# Patient Record
Sex: MALE | Race: WHITE | NOT HISPANIC OR LATINO | URBAN - METROPOLITAN AREA
[De-identification: names, ages, dates, MRNs, and addresses within clinical notes are randomized per-mention and may not be internally consistent; named-entity substitution may affect disease eponyms.]

---

## 2020-11-09 ENCOUNTER — OFFICE VISIT (OUTPATIENT)
Dept: ENDOCRINOLOGY | Facility: CLINIC | Age: 70
End: 2020-11-09

## 2020-11-09 VITALS
DIASTOLIC BLOOD PRESSURE: 78 MMHG | HEART RATE: 82 BPM | SYSTOLIC BLOOD PRESSURE: 126 MMHG | TEMPERATURE: 98.9 F | OXYGEN SATURATION: 98 % | BODY MASS INDEX: 32.93 KG/M2 | HEIGHT: 70 IN | WEIGHT: 230 LBS

## 2020-11-09 DIAGNOSIS — I10 BENIGN HYPERTENSION: ICD-10-CM

## 2020-11-09 DIAGNOSIS — E11.65 UNCONTROLLED TYPE 2 DIABETES MELLITUS WITH HYPERGLYCEMIA (HCC): Primary | ICD-10-CM

## 2020-11-09 PROBLEM — I25.10 ATHEROSCLEROSIS OF NATIVE CORONARY ARTERY OF NATIVE HEART WITHOUT ANGINA PECTORIS: Status: ACTIVE | Noted: 2020-11-09

## 2020-11-09 PROBLEM — E78.2 MIXED HYPERLIPIDEMIA: Status: ACTIVE | Noted: 2020-11-09

## 2020-11-09 LAB
EXPIRATION DATE: NORMAL
HBA1C MFR BLD: 6.8 %
Lab: NORMAL

## 2020-11-09 PROCEDURE — 83036 HEMOGLOBIN GLYCOSYLATED A1C: CPT | Performed by: INTERNAL MEDICINE

## 2020-11-09 PROCEDURE — 99213 OFFICE O/P EST LOW 20 MIN: CPT | Performed by: INTERNAL MEDICINE

## 2020-11-09 RX ORDER — DULAGLUTIDE 1.5 MG/.5ML
INJECTION, SOLUTION SUBCUTANEOUS
COMMUNITY
Start: 2020-11-06 | End: 2021-05-27 | Stop reason: DRUGHIGH

## 2020-11-09 RX ORDER — VALSARTAN AND HYDROCHLOROTHIAZIDE 320; 25 MG/1; MG/1
1 TABLET, FILM COATED ORAL DAILY
COMMUNITY
Start: 2020-11-06

## 2020-11-09 RX ORDER — OMEPRAZOLE 20 MG/1
1 CAPSULE, DELAYED RELEASE ORAL DAILY
COMMUNITY
Start: 2020-09-01 | End: 2022-03-07 | Stop reason: SDUPTHER

## 2020-11-09 RX ORDER — POTASSIUM CITRATE 15 MEQ/1
TABLET, EXTENDED RELEASE ORAL EVERY 12 HOURS SCHEDULED
COMMUNITY
End: 2021-10-12

## 2020-11-09 RX ORDER — UBIDECARENONE 100 MG
100 CAPSULE ORAL DAILY
COMMUNITY

## 2020-11-09 RX ORDER — NITROGLYCERIN 0.4 MG/1
TABLET SUBLINGUAL
COMMUNITY

## 2020-11-09 RX ORDER — ALLOPURINOL 300 MG/1
1 TABLET ORAL DAILY
COMMUNITY
Start: 2020-09-01 | End: 2022-03-07 | Stop reason: SDUPTHER

## 2020-11-09 RX ORDER — METOPROLOL SUCCINATE 50 MG/1
1 TABLET, EXTENDED RELEASE ORAL DAILY
COMMUNITY
Start: 2020-11-06

## 2020-11-09 NOTE — PROGRESS NOTES
"     Office Note      Date: 2020  Patient Name: Aldair Narvaez  MRN: [id number]  : 1950    Chief Complaint   Patient presents with   • Follow-up   • Diabetes       History of Present Illness:   Aldair Narvaez is a 70 y.o. male who presents for Diabetes type 2. Diagnosed in: . Treated in past with oral agents. Current treatments: trulicity. Number of insulin shots per day: none. Checks blood sugar none times a day. Has low blood sugar: no. Aspirin use: Yes. Statin use: No - intolerant. ACE-I/ARB use: Yes. Changes in health since last visit: kidney stone. Last eye exam 2019.    Subjective      Diabetic Complications:  Eyes: No  Kidneys: No  Feet: No  Heart: Yes - stents    Diet and Exercise:  Meals per day: 2  Minutes of exercise per week: 0 mins.    Review of Systems:   Review of Systems   Constitutional: Negative.    Cardiovascular: Negative.    Gastrointestinal: Positive for diarrhea and vomiting.   Endocrine: Negative.        The following portions of the patient's history were reviewed and updated as appropriate: allergies, current medications, past family history, past medical history, past social history, past surgical history and problem list.    Objective       Visit Vitals  /78   Pulse 82   Temp 98.9 °F (37.2 °C) (Infrared)   Ht 177.8 cm (70\")   Wt 104 kg (230 lb)   SpO2 98%   BMI 33.00 kg/m²       Physical Exam:  Physical Exam  Constitutional:       Appearance: Normal appearance.   Neurological:      Mental Status: He is alert.         Labs:    HbA1c  Lab Results   Component Value Date    HGBA1C 6.8 2020       CMP  No results found for: GLUCOSE, BUN, CREATININE, EGFRIFNONA, EGFRIFAFRI, BCR, K, CO2, CALCIUM, PROTENTOTREF, LABIL2, BILIRUBIN, AST, ALT     Lipid Panel        TSH  No results found for: TSH, FREET4     Hemoglobin A1C  Lab Results   Component Value Date    HGBA1C 6.8 2020        Microalbumin/Creatinine  No results found for: MALBCRERATIO, CREATINIURIN, " MICROALBUR        Assessment / Plan      Assessment & Plan:  Problem List Items Addressed This Visit        Cardiovascular and Mediastinum    Benign hypertension    Current Assessment & Plan     Hypertension is unchanged.  Continue current treatment regimen.  Blood pressure will be reassessed in 3 months.         Relevant Medications    valsartan-hydrochlorothiazide (DIOVAN-HCT) 320-25 MG per tablet    metoprolol succinate XL (TOPROL-XL) 50 MG 24 hr tablet       Endocrine    Uncontrolled type 2 diabetes mellitus with hyperglycemia (CMS/[facility]) - Primary    Current Assessment & Plan     Diabetes is unchanged.   Continue current treatment regimen.  Diabetes will be reassessed in 3 months.    A1c okay.         Relevant Medications    Trulicity 1.5 MG/0.5ML solution pen-injector    Other Relevant Orders    POC Glycosylated Hemoglobin (Hb A1C) (Completed)           Return in about 3 months (around 2/9/2021) for Recheck with A1c, CMP, lipids, TSH, microalbumin.    Steve Cohen MD   11/09/2020

## 2020-11-09 NOTE — ASSESSMENT & PLAN NOTE
Diabetes is unchanged.   Continue current treatment regimen.  Diabetes will be reassessed in 3 months.    A1c okay.

## 2021-02-17 ENCOUNTER — OFFICE VISIT (OUTPATIENT)
Dept: ENDOCRINOLOGY | Facility: CLINIC | Age: 71
End: 2021-02-17

## 2021-02-17 ENCOUNTER — LAB (OUTPATIENT)
Dept: LAB | Facility: HOSPITAL | Age: 71
End: 2021-02-17

## 2021-02-17 VITALS
TEMPERATURE: 97.1 F | DIASTOLIC BLOOD PRESSURE: 66 MMHG | BODY MASS INDEX: 34.07 KG/M2 | OXYGEN SATURATION: 97 % | SYSTOLIC BLOOD PRESSURE: 124 MMHG | WEIGHT: 238 LBS | HEIGHT: 70 IN | HEART RATE: 78 BPM

## 2021-02-17 DIAGNOSIS — E11.65 UNCONTROLLED TYPE 2 DIABETES MELLITUS WITH HYPERGLYCEMIA (HCC): Primary | ICD-10-CM

## 2021-02-17 DIAGNOSIS — I25.10 ATHEROSCLEROSIS OF NATIVE CORONARY ARTERY OF NATIVE HEART WITHOUT ANGINA PECTORIS: ICD-10-CM

## 2021-02-17 DIAGNOSIS — I10 BENIGN HYPERTENSION: ICD-10-CM

## 2021-02-17 DIAGNOSIS — E11.65 UNCONTROLLED TYPE 2 DIABETES MELLITUS WITH HYPERGLYCEMIA (HCC): ICD-10-CM

## 2021-02-17 DIAGNOSIS — E78.2 MIXED HYPERLIPIDEMIA: ICD-10-CM

## 2021-02-17 LAB
ALBUMIN SERPL-MCNC: 4.3 G/DL (ref 3.5–5.2)
ALBUMIN UR-MCNC: <1.2 MG/DL
ALBUMIN/GLOB SERPL: 1.3 G/DL
ALP SERPL-CCNC: 78 U/L (ref 39–117)
ALT SERPL W P-5'-P-CCNC: 38 U/L (ref 1–41)
ANION GAP SERPL CALCULATED.3IONS-SCNC: 11.8 MMOL/L (ref 5–15)
AST SERPL-CCNC: 49 U/L (ref 1–40)
BILIRUB SERPL-MCNC: 0.4 MG/DL (ref 0–1.2)
BUN SERPL-MCNC: 16 MG/DL (ref 8–23)
BUN/CREAT SERPL: 20 (ref 7–25)
CALCIUM SPEC-SCNC: 10.1 MG/DL (ref 8.6–10.5)
CHLORIDE SERPL-SCNC: 98 MMOL/L (ref 98–107)
CHOLEST SERPL-MCNC: 120 MG/DL (ref 0–200)
CO2 SERPL-SCNC: 28.2 MMOL/L (ref 22–29)
CREAT SERPL-MCNC: 0.8 MG/DL (ref 0.76–1.27)
CREAT UR-MCNC: 130.4 MG/DL
EXPIRATION DATE: NORMAL
GFR SERPL CREATININE-BSD FRML MDRD: 96 ML/MIN/1.73
GLOBULIN UR ELPH-MCNC: 3.4 GM/DL
GLUCOSE SERPL-MCNC: 159 MG/DL (ref 65–99)
HBA1C MFR BLD: 7.5 %
HDLC SERPL-MCNC: 29 MG/DL (ref 40–60)
LDLC SERPL CALC-MCNC: 63 MG/DL (ref 0–100)
LDLC/HDLC SERPL: 2.03 {RATIO}
Lab: NORMAL
MICROALBUMIN/CREAT UR: NORMAL MG/G{CREAT}
POTASSIUM SERPL-SCNC: 4.8 MMOL/L (ref 3.5–5.2)
PROT SERPL-MCNC: 7.7 G/DL (ref 6–8.5)
SODIUM SERPL-SCNC: 138 MMOL/L (ref 136–145)
TRIGL SERPL-MCNC: 161 MG/DL (ref 0–150)
TSH SERPL DL<=0.05 MIU/L-ACNC: 2.27 UIU/ML (ref 0.27–4.2)
VLDLC SERPL-MCNC: 28 MG/DL (ref 5–40)

## 2021-02-17 PROCEDURE — 82043 UR ALBUMIN QUANTITATIVE: CPT

## 2021-02-17 PROCEDURE — 82570 ASSAY OF URINE CREATININE: CPT

## 2021-02-17 PROCEDURE — 99214 OFFICE O/P EST MOD 30 MIN: CPT | Performed by: INTERNAL MEDICINE

## 2021-02-17 PROCEDURE — 80053 COMPREHEN METABOLIC PANEL: CPT

## 2021-02-17 PROCEDURE — 83036 HEMOGLOBIN GLYCOSYLATED A1C: CPT | Performed by: INTERNAL MEDICINE

## 2021-02-17 PROCEDURE — 84443 ASSAY THYROID STIM HORMONE: CPT

## 2021-02-17 PROCEDURE — 80061 LIPID PANEL: CPT

## 2021-02-17 RX ORDER — ROSUVASTATIN CALCIUM 20 MG/1
20 TABLET, COATED ORAL DAILY
COMMUNITY
Start: 2020-11-23

## 2021-02-17 NOTE — ASSESSMENT & PLAN NOTE
Diabetes is worsening.   Continue current treatment regimen.  Diabetes will be reassessed in 3 months.    Work on diet/exercise/weight loss.  If A1c still high next visit, consider increasing trulicity dose.

## 2021-05-27 ENCOUNTER — OFFICE VISIT (OUTPATIENT)
Dept: ENDOCRINOLOGY | Facility: CLINIC | Age: 71
End: 2021-05-27

## 2021-05-27 VITALS
HEIGHT: 70 IN | SYSTOLIC BLOOD PRESSURE: 136 MMHG | HEART RATE: 78 BPM | DIASTOLIC BLOOD PRESSURE: 68 MMHG | BODY MASS INDEX: 33.93 KG/M2 | OXYGEN SATURATION: 96 % | WEIGHT: 237 LBS

## 2021-05-27 DIAGNOSIS — I25.10 ATHEROSCLEROSIS OF NATIVE CORONARY ARTERY OF NATIVE HEART WITHOUT ANGINA PECTORIS: ICD-10-CM

## 2021-05-27 DIAGNOSIS — E11.65 UNCONTROLLED TYPE 2 DIABETES MELLITUS WITH HYPERGLYCEMIA (HCC): Primary | ICD-10-CM

## 2021-05-27 DIAGNOSIS — I10 BENIGN HYPERTENSION: ICD-10-CM

## 2021-05-27 DIAGNOSIS — E78.2 MIXED HYPERLIPIDEMIA: ICD-10-CM

## 2021-05-27 LAB
EXPIRATION DATE: ABNORMAL
EXPIRATION DATE: NORMAL
GLUCOSE BLDC GLUCOMTR-MCNC: 157 MG/DL (ref 70–130)
HBA1C MFR BLD: 8.2 %
Lab: ABNORMAL
Lab: NORMAL

## 2021-05-27 PROCEDURE — 82947 ASSAY GLUCOSE BLOOD QUANT: CPT | Performed by: INTERNAL MEDICINE

## 2021-05-27 PROCEDURE — 99214 OFFICE O/P EST MOD 30 MIN: CPT | Performed by: INTERNAL MEDICINE

## 2021-05-27 PROCEDURE — 83036 HEMOGLOBIN GLYCOSYLATED A1C: CPT | Performed by: INTERNAL MEDICINE

## 2021-05-27 RX ORDER — PANTOPRAZOLE SODIUM 40 MG/1
TABLET, DELAYED RELEASE ORAL DAILY
COMMUNITY
Start: 2021-04-12

## 2021-05-27 RX ORDER — DULAGLUTIDE 3 MG/.5ML
3 INJECTION, SOLUTION SUBCUTANEOUS
Qty: 6 ML | Refills: 3 | Status: SHIPPED | OUTPATIENT
Start: 2021-05-27 | End: 2021-10-12

## 2021-05-27 NOTE — ASSESSMENT & PLAN NOTE
Diabetes is worsening.   Continue current treatment regimen.  But increase trulicity.  Diabetes will be reassessed in 3 months.

## 2021-05-27 NOTE — PROGRESS NOTES
"     Office Note      Date: 2021  Patient Name: Aldair Narvaez  MRN: [id number]  : 1950    Chief Complaint   Patient presents with   • Diabetes       History of Present Illness:   Aldair Narvaez is a 70 y.o. male who presents for Diabetes type 2. Diagnosed in: . Treated in past with oral agents. Current treatments: trulicity. Number of insulin shots per day: none. Checks blood sugar intermittently. Has low blood sugar: no. Aspirin use: Yes. Statin use: Yes. ACE-I/ARB use: Yes. Changes in health since last visit: none. Last eye exam 2019.    Subjective      Diabetic Complications:  Eyes: No  Kidneys: No  Feet: No  Heart: Yes - stents    Diet and Exercise:  Meals per day: 2  Minutes of exercise per week: 0 mins.    Review of Systems:   Review of Systems   Constitutional: Negative.    Cardiovascular: Negative.    Gastrointestinal: Negative.    Endocrine: Negative.        The following portions of the patient's history were reviewed and updated as appropriate: allergies, current medications, past family history, past medical history, past social history, past surgical history and problem list.    Objective       Visit Vitals  /68   Pulse 78   Ht 177.8 cm (70\")   Wt 108 kg (237 lb)   SpO2 96%   BMI 34.01 kg/m²       Physical Exam:  Physical Exam  Constitutional:       Appearance: Normal appearance.   Neurological:      Mental Status: He is alert.         Labs:    HbA1c  Lab Results   Component Value Date    HGBA1C 8.2 2021       CMP  Lab Results   Component Value Date    GLUCOSE 159 (H) 2021    BUN 16 2021    CREATININE 0.80 2021    EGFRIFNONA 96 2021    BCR 20.0 2021    K 4.8 2021    CO2 28.2 2021    CALCIUM 10.1 2021    AST 49 (H) 2021    ALT 38 2021        Lipid Panel  Lab Results   Component Value Date    HDL 29 (L) 2021    LDL 63 2021    TRIG 161 (H) 2021        TSH  Lab Results   Component Value Date    TSH 2.270 " 02/17/2021        Hemoglobin A1C  Lab Results   Component Value Date    HGBA1C 8.2 05/27/2021        Microalbumin/Creatinine  Lab Results   Component Value Date    MALBCRERATIO  02/17/2021      Comment:      Unable to calculate    MICROALBUR <1.2 02/17/2021           Assessment / Plan      Assessment & Plan:  Diagnoses and all orders for this visit:    1. Uncontrolled type 2 diabetes mellitus with hyperglycemia (CMS/[facility]) (Primary)  Assessment & Plan:  Diabetes is worsening.   Continue current treatment regimen.  But increase trulicity.  Diabetes will be reassessed in 3 months.    Orders:  -     POC Glycosylated Hemoglobin (Hb A1C)  -     POC Glucose, Blood    2. Benign hypertension  Assessment & Plan:  Hypertension is unchanged.  Continue current treatment regimen.  Blood pressure will be reassessed at the next regular appointment.      3. Mixed hyperlipidemia  Assessment & Plan:  Continue statin.      4. Atherosclerosis of native coronary artery of native heart without angina pectoris  Assessment & Plan:  Continue ASA, statin and GLP-1 RA.      Other orders  -     Dulaglutide (Trulicity) 3 MG/0.5ML solution pen-injector; Inject 3 mg under the skin into the appropriate area as directed Every 7 (Seven) Days.  Dispense: 6 mL; Refill: 3      Return in about 3 months (around 8/27/2021) for Recheck with A1c.    Steve Cohen MD   05/27/2021

## 2021-10-12 ENCOUNTER — MEDICATION THERAPY MANAGEMENT (OUTPATIENT)
Dept: ENDOCRINOLOGY | Facility: CLINIC | Age: 71
End: 2021-10-12

## 2021-10-12 ENCOUNTER — OFFICE VISIT (OUTPATIENT)
Dept: ENDOCRINOLOGY | Facility: CLINIC | Age: 71
End: 2021-10-12

## 2021-10-12 VITALS
DIASTOLIC BLOOD PRESSURE: 68 MMHG | SYSTOLIC BLOOD PRESSURE: 130 MMHG | BODY MASS INDEX: 33.79 KG/M2 | OXYGEN SATURATION: 97 % | HEART RATE: 77 BPM | WEIGHT: 236 LBS | HEIGHT: 70 IN

## 2021-10-12 DIAGNOSIS — I10 BENIGN HYPERTENSION: ICD-10-CM

## 2021-10-12 DIAGNOSIS — E78.2 MIXED HYPERLIPIDEMIA: ICD-10-CM

## 2021-10-12 DIAGNOSIS — I25.10 ATHEROSCLEROSIS OF NATIVE CORONARY ARTERY OF NATIVE HEART WITHOUT ANGINA PECTORIS: ICD-10-CM

## 2021-10-12 DIAGNOSIS — E11.65 UNCONTROLLED TYPE 2 DIABETES MELLITUS WITH HYPERGLYCEMIA (HCC): Primary | ICD-10-CM

## 2021-10-12 LAB
EXPIRATION DATE: ABNORMAL
EXPIRATION DATE: NORMAL
GLUCOSE BLDC GLUCOMTR-MCNC: 230 MG/DL (ref 70–130)
HBA1C MFR BLD: 8.3 %
Lab: ABNORMAL
Lab: NORMAL

## 2021-10-12 PROCEDURE — 99214 OFFICE O/P EST MOD 30 MIN: CPT | Performed by: INTERNAL MEDICINE

## 2021-10-12 PROCEDURE — 3052F HG A1C>EQUAL 8.0%<EQUAL 9.0%: CPT | Performed by: INTERNAL MEDICINE

## 2021-10-12 PROCEDURE — 82947 ASSAY GLUCOSE BLOOD QUANT: CPT | Performed by: INTERNAL MEDICINE

## 2021-10-12 PROCEDURE — 83036 HEMOGLOBIN GLYCOSYLATED A1C: CPT | Performed by: INTERNAL MEDICINE

## 2021-10-12 RX ORDER — DAPAGLIFLOZIN 10 MG/1
10 TABLET, FILM COATED ORAL DAILY
Qty: 30 TABLET | Refills: 5 | Status: SHIPPED | OUTPATIENT
Start: 2021-10-12 | End: 2022-03-07 | Stop reason: SDUPTHER

## 2021-10-12 NOTE — ASSESSMENT & PLAN NOTE
Diabetes is unchanged.   Having some GI issues that could be due to trulicity.  Stop trulicity. Trial of SGLT-2 inhibitor.  Potential s/e discussed.  Diabetes will be reassessed in 3 months.

## 2021-10-12 NOTE — PROGRESS NOTES
"     Office Note      Date: 10/12/2021  Patient Name: Aldair Narvaez  MRN: [id number]  : 1950    Chief Complaint   Patient presents with   • Diabetes       History of Present Illness:   Aldair Narvaez is a 71 y.o. male who presents for Diabetes type 2. Diagnosed in: . Treated in past with oral agents. Current treatments: trulicity. Number of insulin shots per day: none. Checks blood sugar intermittently. Has low blood sugar: no. Aspirin use: Yes. Statin use: Yes. ACE-I/ARB use: Yes. Changes in health since last visit: none. Last eye exam 2021.    Subjective      Diabetic Complications:  Eyes: No  Kidneys: No  Feet: No  Heart: Yes - stents    Diet and Exercise:  Meals per day: 2  Minutes of exercise per week: 0 mins.    Review of Systems:   Review of Systems   Constitutional: Negative.    Cardiovascular: Negative.    Gastrointestinal: Positive for abdominal distention, diarrhea and vomiting.   Endocrine: Negative.        The following portions of the patient's history were reviewed and updated as appropriate: allergies, current medications, past family history, past medical history, past social history, past surgical history and problem list.    Objective       Visit Vitals  /68 (BP Location: Left arm, Patient Position: Sitting, Cuff Size: Adult)   Pulse 77   Ht 177.8 cm (70\")   Wt 107 kg (236 lb)   SpO2 97%   BMI 33.86 kg/m²       Physical Exam:  Physical Exam  Constitutional:       Appearance: Normal appearance.   Neurological:      Mental Status: He is alert.         Labs:    HbA1c  Lab Results   Component Value Date    HGBA1C 8.1 2022       CMP  Lab Results   Component Value Date    GLUCOSE 159 (H) 2021    BUN 16 2021    CREATININE 0.80 2021    EGFRIFNONA 96 2021    BCR 20.0 2021    K 4.8 2021    CO2 28.2 2021    CALCIUM 10.1 2021    AST 49 (H) 2021    ALT 38 2021        Lipid Panel  Lab Results   Component Value Date    HDL 29 (L) " 02/17/2021    LDL 63 02/17/2021    TRIG 161 (H) 02/17/2021        TSH  Lab Results   Component Value Date    TSH 2.270 02/17/2021        Hemoglobin A1C  Lab Results   Component Value Date    HGBA1C 8.1 03/07/2022        Microalbumin/Creatinine  Lab Results   Component Value Date    EZTIO  02/17/2021      Comment:      Unable to calculate    MICROALBUR <1.2 02/17/2021           Assessment / Plan      Assessment & Plan:  Diagnoses and all orders for this visit:    1. Uncontrolled type 2 diabetes mellitus with hyperglycemia (HCC) (Primary)  Assessment & Plan:  Diabetes is unchanged.   Having some GI issues that could be due to trulicity.  Stop trulicity. Trial of SGLT-2 inhibitor.  Potential s/e discussed.  Diabetes will be reassessed in 3 months.    Orders:  -     POC Glycosylated Hemoglobin (Hb A1C)  -     POC Glucose, Blood    2. Benign hypertension  Assessment & Plan:  Hypertension is unchanged.  Continue current treatment regimen.  Blood pressure will be reassessed at the next regular appointment.      3. Mixed hyperlipidemia  Assessment & Plan:  Continue statin.      4. Atherosclerosis of native coronary artery of native heart without angina pectoris  Assessment & Plan:  Continue ASA and statin.  Stop GLP-1 RA but try starting SGLT-2 inhibitor.      Other orders  -     Dapagliflozin Propanediol (Farxiga) 10 MG tablet; Take 10 mg by mouth Daily.  Dispense: 30 tablet; Refill: 5      Return in about 3 months (around 1/12/2022) for Recheck with A1c, CMP, lipids, TSH, microalbumin.    Steve Cohen MD   10/12/2021

## 2021-10-12 NOTE — PROGRESS NOTES
[facility] Pharmacy Visit [facility]    Aldair Narvaez is a 71 y.o. male with T2DM seen by Endocrinology and assessed by the [facility] Pharmacist at [facility]. This was an Initial MTM visit for Aldair Narvaez.    Aldair Narvaez was introduced to the [facility] and allergies, PMH, and medications were reviewed.    Past Medical History:    Past Medical History:   Diagnosis Date   • Acid reflux    • Arthritis    • Benign hypertension    • Colon polyp    • Coronary artery disease    • Coronary atherosclerosis    • Heart disease    • Hyperlipidemia    • Hypertension    • Kidney stone    • Mixed hyperlipidemia    • Obesity     body mass index 30+-obesity   • Prostate cancer (HCC)    • Type 2 diabetes mellitus (HCC)    • Type 2 diabetes mellitus (HCC)      Social History     Socioeconomic History   • Marital status: Unknown   Tobacco Use   • Smoking status: Never Smoker   • Smokeless tobacco: Never Used   Vaping Use   • Vaping Use: Never used   Substance and Sexual Activity   • Alcohol use: Yes     Comment: once a week   • Drug use: Never   • Sexual activity: Defer       Medication Allergies:  Iodine and Oxycodone-acetaminophen    Current Medication List:    Current Outpatient Medications:   •  allopurinol (ZYLOPRIM) 300 MG tablet, 1 tablet Daily., Disp: , Rfl:   •  Aspirin Buf,CaCarb-MgCarb-MgO, 81 MG tablet, 1 tablet Daily., Disp: , Rfl:   •  coenzyme Q10 100 MG capsule, Take 100 mg by mouth Daily., Disp: , Rfl:   •  Dapagliflozin Propanediol (Farxiga) 10 MG tablet, Take 10 mg by mouth Daily., Disp: 30 tablet, Rfl: 5  •  metoprolol succinate XL (TOPROL-XL) 50 MG 24 hr tablet, 1 tablet Daily., Disp: , Rfl:   •  nitroglycerin (NITROSTAT) 0.4 MG SL tablet, nitroglycerin 0.4 mg sublingual tablet, Disp: , Rfl:   •  omeprazole (priLOSEC) 20 MG capsule, 1 capsule Daily., Disp: , Rfl:   •  pantoprazole (PROTONIX) 40 MG EC tablet, Daily., Disp: , Rfl:   •   rosuvastatin (CRESTOR) 20 MG tablet, , Disp: , Rfl:   •  valsartan-hydrochlorothiazide (DIOVAN-HCT) 320-25 MG per tablet, 1 tablet Daily., Disp: , Rfl:     Vitals/Labs:  BP: (130)/(68) 130/68   Heart Rate:  [77] 77      A1C Last 3 Results    HGBA1C Last 3 Results 2/17/21 5/27/21 10/12/21   Hemoglobin A1C 7.5 8.2 8.3            Lab Results   Component Value Date    GLUCOSE 159 (H) 02/17/2021    CALCIUM 10.1 02/17/2021     02/17/2021    K 4.8 02/17/2021    CO2 28.2 02/17/2021    CL 98 02/17/2021    BUN 16 02/17/2021    CREATININE 0.80 02/17/2021    EGFRIFNONA 96 02/17/2021    BCR 20.0 02/17/2021    ANIONGAP 11.8 02/17/2021     Lab Results   Component Value Date    CHOL 120 02/17/2021    TRIG 161 (H) 02/17/2021    HDL 29 (L) 02/17/2021    LDL 63 02/17/2021     Microalbumin    Microalbumin 2/17/21   Microalbumin, Urine <1.2              Drug-Drug Interactions:   • No clinically significant DDI identified per chart review     Medication Assessment:   1. Aspirin - Currently Taking  2. Statin - Currently Taking  3. ACEi/ARB - Currently Taking    Medication Education on Specialty Medication:  The patient was provided medication education via verbal and written information on new and/or current target medications. Patient expressed understanding and had no further questions at this time.    Farxiga (dapagliflozin)   · Discussed the medication's MOA and that it may cause more frequent urination particularly upon starting the medication  · Take one tablet in the morning with or without food    · Encouraged to drink plenty of water as to not get dehydrated especially in warmer weather or with activity  · Also discussed there may be an increased incidence of UTIs or yeast infections and to monitor for signs/symptoms such as redness, itching, pain/burning, discharge, or odor.       Assessment & Plan:  1. Provider Changes This Visit: Trulicity discontinued d/t abdominal ADEs; Farxiga prescribed   2. During pharmacy visit,  "patient mentioned that he recently had an epidural for back pain ~10 days ago. He endorsed the following urinary symptoms for the last ~7 days, urinating every 30-60 minutes, incontinence, burning with urination, and cold sweats. Denies any new/worsening flank pain or fever but notes frequent \"cold sweats\". Discussed with Dr. Cohen, he advised pt see PCP regarding symptoms and to hold initiation of Farxiga until pt has been evaluated by PCP and urinary symptoms resolved. Pt expressed understanding and agreeable to plan.    3. Therapy Modifications Recommended: None   4. Provided contact information for the pharmacy team and discussed [facility] services available to patient, including: monitoring of refills, prior authorizations, and copay coupon cards, as applicable, as well as curb-side pick-up or mail-order as needed.   5. Mr. Narvaez is currently filling prescriptions at [email or web address] but has had difficulty with Trulicity being very expensive as he remains in the coverage gap. Advised that Farxiga may also be expensive but unable to provide  Retail copay information, as Rx is currently being processed at [facility]. Farxiga samples provided for trial prior to first fill. Also provided patient with AZ&Me PAP application should he feel he could qualify and Farxiga use otherwise cost-prohibitive. Pt thanked writer for assistance and will contact PharmD if interested in filling with  in future.   6. PharmD Follow Up: Plan to f/u with pt at next provider appointment or sooner if contacted by patient.     Jazmin Kilpatrick, [place]  10/12/2021  17:10 EDT    "

## 2021-11-10 ENCOUNTER — TELEPHONE (OUTPATIENT)
Dept: ENDOCRINOLOGY | Facility: CLINIC | Age: 71
End: 2021-11-10

## 2021-11-10 NOTE — TELEPHONE ENCOUNTER
No answer and no message left. Need to find out which physician he saw.    Tried to contact Urology and no one answered.

## 2021-11-10 NOTE — TELEPHONE ENCOUNTER
PATIENT WAS SEEN AT [facility] UROLOGY AND HAS OVER 1000 GLUCOSE IN HIS URINE. PATIENT COMPLAINED HIS BLOOD SUGARS OVER 200 AND NEEDS TO BE ADVISED ON WHAT TO DO ABOUT URINE AND HIGH BLOOD SUGARS. PHONE NUMBER [phone]

## 2021-11-30 RX ORDER — GLIMEPIRIDE 2 MG/1
2 TABLET ORAL
Qty: 30 TABLET | Refills: 5 | Status: SHIPPED | OUTPATIENT
Start: 2021-11-30 | End: 2022-02-21 | Stop reason: SDUPTHER

## 2021-11-30 NOTE — TELEPHONE ENCOUNTER
Pt called because he was changed to Farxiga from Trulicity and he says that is is not lowering his sugar. He is wanting a call back to discuss his plan of care.

## 2022-02-21 RX ORDER — GLIMEPIRIDE 4 MG/1
4 TABLET ORAL
Qty: 90 TABLET | Refills: 1 | Status: SHIPPED | OUTPATIENT
Start: 2022-02-21 | End: 2022-03-07 | Stop reason: SDUPTHER

## 2022-02-21 NOTE — TELEPHONE ENCOUNTER
We had stopped trulicity last visit due to GI upset.  Did the GI issues improve?  We had started jardiance instead.  Is he still taking this?  We may need to add another medication.

## 2022-02-21 NOTE — TELEPHONE ENCOUNTER
Spoke with patient.  States his blood sugars have been averaging around 200.  Fasting are 170-180 and it's running in the 200's throughout the day.

## 2022-02-21 NOTE — TELEPHONE ENCOUNTER
Spoke with patient.  States his GI issues did improve.  He is taking Farxiga 10mg and glimepiride 2mg daily.

## 2022-02-21 NOTE — TELEPHONE ENCOUNTER
PT CALLED STATING THAT HIS BS's ARE RUNNING AROUND 200. HE REQUESTED A CALL BACK TO CONSULT TO GET BS LOWER. PLEASE AND THANK YOU

## 2022-02-24 ENCOUNTER — DOCUMENTATION (OUTPATIENT)
Dept: ENDOCRINOLOGY | Facility: CLINIC | Age: 72
End: 2022-02-24

## 2022-02-24 NOTE — PROGRESS NOTES
[employer]  Farxiga- $47/ 30 days    $141/ 90 days    April Evelyne Ho  Pharmacy Care Coordinator  2/24/2022  12:12 EST    
Daily Assessment

## 2022-03-07 ENCOUNTER — LAB (OUTPATIENT)
Dept: LAB | Facility: HOSPITAL | Age: 72
End: 2022-03-07

## 2022-03-07 ENCOUNTER — SPECIALTY PHARMACY (OUTPATIENT)
Dept: ENDOCRINOLOGY | Facility: CLINIC | Age: 72
End: 2022-03-07

## 2022-03-07 ENCOUNTER — OFFICE VISIT (OUTPATIENT)
Dept: ENDOCRINOLOGY | Facility: CLINIC | Age: 72
End: 2022-03-07

## 2022-03-07 VITALS
OXYGEN SATURATION: 98 % | HEIGHT: 70 IN | DIASTOLIC BLOOD PRESSURE: 78 MMHG | BODY MASS INDEX: 34.22 KG/M2 | WEIGHT: 239 LBS | HEART RATE: 64 BPM | SYSTOLIC BLOOD PRESSURE: 124 MMHG

## 2022-03-07 DIAGNOSIS — E11.65 UNCONTROLLED TYPE 2 DIABETES MELLITUS WITH HYPERGLYCEMIA: Primary | ICD-10-CM

## 2022-03-07 DIAGNOSIS — I10 BENIGN HYPERTENSION: ICD-10-CM

## 2022-03-07 DIAGNOSIS — E78.2 MIXED HYPERLIPIDEMIA: ICD-10-CM

## 2022-03-07 DIAGNOSIS — I25.10 ATHEROSCLEROSIS OF NATIVE CORONARY ARTERY OF NATIVE HEART WITHOUT ANGINA PECTORIS: ICD-10-CM

## 2022-03-07 DIAGNOSIS — E11.65 UNCONTROLLED TYPE 2 DIABETES MELLITUS WITH HYPERGLYCEMIA: ICD-10-CM

## 2022-03-07 LAB
ALBUMIN SERPL-MCNC: 4.3 G/DL (ref 3.5–5.2)
ALBUMIN UR-MCNC: 1.6 MG/DL
ALBUMIN/GLOB SERPL: 1.6 G/DL
ALP SERPL-CCNC: 69 U/L (ref 39–117)
ALT SERPL W P-5'-P-CCNC: 34 U/L (ref 1–41)
ANION GAP SERPL CALCULATED.3IONS-SCNC: 9.3 MMOL/L (ref 5–15)
AST SERPL-CCNC: 36 U/L (ref 1–40)
BILIRUB SERPL-MCNC: 0.3 MG/DL (ref 0–1.2)
BUN SERPL-MCNC: 15 MG/DL (ref 8–23)
BUN/CREAT SERPL: 18.3 (ref 7–25)
CALCIUM SPEC-SCNC: 9.6 MG/DL (ref 8.6–10.5)
CHLORIDE SERPL-SCNC: 101 MMOL/L (ref 98–107)
CHOLEST SERPL-MCNC: 162 MG/DL (ref 0–200)
CO2 SERPL-SCNC: 29.7 MMOL/L (ref 22–29)
CREAT SERPL-MCNC: 0.82 MG/DL (ref 0.76–1.27)
CREAT UR-MCNC: 144.5 MG/DL
EGFRCR SERPLBLD CKD-EPI 2021: 95.1 ML/MIN/1.73
EXPIRATION DATE: ABNORMAL
EXPIRATION DATE: NORMAL
GLOBULIN UR ELPH-MCNC: 2.7 GM/DL
GLUCOSE BLDC GLUCOMTR-MCNC: 202 MG/DL (ref 70–130)
GLUCOSE SERPL-MCNC: 208 MG/DL (ref 65–99)
HBA1C MFR BLD: 8.1 %
HDLC SERPL-MCNC: 33 MG/DL (ref 40–60)
LDLC SERPL CALC-MCNC: 107 MG/DL (ref 0–100)
LDLC/HDLC SERPL: 3.17 {RATIO}
Lab: ABNORMAL
Lab: NORMAL
MICROALBUMIN/CREAT UR: 11.1 MG/G
POTASSIUM SERPL-SCNC: 4.2 MMOL/L (ref 3.5–5.2)
PROT SERPL-MCNC: 7 G/DL (ref 6–8.5)
SODIUM SERPL-SCNC: 140 MMOL/L (ref 136–145)
TRIGL SERPL-MCNC: 122 MG/DL (ref 0–150)
TSH SERPL DL<=0.05 MIU/L-ACNC: 3.15 UIU/ML (ref 0.27–4.2)
VLDLC SERPL-MCNC: 22 MG/DL (ref 5–40)

## 2022-03-07 PROCEDURE — 99214 OFFICE O/P EST MOD 30 MIN: CPT | Performed by: INTERNAL MEDICINE

## 2022-03-07 PROCEDURE — 83036 HEMOGLOBIN GLYCOSYLATED A1C: CPT | Performed by: INTERNAL MEDICINE

## 2022-03-07 PROCEDURE — 84443 ASSAY THYROID STIM HORMONE: CPT

## 2022-03-07 PROCEDURE — 80053 COMPREHEN METABOLIC PANEL: CPT

## 2022-03-07 PROCEDURE — 82570 ASSAY OF URINE CREATININE: CPT

## 2022-03-07 PROCEDURE — 82043 UR ALBUMIN QUANTITATIVE: CPT

## 2022-03-07 PROCEDURE — 3052F HG A1C>EQUAL 8.0%<EQUAL 9.0%: CPT | Performed by: INTERNAL MEDICINE

## 2022-03-07 PROCEDURE — 82947 ASSAY GLUCOSE BLOOD QUANT: CPT | Performed by: INTERNAL MEDICINE

## 2022-03-07 PROCEDURE — 80061 LIPID PANEL: CPT

## 2022-03-07 RX ORDER — BUPROPION HYDROCHLORIDE 150 MG/1
TABLET ORAL DAILY
COMMUNITY
Start: 2022-03-04

## 2022-03-07 RX ORDER — AMLODIPINE BESYLATE 5 MG/1
TABLET ORAL DAILY
COMMUNITY
Start: 2022-03-04 | End: 2022-12-06

## 2022-03-07 RX ORDER — DAPAGLIFLOZIN 10 MG/1
10 TABLET, FILM COATED ORAL DAILY
Qty: 90 TABLET | Refills: 3 | Status: SHIPPED | OUTPATIENT
Start: 2022-03-07

## 2022-03-07 RX ORDER — GLIMEPIRIDE 4 MG/1
8 TABLET ORAL
Qty: 180 TABLET | Refills: 3 | Status: SHIPPED | OUTPATIENT
Start: 2022-03-07 | End: 2022-12-06 | Stop reason: SDUPTHER

## 2022-03-07 NOTE — PROGRESS NOTES
Specialty Pharmacy Patient Management Program  Endocrinology Introduction to Services Outreach     Aldair Narvaez is a 71 y.o. male with Type 2 Diabetes seen by an Endocrinology provider. This was an initial visit to introduce Endocrinology Patient Management Program and Specialty Pharmacy services offered by [facility], as the patient is taking a specialty medication.  Allergies, PMH, and medications were reviewed during this visit.      Relevant Past Medical History and Comorbidities  Past Medical History:   Diagnosis Date   • Acid reflux    • Arthritis    • Benign hypertension    • Colon polyp    • Coronary artery disease    • Coronary atherosclerosis    • Heart disease    • Hyperlipidemia    • Hypertension    • Kidney stone    • Mixed hyperlipidemia    • Obesity     body mass index 30+-obesity   • Prostate cancer (HCC)    • Type 2 diabetes mellitus (HCC)    • Type 2 diabetes mellitus (HCC)      Social History     Socioeconomic History   • Marital status: Unknown   Tobacco Use   • Smoking status: Never Smoker   • Smokeless tobacco: Never Used   Vaping Use   • Vaping Use: Never used   Substance and Sexual Activity   • Alcohol use: Yes     Comment: once a week   • Drug use: Never   • Sexual activity: Defer            Allergies  Iodine and Oxycodone-acetaminophen    Allergies reviewed by Jazmin Kilpatrick [facility] on 3/7/2022 at 10:08 AM    Current Medication List    Current Outpatient Medications:   •  amLODIPine (NORVASC) 5 MG tablet, Daily., Disp: , Rfl:   •  Aspirin Buf,CaCarb-MgCarb-MgO, 81 MG tablet, 1 tablet Daily., Disp: , Rfl:   •  buPROPion XL (WELLBUTRIN XL) 150 MG 24 hr tablet, Daily., Disp: , Rfl:   •  coenzyme Q10 100 MG capsule, Take 100 mg by mouth Daily., Disp: , Rfl:   •  Dapagliflozin Propanediol (Farxiga) 10 MG tablet, Take 10 mg by mouth Daily., Disp: 90 tablet, Rfl: 3  •  glimepiride (AMARYL) 4 MG tablet, Take 2 tablets by mouth Every Morning Before Breakfast., Disp: 180 tablet, Rfl:  3  •  metoprolol succinate XL (TOPROL-XL) 50 MG 24 hr tablet, 1 tablet Daily., Disp: , Rfl:   •  nitroglycerin (NITROSTAT) 0.4 MG SL tablet, nitroglycerin 0.4 mg sublingual tablet, Disp: , Rfl:   •  pantoprazole (PROTONIX) 40 MG EC tablet, Daily., Disp: , Rfl:   •  rosuvastatin (CRESTOR) 20 MG tablet, , Disp: , Rfl:   •  valsartan-hydrochlorothiazide (DIOVAN-HCT) 320-25 MG per tablet, 1 tablet Daily., Disp: , Rfl:     Medicines reviewed by Jazmin Kilpatrick RPH on 3/7/2022 at 10:08 AM    Recommended Medications Assessment  • Aspirin - Currently Taking  • Statin - Currently Taking  • ACEi/ARB - Currently Taking    Initial Education Provided for Specialty Medication  The patient has been provided with the following education and any applicable administration techniques (i.e. self-injection) have been demonstrated for the therapies indicated. All questions and concerns have been addressed prior to the patient receiving the medication, and the patient has verbalized understanding of the education and any materials provided.  Additional patient education shall be provided and documented upon request by the patient, provider or payer.      FARXIGA® (dapagliflozin)  Medication Expectations   Why am I taking this medication? You are taking Farxiga to lower blood sugar because you have type 2 diabetes. Diabetes is not curable but with proper medication and treatment, we can keep your blood sugar within your personalized target range. This medication also helps reduce the risk of progression of kidney disease, reduce the risk of death from heart attack or stroke, and reduce the risk of heart failure hospitalization.    What should I expect while on this medication? You should expect to see your blood sugar and A1c decrease over time. You may also see a decrease in your blood pressure and it can help some people lose weight.     How does the medication work? Farxiga works by helping to remove some sugar that the body doesn't  need through urination.    How long will I be on this medication for? The amount of time you will be on this medication will be determined by your doctor based on blood sugar and A1c control. You will most likely be on this medication or another diabetes medication throughout your lifetime. Do not abruptly stop this medication without talking to your doctor first.    How do I take this medication? Take as directed on your prescription label. This medication is usually taken in the morning and can be given with or without food.    What are some possible side effects? You may notice increased urination, especially when you first start Farxiga. Stuffy or runny nose can also occur. The most common side effects are urinary tract infections and yeast infections and are more commonly seen in females. Talk with your doctor if you notice white or yellow vaginal discharge, vaginal itching or odor of if you notice redness, itching, pain, or swelling of the penis and/or bad-smelling discharge from the penis.    What happens if I miss a dose? If you miss a dose, take it as soon as you remember. If it is close to your next dose, skip it (do not take 2 doses at once)     Medication Safety   What are things I should warn my doctor immediately about? Tell your doctor if you have kidney disease, liver disease, heart failure, pancreas problems, or history of frequent genital yeast or urinary tract infections. Tell your doctor if you are on a low-salt diet, if you drink alcohol, or if you are having surgery. Talk to your doctor if you are pregnant, planning to become pregnant, or breastfeeding. Also tell your doctor if you notice any signs/symptoms of an allergic reaction (rash, hives, difficulty breathing, etc.).   What are things that I should be cautious of? Be cautious of any side effects from this medication. Talk to your doctor if any new ones develop or aren't getting better.   What are some medications that can interact with  this one? Some medications that interact include diuretics (water pills) and other medications that may also lower your blood sugar such as insulins and glipizide/glimepiride/glyburide. Your doctor may reduce the dose of these medications when you start Farxiga to minimize low blood sugars. Always tell your doctor or pharmacist immediately if you start taking any new medications, including over-the-counter medications, vitamins, and herbal supplements.      Medication Storage/Handling   How should I handle this medication? Keep this medication out of reach of pets/children in tightly sealed container   How does this medication need to be stored? Store at room temperature and keep dry (don't keep in bathroom or other room with moisture)   How should I dispose of this medication? There should not be a need to dispose of this medication unless your provider decides to change the dose or therapy. If that is the case, take to your local police station for proper disposal. Some pharmacies also have take-back bins for medication drop-off.      Resources/Support   How can I remind myself to take this medication? You can download reminder apps to help you manage your refills. You may also set an alarm on your phone to remind you. The pharmacy carries pill boxes that you can place next to an area you pass everyday (such as where you place your car keys or where you charge your phone)   Is financial support available?  [employer] can provide co-pay cards if you have commercial insurance or patient assistance if you have Medicare or no insurance.    Which vaccines are recommended for me? Talk to your doctor about these vaccines: Flu, Coronavirus (COVID-19), Pneumococcal (pneumonia), Tdap, Hepatitis B, Zoster (shingles)        Reassessment Plan & Follow-Up  1. Medication Therapy Changes: Increase to Glimepiride 8mg QAM  2. Additional Plans, Therapy Recommendations, or Therapy Problems to Be Addressed: None   3. Patient declined  filling their specialty medication(s) at [facility] and/or enrollment in the Endocrine Disorders Patient Management Program at this time.     Jazmin Kilpatrick, PharmD, BCACP   3/7/2022  10:08 EST

## 2022-03-07 NOTE — ASSESSMENT & PLAN NOTE
Diabetes is unchanged.  A1c only slightly better at 8.1%.  GI issues resolved off trulicity,  Continue current treatment regimen.  But increase glimepiride.  Diabetes will be reassessed in 3 months.

## 2022-03-07 NOTE — ASSESSMENT & PLAN NOTE
Resume statin.  He has only been taking it intermittently due to joint pain.  Last was about a week ago.  We discussed using CoQ10 to see if this helps with joint/muscle aches.  Check lipids today.

## 2022-03-07 NOTE — PROGRESS NOTES
"     Office Note      Date: 2022  Patient Name: Aldair Narvaez  MRN: [id number]  : 1950    Chief Complaint   Patient presents with   • Diabetes       History of Present Illness:   Aldair Narvaez is a 71 y.o. male who presents for Diabetes type 2. Diagnosed in: . Treated in past with oral agents. Current treatments: farxiga and glimepiride. Number of insulin shots per day: none. Checks blood sugar intermittently. Has low blood sugar: no. Aspirin use: Yes. Statin use: Yes. ACE-I/ARB use: Yes. Changes in health since last visit: none. Last eye exam 2021.    Subjective      Diabetic Complications:  Eyes: No  Kidneys: No  Feet: No  Heart: Yes - stents    Diet and Exercise:  Meals per day: 2  Minutes of exercise per week: 0 mins.    Review of Systems:   Review of Systems   Constitutional: Negative.    Cardiovascular: Negative.    Gastrointestinal: Negative.    Endocrine: Negative.        The following portions of the patient's history were reviewed and updated as appropriate: allergies, current medications, past family history, past medical history, past social history, past surgical history and problem list.    Objective       Visit Vitals  /78   Pulse 64   Ht 177.8 cm (70\")   Wt 108 kg (239 lb)   SpO2 98%   BMI 34.29 kg/m²       Physical Exam:  Physical Exam  Constitutional:       Appearance: Normal appearance.   Cardiovascular:      Pulses:           Dorsalis pedis pulses are 1+ on the right side and 1+ on the left side.        Posterior tibial pulses are 1+ on the right side and 1+ on the left side.   Feet:      Right foot:      Protective Sensation: 5 sites tested. 5 sites sensed.      Skin integrity: Skin integrity normal.      Toenail Condition: Right toenails are abnormally thick. Fungal disease present.     Left foot:      Protective Sensation: 5 sites tested. 5 sites sensed.      Skin integrity: Skin integrity normal.      Toenail Condition: Left toenails are abnormally thick. Fungal disease " present.  Neurological:      Mental Status: He is alert.         Labs:    HbA1c  Lab Results   Component Value Date    HGBA1C 8.1 03/07/2022       CMP  Lab Results   Component Value Date    GLUCOSE 159 (H) 02/17/2021    BUN 16 02/17/2021    CREATININE 0.80 02/17/2021    EGFRIFNONA 96 02/17/2021    BCR 20.0 02/17/2021    K 4.8 02/17/2021    CO2 28.2 02/17/2021    CALCIUM 10.1 02/17/2021    AST 49 (H) 02/17/2021    ALT 38 02/17/2021        Lipid Panel  Lab Results   Component Value Date    HDL 29 (L) 02/17/2021    LDL 63 02/17/2021    TRIG 161 (H) 02/17/2021        TSH  Lab Results   Component Value Date    TSH 2.270 02/17/2021        Hemoglobin A1C  Lab Results   Component Value Date    HGBA1C 8.1 03/07/2022        Microalbumin/Creatinine  Lab Results   Component Value Date    MALBCRERATIO  02/17/2021      Comment:      Unable to calculate    MICROALBUR <1.2 02/17/2021           Assessment / Plan      Assessment & Plan:  Diagnoses and all orders for this visit:    1. Uncontrolled type 2 diabetes mellitus with hyperglycemia (HCC) (Primary)  Assessment & Plan:  Diabetes is unchanged.  A1c only slightly better at 8.1%.  GI issues resolved off trulicity,  Continue current treatment regimen.  But increase glimepiride.  Diabetes will be reassessed in 3 months.    Orders:  -     Comprehensive Metabolic Panel; Future  -     Lipid Panel; Future  -     Microalbumin / Creatinine Urine Ratio - Urine, Clean Catch; Future  -     TSH; Future  -     POC Glycosylated Hemoglobin (Hb A1C)  -     POC Glucose, Blood    2. Benign hypertension  Assessment & Plan:  Hypertension is unchanged.  Continue current treatment regimen.  Blood pressure will be reassessed at the next regular appointment.      3. Mixed hyperlipidemia  Assessment & Plan:  Resume statin.  He has only been taking it intermittently due to joint pain.  Last was about a week ago.  We discussed using CoQ10 to see if this helps with joint/muscle aches.  Check lipids  today.      4. Atherosclerosis of native coronary artery of native heart without angina pectoris  Assessment & Plan:  Continue ASA, statin and SGLT-2 inhibitor.      Other orders  -     glimepiride (AMARYL) 4 MG tablet; Take 2 tablets by mouth Every Morning Before Breakfast.  Dispense: 180 tablet; Refill: 3  -     Dapagliflozin Propanediol (Farxiga) 10 MG tablet; Take 10 mg by mouth Daily.  Dispense: 90 tablet; Refill: 3      Return in about 3 months (around 6/7/2022) for Recheck with A1c.    Steve Cohen MD   03/07/2022

## 2022-08-03 ENCOUNTER — OFFICE VISIT (OUTPATIENT)
Dept: ENDOCRINOLOGY | Facility: CLINIC | Age: 72
End: 2022-08-03

## 2022-08-03 VITALS
HEART RATE: 78 BPM | HEIGHT: 70 IN | OXYGEN SATURATION: 96 % | WEIGHT: 230 LBS | DIASTOLIC BLOOD PRESSURE: 82 MMHG | BODY MASS INDEX: 32.93 KG/M2 | SYSTOLIC BLOOD PRESSURE: 140 MMHG

## 2022-08-03 DIAGNOSIS — I25.10 ATHEROSCLEROSIS OF NATIVE CORONARY ARTERY OF NATIVE HEART WITHOUT ANGINA PECTORIS: ICD-10-CM

## 2022-08-03 DIAGNOSIS — E78.2 MIXED HYPERLIPIDEMIA: ICD-10-CM

## 2022-08-03 DIAGNOSIS — I10 BENIGN HYPERTENSION: ICD-10-CM

## 2022-08-03 DIAGNOSIS — E11.65 UNCONTROLLED TYPE 2 DIABETES MELLITUS WITH HYPERGLYCEMIA: Primary | ICD-10-CM

## 2022-08-03 LAB
EXPIRATION DATE: NORMAL
EXPIRATION DATE: NORMAL
GLUCOSE BLDC GLUCOMTR-MCNC: 116 MG/DL (ref 70–130)
HBA1C MFR BLD: 7.1 %
Lab: NORMAL
Lab: NORMAL

## 2022-08-03 PROCEDURE — 99214 OFFICE O/P EST MOD 30 MIN: CPT | Performed by: INTERNAL MEDICINE

## 2022-08-03 PROCEDURE — 3051F HG A1C>EQUAL 7.0%<8.0%: CPT | Performed by: INTERNAL MEDICINE

## 2022-08-03 PROCEDURE — 82947 ASSAY GLUCOSE BLOOD QUANT: CPT | Performed by: INTERNAL MEDICINE

## 2022-08-03 PROCEDURE — 83036 HEMOGLOBIN GLYCOSYLATED A1C: CPT | Performed by: INTERNAL MEDICINE

## 2022-08-03 RX ORDER — HYDROCODONE BITARTRATE AND ACETAMINOPHEN 5; 325 MG/1; MG/1
0.5 TABLET ORAL AS NEEDED
COMMUNITY
Start: 2022-07-08 | End: 2022-12-06

## 2022-08-03 RX ORDER — ALBUTEROL SULFATE 90 UG/1
AEROSOL, METERED RESPIRATORY (INHALATION) AS NEEDED
COMMUNITY
Start: 2022-06-12

## 2022-08-03 NOTE — ASSESSMENT & PLAN NOTE
Diabetes is improving with treatment.  A1c improved at 7.1%.  Continue current treatment regimen.  Work on diet/exercise.  Diabetes will be reassessed in 3 months.

## 2022-08-03 NOTE — PROGRESS NOTES
"     Office Note      Date: 2022  Patient Name: Aldair Narvaez  MRN: [id number]  : 1950    Chief Complaint   Patient presents with   • Diabetes     C/o hyperglycemic episodes throughout the day and night when checked.       History of Present Illness:   Aldair Narvaez is a 71 y.o. male who presents for Diabetes type 2. Diagnosed in: . Treated in past with oral agents. Current treatments: farxiga and glimepiride. Number of insulin shots per day: none. Checks blood sugar intermittently. Has low blood sugar: no. Aspirin use: Yes. Statin use: Yes. ACE-I/ARB use: Yes. Changes in health since last visit: possible mild MI. Last eye exam 2021.    Subjective      Diabetic Complications:  Eyes: No  Kidneys: No  Feet: No  Heart: Yes - stents    Diet and Exercise:  Meals per day: 2  Minutes of exercise per week: 0 mins.    Review of Systems:   Review of Systems   Constitutional: Negative.    Cardiovascular: Negative.    Gastrointestinal: Negative.    Endocrine: Negative.        The following portions of the patient's history were reviewed and updated as appropriate: allergies, current medications, past family history, past medical history, past social history, past surgical history and problem list.    Objective       Visit Vitals  /82   Pulse 78   Ht 177.8 cm (70\")   Wt 104 kg (230 lb)   SpO2 96%   BMI 33.00 kg/m²       Physical Exam:  Physical Exam  Constitutional:       Appearance: Normal appearance.   Neurological:      Mental Status: He is alert.         Labs:    HbA1c  Lab Results   Component Value Date    HGBA1C 7.1 2022       CMP  Lab Results   Component Value Date    GLUCOSE 208 (H) 2022    BUN 15 2022    CREATININE 0.82 2022    EGFRIFNONA 96 2021    BCR 18.3 2022    K 4.2 2022    CO2 29.7 (H) 2022    CALCIUM 9.6 2022    AST 36 2022    ALT 34 2022        Lipid Panel  Lab Results   Component Value Date    HDL 33 (L) 2022    LDL " 107 (H) 03/07/2022    TRIG 122 03/07/2022        TSH  Lab Results   Component Value Date    TSH 3.150 03/07/2022        Hemoglobin A1C  Lab Results   Component Value Date    HGBA1C 7.1 08/03/2022        Microalbumin/Creatinine  Lab Results   Component Value Date    MALBCRERATIO 11.1 03/07/2022    MICROALBUR 1.6 03/07/2022           Assessment / Plan      Assessment & Plan:  Diagnoses and all orders for this visit:    1. Uncontrolled type 2 diabetes mellitus with hyperglycemia (HCC) (Primary)  Assessment & Plan:  Diabetes is improving with treatment.  A1c improved at 7.1%.  Continue current treatment regimen.  Work on diet/exercise.  Diabetes will be reassessed in 3 months.    Orders:  -     POC Glycosylated Hemoglobin (Hb A1C)  -     POC Glucose, Blood    2. Benign hypertension  Assessment & Plan:  Hypertension is unchanged.  Continue current treatment regimen.  Blood pressure will be reassessed at the next regular appointment.      3. Mixed hyperlipidemia  Assessment & Plan:  Continue statin. Recent LDL above goal.      4. Atherosclerosis of native coronary artery of native heart without angina pectoris  Assessment & Plan:  Continue ASA, statin and SGLT-2 inhibitor.        Return in about 3 months (around 11/3/2022) for Recheck with A1c.    Steve Cohen MD   08/03/2022

## 2022-09-12 NOTE — PROGRESS NOTES
[facility] INPATIENT ENCOUNTER  CRITICAL CARE DAILY PROGRESS NOTE    ADMISSION DATE:  9/10/2022  DATE:  9/12/2022  CURRENT HOSPITAL DAY:  Hospital Day: 3  ATTENDING PHYSICIAN:  Elías Browne MD  CODE STATUS:  Full Resuscitation    IMPRESSION:   1. Hypotension 2/2 GI bleed, resolved  2. GI bleed  3. Anemia 2/2 #2  4. Hypokalemia       PLAN:   1. Responded to IVF. Now hypertensive. Holding antihypertensives given plan for procedure with sedation  2. GI following, colon prep done, EDG/Colon today, on Protonix gtt  3. Cont to trend H/H, no blood given at this time, transfuse if <7  4. Supplement per protocol    Discussed with patient and RN     Chief Complaint: GI bleed    SUBJECTIVE:  Pt in bed. She complains of epigastric pain and has some abdominal cramping. Denies SOB.     MEDICATIONS:  SCHEDULED MEDS:  Current Facility-Administered Medications   Medication Dose Route Frequency Provider Last Rate Last Admin   • [Held by provider] NIFEdipine XL (PROCARDIA XL) ER tablet 90 mg  90 mg Oral Daily Christy Govea MD   90 mg at 09/11/22 0503   • nystatin (MYCOSTATIN) powder   Topical TID Elías Browne MD   Given at 09/11/22 2246   • [Held by provider] metoPROLOL succinate (TOPROL-XL) ER tablet 100 mg  100 mg Oral BID Elías Browne MD       • sertraline (ZOLOFT) tablet 25 mg  25 mg Oral Daily Elías Browne MD   25 mg at 09/11/22 1455   • melatonin tablet 3 mg  3 mg Oral Nightly Elías Browne MD       • sodium chloride (PF) 0.9 % injection 2 mL  2 mL Intracatheter 2 times per day Liza Dumont MD   2 mL at 09/11/22 2245   • Potassium Standard Replacement Protocol (Levels 3.5 and lower)   Does not apply See Admin Instructions Liza Dumont MD       • Magnesium Standard Replacement Protocol   Does not apply See Admin Instructions Liza Dumont MD           CONTINUOUS INFUSIONS:  Current Facility-Administered Medications   Medication Dose Route Frequency Provider Last Rate Last Admin   • lactated ringers infusion   "     Office Note      Date: 2021  Patient Name: Aldair Narvaez  MRN: [id number]  : 1950    Chief Complaint   Patient presents with   • Diabetes       History of Present Illness:   Aldair Narvaez is a 70 y.o. male who presents for Diabetes type 2. Diagnosed in: . Treated in past with oral agents. Current treatments: trulicity. Number of insulin shots per day: none. Checks blood sugar none times a day. Has low blood sugar: no. Aspirin use: Yes. Statin use: Yes. ACE-I/ARB use: Yes. Changes in health since last visit: none. Last eye exam 2019.    Subjective      Diabetic Complications:  Eyes: No  Kidneys: No  Feet: No  Heart: Yes - stents    Diet and Exercise:  Meals per day: 2  Minutes of exercise per week: 0 mins.    Review of Systems:   Review of Systems   Constitutional: Negative.    Cardiovascular: Negative.    Gastrointestinal: Negative.    Endocrine: Negative.        The following portions of the patient's history were reviewed and updated as appropriate: allergies, current medications, past family history, past medical history, past social history, past surgical history and problem list.    Objective       Visit Vitals  /66 (BP Location: Left arm, Patient Position: Sitting, Cuff Size: Adult)   Pulse 78   Temp 97.1 °F (36.2 °C) (Infrared)   Ht 177.8 cm (70\")   Wt 108 kg (238 lb)   SpO2 97%   BMI 34.15 kg/m²       Physical Exam:  Physical Exam  Constitutional:       Appearance: Normal appearance.   Cardiovascular:      Pulses:           Dorsalis pedis pulses are 2+ on the right side and 1+ on the left side.        Posterior tibial pulses are 2+ on the right side and 1+ on the left side.   Feet:      Right foot:      Protective Sensation: 5 sites tested. 5 sites sensed.      Skin integrity: Skin integrity normal.      Toenail Condition: Right toenails are abnormally thick.      Left foot:      Protective Sensation: 5 sites tested. 5 sites sensed.      Skin integrity: Skin integrity normal.      "  Intravenous Continuous [place]  mL/hr at 09/12/22 0757 Rate Verify at 09/12/22 0757   • pantoprazole (PROTONIX) 80 mg/100mL in sodium chloride 0.9% infusion  8 mg/hr Intravenous Continuous Liza Dumont MD 10 mL/hr at 09/12/22 0757 8 mg/hr at 09/12/22 0757       PRN MEDS:  Current Facility-Administered Medications   Medication Dose Route Frequency Provider Last Rate Last Admin   • hydrALAZINE (APRESOLINE) tablet 25 mg  25 mg Oral Q4H PRN Christy Govea MD   25 mg at 09/11/22 0246   • tiZANidine (ZANAFLEX) tablet 2 mg  2 mg Oral QHS PRN Elías Browne MD       • hydrALAZINE (APRESOLINE) injection 10 mg  10 mg Intravenous Q6H PRN Elías Browne MD   10 mg at 09/11/22 1452   • sodium chloride 0.9 % flush bag 25 mL  25 mL Intravenous PRN Liza Dumont MD       • sodium chloride (NORMAL SALINE) 0.9 % bolus 500 mL  500 mL Intravenous PRN Liza Dumont MD       • sodium chloride 0.9 % flush bag 25 mL  25 mL Intravenous PRN Liza Dumont MD           HISTORIES:  I have reviewed the past medical history, family history, social history, medications and allergies listed in the medical record as well as the nursing notes for this encounter.    OBJECTIVE:  VITAL SIGNS:   Vital Last Value 24 Hour Range   Temperature 98 °F (36.7 °C) (09/12/22 0400) Temp  Min: 97.2 °F (36.2 °C)  Max: 98 °F (36.7 °C)   Pulse 60 (09/12/22 0730) Pulse  Min: 39  Max: 97   Respiratory 17 (09/12/22 0730) Resp  Min: 14  Max: 36   Non-Invasive  Blood Pressure (!) 186/77 (09/12/22 0730) BP  Min: 89/38  Max: 194/84   Pulse Oximetry 97 % (09/12/22 0730) SpO2  Min: 93 %  Max: 99 %     Vital Today Admitted   Weight 77.2 kg (170 lb 3.1 oz) (09/12/22 0615) Weight: 78.5 kg (173 lb) (09/10/22 1530)   Height N/A Height: 5' 5\" (165.1 cm) (09/10/22 1530)   BMI N/A BMI (Calculated): 28.79 (09/10/22 1530)       VENT SETTINGS LAST 24 HOURS:         PHYSICAL EXAM:  General:  Awake, alert, NAD  Skin:  Skin color, texture, and turgor  Toenail Condition: Left toenails are abnormally thick.   Neurological:      Mental Status: He is alert.         Labs:    HbA1c  Lab Results   Component Value Date    HGBA1C 7.5 02/17/2021       CMP  No results found for: GLUCOSE, BUN, CREATININE, EGFRIFNONA, EGFRIFAFRI, BCR, K, CO2, CALCIUM, PROTENTOTREF, LABIL2, BILIRUBIN, AST, ALT     Lipid Panel        TSH  No results found for: TSH, FREET4     Hemoglobin A1C  Lab Results   Component Value Date    HGBA1C 7.5 02/17/2021        Microalbumin/Creatinine  No results found for: MALBCRERATIO, CREATINIURIN, MICROALBUR        Assessment / Plan      Assessment & Plan:  Diagnoses and all orders for this visit:    1. Uncontrolled type 2 diabetes mellitus with hyperglycemia (CMS/[facility]) (Primary)  Assessment & Plan:  Diabetes is worsening.   Continue current treatment regimen.  Diabetes will be reassessed in 3 months.    Work on diet/exercise/weight loss.  If A1c still high next visit, consider increasing trulicity dose.    Orders:  -     POC Glycosylated Hemoglobin (Hb A1C)  -     Comprehensive Metabolic Panel; Future  -     Lipid Panel; Future  -     Microalbumin / Creatinine Urine Ratio - Urine, Clean Catch; Future  -     TSH; Future    2. Benign hypertension  Assessment & Plan:  Hypertension is unchanged.  Continue current treatment regimen.  Blood pressure will be reassessed in 3 months.      3. Mixed hyperlipidemia  Assessment & Plan:  Continue statin.  Had trouble tolerating in the past.  Doing okay on rosuvastatin for now.      4. Atherosclerosis of native coronary artery of native heart without angina pectoris  Assessment & Plan:  Cotinue ASA, statin and GLP-1 RA.        Return in about 3 months (around 5/17/2021) for Recheck with A1c.    Steve Cohen MD   02/17/2021   normal.  No rashes or lesions.  Head:  Normocephalic, without obvious abnormality, atraumatic.  Eyes:  PERRL, conjunctivae/corneas clear.  Nose: Nares normal. Septum midline.  Throat:  Lips, mucosa, and tongue normal; teeth and gums normal.  Neck: Supple, symmetrical.  Trachea midline. No adenopathy.  Lungs:  Clear bilaterally, no wheeze or rales  Heart: Regular rate and rhythm. S1 and S2 normal. No murmur, rub or gallop.  Abdomen:  Soft, nontender.  Bowel sounds active in all four quadrants. No masses or hepatomegaly or splenomegaly.  Extremities:  Normal, atraumatic, no cyanosis,  No edema.  Neuro: AAO, GIRON    LABORATORY DATA:  Recent Labs   Lab 09/12/22  0555 09/11/22 1958 09/11/22  0734 09/10/22  1554 09/10/22  1548   SODIUM  --  144 141  --  141   POTASSIUM 3.7 3.2* 3.9  --  4.1   CHLORIDE  --  112* 108  --  108   CO2  --  25 31  --  30   ANIONGAP  --  10 6*  --  7   GLUCOSE  --  148* 101*  --  93   BUN  --  16 17  --  20   CREATININE  --  0.80 0.88 0.90 0.86   BCRAT  --  20 19  --  23   CALCIUM  --  7.8* 8.0*  --  8.3*     Recent Labs   Lab 09/12/22  0141 09/11/22 1958 09/11/22  1406 09/11/22  0734 09/10/22  2026 09/10/22  1548   WBC  --  5.6  --  4.2  --  5.6   HCT 34.7* 34.0* 36.5 35.7*   < > 39.1   HGB 11.3* 10.9* 11.8* 11.4*   < > 12.7   PLT  --  139*  --  147  --  165    < > = values in this interval not displayed.       IMAGING STUDIES:    Radiographic images have been reviewed by me personally.  No new for review                    On 09/12/22, ILala NP scribed the services personally performed by JUAN Milian MD    The documentation recorded by the scribe accurately and completely reflects the service(s) I personally performed and the decisions made by me.                Critical Care Time greater than:  35 minutes     Tien Milian MD

## 2022-09-21 ENCOUNTER — TELEPHONE (OUTPATIENT)
Dept: ENDOCRINOLOGY | Facility: CLINIC | Age: 72
End: 2022-09-21

## 2022-09-21 NOTE — TELEPHONE ENCOUNTER
Patient called stated rx for Farxiga is going to cost him to much. He wanted to know what other options were available. Please advise.

## 2022-09-23 ENCOUNTER — TELEPHONE (OUTPATIENT)
Dept: ENDOCRINOLOGY | Facility: CLINIC | Age: 72
End: 2022-09-23

## 2022-09-23 NOTE — TELEPHONE ENCOUNTER
PATIENT IS CURRENTLY WAITING ON PATIENT ASSISTANCE PAPERWORK TO ARRIVED FROM THIS OFFICE FOR FARXIGA. PATIENT STATES THAT HE IS ALMOST OUT OF THIS MEDICATION AND WOULD LIKE TO BE ADVISED AS IT IS TOO EXPENSIVE OUT OF POCKET AT THIS TIME.     CALL BACK [phone]

## 2022-09-23 NOTE — TELEPHONE ENCOUNTER
PATIENT IS REQUESTING RX FOR CONTOUR TEST STRIPS. PATIENT STATES THAT HE IS TESTING 3 TIMES PER DAY, 90 DAY SUPPLY. PLEASE SEND TO [facility].

## 2022-09-23 NOTE — TELEPHONE ENCOUNTER
Spoke to pt-advised we have samples he can  until his pt assistance is evaluated.  Place samples and the application up front for

## 2022-09-26 ENCOUNTER — TELEPHONE (OUTPATIENT)
Dept: ENDOCRINOLOGY | Facility: CLINIC | Age: 72
End: 2022-09-26

## 2022-09-26 NOTE — TELEPHONE ENCOUNTER
Patient has questions about his med:Dapagliflozin Propanediol (Farxiga). He would like a call back.

## 2022-09-26 NOTE — TELEPHONE ENCOUNTER
Spoke to pt-he filled out the pt assistance for farxiga on line and qualified.  Needed me to fax a rx.  Done  [phone]

## 2022-10-05 ENCOUNTER — TELEPHONE (OUTPATIENT)
Dept: ENDOCRINOLOGY | Facility: CLINIC | Age: 72
End: 2022-10-05

## 2022-12-06 ENCOUNTER — OFFICE VISIT (OUTPATIENT)
Dept: ENDOCRINOLOGY | Facility: CLINIC | Age: 72
End: 2022-12-06

## 2022-12-06 VITALS
DIASTOLIC BLOOD PRESSURE: 78 MMHG | HEART RATE: 65 BPM | SYSTOLIC BLOOD PRESSURE: 130 MMHG | OXYGEN SATURATION: 96 % | BODY MASS INDEX: 30.49 KG/M2 | WEIGHT: 213 LBS | HEIGHT: 70 IN

## 2022-12-06 DIAGNOSIS — I10 BENIGN HYPERTENSION: ICD-10-CM

## 2022-12-06 DIAGNOSIS — E78.2 MIXED HYPERLIPIDEMIA: ICD-10-CM

## 2022-12-06 DIAGNOSIS — E11.65 UNCONTROLLED TYPE 2 DIABETES MELLITUS WITH HYPERGLYCEMIA: Primary | ICD-10-CM

## 2022-12-06 DIAGNOSIS — I25.10 ATHEROSCLEROSIS OF NATIVE CORONARY ARTERY OF NATIVE HEART WITHOUT ANGINA PECTORIS: ICD-10-CM

## 2022-12-06 LAB
EXPIRATION DATE: NORMAL
EXPIRATION DATE: NORMAL
GLUCOSE BLDC GLUCOMTR-MCNC: 94 MG/DL (ref 70–130)
HBA1C MFR BLD: 6.5 %
Lab: NORMAL
Lab: NORMAL

## 2022-12-06 PROCEDURE — 99214 OFFICE O/P EST MOD 30 MIN: CPT | Performed by: INTERNAL MEDICINE

## 2022-12-06 PROCEDURE — 3044F HG A1C LEVEL LT 7.0%: CPT | Performed by: INTERNAL MEDICINE

## 2022-12-06 PROCEDURE — 83036 HEMOGLOBIN GLYCOSYLATED A1C: CPT | Performed by: INTERNAL MEDICINE

## 2022-12-06 PROCEDURE — 82947 ASSAY GLUCOSE BLOOD QUANT: CPT | Performed by: INTERNAL MEDICINE

## 2022-12-06 RX ORDER — APIXABAN 5 MG/1
TABLET, FILM COATED ORAL
COMMUNITY
Start: 2022-11-16

## 2022-12-06 RX ORDER — GLIMEPIRIDE 4 MG/1
8 TABLET ORAL
Qty: 180 TABLET | Refills: 3 | Status: SHIPPED | OUTPATIENT
Start: 2022-12-06 | End: 2023-04-06 | Stop reason: SDUPTHER

## 2022-12-06 NOTE — PROGRESS NOTES
"     Office Note      Date: 2022  Patient Name: Aldair Narvaez  MRN: [id number]  : 1950    Chief Complaint   Patient presents with   • Diabetes     Type II       History of Present Illness:   Aldair Narvaez is a 72 y.o. male who presents for Diabetes type 2. Diagnosed in: . Treated in past with oral agents. Current treatments: farxiga and glimepiride. Number of insulin shots per day: none. Checks blood sugar intermittently. Has low blood sugar: no. Aspirin use: No - on eliquis. Statin use: Yes. ACE-I/ARB use: Yes. Changes in health since last visit: CABG. Last eye exam 2021.    Subjective      Diabetic Complications:  Eyes: No  Kidneys: No  Feet: No  Heart: Yes - stents and CABG    Diet and Exercise:  Meals per day: 2  Minutes of exercise per week: 0 mins.    Review of Systems:   Review of Systems   Constitutional: Negative.    Cardiovascular: Negative.    Gastrointestinal: Negative.    Endocrine: Negative.        The following portions of the patient's history were reviewed and updated as appropriate: allergies, current medications, past family history, past medical history, past social history, past surgical history and problem list.    Objective       Visit Vitals  /78 (BP Location: Left arm, Patient Position: Sitting, Cuff Size: Adult)   Pulse 65   Ht 177.8 cm (70\")   Wt 96.6 kg (213 lb)   SpO2 96%   BMI 30.56 kg/m²       Physical Exam:  Physical Exam  Constitutional:       Appearance: Normal appearance.   Neurological:      Mental Status: He is alert.         Labs:    HbA1c  Lab Results   Component Value Date    HGBA1C 6.5 2022       CMP  Lab Results   Component Value Date    GLUCOSE 208 (H) 2022    BUN 15 2022    CREATININE 0.82 2022    EGFRIFNONA 96 2021    BCR 18.3 2022    K 4.2 2022    CO2 29.7 (H) 2022    CALCIUM 9.6 2022    AST 36 2022    ALT 34 2022        Lipid Panel  Lab Results   Component Value Date    HDL 33 (L) " 03/07/2022     (H) 03/07/2022    TRIG 122 03/07/2022        TSH  Lab Results   Component Value Date    TSH 3.150 03/07/2022        Hemoglobin A1C  Lab Results   Component Value Date    HGBA1C 6.5 12/06/2022        Microalbumin/Creatinine  Lab Results   Component Value Date    MALBCRERATIO 11.1 03/07/2022    MICROALBUR 1.6 03/07/2022           Assessment / Plan      Assessment & Plan:  Diagnoses and all orders for this visit:    1. Uncontrolled type 2 diabetes mellitus with hyperglycemia (HCC) (Primary)  Assessment & Plan:  Diabetes is improving with treatment.  A1c improved at 6.5%.  Continue current treatment regimen.  Take glimepiride 8mg daily.  Diabetes will be reassessed in 3 months.    Orders:  -     POC Glucose, Blood  -     POC Glycosylated Hemoglobin (Hb A1C)    2. Benign hypertension  Assessment & Plan:  Hypertension is improving with treatment.  Continue current treatment regimen.  Blood pressure will be reassessed at the next regular appointment.      3. Mixed hyperlipidemia  Assessment & Plan:  Continue statin.      4. Atherosclerosis of native coronary artery of native heart without angina pectoris  Assessment & Plan:  Continue statin and SGLT-2 inhibitor.      Other orders  -     glimepiride (AMARYL) 4 MG tablet; Take 2 tablets by mouth Every Morning Before Breakfast.  Dispense: 180 tablet; Refill: 3      Return in about 3 months (around 3/6/2023) for Recheck with A1c, CMP, lipids, TSH, microalbumin, foot exam.    Steve Cohen MD   12/06/2022

## 2022-12-06 NOTE — ASSESSMENT & PLAN NOTE
Diabetes is improving with treatment.  A1c improved at 6.5%.  Continue current treatment regimen.  Take glimepiride 8mg daily.  Diabetes will be reassessed in 3 months.

## 2022-12-15 ENCOUNTER — TELEPHONE (OUTPATIENT)
Dept: ENDOCRINOLOGY | Facility: CLINIC | Age: 72
End: 2022-12-15

## 2023-01-25 ENCOUNTER — TELEPHONE (OUTPATIENT)
Dept: ENDOCRINOLOGY | Facility: CLINIC | Age: 73
End: 2023-01-25
Payer: MEDICARE

## 2023-03-28 ENCOUNTER — TELEPHONE (OUTPATIENT)
Dept: ENDOCRINOLOGY | Facility: CLINIC | Age: 73
End: 2023-03-28
Payer: MEDICARE

## 2023-04-06 RX ORDER — GLIMEPIRIDE 4 MG/1
8 TABLET ORAL
Qty: 180 TABLET | Refills: 1 | Status: SHIPPED | OUTPATIENT
Start: 2023-04-06

## 2023-04-13 ENCOUNTER — OFFICE VISIT (OUTPATIENT)
Dept: ENDOCRINOLOGY | Facility: CLINIC | Age: 73
End: 2023-04-13
Payer: MEDICARE

## 2023-04-13 VITALS
HEART RATE: 71 BPM | DIASTOLIC BLOOD PRESSURE: 72 MMHG | OXYGEN SATURATION: 94 % | BODY MASS INDEX: 32.27 KG/M2 | SYSTOLIC BLOOD PRESSURE: 132 MMHG | WEIGHT: 225.4 LBS | HEIGHT: 70 IN

## 2023-04-13 DIAGNOSIS — I25.10 ATHEROSCLEROSIS OF NATIVE CORONARY ARTERY OF NATIVE HEART WITHOUT ANGINA PECTORIS: ICD-10-CM

## 2023-04-13 DIAGNOSIS — I10 BENIGN HYPERTENSION: ICD-10-CM

## 2023-04-13 DIAGNOSIS — E11.65 UNCONTROLLED TYPE 2 DIABETES MELLITUS WITH HYPERGLYCEMIA: Primary | ICD-10-CM

## 2023-04-13 DIAGNOSIS — E78.2 MIXED HYPERLIPIDEMIA: ICD-10-CM

## 2023-04-13 LAB
ALBUMIN SERPL-MCNC: 4.3 G/DL (ref 3.5–5.2)
ALBUMIN UR-MCNC: 1.2 MG/DL
ALBUMIN/GLOB SERPL: 1.3 G/DL
ALP SERPL-CCNC: 74 U/L (ref 39–117)
ALT SERPL W P-5'-P-CCNC: 31 U/L (ref 1–41)
ANION GAP SERPL CALCULATED.3IONS-SCNC: 11 MMOL/L (ref 5–15)
AST SERPL-CCNC: 39 U/L (ref 1–40)
BILIRUB SERPL-MCNC: 0.4 MG/DL (ref 0–1.2)
BUN SERPL-MCNC: 17 MG/DL (ref 8–23)
BUN/CREAT SERPL: 20 (ref 7–25)
CALCIUM SPEC-SCNC: 9.4 MG/DL (ref 8.6–10.5)
CHLORIDE SERPL-SCNC: 101 MMOL/L (ref 98–107)
CHOLEST SERPL-MCNC: 188 MG/DL (ref 0–200)
CO2 SERPL-SCNC: 28 MMOL/L (ref 22–29)
CREAT SERPL-MCNC: 0.85 MG/DL (ref 0.76–1.27)
CREAT UR-MCNC: 130.6 MG/DL
EGFRCR SERPLBLD CKD-EPI 2021: 93.5 ML/MIN/1.73
EXPIRATION DATE: NORMAL
EXPIRATION DATE: NORMAL
GLOBULIN UR ELPH-MCNC: 3.2 GM/DL
GLUCOSE BLDC GLUCOMTR-MCNC: 125 MG/DL (ref 70–130)
GLUCOSE SERPL-MCNC: 128 MG/DL (ref 65–99)
HBA1C MFR BLD: 7 %
HDLC SERPL-MCNC: 37 MG/DL (ref 40–60)
LDLC SERPL CALC-MCNC: 114 MG/DL (ref 0–100)
LDLC/HDLC SERPL: 2.93 {RATIO}
Lab: NORMAL
Lab: NORMAL
MICROALBUMIN/CREAT UR: 9.2 MG/G
POTASSIUM SERPL-SCNC: 3.7 MMOL/L (ref 3.5–5.2)
PROT SERPL-MCNC: 7.5 G/DL (ref 6–8.5)
SODIUM SERPL-SCNC: 140 MMOL/L (ref 136–145)
TRIGL SERPL-MCNC: 213 MG/DL (ref 0–150)
TSH SERPL DL<=0.05 MIU/L-ACNC: 2.66 UIU/ML (ref 0.27–4.2)
VLDLC SERPL-MCNC: 37 MG/DL (ref 5–40)

## 2023-04-13 PROCEDURE — 1160F RVW MEDS BY RX/DR IN RCRD: CPT | Performed by: INTERNAL MEDICINE

## 2023-04-13 PROCEDURE — 82947 ASSAY GLUCOSE BLOOD QUANT: CPT | Performed by: INTERNAL MEDICINE

## 2023-04-13 PROCEDURE — 1159F MED LIST DOCD IN RCRD: CPT | Performed by: INTERNAL MEDICINE

## 2023-04-13 PROCEDURE — 84443 ASSAY THYROID STIM HORMONE: CPT | Performed by: INTERNAL MEDICINE

## 2023-04-13 PROCEDURE — 3075F SYST BP GE 130 - 139MM HG: CPT | Performed by: INTERNAL MEDICINE

## 2023-04-13 PROCEDURE — 82043 UR ALBUMIN QUANTITATIVE: CPT | Performed by: INTERNAL MEDICINE

## 2023-04-13 PROCEDURE — 3078F DIAST BP <80 MM HG: CPT | Performed by: INTERNAL MEDICINE

## 2023-04-13 PROCEDURE — 80061 LIPID PANEL: CPT | Performed by: INTERNAL MEDICINE

## 2023-04-13 PROCEDURE — 99214 OFFICE O/P EST MOD 30 MIN: CPT | Performed by: INTERNAL MEDICINE

## 2023-04-13 PROCEDURE — 3051F HG A1C>EQUAL 7.0%<8.0%: CPT | Performed by: INTERNAL MEDICINE

## 2023-04-13 PROCEDURE — 82570 ASSAY OF URINE CREATININE: CPT | Performed by: INTERNAL MEDICINE

## 2023-04-13 PROCEDURE — 80053 COMPREHEN METABOLIC PANEL: CPT | Performed by: INTERNAL MEDICINE

## 2023-04-13 PROCEDURE — 83036 HEMOGLOBIN GLYCOSYLATED A1C: CPT | Performed by: INTERNAL MEDICINE

## 2023-04-13 NOTE — PROGRESS NOTES
"     Office Note      Date: 2023  Patient Name: Aldair Narvaez  MRN: [id number]  : 1950    Chief Complaint   Patient presents with   • Diabetes       History of Present Illness:   Aldair Narvaez is a 72 y.o. male who presents for Diabetes type 2. Diagnosed in: . Treated in past with oral agents. Current treatments: farxiga and glimepiride. Number of insulin shots per day: none. Checks blood sugar intermittently. Has low blood sugar: no. Aspirin use: No - on eliquis. Statin use: Yes. ACE-I/ARB use: Yes. Changes in health since last visit: pinched nerve in back. Last eye exam 2023.    Subjective      Diabetic Complications:  Eyes: No  Kidneys: No  Feet: No  Heart: Yes - stents and CABG    Diet and Exercise:  Meals per day: 2  Minutes of exercise per week: 0 mins.    Review of Systems:   Review of Systems   Constitutional: Negative.    Cardiovascular: Negative.    Gastrointestinal: Negative.    Endocrine: Negative.        The following portions of the patient's history were reviewed and updated as appropriate: allergies, current medications, past family history, past medical history, past social history, past surgical history and problem list.    Objective       Visit Vitals  /72   Pulse 71   Ht 177.8 cm (70\")   Wt 102 kg (225 lb 6.4 oz)   SpO2 94%   BMI 32.34 kg/m²       Physical Exam:  Physical Exam  Constitutional:       Appearance: Normal appearance.   Cardiovascular:      Pulses:           Dorsalis pedis pulses are 1+ on the right side and 1+ on the left side.        Posterior tibial pulses are 1+ on the right side and 1+ on the left side.   Feet:      Right foot:      Protective Sensation: 5 sites tested. 5 sites sensed.      Skin integrity: Dry skin present.      Toenail Condition: Right toenails are abnormally thick. Fungal disease present.     Left foot:      Protective Sensation: 5 sites tested. 5 sites sensed.      Skin integrity: Dry skin present.      Toenail Condition: Left toenails " are abnormally thick. Fungal disease present.  Neurological:      Mental Status: He is alert.         Labs:    HbA1c  Lab Results   Component Value Date    HGBA1C 7.0 04/13/2023       CMP  Lab Results   Component Value Date    GLUCOSE 208 (H) 03/07/2022    BUN 15 03/07/2022    CREATININE 0.82 03/07/2022    EGFRIFNONA 96 02/17/2021    BCR 18.3 03/07/2022    K 4.2 03/07/2022    CO2 29.7 (H) 03/07/2022    CALCIUM 9.6 03/07/2022    AST 36 03/07/2022    ALT 34 03/07/2022        Lipid Panel  Lab Results   Component Value Date    HDL 33 (L) 03/07/2022     (H) 03/07/2022    TRIG 122 03/07/2022        TSH  Lab Results   Component Value Date    TSH 3.150 03/07/2022        Hemoglobin A1C  Lab Results   Component Value Date    HGBA1C 7.0 04/13/2023        Microalbumin/Creatinine  Lab Results   Component Value Date    MALBCRERATIO 11.1 03/07/2022    MICROALBUR 1.6 03/07/2022           Assessment / Plan      Assessment & Plan:  Diagnoses and all orders for this visit:    1. Uncontrolled type 2 diabetes mellitus with hyperglycemia (Primary)  Assessment & Plan:  Diabetes is worsening.  A1c increased but okay at 7.0%.  Continue current treatment regimen.  Diabetes will be reassessed in 3 months.    Orders:  -     POC Glucose, Blood  -     POC Glycosylated Hemoglobin (Hb A1C)  -     Comprehensive Metabolic Panel; Future  -     Lipid Panel; Future  -     Microalbumin / Creatinine Urine Ratio - Urine, Clean Catch; Future  -     TSH; Future    2. Benign hypertension  Assessment & Plan:  Hypertension is unchanged.  Continue current treatment regimen.  Blood pressure will be reassessed at the next regular appointment.      3. Mixed hyperlipidemia  Assessment & Plan:  Continue statin.  Check lipids today.      4. Atherosclerosis of native coronary artery of native heart without angina pectoris  Assessment & Plan:  Continue eliquis, statin and SGLT-2 inhibitor.      Current Outpatient Medications   Medication Instructions   •  albuterol sulfate  (90 Base) MCG/ACT inhaler As Needed   • buPROPion XL (WELLBUTRIN XL) 150 MG 24 hr tablet Daily   • coenzyme Q10 100 mg, Oral, Daily   • Eliquis 5 MG tablet tablet No dose, route, or frequency recorded.   • Farxiga 10 mg, Oral, Daily   • glimepiride (AMARYL) 8 mg, Oral, Every Morning Before Breakfast   • glucose blood (Contour Next Test) test strip 1 strip 3 times daily dx: e11.65   • metoprolol succinate XL (TOPROL-XL) 50 MG 24 hr tablet 1 tablet, Daily   • nitroglycerin (NITROSTAT) 0.4 MG SL tablet nitroglycerin 0.4 mg sublingual tablet   • pantoprazole (PROTONIX) 40 MG EC tablet Daily   • rosuvastatin (CRESTOR) 20 mg, Oral, Daily   • valsartan-hydrochlorothiazide (DIOVAN-HCT) 320-25 MG per tablet 1 tablet, Daily      Return in about 3 months (around 7/13/2023) for Recheck with A1c.    Steve Cohen MD   04/13/2023

## 2023-04-13 NOTE — ASSESSMENT & PLAN NOTE
Diabetes is worsening.  A1c increased but okay at 7.0%.  Continue current treatment regimen.  Diabetes will be reassessed in 3 months.

## 2023-05-08 ENCOUNTER — HOSPITAL ENCOUNTER (INPATIENT)
Facility: HOSPITAL | Age: 73
LOS: 3 days | Discharge: HOME | End: 2023-05-11
Attending: EMERGENCY MEDICINE | Admitting: HOSPITALIST
Payer: MEDICARE

## 2023-05-08 ENCOUNTER — APPOINTMENT (OUTPATIENT)
Dept: CT IMAGING | Facility: HOSPITAL | Age: 73
End: 2023-05-08
Payer: MEDICARE

## 2023-05-08 DIAGNOSIS — N13.30 HYDRONEPHROSIS, LEFT: ICD-10-CM

## 2023-05-08 DIAGNOSIS — D72.829 LEUKOCYTOSIS, UNSPECIFIED TYPE: ICD-10-CM

## 2023-05-08 DIAGNOSIS — R50.9 ACUTE FEBRILE ILLNESS: ICD-10-CM

## 2023-05-08 DIAGNOSIS — N12 PYELONEPHRITIS: Primary | ICD-10-CM

## 2023-05-08 PROBLEM — I48.0 PAROXYSMAL A-FIB: Status: ACTIVE | Noted: 2023-05-08

## 2023-05-08 PROBLEM — N39.0 SEPSIS DUE TO URINARY TRACT INFECTION: Status: ACTIVE | Noted: 2023-05-08

## 2023-05-08 PROBLEM — A41.9 SEPSIS DUE TO URINARY TRACT INFECTION: Status: ACTIVE | Noted: 2023-05-08

## 2023-05-08 PROBLEM — Z85.46 PERSONAL HISTORY OF PROSTATE CANCER: Status: ACTIVE | Noted: 2023-05-08

## 2023-05-08 LAB
ALBUMIN SERPL-MCNC: 3.9 G/DL (ref 3.5–5.2)
ALBUMIN/GLOB SERPL: 1 G/DL
ALP SERPL-CCNC: 101 U/L (ref 39–117)
ALT SERPL W P-5'-P-CCNC: 30 U/L (ref 1–41)
ANION GAP SERPL CALCULATED.3IONS-SCNC: 14 MMOL/L (ref 5–15)
AST SERPL-CCNC: 27 U/L (ref 1–40)
BACTERIA UR QL AUTO: ABNORMAL /HPF
BASOPHILS # BLD AUTO: 0.07 10*3/MM3 (ref 0–0.2)
BASOPHILS NFR BLD AUTO: 0.3 % (ref 0–1.5)
BILIRUB SERPL-MCNC: 0.8 MG/DL (ref 0–1.2)
BILIRUB UR QL STRIP: NEGATIVE
BUN SERPL-MCNC: 19 MG/DL (ref 8–23)
BUN/CREAT SERPL: 17.9 (ref 7–25)
CALCIUM SPEC-SCNC: 10 MG/DL (ref 8.6–10.5)
CHLORIDE SERPL-SCNC: 95 MMOL/L (ref 98–107)
CLARITY UR: ABNORMAL
CO2 SERPL-SCNC: 27 MMOL/L (ref 22–29)
COLOR UR: YELLOW
CREAT SERPL-MCNC: 1.06 MG/DL (ref 0.76–1.27)
D-LACTATE SERPL-SCNC: 1.7 MMOL/L (ref 0.5–2)
D-LACTATE SERPL-SCNC: 2.7 MMOL/L (ref 0.5–2)
D-LACTATE SERPL-SCNC: 3 MMOL/L (ref 0.5–2)
DEPRECATED RDW RBC AUTO: 50.2 FL (ref 37–54)
EGFRCR SERPLBLD CKD-EPI 2021: 75.5 ML/MIN/1.73
EOSINOPHIL # BLD AUTO: 0.03 10*3/MM3 (ref 0–0.4)
EOSINOPHIL NFR BLD AUTO: 0.1 % (ref 0.3–6.2)
ERYTHROCYTE [DISTWIDTH] IN BLOOD BY AUTOMATED COUNT: 14.6 % (ref 12.3–15.4)
GLOBULIN UR ELPH-MCNC: 4.1 GM/DL
GLUCOSE BLDC GLUCOMTR-MCNC: 144 MG/DL (ref 70–130)
GLUCOSE SERPL-MCNC: 213 MG/DL (ref 65–99)
GLUCOSE UR STRIP-MCNC: ABNORMAL MG/DL
HCT VFR BLD AUTO: 51.4 % (ref 37.5–51)
HGB BLD-MCNC: 16.4 G/DL (ref 13–17.7)
HGB UR QL STRIP.AUTO: ABNORMAL
HOLD SPECIMEN: NORMAL
HYALINE CASTS UR QL AUTO: ABNORMAL /LPF
IMM GRANULOCYTES # BLD AUTO: 0.25 10*3/MM3 (ref 0–0.05)
IMM GRANULOCYTES NFR BLD AUTO: 1.2 % (ref 0–0.5)
KETONES UR QL STRIP: NEGATIVE
LEUKOCYTE ESTERASE UR QL STRIP.AUTO: ABNORMAL
LYMPHOCYTES # BLD AUTO: 1.71 10*3/MM3 (ref 0.7–3.1)
LYMPHOCYTES NFR BLD AUTO: 8.2 % (ref 19.6–45.3)
MCH RBC QN AUTO: 29.7 PG (ref 26.6–33)
MCHC RBC AUTO-ENTMCNC: 31.9 G/DL (ref 31.5–35.7)
MCV RBC AUTO: 92.9 FL (ref 79–97)
MONOCYTES # BLD AUTO: 1.59 10*3/MM3 (ref 0.1–0.9)
MONOCYTES NFR BLD AUTO: 7.6 % (ref 5–12)
NEUTROPHILS NFR BLD AUTO: 17.31 10*3/MM3 (ref 1.7–7)
NEUTROPHILS NFR BLD AUTO: 82.6 % (ref 42.7–76)
NITRITE UR QL STRIP: NEGATIVE
NRBC BLD AUTO-RTO: 0 /100 WBC (ref 0–0.2)
PH UR STRIP.AUTO: 6 [PH] (ref 5–8)
PLATELET # BLD AUTO: 208 10*3/MM3 (ref 140–450)
PMV BLD AUTO: 9.2 FL (ref 6–12)
POTASSIUM SERPL-SCNC: 4.6 MMOL/L (ref 3.5–5.2)
PROT SERPL-MCNC: 8 G/DL (ref 6–8.5)
PROT UR QL STRIP: ABNORMAL
RBC # BLD AUTO: 5.53 10*6/MM3 (ref 4.14–5.8)
RBC # UR STRIP: ABNORMAL /HPF
REF LAB TEST METHOD: ABNORMAL
SODIUM SERPL-SCNC: 136 MMOL/L (ref 136–145)
SP GR UR STRIP: 1.03 (ref 1–1.03)
SQUAMOUS #/AREA URNS HPF: ABNORMAL /HPF
UROBILINOGEN UR QL STRIP: ABNORMAL
WBC # UR STRIP: ABNORMAL /HPF
WBC NRBC COR # BLD: 20.96 10*3/MM3 (ref 3.4–10.8)
WHOLE BLOOD HOLD COAG: NORMAL
WHOLE BLOOD HOLD SPECIMEN: NORMAL

## 2023-05-08 PROCEDURE — 87040 BLOOD CULTURE FOR BACTERIA: CPT | Performed by: INTERNAL MEDICINE

## 2023-05-08 PROCEDURE — 87077 CULTURE AEROBIC IDENTIFY: CPT | Performed by: NURSE PRACTITIONER

## 2023-05-08 PROCEDURE — 83605 ASSAY OF LACTIC ACID: CPT | Performed by: EMERGENCY MEDICINE

## 2023-05-08 PROCEDURE — 99223 1ST HOSP IP/OBS HIGH 75: CPT | Performed by: INTERNAL MEDICINE

## 2023-05-08 PROCEDURE — 87086 URINE CULTURE/COLONY COUNT: CPT | Performed by: EMERGENCY MEDICINE

## 2023-05-08 PROCEDURE — 87150 DNA/RNA AMPLIFIED PROBE: CPT | Performed by: EMERGENCY MEDICINE

## 2023-05-08 PROCEDURE — 80053 COMPREHEN METABOLIC PANEL: CPT | Performed by: EMERGENCY MEDICINE

## 2023-05-08 PROCEDURE — 82948 REAGENT STRIP/BLOOD GLUCOSE: CPT

## 2023-05-08 PROCEDURE — 81001 URINALYSIS AUTO W/SCOPE: CPT | Performed by: EMERGENCY MEDICINE

## 2023-05-08 PROCEDURE — 85025 COMPLETE CBC W/AUTO DIFF WBC: CPT | Performed by: EMERGENCY MEDICINE

## 2023-05-08 PROCEDURE — 87086 URINE CULTURE/COLONY COUNT: CPT | Performed by: NURSE PRACTITIONER

## 2023-05-08 PROCEDURE — 36415 COLL VENOUS BLD VENIPUNCTURE: CPT

## 2023-05-08 PROCEDURE — 74176 CT ABD & PELVIS W/O CONTRAST: CPT

## 2023-05-08 PROCEDURE — 99285 EMERGENCY DEPT VISIT HI MDM: CPT

## 2023-05-08 PROCEDURE — 87186 SC STD MICRODIL/AGAR DIL: CPT | Performed by: EMERGENCY MEDICINE

## 2023-05-08 PROCEDURE — 87040 BLOOD CULTURE FOR BACTERIA: CPT | Performed by: EMERGENCY MEDICINE

## 2023-05-08 PROCEDURE — 87186 SC STD MICRODIL/AGAR DIL: CPT | Performed by: NURSE PRACTITIONER

## 2023-05-08 PROCEDURE — 87088 URINE BACTERIA CULTURE: CPT | Performed by: EMERGENCY MEDICINE

## 2023-05-08 PROCEDURE — 25010000002 CEFTRIAXONE PER 250 MG: Performed by: EMERGENCY MEDICINE

## 2023-05-08 RX ORDER — METOPROLOL SUCCINATE 50 MG/1
50 TABLET, EXTENDED RELEASE ORAL DAILY
Status: DISCONTINUED | OUTPATIENT
Start: 2023-05-09 | End: 2023-05-11 | Stop reason: HOSPADM

## 2023-05-08 RX ORDER — ONDANSETRON 4 MG/1
4 TABLET, FILM COATED ORAL EVERY 6 HOURS PRN
Status: DISCONTINUED | OUTPATIENT
Start: 2023-05-08 | End: 2023-05-08

## 2023-05-08 RX ORDER — ROSUVASTATIN CALCIUM 20 MG/1
20 TABLET, COATED ORAL DAILY
Status: DISCONTINUED | OUTPATIENT
Start: 2023-05-09 | End: 2023-05-11 | Stop reason: HOSPADM

## 2023-05-08 RX ORDER — BUPROPION HYDROCHLORIDE 150 MG/1
150 TABLET ORAL DAILY
Status: DISCONTINUED | OUTPATIENT
Start: 2023-05-09 | End: 2023-05-11 | Stop reason: HOSPADM

## 2023-05-08 RX ORDER — SODIUM CHLORIDE 0.9 % (FLUSH) 0.9 %
1-10 SYRINGE (ML) INJECTION AS NEEDED
Status: DISCONTINUED | OUTPATIENT
Start: 2023-05-08 | End: 2023-05-11 | Stop reason: HOSPADM

## 2023-05-08 RX ORDER — ALBUTEROL SULFATE 90 UG/1
2 AEROSOL, METERED RESPIRATORY (INHALATION) AS NEEDED
Status: DISCONTINUED | OUTPATIENT
Start: 2023-05-08 | End: 2023-05-11 | Stop reason: HOSPADM

## 2023-05-08 RX ORDER — DEXTROSE MONOHYDRATE 25 G/50ML
25 INJECTION, SOLUTION INTRAVENOUS
Status: DISCONTINUED | OUTPATIENT
Start: 2023-05-08 | End: 2023-05-11 | Stop reason: HOSPADM

## 2023-05-08 RX ORDER — ONDANSETRON 2 MG/ML
4 INJECTION INTRAMUSCULAR; INTRAVENOUS EVERY 6 HOURS PRN
Status: DISCONTINUED | OUTPATIENT
Start: 2023-05-08 | End: 2023-05-08

## 2023-05-08 RX ORDER — ACETAMINOPHEN 500 MG
1000 TABLET ORAL ONCE
Status: COMPLETED | OUTPATIENT
Start: 2023-05-08 | End: 2023-05-08

## 2023-05-08 RX ORDER — HYDROCODONE BITARTRATE AND ACETAMINOPHEN 5; 325 MG/1; MG/1
1 TABLET ORAL EVERY 4 HOURS PRN
Status: DISCONTINUED | OUTPATIENT
Start: 2023-05-08 | End: 2023-05-11 | Stop reason: HOSPADM

## 2023-05-08 RX ORDER — IBUPROFEN 600 MG/1
1 TABLET ORAL
Status: DISCONTINUED | OUTPATIENT
Start: 2023-05-08 | End: 2023-05-11 | Stop reason: HOSPADM

## 2023-05-08 RX ORDER — NICOTINE POLACRILEX 4 MG
15 LOZENGE BUCCAL
Status: DISCONTINUED | OUTPATIENT
Start: 2023-05-08 | End: 2023-05-11 | Stop reason: HOSPADM

## 2023-05-08 RX ORDER — ACETAMINOPHEN 325 MG/1
650 TABLET ORAL EVERY 4 HOURS PRN
Status: DISCONTINUED | OUTPATIENT
Start: 2023-05-08 | End: 2023-05-11 | Stop reason: HOSPADM

## 2023-05-08 RX ORDER — ACETAMINOPHEN 160 MG/5ML
650 SOLUTION ORAL EVERY 4 HOURS PRN
Status: DISCONTINUED | OUTPATIENT
Start: 2023-05-08 | End: 2023-05-11 | Stop reason: HOSPADM

## 2023-05-08 RX ORDER — SODIUM CHLORIDE 0.9 % (FLUSH) 0.9 %
10 SYRINGE (ML) INJECTION EVERY 12 HOURS SCHEDULED
Status: DISCONTINUED | OUTPATIENT
Start: 2023-05-08 | End: 2023-05-11 | Stop reason: HOSPADM

## 2023-05-08 RX ORDER — SODIUM CHLORIDE 0.9 % (FLUSH) 0.9 %
10 SYRINGE (ML) INJECTION AS NEEDED
Status: DISCONTINUED | OUTPATIENT
Start: 2023-05-08 | End: 2023-05-11 | Stop reason: HOSPADM

## 2023-05-08 RX ORDER — INSULIN LISPRO 100 [IU]/ML
0-7 INJECTION, SOLUTION INTRAVENOUS; SUBCUTANEOUS
Status: DISCONTINUED | OUTPATIENT
Start: 2023-05-08 | End: 2023-05-11 | Stop reason: HOSPADM

## 2023-05-08 RX ORDER — PANTOPRAZOLE SODIUM 40 MG/1
40 TABLET, DELAYED RELEASE ORAL DAILY
Status: DISCONTINUED | OUTPATIENT
Start: 2023-05-09 | End: 2023-05-11 | Stop reason: HOSPADM

## 2023-05-08 RX ORDER — SODIUM CHLORIDE 9 MG/ML
40 INJECTION, SOLUTION INTRAVENOUS AS NEEDED
Status: DISCONTINUED | OUTPATIENT
Start: 2023-05-08 | End: 2023-05-11 | Stop reason: HOSPADM

## 2023-05-08 RX ORDER — SODIUM CHLORIDE 9 MG/ML
100 INJECTION, SOLUTION INTRAVENOUS CONTINUOUS
Status: ACTIVE | OUTPATIENT
Start: 2023-05-08 | End: 2023-05-09

## 2023-05-08 RX ORDER — ACETAMINOPHEN 650 MG/1
650 SUPPOSITORY RECTAL EVERY 4 HOURS PRN
Status: DISCONTINUED | OUTPATIENT
Start: 2023-05-08 | End: 2023-05-11 | Stop reason: HOSPADM

## 2023-05-08 RX ADMIN — CEFTRIAXONE 2 G: 2 INJECTION, POWDER, FOR SOLUTION INTRAMUSCULAR; INTRAVENOUS at 15:08

## 2023-05-08 RX ADMIN — ACETAMINOPHEN 325MG 650 MG: 325 TABLET ORAL at 23:06

## 2023-05-08 RX ADMIN — SODIUM CHLORIDE 1000 ML: 0.9 INJECTION, SOLUTION INTRAVENOUS at 15:13

## 2023-05-08 RX ADMIN — ACETAMINOPHEN 1000 MG: 500 TABLET ORAL at 15:13

## 2023-05-08 RX ADMIN — SODIUM CHLORIDE 100 ML/HR: 0.9 INJECTION, SOLUTION INTRAVENOUS at 17:32

## 2023-05-08 RX ADMIN — APIXABAN 5 MG: 5 TABLET, FILM COATED ORAL at 20:11

## 2023-05-08 RX ADMIN — Medication 10 ML: at 20:11

## 2023-05-08 NOTE — H&P
[facility] Medicine Services  HISTORY AND PHYSICAL    Patient Name: Aldair Narvaez  : 1950  MRN: [id number]  Primary Care Physician: Miguel Angel Mireles MD  Date of admission: 2023      Subjective   Subjective     Chief Complaint:  Fever, dysuria    HPI:  Aldair Narvaez is a 72 y.o. male with PMH of HTN, HLD, T2DM, CAD s/p stents in the remote past with 3VCABG in , PAF on Eliquis, prostate cancer s/p prostatectomy who presented with complaints of dysuria. He was found to be septic due to pyelonephritis. Pt states that he has been having frequency,  Dysuria and fevers for the last few days at home. He did not seek treatment over the weekend and finally decided to go to [facility] this am. He was sent here for further management.    Pt endorses left sided pain but notes that this is mostly from his hip which was injected by Ortho last week (steroids).         Review of Systems   Gen- No fevers, chills  CV- No chest pain, palpitations  Resp- No cough, dyspnea  GI- No N/V/D, + left flank pain        Personal History     Past Medical History:   Diagnosis Date   • Acid reflux    • Arthritis    • Benign hypertension    • Colon polyp    • Concern about heart attack without diagnosis    • Coronary artery disease    • Coronary atherosclerosis    • Heart disease    • Hyperlipidemia    • Hypertension    • Kidney stone    • Mixed hyperlipidemia    • Obesity     body mass index 30+-obesity   • Prostate cancer    • Type 2 diabetes mellitus    • Type 2 diabetes mellitus              Past Surgical History:   Procedure Laterality Date   • CARDIAC CATHETERIZATION     • CARDIAC SURGERY N/A     [place]   • CARPAL TUNNEL RELEASE     • CORONARY ARTERY BYPASS GRAFT  2022   • CYSTOSCOPY BLADDER STONE LITHOTRIPSY     • CYSTOSCOPY BLADDER STONE LITHOTRIPSY     • FETAL SURGERY FOR CONGENITAL HERNIA     • INGUINAL HERNIA REPAIR     • KNEE ARTHROSCOPY     • PROSTATECTOMY     • TRIGGER FINGER RELEASE     •  UMBILICAL HERNIA REPAIR     • UVULOPALATOPHARYNGOPLASTY         Family History: family history includes Diabetes in his sister; Heart attack in his father; Heart disease in his father; Hyperlipidemia in his sister; Hypertension in his sister.     Social History:  reports that he has never smoked. He has never used smokeless tobacco. He reports that he does not currently use alcohol. He reports that he does not use drugs.  Social History     Social History Narrative   • Not on file       Medications:  Available home medication information reviewed.  (Not in a hospital admission)    No current facility-administered medications on file prior to encounter.     Current Outpatient Medications on File Prior to Encounter   Medication Sig Dispense Refill   • albuterol sulfate  (90 Base) MCG/ACT inhaler As Needed.     • buPROPion XL (WELLBUTRIN XL) 150 MG 24 hr tablet Daily.     • coenzyme Q10 100 MG capsule Take 1 capsule by mouth Daily.     • Dapagliflozin Propanediol (Farxiga) 10 MG tablet Take 10 mg by mouth Daily. 90 tablet 3   • Eliquis 5 MG tablet tablet      • glimepiride (AMARYL) 4 MG tablet Take 2 tablets by mouth Every Morning Before Breakfast. 180 tablet 1   • glucose blood (Contour Next Test) test strip 1 strip 3 times daily dx: e11.65 300 each 3   • metoprolol succinate XL (TOPROL-XL) 50 MG 24 hr tablet 1 tablet Daily.     • nitroglycerin (NITROSTAT) 0.4 MG SL tablet nitroglycerin 0.4 mg sublingual tablet     • pantoprazole (PROTONIX) 40 MG EC tablet Daily.     • rosuvastatin (CRESTOR) 20 MG tablet Take 1 tablet by mouth Daily.     • valsartan-hydrochlorothiazide (DIOVAN-HCT) 320-25 MG per tablet 1 tablet Daily.         Allergies   Allergen Reactions   • Iodine Other (See Comments)   • Oxycodone-Acetaminophen Itching   • Zofran [Ondansetron] Hives       Objective   Objective     Vital Signs:   Temp:  [101.3 °F (38.5 °C)-102.9 °F (39.4 °C)] 102.9 °F (39.4 °C)  Heart Rate:  [117-127] 117  Resp:  [18-20]  20  BP: (112-138)/(78-92) 130/78       Physical Exam   Constitutional: Awake, alert  Eyes: PERRLA, sclerae anicteric, no conjunctival injection  HENT: NCAT, mucous membranes moist  Neck: Supple, no thyromegaly, no lymphadenopathy, trachea midline  Respiratory: Clear to auscultation bilaterally, nonlabored respirations   Cardiovascular: tachycardic but regular, no murmurs, rubs, or gallops, palpable pedal pulses bilaterally  Gastrointestinal: Positive bowel sounds, soft, nontender, nondistended  Musculoskeletal: No bilateral ankle edema, no clubbing or cyanosis to extremities  Psychiatric: Appropriate affect, cooperative  Neurologic: Oriented x 3, strength symmetric in all extremities, Cranial Nerves grossly intact to confrontation, speech clear  Skin: No rashes    Result Review:  I have personally reviewed the results from the time of this admission to 5/8/2023 15:30 EDT and agree with these findings:  [x]  Laboratory list / accordion  [x]  Microbiology  [x]  Radiology  []  EKG/Telemetry   []  Cardiology/Vascular   []  Pathology  [x]  Old records  []  Other:  Most notable findings include: see assessment and plan      LAB RESULTS:      Lab 05/08/23  1343 05/08/23  1047   WBC  --  20.96*   HEMOGLOBIN  --  16.4   HEMATOCRIT  --  51.4*   PLATELETS  --  208   NEUTROS ABS  --  17.31*   IMMATURE GRANS (ABS)  --  0.25*   LYMPHS ABS  --  1.71   MONOS ABS  --  1.59*   EOS ABS  --  0.03   MCV  --  92.9   LACTATE 3.0* 2.7*         Lab 05/08/23  1047   SODIUM 136   POTASSIUM 4.6   CHLORIDE 95*   CO2 27.0   ANION GAP 14.0   BUN 19   CREATININE 1.06   EGFR 75.5   GLUCOSE 213*   CALCIUM 10.0         Lab 05/08/23  1047   TOTAL PROTEIN 8.0   ALBUMIN 3.9   GLOBULIN 4.1   ALT (SGPT) 30   AST (SGOT) 27   BILIRUBIN 0.8   ALK PHOS 101                     UA        5/8/2023    09:02 5/8/2023    14:10   Urinalysis   Squamous Epithelial Cells, UA  7-12     Specific Gravity, UA  1.027     Ketones, UA Negative   Negative     Blood, UA  Large  (3+)     Leukocytes, UA Moderate (2+)   Moderate (2+)     Nitrite, UA  Negative     RBC, UA  13-20     WBC, UA  Too Numerous to Count     Bacteria, UA  3+         Microbiology Results (last 10 days)     ** No results found for the last 240 hours. **          CT Abdomen Pelvis Without Contrast    Result Date: 5/8/2023  CT ABDOMEN PELVIS WO CONTRAST Date of Exam: 5/8/2023 12:40 PM EDT Indication: bilat flank pain with high fever and hx of nephrioliths. Comparison: None available. Technique: Axial CT images were obtained of the abdomen and pelvis without the administration of contrast. Reconstructed coronal and sagittal images were also obtained. Automated exposure control and iterative construction methods were used. Findings: Lower thorax demonstrates partially imaged changes of prior CABG with otherwise unremarkable appearance of the lower thorax. Unremarkable appearance of the liver. There is small dependent hyperdensity within the gallbladder lumen favored to reflect some gallbladder sludge, with 3 mm calcific density at the gallbladder fundus, uncertain whether this reflects small gallstone or calcification relating to adenomyomatosis. No evidence of acute cholecystitis. Normal appearance of the bile ducts. Unremarkable appearance of the spleen, pancreas, and adrenal glands. There is a cluster of small nonobstructing stones in the inferior pole of the right kidney measuring up to 3 mm in size. There are couple small vague round hypodensities in the cortex of the right kidney which may reflect small simple cysts. Otherwise unremarkable appearance of the right kidney and right ureter. There is mild to moderate left-sided hydroureteronephrosis. There is a punctate nonobstructing stone in the inferior pole of the left kidney. There is some periureteral fat stranding along the  proximal and mid left ureter; the distal left ureter appears normal in caliber. No evidence of ureteral stone or obvious obstructing lesion.  There is a simple cyst in the superior pole cortex of the left kidney. No perinephric fluid collection. There is  questionable circumferential wall thickening of the urinary bladder although the bladder is under distended. There are multiple surgical clips in the expected region of the prostate compatible with surgical change of prostatectomy. There is minimal sigmoid colonic diverticulosis without evidence of diverticulitis. Normal appendix. No evidence of bowel obstruction. No definite inflammatory change of the GI tract. No abdominopelvic free fluid or conspicuous fat stranding. No pneumoperitoneum. There are mild scattered atherosclerotic calcifications of the abdominal aorta with otherwise unremarkable noncontrast appearance of the vasculature. There are couple surgical clips in the region of the right inguinal canal/right groin.  Otherwise unremarkable appearance of the body wall soft tissues. No bulky or suspicious abdominopelvic lymph nodes. There is a subcentimeter sclerotic focus in the right ischium which is nonspecific and may reflect bone island; no additional conspicuous  sclerotic bony lesions are identified. No acute osseous findings.     Impression: Impression: Mild to moderate left-sided hydroureteronephrosis without identifiable cause. Assessment for any left-sided pyelonephritis is severely limited in the absence of IV contrast and clinical correlation is required. There is nonobstructing bilateral nephrolithiasis. Questionable circumferential wall thickening of the urinary bladder which appears under distended. Correlate with urinalysis to exclude cystitis. There are surgical changes of prostatectomy. Gallbladder sludge. Electronically Signed: Luis Justin  5/8/2023 1:21 PM EDT  Workstation ID: [id number]          Assessment & Plan   Assessment & Plan     Active Hospital Problems    Diagnosis  POA   • **Pyelonephritis [N12]  Yes     Priority: High   • Sepsis due to urinary tract infection  [A41.9, N39.0]  Unknown     Priority: High   • Personal history of prostate cancer [Z85.46]  Not Applicable     Priority: Medium   • Paroxysmal A-fib [I48.0]  Unknown     Priority: Medium   • Uncontrolled type 2 diabetes mellitus with hyperglycemia [E11.65]  Yes   • Mixed hyperlipidemia [E78.2]  Yes   • Benign hypertension [I10]  Yes   • CAD, multiple vessel [I25.10]  Unknown     S/p CABG in 2022         Mr. Narvaez is a 73 yo M with PMH of HTN, HLD, T2DM, CAD s/p CABG in 2022, PAF on Eliquis, prostate cancer s/p prostatectomy who presented with complaints of dysuria. He was found to be septic due to pyelonephritis.     Plan:    Sepsis (fever, tachycardia, leukocytosis)  UTI/Pyelonephritis  -- UA c/w UTI  -- UCx and blood cultures x 2 PENDING  -- CT A/P looks c/w pyelonephritis  -- IV Rocephin for now  -- IVFs for 24 hours  -- hold antihypertensives as pt is borderline hypotensive    Lactic Acidosis  -- due to above  -- reflex PENDING  -- IVFs for 24 hours as noted above    PAF  -- continue metoprolol as BP will allow  -- IVFs for 24 hours  -- [id number] of 3, on Eliquis, continue    CAD   -- s/p stents in remote past with 3V CABG in 2022 ([facility])  -- hold antihypertensives with exception of beta blocker as noted above    H/o Prostate Cancer  -- s/p prostatectomy    T2DM  -- check HbA1C in am  -- hold oral hypoglycemics  -- low dose SSI for now    Total time spent: 75 minutes  Time spent includes time reviewing chart, face-to-face time, counseling patient/family/caregiver, ordering medications/tests/procedures, communicating with other health care professionals, documenting clinical information in the electronic health record, and coordination of care.      DVT prophylaxis:  Eliquis      CODE STATUS:  Full Code  Code Status and Medical Interventions:   Ordered at: 05/08/23 1526     Level Of Support Discussed With:    Patient     Code Status (Patient has no pulse and is not breathing):    CPR (Attempt to  Resuscitate)     Medical Interventions (Patient has pulse or is breathing):    Full Support       Expected Discharge   Expected Discharge Date: 5/10/2023; Expected Discharge Time:      Bárbara Lee MD  05/08/23

## 2023-05-08 NOTE — Clinical Note
Level of Care: Telemetry [5]   Diagnosis: Pyelonephritis [[id number]]   Admitting Physician: SUMA VALENTIN [[id number]]   Attending Physician: SUMA VALENTIN [[id number]]

## 2023-05-08 NOTE — ED PROVIDER NOTES
" EMERGENCY DEPARTMENT ENCOUNTER    Pt Name: Aldair Narvaez  MRN: [id number]  Pt :   1950  Room Number:  33/33  Date of encounter:  2023  PCP: Miguel Angel Mireles MD  ED Provider: Smith Guzman MD    Historian: Patient      HPI:  Chief Complaint: Urinary frequency, flank pain, fever        Context: Aldair Narvaez is a 72 y.o. male who presents to the ED c/o symptoms which started roughly 4 days ago with urinary urgency and frequency.  He states that he is now urinating every 15 to 20 minutes even throughout the night.  At home he was suffering from chills.  He went to an urgent treatment center today and his temperature was 101.9.  He has been complaining of bilateral flank pain as well.  He notes that he \"gets urinary tract infections with kidney stones\".  He has noticed some blood in his urine 2 and 3 days ago but none today.  The patient is status post prostatectomy and herniorrhaphy.      PAST MEDICAL HISTORY  Past Medical History:   Diagnosis Date   • Acid reflux    • Arthritis    • Benign hypertension    • Colon polyp    • Concern about heart attack without diagnosis    • Coronary artery disease    • Coronary atherosclerosis    • Heart disease    • Hyperlipidemia    • Hypertension    • Kidney stone    • Mixed hyperlipidemia    • Obesity     body mass index 30+-obesity   • Prostate cancer    • Type 2 diabetes mellitus    • Type 2 diabetes mellitus          PAST SURGICAL HISTORY  Past Surgical History:   Procedure Laterality Date   • CARDIAC CATHETERIZATION     • CARDIAC SURGERY N/A     Triple By Pass   • CARPAL TUNNEL RELEASE     • CORONARY ARTERY BYPASS GRAFT  2022   • CYSTOSCOPY BLADDER STONE LITHOTRIPSY     • CYSTOSCOPY BLADDER STONE LITHOTRIPSY     • FETAL SURGERY FOR CONGENITAL HERNIA     • INGUINAL HERNIA REPAIR     • KNEE ARTHROSCOPY     • PROSTATECTOMY     • TRIGGER FINGER RELEASE     • UMBILICAL HERNIA REPAIR     • UVULOPALATOPHARYNGOPLASTY           FAMILY HISTORY  Family History   Problem " Relation Age of Onset   • Heart attack Father    • Heart disease Father    • Diabetes Sister    • Hyperlipidemia Sister    • Hypertension Sister          SOCIAL HISTORY  Social History     Socioeconomic History   • Marital status: Unknown   Tobacco Use   • Smoking status: Never   • Smokeless tobacco: Never   Vaping Use   • Vaping Use: Never used   Substance and Sexual Activity   • Alcohol use: Not Currently   • Drug use: Never   • Sexual activity: Defer         ALLERGIES  Iodine and Oxycodone-acetaminophen        REVIEW OF SYSTEMS  Review of Systems       All systems reviewed and negative except for those discussed in HPI.       PHYSICAL EXAM    I have reviewed the triage vital signs and nursing notes.    ED Triage Vitals [05/08/23 1026]   Temp Heart Rate Resp BP SpO2   (!) 102.9 °F (39.4 °C) (!) 127 20 112/92 93 %      Temp src Heart Rate Source Patient Position BP Location FiO2 (%)   Oral Monitor Sitting Left arm --       Physical Exam  GENERAL:   Appears nontoxic but appears to not feel well.  HENT: Nares patent.  Somewhat dry mucous membranes  EYES: No scleral icterus.  CV: Regular rhythm, tachycardic rate.  No murmurs gallops rubs  RESPIRATORY: Normal effort.  No audible wheezes, rales or rhonchi.  Clear to auscultation  ABDOMEN: Soft, nontender to very deep palpation.  Protuberant with adipose.  MUSCULOSKELETAL: No deformities.  No CVA tenderness  NEURO: Alert, moves all extremities, follows commands.  SKIN: Warm, dry, no rash visualized.      LAB RESULTS  Recent Results (from the past 24 hour(s))   POC Urinalysis Dipstick, Multipro (Automated Dipstick)    Collection Time: 05/08/23  9:02 AM    Specimen: Urine   Result Value Ref Range    Color Yellow     Clarity, UA Cloudy (A)     Glucose, UA 3+ (A) mg/dL    Bilirubin Negative     Ketones, UA Negative     Specific Gravity  1.015 1.005 - 1.030    Blood, UA 3+ (A)     pH, Urine 6.0 5.0 - 8.0    Protein, POC 1+ (A) mg/dL    Urobilinogen, UA Normal     Nitrite, UA  Positive (A)     Leukocytes Moderate (2+) (A)    Comprehensive Metabolic Panel    Collection Time: 05/08/23 10:47 AM    Specimen: Blood   Result Value Ref Range    Glucose 213 (H) 65 - 99 mg/dL    BUN 19 8 - 23 mg/dL    Creatinine 1.06 0.76 - 1.27 mg/dL    Sodium 136 136 - 145 mmol/L    Potassium 4.6 3.5 - 5.2 mmol/L    Chloride 95 (L) 98 - 107 mmol/L    CO2 27.0 22.0 - 29.0 mmol/L    Calcium 10.0 8.6 - 10.5 mg/dL    Total Protein 8.0 6.0 - 8.5 g/dL    Albumin 3.9 3.5 - 5.2 g/dL    ALT (SGPT) 30 1 - 41 U/L    AST (SGOT) 27 1 - 40 U/L    Alkaline Phosphatase 101 39 - 117 U/L    Total Bilirubin 0.8 0.0 - 1.2 mg/dL    Globulin 4.1 gm/dL    A/G Ratio 1.0 g/dL    BUN/Creatinine Ratio 17.9 7.0 - 25.0    Anion Gap 14.0 5.0 - 15.0 mmol/L    eGFR 75.5 >60.0 mL/min/1.73   Lactic Acid, Plasma    Collection Time: 05/08/23 10:47 AM    Specimen: Blood   Result Value Ref Range    Lactate 2.7 (C) 0.5 - 2.0 mmol/L   CBC Auto Differential    Collection Time: 05/08/23 10:47 AM    Specimen: Blood   Result Value Ref Range    WBC 20.96 (H) 3.40 - 10.80 10*3/mm3    RBC 5.53 4.14 - 5.80 10*6/mm3    Hemoglobin 16.4 13.0 - 17.7 g/dL    Hematocrit 51.4 (H) 37.5 - 51.0 %    MCV 92.9 79.0 - 97.0 fL    MCH 29.7 26.6 - 33.0 pg    MCHC 31.9 31.5 - 35.7 g/dL    RDW 14.6 12.3 - 15.4 %    RDW-SD 50.2 37.0 - 54.0 fl    MPV 9.2 6.0 - 12.0 fL    Platelets 208 140 - 450 10*3/mm3    Neutrophil % 82.6 (H) 42.7 - 76.0 %    Lymphocyte % 8.2 (L) 19.6 - 45.3 %    Monocyte % 7.6 5.0 - 12.0 %    Eosinophil % 0.1 (L) 0.3 - 6.2 %    Basophil % 0.3 0.0 - 1.5 %    Immature Grans % 1.2 (H) 0.0 - 0.5 %    Neutrophils, Absolute 17.31 (H) 1.70 - 7.00 10*3/mm3    Lymphocytes, Absolute 1.71 0.70 - 3.10 10*3/mm3    Monocytes, Absolute 1.59 (H) 0.10 - 0.90 10*3/mm3    Eosinophils, Absolute 0.03 0.00 - 0.40 10*3/mm3    Basophils, Absolute 0.07 0.00 - 0.20 10*3/mm3    Immature Grans, Absolute 0.25 (H) 0.00 - 0.05 10*3/mm3    nRBC 0.0 0.0 - 0.2 /100 WBC   Green Top (Gel)     Collection Time: 05/08/23 10:47 AM   Result Value Ref Range    Extra Tube Hold for add-ons.    Lavender Top    Collection Time: 05/08/23 10:47 AM   Result Value Ref Range    Extra Tube hold for add-on    Gold Top - SST    Collection Time: 05/08/23 10:47 AM   Result Value Ref Range    Extra Tube Hold for add-ons.    Light Blue Top    Collection Time: 05/08/23 10:47 AM   Result Value Ref Range    Extra Tube Hold for add-ons.    Urinalysis With Culture If Indicated - Urine, Clean Catch    Collection Time: 05/08/23  2:10 PM    Specimen: Urine, Clean Catch   Result Value Ref Range    Color, UA Yellow Yellow, Straw    Appearance, UA Turbid (A) Clear    pH, UA 6.0 5.0 - 8.0    Specific Gravity, UA 1.027 1.001 - 1.030    Glucose, UA >=1000 mg/dL (3+) (A) Negative    Ketones, UA Negative Negative    Bilirubin, UA Negative Negative    Blood, UA Large (3+) (A) Negative    Protein, UA >=300 mg/dL (3+) (A) Negative    Leuk Esterase, UA Moderate (2+) (A) Negative    Nitrite, UA Negative Negative    Urobilinogen, UA 0.2 E.U./dL 0.2 - 1.0 E.U./dL   Urinalysis, Microscopic Only - Urine, Clean Catch    Collection Time: 05/08/23  2:10 PM    Specimen: Urine, Clean Catch   Result Value Ref Range    RBC, UA 13-20 (A) None Seen, 0-2 /HPF    WBC, UA Too Numerous to Count (A) None Seen, 0-2 /HPF    Bacteria, UA 3+ (A) None Seen, Trace /HPF    Squamous Epithelial Cells, UA 7-12 (A) None Seen, 0-2 /HPF    Hyaline Casts, UA 0-6 0 - 6 /LPF    Methodology Manual Light Microscopy        If labs were ordered, I independently reviewed the results and considered them in treating the patient.        RADIOLOGY  CT Abdomen Pelvis Without Contrast    Result Date: 5/8/2023  CT ABDOMEN PELVIS WO CONTRAST Date of Exam: 5/8/2023 12:40 PM EDT Indication: bilat flank pain with high fever and hx of nephrioliths. Comparison: None available. Technique: Axial CT images were obtained of the abdomen and pelvis without the administration of contrast. Reconstructed  coronal and sagittal images were also obtained. Automated exposure control and iterative construction methods were used. Findings: Lower thorax demonstrates partially imaged changes of prior CABG with otherwise unremarkable appearance of the lower thorax. Unremarkable appearance of the liver. There is small dependent hyperdensity within the gallbladder lumen favored to reflect some gallbladder sludge, with 3 mm calcific density at the gallbladder fundus, uncertain whether this reflects small gallstone or calcification relating to adenomyomatosis. No evidence of acute cholecystitis. Normal appearance of the bile ducts. Unremarkable appearance of the spleen, pancreas, and adrenal glands. There is a cluster of small nonobstructing stones in the inferior pole of the right kidney measuring up to 3 mm in size. There are couple small vague round hypodensities in the cortex of the right kidney which may reflect small simple cysts. Otherwise unremarkable appearance of the right kidney and right ureter. There is mild to moderate left-sided hydroureteronephrosis. There is a punctate nonobstructing stone in the inferior pole of the left kidney. There is some periureteral fat stranding along the  proximal and mid left ureter; the distal left ureter appears normal in caliber. No evidence of ureteral stone or obvious obstructing lesion. There is a simple cyst in the superior pole cortex of the left kidney. No perinephric fluid collection. There is  questionable circumferential wall thickening of the urinary bladder although the bladder is under distended. There are multiple surgical clips in the expected region of the prostate compatible with surgical change of prostatectomy. There is minimal sigmoid colonic diverticulosis without evidence of diverticulitis. Normal appendix. No evidence of bowel obstruction. No definite inflammatory change of the GI tract. No abdominopelvic free fluid or conspicuous fat stranding. No  pneumoperitoneum. There are mild scattered atherosclerotic calcifications of the abdominal aorta with otherwise unremarkable noncontrast appearance of the vasculature. There are couple surgical clips in the region of the right inguinal canal/right groin.  Otherwise unremarkable appearance of the body wall soft tissues. No bulky or suspicious abdominopelvic lymph nodes. There is a subcentimeter sclerotic focus in the right ischium which is nonspecific and may reflect bone island; no additional conspicuous  sclerotic bony lesions are identified. No acute osseous findings.     Impression: Mild to moderate left-sided hydroureteronephrosis without identifiable cause. Assessment for any left-sided pyelonephritis is severely limited in the absence of IV contrast and clinical correlation is required. There is nonobstructing bilateral nephrolithiasis. Questionable circumferential wall thickening of the urinary bladder which appears under distended. Correlate with urinalysis to exclude cystitis. There are surgical changes of prostatectomy. Gallbladder sludge. Electronically Signed: Luis Justin  5/8/2023 1:21 PM EDT  Workstation ID: [id number]      I ordered and independently reviewed the above noted radiographic studies.      I viewed images of CT abdomen pelvis which showed no ureteral stones per my independent interpretation.    See radiologist's dictation for official interpretation.        PROCEDURES    Procedures    No orders to display       MEDICATIONS GIVEN IN ER    Medications   sodium chloride 0.9 % flush 10 mL (has no administration in time range)   sodium chloride 0.9 % bolus 1,000 mL (has no administration in time range)   acetaminophen (TYLENOL) tablet 1,000 mg (has no administration in time range)   cefTRIAXone (ROCEPHIN) 2 g/100 mL 0.9% NS IVPB (MBP) (has no administration in time range)         MEDICAL DECISION MAKING, PROGRESS, and CONSULTS    All labs have been independently reviewed by me.  All radiology  studies have been reviewed by me and the radiologist dictating the report.  All EKG's have been independently viewed and interpreted by me.      Discussion below represents my analysis of pertinent findings related to patient's condition, differential diagnosis, treatment plan and final disposition.      Differential diagnosis:    Pyelonephritis versus infected kidney stone versus colitis, etc.      Additional sources:      - External (non-ED) record review: Epic chart review    - Chronic or social conditions impacting care: BMI 32    - Shared decision making: Patient is in full agreement with current plan for evaluation and treatment.      Orders placed during this visit:  Orders Placed This Encounter   Procedures   • Blood Culture - Blood,   • Blood Culture - Blood,   • Urine Culture - Urine,   • CT Abdomen Pelvis Without Contrast   • Comprehensive Metabolic Panel   • Lactic Acid, Plasma   • May Draw   • CBC Auto Differential   • Urinalysis With Culture If Indicated - Urine, Clean Catch   • STAT Lactic Acid, Reflex   • Urinalysis, Microscopic Only - Urine, Clean Catch   • Undress & Gown   • Continuous Pulse Oximetry   • Vital Signs   • Cardiac Monitoring   • Oxygen Therapy- Nasal Cannula; 2 LPM; Titrate for SPO2: 92%, Greater Than or Equal To   • Insert Peripheral IV   • Inpatient Admission   • ED Bed Request   • CBC & Differential   • Green Top (Gel)   • Lavender Top   • Gold Top - SST   • Gray Top   • Light Blue Top         Additional orders considered but not ordered:  Contrasted CT scan of the abdomen and pelvis.  I did not believe that contrast would give significant benefit and does have downsides.    ED Course:    Consultants:      ED Course as of 05/08/23 1451   Mon May 08, 2023   1441 WBC(!): 20.96 [MS]   1441 Lactate(!!): 2.7 [MS]   1441 Glucose(!): 213 [MS]   1441 WBC, UA(!): Too Numerous to Count [MS]   1441 Bacteria, UA(!): 3+ [MS]   1441 Glucose(!): >=1000 mg/dL (3+) [MS]   1445 I have ordered 2  g of IV Rocephin.  I communicated with Dr. Padron, hospitalist for admission. [MS]      ED Course User Index  [MS] Smith Guzman MD                  AS OF 14:51 EDT VITALS:    BP - 112/92  HR - (!) 127  TEMP - (!) 102.9 °F (39.4 °C) (Oral)  O2 SATS - 93%                  DIAGNOSIS  Final diagnoses:   Pyelonephritis   Leukocytosis, unspecified type   Hydronephrosis, left   Acute febrile illness         DISPOSITION  Admission      Please note that portions of this document were completed with voice recognition software.      Smith Guzman MD  05/14/23 6350

## 2023-05-08 NOTE — PLAN OF CARE
Goal Outcome Evaluation:  Plan of Care Reviewed With: patient        Progress: improving  Outcome Evaluation: VSS. RA. Tachycardic. IVF infusing. Pt admitted to floor

## 2023-05-09 ENCOUNTER — ANESTHESIA (OUTPATIENT)
Dept: PERIOP | Facility: HOSPITAL | Age: 73
End: 2023-05-09
Payer: MEDICARE

## 2023-05-09 ENCOUNTER — ANESTHESIA EVENT (OUTPATIENT)
Dept: PERIOP | Facility: HOSPITAL | Age: 73
End: 2023-05-09
Payer: MEDICARE

## 2023-05-09 ENCOUNTER — APPOINTMENT (OUTPATIENT)
Dept: GENERAL RADIOLOGY | Facility: HOSPITAL | Age: 73
End: 2023-05-09
Payer: MEDICARE

## 2023-05-09 PROBLEM — N13.30 HYDRONEPHROSIS OF LEFT KIDNEY: Status: ACTIVE | Noted: 2023-05-09

## 2023-05-09 PROBLEM — R78.81 BACTEREMIA: Status: ACTIVE | Noted: 2023-05-09

## 2023-05-09 LAB
ANION GAP SERPL CALCULATED.3IONS-SCNC: 12 MMOL/L (ref 5–15)
BACTERIA BLD CULT: ABNORMAL
BASOPHILS # BLD AUTO: 0.04 10*3/MM3 (ref 0–0.2)
BASOPHILS NFR BLD AUTO: 0.4 % (ref 0–1.5)
BUN SERPL-MCNC: 20 MG/DL (ref 8–23)
BUN/CREAT SERPL: 22.2 (ref 7–25)
CALCIUM SPEC-SCNC: 8 MG/DL (ref 8.6–10.5)
CHLORIDE SERPL-SCNC: 101 MMOL/L (ref 98–107)
CHOLEST SERPL-MCNC: 173 MG/DL (ref 0–200)
CO2 SERPL-SCNC: 23 MMOL/L (ref 22–29)
CREAT SERPL-MCNC: 0.9 MG/DL (ref 0.76–1.27)
DEPRECATED RDW RBC AUTO: 50.4 FL (ref 37–54)
EGFRCR SERPLBLD CKD-EPI 2021: 91.9 ML/MIN/1.73
EOSINOPHIL # BLD AUTO: 0.01 10*3/MM3 (ref 0–0.4)
EOSINOPHIL NFR BLD AUTO: 0.1 % (ref 0.3–6.2)
ERYTHROCYTE [DISTWIDTH] IN BLOOD BY AUTOMATED COUNT: 14.7 % (ref 12.3–15.4)
GLUCOSE BLDC GLUCOMTR-MCNC: 116 MG/DL (ref 70–130)
GLUCOSE BLDC GLUCOMTR-MCNC: 123 MG/DL (ref 70–130)
GLUCOSE BLDC GLUCOMTR-MCNC: 123 MG/DL (ref 70–130)
GLUCOSE SERPL-MCNC: 119 MG/DL (ref 65–99)
HBA1C MFR BLD: 7.3 % (ref 4.8–5.6)
HCT VFR BLD AUTO: 42.7 % (ref 37.5–51)
HDLC SERPL-MCNC: 39 MG/DL (ref 40–60)
HGB BLD-MCNC: 13.5 G/DL (ref 13–17.7)
IMM GRANULOCYTES # BLD AUTO: 0.15 10*3/MM3 (ref 0–0.05)
IMM GRANULOCYTES NFR BLD AUTO: 1.6 % (ref 0–0.5)
LDLC SERPL CALC-MCNC: 113 MG/DL (ref 0–100)
LDLC/HDLC SERPL: 2.85 {RATIO}
LYMPHOCYTES # BLD AUTO: 0.69 10*3/MM3 (ref 0.7–3.1)
LYMPHOCYTES NFR BLD AUTO: 7.4 % (ref 19.6–45.3)
MCH RBC QN AUTO: 29.5 PG (ref 26.6–33)
MCHC RBC AUTO-ENTMCNC: 31.6 G/DL (ref 31.5–35.7)
MCV RBC AUTO: 93.2 FL (ref 79–97)
MONOCYTES # BLD AUTO: 0.69 10*3/MM3 (ref 0.1–0.9)
MONOCYTES NFR BLD AUTO: 7.4 % (ref 5–12)
NEUTROPHILS NFR BLD AUTO: 7.72 10*3/MM3 (ref 1.7–7)
NEUTROPHILS NFR BLD AUTO: 83.1 % (ref 42.7–76)
NRBC BLD AUTO-RTO: 0 /100 WBC (ref 0–0.2)
PLATELET # BLD AUTO: 146 10*3/MM3 (ref 140–450)
PMV BLD AUTO: 8.9 FL (ref 6–12)
POTASSIUM SERPL-SCNC: 4 MMOL/L (ref 3.5–5.2)
RBC # BLD AUTO: 4.58 10*6/MM3 (ref 4.14–5.8)
SODIUM SERPL-SCNC: 136 MMOL/L (ref 136–145)
TRIGL SERPL-MCNC: 114 MG/DL (ref 0–150)
TSH SERPL DL<=0.05 MIU/L-ACNC: 0.58 UIU/ML (ref 0.27–4.2)
VLDLC SERPL-MCNC: 21 MG/DL (ref 5–40)
WBC NRBC COR # BLD: 9.3 10*3/MM3 (ref 3.4–10.8)

## 2023-05-09 PROCEDURE — 82948 REAGENT STRIP/BLOOD GLUCOSE: CPT

## 2023-05-09 PROCEDURE — BT1F1ZZ FLUOROSCOPY OF LEFT KIDNEY, URETER AND BLADDER USING LOW OSMOLAR CONTRAST: ICD-10-PCS | Performed by: STUDENT IN AN ORGANIZED HEALTH CARE EDUCATION/TRAINING PROGRAM

## 2023-05-09 PROCEDURE — C1758 CATHETER, URETERAL: HCPCS | Performed by: STUDENT IN AN ORGANIZED HEALTH CARE EDUCATION/TRAINING PROGRAM

## 2023-05-09 PROCEDURE — 25010000002 CEFTRIAXONE PER 250 MG: Performed by: INTERNAL MEDICINE

## 2023-05-09 PROCEDURE — 52332 CYSTOSCOPY AND TREATMENT: CPT | Performed by: STUDENT IN AN ORGANIZED HEALTH CARE EDUCATION/TRAINING PROGRAM

## 2023-05-09 PROCEDURE — 85025 COMPLETE CBC W/AUTO DIFF WBC: CPT | Performed by: INTERNAL MEDICINE

## 2023-05-09 PROCEDURE — 0T778DZ DILATION OF LEFT URETER WITH INTRALUMINAL DEVICE, VIA NATURAL OR ARTIFICIAL OPENING ENDOSCOPIC: ICD-10-PCS | Performed by: STUDENT IN AN ORGANIZED HEALTH CARE EDUCATION/TRAINING PROGRAM

## 2023-05-09 PROCEDURE — C2617 STENT, NON-COR, TEM W/O DEL: HCPCS | Performed by: STUDENT IN AN ORGANIZED HEALTH CARE EDUCATION/TRAINING PROGRAM

## 2023-05-09 PROCEDURE — 74420 UROGRAPHY RTRGR +-KUB: CPT

## 2023-05-09 PROCEDURE — 80061 LIPID PANEL: CPT | Performed by: INTERNAL MEDICINE

## 2023-05-09 PROCEDURE — C1769 GUIDE WIRE: HCPCS | Performed by: STUDENT IN AN ORGANIZED HEALTH CARE EDUCATION/TRAINING PROGRAM

## 2023-05-09 PROCEDURE — 25010000002 KETOROLAC TROMETHAMINE PER 15 MG: Performed by: NURSE ANESTHETIST, CERTIFIED REGISTERED

## 2023-05-09 PROCEDURE — 25010000002 CEFTRIAXONE PER 250 MG: Performed by: STUDENT IN AN ORGANIZED HEALTH CARE EDUCATION/TRAINING PROGRAM

## 2023-05-09 PROCEDURE — 83036 HEMOGLOBIN GLYCOSYLATED A1C: CPT | Performed by: INTERNAL MEDICINE

## 2023-05-09 PROCEDURE — 25510000001 IOPAMIDOL 61 % SOLUTION 50 ML VIAL: Performed by: STUDENT IN AN ORGANIZED HEALTH CARE EDUCATION/TRAINING PROGRAM

## 2023-05-09 PROCEDURE — 99222 1ST HOSP IP/OBS MODERATE 55: CPT | Performed by: NURSE PRACTITIONER

## 2023-05-09 PROCEDURE — 74420 UROGRAPHY RTRGR +-KUB: CPT | Performed by: STUDENT IN AN ORGANIZED HEALTH CARE EDUCATION/TRAINING PROGRAM

## 2023-05-09 PROCEDURE — 52351 CYSTOURETERO & OR PYELOSCOPE: CPT | Performed by: STUDENT IN AN ORGANIZED HEALTH CARE EDUCATION/TRAINING PROGRAM

## 2023-05-09 PROCEDURE — 84443 ASSAY THYROID STIM HORMONE: CPT | Performed by: INTERNAL MEDICINE

## 2023-05-09 PROCEDURE — 25010000002 ONDANSETRON PER 1 MG: Performed by: NURSE ANESTHETIST, CERTIFIED REGISTERED

## 2023-05-09 PROCEDURE — 99233 SBSQ HOSP IP/OBS HIGH 50: CPT | Performed by: INTERNAL MEDICINE

## 2023-05-09 PROCEDURE — 25010000002 DEXAMETHASONE PER 1 MG: Performed by: NURSE ANESTHETIST, CERTIFIED REGISTERED

## 2023-05-09 PROCEDURE — 80048 BASIC METABOLIC PNL TOTAL CA: CPT | Performed by: INTERNAL MEDICINE

## 2023-05-09 DEVICE — URETERAL STENT
Type: IMPLANTABLE DEVICE | Site: UTERUS | Status: FUNCTIONAL
Brand: CONTOUR™

## 2023-05-09 RX ORDER — SODIUM CHLORIDE 0.9 % (FLUSH) 0.9 %
10 SYRINGE (ML) INJECTION EVERY 12 HOURS SCHEDULED
Status: DISCONTINUED | OUTPATIENT
Start: 2023-05-09 | End: 2023-05-09 | Stop reason: HOSPADM

## 2023-05-09 RX ORDER — SODIUM CHLORIDE 0.9 % (FLUSH) 0.9 %
10 SYRINGE (ML) INJECTION AS NEEDED
Status: DISCONTINUED | OUTPATIENT
Start: 2023-05-09 | End: 2023-05-09 | Stop reason: HOSPADM

## 2023-05-09 RX ORDER — DEXAMETHASONE SODIUM PHOSPHATE 4 MG/ML
INJECTION, SOLUTION INTRA-ARTICULAR; INTRALESIONAL; INTRAMUSCULAR; INTRAVENOUS; SOFT TISSUE AS NEEDED
Status: DISCONTINUED | OUTPATIENT
Start: 2023-05-09 | End: 2023-05-09 | Stop reason: SURG

## 2023-05-09 RX ORDER — KETOROLAC TROMETHAMINE 30 MG/ML
INJECTION, SOLUTION INTRAMUSCULAR; INTRAVENOUS AS NEEDED
Status: DISCONTINUED | OUTPATIENT
Start: 2023-05-09 | End: 2023-05-09 | Stop reason: SURG

## 2023-05-09 RX ORDER — MAGNESIUM HYDROXIDE 1200 MG/15ML
LIQUID ORAL AS NEEDED
Status: DISCONTINUED | OUTPATIENT
Start: 2023-05-09 | End: 2023-05-09 | Stop reason: HOSPADM

## 2023-05-09 RX ORDER — SODIUM CHLORIDE, SODIUM LACTATE, POTASSIUM CHLORIDE, CALCIUM CHLORIDE 600; 310; 30; 20 MG/100ML; MG/100ML; MG/100ML; MG/100ML
9 INJECTION, SOLUTION INTRAVENOUS CONTINUOUS PRN
Status: DISCONTINUED | OUTPATIENT
Start: 2023-05-09 | End: 2023-05-11 | Stop reason: HOSPADM

## 2023-05-09 RX ORDER — FENTANYL CITRATE 50 UG/ML
50 INJECTION, SOLUTION INTRAMUSCULAR; INTRAVENOUS
Status: DISCONTINUED | OUTPATIENT
Start: 2023-05-09 | End: 2023-05-09 | Stop reason: HOSPADM

## 2023-05-09 RX ORDER — ONDANSETRON 2 MG/ML
INJECTION INTRAMUSCULAR; INTRAVENOUS AS NEEDED
Status: DISCONTINUED | OUTPATIENT
Start: 2023-05-09 | End: 2023-05-09 | Stop reason: SURG

## 2023-05-09 RX ORDER — SODIUM CHLORIDE 9 MG/ML
40 INJECTION, SOLUTION INTRAVENOUS AS NEEDED
Status: DISCONTINUED | OUTPATIENT
Start: 2023-05-09 | End: 2023-05-09 | Stop reason: HOSPADM

## 2023-05-09 RX ADMIN — APIXABAN 5 MG: 5 TABLET, FILM COATED ORAL at 09:15

## 2023-05-09 RX ADMIN — CEFTRIAXONE 2 G: 2 INJECTION, POWDER, FOR SOLUTION INTRAMUSCULAR; INTRAVENOUS at 15:53

## 2023-05-09 RX ADMIN — CEFTRIAXONE 2 G: 2 INJECTION, POWDER, FOR SOLUTION INTRAMUSCULAR; INTRAVENOUS at 15:17

## 2023-05-09 RX ADMIN — BUPROPION HYDROCHLORIDE 150 MG: 150 TABLET, EXTENDED RELEASE ORAL at 09:15

## 2023-05-09 RX ADMIN — ONDANSETRON 4 MG: 2 INJECTION INTRAMUSCULAR; INTRAVENOUS at 16:15

## 2023-05-09 RX ADMIN — APIXABAN 5 MG: 5 TABLET, FILM COATED ORAL at 20:13

## 2023-05-09 RX ADMIN — ROSUVASTATIN 20 MG: 20 TABLET, FILM COATED ORAL at 09:16

## 2023-05-09 RX ADMIN — ACETAMINOPHEN 325MG 650 MG: 325 TABLET ORAL at 03:20

## 2023-05-09 RX ADMIN — METOPROLOL SUCCINATE 50 MG: 50 TABLET, EXTENDED RELEASE ORAL at 09:15

## 2023-05-09 RX ADMIN — PANTOPRAZOLE SODIUM 40 MG: 40 TABLET, DELAYED RELEASE ORAL at 09:15

## 2023-05-09 RX ADMIN — KETOROLAC TROMETHAMINE 15 MG: 30 INJECTION, SOLUTION INTRAMUSCULAR at 16:24

## 2023-05-09 RX ADMIN — DEXAMETHASONE SODIUM PHOSPHATE 4 MG: 4 INJECTION, SOLUTION INTRAMUSCULAR; INTRAVENOUS at 16:13

## 2023-05-09 RX ADMIN — SODIUM CHLORIDE, POTASSIUM CHLORIDE, SODIUM LACTATE AND CALCIUM CHLORIDE 9 ML/HR: 600; 310; 30; 20 INJECTION, SOLUTION INTRAVENOUS at 14:20

## 2023-05-09 RX ADMIN — SODIUM CHLORIDE 100 ML/HR: 0.9 INJECTION, SOLUTION INTRAVENOUS at 03:20

## 2023-05-09 NOTE — ANESTHESIA PROCEDURE NOTES
Airway  Urgency: elective    Date/Time: 5/9/2023 4:06 PM  Airway not difficult    General Information and Staff    Patient location during procedure: OR  CRNA/CAA: Rajesh Barillas CRNA    Indications and Patient Condition  Indications for airway management: airway protection    Preoxygenated: yes  Mask difficulty assessment: 1 - vent by mask    Final Airway Details  Final airway type: supraglottic airway      Successful airway: I-gel  Size 4     Number of attempts at approach: 1  Assessment: lips, teeth, and gum same as pre-op    Additional Comments  LMA placed without difficulty, ventilation with assist, equal breath sounds and symmetric chest rise and fall

## 2023-05-09 NOTE — CASE MANAGEMENT/SOCIAL WORK
"Discharge Planning Assessment  [facility]     Patient Name: Aldair Narvaez  MRN: [id number]  Today's Date: 5/9/2023    Admit Date: 5/8/2023    Plan: JAUN eval   Discharge Needs Assessment     Row Name 05/09/23 1259       Living Environment    People in Home spouse    Current Living Arrangements home    Primary Care Provided by self    Provides Primary Care For no one    Family Caregiver if Needed spouse    Quality of Family Relationships helpful;involved       Resource/Environmental Concerns    Resource/Environmental Concerns none       Transition Planning    Patient/Family Anticipates Transition to home with family    Patient/Family Anticipated Services at Transition     Transportation Anticipated family or friend will provide       Discharge Needs Assessment    Readmission Within the Last 30 Days no previous admission in last 30 days    Equipment Currently Used at Home cpap;glucometer    Concerns to be Addressed discharge planning    Anticipated Changes Related to Illness none    Equipment Needed After Discharge none               Discharge Plan     Row Name 05/09/23 1300       Plan    Plan CM eval    Plan Comments Confirmed patient lives with his spouse in [facility]. He is independent of ADls and has a cpap and glucometer at home. Per MD note, plan for today \"Proceed to OR for cystoscopy, left retrograde pyelogram, left ureteral stent insertion, possible Farooq catheter placement, possible bladder neck contracture dilation.\" Goal is to return home upon discharge. CM will continue to follow -[phone]    Final Discharge Disposition Code 01 - home or self-care              Continued Care and Services - Admitted Since 5/8/2023    Coordination has not been started for this encounter.       Expected Discharge Date and Time     Expected Discharge Date Expected Discharge Time    May 11, 2023          Demographic Summary     Row Name 05/09/23 1258       General Information    Admission Type inpatient "    Arrived From home    Referral Source admission list    Reason for Consult discharge planning    Preferred Language English       Contact Information    Permission Granted to Share Info With                Functional Status     Row Name 05/09/23 3318       Functional Status    Usual Activity Tolerance good    Current Activity Tolerance moderate       Functional Status, IADL    Medications independent    Meal Preparation independent    Housekeeping independent    Laundry independent    Shopping independent               Psychosocial    No documentation.                Abuse/Neglect    No documentation.                Legal    No documentation.                Substance Abuse    No documentation.                Patient Forms    No documentation.                   Brandy Gerard RN

## 2023-05-09 NOTE — OP NOTE
CYSTOSCOPY URETERAL CATHETER/STENT INSERTION  Procedure Report    Patient Name:  Aldair Narvaez  YOB: 1950    Date of Surgery:  5/9/2023     Indications: 72-year-old male with remote prostatectomy for prostate cancer admitted with urosepsis, left hydroureteronephrosis of unclear etiology.  He presents today for cystoscopy, left retrograde pyelogram and stent placement.  Risks discussed.    Pre-op Diagnosis:   Left hydroureteronephrosis       Post-Op Diagnosis Codes:  Left hydroureteronephrosis    Procedure(s):  CYSTOSCOPY   LEFT RETROGRADE PYELOGRAM  LEFT URETERAL STENT PLACEMENT    Staff:  Surgeon(s):  Isma Justin MD     Anesthesia: General    Estimated Blood Loss: none    Implants:    Implant Name Type Inv. Item Serial No.  Lot No. LRB No. Used Action   STNT URETRL CONTOUR PERCUFLX NO GW 6F 22CM - [id number] Stent STNT URETRL CONTOUR PERCUFLX NO GW 6F 22CM  [employer] LATRICIA [id number] Left 1 Implanted       Specimen:          None        Findings:   1. Estimated 18-20 Fr bladder neck contracture able to be bypassed with 22 Fr rigid cystoscope  2.  Moderate to severe left hydroroureteronephrosis to level of distal ureter of unclear etiology  3. Uncomplicated left ureteral stent placement, 6 Fr x 22 cm    Complications: None    Description of Procedure:   The patient was identified in the preoperative holding area where informed consent was reviewed and signed. The patient was transported the operating room per anesthesia and placed supine on the operating table. Smooth endotracheal intubation was performed without issue after administration of general anesthesia. The patient was then placed in the dorsal lithotomy position where genitals were prepped and draped in the usual sterile fashion. A brief timeout was performed identifying the correct patient procedure and laterality. Perioperative antibiotics were administered. All pressure points were padded.     Procedure began by  inserting a 22 Arabic cystoscope atraumatically per the patient's urethra, the penile and bulbar urethra appeared widely patent, the membranous sphincter was preserved, at the level of the bladder neck there was an estimated 18-20 Arabic mild bladder neck contracture which was able to be bypassed with the flexible scope with gentle pressure.      Entering into the bladder were pan cystoscopy was performed identifying bilateral orthotopic ureteral orifices and no evidence of bladder masses or other lesions. Attention was then turned to the left ureteral orifice.     Left retrograde pyelogram interpretation  A 5 Fr open ended ureteral catheter was inserted into the distal ureter and contrast dye was injected up the ureter, a retrograde pyelogram was performed identifying stenotic appearing distal ureter, a few centimeters above the left ureterovesical junction there was a significant ureteral stenosis for a few centimeters, above this more proximally there was significant hydroureteronephrosis, rated severe. The ureter and renal pelvis locations were identified under fluoroscopy.  No obvious filling defects were noted in the ureter or renal collecting system.     A Sensor wire was inserted through the working channel of the scope and advanced up the left ureteral orifice to the level of the renal pelvis on fluoroscopy. A 6 Arabic by 22 cm double J ureteral stent was then advanced over the wire to the renal pelvis confirmed on fluoroscopy. The Sensor wire was then removed and a good proximal stent curl was visualized within the collecting system on fluoroscopy, and a distal stent curl was observed within the bladder on direct visualization. The patient's bladder was emptied. The cystoscope was then removed.    The patient was awoken from general anesthesia and transported to the PACU in stable condition.    PLAN  -Return to clinic in 2 to 3 weeks to discuss further work-up of his likely left distal ureteral stricture,  of unclear etiology.  Diagnostic ureteroscopy would likely be the next step once patient clears his infection.        Isma Justin MD     Date: 5/9/2023  Time: 18:37 EDT

## 2023-05-09 NOTE — PLAN OF CARE
Goal Outcome Evaluation:           Progress: (P) improving  Outcome Evaluation: (P) VSS on RA.Sinus tach at baseline. Diet tolerated well before going NPO for cystoscopy. Ambulating well to bathroom. No complaints of pain or nausea. Awaiting patient's return from cystoscopy. Plan of care ongoing.

## 2023-05-09 NOTE — NURSING NOTE
"                             Wound, Ostomy and Continence (WOC) Note    Reason for WOC Consultation: \"Left leg, skin peeled off.\"     Patient awake and alert.  Family/support person not present.  Subjective: Patient states,\" I had a vein graft at the site a year ago.  The wound has been healing at home.  We have been putting Neosporin on it.\"    Skin/Wound Assessment:     Wound Type: Skin Tear ISTAP-type 1 versus open abrasion  Location: Left medial lower leg  Measurements: 2.5 x 2.5 x 0.2 cm  Wound Bed: bleeding, clean, dermis, epithelialization, granulating and red  Wound Edges: Irregular and Open  Periwound Skin: intact and blanchable   Drainage Characteristics/Odor: bleeding controlled  Drainage Amount: scant  Pain: No   Care provided: The wound was cleansed with wound cleanser.  Multilayer Xeroform applied and cover with an Optifoam dressing.  Image:         Summary and Recommendation(s):     -The wound bed appears healthy with good granulation tissue.  No signs of infection.  - Refer to wound care instructions in the \"Orders\" tab  - Specialty support surface in place: Foam Mattress       Pressure Injury Prevention Measures (initiate for a Antony Scale Score of <18):     Most recent Antony Scale score:  Sensory Perception: 4-->no impairment  Moisture: 4-->rarely moist  Activity: 3-->walks occasionally  Mobility: 4-->no limitation  Nutrition: 3-->adequate  Friction and Shear: 3-->no apparent problem  Antony Score: 21 (05/08/23 2000)     -Turn q 2 hr. using an offloading foam wedge, keep heels elevated and offloaded with offloading heel boots.    -Raise knee-gatch before elevating HOB to reduce shearing   -Follow C.A.R.E protocol if medical devices (Bipap, villarreal, Ng tube, etc) are being used.  -Apply moisture barrier cream to bottom BID & PRN, if incontinent.  -Clean skin gently with no-rinse PH-balanced foam cleanser and a soft, disposable cloth (barrier wipes-blue pack).   -Reduce layers under patient (one " sheet as drawsheet and two incontinence pads)    Thank you for consulting the WOC Nurse.  WOC Team will sign off.  Please re consult if the wound(s) worsens.     Den Dye RN, BSN, CCRN, CWOCN  Wound, Ostomy and Continence (WOC) Department  [facility]

## 2023-05-09 NOTE — PAYOR COMM NOTE
"Flaca Lim RN  Utilization Management  P:[phone]  F:[phone]    Ref # [id number]  Yumiko Bernstein (72 y.o. Male)     Date of Birth   1950    Social Security Number       Address   [facility] [address] [place] [address]    Home Phone   [phone]    MRN   [id number]       Holiness   Faith    Marital Status   Unknown                            Admission Date   23    Admission Type   Emergency    Admitting Provider   Georgie Padron MD    Attending Provider   Georgie Padron MD    Department, Room/Bed   [facility] 2F, S201/1       Discharge Date       Discharge Disposition       Discharge Destination                               Attending Provider: Georgie Padron MD    Allergies: Iodine, Oxycodone-acetaminophen, Zofran [Ondansetron]    Isolation: None   Infection: None   Code Status: CPR    Ht: 177.8 cm (70\")   Wt: 99.8 kg (220 lb)    Admission Cmt: None   Principal Problem: Pyelonephritis [N12]                 Active Insurance as of 2023     Primary Coverage     Payor Plan Insurance Group Employer/Plan Group    ANTHEM MEDICARE REPLACEMENT ANTHEM MEDICARE ADVANTAGE [id number]     Payor Plan Address Payor Plan Phone Number Payor Plan Fax Number Effective Dates    [id number] [phone]  2018 - None Entered    [facility] [address]       Subscriber Name Subscriber Birth Date Member ID       YUMIKO BERNSTEIN 1950 [id number]                 Emergency Contacts      (Rel.) Home Phone Work Phone Mobile Phone    GORAN BERNSTEIN (Spouse) [phone] -- --    Adelaide Joseph (Daughter) [phone] -- --               History & Physical      Bárbara Lee MD at [address] [place] [address] Medicine Services  HISTORY AND PHYSICAL    Patient Name: Yumiko Bernstein  : 1950  MRN: [id number]  Primary Care Physician: Miguel Angel Mireles MD  Date of admission: 2023      Subjective    Subjective     Chief " Complaint:  Fever, dysuria    HPI:  Aldair Narvaez is a 72 y.o. male with PMH of HTN, HLD, T2DM, CAD s/p stents in the remote past with 3VCABG in 2022, PAF on Eliquis, prostate cancer s/p prostatectomy who presented with complaints of dysuria. He was found to be septic due to pyelonephritis. Pt states that he has been having frequency,  Dysuria and fevers for the last few days at home. He did not seek treatment over the weekend and finally decided to go to [facility] this am. He was sent here for further management.    Pt endorses left sided pain but notes that this is mostly from his hip which was injected by Ortho last week (steroids).         Review of Systems   Gen- No fevers, chills  CV- No chest pain, palpitations  Resp- No cough, dyspnea  GI- No N/V/D, + left flank pain        Personal History     Past Medical History:   Diagnosis Date   • Acid reflux    • Arthritis    • Benign hypertension    • Colon polyp    • Concern about heart attack without diagnosis    • Coronary artery disease    • Coronary atherosclerosis    • Heart disease    • Hyperlipidemia    • Hypertension    • Kidney stone    • Mixed hyperlipidemia    • Obesity     body mass index 30+-obesity   • Prostate cancer    • Type 2 diabetes mellitus    • Type 2 diabetes mellitus              Past Surgical History:   Procedure Laterality Date   • CARDIAC CATHETERIZATION     • CARDIAC SURGERY N/A     Triple By Pass   • CARPAL TUNNEL RELEASE     • CORONARY ARTERY BYPASS GRAFT  08/30/2022   • CYSTOSCOPY BLADDER STONE LITHOTRIPSY     • CYSTOSCOPY BLADDER STONE LITHOTRIPSY     • FETAL SURGERY FOR CONGENITAL HERNIA     • INGUINAL HERNIA REPAIR     • KNEE ARTHROSCOPY     • PROSTATECTOMY     • TRIGGER FINGER RELEASE     • UMBILICAL HERNIA REPAIR     • UVULOPALATOPHARYNGOPLASTY         Family History: family history includes Diabetes in his sister; Heart attack in his father; Heart disease in his father; Hyperlipidemia in his sister; Hypertension in his sister.     Social  History:  reports that he has never smoked. He has never used smokeless tobacco. He reports that he does not currently use alcohol. He reports that he does not use drugs.  Social History     Social History Narrative   • Not on file       Medications:  Available home medication information reviewed.  (Not in a hospital admission)    No current facility-administered medications on file prior to encounter.     Current Outpatient Medications on File Prior to Encounter   Medication Sig Dispense Refill   • albuterol sulfate  (90 Base) MCG/ACT inhaler As Needed.     • buPROPion XL (WELLBUTRIN XL) 150 MG 24 hr tablet Daily.     • coenzyme Q10 100 MG capsule Take 1 capsule by mouth Daily.     • Dapagliflozin Propanediol (Farxiga) 10 MG tablet Take 10 mg by mouth Daily. 90 tablet 3   • Eliquis 5 MG tablet tablet      • glimepiride (AMARYL) 4 MG tablet Take 2 tablets by mouth Every Morning Before Breakfast. 180 tablet 1   • glucose blood (Contour Next Test) test strip 1 strip 3 times daily dx: e11.65 300 each 3   • metoprolol succinate XL (TOPROL-XL) 50 MG 24 hr tablet 1 tablet Daily.     • nitroglycerin (NITROSTAT) 0.4 MG SL tablet nitroglycerin 0.4 mg sublingual tablet     • pantoprazole (PROTONIX) 40 MG EC tablet Daily.     • rosuvastatin (CRESTOR) 20 MG tablet Take 1 tablet by mouth Daily.     • valsartan-hydrochlorothiazide (DIOVAN-HCT) 320-25 MG per tablet 1 tablet Daily.         Allergies   Allergen Reactions   • Iodine Other (See Comments)   • Oxycodone-Acetaminophen Itching   • Zofran [Ondansetron] Hives       Objective    Objective     Vital Signs:   Temp:  [101.3 °F (38.5 °C)-102.9 °F (39.4 °C)] 102.9 °F (39.4 °C)  Heart Rate:  [117-127] 117  Resp:  [18-20] 20  BP: (112-138)/(78-92) 130/78       Physical Exam   Constitutional: Awake, alert  Eyes: PERRLA, sclerae anicteric, no conjunctival injection  HENT: NCAT, mucous membranes moist  Neck: Supple, no thyromegaly, no lymphadenopathy, trachea  midline  Respiratory: Clear to auscultation bilaterally, nonlabored respirations   Cardiovascular: tachycardic but regular, no murmurs, rubs, or gallops, palpable pedal pulses bilaterally  Gastrointestinal: Positive bowel sounds, soft, nontender, nondistended  Musculoskeletal: No bilateral ankle edema, no clubbing or cyanosis to extremities  Psychiatric: Appropriate affect, cooperative  Neurologic: Oriented x 3, strength symmetric in all extremities, Cranial Nerves grossly intact to confrontation, speech clear  Skin: No rashes    Result Review:  I have personally reviewed the results from the time of this admission to 5/8/2023 15:30 EDT and agree with these findings:  [x]  Laboratory list / accordion  [x]  Microbiology  [x]  Radiology  []  EKG/Telemetry   []  Cardiology/Vascular   []  Pathology  [x]  Old records  []  Other:  Most notable findings include: see assessment and plan      LAB RESULTS:      Lab 05/08/23  1343 05/08/23  1047   WBC  --  20.96*   HEMOGLOBIN  --  16.4   HEMATOCRIT  --  51.4*   PLATELETS  --  208   NEUTROS ABS  --  17.31*   IMMATURE GRANS (ABS)  --  0.25*   LYMPHS ABS  --  1.71   MONOS ABS  --  1.59*   EOS ABS  --  0.03   MCV  --  92.9   LACTATE 3.0* 2.7*         Lab 05/08/23  1047   SODIUM 136   POTASSIUM 4.6   CHLORIDE 95*   CO2 27.0   ANION GAP 14.0   BUN 19   CREATININE 1.06   EGFR 75.5   GLUCOSE 213*   CALCIUM 10.0         Lab 05/08/23  1047   TOTAL PROTEIN 8.0   ALBUMIN 3.9   GLOBULIN 4.1   ALT (SGPT) 30   AST (SGOT) 27   BILIRUBIN 0.8   ALK PHOS 101                     UA        5/8/2023    09:02 5/8/2023    14:10   Urinalysis   Squamous Epithelial Cells, UA  7-12     Specific Gravity, UA  1.027     Ketones, UA Negative   Negative     Blood, UA  Large (3+)     Leukocytes, UA Moderate (2+)   Moderate (2+)     Nitrite, UA  Negative     RBC, UA  13-20     WBC, UA  Too Numerous to Count     Bacteria, UA  3+         Microbiology Results (last 10 days)     ** No results found for the last 240  hours. **          CT Abdomen Pelvis Without Contrast    Result Date: 5/8/2023  CT ABDOMEN PELVIS WO CONTRAST Date of Exam: 5/8/2023 12:40 PM EDT Indication: bilat flank pain with high fever and hx of nephrioliths. Comparison: None available. Technique: Axial CT images were obtained of the abdomen and pelvis without the administration of contrast. Reconstructed coronal and sagittal images were also obtained. Automated exposure control and iterative construction methods were used. Findings: Lower thorax demonstrates partially imaged changes of prior CABG with otherwise unremarkable appearance of the lower thorax. Unremarkable appearance of the liver. There is small dependent hyperdensity within the gallbladder lumen favored to reflect some gallbladder sludge, with 3 mm calcific density at the gallbladder fundus, uncertain whether this reflects small gallstone or calcification relating to adenomyomatosis. No evidence of acute cholecystitis. Normal appearance of the bile ducts. Unremarkable appearance of the spleen, pancreas, and adrenal glands. There is a cluster of small nonobstructing stones in the inferior pole of the right kidney measuring up to 3 mm in size. There are couple small vague round hypodensities in the cortex of the right kidney which may reflect small simple cysts. Otherwise unremarkable appearance of the right kidney and right ureter. There is mild to moderate left-sided hydroureteronephrosis. There is a punctate nonobstructing stone in the inferior pole of the left kidney. There is some periureteral fat stranding along the  proximal and mid left ureter; the distal left ureter appears normal in caliber. No evidence of ureteral stone or obvious obstructing lesion. There is a simple cyst in the superior pole cortex of the left kidney. No perinephric fluid collection. There is  questionable circumferential wall thickening of the urinary bladder although the bladder is under distended. There are multiple  surgical clips in the expected region of the prostate compatible with surgical change of prostatectomy. There is minimal sigmoid colonic diverticulosis without evidence of diverticulitis. Normal appendix. No evidence of bowel obstruction. No definite inflammatory change of the GI tract. No abdominopelvic free fluid or conspicuous fat stranding. No pneumoperitoneum. There are mild scattered atherosclerotic calcifications of the abdominal aorta with otherwise unremarkable noncontrast appearance of the vasculature. There are couple surgical clips in the region of the right inguinal canal/right groin.  Otherwise unremarkable appearance of the body wall soft tissues. No bulky or suspicious abdominopelvic lymph nodes. There is a subcentimeter sclerotic focus in the right ischium which is nonspecific and may reflect bone island; no additional conspicuous  sclerotic bony lesions are identified. No acute osseous findings.     Impression: Impression: Mild to moderate left-sided hydroureteronephrosis without identifiable cause. Assessment for any left-sided pyelonephritis is severely limited in the absence of IV contrast and clinical correlation is required. There is nonobstructing bilateral nephrolithiasis. Questionable circumferential wall thickening of the urinary bladder which appears under distended. Correlate with urinalysis to exclude cystitis. There are surgical changes of prostatectomy. Gallbladder sludge. Electronically Signed: Luis Justin  5/8/2023 1:21 PM EDT  Workstation ID: [id number]          Assessment & Plan   Assessment & Plan     Active Hospital Problems    Diagnosis  POA   • **Pyelonephritis [N12]  Yes     Priority: High   • Sepsis due to urinary tract infection [A41.9, N39.0]  Unknown     Priority: High   • Personal history of prostate cancer [Z85.46]  Not Applicable     Priority: Medium   • Paroxysmal A-fib [I48.0]  Unknown     Priority: Medium   • Uncontrolled type 2 diabetes mellitus with hyperglycemia  [E11.65]  Yes   • Mixed hyperlipidemia [E78.2]  Yes   • Benign hypertension [I10]  Yes   • CAD, multiple vessel [I25.10]  Unknown     S/p CABG in 2022         Mr. Narvaez is a 71 yo M with PMH of HTN, HLD, T2DM, CAD s/p CABG in 2022, PAF on Eliquis, prostate cancer s/p prostatectomy who presented with complaints of dysuria. He was found to be septic due to pyelonephritis.     Plan:    Sepsis (fever, tachycardia, leukocytosis)  UTI/Pyelonephritis  -- UA c/w UTI  -- UCx and blood cultures x 2 PENDING  -- CT A/P looks c/w pyelonephritis  -- IV Rocephin for now  -- IVFs for 24 hours  -- hold antihypertensives as pt is borderline hypotensive    Lactic Acidosis  -- due to above  -- reflex PENDING  -- IVFs for 24 hours as noted above    PAF  -- continue metoprolol as BP will allow  -- IVFs for 24 hours  -- [id number] of 3, on Eliquis, continue    CAD   -- s/p stents in remote past with 3V CABG in 2022 ([facility])  -- hold antihypertensives with exception of beta blocker as noted above    H/o Prostate Cancer  -- s/p prostatectomy    T2DM  -- check HbA1C in am  -- hold oral hypoglycemics  -- low dose SSI for now    Total time spent: 75 minutes  Time spent includes time reviewing chart, face-to-face time, counseling patient/family/caregiver, ordering medications/tests/procedures, communicating with other health care professionals, documenting clinical information in the electronic health record, and coordination of care.      DVT prophylaxis:  Eliquis      CODE STATUS:  Full Code  Code Status and Medical Interventions:   Ordered at: 05/08/23 1526     Level Of Support Discussed With:    Patient     Code Status (Patient has no pulse and is not breathing):    CPR (Attempt to Resuscitate)     Medical Interventions (Patient has pulse or is breathing):    Full Support       Expected Discharge   Expected Discharge Date: 5/10/2023; Expected Discharge Time:      Bárbara Lee MD  05/08/23    Electronically signed by Jesus  Bárbara Lemon MD at 05/08/23 1530         Current Facility-Administered Medications   Medication Dose Route Frequency Provider Last Rate Last Admin   • acetaminophen (TYLENOL) tablet 650 mg  650 mg Oral Q4H PRN Bárbara Lee MD   650 mg at 05/09/23 0320    Or   • acetaminophen (TYLENOL) 160 MG/5ML solution 650 mg  650 mg Oral Q4H PRN Bárbara Lee MD        Or   • acetaminophen (TYLENOL) suppository 650 mg  650 mg Rectal Q4H PRN Bárbara Lee MD       • albuterol sulfate HFA (PROVENTIL HFA;VENTOLIN HFA;PROAIR HFA) inhaler 2 puff  2 puff Inhalation PRN Bárbara Lee MD       • apixaban (ELIQUIS) tablet 5 mg  5 mg Oral Q12H Bárbara Lee MD   5 mg at 05/09/23 0915   • buPROPion XL (WELLBUTRIN XL) 24 hr tablet 150 mg  150 mg Oral Daily Bárbara Lee MD   150 mg at 05/09/23 0915   • Calcium Replacement - Follow Nurse / BPA Driven Protocol   Does not apply PRN Bárbara Lee MD       • cefTRIAXone (ROCEPHIN) 2 g/100 mL 0.9% NS IVPB (MBP)  2 g Intravenous Q24H Bárbara Lee MD       • dextrose (D50W) (25 g/50 mL) IV injection 25 g  25 g Intravenous Q15 Min PRN Bárbara Lee MD       • dextrose (GLUTOSE) oral gel 15 g  15 g Oral Q15 Min PRN Bárbara Lee MD       • glucagon (GLUCAGEN) injection 1 mg  1 mg Intramuscular Q15 Min PRN Bárbara Lee MD       • HYDROcodone-acetaminophen (NORCO) 5-325 MG per tablet 1 tablet  1 tablet Oral Q4H PRN Bárbara Lee MD       • Insulin Lispro (humaLOG) injection 0-7 Units  0-7 Units Subcutaneous TID AC Bárbara Lee MD       • Magnesium Standard Dose Replacement - Follow Nurse / BPA Driven Protocol   Does not apply PRN Bárbara Lee MD       • metoprolol succinate XL (TOPROL-XL) 24 hr tablet 50 mg  50 mg Oral Daily Bárbara Lee MD   50 mg at 05/09/23 0915   • pantoprazole (PROTONIX) EC tablet 40 mg  40 mg Oral Daily Bárbara Lee MD   40 mg at  05/09/23 0915   • Phosphorus Replacement - Follow Nurse / BPA Driven Protocol   Does not apply PRN Bárbara Lee MD       • Potassium Replacement - Follow Nurse / BPA Driven Protocol   Does not apply PRN Bárbara Lee MD       • rosuvastatin (CRESTOR) tablet 20 mg  20 mg Oral Daily Bárbara Lee MD   20 mg at 05/09/23 0916   • sodium chloride 0.9 % flush 1-10 mL  1-10 mL Intravenous PRN Bárbara Lee MD       • sodium chloride 0.9 % flush 10 mL  10 mL Intravenous PRN Smith Guzman MD       • sodium chloride 0.9 % flush 10 mL  10 mL Intravenous Q12H Bárbara Lee MD   10 mL at 05/08/23 2011   • sodium chloride 0.9 % infusion 40 mL  40 mL Intravenous PRN Bárbara Lee MD       • sodium chloride 0.9 % infusion  100 mL/hr Intravenous Continuous Bárbara Lee  mL/hr at 05/09/23 0320 100 mL/hr at 05/09/23 0320       Lab Results (last 24 hours)     Procedure Component Value Units Date/Time    POC Glucose Once [[id number]]  (Normal) Collected: 05/09/23 1114    Specimen: Blood Updated: 05/09/23 1116     Glucose 123 mg/dL      Comment: Meter: [id number] : [id number] Georgiana Gutierrez       POC Glucose Once [[id number]]  (Normal) Collected: 05/09/23 0733    Specimen: Blood Updated: 05/09/23 0744     Glucose 123 mg/dL      Comment: Meter: [id number] : [id number] Cj Santos       Basic Metabolic Panel [[id number]]  (Abnormal) Collected: 05/09/23 0532    Specimen: Blood Updated: 05/09/23 0659     Glucose 119 mg/dL      BUN 20 mg/dL      Creatinine 0.90 mg/dL      Sodium 136 mmol/L      Potassium 4.0 mmol/L      Comment: Slight hemolysis detected by analyzer. Results may be affected.        Chloride 101 mmol/L      CO2 23.0 mmol/L      Calcium 8.0 mg/dL      BUN/Creatinine Ratio 22.2     Anion Gap 12.0 mmol/L      eGFR 91.9 mL/min/1.73     Narrative:      GFR Normal >60  Chronic Kidney Disease <60  Kidney Failure <15      Lipid Panel [[id number]]   (Abnormal) Collected: 05/09/23 0532    Specimen: Blood Updated: 05/09/23 0658     Total Cholesterol 173 mg/dL      Triglycerides 114 mg/dL      HDL Cholesterol 39 mg/dL      LDL Cholesterol  113 mg/dL      VLDL Cholesterol 21 mg/dL      LDL/HDL Ratio 2.85    Narrative:      Cholesterol Reference Ranges  (U.S. Department of Health and Human Services ATP III Classifications)    Desirable          <200 mg/dL  Borderline High    200-239 mg/dL  High Risk          >240 mg/dL      Triglyceride Reference Ranges  (U.S. Department of Health and Human Services ATP III Classifications)    Normal           <150 mg/dL  Borderline High  150-199 mg/dL  High             200-499 mg/dL  Very High        >500 mg/dL    HDL Reference Ranges  (U.S. Department of Health and Human Services ATP III Classifications)    Low     <40 mg/dl (major risk factor for CHD)  High    >60 mg/dl ('negative' risk factor for CHD)        LDL Reference Ranges  (U.S. Department of Health and Human Services ATP III Classifications)    Optimal          <100 mg/dL  Near Optimal     100-129 mg/dL  Borderline High  130-159 mg/dL  High             160-189 mg/dL  Very High        >189 mg/dL    TSH [[id number]]  (Normal) Collected: 05/09/23 0532    Specimen: Blood Updated: 05/09/23 0650     TSH 0.577 uIU/mL     Urine Culture - Urine, Urine, Clean Catch [[id number]]  (Abnormal) Collected: 05/08/23 1410    Specimen: Urine, Clean Catch Updated: 05/09/23 0648     Urine Culture >100,000 CFU/mL Escherichia coli    Narrative:      Colonization of the urinary tract without infection is common. Treatment is discouraged unless the patient is symptomatic, pregnant, or undergoing an invasive urologic procedure.    Hemoglobin A1c [[id number]]  (Abnormal) Collected: 05/09/23 0532    Specimen: Blood Updated: 05/09/23 0608     Hemoglobin A1C 7.30 %     Narrative:      Hemoglobin A1C Ranges:    Increased Risk for Diabetes  5.7% to 6.4%  Diabetes                     >= 6.5%  Diabetic Goal                 < 7.0%    Blood Culture ID, PCR - Blood, Arm, Right [[id number]]  (Abnormal) Collected: 05/08/23 1047    Specimen: Blood from Arm, Right Updated: 05/09/23 0558     BCID, PCR Escherichia coli. Identification by BCID2 PCR.    Narrative:      No resistance genes detected.    CBC & Differential [[id number]]  (Abnormal) Collected: 05/09/23 0532    Specimen: Blood Updated: 05/09/23 0553    Narrative:      The following orders were created for panel order CBC & Differential.  Procedure                               Abnormality         Status                     ---------                               -----------         ------                     CBC Auto Differential[[id number]]        Abnormal            Final result               Scan Slide[[id number]]                                                                    Please view results for these tests on the individual orders.    CBC Auto Differential [[id number]]  (Abnormal) Collected: 05/09/23 0532    Specimen: Blood Updated: 05/09/23 0553     WBC 9.30 10*3/mm3      RBC 4.58 10*6/mm3      Hemoglobin 13.5 g/dL      Hematocrit 42.7 %      MCV 93.2 fL      MCH 29.5 pg      MCHC 31.6 g/dL      RDW 14.7 %      RDW-SD 50.4 fl      MPV 8.9 fL      Platelets 146 10*3/mm3      Neutrophil % 83.1 %      Lymphocyte % 7.4 %      Monocyte % 7.4 %      Eosinophil % 0.1 %      Basophil % 0.4 %      Immature Grans % 1.6 %      Neutrophils, Absolute 7.72 10*3/mm3      Lymphocytes, Absolute 0.69 10*3/mm3      Monocytes, Absolute 0.69 10*3/mm3      Eosinophils, Absolute 0.01 10*3/mm3      Basophils, Absolute 0.04 10*3/mm3      Immature Grans, Absolute 0.15 10*3/mm3      nRBC 0.0 /100 WBC     Blood Culture - Blood, Arm, Right [[id number]]  (Abnormal) Collected: 05/08/23 1047    Specimen: Blood from Arm, Right Updated: 05/09/23 0433     Blood Culture Abnormal Stain     Gram Stain Aerobic Bottle Gram negative bacilli    STAT Lactic Acid, Reflex [[id number]]  (Normal) Collected:  05/08/23 1926    Specimen: Blood Updated: 05/08/23 2010     Lactate 1.7 mmol/L      Comment: Falsely depressed results may occur on samples drawn from patients receiving N-Acetylcysteine (NAC) or Metamizole.       Blood Culture - Blood, Arm, Left [[id number]] Collected: 05/08/23 1926    Specimen: Blood from Arm, Left Updated: 05/08/23 1948    Blood Culture - Blood, Hand, Left [[id number]] Collected: 05/08/23 1926    Specimen: Blood from Hand, Left Updated: 05/08/23 1948    POC Glucose Once [[id number]]  (Abnormal) Collected: 05/08/23 1712    Specimen: Blood Updated: 05/08/23 1714     Glucose 144 mg/dL      Comment: Meter: [id number] : [id number] [place]       STAT Lactic Acid, Reflex [[id number]]  (Abnormal) Collected: 05/08/23 1343    Specimen: Blood Updated: 05/08/23 1506     Lactate 3.0 mmol/L      Comment: Falsely depressed results may occur on samples drawn from patients receiving N-Acetylcysteine (NAC) or Metamizole.       [place] Draw [[id number]] Collected: 05/08/23 1047    Specimen: Blood Updated: 05/08/23 1500    Narrative:      The following orders were created for panel order [place] Draw.  Procedure                               Abnormality         Status                     ---------                               -----------         ------                     Green Top (Gel)[[id number]]                                  Final result               Lavender Top[[id number]]                                     Final result               Gold Top - SST[[id number]]                                   Final result               Tsai Top[[id number]]                                         Final result               Light Blue Top[[id number]]                                   Final result                 Please view results for these tests on the individual orders.    Gray Top [[id number]] Collected: 05/08/23 1047    Specimen: Blood Updated: 05/08/23 1500     Extra Tube Hold for add-ons.     Comment: Auto  resulted.       Urinalysis, Microscopic Only - Urine, Clean Catch [[id number]]  (Abnormal) Collected: 05/08/23 1410    Specimen: Urine, Clean Catch Updated: 05/08/23 1439     RBC, UA 13-20 /HPF      WBC, UA Too Numerous to Count /HPF      Bacteria, UA 3+ /HPF      Squamous Epithelial Cells, UA 7-12 /HPF      Hyaline Casts, UA 0-6 /LPF      Methodology Manual Light Microscopy    Narrative:      Microscopic performed on unspun specimen.      Urinalysis With Culture If Indicated - Urine, Clean Catch [[id number]]  (Abnormal) Collected: 05/08/23 1410    Specimen: Urine, Clean Catch Updated: 05/08/23 1427     Color, UA Yellow     Appearance, UA Turbid     pH, UA 6.0     Specific Gravity, UA 1.027     Glucose, UA >=1000 mg/dL (3+)     Ketones, UA Negative     Bilirubin, UA Negative     Blood, UA Large (3+)     Protein, UA >=300 mg/dL (3+)     Leuk Esterase, UA Moderate (2+)     Nitrite, UA Negative     Urobilinogen, UA 0.2 E.U./dL    Narrative:      In absence of clinical symptoms, the presence of pyuria, bacteria, and/or nitrites on the urinalysis result does not correlate with infection.    Blood Culture - Blood, Arm, Right [[id number]] Collected: 05/08/23 1343    Specimen: Blood from Arm, Right Updated: 05/08/23 1400        Imaging Results (Last 24 Hours)     Procedure Component Value Units Date/Time    CT Abdomen Pelvis Without Contrast [[id number]] Collected: 05/08/23 1303     Updated: 05/08/23 1324    Narrative:      CT ABDOMEN PELVIS WO CONTRAST    Date of Exam: 5/8/2023 12:40 PM EDT    Indication: bilat flank pain with high fever and hx of nephrioliths.    Comparison: None available.    Technique: Axial CT images were obtained of the abdomen and pelvis without the administration of contrast. Reconstructed coronal and sagittal images were also obtained. Automated exposure control and iterative construction methods were used.      Findings:  Lower thorax demonstrates partially imaged changes of prior CABG with otherwise  unremarkable appearance of the lower thorax. Unremarkable appearance of the liver. There is small dependent hyperdensity within the gallbladder lumen favored to reflect some   gallbladder sludge, with 3 mm calcific density at the gallbladder fundus, uncertain whether this reflects small gallstone or calcification relating to adenomyomatosis. No evidence of acute cholecystitis. Normal appearance of the bile ducts. Unremarkable   appearance of the spleen, pancreas, and adrenal glands.    There is a cluster of small nonobstructing stones in the inferior pole of the right kidney measuring up to 3 mm in size. There are couple small vague round hypodensities in the cortex of the right kidney which may reflect small simple cysts. Otherwise   unremarkable appearance of the right kidney and right ureter. There is mild to moderate left-sided hydroureteronephrosis. There is a punctate nonobstructing stone in the inferior pole of the left kidney. There is some periureteral fat stranding along the   proximal and mid left ureter; the distal left ureter appears normal in caliber. No evidence of ureteral stone or obvious obstructing lesion. There is a simple cyst in the superior pole cortex of the left kidney. No perinephric fluid collection. There is   questionable circumferential wall thickening of the urinary bladder although the bladder is under distended.    There are multiple surgical clips in the expected region of the prostate compatible with surgical change of prostatectomy.    There is minimal sigmoid colonic diverticulosis without evidence of diverticulitis. Normal appendix. No evidence of bowel obstruction. No definite inflammatory change of the GI tract. No abdominopelvic free fluid or conspicuous fat stranding. No   pneumoperitoneum. There are mild scattered atherosclerotic calcifications of the abdominal aorta with otherwise unremarkable noncontrast appearance of the vasculature. There are couple surgical clips in  the region of the right inguinal canal/right groin.   Otherwise unremarkable appearance of the body wall soft tissues. No bulky or suspicious abdominopelvic lymph nodes. There is a subcentimeter sclerotic focus in the right ischium which is nonspecific and may reflect bone island; no additional conspicuous   sclerotic bony lesions are identified. No acute osseous findings.      Impression:      Impression:  Mild to moderate left-sided hydroureteronephrosis without identifiable cause. Assessment for any left-sided pyelonephritis is severely limited in the absence of IV contrast and clinical correlation is required. There is nonobstructing bilateral   nephrolithiasis.    Questionable circumferential wall thickening of the urinary bladder which appears under distended. Correlate with urinalysis to exclude cystitis.    There are surgical changes of prostatectomy.    Gallbladder sludge.            Electronically Signed: Luis Justin    2023 1:21 PM EDT    Workstation ID: [id number]             Physician Progress Notes (last 24 hours)      Georgie Padron MD at 23 43              [facility] Medicine Services  PROGRESS NOTE    Patient Name: Aldair Narvaez  : 1950  MRN: [id number]    Date of Admission: 2023  Primary Care Physician: Miguel Angel Mireles MD    Subjective   Subjective     CC:  Pyelo/bacteremia    HPI:  Patient is doing ok today. Fevers throughout night but reports he feels well. No other complaints today     ROS:  Gen- + fevers, - chills  CV- No chest pain, palpitations  Resp- No cough, dyspnea  GI- No N/V/D, abd pain         Objective   Objective     Vital Signs:   Temp:  [98.8 °F (37.1 °C)-103 °F (39.4 °C)] 99.3 °F (37.4 °C)  Heart Rate:  [105-127] (P) 106  Resp:  [16-20] 16  BP: (112-150)/(69-92) 130/82     Physical Exam:  GEN: NAD, resting in bed, awake, BMI 32  HEENT: on room air, atraumatic, normocephalic  NECK: supple, no masses  RESP: on room air, normal effort  CV:  on tele, sinus rhythm  PSYCH: normal affect, appropriate  NEURO: awake, alert, no focal deficits noted  MSK: no edema noted  SKIN: no rashes noted       Results Reviewed:  LAB RESULTS:      Lab 05/09/23  0532 05/08/23  1926 05/08/23  1343 05/08/23  1047   WBC 9.30  --   --  20.96*   HEMOGLOBIN 13.5  --   --  16.4   HEMATOCRIT 42.7  --   --  51.4*   PLATELETS 146  --   --  208   NEUTROS ABS 7.72*  --   --  17.31*   IMMATURE GRANS (ABS) 0.15*  --   --  0.25*   LYMPHS ABS 0.69*  --   --  1.71   MONOS ABS 0.69  --   --  1.59*   EOS ABS 0.01  --   --  0.03   MCV 93.2  --   --  92.9   LACTATE  --  1.7 3.0* 2.7*         Lab 05/09/23  0532 05/08/23  1047   SODIUM 136 136   POTASSIUM 4.0 4.6   CHLORIDE 101 95*   CO2 23.0 27.0   ANION GAP 12.0 14.0   BUN 20 19   CREATININE 0.90 1.06   EGFR 91.9 75.5   GLUCOSE 119* 213*   CALCIUM 8.0* 10.0   HEMOGLOBIN A1C 7.30*  --    TSH 0.577  --          Lab 05/08/23  1047   TOTAL PROTEIN 8.0   ALBUMIN 3.9   GLOBULIN 4.1   ALT (SGPT) 30   AST (SGOT) 27   BILIRUBIN 0.8   ALK PHOS 101             Lab 05/09/23  0532   CHOLESTEROL 173   LDL CHOL 113*   HDL CHOL 39*   TRIGLYCERIDES 114             Brief Urine Lab Results  (Last result in the past 365 days)      Color   Clarity   Blood   Leuk Est   Nitrite   Protein   CREAT   Urine HCG        05/08/23 1410 Yellow   Turbid   Large (3+)   Moderate (2+)   Negative   >=300 mg/dL (3+)                 Microbiology Results Abnormal     None          CT Abdomen Pelvis Without Contrast    Result Date: 5/8/2023  CT ABDOMEN PELVIS WO CONTRAST Date of Exam: 5/8/2023 12:40 PM EDT Indication: bilat flank pain with high fever and hx of nephrioliths. Comparison: None available. Technique: Axial CT images were obtained of the abdomen and pelvis without the administration of contrast. Reconstructed coronal and sagittal images were also obtained. Automated exposure control and iterative construction methods were used. Findings: Lower thorax demonstrates partially  imaged changes of prior CABG with otherwise unremarkable appearance of the lower thorax. Unremarkable appearance of the liver. There is small dependent hyperdensity within the gallbladder lumen favored to reflect some gallbladder sludge, with 3 mm calcific density at the gallbladder fundus, uncertain whether this reflects small gallstone or calcification relating to adenomyomatosis. No evidence of acute cholecystitis. Normal appearance of the bile ducts. Unremarkable appearance of the spleen, pancreas, and adrenal glands. There is a cluster of small nonobstructing stones in the inferior pole of the right kidney measuring up to 3 mm in size. There are couple small vague round hypodensities in the cortex of the right kidney which may reflect small simple cysts. Otherwise unremarkable appearance of the right kidney and right ureter. There is mild to moderate left-sided hydroureteronephrosis. There is a punctate nonobstructing stone in the inferior pole of the left kidney. There is some periureteral fat stranding along the  proximal and mid left ureter; the distal left ureter appears normal in caliber. No evidence of ureteral stone or obvious obstructing lesion. There is a simple cyst in the superior pole cortex of the left kidney. No perinephric fluid collection. There is  questionable circumferential wall thickening of the urinary bladder although the bladder is under distended. There are multiple surgical clips in the expected region of the prostate compatible with surgical change of prostatectomy. There is minimal sigmoid colonic diverticulosis without evidence of diverticulitis. Normal appendix. No evidence of bowel obstruction. No definite inflammatory change of the GI tract. No abdominopelvic free fluid or conspicuous fat stranding. No pneumoperitoneum. There are mild scattered atherosclerotic calcifications of the abdominal aorta with otherwise unremarkable noncontrast appearance of the vasculature. There are  couple surgical clips in the region of the right inguinal canal/right groin.  Otherwise unremarkable appearance of the body wall soft tissues. No bulky or suspicious abdominopelvic lymph nodes. There is a subcentimeter sclerotic focus in the right ischium which is nonspecific and may reflect bone island; no additional conspicuous  sclerotic bony lesions are identified. No acute osseous findings.     Impression: Impression: Mild to moderate left-sided hydroureteronephrosis without identifiable cause. Assessment for any left-sided pyelonephritis is severely limited in the absence of IV contrast and clinical correlation is required. There is nonobstructing bilateral nephrolithiasis. Questionable circumferential wall thickening of the urinary bladder which appears under distended. Correlate with urinalysis to exclude cystitis. There are surgical changes of prostatectomy. Gallbladder sludge. Electronically Signed: Luis Justin  5/8/2023 1:21 PM EDT  Workstation ID: [id number]          Current medications:  Scheduled Meds:apixaban, 5 mg, Oral, Q12H  buPROPion XL, 150 mg, Oral, Daily  cefTRIAXone, 2 g, Intravenous, Q24H  insulin lispro, 0-7 Units, Subcutaneous, TID AC  metoprolol succinate XL, 50 mg, Oral, Daily  pantoprazole, 40 mg, Oral, Daily  rosuvastatin, 20 mg, Oral, Daily  sodium chloride, 10 mL, Intravenous, Q12H      Continuous Infusions:sodium chloride, 100 mL/hr, Last Rate: 100 mL/hr (05/09/23 0320)      PRN Meds:.•  acetaminophen **OR** acetaminophen **OR** acetaminophen  •  albuterol sulfate HFA  •  Calcium Replacement - Follow Nurse / BPA Driven Protocol  •  dextrose  •  dextrose  •  glucagon (human recombinant)  •  HYDROcodone-acetaminophen  •  Magnesium Standard Dose Replacement - Follow Nurse / BPA Driven Protocol  •  Phosphorus Replacement - Follow Nurse / BPA Driven Protocol  •  Potassium Replacement - Follow Nurse / BPA Driven Protocol  •  sodium chloride  •  sodium chloride  •  sodium  chloride    Assessment & Plan   Assessment & Plan     Active Hospital Problems    Diagnosis  POA   • **Pyelonephritis [N12]  Yes   • Bacteremia [R78.81]  Unknown   • Sepsis due to urinary tract infection [A41.9, N39.0]  Unknown   • Personal history of prostate cancer [Z85.46]  Not Applicable   • Paroxysmal A-fib [I48.0]  Unknown   • Uncontrolled type 2 diabetes mellitus with hyperglycemia [E11.65]  Yes   • Mixed hyperlipidemia [E78.2]  Yes   • Benign hypertension [I10]  Yes   • CAD, multiple vessel [I25.10]  Unknown      Resolved Hospital Problems   No resolved problems to display.        Brief Hospital Course to date:  Mr. Narvaez is a 71 yo M with PMH of HTN, HLD, T2DM, CAD s/p CABG in 2022, PAF on Eliquis, prostate cancer s/p prostatectomy who presented with complaints of dysuria. He was found to be septic due to pyelonephritis.      Plan:     Sepsis (fever, tachycardia, leukocytosis)  UTI/Pyelonephritis  Ecoli Bacteremia   -- UA c/w UTI  -- Urine cx and blood cx have now speciated with Ecoli, likely as pyelo the source  --given bacteremia will consult ID to assist with IV abx management possibly as outpatient   -- CT A/P looks c/w pyelonephritis  -- IV Rocephin for now  -- IVFs for 24 hours  -- WBC sign improved but still spiking fevers, continue to monitor on IV abx     Hydroureteronephrosis, left sided  --noted on CT imaging, d/w ID- have consulted urology to see- appreciate     Lactic Acidosis  -- due to above, reflex normalized  -- IVFs for 24 hours as noted above     PAF  -- continue metoprolol as BP will allow  -- IVFs for 24 hours  -- [id number] of 3, on Eliquis, continue     CAD   -- s/p stents in remote past with 3V CABG in 2022 ([facility])  -- hold antihypertensives with exception of beta blocker as noted above     H/o Prostate Cancer  -- s/p prostatectomy     T2DM  -- A1C 7.3  -- hold oral hypoglycemics  -- low dose SSI for now    Expected Discharge Location and Transportation: home with family    Expected Discharge  Expected Discharge Date: 5/11/2023; Expected Discharge Time:      DVT prophylaxis:  Medical DVT prophylaxis orders are present.     AM-PAC 6 Clicks Score (PT): 22 (05/08/23 2000)    CODE STATUS:   Code Status and Medical Interventions:   Ordered at: 05/08/23 1526     Level Of Support Discussed With:    Patient     Code Status (Patient has no pulse and is not breathing):    CPR (Attempt to Resuscitate)     Medical Interventions (Patient has pulse or is breathing):    Full Support       Georgie Padron MD  05/09/23        Electronically signed by Georgie Padron MD at 05/09/23 0939          Consult Notes (all)      Linh Vallejo APRN at 05/09/23 0921      Consult Orders    1. Inpatient Urology Consult [[id number]] ordered by Joel Ham MD at 05/09/23 0816          Attestation signed by Isma Justin MD at 05/09/23 1036    I have reviewed this documentation and agree.    Patient personally evaluated and examined.  History of prostate cancer status post remote prostatectomy in the early 2000's by an outside urologist.  Previously following with the [facility] urology group and most recently Dr. Reeves of [facility].  Patient reports being displeased with his previous urologic care and would like to transition his care to [facility].    States over the last week he has been having difficulty with urination, frequency, urgency and nocturia as well as dysuria concerning for UTI.  Has a history of recurrent nephrolithiasis.  Has undergone multiple previous procedures.  He tells me in discussion that he underwent left-sided kidney stone procedure before and was told he had a left ureteral injury during one of his procedure secondary to large stone and laser lithotripsy.  He has had stents in the past.    From care everywhere, patient was seen by Dr. Reeves in 2021 and a CT at that time demonstrated bilateral nephrolithiasis and what appeared to be mild to  moderate left-sided hydronephrosis, of which Dr. Reeves elected to observe.    Review of CT demonstrates moderate to severe left hydroureteronephrosis and thick bladder wall, multiple clips posterior and lateral to the bladder secondary to prostatectomy.  Patient is persistently tachycardic into the 90s and low 100s, he has been febrile to 103 Fahrenheit.  He is bacteremic with E. coli.  Given his left kidney obstruction he is best served with retrograde pyelogram and left ureteral stent placement.  I discussed with the patient he likely has a chronic left ureteral stricture but this will be assessed on retrograde pyelogram.  Discussed we will come up with a plan for his left ureteral stent in future discussions.  He ate at 7:30 AM therefore 8-hour n.p.o. time will be 330 surgery time.    Discussed given his recent difficulty urinating he could have a bladder neck contracture which will need to be assessed on cystoscopy and may require dilation.    Risks of the procedure include bleeding, hematuria, infection, worsening urinary incontinence, anesthetic related complications, ureteral injury, etc.  Patient reports understanding and is willing to proceed.    Plan  Proceed to OR for cystoscopy, left retrograde pyelogram, left ureteral stent insertion, possible Farooq catheter placement, possible bladder neck contracture dilation    Isma Justin MD             Summary:Urology Consult                [facility]   HISTORY AND PHYSICAL    Patient Name: Aldair Narvaez  : 1950  MRN: [id number]  Primary Care Physician:  Miguel Angel Mireles MD  Date of admission: 2023    Subjective   Subjective     Chief Complaint: Urinary frequency, fever.     HPI:    Aldair Narvaez is a 72 y.o. male with hx of HTN, HLD, CAD s/p stents, CABG x 3, Atrial Fibrillation on Eliquis and prostate cancer s/p radical prostatectomy who presented to [facility] ED with worsening dysuria, urinary frequency and fever. He was admitted for further management  of pyelonephritis. His established Urologist is Dr. Reeves. He reports history of nephrolithiasis and has previously undergone ESWL and Ureteroscopy for stones.     Labs upon admission reviewed; WBC 20.96, Lactate 2.7, Cr 1.06. UA with 3+ Bacteria, TNTC WBC, 13-20 RBC. Urine and Blood Cultures preliminary with >100,000 E.Coli. TMAX overnight 102.8.     CT AP wo 05/08; Mild-Moderate left sided hydronephrosis. No obvious obstructing ureteral calculi. The bladder is nondistended, bilateral nonobstructing renal calculi.     He is currently sitting on side of bed. He has been voiding, dysuria is improving. He denies Left flank pain.     Review of Systems   All systems were reviewed and negative except for: Dysuria, fever    Personal History     Past Medical History:   Diagnosis Date   • Acid reflux    • Arthritis    • Benign hypertension    • Colon polyp    • Concern about heart attack without diagnosis    • Coronary artery disease    • Coronary atherosclerosis    • Heart disease    • Hyperlipidemia    • Hypertension    • Kidney stone    • Mixed hyperlipidemia    • Obesity     body mass index 30+-obesity   • Prostate cancer    • Sleep apnea    • Type 2 diabetes mellitus    • Type 2 diabetes mellitus        Past Surgical History:   Procedure Laterality Date   • CARDIAC CATHETERIZATION     • CARDIAC SURGERY N/A     [place]   • CARPAL TUNNEL RELEASE     • CORONARY ARTERY BYPASS GRAFT  08/30/2022   • CYSTOSCOPY BLADDER STONE LITHOTRIPSY     • CYSTOSCOPY BLADDER STONE LITHOTRIPSY     • FETAL SURGERY FOR CONGENITAL HERNIA     • INGUINAL HERNIA REPAIR     • KNEE ARTHROSCOPY     • PROSTATECTOMY     • TRIGGER FINGER RELEASE     • UMBILICAL HERNIA REPAIR     • UVULOPALATOPHARYNGOPLASTY         Family History: family history includes Diabetes in his sister; Heart attack in his father; Heart disease in his father; Hyperlipidemia in his sister; Hypertension in his sister. Otherwise pertinent FHx was reviewed and not pertinent  to current issue.    Social History:  reports that he has never smoked. He has never used smokeless tobacco. He reports that he does not currently use alcohol after a past usage of about 1.0 standard drink per week. He reports that he does not use drugs.    Home Medications:  albuterol sulfate HFA, apixaban, buPROPion XL, coenzyme Q10, dapagliflozin Propanediol, glimepiride, glucose blood, metoprolol succinate XL, nitroglycerin, pantoprazole, rosuvastatin, and valsartan-hydrochlorothiazide      Allergies:  Allergies   Allergen Reactions   • Iodine Other (See Comments)     Closes sinuses   • Oxycodone-Acetaminophen Itching   • Zofran [Ondansetron] Hives       Objective   Objective     Vitals:   Temp:  [98.8 °F (37.1 °C)-103 °F (39.4 °C)] 99.3 °F (37.4 °C)  Heart Rate:  [105-127] (P) 106  Resp:  [16-20] 16  BP: (112-150)/(69-92) 130/82  Physical Exam    Constitutional: Awake in bed, alert    Eyes: PERRLA, sclerae anicteric, no conjunctival injection   HENT: Normocephalic, atraumatic, mucous membranes moist   Neck: Supple, trachea midline   Respiratory:Equal chest rise, non-labored respirations    Cardiovascular: RRR   Gastrointestinal: Soft   Musculoskeletal: No bilateral ankle edema, no clubbing or cyanosis to extremities   Genitourinary: voiding, no flank pain.    Psychiatric: Appropriate affect, cooperative   Neurologic: Oriented x 3, Cranial Nerves grossly intact, speech clear   Skin: No rashes     Result Review    Result Review:  I have personally reviewed the results from the time of this admission to 5/9/2023 09:21 EDT and agree with these findings:  [x]  Laboratory  [x]  Microbiology  [x]  Radiology  []  EKG/Telemetry   []  Cardiology/Vascular   []  Pathology  []  Old records  [x]  Other: Vital signs    Most notable findings include: Urine Culture Preliminary >100,000 E.Coli. WBC 9.3 from 20.96. Prelim blood cultures with E.Coli.     Assessment & Plan   Assessment / Plan     Brief Patient Summary:  Aldair Narvaez  is a 72 y.o. male with history of nephrolithiasis and prostate cancer s/p prostatectomy who presented to [facility] for worsening dysuria, urinary frequency and fever found to have Left pyelonephritis and mild-moderate left hydronephrosis.     Active Hospital Problems:  Active Hospital Problems    Diagnosis    • **Pyelonephritis    • Sepsis due to urinary tract infection    • Personal history of prostate cancer    • Paroxysmal A-fib    • Uncontrolled type 2 diabetes mellitus with hyperglycemia    • Mixed hyperlipidemia    • Benign hypertension    • CAD, multiple vessel      Dr. Justin reviewed CT findings and discussed plan of care with patient. Likely chronic left hydronephrosis, given patients persistent Tachycardia we will plan for Cystoscopy, Left retrograde pyelogram, Left ureteral stent placement.     Plan:  -Consent.  -Keep NPO for procedure.  -OR this afternoon with Dr. Justin.   will follow, please call if any questions.     DVT prophylaxis:  Medical DVT prophylaxis orders are present.    CODE STATUS:    Level Of Support Discussed With: Patient  Code Status (Patient has no pulse and is not breathing): CPR (Attempt to Resuscitate)  Medical Interventions (Patient has pulse or is breathing): Full Support    Admission Status:  I believe this patient meets inpatient status.    Electronically signed by JERRICA Damon, 05/09/23, 9:21 AM EDT.             Electronically signed by Isma Justin MD at 05/09/23 1036     Joel Ham MD at 05/09/23 0812      Consult Orders    1. Inpatient Infectious Diseases Consult [[id number]] ordered by Georgie Padron MD at 05/09/23 0745               [place]air [place]  1950  [id number]    Date of Consult: 5/9/2023    Admit Date:  5/8/2023      Requesting Provider: No ref. provider found  Evaluating Physician: Joel Ham MD    Chief Complaint: fever    Reason for Consultation: sepsis    History of present illness:    Patient is a 72 y.o.  Yr old male with  history of prostate cancer and renal calculi, follows with Dr. Dennis Reeves at [facility]. Prior prostatectomy.He has required prior stents but he does not recall past anatomy/side or timing.  No prior history of resistant organisms.  No prior history MRSA/VRE, C. Difficile or ESBL/K PC/CRE organisms; he was admitted on May 8 with acute urinary frequency/hematuria, dysuria with fever over at least several days, starting on the weekend. Fever noted to 103, tachycardic with leukocytosis and subsequent blood/urine culture positivity for E. Coli, final SANDRA data pending but no resistance markers on PCR per microbiology.as of May 9, he reports temperature less intense, white blood cell count improving and he denies any new pain.  Nursing reports no new distress.  On room air    Recent dysuria/hematuria and urinary frequency as above.  He denies flank pain.  Denies hesitancy.  Fever as above.  No chills or sweats.    No headache photophobia or neck stiffness.  No shortness of breath cough or hemoptysis.  Denies nausea vomiting diarrhea or abdominal pain.  No skin rash        Past Medical History:   Diagnosis Date   • Acid reflux    • Arthritis    • Benign hypertension    • Colon polyp    • Concern about heart attack without diagnosis    • Coronary artery disease    • Coronary atherosclerosis    • Heart disease    • Hyperlipidemia    • Hypertension    • Kidney stone    • Mixed hyperlipidemia    • Obesity     body mass index 30+-obesity   • Prostate cancer    • Sleep apnea    • Type 2 diabetes mellitus    • Type 2 diabetes mellitus        Past Surgical History:   Procedure Laterality Date   • CARDIAC CATHETERIZATION     • CARDIAC SURGERY N/A     [place]   • CARPAL TUNNEL RELEASE     • CORONARY ARTERY BYPASS GRAFT  08/30/2022   • CYSTOSCOPY BLADDER STONE LITHOTRIPSY     • CYSTOSCOPY BLADDER STONE LITHOTRIPSY     • FETAL SURGERY FOR CONGENITAL HERNIA     • INGUINAL HERNIA REPAIR     • KNEE ARTHROSCOPY     •  PROSTATECTOMY     • TRIGGER FINGER RELEASE     • UMBILICAL HERNIA REPAIR     • UVULOPALATOPHARYNGOPLASTY         Pediatric History   Patient Parents   • Not on file     Other Topics Concern   • Not on file   Social History Narrative   • Not on file       family history includes Diabetes in his sister; Heart attack in his father; Heart disease in his father; Hyperlipidemia in his sister; Hypertension in his sister.    Allergies   Allergen Reactions   • Iodine Other (See Comments)     Closes sinuses   • Oxycodone-Acetaminophen Itching   • Zofran [Ondansetron] Hives       Medication:  Current Facility-Administered Medications   Medication Dose Route Frequency Provider Last Rate Last Admin   • acetaminophen (TYLENOL) tablet 650 mg  650 mg Oral Q4H PRN Bárbara Lee MD   650 mg at 05/09/23 0320    Or   • acetaminophen (TYLENOL) 160 MG/5ML solution 650 mg  650 mg Oral Q4H PRN Bárbara Lee MD        Or   • acetaminophen (TYLENOL) suppository 650 mg  650 mg Rectal Q4H PRN Bárbara Lee MD       • albuterol sulfate HFA (PROVENTIL HFA;VENTOLIN HFA;PROAIR HFA) inhaler 2 puff  2 puff Inhalation PRN Bárbara Lee MD       • apixaban (ELIQUIS) tablet 5 mg  5 mg Oral Q12H Bárbara Lee MD   5 mg at 05/08/23 2011   • buPROPion XL (WELLBUTRIN XL) 24 hr tablet 150 mg  150 mg Oral Daily Bárbara Lee MD       • Calcium Replacement - Follow Nurse / BPA Driven Protocol   Does not apply PRN Bárbara Lee MD       • cefTRIAXone (ROCEPHIN) 2 g/100 mL 0.9% NS IVPB (MBP)  2 g Intravenous Q24H Bárbara Lee MD       • dextrose (D50W) (25 g/50 mL) IV injection 25 g  25 g Intravenous Q15 Min PRN Bárbara Lee MD       • dextrose (GLUTOSE) oral gel 15 g  15 g Oral Q15 Min PRN Bárbara Lee MD       • glucagon (GLUCAGEN) injection 1 mg  1 mg Intramuscular Q15 Min PRN Bárbara Lee MD       • HYDROcodone-acetaminophen (NORCO) 5-325 MG per tablet 1  tablet  1 tablet Oral Q4H PRN Bárbara Lee MD       • Insulin Lispro (humaLOG) injection 0-7 Units  0-7 Units Subcutaneous TID AC Bárbara Lee MD       • Magnesium Standard Dose Replacement - Follow Nurse / BPA Driven Protocol   Does not apply PRN Bárbara Lee MD       • metoprolol succinate XL (TOPROL-XL) 24 hr tablet 50 mg  50 mg Oral Daily Bárbara Lee MD       • pantoprazole (PROTONIX) EC tablet 40 mg  40 mg Oral Daily Bárbara Lee MD       • Phosphorus Replacement - Follow Nurse / BPA Driven Protocol   Does not apply PRN Bárbara Lee MD       • Potassium Replacement - Follow Nurse / BPA Driven Protocol   Does not apply PRN Bárbara Lee MD       • rosuvastatin (CRESTOR) tablet 20 mg  20 mg Oral Daily Bárbara Lee MD       • sodium chloride 0.9 % flush 1-10 mL  1-10 mL Intravenous PRN Bárbara Lee MD       • sodium chloride 0.9 % flush 10 mL  10 mL Intravenous PRN Smith Guzman MD       • sodium chloride 0.9 % flush 10 mL  10 mL Intravenous Q12H Bárbara Lee MD   10 mL at 05/08/23 2011   • sodium chloride 0.9 % infusion 40 mL  40 mL Intravenous PRN Bárbara Lee MD       • sodium chloride 0.9 % infusion  100 mL/hr Intravenous Continuous Bárbara Lee  mL/hr at 05/09/23 0320 100 mL/hr at 05/09/23 0320       Antibiotics:  Anti-Infectives (From admission, onward)    Ordered     Dose/Rate Route Frequency Start Stop    05/08/23 1708  cefTRIAXone (ROCEPHIN) 2 g/100 mL 0.9% NS IVPB (MBP)        Ordering Provider: Bárbara Lee MD    2 g  over 30 Minutes Intravenous Every 24 Hours 05/09/23 1500 05/16/23 1459    05/08/23 1442  cefTRIAXone (ROCEPHIN) 2 g/100 mL 0.9% NS IVPB (MBP)        Ordering Provider: Smith Guzman MD    2 g  over 30 Minutes Intravenous Once 05/08/23 1444 05/08/23 1538            Review of Systems    Constitutional--  Appetite  Diminished, and no malaise. +  "fatigue.  Heent-- No new vision, hearing or throat complaints.  No epistaxis or oral sores.  Denies odynophagia or dysphagia.  No flashers, floaters or eye pain. No odynophagia or dysphagia. No headache, photophobia or neck stiffness.  CV-- No chest pain, palpitation or syncope  Resp-- No SOB/cough/Hemoptysis  GI- No nausea, vomiting, or diarrhea.  No hematochezia, melena, or hematemesis. Denies jaundice or chronic liver disease.  -- see above  Lymph- no swollen lymph nodes in neck/axilla or groin.   Heme- No active bruising or bleeding; no Hx of DVT or PE.  MS-- no swelling or pain in the bones or joints of arms/legs.  No new back pain.  Neuro-- No acute focal weakness or numbness in the arms or legs.  No seizures.    Full 12 point review of systems reviewed and negative otherwise for acute complaints, except for above    Physical Exam:   Vital Signs   /82 (BP Location: Left arm, Patient Position: Lying)   Pulse 114   Temp 99.3 °F (37.4 °C) (Oral)   Resp 16   Ht 177.8 cm (70\")   Wt 99.8 kg (220 lb)   SpO2 93%   BMI 31.57 kg/m²     GENERAL: Awake and alert, in no acute distress.   HEENT: Normocephalic, atraumatic.  PERRL. EOMI. No conjunctival injection. No icterus. Oropharynx clear without evidence of thrush or exudate. No evidence of peridontal disease.    NECK: Supple without nuchal rigidity. No mass.  LYMPH: No cervical, axillary or inguinal lymphadenopathy.  HEART: RRR; No murmur, rubs, gallops.   LUNGS: Clear to auscultation bilaterally without wheezing, rales, rhonchi. Normal respiratory effort. Nonlabored. No dullness.  ABDOMEN: Soft, nontender, nondistended. Positive bowel sounds. No rebound or guarding. NO mass or HSM.  EXT:  No cyanosis, clubbing or edema. No cord.  : Genitalia generally unremarkable.  Without Farooq catheter.  MSK: FROM without joint effusions noted arms/legs.    SKIN: Warm and dry without cutaneous eruptions on Inspection/palpation.    NEURO: Oriented to PPT. No focal " deficits on motor/sensory exam at arms/legs.  PSYCHIATRIC: Normal insight and judgement. Cooperative with PE    No CVA tenderness to percussion    IV without obvious redness or drainage    Laboratory Data    Results from last 7 days   Lab Units 05/09/23  0532 05/08/23  1047   WBC 10*3/mm3 9.30 20.96*   HEMOGLOBIN g/dL 13.5 16.4   HEMATOCRIT % 42.7 51.4*   PLATELETS 10*3/mm3 146 208     Results from last 7 days   Lab Units 05/09/23  0532   SODIUM mmol/L 136   POTASSIUM mmol/L 4.0   CHLORIDE mmol/L 101   CO2 mmol/L 23.0   BUN mg/dL 20   CREATININE mg/dL 0.90   GLUCOSE mg/dL 119*   CALCIUM mg/dL 8.0*     Results from last 7 days   Lab Units 05/08/23  1047   ALK PHOS U/L 101   BILIRUBIN mg/dL 0.8   ALT (SGPT) U/L 30   AST (SGOT) U/L 27               Estimated Creatinine Clearance: 90.4 mL/min (by C-G formula based on SCr of 0.9 mg/dL).      Microbiology:      Radiology:  Imaging Results (Last 72 Hours)     Procedure Component Value Units Date/Time    CT Abdomen Pelvis Without Contrast [[id number]] Collected: 05/08/23 1303     Updated: 05/08/23 1324    Narrative:      CT ABDOMEN PELVIS WO CONTRAST    Date of Exam: 5/8/2023 12:40 PM EDT    Indication: bilat flank pain with high fever and hx of nephrioliths.    Comparison: None available.    Technique: Axial CT images were obtained of the abdomen and pelvis without the administration of contrast. Reconstructed coronal and sagittal images were also obtained. Automated exposure control and iterative construction methods were used.      Findings:  Lower thorax demonstrates partially imaged changes of prior CABG with otherwise unremarkable appearance of the lower thorax. Unremarkable appearance of the liver. There is small dependent hyperdensity within the gallbladder lumen favored to reflect some   gallbladder sludge, with 3 mm calcific density at the gallbladder fundus, uncertain whether this reflects small gallstone or calcification relating to adenomyomatosis. No evidence  of acute cholecystitis. Normal appearance of the bile ducts. Unremarkable   appearance of the spleen, pancreas, and adrenal glands.    There is a cluster of small nonobstructing stones in the inferior pole of the right kidney measuring up to 3 mm in size. There are couple small vague round hypodensities in the cortex of the right kidney which may reflect small simple cysts. Otherwise   unremarkable appearance of the right kidney and right ureter. There is mild to moderate left-sided hydroureteronephrosis. There is a punctate nonobstructing stone in the inferior pole of the left kidney. There is some periureteral fat stranding along the   proximal and mid left ureter; the distal left ureter appears normal in caliber. No evidence of ureteral stone or obvious obstructing lesion. There is a simple cyst in the superior pole cortex of the left kidney. No perinephric fluid collection. There is   questionable circumferential wall thickening of the urinary bladder although the bladder is under distended.    There are multiple surgical clips in the expected region of the prostate compatible with surgical change of prostatectomy.    There is minimal sigmoid colonic diverticulosis without evidence of diverticulitis. Normal appendix. No evidence of bowel obstruction. No definite inflammatory change of the GI tract. No abdominopelvic free fluid or conspicuous fat stranding. No   pneumoperitoneum. There are mild scattered atherosclerotic calcifications of the abdominal aorta with otherwise unremarkable noncontrast appearance of the vasculature. There are couple surgical clips in the region of the right inguinal canal/right groin.   Otherwise unremarkable appearance of the body wall soft tissues. No bulky or suspicious abdominopelvic lymph nodes. There is a subcentimeter sclerotic focus in the right ischium which is nonspecific and may reflect bone island; no additional conspicuous   sclerotic bony lesions are identified. No acute  osseous findings.      Impression:      Impression:  Mild to moderate left-sided hydroureteronephrosis without identifiable cause. Assessment for any left-sided pyelonephritis is severely limited in the absence of IV contrast and clinical correlation is required. There is nonobstructing bilateral   nephrolithiasis.    Questionable circumferential wall thickening of the urinary bladder which appears under distended. Correlate with urinalysis to exclude cystitis.    There are surgical changes of prostatectomy.    Gallbladder sludge.            Electronically Signed: Luis Vuongdacia    5/8/2023 1:21 PM EDT    Workstation ID: [id number]            Impression:     --acute complicated UTI/ pyelonephritis, gram-negative mavis in urine/blood and associated with sepsis per admission notes.  White blood cell count and fever trending better with ceftriaxone and no resistance markers on PCR per microbiology.  Further SANDRA data pending.  If clinically worse despite current therapy prior to more information, you could consider empirically broader therapy    --Acute sepsis at presentation per medicine knows.  White blood cell count trending better, fever down per patient; resuscitative measures per medicine team    --left side hydroureteronephrosis on CT scan per radiology, bilateral nephrolithiasis per radiology and circumferential wall thickening of the bladder.  Urology to see and further anatomic/functional assessment/intervention at their discretion    --history of prostate cancer    PLAN: Thank you for asking us to see Aldair Narvaez, I recommend the following:    --IV ceftriaxone    --Check/review labs cultures and scans    --Partial history per nursing staff    --Discussed with microbiology    --Discussed with Dr. Padron    --Urology to see    --Discussed with pharmacy    --Highly complex at of issues with high risk for further serious morbidity and other serious sequela       Joel Ham,  MD  5/9/2023                Electronically signed by Joel Ham MD at 05/09/23 0894

## 2023-05-09 NOTE — PLAN OF CARE
Problem: Adult Inpatient Plan of Care  Goal: Absence of Hospital-Acquired Illness or Injury  Intervention: Identify and Manage Fall Risk  Description: Perform standard risk assessment on admission using a validated tool or comprehensive approach appropriate to the patient; reassess fall risk frequently, with change in status or transfer to another level of care.  Communicate fall injury risk to interprofessional healthcare team.  Determine need for increased observation, equipment and environmental modification, such as low bed, signage and supportive, nonskid footwear.  Adjust safety measures to individual developmental age, stage and identified risk factors.  Reinforce the importance of safety and physical activity with patient and family.  Perform regular intentional rounding to assess need for position change, pain assessment and personal needs, including assistance with toileting.  Recent Flowsheet Documentation  Taken 5/9/2023 0000 by Tremayne Vicente, RN  Safety Promotion/Fall Prevention:   activity supervised   assistive device/personal items within reach   clutter free environment maintained   elopement precautions   fall prevention program maintained   nonskid shoes/slippers when out of bed   safety round/check completed  Taken 5/8/2023 2200 by Tremayne Vicente, RN  Safety Promotion/Fall Prevention:   activity supervised   assistive device/personal items within reach   clutter free environment maintained   elopement precautions   fall prevention program maintained   nonskid shoes/slippers when out of bed   safety round/check completed  Taken 5/8/2023 2000 by Tremayne Vicente, RN  Safety Promotion/Fall Prevention:   activity supervised   assistive device/personal items within reach   elopement precautions   clutter free environment maintained   fall prevention program maintained   nonskid shoes/slippers when out of bed   safety round/check completed  Intervention: Prevent Skin Injury  Description: Perform a screening  for skin injury risk, such as pressure or moisture associated skin damage on admission and at regular intervals throughout hospital stay.  Keep all areas of skin (especially folds) clean and dry.  Maintain adequate skin hydration.  Relieve and redistribute pressure and protect bony prominences; implement measures based on patient-specific risk factors.  Match turning and repositioning schedule to clinical condition.  Encourage weight shift frequently; assist with reposition if unable to complete independently.  Float heels off bed; avoid pressure on the Achilles tendon.  Keep skin free from extended contact with medical devices.  Encourage functional activity and mobility, as early as tolerated.  Use aids (e.g., slide boards, mechanical lift) during transfer.  Recent Flowsheet Documentation  Taken 5/9/2023 0000 by Tremayne Vicente RN  Body Position: position changed independently  Taken 5/8/2023 2200 by Tremayne Vicente RN  Body Position: position changed independently  Taken 5/8/2023 2000 by Tremayne Vicente RN  Body Position: position changed independently  Intervention: Prevent and Manage VTE (Venous Thromboembolism) Risk  Description: Assess for VTE (venous thromboembolism) risk.  Encourage and assist with early ambulation.  Initiate and maintain compression or other therapy, as indicated, based on identified risk in accordance with organizational protocol and provider order.  Encourage both active and passive leg exercises while in bed, if unable to ambulate.  Recent Flowsheet Documentation  Taken 5/9/2023 0000 by Tremayne Vicente RN  Activity Management: dorsiflexion/plantar flexion performed  Range of Motion: ROM (range of motion) performed  Taken 5/8/2023 2200 by Tremayne Vicente RN  Activity Management: dorsiflexion/plantar flexion performed  Taken 5/8/2023 2000 by Tremayne Vicente RN  Activity Management: dorsiflexion/plantar flexion performed  Range of Motion: ROM (range of motion) performed  Intervention: Prevent  Infection  Description: Maintain skin and mucous membrane integrity; promote hand, oral and pulmonary hygiene.  Optimize fluid balance, nutrition, sleep and glycemic control to maximize infection resistance.  Identify potential sources of infection early to prevent or mitigate progression of infection (e.g., wound, lines, devices).  Evaluate ongoing need for invasive devices; remove promptly when no longer indicated.  Recent Flowsheet Documentation  Taken 5/8/2023 2000 by Tremayne Vicente RN  Infection Prevention:   environmental surveillance performed   hand hygiene promoted   rest/sleep promoted   single patient room provided  Goal: Optimal Comfort and Wellbeing  Intervention: Provide Person-Centered Care  Description: Use a family-focused approach to care.  Develop trust and rapport by proactively providing information, encouraging questions, addressing concerns and offering reassurance.  Acknowledge emotional response to hospitalization.  Recognize and utilize personal coping strategies.  Honor spiritual and cultural preferences.  Recent Flowsheet Documentation  Taken 5/8/2023 2000 by Tremayne Vicente RN  Trust Relationship/Rapport:   care explained   choices provided   emotional support provided   empathic listening provided   questions answered   questions encouraged   reassurance provided   thoughts/feelings acknowledged     Problem: Hypertension Comorbidity  Goal: Blood Pressure in Desired Range  Outcome: Ongoing, Progressing  Intervention: Maintain Blood Pressure Management  Description: Evaluate adherence to home antihypertensive regimen (e.g., exercise and activity, diet modification, medication).  Provide scheduled antihypertensive medication; consider administration time and effects (e.g., avoid giving diuretic prior to bedtime).  Monitor response to antihypertensive medication therapy (e.g., blood pressure, electrolyte levels, medication effects).  Minimize risk of orthostatic hypotension; encourage caution  with position changes, particularly if elderly.  Flowsheets  Taken 5/9/2023 0010  Syncope Management: position changed slowly  Taken 5/8/2023 2000  Medication Review/Management:   medications reviewed   high-risk medications identified   Goal Outcome Evaluation:

## 2023-05-09 NOTE — BRIEF OP NOTE
CYSTOSCOPY URETERAL CATHETER/STENT INSERTION  Progress Note    Aldair Narvaez  5/9/2023    Pre-op Diagnosis:   Urosepsis  Left hydroureteronephrosis       Post-Op Diagnosis Codes:  Urosepsis  Left hydroureteronephrosis         Procedure(s):  CYSTOSCOPY LEFT RETROGRADE PYELOGRAM AND LEFT URETERAL STENT PLACEMENT        Surgeon(s):  Isma Justin MD    Anesthesia: General    Staff:   Circulator: Sheba Collins RN  Scrub Person: Jamilah Coleman         Estimated Blood Loss: none    Urine Voided: * No values recorded between 5/9/2023  3:53 PM and 5/9/2023  4:23 PM *    Specimens:                None          Drains: * No LDAs found *    Findings: Severe global cystitis noted. Moderate bladder neck contracture noted however able to dilate easily with passage of 22 Fr cystoscope.  Left retrograde pyelogram with left distal 1/3 ureteral stenosis and severe proximal ureterohydronephrosis. Proteinaceous debris returned after placement of left ureteral stent, 6 Fr x 22 cm double J. No strings.         Complications: None          Isma Justin MD     Date: 5/9/2023  Time: 16:26 EDT

## 2023-05-09 NOTE — PROGRESS NOTES
[facility] Medicine Services  PROGRESS NOTE    Patient Name: Aldair Narvaez  : 1950  MRN: [id number]    Date of Admission: 2023  Primary Care Physician: Miguel Angel Mireles MD    Subjective   Subjective     CC:  Pyelo/bacteremia    HPI:  Patient is doing ok today. Fevers throughout night but reports he feels well. No other complaints today     ROS:  Gen- + fevers, - chills  CV- No chest pain, palpitations  Resp- No cough, dyspnea  GI- No N/V/D, abd pain         Objective   Objective     Vital Signs:   Temp:  [98.8 °F (37.1 °C)-103 °F (39.4 °C)] 99.3 °F (37.4 °C)  Heart Rate:  [105-127] (P) 106  Resp:  [16-20] 16  BP: (112-150)/(69-92) 130/82     Physical Exam:  GEN: NAD, resting in bed, awake, BMI 32  HEENT: on room air, atraumatic, normocephalic  NECK: supple, no masses  RESP: on room air, normal effort  CV: on tele, sinus rhythm  PSYCH: normal affect, appropriate  NEURO: awake, alert, no focal deficits noted  MSK: no edema noted  SKIN: no rashes noted       Results Reviewed:  LAB RESULTS:      Lab 23  0532 23  1926 23  1343 23  1047   WBC 9.30  --   --  20.96*   HEMOGLOBIN 13.5  --   --  16.4   HEMATOCRIT 42.7  --   --  51.4*   PLATELETS 146  --   --  208   NEUTROS ABS 7.72*  --   --  17.31*   IMMATURE GRANS (ABS) 0.15*  --   --  0.25*   LYMPHS ABS 0.69*  --   --  1.71   MONOS ABS 0.69  --   --  1.59*   EOS ABS 0.01  --   --  0.03   MCV 93.2  --   --  92.9   LACTATE  --  1.7 3.0* 2.7*         Lab 23  0532 23  1047   SODIUM 136 136   POTASSIUM 4.0 4.6   CHLORIDE 101 95*   CO2 23.0 27.0   ANION GAP 12.0 14.0   BUN 20 19   CREATININE 0.90 1.06   EGFR 91.9 75.5   GLUCOSE 119* 213*   CALCIUM 8.0* 10.0   HEMOGLOBIN A1C 7.30*  --    TSH 0.577  --          Lab 23  1047   TOTAL PROTEIN 8.0   ALBUMIN 3.9   GLOBULIN 4.1   ALT (SGPT) 30   AST (SGOT) 27   BILIRUBIN 0.8   ALK PHOS 101             Lab 23  0532   CHOLESTEROL 173   LDL CHOL 113*   HDL  CHOL 39*   TRIGLYCERIDES 114             Brief Urine Lab Results  (Last result in the past 365 days)      Color   Clarity   Blood   Leuk Est   Nitrite   Protein   CREAT   Urine HCG        05/08/23 1410 Yellow   Turbid   Large (3+)   Moderate (2+)   Negative   >=300 mg/dL (3+)                 Microbiology Results Abnormal     None          CT Abdomen Pelvis Without Contrast    Result Date: 5/8/2023  CT ABDOMEN PELVIS WO CONTRAST Date of Exam: 5/8/2023 12:40 PM EDT Indication: bilat flank pain with high fever and hx of nephrioliths. Comparison: None available. Technique: Axial CT images were obtained of the abdomen and pelvis without the administration of contrast. Reconstructed coronal and sagittal images were also obtained. Automated exposure control and iterative construction methods were used. Findings: Lower thorax demonstrates partially imaged changes of prior CABG with otherwise unremarkable appearance of the lower thorax. Unremarkable appearance of the liver. There is small dependent hyperdensity within the gallbladder lumen favored to reflect some gallbladder sludge, with 3 mm calcific density at the gallbladder fundus, uncertain whether this reflects small gallstone or calcification relating to adenomyomatosis. No evidence of acute cholecystitis. Normal appearance of the bile ducts. Unremarkable appearance of the spleen, pancreas, and adrenal glands. There is a cluster of small nonobstructing stones in the inferior pole of the right kidney measuring up to 3 mm in size. There are couple small vague round hypodensities in the cortex of the right kidney which may reflect small simple cysts. Otherwise unremarkable appearance of the right kidney and right ureter. There is mild to moderate left-sided hydroureteronephrosis. There is a punctate nonobstructing stone in the inferior pole of the left kidney. There is some periureteral fat stranding along the  proximal and mid left ureter; the distal left ureter appears  normal in caliber. No evidence of ureteral stone or obvious obstructing lesion. There is a simple cyst in the superior pole cortex of the left kidney. No perinephric fluid collection. There is  questionable circumferential wall thickening of the urinary bladder although the bladder is under distended. There are multiple surgical clips in the expected region of the prostate compatible with surgical change of prostatectomy. There is minimal sigmoid colonic diverticulosis without evidence of diverticulitis. Normal appendix. No evidence of bowel obstruction. No definite inflammatory change of the GI tract. No abdominopelvic free fluid or conspicuous fat stranding. No pneumoperitoneum. There are mild scattered atherosclerotic calcifications of the abdominal aorta with otherwise unremarkable noncontrast appearance of the vasculature. There are couple surgical clips in the region of the right inguinal canal/right groin.  Otherwise unremarkable appearance of the body wall soft tissues. No bulky or suspicious abdominopelvic lymph nodes. There is a subcentimeter sclerotic focus in the right ischium which is nonspecific and may reflect bone island; no additional conspicuous  sclerotic bony lesions are identified. No acute osseous findings.     Impression: Impression: Mild to moderate left-sided hydroureteronephrosis without identifiable cause. Assessment for any left-sided pyelonephritis is severely limited in the absence of IV contrast and clinical correlation is required. There is nonobstructing bilateral nephrolithiasis. Questionable circumferential wall thickening of the urinary bladder which appears under distended. Correlate with urinalysis to exclude cystitis. There are surgical changes of prostatectomy. Gallbladder sludge. Electronically Signed: Luis Justin  5/8/2023 1:21 PM EDT  Workstation ID: [id number]          Current medications:  Scheduled Meds:apixaban, 5 mg, Oral, Q12H  buPROPion XL, 150 mg, Oral,  Daily  cefTRIAXone, 2 g, Intravenous, Q24H  insulin lispro, 0-7 Units, Subcutaneous, TID AC  metoprolol succinate XL, 50 mg, Oral, Daily  pantoprazole, 40 mg, Oral, Daily  rosuvastatin, 20 mg, Oral, Daily  sodium chloride, 10 mL, Intravenous, Q12H      Continuous Infusions:sodium chloride, 100 mL/hr, Last Rate: 100 mL/hr (05/09/23 0320)      PRN Meds:.•  acetaminophen **OR** acetaminophen **OR** acetaminophen  •  albuterol sulfate HFA  •  Calcium Replacement - Follow Nurse / BPA Driven Protocol  •  dextrose  •  dextrose  •  glucagon (human recombinant)  •  HYDROcodone-acetaminophen  •  Magnesium Standard Dose Replacement - Follow Nurse / BPA Driven Protocol  •  Phosphorus Replacement - Follow Nurse / BPA Driven Protocol  •  Potassium Replacement - Follow Nurse / BPA Driven Protocol  •  sodium chloride  •  sodium chloride  •  sodium chloride    Assessment & Plan   Assessment & Plan     Active Hospital Problems    Diagnosis  POA   • **Pyelonephritis [N12]  Yes   • Bacteremia [R78.81]  Unknown   • Sepsis due to urinary tract infection [A41.9, N39.0]  Unknown   • Personal history of prostate cancer [Z85.46]  Not Applicable   • Paroxysmal A-fib [I48.0]  Unknown   • Uncontrolled type 2 diabetes mellitus with hyperglycemia [E11.65]  Yes   • Mixed hyperlipidemia [E78.2]  Yes   • Benign hypertension [I10]  Yes   • CAD, multiple vessel [I25.10]  Unknown      Resolved Hospital Problems   No resolved problems to display.        Brief Hospital Course to date:  Mr. Narvaez is a 73 yo M with PMH of HTN, HLD, T2DM, CAD s/p CABG in 2022, PAF on Eliquis, prostate cancer s/p prostatectomy who presented with complaints of dysuria. He was found to be septic due to pyelonephritis.      Plan:     Sepsis (fever, tachycardia, leukocytosis)  UTI/Pyelonephritis  Ecoli Bacteremia   -- UA c/w UTI  -- Urine cx and blood cx have now speciated with Ecoli, likely as pyelo the source  --given bacteremia will consult ID to assist with IV abx management  possibly as outpatient   -- CT A/P looks c/w pyelonephritis  -- IV Rocephin for now  -- IVFs for 24 hours  -- WBC sign improved but still spiking fevers, continue to monitor on IV abx     Hydroureteronephrosis, left sided  --noted on CT imaging, d/w ID- have consulted urology to see- appreciate     Lactic Acidosis  -- due to above, reflex normalized  -- IVFs for 24 hours as noted above     PAF  -- continue metoprolol as BP will allow  -- IVFs for 24 hours  -- [id number] of 3, on Eliquis, continue     CAD   -- s/p stents in remote past with 3V CABG in 2022 ([facility])  -- hold antihypertensives with exception of beta blocker as noted above     H/o Prostate Cancer  -- s/p prostatectomy     T2DM  -- A1C 7.3  -- hold oral hypoglycemics  -- low dose SSI for now    Expected Discharge Location and Transportation: home with family   Expected Discharge  Expected Discharge Date: 5/11/2023; Expected Discharge Time:      DVT prophylaxis:  Medical DVT prophylaxis orders are present.     AM-PAC 6 Clicks Score (PT): 22 (05/08/23 2000)    CODE STATUS:   Code Status and Medical Interventions:   Ordered at: 05/08/23 1526     Level Of Support Discussed With:    Patient     Code Status (Patient has no pulse and is not breathing):    CPR (Attempt to Resuscitate)     Medical Interventions (Patient has pulse or is breathing):    Full Support       Georgie Padron MD  05/09/23

## 2023-05-09 NOTE — ANESTHESIA PREPROCEDURE EVALUATION
Anesthesia Evaluation     Patient summary reviewed and Nursing notes reviewed                Airway   Mallampati: I  TM distance: >3 FB  Neck ROM: full  No difficulty expected  Dental - normal exam   (+) edentulous    Pulmonary - normal exam   (+) sleep apnea,   Cardiovascular - normal exam    (+) hypertension, CAD, CABG, dysrhythmias Atrial Fib, hyperlipidemia,       Neuro/Psych- negative ROS  GI/Hepatic/Renal/Endo    (+) obesity,  GERD,  renal disease stones, diabetes mellitus,     Musculoskeletal     Abdominal  - normal exam    Bowel sounds: normal.   Substance History - negative use     OB/GYN negative ob/gyn ROS         Other   arthritis,    history of cancer (PROSTATE)                  Anesthesia Plan    ASA 3     general     intravenous induction     Anesthetic plan, risks, benefits, and alternatives have been provided, discussed and informed consent has been obtained with: patient.    Plan discussed with CRNA.        CODE STATUS:    Level Of Support Discussed With: Patient  Code Status (Patient has no pulse and is not breathing): CPR (Attempt to Resuscitate)  Medical Interventions (Patient has pulse or is breathing): Full Support

## 2023-05-09 NOTE — CONSULTS
Aldair Narvaez  1950  [id number]    Date of Consult: 5/9/2023    Admit Date:  5/8/2023      Requesting Provider: No ref. provider found  Evaluating Physician: Joel Ham MD    Chief Complaint: fever    Reason for Consultation: sepsis    History of present illness:    Patient is a 72 y.o.  Yr old male with history of prostate cancer and renal calculi, follows with Dr. Dennis Reeves at [facility]. Prior prostatectomy.He has required prior stents but he does not recall past anatomy/side or timing.  No prior history of resistant organisms.  No prior history MRSA/VRE, C. Difficile or ESBL/K PC/CRE organisms; he was admitted on May 8 with acute urinary frequency/hematuria, dysuria with fever over at least several days, starting on the weekend. Fever noted to 103, tachycardic with leukocytosis and subsequent blood/urine culture positivity for E. Coli, final SANDRA data pending but no resistance markers on PCR per microbiology.as of May 9, he reports temperature less intense, white blood cell count improving and he denies any new pain.  Nursing reports no new distress.  On room air    Recent dysuria/hematuria and urinary frequency as above.  He denies flank pain.  Denies hesitancy.  Fever as above.  No chills or sweats.    No headache photophobia or neck stiffness.  No shortness of breath cough or hemoptysis.  Denies nausea vomiting diarrhea or abdominal pain.  No skin rash        Past Medical History:   Diagnosis Date   • Acid reflux    • Arthritis    • Benign hypertension    • Colon polyp    • Concern about heart attack without diagnosis    • Coronary artery disease    • Coronary atherosclerosis    • Heart disease    • Hyperlipidemia    • Hypertension    • Kidney stone    • Mixed hyperlipidemia    • Obesity     body mass index 30+-obesity   • Prostate cancer    • Sleep apnea    • Type 2 diabetes mellitus    • Type 2 diabetes mellitus        Past Surgical History:   Procedure Laterality Date   • CARDIAC  CATHETERIZATION     • CARDIAC SURGERY N/A     Triple By Pass   • CARPAL TUNNEL RELEASE     • CORONARY ARTERY BYPASS GRAFT  08/30/2022   • CYSTOSCOPY BLADDER STONE LITHOTRIPSY     • CYSTOSCOPY BLADDER STONE LITHOTRIPSY     • FETAL SURGERY FOR CONGENITAL HERNIA     • INGUINAL HERNIA REPAIR     • KNEE ARTHROSCOPY     • PROSTATECTOMY     • TRIGGER FINGER RELEASE     • UMBILICAL HERNIA REPAIR     • UVULOPALATOPHARYNGOPLASTY         Pediatric History   Patient Parents   • Not on file     Other Topics Concern   • Not on file   Social History Narrative   • Not on file       family history includes Diabetes in his sister; Heart attack in his father; Heart disease in his father; Hyperlipidemia in his sister; Hypertension in his sister.    Allergies   Allergen Reactions   • Iodine Other (See Comments)     Closes sinuses   • Oxycodone-Acetaminophen Itching   • Zofran [Ondansetron] Hives       Medication:  Current Facility-Administered Medications   Medication Dose Route Frequency Provider Last Rate Last Admin   • acetaminophen (TYLENOL) tablet 650 mg  650 mg Oral Q4H PRN Bárbara Lee MD   650 mg at 05/09/23 0320    Or   • acetaminophen (TYLENOL) 160 MG/5ML solution 650 mg  650 mg Oral Q4H PRN Bárbara Lee MD        Or   • acetaminophen (TYLENOL) suppository 650 mg  650 mg Rectal Q4H PRN Bárbara Lee MD       • albuterol sulfate HFA (PROVENTIL HFA;VENTOLIN HFA;PROAIR HFA) inhaler 2 puff  2 puff Inhalation PRN Bárbara Lee MD       • apixaban (ELIQUIS) tablet 5 mg  5 mg Oral Q12H Bárbara Lee MD   5 mg at 05/08/23 2011   • buPROPion XL (WELLBUTRIN XL) 24 hr tablet 150 mg  150 mg Oral Daily Bárbara Lee MD       • Calcium Replacement - Follow Nurse / BPA Driven Protocol   Does not apply PRN Bárbara Lee MD       • cefTRIAXone (ROCEPHIN) 2 g/100 mL 0.9% NS IVPB (MBP)  2 g Intravenous Q24H Bárbara Lee MD       • dextrose (D50W) (25 g/50 mL) IV injection  25 g  25 g Intravenous Q15 Min PRN Bárbara Lee MD       • dextrose (GLUTOSE) oral gel 15 g  15 g Oral Q15 Min PRN Bárbara Lee MD       • glucagon (GLUCAGEN) injection 1 mg  1 mg Intramuscular Q15 Min PRN Bárbara Lee MD       • HYDROcodone-acetaminophen (NORCO) 5-325 MG per tablet 1 tablet  1 tablet Oral Q4H PRN Bárbara Lee MD       • Insulin Lispro (humaLOG) injection 0-7 Units  0-7 Units Subcutaneous TID AC Bárbara Lee MD       • Magnesium Standard Dose Replacement - Follow Nurse / BPA Driven Protocol   Does not apply PRN Bárbara Lee MD       • metoprolol succinate XL (TOPROL-XL) 24 hr tablet 50 mg  50 mg Oral Daily Bárbara Lee MD       • pantoprazole (PROTONIX) EC tablet 40 mg  40 mg Oral Daily Bárbara Lee MD       • Phosphorus Replacement - Follow Nurse / BPA Driven Protocol   Does not apply PRN Bárbara Lee MD       • Potassium Replacement - Follow Nurse / BPA Driven Protocol   Does not apply PRN Bárbara Lee MD       • rosuvastatin (CRESTOR) tablet 20 mg  20 mg Oral Daily Bárbara Lee MD       • sodium chloride 0.9 % flush 1-10 mL  1-10 mL Intravenous PRN Bárbara Lee MD       • sodium chloride 0.9 % flush 10 mL  10 mL Intravenous PRN Smith Guzman MD       • sodium chloride 0.9 % flush 10 mL  10 mL Intravenous Q12H Bárbara Lee MD   10 mL at 05/08/23 2011   • sodium chloride 0.9 % infusion 40 mL  40 mL Intravenous PRN Bárbara Lee MD       • sodium chloride 0.9 % infusion  100 mL/hr Intravenous Continuous Bárbara Lee  mL/hr at 05/09/23 0320 100 mL/hr at 05/09/23 0320       Antibiotics:  Anti-Infectives (From admission, onward)    Ordered     Dose/Rate Route Frequency Start Stop    05/08/23 1708  cefTRIAXone (ROCEPHIN) 2 g/100 mL 0.9% NS IVPB (MBP)        Ordering Provider: Bárbara Lee MD    2 g  over 30 Minutes Intravenous Every 24  "Hours 05/09/23 1500 05/16/23 1459    05/08/23 1442  cefTRIAXone (ROCEPHIN) 2 g/100 mL 0.9% NS IVPB (MBP)        Ordering Provider: Smith Guzman MD    2 g  over 30 Minutes Intravenous Once 05/08/23 1444 05/08/23 1538            Review of Systems    Constitutional--  Appetite  Diminished, and no malaise. + fatigue.  Heent-- No new vision, hearing or throat complaints.  No epistaxis or oral sores.  Denies odynophagia or dysphagia.  No flashers, floaters or eye pain. No odynophagia or dysphagia. No headache, photophobia or neck stiffness.  CV-- No chest pain, palpitation or syncope  Resp-- No SOB/cough/Hemoptysis  GI- No nausea, vomiting, or diarrhea.  No hematochezia, melena, or hematemesis. Denies jaundice or chronic liver disease.  -- see above  Lymph- no swollen lymph nodes in neck/axilla or groin.   Heme- No active bruising or bleeding; no Hx of DVT or PE.  MS-- no swelling or pain in the bones or joints of arms/legs.  No new back pain.  Neuro-- No acute focal weakness or numbness in the arms or legs.  No seizures.    Full 12 point review of systems reviewed and negative otherwise for acute complaints, except for above    Physical Exam:   Vital Signs   /82 (BP Location: Left arm, Patient Position: Lying)   Pulse 114   Temp 99.3 °F (37.4 °C) (Oral)   Resp 16   Ht 177.8 cm (70\")   Wt 99.8 kg (220 lb)   SpO2 93%   BMI 31.57 kg/m²     GENERAL: Awake and alert, in no acute distress.   HEENT: Normocephalic, atraumatic.  PERRL. EOMI. No conjunctival injection. No icterus. Oropharynx clear without evidence of thrush or exudate. No evidence of peridontal disease.    NECK: Supple without nuchal rigidity. No mass.  LYMPH: No cervical, axillary or inguinal lymphadenopathy.  HEART: RRR; No murmur, rubs, gallops.   LUNGS: Clear to auscultation bilaterally without wheezing, rales, rhonchi. Normal respiratory effort. Nonlabored. No dullness.  ABDOMEN: Soft, nontender, nondistended. Positive bowel sounds. No " rebound or guarding. NO mass or HSM.  EXT:  No cyanosis, clubbing or edema. No cord.  : Genitalia generally unremarkable.  Without Farooq catheter.  MSK: FROM without joint effusions noted arms/legs.    SKIN: Warm and dry without cutaneous eruptions on Inspection/palpation.    NEURO: Oriented to PPT. No focal deficits on motor/sensory exam at arms/legs.  PSYCHIATRIC: Normal insight and judgement. Cooperative with PE    No CVA tenderness to percussion    IV without obvious redness or drainage    Laboratory Data    Results from last 7 days   Lab Units 05/09/23  0532 05/08/23  1047   WBC 10*3/mm3 9.30 20.96*   HEMOGLOBIN g/dL 13.5 16.4   HEMATOCRIT % 42.7 51.4*   PLATELETS 10*3/mm3 146 208     Results from last 7 days   Lab Units 05/09/23  0532   SODIUM mmol/L 136   POTASSIUM mmol/L 4.0   CHLORIDE mmol/L 101   CO2 mmol/L 23.0   BUN mg/dL 20   CREATININE mg/dL 0.90   GLUCOSE mg/dL 119*   CALCIUM mg/dL 8.0*     Results from last 7 days   Lab Units 05/08/23  1047   ALK PHOS U/L 101   BILIRUBIN mg/dL 0.8   ALT (SGPT) U/L 30   AST (SGOT) U/L 27               Estimated Creatinine Clearance: 90.4 mL/min (by C-G formula based on SCr of 0.9 mg/dL).      Microbiology:      Radiology:  Imaging Results (Last 72 Hours)     Procedure Component Value Units Date/Time    CT Abdomen Pelvis Without Contrast [[id number]] Collected: 05/08/23 1303     Updated: 05/08/23 1324    Narrative:      CT ABDOMEN PELVIS WO CONTRAST    Date of Exam: 5/8/2023 12:40 PM EDT    Indication: bilat flank pain with high fever and hx of nephrioliths.    Comparison: None available.    Technique: Axial CT images were obtained of the abdomen and pelvis without the administration of contrast. Reconstructed coronal and sagittal images were also obtained. Automated exposure control and iterative construction methods were used.      Findings:  Lower thorax demonstrates partially imaged changes of prior CABG with otherwise unremarkable appearance of the lower thorax.  Unremarkable appearance of the liver. There is small dependent hyperdensity within the gallbladder lumen favored to reflect some   gallbladder sludge, with 3 mm calcific density at the gallbladder fundus, uncertain whether this reflects small gallstone or calcification relating to adenomyomatosis. No evidence of acute cholecystitis. Normal appearance of the bile ducts. Unremarkable   appearance of the spleen, pancreas, and adrenal glands.    There is a cluster of small nonobstructing stones in the inferior pole of the right kidney measuring up to 3 mm in size. There are couple small vague round hypodensities in the cortex of the right kidney which may reflect small simple cysts. Otherwise   unremarkable appearance of the right kidney and right ureter. There is mild to moderate left-sided hydroureteronephrosis. There is a punctate nonobstructing stone in the inferior pole of the left kidney. There is some periureteral fat stranding along the   proximal and mid left ureter; the distal left ureter appears normal in caliber. No evidence of ureteral stone or obvious obstructing lesion. There is a simple cyst in the superior pole cortex of the left kidney. No perinephric fluid collection. There is   questionable circumferential wall thickening of the urinary bladder although the bladder is under distended.    There are multiple surgical clips in the expected region of the prostate compatible with surgical change of prostatectomy.    There is minimal sigmoid colonic diverticulosis without evidence of diverticulitis. Normal appendix. No evidence of bowel obstruction. No definite inflammatory change of the GI tract. No abdominopelvic free fluid or conspicuous fat stranding. No   pneumoperitoneum. There are mild scattered atherosclerotic calcifications of the abdominal aorta with otherwise unremarkable noncontrast appearance of the vasculature. There are couple surgical clips in the region of the right inguinal canal/right  groin.   Otherwise unremarkable appearance of the body wall soft tissues. No bulky or suspicious abdominopelvic lymph nodes. There is a subcentimeter sclerotic focus in the right ischium which is nonspecific and may reflect bone island; no additional conspicuous   sclerotic bony lesions are identified. No acute osseous findings.      Impression:      Impression:  Mild to moderate left-sided hydroureteronephrosis without identifiable cause. Assessment for any left-sided pyelonephritis is severely limited in the absence of IV contrast and clinical correlation is required. There is nonobstructing bilateral   nephrolithiasis.    Questionable circumferential wall thickening of the urinary bladder which appears under distended. Correlate with urinalysis to exclude cystitis.    There are surgical changes of prostatectomy.    Gallbladder sludge.            Electronically Signed: Luis Justin    5/8/2023 1:21 PM EDT    Workstation ID: [id number]            Impression:     --acute complicated UTI/ pyelonephritis, gram-negative mavis in urine/blood and associated with sepsis per admission notes.  White blood cell count and fever trending better with ceftriaxone and no resistance markers on PCR per microbiology.  Further SANDRA data pending.  If clinically worse despite current therapy prior to more information, you could consider empirically broader therapy    --Acute sepsis at presentation per medicine knows.  White blood cell count trending better, fever down per patient; resuscitative measures per medicine team    --left side hydroureteronephrosis on CT scan per radiology, bilateral nephrolithiasis per radiology and circumferential wall thickening of the bladder.  Urology to see and further anatomic/functional assessment/intervention at their discretion    --history of prostate cancer    PLAN: Thank you for asking us to see Aldair Narvaez, I recommend the following:    --IV ceftriaxone    --Check/review labs cultures and  scans    --Partial history per nursing staff    --Discussed with microbiology    --Discussed with Dr. Padron    --Urology to see    --Discussed with pharmacy    --Highly complex at of issues with high risk for further serious morbidity and other serious sequela       Joel Ham MD  5/9/2023

## 2023-05-09 NOTE — CONSULTS
[facility]   HISTORY AND PHYSICAL    Patient Name: Aldair Narvaez  : 1950  MRN: [id number]  Primary Care Physician:  Miguel Angel Mireles MD  Date of admission: 2023    Subjective   Subjective     Chief Complaint: Urinary frequency, fever.     HPI:    Aldair Narvaez is a 72 y.o. male with hx of HTN, HLD, CAD s/p stents, CABG x 3, Atrial Fibrillation on Eliquis and prostate cancer s/p radical prostatectomy who presented to [facility] ED with worsening dysuria, urinary frequency and fever. He was admitted for further management of pyelonephritis. His established Urologist is Dr. Reeves. He reports history of nephrolithiasis and has previously undergone ESWL and Ureteroscopy for stones.     Labs upon admission reviewed; WBC 20.96, Lactate 2.7, Cr 1.06. UA with 3+ Bacteria, TNTC WBC, 13-20 RBC. Urine and Blood Cultures preliminary with >100,000 E.Coli. TMAX overnight 102.8.     CT AP wo ; Mild-Moderate left sided hydronephrosis. No obvious obstructing ureteral calculi. The bladder is nondistended, bilateral nonobstructing renal calculi.     He is currently sitting on side of bed. He has been voiding, dysuria is improving. He denies Left flank pain.     Review of Systems   All systems were reviewed and negative except for: Dysuria, fever    Personal History     Past Medical History:   Diagnosis Date   • Acid reflux    • Arthritis    • Benign hypertension    • Colon polyp    • Concern about heart attack without diagnosis    • Coronary artery disease    • Coronary atherosclerosis    • Heart disease    • Hyperlipidemia    • Hypertension    • Kidney stone    • Mixed hyperlipidemia    • Obesity     body mass index 30+-obesity   • Prostate cancer    • Sleep apnea    • Type 2 diabetes mellitus    • Type 2 diabetes mellitus        Past Surgical History:   Procedure Laterality Date   • CARDIAC CATHETERIZATION     • CARDIAC SURGERY N/A     Triple By Pass   • CARPAL TUNNEL RELEASE     • CORONARY ARTERY BYPASS GRAFT  2022   •  CYSTOSCOPY BLADDER STONE LITHOTRIPSY     • CYSTOSCOPY BLADDER STONE LITHOTRIPSY     • FETAL SURGERY FOR CONGENITAL HERNIA     • INGUINAL HERNIA REPAIR     • KNEE ARTHROSCOPY     • PROSTATECTOMY     • TRIGGER FINGER RELEASE     • UMBILICAL HERNIA REPAIR     • UVULOPALATOPHARYNGOPLASTY         Family History: family history includes Diabetes in his sister; Heart attack in his father; Heart disease in his father; Hyperlipidemia in his sister; Hypertension in his sister. Otherwise pertinent FHx was reviewed and not pertinent to current issue.    Social History:  reports that he has never smoked. He has never used smokeless tobacco. He reports that he does not currently use alcohol after a past usage of about 1.0 standard drink per week. He reports that he does not use drugs.    Home Medications:  albuterol sulfate HFA, apixaban, buPROPion XL, coenzyme Q10, dapagliflozin Propanediol, glimepiride, glucose blood, metoprolol succinate XL, nitroglycerin, pantoprazole, rosuvastatin, and valsartan-hydrochlorothiazide      Allergies:  Allergies   Allergen Reactions   • Iodine Other (See Comments)     Closes sinuses   • Oxycodone-Acetaminophen Itching   • Zofran [Ondansetron] Hives       Objective   Objective     Vitals:   Temp:  [98.8 °F (37.1 °C)-103 °F (39.4 °C)] 99.3 °F (37.4 °C)  Heart Rate:  [105-127] (P) 106  Resp:  [16-20] 16  BP: (112-150)/(69-92) 130/82  Physical Exam    Constitutional: Awake in bed, alert    Eyes: PERRLA, sclerae anicteric, no conjunctival injection   HENT: Normocephalic, atraumatic, mucous membranes moist   Neck: Supple, trachea midline   Respiratory:Equal chest rise, non-labored respirations    Cardiovascular: RRR   Gastrointestinal: Soft   Musculoskeletal: No bilateral ankle edema, no clubbing or cyanosis to extremities   Genitourinary: voiding, no flank pain.    Psychiatric: Appropriate affect, cooperative   Neurologic: Oriented x 3, Cranial Nerves grossly intact, speech clear   Skin: No rashes      Result Review    Result Review:  I have personally reviewed the results from the time of this admission to 5/9/2023 09:21 EDT and agree with these findings:  [x]  Laboratory  [x]  Microbiology  [x]  Radiology  []  EKG/Telemetry   []  Cardiology/Vascular   []  Pathology  []  Old records  [x]  Other: Vital signs    Most notable findings include: Urine Culture Preliminary >100,000 E.Coli. WBC 9.3 from 20.96. Prelim blood cultures with E.Coli.     Assessment & Plan   Assessment / Plan     Brief Patient Summary:  Aldair Narvaez is a 72 y.o. male with history of nephrolithiasis and prostate cancer s/p prostatectomy who presented to [facility] for worsening dysuria, urinary frequency and fever found to have Left pyelonephritis and mild-moderate left hydronephrosis.     Active Hospital Problems:  Active Hospital Problems    Diagnosis    • **Pyelonephritis    • Sepsis due to urinary tract infection    • Personal history of prostate cancer    • Paroxysmal A-fib    • Uncontrolled type 2 diabetes mellitus with hyperglycemia    • Mixed hyperlipidemia    • Benign hypertension    • CAD, multiple vessel      Dr. Justin reviewed CT findings and discussed plan of care with patient. Likely chronic left hydronephrosis, given patients persistent Tachycardia we will plan for Cystoscopy, Left retrograde pyelogram, Left ureteral stent placement.     Plan:  -Consent.  -Keep NPO for procedure.  -OR this afternoon with Dr. Justin.   will follow, please call if any questions.     DVT prophylaxis:  Medical DVT prophylaxis orders are present.    CODE STATUS:    Level Of Support Discussed With: Patient  Code Status (Patient has no pulse and is not breathing): CPR (Attempt to Resuscitate)  Medical Interventions (Patient has pulse or is breathing): Full Support    Admission Status:  I believe this patient meets inpatient status.    Electronically signed by JERRICA Damon, 05/09/23, 9:21 AM EDT.

## 2023-05-09 NOTE — ANESTHESIA POSTPROCEDURE EVALUATION
Patient: Aldair Narvaez    Procedure Summary     Date: 05/09/23 Room / Location:  [facility] /  [facility]    Anesthesia Start: 1553 Anesthesia Stop: 1638    Procedure: CYSTOSCOPY LEFT RETROGRADE PYELOGRAM AND LEFT URETERAL STENT PLACEMENT (Left: Bladder) Diagnosis:     Surgeons: Isma Justin MD Provider: Wai Vaca MD    Anesthesia Type: general ASA Status: 3          Anesthesia Type: general    Vitals  Vitals Value Taken Time   /76 05/09/23 1715   Temp 98.7 °F (37.1 °C) 05/09/23 1715   Pulse 82 05/09/23 1721   Resp 18 05/09/23 1715   SpO2 93 % 05/09/23 1721   Vitals shown include unvalidated device data.        Post Anesthesia Care and Evaluation    Patient location during evaluation: PACU  Patient participation: complete - patient participated  Level of consciousness: awake and alert  Pain management: adequate    Airway patency: patent  Anesthetic complications: No anesthetic complications  PONV Status: none  Cardiovascular status: hemodynamically stable and acceptable  Respiratory status: nonlabored ventilation, acceptable and nasal cannula  Hydration status: acceptable

## 2023-05-10 ENCOUNTER — TELEPHONE (OUTPATIENT)
Dept: ENDOCRINOLOGY | Facility: CLINIC | Age: 73
End: 2023-05-10
Payer: MEDICARE

## 2023-05-10 LAB
BACTERIA SPEC AEROBE CULT: ABNORMAL
GLUCOSE BLDC GLUCOMTR-MCNC: 209 MG/DL (ref 70–130)
GLUCOSE BLDC GLUCOMTR-MCNC: 229 MG/DL (ref 70–130)
GLUCOSE BLDC GLUCOMTR-MCNC: 242 MG/DL (ref 70–130)

## 2023-05-10 PROCEDURE — 99232 SBSQ HOSP IP/OBS MODERATE 35: CPT | Performed by: HOSPITALIST

## 2023-05-10 PROCEDURE — 82948 REAGENT STRIP/BLOOD GLUCOSE: CPT

## 2023-05-10 PROCEDURE — 99233 SBSQ HOSP IP/OBS HIGH 50: CPT | Performed by: NURSE PRACTITIONER

## 2023-05-10 PROCEDURE — 25010000002 CEFTRIAXONE PER 250 MG: Performed by: STUDENT IN AN ORGANIZED HEALTH CARE EDUCATION/TRAINING PROGRAM

## 2023-05-10 PROCEDURE — 63710000001 INSULIN LISPRO (HUMAN) PER 5 UNITS: Performed by: STUDENT IN AN ORGANIZED HEALTH CARE EDUCATION/TRAINING PROGRAM

## 2023-05-10 RX ORDER — PHENAZOPYRIDINE HYDROCHLORIDE 100 MG/1
200 TABLET, FILM COATED ORAL 3 TIMES DAILY PRN
Status: DISCONTINUED | OUTPATIENT
Start: 2023-05-10 | End: 2023-05-11 | Stop reason: HOSPADM

## 2023-05-10 RX ORDER — OXYBUTYNIN CHLORIDE 10 MG/1
10 TABLET, EXTENDED RELEASE ORAL DAILY
Status: DISCONTINUED | OUTPATIENT
Start: 2023-05-10 | End: 2023-05-11 | Stop reason: HOSPADM

## 2023-05-10 RX ORDER — TAMSULOSIN HYDROCHLORIDE 0.4 MG/1
0.4 CAPSULE ORAL DAILY
Status: DISCONTINUED | OUTPATIENT
Start: 2023-05-10 | End: 2023-05-11 | Stop reason: HOSPADM

## 2023-05-10 RX ORDER — HYOSCYAMINE SULFATE 0.125 MG
125 TABLET,DISINTEGRATING ORAL EVERY 4 HOURS PRN
Status: DISCONTINUED | OUTPATIENT
Start: 2023-05-10 | End: 2023-05-11 | Stop reason: HOSPADM

## 2023-05-10 RX ADMIN — APIXABAN 5 MG: 5 TABLET, FILM COATED ORAL at 20:01

## 2023-05-10 RX ADMIN — OXYBUTYNIN CHLORIDE 10 MG: 10 TABLET, EXTENDED RELEASE ORAL at 09:31

## 2023-05-10 RX ADMIN — METOPROLOL SUCCINATE 50 MG: 50 TABLET, EXTENDED RELEASE ORAL at 08:07

## 2023-05-10 RX ADMIN — BUPROPION HYDROCHLORIDE 150 MG: 150 TABLET, EXTENDED RELEASE ORAL at 08:07

## 2023-05-10 RX ADMIN — APIXABAN 5 MG: 5 TABLET, FILM COATED ORAL at 08:07

## 2023-05-10 RX ADMIN — INSULIN LISPRO 3 UNITS: 100 INJECTION, SOLUTION INTRAVENOUS; SUBCUTANEOUS at 16:02

## 2023-05-10 RX ADMIN — TAMSULOSIN HYDROCHLORIDE 0.4 MG: 0.4 CAPSULE ORAL at 09:31

## 2023-05-10 RX ADMIN — INSULIN LISPRO 3 UNITS: 100 INJECTION, SOLUTION INTRAVENOUS; SUBCUTANEOUS at 11:39

## 2023-05-10 RX ADMIN — Medication 10 ML: at 08:08

## 2023-05-10 RX ADMIN — INSULIN LISPRO 3 UNITS: 100 INJECTION, SOLUTION INTRAVENOUS; SUBCUTANEOUS at 08:07

## 2023-05-10 RX ADMIN — CEFTRIAXONE 2 G: 2 INJECTION, POWDER, FOR SOLUTION INTRAMUSCULAR; INTRAVENOUS at 16:02

## 2023-05-10 RX ADMIN — ROSUVASTATIN 20 MG: 20 TABLET, FILM COATED ORAL at 08:07

## 2023-05-10 RX ADMIN — PANTOPRAZOLE SODIUM 40 MG: 40 TABLET, DELAYED RELEASE ORAL at 08:07

## 2023-05-10 NOTE — PLAN OF CARE
Goal Outcome Evaluation:           Progress: improving  Outcome Evaluation: VSS on RA to 2LNC while sleeping. large BM overnight. Tolerating regular diet. Ambulating independently and voiding appropriately. No complaints of pain or nausea. Slept throughout the night. Will continue with plan of care.

## 2023-05-10 NOTE — PROGRESS NOTES
"Aldair Narvaez  1950  [id number]     Chief Complaint: fever    Reason for Consultation: sepsis    History of present illness:    Patient is a 72 y.o.  Yr old male with history of prostate cancer and renal calculi, follows with Dr. Dennis Reeves at [facility]. Prior prostatectomy.He has required prior stents but he does not recall past anatomy/side or timing.  No prior history of resistant organisms.  No prior history MRSA/VRE, C. Difficile or ESBL/K PC/CRE organisms; he was admitted on May 8 with acute urinary frequency/hematuria, dysuria with fever over at least several days, starting on the weekend. Fever noted to 103, tachycardic with leukocytosis and subsequent blood/urine culture positivity for E. Coli, final SANDRA data pending but no resistance markers on PCR per microbiology.as of May 9, he reports temperature less intense, white blood cell count improving and he denies any new pain.  Nursing reports no new distress.  On room air    5/9/23 Dr Justin  \"Procedure(s):  CYSTOSCOPY LEFT RETROGRADE PYELOGRAM AND LEFT URETERAL STENT PLACEMENT\"    Preop  hesitancy/dysuria/hematuria and urinary frequency as above.      5/10/23 postop with no new focal pain; He denies flank pain.  Denies hesitancy.  Fever as above.  No chills or sweats.    No headache photophobia or neck stiffness.  No shortness of breath cough or hemoptysis.  Denies nausea vomiting diarrhea or abdominal pain.  No skin rash        Past Medical History:   Diagnosis Date   • Acid reflux    • Arthritis    • Benign hypertension    • Colon polyp    • Concern about heart attack without diagnosis    • Coronary artery disease    • Coronary atherosclerosis    • Heart disease    • Hyperlipidemia    • Hypertension    • Kidney stone    • Mixed hyperlipidemia    • Obesity     body mass index 30+-obesity   • Prostate cancer    • Sleep apnea    • Type 2 diabetes mellitus    • Type 2 diabetes mellitus        Past Surgical History:   Procedure Laterality Date   • " CARDIAC CATHETERIZATION     • CARDIAC SURGERY N/A     Triple By Pass   • CARPAL TUNNEL RELEASE     • CORONARY ARTERY BYPASS GRAFT  08/30/2022   • CYSTOSCOPY BLADDER STONE LITHOTRIPSY     • CYSTOSCOPY BLADDER STONE LITHOTRIPSY     • FETAL SURGERY FOR CONGENITAL HERNIA     • INGUINAL HERNIA REPAIR     • KNEE ARTHROSCOPY     • PROSTATECTOMY     • TRIGGER FINGER RELEASE     • UMBILICAL HERNIA REPAIR     • UVULOPALATOPHARYNGOPLASTY         Pediatric History   Patient Parents   • Not on file     Other Topics Concern   • Not on file   Social History Narrative   • Not on file       family history includes Diabetes in his sister; Heart attack in his father; Heart disease in his father; Hyperlipidemia in his sister; Hypertension in his sister.    Allergies   Allergen Reactions   • Iodine Other (See Comments)     Closes sinuses   • Oxycodone-Acetaminophen Itching   • Zofran [Ondansetron] Hives        Review of Systems    5/10/23     Constitutional--  Appetite  Diminished, and no malaise. + fatigue.  Heent-- No new vision, hearing or throat complaints.  No epistaxis or oral sores.  Denies odynophagia or dysphagia.  No flashers, floaters or eye pain. No odynophagia or dysphagia. No headache, photophobia or neck stiffness.  CV-- No chest pain, palpitation or syncope  Resp-- No SOB/cough/Hemoptysis  GI- No nausea, vomiting, or diarrhea.  No hematochezia, melena, or hematemesis. Denies jaundice or chronic liver disease.  -- see above  Lymph- no swollen lymph nodes in neck/axilla or groin.   Heme- No active bruising or bleeding; no Hx of DVT or PE.  MS-- no swelling or pain in the bones or joints of arms/legs.  No new back pain.  Neuro-- No acute focal weakness or numbness in the arms or legs.  No seizures.    Full 12 point review of systems reviewed and negative otherwise for acute complaints, except for above    Physical Exam:   Vital Signs   /88 (BP Location: Right arm, Patient Position: Lying)   Pulse 80   Temp 97.7 °F  "(36.5 °C) (Oral)   Resp 18   Ht 177.8 cm (70\")   Wt 99.8 kg (220 lb 0.3 oz)   SpO2 96%   BMI 31.57 kg/m²     GENERAL: Awake and alert, in no acute distress.   HEENT: Normocephalic, atraumatic.   No conjunctival injection. No icterus. Oropharynx clear without evidence of thrush or exudate. No evidence of peridontal disease.    NECK: Supple without nuchal rigidity. No mass.   HEART: RRR; No murmur, rubs, gallops.   LUNGS: Clear to auscultation bilaterally without wheezing, rales, rhonchi. Normal respiratory effort. Nonlabored. No dullness.  ABDOMEN: Soft, nontender, nondistended. Positive bowel sounds. No rebound or guarding. NO mass or HSM.  EXT:  No cyanosis, clubbing or edema. No cord.   MSK: FROM without joint effusions noted arms/legs.    SKIN: Warm and dry without cutaneous eruptions on Inspection/palpation.    NEURO: Oriented to PPT. No focal deficits on motor/sensory exam at arms/legs.     No CVA tenderness to percussion    IV without obvious redness or drainage    Laboratory Data    Results from last 7 days   Lab Units 05/09/23  0532 05/08/23  1047   WBC 10*3/mm3 9.30 20.96*   HEMOGLOBIN g/dL 13.5 16.4   HEMATOCRIT % 42.7 51.4*   PLATELETS 10*3/mm3 146 208     Results from last 7 days   Lab Units 05/09/23  0532   SODIUM mmol/L 136   POTASSIUM mmol/L 4.0   CHLORIDE mmol/L 101   CO2 mmol/L 23.0   BUN mg/dL 20   CREATININE mg/dL 0.90   GLUCOSE mg/dL 119*   CALCIUM mg/dL 8.0*     Results from last 7 days   Lab Units 05/08/23  1047   ALK PHOS U/L 101   BILIRUBIN mg/dL 0.8   ALT (SGPT) U/L 30   AST (SGOT) U/L 27               Estimated Creatinine Clearance: 90.4 mL/min (by C-G formula based on SCr of 0.9 mg/dL).      Microbiology:      Radiology:  Imaging Results (Last 72 Hours)     Procedure Component Value Units Date/Time    XR Pyelogram Retrograde [[id number]] Collected: 05/09/23 1657     Updated: 05/09/23 1701    Narrative:      XR PYELOGRAM RETROGRADE    Date of Exam: 5/9/2023 3:56 PM EDT    Indication: " CYSTOSCOPY LEFT RETROGRADE PYELOGRAM AND LEFT URETERAL STENT PLACEMENT    Comparison: CT abdomen and pelvis dated 5/8/2023    Findings:  48 seconds of fluoroscopy was utilized. 25.6 mGy. 7 images were obtained.  Intraoperative fluoroscopic images demonstrate cannulation and opacification of the left ureter and renal collecting system in retrograde fashion with stent placement. Please see the operative report for full findings and details.      Impression:      Impression:  1. Intraoperative fluoroscopy during left-sided retrograde evaluation with stent placement. Please see operative report for full findings and details.      Electronically Signed: Jt Reyna    5/9/2023 4:58 PM EDT    Workstation ID: [id number]    CT Abdomen Pelvis Without Contrast [[id number]] Collected: 05/08/23 1303     Updated: 05/08/23 1324    Narrative:      CT ABDOMEN PELVIS WO CONTRAST    Date of Exam: 5/8/2023 12:40 PM EDT    Indication: bilat flank pain with high fever and hx of nephrioliths.    Comparison: None available.    Technique: Axial CT images were obtained of the abdomen and pelvis without the administration of contrast. Reconstructed coronal and sagittal images were also obtained. Automated exposure control and iterative construction methods were used.      Findings:  Lower thorax demonstrates partially imaged changes of prior CABG with otherwise unremarkable appearance of the lower thorax. Unremarkable appearance of the liver. There is small dependent hyperdensity within the gallbladder lumen favored to reflect some   gallbladder sludge, with 3 mm calcific density at the gallbladder fundus, uncertain whether this reflects small gallstone or calcification relating to adenomyomatosis. No evidence of acute cholecystitis. Normal appearance of the bile ducts. Unremarkable   appearance of the spleen, pancreas, and adrenal glands.    There is a cluster of small nonobstructing stones in the inferior pole of the right kidney  measuring up to 3 mm in size. There are couple small vague round hypodensities in the cortex of the right kidney which may reflect small simple cysts. Otherwise   unremarkable appearance of the right kidney and right ureter. There is mild to moderate left-sided hydroureteronephrosis. There is a punctate nonobstructing stone in the inferior pole of the left kidney. There is some periureteral fat stranding along the   proximal and mid left ureter; the distal left ureter appears normal in caliber. No evidence of ureteral stone or obvious obstructing lesion. There is a simple cyst in the superior pole cortex of the left kidney. No perinephric fluid collection. There is   questionable circumferential wall thickening of the urinary bladder although the bladder is under distended.    There are multiple surgical clips in the expected region of the prostate compatible with surgical change of prostatectomy.    There is minimal sigmoid colonic diverticulosis without evidence of diverticulitis. Normal appendix. No evidence of bowel obstruction. No definite inflammatory change of the GI tract. No abdominopelvic free fluid or conspicuous fat stranding. No   pneumoperitoneum. There are mild scattered atherosclerotic calcifications of the abdominal aorta with otherwise unremarkable noncontrast appearance of the vasculature. There are couple surgical clips in the region of the right inguinal canal/right groin.   Otherwise unremarkable appearance of the body wall soft tissues. No bulky or suspicious abdominopelvic lymph nodes. There is a subcentimeter sclerotic focus in the right ischium which is nonspecific and may reflect bone island; no additional conspicuous   sclerotic bony lesions are identified. No acute osseous findings.      Impression:      Impression:  Mild to moderate left-sided hydroureteronephrosis without identifiable cause. Assessment for any left-sided pyelonephritis is severely limited in the absence of IV contrast  and clinical correlation is required. There is nonobstructing bilateral   nephrolithiasis.    Questionable circumferential wall thickening of the urinary bladder which appears under distended. Correlate with urinalysis to exclude cystitis.    There are surgical changes of prostatectomy.    Gallbladder sludge.            Electronically Signed: Luis Vuongdacia    5/8/2023 1:21 PM EDT    Workstation ID: [id number]            Impression:     --acute E Coli complicated UTI/ pyelonephritis/septicemia,  + urine/blood Cx and associated with sepsis per admission notes.  White blood cell count and fever trending better with ceftriaxone .  Intervention 5/9    --Acute sepsis at presentation per medicine knows.  White blood cell count trending better, fever down per patient; resuscitative measures per medicine team    --left side hydroureteronephrosis on CT scan per radiology, bilateral nephrolithiasis per radiology and circumferential wall thickening of the bladder.  Urology to see and further anatomic/functional assessment/intervention at their discretion    --history of prostate cancer    PLAN:    --IV ceftriaxone    --Check/review labs cultures and scans    --Partial history per nursing staff    --Discussed with microbiology    --Urology following    --Discussed with pharmacy    --Highly complex at of issues with high risk for further serious morbidity and other serious sequela    **Copied text in this note has been reviewed and is accurate as of 05/10/23.     Joel Ham MD  5/10/2023

## 2023-05-10 NOTE — PROGRESS NOTES
[facility]   Urology Progress Note    Patient Name: Aldair Narvaez  : 1950  MRN: [id number]  Primary Care Physician:  Miguel Angel Mireles MD  Date of admission: 2023    Subjective   Subjective     Chief Complaint: Urinary frequency, dysuria    HPI:  Patient resting in bed. He denies flank pain. He reports mild dysuria and urinary straining. TMAX last night 99.1. Afebrile this AM.     Urine and Blood Cultures with E.Coli.     Review of Systems   All systems were reviewed and negative except for: dysuria, urinary straining    Objective   Objective     Vitals:   Temp:  [97.6 °F (36.4 °C)-99.2 °F (37.3 °C)] 97.7 °F (36.5 °C)  Heart Rate:  [] 80  Resp:  [16-20] 18  BP: (105-135)/(62-88) 132/88  Flow (L/min):  [2] 2  Physical Exam    Constitutional: Awake in bed, alert   Eyes: PERRLA, sclerae anicteric, no conjunctival injection   HENT: Normocephalic, atraumatic, mucous membranes moist   Neck: Supple, trachea midline   Respiratory: Equal chest rise, 2 L o2.   Cardiovascular: RRR, palpable radial pulses bilaterally   Gastrointestinal: Soft, nontender, non-distended   Genitourinary: voiding, no flank pain   Musculoskeletal: No lower extremity edema bilaterally, no clubbing or cyanosis to extremities   Psychiatric: Appropriate affect, cooperative   Neurologic: Oriented x 3,  Cranial Nerves grossly intact, speech clear   Skin: No rashes     Result Review    Result Review:  I have personally reviewed the results from the time of this admission to 5/10/2023 08:51 EDT and agree with these findings:  [x]  Laboratory  [x]  Microbiology  []  Radiology  []  EKG/Telemetry   []  Cardiology/Vascular   []  Pathology  []  Old records  [x]  Other:Vital signs, operative report.     Most notable findings include: Urine and Blood Cultures E.Coli.     Assessment & Plan   Assessment / Plan     Brief Patient Summary:  Aldair Narvaez is a 72 y.o. male with history of nephrolithiasis and prostate cancer s/p prostatectomy who presented  "to [facility] for worsening dysuria, urinary frequency and fever found to have Left pyelonephritis and moderate-severe left hydroureteronephrosis. He is now POD 1 s/p Cystoscopy, Left retrograde pyelogram and left ureteral stent placement with Dr. Justin.     OR findings per Dr. Justin \"Proteinaceous debris returned after placement of left ureteral stent\".    Active Hospital Problems:  Active Hospital Problems    Diagnosis    • **Pyelonephritis    • Bacteremia    • Hydronephrosis of left kidney    • Sepsis due to urinary tract infection    • Personal history of prostate cancer    • Paroxysmal A-fib    • Uncontrolled type 2 diabetes mellitus with hyperglycemia    • Mixed hyperlipidemia    • Benign hypertension    • CAD, multiple vessel        Plan:   -Pyridium, Oxybutynin, Levsin and Flomax for stent discomfort/dysuria.   -Plan for outpatient follow up with Dr. Justin in 2-3 weeks. Our office will call to schedule appointment.    will be available, please call if any questions.  D/w Dr. Justin.      DVT prophylaxis:  Medical DVT prophylaxis orders are present.    CODE STATUS:   Level Of Support Discussed With: Patient  Code Status (Patient has no pulse and is not breathing): CPR (Attempt to Resuscitate)  Medical Interventions (Patient has pulse or is breathing): Full Support    Disposition:  I expect patient to remain inpatient.    Electronically signed by JERRICA Damon, 05/10/23, 8:51 AM EDT.           "

## 2023-05-10 NOTE — CASE MANAGEMENT/SOCIAL WORK
Continued Stay Note   [facility]     Patient Name: Aldair Narvaez  MRN: [id number]  Today's Date: 5/10/2023    Admit Date: 5/8/2023    Plan: CM update   Discharge Plan     Row Name 05/10/23 1336       Plan    Plan CM update    Plan Comments Goal remains for patient to return home upon discharge- found to be septic due to pyelonephritis, IV antibiotics continue. CM will continue to follow for dc planning needs that may arise - danika [phone]    Final Discharge Disposition Code 01 - home or self-care               Discharge Codes    No documentation.               Expected Discharge Date and Time     Expected Discharge Date Expected Discharge Time    May 11, 2023             Danika Gerard RN

## 2023-05-10 NOTE — PROGRESS NOTES
[facility] Medicine Services  PROGRESS NOTE    Patient Name: Aldair Narvaez  : 1950  MRN: [id number]    Date of Admission: 2023  Primary Care Physician: Miguel Angel Mireles MD    Subjective   Subjective     CC:  Pyelo/bacteremia    HPI: Notes sweats, some dysuria, but improving. Debris/hematuria better. No pain. No n/v. Tolerating PO. No dyspnea.    ROS:  As above    Objective   Objective     Vital Signs:   Temp:  [97.6 °F (36.4 °C)-99.3 °F (37.4 °C)] 97.6 °F (36.4 °C)  Heart Rate:  [] 75  Resp:  [16-20] 18  BP: (105-135)/(62-83) 124/74  Flow (L/min):  [2] 2     Physical Exam:  NAD, alert and oriented  OP clear, dry MM  Neck supple  No LAD  RRR  CTAB  +BS, ND, soft  ESCAMILLA  Normal affect  No rashes    Results Reviewed:  LAB RESULTS:      Lab 23  0532 23  1926 23  1343 23  1047   WBC 9.30  --   --  20.96*   HEMOGLOBIN 13.5  --   --  16.4   HEMATOCRIT 42.7  --   --  51.4*   PLATELETS 146  --   --  208   NEUTROS ABS 7.72*  --   --  17.31*   IMMATURE GRANS (ABS) 0.15*  --   --  0.25*   LYMPHS ABS 0.69*  --   --  1.71   MONOS ABS 0.69  --   --  1.59*   EOS ABS 0.01  --   --  0.03   MCV 93.2  --   --  92.9   LACTATE  --  1.7 3.0* 2.7*         Lab 23  0532 23  1047   SODIUM 136 136   POTASSIUM 4.0 4.6   CHLORIDE 101 95*   CO2 23.0 27.0   ANION GAP 12.0 14.0   BUN 20 19   CREATININE 0.90 1.06   EGFR 91.9 75.5   GLUCOSE 119* 213*   CALCIUM 8.0* 10.0   HEMOGLOBIN A1C 7.30*  --    TSH 0.577  --          Lab 23  1047   TOTAL PROTEIN 8.0   ALBUMIN 3.9   GLOBULIN 4.1   ALT (SGPT) 30   AST (SGOT) 27   BILIRUBIN 0.8   ALK PHOS 101             Lab 23  0532   CHOLESTEROL 173   LDL CHOL 113*   HDL CHOL 39*   TRIGLYCERIDES 114             Brief Urine Lab Results  (Last result in the past 365 days)      Color   Clarity   Blood   Leuk Est   Nitrite   Protein   CREAT   Urine HCG        23 1410 Yellow   Turbid   Large (3+)   Moderate (2+)   Negative    >=300 mg/dL (3+)                 Microbiology Results Abnormal     Procedure Component Value - Date/Time    Blood Culture - Blood, Hand, Left [[id number]]  (Normal) Collected: 05/08/23 1926    Lab Status: Preliminary result Specimen: Blood from Hand, Left Updated: 05/09/23 2000     Blood Culture No growth at 24 hours    Blood Culture - Blood, Arm, Left [[id number]]  (Normal) Collected: 05/08/23 1926    Lab Status: Preliminary result Specimen: Blood from Arm, Left Updated: 05/09/23 2000     Blood Culture No growth at 24 hours    Blood Culture - Blood, Arm, Right [[id number]]  (Normal) Collected: 05/08/23 1343    Lab Status: Preliminary result Specimen: Blood from Arm, Right Updated: 05/09/23 1415     Blood Culture No growth at 24 hours          CT Abdomen Pelvis Without Contrast    Result Date: 5/8/2023  CT ABDOMEN PELVIS WO CONTRAST Date of Exam: 5/8/2023 12:40 PM EDT Indication: bilat flank pain with high fever and hx of nephrioliths. Comparison: None available. Technique: Axial CT images were obtained of the abdomen and pelvis without the administration of contrast. Reconstructed coronal and sagittal images were also obtained. Automated exposure control and iterative construction methods were used. Findings: Lower thorax demonstrates partially imaged changes of prior CABG with otherwise unremarkable appearance of the lower thorax. Unremarkable appearance of the liver. There is small dependent hyperdensity within the gallbladder lumen favored to reflect some gallbladder sludge, with 3 mm calcific density at the gallbladder fundus, uncertain whether this reflects small gallstone or calcification relating to adenomyomatosis. No evidence of acute cholecystitis. Normal appearance of the bile ducts. Unremarkable appearance of the spleen, pancreas, and adrenal glands. There is a cluster of small nonobstructing stones in the inferior pole of the right kidney measuring up to 3 mm in size. There are couple small vague round  hypodensities in the cortex of the right kidney which may reflect small simple cysts. Otherwise unremarkable appearance of the right kidney and right ureter. There is mild to moderate left-sided hydroureteronephrosis. There is a punctate nonobstructing stone in the inferior pole of the left kidney. There is some periureteral fat stranding along the  proximal and mid left ureter; the distal left ureter appears normal in caliber. No evidence of ureteral stone or obvious obstructing lesion. There is a simple cyst in the superior pole cortex of the left kidney. No perinephric fluid collection. There is  questionable circumferential wall thickening of the urinary bladder although the bladder is under distended. There are multiple surgical clips in the expected region of the prostate compatible with surgical change of prostatectomy. There is minimal sigmoid colonic diverticulosis without evidence of diverticulitis. Normal appendix. No evidence of bowel obstruction. No definite inflammatory change of the GI tract. No abdominopelvic free fluid or conspicuous fat stranding. No pneumoperitoneum. There are mild scattered atherosclerotic calcifications of the abdominal aorta with otherwise unremarkable noncontrast appearance of the vasculature. There are couple surgical clips in the region of the right inguinal canal/right groin.  Otherwise unremarkable appearance of the body wall soft tissues. No bulky or suspicious abdominopelvic lymph nodes. There is a subcentimeter sclerotic focus in the right ischium which is nonspecific and may reflect bone island; no additional conspicuous  sclerotic bony lesions are identified. No acute osseous findings.     Impression: Impression: Mild to moderate left-sided hydroureteronephrosis without identifiable cause. Assessment for any left-sided pyelonephritis is severely limited in the absence of IV contrast and clinical correlation is required. There is nonobstructing bilateral  nephrolithiasis. Questionable circumferential wall thickening of the urinary bladder which appears under distended. Correlate with urinalysis to exclude cystitis. There are surgical changes of prostatectomy. Gallbladder sludge. Electronically Signed: Luis Justin  5/8/2023 1:21 PM EDT  Workstation ID: [id number]    XR Pyelogram Retrograde    Result Date: 5/9/2023  XR PYELOGRAM RETROGRADE Date of Exam: 5/9/2023 3:56 PM EDT Indication: CYSTOSCOPY LEFT RETROGRADE PYELOGRAM AND LEFT URETERAL STENT PLACEMENT Comparison: CT abdomen and pelvis dated 5/8/2023 Findings: 48 seconds of fluoroscopy was utilized. 25.6 mGy. 7 images were obtained. Intraoperative fluoroscopic images demonstrate cannulation and opacification of the left ureter and renal collecting system in retrograde fashion with stent placement. Please see the operative report for full findings and details.     Impression: Impression: 1. Intraoperative fluoroscopy during left-sided retrograde evaluation with stent placement. Please see operative report for full findings and details. Electronically Signed: Jt Reyna  5/9/2023 4:58 PM EDT  Workstation ID: [id number]          Current medications:  Scheduled Meds:apixaban, 5 mg, Oral, Q12H  buPROPion XL, 150 mg, Oral, Daily  cefTRIAXone, 2 g, Intravenous, Q24H  insulin lispro, 0-7 Units, Subcutaneous, TID AC  metoprolol succinate XL, 50 mg, Oral, Daily  pantoprazole, 40 mg, Oral, Daily  rosuvastatin, 20 mg, Oral, Daily  sodium chloride, 10 mL, Intravenous, Q12H      Continuous Infusions:lactated ringers, 9 mL/hr, Last Rate: 9 mL/hr (05/09/23 1420)      PRN Meds:.•  acetaminophen **OR** acetaminophen **OR** acetaminophen  •  albuterol sulfate HFA  •  Calcium Replacement - Follow Nurse / BPA Driven Protocol  •  dextrose  •  dextrose  •  glucagon (human recombinant)  •  HYDROcodone-acetaminophen  •  lactated ringers  •  Magnesium Standard Dose Replacement - Follow Nurse / BPA Driven Protocol  •  Phosphorus  Replacement - Follow Nurse / BPA Driven Protocol  •  Potassium Replacement - Follow Nurse / BPA Driven Protocol  •  sodium chloride  •  sodium chloride  •  sodium chloride    Assessment & Plan   Assessment & Plan     Active Hospital Problems    Diagnosis  POA   • **Pyelonephritis [N12]  Yes   • Bacteremia [R78.81]  Unknown   • Hydronephrosis of left kidney [N13.30]  Yes   • Sepsis due to urinary tract infection [A41.9, N39.0]  Unknown   • Personal history of prostate cancer [Z85.46]  Not Applicable   • Paroxysmal A-fib [I48.0]  Unknown   • Uncontrolled type 2 diabetes mellitus with hyperglycemia [E11.65]  Yes   • Mixed hyperlipidemia [E78.2]  Yes   • Benign hypertension [I10]  Yes   • CAD, multiple vessel [I25.10]  Unknown      Resolved Hospital Problems   No resolved problems to display.        Brief Hospital Course to date:  Mr. Narvaez is a 71 yo M with PMH of HTN, HLD, T2DM, CAD s/p CABG in 2022, PAF on Eliquis, prostate cancer s/p prostatectomy who presented with complaints of dysuria. He was found to be septic due to pyelonephritis.      Plan:     Sepsis (fever, tachycardia, leukocytosis)  UTI/Pyelonephritis  Ecoli Bacteremia   -s/p cytoscopy, with L ureteral stent, with return of debris  -IV antibiotics per ID  -will need urology follow up  -continues with sweats, no fevers  -home soon    Hydroureteronephrosis, left sided  -s/p cystoscopy with L ureteral stent    PAF  -BB/Eliquis  -CHADSVASC 3     CAD   -BB, s/p CABG 2022     H/o Prostate Cancer  -s/p prostatectomy     T2DM  -SSI    Expected Discharge Location and Transportation: Home with family  Expected Discharge  Expected Discharge Date: 5/11/2023; Expected Discharge Time:      DVT prophylaxis:  Medical DVT prophylaxis orders are present.     AM-PAC 6 Clicks Score (PT): 23 (05/09/23 2000)    CODE STATUS:   Code Status and Medical Interventions:   Ordered at: 05/08/23 1526     Level Of Support Discussed With:    Patient     Code Status (Patient has no pulse  and is not breathing):    CPR (Attempt to Resuscitate)     Medical Interventions (Patient has pulse or is breathing):    Full Support       Raymon Weinberg MD  05/10/23

## 2023-05-10 NOTE — PLAN OF CARE
Goal Outcome Evaluation:  Plan of Care Reviewed With: patient        Progress: improving  Outcome Evaluation: VSS on RA to 2LNC while sleeping. BM today. Tolerating regular diet. Ambulating independently and voiding appropriately. No complaints of pain or nausea. Discussed plan of care with patient who verbalizes understanding.

## 2023-05-11 ENCOUNTER — READMISSION MANAGEMENT (OUTPATIENT)
Dept: CALL CENTER | Facility: HOSPITAL | Age: 73
End: 2023-05-11
Payer: MEDICARE

## 2023-05-11 VITALS
SYSTOLIC BLOOD PRESSURE: 130 MMHG | DIASTOLIC BLOOD PRESSURE: 78 MMHG | HEIGHT: 70 IN | TEMPERATURE: 97.7 F | OXYGEN SATURATION: 95 % | BODY MASS INDEX: 31.5 KG/M2 | WEIGHT: 220.02 LBS | RESPIRATION RATE: 18 BRPM | HEART RATE: 72 BPM

## 2023-05-11 LAB
ANION GAP SERPL CALCULATED.3IONS-SCNC: 10 MMOL/L (ref 5–15)
BACTERIA SPEC AEROBE CULT: ABNORMAL
BUN SERPL-MCNC: 23 MG/DL (ref 8–23)
BUN/CREAT SERPL: 34.3 (ref 7–25)
CALCIUM SPEC-SCNC: 8.3 MG/DL (ref 8.6–10.5)
CHLORIDE SERPL-SCNC: 105 MMOL/L (ref 98–107)
CO2 SERPL-SCNC: 22 MMOL/L (ref 22–29)
CREAT SERPL-MCNC: 0.67 MG/DL (ref 0.76–1.27)
DEPRECATED RDW RBC AUTO: 50.1 FL (ref 37–54)
EGFRCR SERPLBLD CKD-EPI 2021: 100.4 ML/MIN/1.73
ERYTHROCYTE [DISTWIDTH] IN BLOOD BY AUTOMATED COUNT: 14.5 % (ref 12.3–15.4)
GLUCOSE BLDC GLUCOMTR-MCNC: 151 MG/DL (ref 70–130)
GLUCOSE BLDC GLUCOMTR-MCNC: 156 MG/DL (ref 70–130)
GLUCOSE SERPL-MCNC: 204 MG/DL (ref 65–99)
GRAM STN SPEC: ABNORMAL
HCT VFR BLD AUTO: 43.1 % (ref 37.5–51)
HGB BLD-MCNC: 13.8 G/DL (ref 13–17.7)
ISOLATED FROM: ABNORMAL
MCH RBC QN AUTO: 30.1 PG (ref 26.6–33)
MCHC RBC AUTO-ENTMCNC: 32 G/DL (ref 31.5–35.7)
MCV RBC AUTO: 94.1 FL (ref 79–97)
PLATELET # BLD AUTO: 163 10*3/MM3 (ref 140–450)
PMV BLD AUTO: 9 FL (ref 6–12)
POTASSIUM SERPL-SCNC: 4.3 MMOL/L (ref 3.5–5.2)
RBC # BLD AUTO: 4.58 10*6/MM3 (ref 4.14–5.8)
SODIUM SERPL-SCNC: 137 MMOL/L (ref 136–145)
WBC NRBC COR # BLD: 8.24 10*3/MM3 (ref 3.4–10.8)

## 2023-05-11 PROCEDURE — 85027 COMPLETE CBC AUTOMATED: CPT | Performed by: HOSPITALIST

## 2023-05-11 PROCEDURE — 99239 HOSP IP/OBS DSCHRG MGMT >30: CPT | Performed by: HOSPITALIST

## 2023-05-11 PROCEDURE — 80048 BASIC METABOLIC PNL TOTAL CA: CPT | Performed by: HOSPITALIST

## 2023-05-11 PROCEDURE — 82948 REAGENT STRIP/BLOOD GLUCOSE: CPT

## 2023-05-11 PROCEDURE — 63710000001 INSULIN LISPRO (HUMAN) PER 5 UNITS: Performed by: STUDENT IN AN ORGANIZED HEALTH CARE EDUCATION/TRAINING PROGRAM

## 2023-05-11 PROCEDURE — 25010000002 CEFTRIAXONE PER 250 MG: Performed by: STUDENT IN AN ORGANIZED HEALTH CARE EDUCATION/TRAINING PROGRAM

## 2023-05-11 RX ORDER — TAMSULOSIN HYDROCHLORIDE 0.4 MG/1
0.4 CAPSULE ORAL DAILY
Qty: 30 CAPSULE | Refills: 0 | Status: SHIPPED | OUTPATIENT
Start: 2023-05-11

## 2023-05-11 RX ORDER — OXYBUTYNIN CHLORIDE 10 MG/1
10 TABLET, EXTENDED RELEASE ORAL DAILY
Qty: 30 TABLET | Refills: 0 | Status: SHIPPED | OUTPATIENT
Start: 2023-05-12

## 2023-05-11 RX ORDER — PHENAZOPYRIDINE HYDROCHLORIDE 200 MG/1
200 TABLET, FILM COATED ORAL 3 TIMES DAILY PRN
Qty: 15 TABLET | Refills: 0 | Status: SHIPPED | OUTPATIENT
Start: 2023-05-11

## 2023-05-11 RX ORDER — HYDROCODONE BITARTRATE AND ACETAMINOPHEN 5; 325 MG/1; MG/1
1 TABLET ORAL EVERY 8 HOURS PRN
Qty: 9 TABLET | Refills: 0 | Status: SHIPPED | OUTPATIENT
Start: 2023-05-11

## 2023-05-11 RX ORDER — HYOSCYAMINE SULFATE 0.125 MG
125 TABLET,DISINTEGRATING ORAL EVERY 6 HOURS PRN
Qty: 15 EACH | Refills: 0 | Status: SHIPPED | OUTPATIENT
Start: 2023-05-11

## 2023-05-11 RX ADMIN — CEFTRIAXONE 2 G: 2 INJECTION, POWDER, FOR SOLUTION INTRAMUSCULAR; INTRAVENOUS at 08:19

## 2023-05-11 RX ADMIN — OXYBUTYNIN CHLORIDE 10 MG: 10 TABLET, EXTENDED RELEASE ORAL at 08:06

## 2023-05-11 RX ADMIN — BUPROPION HYDROCHLORIDE 150 MG: 150 TABLET, EXTENDED RELEASE ORAL at 08:07

## 2023-05-11 RX ADMIN — Medication 10 ML: at 08:08

## 2023-05-11 RX ADMIN — INSULIN LISPRO 2 UNITS: 100 INJECTION, SOLUTION INTRAVENOUS; SUBCUTANEOUS at 08:07

## 2023-05-11 RX ADMIN — APIXABAN 5 MG: 5 TABLET, FILM COATED ORAL at 08:07

## 2023-05-11 RX ADMIN — ROSUVASTATIN 20 MG: 20 TABLET, FILM COATED ORAL at 08:07

## 2023-05-11 RX ADMIN — PANTOPRAZOLE SODIUM 40 MG: 40 TABLET, DELAYED RELEASE ORAL at 08:07

## 2023-05-11 RX ADMIN — TAMSULOSIN HYDROCHLORIDE 0.4 MG: 0.4 CAPSULE ORAL at 08:07

## 2023-05-11 RX ADMIN — METOPROLOL SUCCINATE 50 MG: 50 TABLET, EXTENDED RELEASE ORAL at 08:07

## 2023-05-11 NOTE — DISCHARGE SUMMARY
[facility]  DISCHARGE SUMMARY    Patient Name: Aldair Narvaez  : 1950  MRN: [id number]    Date of Admission: 2023  2:39 PM  Date of Discharge:  2023  Primary Care Physician: Miguel Angel Mireles MD    Consults     Date and Time Order Name Status Description    2023  8:16 AM Inpatient Urology Consult Completed     2023  7:45 AM Inpatient Infectious Diseases Consult Completed           Hospital Course     Presenting Problem:   Pyelonephritis [N12]    Active Hospital Problems    Diagnosis  POA   • **Pyelonephritis [N12]  Yes   • Bacteremia [R78.81]  Unknown   • Hydronephrosis of left kidney [N13.30]  Yes   • Sepsis due to urinary tract infection [A41.9, N39.0]  Unknown   • Personal history of prostate cancer [Z85.46]  Not Applicable   • Paroxysmal A-fib [I48.0]  Unknown   • Uncontrolled type 2 diabetes mellitus with hyperglycemia [E11.65]  Yes   • Mixed hyperlipidemia [E78.2]  Yes   • Benign hypertension [I10]  Yes   • CAD, multiple vessel [I25.10]  Unknown      Resolved Hospital Problems   No resolved problems to display.          Hospital Course:  Mr. Narvaez is a 73 yo M with PMH of HTN, HLD, T2DM, CAD s/p CABG in , PAF on Eliquis, prostate cancer s/p prostatectomy who presented with complaints of dysuria. He was found to be septic due to pyelonephritis.      Plan:     Sepsis (fever, tachycardia, leukocytosis)  UTI/Pyelonephritis  E. Coli Bacteremia   -He underwent cystoscopy with L ureteral stent placement with return of debris and will follow up with Dr. Justin in two weeks. He will continue IV Rocephin after discharge under the care of Dr. Ham, infectious disease.   -We will discontinue his Farxiga given his presentation with pyelonephritis and bacteremia and he can address resuming this with his PCP.     Hydroureteronephrosis, left sided  -s/p cystoscopy with L ureteral stent     PAF  -BB/Eliquis  -CHADSVASC 3     CAD   -BB, s/p CABG      H/o  Prostate Cancer  -s/p prostatectomy     T2DM  -We will stop his farxiga  Discharge Follow Up Recommendations for outpatient labs/diagnostics:  As written    Day of Discharge     HPI:   Denies pain or dysuria. No f/c/sweats.     Review of Systems  As above    Vital Signs:   Temp:  [97.5 °F (36.4 °C)-98.7 °F (37.1 °C)] 97.8 °F (36.6 °C)  Heart Rate:  [71-98] 73  Resp:  [16-18] 18  BP: (107-155)/(78-82) 135/81  Flow (L/min):  [2] 2      Physical Exam:  NAD, alert and oriented  OP clear, MMM  Neck supple  No LAD  RRR  CTAB  +BS, ND, NT, soft  ESCAMILLA  Normal affect  No rashes    Pertinent  and/or Most Recent Results     LAB RESULTS:      Lab 05/11/23  0510 05/09/23  0532 05/08/23  1926 05/08/23  1343 05/08/23  1047   WBC 8.24 9.30  --   --  20.96*   HEMOGLOBIN 13.8 13.5  --   --  16.4   HEMATOCRIT 43.1 42.7  --   --  51.4*   PLATELETS 163 146  --   --  208   NEUTROS ABS  --  7.72*  --   --  17.31*   IMMATURE GRANS (ABS)  --  0.15*  --   --  0.25*   LYMPHS ABS  --  0.69*  --   --  1.71   MONOS ABS  --  0.69  --   --  1.59*   EOS ABS  --  0.01  --   --  0.03   MCV 94.1 93.2  --   --  92.9   LACTATE  --   --  1.7 3.0* 2.7*         Lab 05/11/23  0510 05/09/23  0532 05/08/23  1047   SODIUM 137 136 136   POTASSIUM 4.3 4.0 4.6   CHLORIDE 105 101 95*   CO2 22.0 23.0 27.0   ANION GAP 10.0 12.0 14.0   BUN 23 20 19   CREATININE 0.67* 0.90 1.06   EGFR 100.4 91.9 75.5   GLUCOSE 204* 119* 213*   CALCIUM 8.3* 8.0* 10.0   HEMOGLOBIN A1C  --  7.30*  --    TSH  --  0.577  --          Lab 05/08/23  1047   TOTAL PROTEIN 8.0   ALBUMIN 3.9   GLOBULIN 4.1   ALT (SGPT) 30   AST (SGOT) 27   BILIRUBIN 0.8   ALK PHOS 101             Lab 05/09/23  0532   CHOLESTEROL 173   LDL CHOL 113*   HDL CHOL 39*   TRIGLYCERIDES 114             Brief Urine Lab Results  (Last result in the past 365 days)      Color   Clarity   Blood   Leuk Est   Nitrite   Protein   CREAT   Urine HCG        05/08/23 1410 Yellow   Turbid   Large (3+)   Moderate (2+)   Negative    >=300 mg/dL (3+)               Microbiology Results (last 10 days)     Procedure Component Value - Date/Time    Blood Culture - Blood, Hand, Left [[id number]]  (Normal) Collected: 05/08/23 1926    Lab Status: Preliminary result Specimen: Blood from Hand, Left Updated: 05/10/23 2000     Blood Culture No growth at 2 days    Blood Culture - Blood, Arm, Left [[id number]]  (Normal) Collected: 05/08/23 1926    Lab Status: Preliminary result Specimen: Blood from Arm, Left Updated: 05/10/23 2000     Blood Culture No growth at 2 days    Urine Culture - Urine, Urine, Clean Catch [[id number]]  (Abnormal)  (Susceptibility) Collected: 05/08/23 1410    Lab Status: Final result Specimen: Urine, Clean Catch Updated: 05/10/23 0632     Urine Culture >100,000 CFU/mL Escherichia coli    Narrative:      Colonization of the urinary tract without infection is common. Treatment is discouraged unless the patient is symptomatic, pregnant, or undergoing an invasive urologic procedure.    Susceptibility      Escherichia coli      SANDRA      Ampicillin Resistant     Ampicillin + Sulbactam Resistant     Cefazolin Susceptible      Cefepime Susceptible      Ceftazidime Susceptible      Ceftriaxone Susceptible      Gentamicin Susceptible      Levofloxacin Susceptible      Nitrofurantoin Susceptible      Piperacillin + Tazobactam Susceptible      Trimethoprim + Sulfamethoxazole Resistant                          Blood Culture - Blood, Arm, Right [[id number]]  (Normal) Collected: 05/08/23 1343    Lab Status: Preliminary result Specimen: Blood from Arm, Right Updated: 05/10/23 1415     Blood Culture No growth at 2 days    Blood Culture - Blood, Arm, Right [[id number]]  (Abnormal)  (Susceptibility) Collected: 05/08/23 1047    Lab Status: Final result Specimen: Blood from Arm, Right Updated: 05/11/23 0629     Blood Culture Escherichia coli     Isolated from Aerobic Bottle     Gram Stain Aerobic Bottle Gram negative bacilli    Susceptibility      Escherichia  coli      SANDRA      Ampicillin Resistant     Ampicillin + Sulbactam Resistant     Cefepime Susceptible      Ceftazidime Susceptible      Ceftriaxone Susceptible      Gentamicin Susceptible      Levofloxacin Susceptible      Piperacillin + Tazobactam Susceptible      Trimethoprim + Sulfamethoxazole Resistant                      Susceptibility Comments     Escherichia coli    Cefazolin sensitivity will not be reported for Enterobacteriaceae in non-urine isolates. If cefazolin is preferred, please call the microbiology lab to request an E-test.  With the exception of urinary-sourced infections, aminoglycosides should not be used as monotherapy.             Blood Culture ID, PCR - Blood, Arm, Right [[id number]]  (Abnormal) Collected: 05/08/23 1047    Lab Status: Final result Specimen: Blood from Arm, Right Updated: 05/09/23 0558     BCID, PCR Escherichia coli. Identification by BCID2 PCR.    Narrative:      No resistance genes detected.    Urine Culture - Urine, Urine, Clean Catch [[id number]]  (Abnormal)  (Susceptibility) Collected: 05/08/23 0904    Lab Status: Final result Specimen: Urine, Clean Catch Updated: 05/10/23 0623     Urine Culture >100,000 CFU/mL Escherichia coli    Narrative:      Colonization of the urinary tract without infection is common. Treatment is discouraged unless the patient is symptomatic, pregnant, or undergoing an invasive urologic procedure.    Susceptibility      Escherichia coli      SANDRA      Ampicillin Resistant     Ampicillin + Sulbactam Intermediate      Cefazolin Susceptible      Cefepime Susceptible      Ceftazidime Susceptible      Ceftriaxone Susceptible      Gentamicin Susceptible      Levofloxacin Susceptible      Nitrofurantoin Susceptible      Piperacillin + Tazobactam Susceptible      Trimethoprim + Sulfamethoxazole Resistant                                CT Abdomen Pelvis Without Contrast    Result Date: 5/8/2023  CT ABDOMEN PELVIS WO CONTRAST Date of Exam: 5/8/2023 12:40 PM  EDT Indication: bilat flank pain with high fever and hx of nephrioliths. Comparison: None available. Technique: Axial CT images were obtained of the abdomen and pelvis without the administration of contrast. Reconstructed coronal and sagittal images were also obtained. Automated exposure control and iterative construction methods were used. Findings: Lower thorax demonstrates partially imaged changes of prior CABG with otherwise unremarkable appearance of the lower thorax. Unremarkable appearance of the liver. There is small dependent hyperdensity within the gallbladder lumen favored to reflect some gallbladder sludge, with 3 mm calcific density at the gallbladder fundus, uncertain whether this reflects small gallstone or calcification relating to adenomyomatosis. No evidence of acute cholecystitis. Normal appearance of the bile ducts. Unremarkable appearance of the spleen, pancreas, and adrenal glands. There is a cluster of small nonobstructing stones in the inferior pole of the right kidney measuring up to 3 mm in size. There are couple small vague round hypodensities in the cortex of the right kidney which may reflect small simple cysts. Otherwise unremarkable appearance of the right kidney and right ureter. There is mild to moderate left-sided hydroureteronephrosis. There is a punctate nonobstructing stone in the inferior pole of the left kidney. There is some periureteral fat stranding along the  proximal and mid left ureter; the distal left ureter appears normal in caliber. No evidence of ureteral stone or obvious obstructing lesion. There is a simple cyst in the superior pole cortex of the left kidney. No perinephric fluid collection. There is  questionable circumferential wall thickening of the urinary bladder although the bladder is under distended. There are multiple surgical clips in the expected region of the prostate compatible with surgical change of prostatectomy. There is minimal sigmoid colonic  diverticulosis without evidence of diverticulitis. Normal appendix. No evidence of bowel obstruction. No definite inflammatory change of the GI tract. No abdominopelvic free fluid or conspicuous fat stranding. No pneumoperitoneum. There are mild scattered atherosclerotic calcifications of the abdominal aorta with otherwise unremarkable noncontrast appearance of the vasculature. There are couple surgical clips in the region of the right inguinal canal/right groin.  Otherwise unremarkable appearance of the body wall soft tissues. No bulky or suspicious abdominopelvic lymph nodes. There is a subcentimeter sclerotic focus in the right ischium which is nonspecific and may reflect bone island; no additional conspicuous  sclerotic bony lesions are identified. No acute osseous findings.     Impression: Mild to moderate left-sided hydroureteronephrosis without identifiable cause. Assessment for any left-sided pyelonephritis is severely limited in the absence of IV contrast and clinical correlation is required. There is nonobstructing bilateral nephrolithiasis. Questionable circumferential wall thickening of the urinary bladder which appears under distended. Correlate with urinalysis to exclude cystitis. There are surgical changes of prostatectomy. Gallbladder sludge. Electronically Signed: Luis Justin  5/8/2023 1:21 PM EDT  Workstation ID: [id number]    XR Pyelogram Retrograde    Result Date: 5/9/2023  XR PYELOGRAM RETROGRADE Date of Exam: 5/9/2023 3:56 PM EDT Indication: CYSTOSCOPY LEFT RETROGRADE PYELOGRAM AND LEFT URETERAL STENT PLACEMENT Comparison: CT abdomen and pelvis dated 5/8/2023 Findings: 48 seconds of fluoroscopy was utilized. 25.6 mGy. 7 images were obtained. Intraoperative fluoroscopic images demonstrate cannulation and opacification of the left ureter and renal collecting system in retrograde fashion with stent placement. Please see the operative report for full findings and details.     Impression: 1.  Intraoperative fluoroscopy during left-sided retrograde evaluation with stent placement. Please see operative report for full findings and details. Electronically Signed: Jt Barkeror  5/9/2023 4:58 PM EDT  Workstation ID: [id number]                  Plan for Follow-up of Pending Labs/Results:   Pending Labs     Order Current Status    Blood Culture - Blood, Arm, Left Preliminary result    Blood Culture - Blood, Arm, Right Preliminary result    Blood Culture - Blood, Hand, Left Preliminary result        Discharge Details        Discharge Medications      New Medications      Instructions Start Date   cefTRIAXone  Commonly known as: ROCEPHIN   2 g, Intravenous, Every 24 Hours      HYDROcodone-acetaminophen 5-325 MG per tablet  Commonly known as: NORCO   1 tablet, Oral, Every 8 Hours PRN      hyoscyamine sulfate 0.125 MG tablet dispersible disintegrating tablet  Commonly known as: ANASPAZ   0.125 mg, Translingual, Every 6 Hours PRN      oxybutynin XL 10 MG 24 hr tablet  Commonly known as: DITROPAN-XL   10 mg, Oral, Daily   Start Date: May 12, 2023     PHARMACY MEDS TO BED CONSULT   Does not apply, Daily      phenazopyridine 200 MG tablet  Commonly known as: PYRIDIUM   Take 1 tablet by mouth 3 (Three) Times a Day As Needed painful urination (Dysuria) for up to 15 doses.      tamsulosin 0.4 MG capsule 24 hr capsule  Commonly known as: FLOMAX   0.4 mg, Oral, Daily         Continue These Medications      Instructions Start Date   albuterol sulfate  (90 Base) MCG/ACT inhaler  Commonly known as: PROVENTIL HFA;VENTOLIN HFA;PROAIR HFA   As Needed      buPROPion  MG 24 hr tablet  Commonly known as: WELLBUTRIN XL   Daily      coenzyme Q10 100 MG capsule   100 mg, Oral, Daily      Eliquis 5 MG tablet tablet  Generic drug: apixaban   Daily      glimepiride 4 MG tablet  Commonly known as: AMARYL   8 mg, Oral, Every Morning Before Breakfast      glucose blood test strip  Commonly known as: Contour Next Test   1 strip  3 times daily dx: e11.65      metoprolol succinate XL 50 MG 24 hr tablet  Commonly known as: TOPROL-XL   1 tablet, Daily      nitroglycerin 0.4 MG SL tablet  Commonly known as: NITROSTAT   nitroglycerin 0.4 mg sublingual tablet      pantoprazole 40 MG EC tablet  Commonly known as: PROTONIX   Daily      rosuvastatin 20 MG tablet  Commonly known as: CRESTOR   20 mg, Oral, Daily      valsartan-hydrochlorothiazide 320-25 MG per tablet  Commonly known as: DIOVAN-HCT   1 tablet, Daily         Stop These Medications    Farxiga 10 MG tablet  Generic drug: dapagliflozin Propanediol            Allergies   Allergen Reactions   • Iodine Other (See Comments)     Closes sinuses   • Oxycodone-Acetaminophen Itching   • Zofran [Ondansetron] Hives         Discharge Disposition:  Home or Self Care    Diet:  Hospital:  Diet Order   Procedures   • Diet: Regular/House Diet; Texture: Regular Texture (IDDSI 7); Fluid Consistency: Thin (IDDSI 0)       Activity:      Restrictions or Other Recommendations:         CODE STATUS:    Code Status and Medical Interventions:   Ordered at: 05/08/23 1526     Level Of Support Discussed With:    Patient     Code Status (Patient has no pulse and is not breathing):    CPR (Attempt to Resuscitate)     Medical Interventions (Patient has pulse or is breathing):    Full Support       Future Appointments   Date Time Provider Department Center   5/24/2023  3:40 PM Isma Justin MD [facility]   9/5/2023  1:45 PM Steve Cohen MD [facility]       Additional Instructions for the Follow-ups that You Need to Schedule     Discharge Follow-up with PCP   As directed       Currently Documented PCP:    Miguel Angel Mireles MD    PCP Phone Number:    [phone]     Follow Up Details: 1-2 weeks         Discharge Follow-up with Specified Provider: YANIQUE tomorrow for outpatient IV rocephin, and Dr. Ham tomorrow or Monday 5/15   As directed      To: YANIQUE tomorrow for outpatient IV rocephin, and Dr. Ham  tomorrow or Monday 5/15         Discharge Follow-up with Specified Provider: naeem Justin 2 weeks   As directed      To: naeem Justin 2 weeks                     Raymon Weinberg MD  05/11/23      Time Spent on Discharge:  I spent  40  minutes on this discharge activity which included: face-to-face encounter with the patient, reviewing the data in the system, coordination of the care with the nursing staff as well as consultants, documentation, and entering orders.

## 2023-05-11 NOTE — CASE MANAGEMENT/SOCIAL WORK
Case Management Discharge Note      Final Note: Patient is being discharged home today - He will return home with his spouse in [facility]. No new DME needs. Follow up appointments made and documented. family to transport - no other dc needs identified - danika [phone]         Selected Continued Care - Admitted Since 5/8/2023     Destination    No services have been selected for the patient.              Durable Medical Equipment    No services have been selected for the patient.              Dialysis/Infusion    No services have been selected for the patient.              Home Medical Care    No services have been selected for the patient.              Therapy    No services have been selected for the patient.              Community Resources    No services have been selected for the patient.              Community & DME    No services have been selected for the patient.                       Final Discharge Disposition Code: 01 - home or self-care

## 2023-05-11 NOTE — PLAN OF CARE
Goal Outcome Evaluation:           Progress: improving  Outcome Evaluation: Patient is being discharged at this time. Peripheral IV has been removed. Patient belongings have been gathered to be sent home with the patient. Dressing on left leg was changed before the patient was discharged. Patient also received today's dose of rocephin. Discharge paperwork has been printed off and reviewed with the patient. Patient will be driving himself home from the hospital. VSS, and patient is alert and oriented times four.

## 2023-05-11 NOTE — PROGRESS NOTES
"Aldair Narvaez  1950  [id number]     Chief Complaint: fever    Reason for Consultation: sepsis    History of present illness:    Patient is a 72 y.o.  Yr old male with history of prostate cancer and renal calculi, follows with Dr. Dennis Reeves at [facility]. Prior prostatectomy.He has required prior stents but he does not recall past anatomy/side or timing.  No prior history of resistant organisms.  No prior history MRSA/VRE, C. Difficile or ESBL/K PC/CRE organisms; he was admitted on May 8 with acute urinary frequency/hematuria, dysuria with fever over at least several days, starting on the weekend. Fever noted to 103, tachycardic with leukocytosis and subsequent blood/urine culture positivity for E. Coli, final SANDRA data pending but no resistance markers on PCR per microbiology.as of May 9, he reports temperature less intense, white blood cell count improving and he denies any new pain.  Nursing reports no new distress.  On room air    5/9/23 Dr Justin  \"Procedure(s):  CYSTOSCOPY LEFT RETROGRADE PYELOGRAM AND LEFT URETERAL STENT PLACEMENT\"    Preop  hesitancy/dysuria/hematuria and urinary frequency as above.      5/11/23 doing ok overall;  He wants home; no new focal pain; He denies flank pain.  Denies hesitancy.  Fever as above.  No chills or sweats.    No headache photophobia or neck stiffness.  No shortness of breath cough or hemoptysis.  Denies nausea vomiting diarrhea or abdominal pain.  No skin rash        Past Medical History:   Diagnosis Date   • Acid reflux    • Arthritis    • Benign hypertension    • Colon polyp    • Concern about heart attack without diagnosis    • Coronary artery disease    • Coronary atherosclerosis    • Heart disease    • Hyperlipidemia    • Hypertension    • Kidney stone    • Mixed hyperlipidemia    • Obesity     body mass index 30+-obesity   • Prostate cancer    • Sleep apnea    • Type 2 diabetes mellitus    • Type 2 diabetes mellitus        Past Surgical History: "   Procedure Laterality Date   • CARDIAC CATHETERIZATION     • CARDIAC SURGERY N/A     [place]   • CARPAL TUNNEL RELEASE     • CORONARY ARTERY BYPASS GRAFT  08/30/2022   • CYSTOSCOPY BLADDER STONE LITHOTRIPSY     • CYSTOSCOPY BLADDER STONE LITHOTRIPSY     • CYSTOSCOPY W/ URETERAL STENT PLACEMENT Left 5/9/2023    Procedure: CYSTOSCOPY LEFT RETROGRADE PYELOGRAM AND LEFT URETERAL STENT PLACEMENT;  Surgeon: Isma Justin MD;  Location: [facility];  Service: Urology;  Laterality: Left;   • FETAL SURGERY FOR CONGENITAL HERNIA     • INGUINAL HERNIA REPAIR     • KNEE ARTHROSCOPY     • PROSTATECTOMY     • TRIGGER FINGER RELEASE     • UMBILICAL HERNIA REPAIR     • UVULOPALATOPHARYNGOPLASTY         Pediatric History   Patient Parents   • Not on file     Other Topics Concern   • Not on file   Social History Narrative   • Not on file       family history includes Diabetes in his sister; Heart attack in his father; Heart disease in his father; Hyperlipidemia in his sister; Hypertension in his sister.    Allergies   Allergen Reactions   • Iodine Other (See Comments)     Closes sinuses   • Oxycodone-Acetaminophen Itching   • Zofran [Ondansetron] Hives        Review of Systems    5/11/23     Constitutional--  Appetite  Diminished, and no malaise. + fatigue.  Heent-- No new vision, hearing or throat complaints.  No epistaxis or oral sores.  Denies odynophagia or dysphagia.  No flashers, floaters or eye pain. No odynophagia or dysphagia. No headache, photophobia or neck stiffness.  CV-- No chest pain, palpitation or syncope  Resp-- No SOB/cough/Hemoptysis  GI- No nausea, vomiting, or diarrhea.  No hematochezia, melena, or hematemesis. Denies jaundice or chronic liver disease.  -- see above  Lymph- no swollen lymph nodes in neck/axilla or groin.   Heme- No active bruising or bleeding; no Hx of DVT or PE.  MS-- no swelling or pain in the bones or joints of arms/legs.  No new back pain.  Neuro-- No acute focal weakness or  "numbness in the arms or legs.  No seizures.    Full 12 point review of systems reviewed and negative otherwise for acute complaints, except for above    Physical Exam:   Vital Signs   /81 (BP Location: Right arm, Patient Position: Lying)   Pulse 73   Temp 97.8 °F (36.6 °C) (Oral)   Resp 18   Ht 177.8 cm (70\")   Wt 99.8 kg (220 lb 0.3 oz)   SpO2 96%   BMI 31.57 kg/m²     GENERAL: Awake and alert, in no acute distress.   HEENT: Normocephalic, atraumatic.   No conjunctival injection. No icterus. Oropharynx clear without evidence of thrush or exudate. No evidence of peridontal disease.    NECK: Supple without nuchal rigidity. No mass.   HEART: RRR; No murmur, rubs, gallops.   LUNGS: Clear to auscultation bilaterally without wheezing, rales, rhonchi. Normal respiratory effort. Nonlabored. No dullness.  ABDOMEN: Soft, nontender, nondistended. Positive bowel sounds. No rebound or guarding. NO mass or HSM.  EXT:  No cyanosis, clubbing or edema. No cord.   MSK: FROM without joint effusions noted arms/legs.    SKIN: Warm and dry without cutaneous eruptions on Inspection/palpation.    NEURO: Oriented to PPT. No focal deficits on motor/sensory exam at arms/legs.     No CVA tenderness to percussion    IV without obvious redness or drainage    Laboratory Data    Results from last 7 days   Lab Units 05/11/23  0510 05/09/23  0532 05/08/23  1047   WBC 10*3/mm3 8.24 9.30 20.96*   HEMOGLOBIN g/dL 13.8 13.5 16.4   HEMATOCRIT % 43.1 42.7 51.4*   PLATELETS 10*3/mm3 163 146 208     Results from last 7 days   Lab Units 05/11/23  0510   SODIUM mmol/L 137   POTASSIUM mmol/L 4.3   CHLORIDE mmol/L 105   CO2 mmol/L 22.0   BUN mg/dL 23   CREATININE mg/dL 0.67*   GLUCOSE mg/dL 204*   CALCIUM mg/dL 8.3*     Results from last 7 days   Lab Units 05/08/23  1047   ALK PHOS U/L 101   BILIRUBIN mg/dL 0.8   ALT (SGPT) U/L 30   AST (SGOT) U/L 27               Estimated Creatinine Clearance: 121.5 mL/min (A) (by C-G formula based on SCr of " 0.67 mg/dL (L)).      Microbiology:      Radiology:  Imaging Results (Last 72 Hours)     Procedure Component Value Units Date/Time    XR Pyelogram Retrograde [[id number]] Collected: 05/09/23 1657     Updated: 05/09/23 1701    Narrative:      XR PYELOGRAM RETROGRADE    Date of Exam: 5/9/2023 3:56 PM EDT    Indication: CYSTOSCOPY LEFT RETROGRADE PYELOGRAM AND LEFT URETERAL STENT PLACEMENT    Comparison: CT abdomen and pelvis dated 5/8/2023    Findings:  48 seconds of fluoroscopy was utilized. 25.6 mGy. 7 images were obtained.  Intraoperative fluoroscopic images demonstrate cannulation and opacification of the left ureter and renal collecting system in retrograde fashion with stent placement. Please see the operative report for full findings and details.      Impression:      Impression:  1. Intraoperative fluoroscopy during left-sided retrograde evaluation with stent placement. Please see operative report for full findings and details.      Electronically Signed: Jt Reyna    5/9/2023 4:58 PM EDT    Workstation ID: [id number]    CT Abdomen Pelvis Without Contrast [[id number]] Collected: 05/08/23 1303     Updated: 05/08/23 1324    Narrative:      CT ABDOMEN PELVIS WO CONTRAST    Date of Exam: 5/8/2023 12:40 PM EDT    Indication: bilat flank pain with high fever and hx of nephrioliths.    Comparison: None available.    Technique: Axial CT images were obtained of the abdomen and pelvis without the administration of contrast. Reconstructed coronal and sagittal images were also obtained. Automated exposure control and iterative construction methods were used.      Findings:  Lower thorax demonstrates partially imaged changes of prior CABG with otherwise unremarkable appearance of the lower thorax. Unremarkable appearance of the liver. There is small dependent hyperdensity within the gallbladder lumen favored to reflect some   gallbladder sludge, with 3 mm calcific density at the gallbladder fundus, uncertain whether this  reflects small gallstone or calcification relating to adenomyomatosis. No evidence of acute cholecystitis. Normal appearance of the bile ducts. Unremarkable   appearance of the spleen, pancreas, and adrenal glands.    There is a cluster of small nonobstructing stones in the inferior pole of the right kidney measuring up to 3 mm in size. There are couple small vague round hypodensities in the cortex of the right kidney which may reflect small simple cysts. Otherwise   unremarkable appearance of the right kidney and right ureter. There is mild to moderate left-sided hydroureteronephrosis. There is a punctate nonobstructing stone in the inferior pole of the left kidney. There is some periureteral fat stranding along the   proximal and mid left ureter; the distal left ureter appears normal in caliber. No evidence of ureteral stone or obvious obstructing lesion. There is a simple cyst in the superior pole cortex of the left kidney. No perinephric fluid collection. There is   questionable circumferential wall thickening of the urinary bladder although the bladder is under distended.    There are multiple surgical clips in the expected region of the prostate compatible with surgical change of prostatectomy.    There is minimal sigmoid colonic diverticulosis without evidence of diverticulitis. Normal appendix. No evidence of bowel obstruction. No definite inflammatory change of the GI tract. No abdominopelvic free fluid or conspicuous fat stranding. No   pneumoperitoneum. There are mild scattered atherosclerotic calcifications of the abdominal aorta with otherwise unremarkable noncontrast appearance of the vasculature. There are couple surgical clips in the region of the right inguinal canal/right groin.   Otherwise unremarkable appearance of the body wall soft tissues. No bulky or suspicious abdominopelvic lymph nodes. There is a subcentimeter sclerotic focus in the right ischium which is nonspecific and may reflect bone  island; no additional conspicuous   sclerotic bony lesions are identified. No acute osseous findings.      Impression:      Impression:  Mild to moderate left-sided hydroureteronephrosis without identifiable cause. Assessment for any left-sided pyelonephritis is severely limited in the absence of IV contrast and clinical correlation is required. There is nonobstructing bilateral   nephrolithiasis.    Questionable circumferential wall thickening of the urinary bladder which appears under distended. Correlate with urinalysis to exclude cystitis.    There are surgical changes of prostatectomy.    Gallbladder sludge.            Electronically Signed: Luis Justin    5/8/2023 1:21 PM EDT    Workstation ID: [id number]            Impression:     --acute E Coli complicated UTI/ pyelonephritis/septicemia,  + urine/blood Cx and associated with sepsis per admission notes.  White blood cell count and fever trending better with ceftriaxone .  Intervention 5/9    --Acute sepsis at presentation per medicine knows.  White blood cell count trending better, fever down per patient; resuscitative measures per medicine team    --left side hydroureteronephrosis on CT scan per radiology, bilateral nephrolithiasis per radiology and circumferential wall thickening of the bladder.  Urology to see and further anatomic/functional assessment/intervention at their discretion    --history of prostate cancer    PLAN:    --IV ceftriaxone    --Check/review labs cultures and scans    --Partial history per nursing staff    --Discussed with microbiology    --Urology following    --Discussed with pharmacy    --Highly complex at of issues with high risk for further serious morbidity and other serious sequela    **he will come to office daily starting on May 12 for ceftriaxone 2 g IV daily.  Follow-up with me in office on May 12, 2023    **Copied text in this note has been reviewed and is accurate as of 05/11/23.     Joel Ham,  MD  5/11/2023

## 2023-05-11 NOTE — PLAN OF CARE
Goal Outcome Evaluation:           Progress: improving  Outcome Evaluation: VSS on RA. No complaints of pain or nausea. Tolerating regular diet. Ambulating independently and voiding appropriately. Plan for D/C today. Will continue with plan of care.

## 2023-05-12 ENCOUNTER — TELEPHONE (OUTPATIENT)
Dept: UROLOGY | Facility: CLINIC | Age: 73
End: 2023-05-12
Payer: MEDICARE

## 2023-05-12 NOTE — OUTREACH NOTE
Prep Survey    Flowsheet Row Responses   [facility] facility patient discharged from? [place]   Is LACE score < 7 ? No   Eligibility Readm Mgmt   Discharge diagnosis CYSTOSCOPY  LEFT RETROGRADE PYELOGRAM   LEFT URETERAL STENT PLACEMENT   Does the patient have one of the following disease processes/diagnoses(primary or secondary)? General Surgery   Does the patient have Home health ordered? No   Is there a DME ordered? No   Prep survey completed? Yes          Nadine MORALES - Registered Nurse

## 2023-05-12 NOTE — TELEPHONE ENCOUNTER
Pt called with questions regarding his scheduled colonoscopy due to having an Ecoli infection and several other issues after being taking off medication

## 2023-05-12 NOTE — PAYOR COMM NOTE
"Flaca Lim RN  Utilization Management  P:[phone]  F:[phone]    Ref # [id number]  Yumiko Bernstein (72 y.o. Male)     Date of Birth   1950    Social Security Number       Address   [facility] [address] [place] [address]    Home Phone   [phone]    MRN   [id number]       Hoahaoism   Muslim    Marital Status   Unknown                            Admission Date   23    Admission Type   Emergency    Admitting Provider   Raymon Weinberg MD    Attending Provider       Department, Room/Bed   [facility] 2F, S201/       Discharge Date   2023    Discharge Disposition   Home or Self Care    Discharge Destination   Home                            Attending Provider: (none)   Allergies: Iodine, Oxycodone-acetaminophen, Zofran [Ondansetron]    Isolation: None   Infection: None   Code Status: Prior    Ht: 177.8 cm (70\")   Wt: 99.8 kg (220 lb 0.3 oz)    Admission Cmt: None   Principal Problem: Pyelonephritis [N12]                 Active Insurance as of 2023     Primary Coverage     Payor Plan Insurance Group Employer/Plan Group    ANTHEM MEDICARE REPLACEMENT ANTHEM MEDICARE ADVANTAGE [id number]     Payor Plan Address Payor Plan Phone Number Payor Plan Fax Number Effective Dates    [address] [phone]  2018 - None Entered    [facility] [address]       Subscriber Name Subscriber Birth Date Member ID       YUMIKO BERNSTEIN 1950 [id number]                 Emergency Contacts      (Rel.) Home Phone Work Phone Mobile Phone    GORAN BERNSTEIN (Spouse) [phone] -- --    JakeAdelaide (Daughter) [phone] -- --               Discharge Summary      Raymon Weinberg MD at 23 0809              [facility] Medicine Services  DISCHARGE SUMMARY    Patient Name: Yumiko Bernstein  : 1950  MRN: [id number]    Date of Admission: 2023  2:39 PM  Date of Discharge:  2023  Primary Care Physician: Miguel Angel Mireles MD    Consults     " Date and Time Order Name Status Description    5/9/2023  8:16 AM Inpatient Urology Consult Completed     5/9/2023  7:45 AM Inpatient Infectious Diseases Consult Completed           Hospital Course     Presenting Problem:   Pyelonephritis [N12]    Active Hospital Problems    Diagnosis  POA   • **Pyelonephritis [N12]  Yes   • Bacteremia [R78.81]  Unknown   • Hydronephrosis of left kidney [N13.30]  Yes   • Sepsis due to urinary tract infection [A41.9, N39.0]  Unknown   • Personal history of prostate cancer [Z85.46]  Not Applicable   • Paroxysmal A-fib [I48.0]  Unknown   • Uncontrolled type 2 diabetes mellitus with hyperglycemia [E11.65]  Yes   • Mixed hyperlipidemia [E78.2]  Yes   • Benign hypertension [I10]  Yes   • CAD, multiple vessel [I25.10]  Unknown      Resolved Hospital Problems   No resolved problems to display.          Hospital Course:  Mr. Narveaz is a 73 yo M with PMH of HTN, HLD, T2DM, CAD s/p CABG in 2022, PAF on Eliquis, prostate cancer s/p prostatectomy who presented with complaints of dysuria. He was found to be septic due to pyelonephritis.      Plan:     Sepsis (fever, tachycardia, leukocytosis)  UTI/Pyelonephritis  E. Coli Bacteremia   -He underwent cystoscopy with L ureteral stent placement with return of debris and will follow up with Dr. Justin in two weeks. He will continue IV Rocephin after discharge under the care of Dr. Ham, infectious disease.   -We will discontinue his Farxiga given his presentation with pyelonephritis and bacteremia and he can address resuming this with his PCP.     Hydroureteronephrosis, left sided  -s/p cystoscopy with L ureteral stent     PAF  -BB/Eliquis  -CHADSVASC 3     CAD   -BB, s/p CABG 2022     H/o Prostate Cancer  -s/p prostatectomy     T2DM  -We will stop his farxiga  Discharge Follow Up Recommendations for outpatient labs/diagnostics:  As written    Day of Discharge     HPI:   Denies pain or dysuria. No f/c/sweats.     Review of Systems  As above    Vital  Signs:   Temp:  [97.5 °F (36.4 °C)-98.7 °F (37.1 °C)] 97.8 °F (36.6 °C)  Heart Rate:  [71-98] 73  Resp:  [16-18] 18  BP: (107-155)/(78-82) 135/81  Flow (L/min):  [2] 2      Physical Exam:  NAD, alert and oriented  OP clear, MMM  Neck supple  No LAD  RRR  CTAB  +BS, ND, NT, soft  ESCAMILLA  Normal affect  No rashes    Pertinent  and/or Most Recent Results     LAB RESULTS:      Lab 05/11/23  0510 05/09/23  0532 05/08/23  1926 05/08/23  1343 05/08/23  1047   WBC 8.24 9.30  --   --  20.96*   HEMOGLOBIN 13.8 13.5  --   --  16.4   HEMATOCRIT 43.1 42.7  --   --  51.4*   PLATELETS 163 146  --   --  208   NEUTROS ABS  --  7.72*  --   --  17.31*   IMMATURE GRANS (ABS)  --  0.15*  --   --  0.25*   LYMPHS ABS  --  0.69*  --   --  1.71   MONOS ABS  --  0.69  --   --  1.59*   EOS ABS  --  0.01  --   --  0.03   MCV 94.1 93.2  --   --  92.9   LACTATE  --   --  1.7 3.0* 2.7*         Lab 05/11/23  0510 05/09/23  0532 05/08/23  1047   SODIUM 137 136 136   POTASSIUM 4.3 4.0 4.6   CHLORIDE 105 101 95*   CO2 22.0 23.0 27.0   ANION GAP 10.0 12.0 14.0   BUN 23 20 19   CREATININE 0.67* 0.90 1.06   EGFR 100.4 91.9 75.5   GLUCOSE 204* 119* 213*   CALCIUM 8.3* 8.0* 10.0   HEMOGLOBIN A1C  --  7.30*  --    TSH  --  0.577  --          Lab 05/08/23  1047   TOTAL PROTEIN 8.0   ALBUMIN 3.9   GLOBULIN 4.1   ALT (SGPT) 30   AST (SGOT) 27   BILIRUBIN 0.8   ALK PHOS 101             Lab 05/09/23  0532   CHOLESTEROL 173   LDL CHOL 113*   HDL CHOL 39*   TRIGLYCERIDES 114             Brief Urine Lab Results  (Last result in the past 365 days)      Color   Clarity   Blood   Leuk Est   Nitrite   Protein   CREAT   Urine HCG        05/08/23 1410 Yellow   Turbid   Large (3+)   Moderate (2+)   Negative   >=300 mg/dL (3+)               Microbiology Results (last 10 days)     Procedure Component Value - Date/Time    Blood Culture - Blood, Hand, Left [[id number]]  (Normal) Collected: 05/08/23 1926    Lab Status: Preliminary result Specimen: Blood from Hand, Left Updated:  05/10/23 2000     Blood Culture No growth at 2 days    Blood Culture - Blood, Arm, Left [[id number]]  (Normal) Collected: 05/08/23 1926    Lab Status: Preliminary result Specimen: Blood from Arm, Left Updated: 05/10/23 2000     Blood Culture No growth at 2 days    Urine Culture - Urine, Urine, Clean Catch [[id number]]  (Abnormal)  (Susceptibility) Collected: 05/08/23 1410    Lab Status: Final result Specimen: Urine, Clean Catch Updated: 05/10/23 0632     Urine Culture >100,000 CFU/mL Escherichia coli    Narrative:      Colonization of the urinary tract without infection is common. Treatment is discouraged unless the patient is symptomatic, pregnant, or undergoing an invasive urologic procedure.    Susceptibility      Escherichia coli      SANDRA      Ampicillin Resistant     Ampicillin + Sulbactam Resistant     Cefazolin Susceptible      Cefepime Susceptible      Ceftazidime Susceptible      Ceftriaxone Susceptible      Gentamicin Susceptible      Levofloxacin Susceptible      Nitrofurantoin Susceptible      Piperacillin + Tazobactam Susceptible      Trimethoprim + Sulfamethoxazole Resistant                          Blood Culture - Blood, Arm, Right [[id number]]  (Normal) Collected: 05/08/23 1343    Lab Status: Preliminary result Specimen: Blood from Arm, Right Updated: 05/10/23 1415     Blood Culture No growth at 2 days    Blood Culture - Blood, Arm, Right [[id number]]  (Abnormal)  (Susceptibility) Collected: 05/08/23 1047    Lab Status: Final result Specimen: Blood from Arm, Right Updated: 05/11/23 0629     Blood Culture Escherichia coli     Isolated from Aerobic Bottle     Gram Stain Aerobic Bottle Gram negative bacilli    Susceptibility      Escherichia coli      SANDRA      Ampicillin Resistant     Ampicillin + Sulbactam Resistant     Cefepime Susceptible      Ceftazidime Susceptible      Ceftriaxone Susceptible      Gentamicin Susceptible      Levofloxacin Susceptible      Piperacillin + Tazobactam Susceptible       Trimethoprim + Sulfamethoxazole Resistant                      Susceptibility Comments     Escherichia coli    Cefazolin sensitivity will not be reported for Enterobacteriaceae in non-urine isolates. If cefazolin is preferred, please call the microbiology lab to request an E-test.  With the exception of urinary-sourced infections, aminoglycosides should not be used as monotherapy.             Blood Culture ID, PCR - Blood, Arm, Right [[id number]]  (Abnormal) Collected: 05/08/23 1047    Lab Status: Final result Specimen: Blood from Arm, Right Updated: 05/09/23 0558     BCID, PCR Escherichia coli. Identification by BCID2 PCR.    Narrative:      No resistance genes detected.    Urine Culture - Urine, Urine, Clean Catch [[id number]]  (Abnormal)  (Susceptibility) Collected: 05/08/23 0904    Lab Status: Final result Specimen: Urine, Clean Catch Updated: 05/10/23 0623     Urine Culture >100,000 CFU/mL Escherichia coli    Narrative:      Colonization of the urinary tract without infection is common. Treatment is discouraged unless the patient is symptomatic, pregnant, or undergoing an invasive urologic procedure.    Susceptibility      Escherichia coli      SANDRA      Ampicillin Resistant     Ampicillin + Sulbactam Intermediate      Cefazolin Susceptible      Cefepime Susceptible      Ceftazidime Susceptible      Ceftriaxone Susceptible      Gentamicin Susceptible      Levofloxacin Susceptible      Nitrofurantoin Susceptible      Piperacillin + Tazobactam Susceptible      Trimethoprim + Sulfamethoxazole Resistant                                CT Abdomen Pelvis Without Contrast    Result Date: 5/8/2023  CT ABDOMEN PELVIS WO CONTRAST Date of Exam: 5/8/2023 12:40 PM EDT Indication: bilat flank pain with high fever and hx of nephrioliths. Comparison: None available. Technique: Axial CT images were obtained of the abdomen and pelvis without the administration of contrast. Reconstructed coronal and sagittal images were also  obtained. Automated exposure control and iterative construction methods were used. Findings: Lower thorax demonstrates partially imaged changes of prior CABG with otherwise unremarkable appearance of the lower thorax. Unremarkable appearance of the liver. There is small dependent hyperdensity within the gallbladder lumen favored to reflect some gallbladder sludge, with 3 mm calcific density at the gallbladder fundus, uncertain whether this reflects small gallstone or calcification relating to adenomyomatosis. No evidence of acute cholecystitis. Normal appearance of the bile ducts. Unremarkable appearance of the spleen, pancreas, and adrenal glands. There is a cluster of small nonobstructing stones in the inferior pole of the right kidney measuring up to 3 mm in size. There are couple small vague round hypodensities in the cortex of the right kidney which may reflect small simple cysts. Otherwise unremarkable appearance of the right kidney and right ureter. There is mild to moderate left-sided hydroureteronephrosis. There is a punctate nonobstructing stone in the inferior pole of the left kidney. There is some periureteral fat stranding along the  proximal and mid left ureter; the distal left ureter appears normal in caliber. No evidence of ureteral stone or obvious obstructing lesion. There is a simple cyst in the superior pole cortex of the left kidney. No perinephric fluid collection. There is  questionable circumferential wall thickening of the urinary bladder although the bladder is under distended. There are multiple surgical clips in the expected region of the prostate compatible with surgical change of prostatectomy. There is minimal sigmoid colonic diverticulosis without evidence of diverticulitis. Normal appendix. No evidence of bowel obstruction. No definite inflammatory change of the GI tract. No abdominopelvic free fluid or conspicuous fat stranding. No pneumoperitoneum. There are mild scattered  atherosclerotic calcifications of the abdominal aorta with otherwise unremarkable noncontrast appearance of the vasculature. There are couple surgical clips in the region of the right inguinal canal/right groin.  Otherwise unremarkable appearance of the body wall soft tissues. No bulky or suspicious abdominopelvic lymph nodes. There is a subcentimeter sclerotic focus in the right ischium which is nonspecific and may reflect bone island; no additional conspicuous  sclerotic bony lesions are identified. No acute osseous findings.     Impression: Mild to moderate left-sided hydroureteronephrosis without identifiable cause. Assessment for any left-sided pyelonephritis is severely limited in the absence of IV contrast and clinical correlation is required. There is nonobstructing bilateral nephrolithiasis. Questionable circumferential wall thickening of the urinary bladder which appears under distended. Correlate with urinalysis to exclude cystitis. There are surgical changes of prostatectomy. Gallbladder sludge. Electronically Signed: Luis Justin  5/8/2023 1:21 PM EDT  Workstation ID: [id number]    XR Pyelogram Retrograde    Result Date: 5/9/2023  XR PYELOGRAM RETROGRADE Date of Exam: 5/9/2023 3:56 PM EDT Indication: CYSTOSCOPY LEFT RETROGRADE PYELOGRAM AND LEFT URETERAL STENT PLACEMENT Comparison: CT abdomen and pelvis dated 5/8/2023 Findings: 48 seconds of fluoroscopy was utilized. 25.6 mGy. 7 images were obtained. Intraoperative fluoroscopic images demonstrate cannulation and opacification of the left ureter and renal collecting system in retrograde fashion with stent placement. Please see the operative report for full findings and details.     Impression: 1. Intraoperative fluoroscopy during left-sided retrograde evaluation with stent placement. Please see operative report for full findings and details. Electronically Signed: Jt Reyna  5/9/2023 4:58 PM EDT  Workstation ID: [id number]                  Plan for  Follow-up of Pending Labs/Results:   Pending Labs     Order Current Status    Blood Culture - Blood, Arm, Left Preliminary result    Blood Culture - Blood, Arm, Right Preliminary result    Blood Culture - Blood, Hand, Left Preliminary result        Discharge Details        Discharge Medications      New Medications      Instructions Start Date   cefTRIAXone  Commonly known as: ROCEPHIN   2 g, Intravenous, Every 24 Hours      HYDROcodone-acetaminophen 5-325 MG per tablet  Commonly known as: NORCO   1 tablet, Oral, Every 8 Hours PRN      hyoscyamine sulfate 0.125 MG tablet dispersible disintegrating tablet  Commonly known as: ANASPAZ   0.125 mg, Translingual, Every 6 Hours PRN      oxybutynin XL 10 MG 24 hr tablet  Commonly known as: DITROPAN-XL   10 mg, Oral, Daily   Start Date: May 12, 2023     PHARMACY MEDS TO BED CONSULT   Does not apply, Daily      phenazopyridine 200 MG tablet  Commonly known as: PYRIDIUM   Take 1 tablet by mouth 3 (Three) Times a Day As Needed painful urination (Dysuria) for up to 15 doses.      tamsulosin 0.4 MG capsule 24 hr capsule  Commonly known as: FLOMAX   0.4 mg, Oral, Daily         Continue These Medications      Instructions Start Date   albuterol sulfate  (90 Base) MCG/ACT inhaler  Commonly known as: PROVENTIL HFA;VENTOLIN HFA;PROAIR HFA   As Needed      buPROPion  MG 24 hr tablet  Commonly known as: WELLBUTRIN XL   Daily      coenzyme Q10 100 MG capsule   100 mg, Oral, Daily      Eliquis 5 MG tablet tablet  Generic drug: apixaban   Daily      glimepiride 4 MG tablet  Commonly known as: AMARYL   8 mg, Oral, Every Morning Before Breakfast      glucose blood test strip  Commonly known as: Contour Next Test   1 strip 3 times daily dx: e11.65      metoprolol succinate XL 50 MG 24 hr tablet  Commonly known as: TOPROL-XL   1 tablet, Daily      nitroglycerin 0.4 MG SL tablet  Commonly known as: NITROSTAT   nitroglycerin 0.4 mg sublingual tablet      pantoprazole 40 MG EC  tablet  Commonly known as: PROTONIX   Daily      rosuvastatin 20 MG tablet  Commonly known as: CRESTOR   20 mg, Oral, Daily      valsartan-hydrochlorothiazide 320-25 MG per tablet  Commonly known as: DIOVAN-HCT   1 tablet, Daily         Stop These Medications    Farxiga 10 MG tablet  Generic drug: dapagliflozin Propanediol            Allergies   Allergen Reactions   • Iodine Other (See Comments)     Closes sinuses   • Oxycodone-Acetaminophen Itching   • Zofran [Ondansetron] Hives         Discharge Disposition:  Home or Self Care    Diet:  Hospital:  Diet Order   Procedures   • Diet: Regular/House Diet; Texture: Regular Texture (IDDSI 7); Fluid Consistency: Thin (IDDSI 0)       Activity:      Restrictions or Other Recommendations:         CODE STATUS:    Code Status and Medical Interventions:   Ordered at: 05/08/23 1526     Level Of Support Discussed With:    Patient     Code Status (Patient has no pulse and is not breathing):    CPR (Attempt to Resuscitate)     Medical Interventions (Patient has pulse or is breathing):    Full Support       Future Appointments   Date Time Provider Department Center   5/24/2023  3:40 PM Isma Justin MD [facility]   9/5/2023  1:45 PM Steve Cohen MD [facility]       Additional Instructions for the Follow-ups that You Need to Schedule     Discharge Follow-up with PCP   As directed       Currently Documented PCP:    Miguel Angel Mireles MD    PCP Phone Number:    [phone]     Follow Up Details: 1-2 weeks         Discharge Follow-up with Specified Provider: YANIQUE tomorrow for outpatient IV rocephin, and Dr. Ham tomorrow or Monday 5/15   As directed      To: YANIQUE tomorrow for outpatient IV rocephin, and Dr. Ham tomorrow or Monday 5/15         Discharge Follow-up with Specified Provider: bruna Justiny 2 weeks   As directed      To: [facility] 2 weeks                     Raymon Weinberg MD  05/11/23      Time Spent on Discharge:  I spent  40  minutes  on this discharge activity which included: face-to-face encounter with the patient, reviewing the data in the system, coordination of the care with the nursing staff as well as consultants, documentation, and entering orders.            Electronically signed by Raymon Weinberg MD at 05/11/23 0904

## 2023-05-13 LAB
BACTERIA SPEC AEROBE CULT: NORMAL

## 2023-05-15 NOTE — TELEPHONE ENCOUNTER
Patient states he has a colonoscopy scheduled for 05/22/23 and was wondering if he should cancel this procedure, since he is dealing with the infection, Ecoli which was diagnosed at the hospital.    Pt was given antibiotics.      Also, pt was taken of Farxiga, when he was in the hospital.      Did Dr. Justin discontinued this medication?    Informed pt to speak to his endocrinologist about his medication, Farxiga and ask if he needs to continue taking.    Pt will talk to Endocrinologist but would still like to ask Dr. Justin.    Dr. Justin, please advise.  Thank you.

## 2023-05-15 NOTE — TELEPHONE ENCOUNTER
Informed patient, per  its ok to go back to taking home meds.  Patient is eating normally, and ok to have colonoscopy.  Patient voiced understanding.

## 2023-05-16 ENCOUNTER — TRANSCRIBE ORDERS (OUTPATIENT)
Dept: LAB | Facility: HOSPITAL | Age: 73
End: 2023-05-16
Payer: MEDICARE

## 2023-05-16 ENCOUNTER — LAB (OUTPATIENT)
Dept: LAB | Facility: HOSPITAL | Age: 73
End: 2023-05-16
Payer: MEDICARE

## 2023-05-16 ENCOUNTER — READMISSION MANAGEMENT (OUTPATIENT)
Dept: CALL CENTER | Facility: HOSPITAL | Age: 73
End: 2023-05-16
Payer: MEDICARE

## 2023-05-16 DIAGNOSIS — I48.0 PAROXYSMAL ATRIAL FIBRILLATION: ICD-10-CM

## 2023-05-16 DIAGNOSIS — N13.6 HYDRONEPHROSIS WITH INFECTION: ICD-10-CM

## 2023-05-16 DIAGNOSIS — N39.0 URINARY TRACT INFECTION WITHOUT HEMATURIA, SITE UNSPECIFIED: ICD-10-CM

## 2023-05-16 DIAGNOSIS — E11.9 TYPE 2 DIABETES MELLITUS WITHOUT COMPLICATION, UNSPECIFIED WHETHER LONG TERM INSULIN USE: ICD-10-CM

## 2023-05-16 DIAGNOSIS — A41.51 SEPSIS DUE TO ESCHERICHIA COLI, UNSPECIFIED WHETHER ACUTE ORGAN DYSFUNCTION PRESENT: ICD-10-CM

## 2023-05-16 DIAGNOSIS — I10 HYPERTENSION, ESSENTIAL: ICD-10-CM

## 2023-05-16 DIAGNOSIS — I25.810 ATHEROSCLEROSIS OF AUTOLOGOUS VEIN CORONARY ARTERY BYPASS GRAFT, UNSPECIFIED WHETHER ANGINA PRESENT: ICD-10-CM

## 2023-05-16 DIAGNOSIS — N10 ACUTE PYELONEPHRITIS: Primary | ICD-10-CM

## 2023-05-16 DIAGNOSIS — B96.29 NON-SHIGA TOXIN-PRODUCING E. COLI: ICD-10-CM

## 2023-05-16 LAB
ALBUMIN SERPL-MCNC: 3.9 G/DL (ref 3.5–5.2)
ALBUMIN/GLOB SERPL: 1.1 G/DL
ALP SERPL-CCNC: 80 U/L (ref 39–117)
ALT SERPL W P-5'-P-CCNC: 34 U/L (ref 1–41)
ANION GAP SERPL CALCULATED.3IONS-SCNC: 11 MMOL/L (ref 5–15)
AST SERPL-CCNC: 27 U/L (ref 1–40)
BASOPHILS # BLD AUTO: 0.14 10*3/MM3 (ref 0–0.2)
BASOPHILS NFR BLD AUTO: 1.1 % (ref 0–1.5)
BILIRUB SERPL-MCNC: 0.3 MG/DL (ref 0–1.2)
BUN SERPL-MCNC: 15 MG/DL (ref 8–23)
BUN/CREAT SERPL: 17.4 (ref 7–25)
CALCIUM SPEC-SCNC: 9.6 MG/DL (ref 8.6–10.5)
CHLORIDE SERPL-SCNC: 102 MMOL/L (ref 98–107)
CO2 SERPL-SCNC: 27 MMOL/L (ref 22–29)
CREAT SERPL-MCNC: 0.86 MG/DL (ref 0.76–1.27)
CRP SERPL-MCNC: 0.72 MG/DL (ref 0–0.5)
DEPRECATED RDW RBC AUTO: 47.5 FL (ref 37–54)
EGFRCR SERPLBLD CKD-EPI 2021: 93.1 ML/MIN/1.73
EOSINOPHIL # BLD AUTO: 0.25 10*3/MM3 (ref 0–0.4)
EOSINOPHIL NFR BLD AUTO: 1.9 % (ref 0.3–6.2)
ERYTHROCYTE [DISTWIDTH] IN BLOOD BY AUTOMATED COUNT: 14.1 % (ref 12.3–15.4)
ERYTHROCYTE [SEDIMENTATION RATE] IN BLOOD: 38 MM/HR (ref 0–20)
GLOBULIN UR ELPH-MCNC: 3.4 GM/DL
GLUCOSE SERPL-MCNC: 135 MG/DL (ref 65–99)
HCT VFR BLD AUTO: 45.5 % (ref 37.5–51)
HGB BLD-MCNC: 14.9 G/DL (ref 13–17.7)
IMM GRANULOCYTES # BLD AUTO: 0.48 10*3/MM3 (ref 0–0.05)
IMM GRANULOCYTES NFR BLD AUTO: 3.7 % (ref 0–0.5)
LYMPHOCYTES # BLD AUTO: 3.3 10*3/MM3 (ref 0.7–3.1)
LYMPHOCYTES NFR BLD AUTO: 25.6 % (ref 19.6–45.3)
MCH RBC QN AUTO: 30 PG (ref 26.6–33)
MCHC RBC AUTO-ENTMCNC: 32.7 G/DL (ref 31.5–35.7)
MCV RBC AUTO: 91.5 FL (ref 79–97)
MONOCYTES # BLD AUTO: 0.71 10*3/MM3 (ref 0.1–0.9)
MONOCYTES NFR BLD AUTO: 5.5 % (ref 5–12)
NEUTROPHILS NFR BLD AUTO: 62.2 % (ref 42.7–76)
NEUTROPHILS NFR BLD AUTO: 8.02 10*3/MM3 (ref 1.7–7)
NRBC BLD AUTO-RTO: 0 /100 WBC (ref 0–0.2)
PLATELET # BLD AUTO: 273 10*3/MM3 (ref 140–450)
PMV BLD AUTO: 8.9 FL (ref 6–12)
POTASSIUM SERPL-SCNC: 3.9 MMOL/L (ref 3.5–5.2)
PROT SERPL-MCNC: 7.3 G/DL (ref 6–8.5)
RBC # BLD AUTO: 4.97 10*6/MM3 (ref 4.14–5.8)
SODIUM SERPL-SCNC: 140 MMOL/L (ref 136–145)
WBC NRBC COR # BLD: 12.9 10*3/MM3 (ref 3.4–10.8)

## 2023-05-16 PROCEDURE — 85025 COMPLETE CBC W/AUTO DIFF WBC: CPT | Performed by: INTERNAL MEDICINE

## 2023-05-16 PROCEDURE — 85652 RBC SED RATE AUTOMATED: CPT | Performed by: INTERNAL MEDICINE

## 2023-05-16 PROCEDURE — 36415 COLL VENOUS BLD VENIPUNCTURE: CPT | Performed by: INTERNAL MEDICINE

## 2023-05-16 PROCEDURE — 86140 C-REACTIVE PROTEIN: CPT | Performed by: INTERNAL MEDICINE

## 2023-05-16 PROCEDURE — 80053 COMPREHEN METABOLIC PANEL: CPT | Performed by: INTERNAL MEDICINE

## 2023-05-16 NOTE — OUTREACH NOTE
General Surgery Week 1 Survey    Flowsheet Row Responses   [facility], [place], operated by [facility] patient discharged from? [facility]   Does the patient have one of the following disease processes/diagnoses(primary or secondary)? General Surgery   Week 1 attempt successful? Yes   Call start time 1357   Call end time 1359   Discharge diagnosis CYSTOSCOPY  LEFT RETROGRADE PYELOGRAM   LEFT URETERAL STENT PLACEMENT   Meds reviewed with patient/caregiver? Yes   Is the patient having any side effects they believe may be caused by any medication additions or changes? No   Does the patient have all medications related to this admission filled (includes all antibiotics, pain medications, etc.) Yes   Is the patient taking all medications as directed (includes completed medication regime)? Yes   Does the patient have a follow up appointment scheduled with their surgeon? Yes   Has the patient kept scheduled appointments due by today? N/A   Has home health visited the patient within 72 hours of discharge? N/A   Psychosocial issues? No   Did the patient receive a copy of their discharge instructions? Yes   Nursing interventions Reviewed instructions with patient   What is the patient's perception of their health status since discharge? Improving   Is the patient /caregiver able to teach back basic post-op care? Drive as instructed by MD in discharge instructions, Take showers only when approved by MD-sponge bathe until then, No tub bath, swimming, or hot tub until instructed by MD, Lifting as instructed by MD in discharge instructions   Is the patient/caregiver able to teach back steps to recovery at home? Set small, achievable goals for return to baseline health, Rest and rebuild strength, gradually increase activity, Make a list of questions for surgeon's appointment   If the patient is a current smoker, are they able to teach back resources for cessation? Not a smoker   Is the patient/caregiver able to teach back the hierarchy of who to call/visit  for symptoms/problems? PCP, Specialist, Home health nurse, Urgent Care, ED, 911 Yes   Week 1 call completed? Yes          Mignon AMIN - Licensed

## 2023-05-19 ENCOUNTER — TRANSCRIBE ORDERS (OUTPATIENT)
Dept: LAB | Facility: HOSPITAL | Age: 73
End: 2023-05-19
Payer: MEDICARE

## 2023-05-19 ENCOUNTER — LAB (OUTPATIENT)
Dept: LAB | Facility: HOSPITAL | Age: 73
End: 2023-05-19
Payer: MEDICARE

## 2023-05-19 DIAGNOSIS — A41.51 SEPTICEMIA DUE TO E. COLI: ICD-10-CM

## 2023-05-19 DIAGNOSIS — N10 PYELONEPHRITIS, ACUTE: ICD-10-CM

## 2023-05-19 DIAGNOSIS — N13.6 ACUTE PYONEPHROSIS WITHOUT RENAL MEDULLARY NECROSIS: ICD-10-CM

## 2023-05-19 DIAGNOSIS — N10 PYELONEPHRITIS, ACUTE: Primary | ICD-10-CM

## 2023-05-19 DIAGNOSIS — E11.69 TYPE 2 DIABETES MELLITUS WITH OTHER SPECIFIED COMPLICATION, UNSPECIFIED WHETHER LONG TERM INSULIN USE: ICD-10-CM

## 2023-05-19 LAB
ALBUMIN SERPL-MCNC: 3.9 G/DL (ref 3.5–5.2)
ALBUMIN/GLOB SERPL: 1.2 G/DL
ALP SERPL-CCNC: 81 U/L (ref 39–117)
ALT SERPL W P-5'-P-CCNC: 35 U/L (ref 1–41)
ANION GAP SERPL CALCULATED.3IONS-SCNC: 12 MMOL/L (ref 5–15)
AST SERPL-CCNC: 20 U/L (ref 1–40)
BASOPHILS # BLD AUTO: 0.04 10*3/MM3 (ref 0–0.2)
BASOPHILS NFR BLD AUTO: 0.4 % (ref 0–1.5)
BILIRUB SERPL-MCNC: 0.2 MG/DL (ref 0–1.2)
BUN SERPL-MCNC: 26 MG/DL (ref 8–23)
BUN/CREAT SERPL: 29.9 (ref 7–25)
CALCIUM SPEC-SCNC: 9.9 MG/DL (ref 8.6–10.5)
CHLORIDE SERPL-SCNC: 100 MMOL/L (ref 98–107)
CO2 SERPL-SCNC: 28 MMOL/L (ref 22–29)
CREAT SERPL-MCNC: 0.87 MG/DL (ref 0.76–1.27)
CRP SERPL-MCNC: 1.36 MG/DL (ref 0–0.5)
DEPRECATED RDW RBC AUTO: 46.7 FL (ref 37–54)
EGFRCR SERPLBLD CKD-EPI 2021: 92.8 ML/MIN/1.73
EOSINOPHIL # BLD AUTO: 0.21 10*3/MM3 (ref 0–0.4)
EOSINOPHIL NFR BLD AUTO: 1.9 % (ref 0.3–6.2)
ERYTHROCYTE [DISTWIDTH] IN BLOOD BY AUTOMATED COUNT: 13.9 % (ref 12.3–15.4)
ERYTHROCYTE [SEDIMENTATION RATE] IN BLOOD: 34 MM/HR (ref 0–20)
GLOBULIN UR ELPH-MCNC: 3.3 GM/DL
GLUCOSE SERPL-MCNC: 157 MG/DL (ref 65–99)
HCT VFR BLD AUTO: 44.3 % (ref 37.5–51)
HGB BLD-MCNC: 14.6 G/DL (ref 13–17.7)
IMM GRANULOCYTES # BLD AUTO: 0.16 10*3/MM3 (ref 0–0.05)
IMM GRANULOCYTES NFR BLD AUTO: 1.5 % (ref 0–0.5)
LYMPHOCYTES # BLD AUTO: 3.07 10*3/MM3 (ref 0.7–3.1)
LYMPHOCYTES NFR BLD AUTO: 28.5 % (ref 19.6–45.3)
MCH RBC QN AUTO: 30.1 PG (ref 26.6–33)
MCHC RBC AUTO-ENTMCNC: 33 G/DL (ref 31.5–35.7)
MCV RBC AUTO: 91.3 FL (ref 79–97)
MONOCYTES # BLD AUTO: 0.74 10*3/MM3 (ref 0.1–0.9)
MONOCYTES NFR BLD AUTO: 6.9 % (ref 5–12)
NEUTROPHILS NFR BLD AUTO: 6.55 10*3/MM3 (ref 1.7–7)
NEUTROPHILS NFR BLD AUTO: 60.8 % (ref 42.7–76)
NRBC BLD AUTO-RTO: 0 /100 WBC (ref 0–0.2)
PLATELET # BLD AUTO: 245 10*3/MM3 (ref 140–450)
PMV BLD AUTO: 8.7 FL (ref 6–12)
POTASSIUM SERPL-SCNC: 4.4 MMOL/L (ref 3.5–5.2)
PROT SERPL-MCNC: 7.2 G/DL (ref 6–8.5)
RBC # BLD AUTO: 4.85 10*6/MM3 (ref 4.14–5.8)
SODIUM SERPL-SCNC: 140 MMOL/L (ref 136–145)
WBC NRBC COR # BLD: 10.77 10*3/MM3 (ref 3.4–10.8)

## 2023-05-19 PROCEDURE — 85652 RBC SED RATE AUTOMATED: CPT

## 2023-05-19 PROCEDURE — 85025 COMPLETE CBC W/AUTO DIFF WBC: CPT

## 2023-05-19 PROCEDURE — 86140 C-REACTIVE PROTEIN: CPT

## 2023-05-19 PROCEDURE — 36415 COLL VENOUS BLD VENIPUNCTURE: CPT

## 2023-05-19 PROCEDURE — 80053 COMPREHEN METABOLIC PANEL: CPT

## 2023-05-24 ENCOUNTER — LAB (OUTPATIENT)
Dept: LAB | Facility: HOSPITAL | Age: 73
End: 2023-05-24
Payer: MEDICARE

## 2023-05-24 ENCOUNTER — READMISSION MANAGEMENT (OUTPATIENT)
Dept: CALL CENTER | Facility: HOSPITAL | Age: 73
End: 2023-05-24
Payer: MEDICARE

## 2023-05-24 ENCOUNTER — OFFICE VISIT (OUTPATIENT)
Dept: UROLOGY | Facility: CLINIC | Age: 73
End: 2023-05-24
Payer: MEDICARE

## 2023-05-24 ENCOUNTER — TRANSCRIBE ORDERS (OUTPATIENT)
Dept: LAB | Facility: HOSPITAL | Age: 73
End: 2023-05-24
Payer: MEDICARE

## 2023-05-24 VITALS — BODY MASS INDEX: 31.5 KG/M2 | HEIGHT: 70 IN | WEIGHT: 220 LBS

## 2023-05-24 DIAGNOSIS — I25.718 ATHEROSCLEROSIS OF AUTOLOGOUS VEIN CORONARY ARTERY BYPASS GRAFT WITH OTHER FORMS OF ANGINA PECTORIS: ICD-10-CM

## 2023-05-24 DIAGNOSIS — A41.51 SEPTICEMIA DUE TO E. COLI: ICD-10-CM

## 2023-05-24 DIAGNOSIS — N10 ACUTE PYELONEPHRITIS WITHOUT LESION OF RENAL MEDULLARY NECROSIS: ICD-10-CM

## 2023-05-24 DIAGNOSIS — E13.69 OTHER SPECIFIED DIABETES MELLITUS WITH OTHER SPECIFIED COMPLICATION, UNSPECIFIED WHETHER LONG TERM INSULIN USE: ICD-10-CM

## 2023-05-24 DIAGNOSIS — B96.29 NON-SHIGA TOXIN-PRODUCING E. COLI: ICD-10-CM

## 2023-05-24 DIAGNOSIS — N13.30 HYDRONEPHROSIS OF LEFT KIDNEY: Primary | ICD-10-CM

## 2023-05-24 DIAGNOSIS — I48.0 PAROXYSMAL ATRIAL FIBRILLATION: ICD-10-CM

## 2023-05-24 DIAGNOSIS — R32 URINARY INCONTINENCE, UNSPECIFIED TYPE: ICD-10-CM

## 2023-05-24 DIAGNOSIS — I10 ESSENTIAL HYPERTENSION, MALIGNANT: ICD-10-CM

## 2023-05-24 DIAGNOSIS — N13.5 URETERAL STRICTURE, LEFT: ICD-10-CM

## 2023-05-24 DIAGNOSIS — N13.9 ACUTE UNILATERAL OBSTRUCTIVE UROPATHY: ICD-10-CM

## 2023-05-24 DIAGNOSIS — N10 ACUTE PYELONEPHRITIS WITHOUT LESION OF RENAL MEDULLARY NECROSIS: Primary | ICD-10-CM

## 2023-05-24 DIAGNOSIS — N39.0 URINARY TRACT INFECTION WITHOUT HEMATURIA, SITE UNSPECIFIED: ICD-10-CM

## 2023-05-24 LAB
ALBUMIN SERPL-MCNC: 4.2 G/DL (ref 3.5–5.2)
ALBUMIN/GLOB SERPL: 1.3 G/DL
ALP SERPL-CCNC: 79 U/L (ref 39–117)
ALT SERPL W P-5'-P-CCNC: 27 U/L (ref 1–41)
ANION GAP SERPL CALCULATED.3IONS-SCNC: 8 MMOL/L (ref 5–15)
AST SERPL-CCNC: 24 U/L (ref 1–40)
BASOPHILS # BLD AUTO: 0.05 10*3/MM3 (ref 0–0.2)
BASOPHILS NFR BLD AUTO: 0.4 % (ref 0–1.5)
BILIRUB BLD-MCNC: NEGATIVE MG/DL
BILIRUB SERPL-MCNC: 0.2 MG/DL (ref 0–1.2)
BUN SERPL-MCNC: 23 MG/DL (ref 8–23)
BUN/CREAT SERPL: 28.4 (ref 7–25)
CALCIUM SPEC-SCNC: 9.7 MG/DL (ref 8.6–10.5)
CHLORIDE SERPL-SCNC: 100 MMOL/L (ref 98–107)
CLARITY, POC: ABNORMAL
CO2 SERPL-SCNC: 31 MMOL/L (ref 22–29)
COLOR UR: YELLOW
CREAT SERPL-MCNC: 0.81 MG/DL (ref 0.76–1.27)
CRP SERPL-MCNC: 0.48 MG/DL (ref 0–0.5)
DEPRECATED RDW RBC AUTO: 48.8 FL (ref 37–54)
EGFRCR SERPLBLD CKD-EPI 2021: 94.8 ML/MIN/1.73
EOSINOPHIL # BLD AUTO: 0.21 10*3/MM3 (ref 0–0.4)
EOSINOPHIL NFR BLD AUTO: 1.7 % (ref 0.3–6.2)
ERYTHROCYTE [DISTWIDTH] IN BLOOD BY AUTOMATED COUNT: 14.4 % (ref 12.3–15.4)
ERYTHROCYTE [SEDIMENTATION RATE] IN BLOOD: 38 MM/HR (ref 0–20)
EXPIRATION DATE: ABNORMAL
GLOBULIN UR ELPH-MCNC: 3.3 GM/DL
GLUCOSE SERPL-MCNC: 197 MG/DL (ref 65–99)
GLUCOSE UR STRIP-MCNC: ABNORMAL MG/DL
HCT VFR BLD AUTO: 45.6 % (ref 37.5–51)
HGB BLD-MCNC: 14.5 G/DL (ref 13–17.7)
IMM GRANULOCYTES # BLD AUTO: 0.11 10*3/MM3 (ref 0–0.05)
IMM GRANULOCYTES NFR BLD AUTO: 0.9 % (ref 0–0.5)
KETONES UR QL: NEGATIVE
LEUKOCYTE EST, POC: NEGATIVE
LYMPHOCYTES # BLD AUTO: 3.22 10*3/MM3 (ref 0.7–3.1)
LYMPHOCYTES NFR BLD AUTO: 26.2 % (ref 19.6–45.3)
Lab: ABNORMAL
MCH RBC QN AUTO: 29.5 PG (ref 26.6–33)
MCHC RBC AUTO-ENTMCNC: 31.8 G/DL (ref 31.5–35.7)
MCV RBC AUTO: 92.7 FL (ref 79–97)
MONOCYTES # BLD AUTO: 0.78 10*3/MM3 (ref 0.1–0.9)
MONOCYTES NFR BLD AUTO: 6.3 % (ref 5–12)
NEUTROPHILS NFR BLD AUTO: 64.5 % (ref 42.7–76)
NEUTROPHILS NFR BLD AUTO: 7.94 10*3/MM3 (ref 1.7–7)
NITRITE UR-MCNC: NEGATIVE MG/ML
NRBC BLD AUTO-RTO: 0 /100 WBC (ref 0–0.2)
PH UR: 6 [PH] (ref 5–8)
PLATELET # BLD AUTO: 230 10*3/MM3 (ref 140–450)
PMV BLD AUTO: 9.1 FL (ref 6–12)
POTASSIUM SERPL-SCNC: 4.7 MMOL/L (ref 3.5–5.2)
PROT SERPL-MCNC: 7.5 G/DL (ref 6–8.5)
PROT UR STRIP-MCNC: NEGATIVE MG/DL
RBC # BLD AUTO: 4.92 10*6/MM3 (ref 4.14–5.8)
RBC # UR STRIP: ABNORMAL /UL
SODIUM SERPL-SCNC: 139 MMOL/L (ref 136–145)
SP GR UR: 1.01 (ref 1–1.03)
UROBILINOGEN UR QL: NORMAL
WBC NRBC COR # BLD: 12.31 10*3/MM3 (ref 3.4–10.8)

## 2023-05-24 PROCEDURE — 80053 COMPREHEN METABOLIC PANEL: CPT

## 2023-05-24 PROCEDURE — 1160F RVW MEDS BY RX/DR IN RCRD: CPT | Performed by: STUDENT IN AN ORGANIZED HEALTH CARE EDUCATION/TRAINING PROGRAM

## 2023-05-24 PROCEDURE — 36415 COLL VENOUS BLD VENIPUNCTURE: CPT

## 2023-05-24 PROCEDURE — 87086 URINE CULTURE/COLONY COUNT: CPT | Performed by: STUDENT IN AN ORGANIZED HEALTH CARE EDUCATION/TRAINING PROGRAM

## 2023-05-24 PROCEDURE — 51798 US URINE CAPACITY MEASURE: CPT | Performed by: STUDENT IN AN ORGANIZED HEALTH CARE EDUCATION/TRAINING PROGRAM

## 2023-05-24 PROCEDURE — 85025 COMPLETE CBC W/AUTO DIFF WBC: CPT

## 2023-05-24 PROCEDURE — 86140 C-REACTIVE PROTEIN: CPT

## 2023-05-24 PROCEDURE — 81003 URINALYSIS AUTO W/O SCOPE: CPT | Performed by: STUDENT IN AN ORGANIZED HEALTH CARE EDUCATION/TRAINING PROGRAM

## 2023-05-24 PROCEDURE — 85652 RBC SED RATE AUTOMATED: CPT

## 2023-05-24 PROCEDURE — 99214 OFFICE O/P EST MOD 30 MIN: CPT | Performed by: STUDENT IN AN ORGANIZED HEALTH CARE EDUCATION/TRAINING PROGRAM

## 2023-05-24 PROCEDURE — 1159F MED LIST DOCD IN RCRD: CPT | Performed by: STUDENT IN AN ORGANIZED HEALTH CARE EDUCATION/TRAINING PROGRAM

## 2023-05-24 NOTE — PROGRESS NOTES
Follow Up Office Visit      Patient Name: Aldair Narvaez  : 1950   MRN: [id number]     Chief Complaint:    Chief Complaint   Patient presents with   • Hydronephrosis of left kidney       Referring Provider: No ref. provider found    History of Present Illness: Aldair Narvaez is a 72 y.o. male who presents today for follow up of left ureteral stricture and hydronephrosis. Presented in early may with E coli bacteremia and sepsis. Urine culture E coli, Blood cultures E coli. History of prostate cancer status post remote prostatectomy in the early  by an outside urologist.  Previously following with the [facility] urology group and most recently Dr. Reeves of [facility].  Patient reports being displeased with his previous urologic care and would like to transition his care to [facility].     When I saw him while admitted, he had been having difficulty with urination, frequency, urgency and nocturia as well as dysuria concerning for UTI.  Has a history of recurrent nephrolithiasis.  Has undergone multiple previous procedures.  He tells me in discussion that he underwent left-sided kidney stone procedure before and was told he had a left ureteral injury during one of his procedure secondary to large stone and laser lithotripsy.  He has had stents in the past.     From care everywhere, patient was seen by Dr. Reeves in  and a CT at that time demonstrated bilateral nephrolithiasis and what appeared to be mild to moderate left-sided hydronephrosis, of which Dr. Reeves elected to observe.     Review of CT during admission demonstrated moderate to severe left hydroureteronephrosis and thick bladder wall, multiple clips posterior and lateral to the bladder secondary to prostatectomy.            Distal left ureter stenotic with moderate hydroureteronephrosis on admission, unclear etiology.   Distal left ureteral stricture  Left diagnostic ureteroscopy, left ureteral balloon dilation  6/21/23 with left stent placement  If fails, will need to consider left ureteral reimplantation long term with psoas hitch     He is still receiving IV abx, will complete these on Friday per ID.   He denies urinary symptoms at this time.   UA with glucose and blood, he is a diabetic.     Subjective      Review of System: Review of Systems   Genitourinary: Positive for hematuria.      I have reviewed the ROS documented by my clinical staff, I have updated appropriately and I agree. Isma Justin MD    I have reviewed and the following portions of the patient's history were updated as appropriate: past family history, past medical history, past social history, past surgical history and problem list.    Medications:     Current Outpatient Medications:   •  albuterol sulfate  (90 Base) MCG/ACT inhaler, As Needed., Disp: , Rfl:   •  buPROPion XL (WELLBUTRIN XL) 150 MG 24 hr tablet, Daily., Disp: , Rfl:   •  coenzyme Q10 100 MG capsule, Take 1 capsule by mouth Daily., Disp: , Rfl:   •  Eliquis 5 MG tablet tablet, Daily., Disp: , Rfl:   •  glimepiride (AMARYL) 4 MG tablet, Take 2 tablets by mouth Every Morning Before Breakfast., Disp: 180 tablet, Rfl: 1  •  glucose blood (Contour Next Test) test strip, 1 strip 3 times daily dx: e11.65, Disp: 300 each, Rfl: 3  •  HYDROcodone-acetaminophen (NORCO) 5-325 MG per tablet, Take 1 tablet by mouth Every 8 (Eight) Hours As Needed for Moderate Pain for up to 9 doses., Disp: 9 tablet, Rfl: 0  •  hyoscyamine sulfate (ANASPAZ) 0.125 MG tablet dispersible disintegrating tablet, Place 1 tablet on the tongue Every 6 (Six) Hours As Needed (spasms)., Disp: 15 each, Rfl: 0  •  metoprolol succinate XL (TOPROL-XL) 50 MG 24 hr tablet, 1 tablet Daily., Disp: , Rfl:   •  nitroglycerin (NITROSTAT) 0.4 MG SL tablet, nitroglycerin 0.4 mg sublingual tablet, Disp: , Rfl:   •  oxybutynin XL (DITROPAN-XL) 10 MG 24 hr tablet, Take 1 tablet by mouth Daily., Disp: 30 tablet, Rfl: 0  •   pantoprazole (PROTONIX) 40 MG EC tablet, Daily., Disp: , Rfl:   •  PHARMACY MEDS TO BED CONSULT, Daily., Disp: , Rfl:   •  phenazopyridine (PYRIDIUM) 200 MG tablet, Take 1 tablet by mouth 3 (Three) Times a Day As Needed painful urination (Dysuria) for up to 15 doses., Disp: 15 tablet, Rfl: 0  •  rosuvastatin (CRESTOR) 20 MG tablet, Take 1 tablet by mouth Daily., Disp: , Rfl:   •  tamsulosin (FLOMAX) 0.4 MG capsule 24 hr capsule, Take 1 capsule by mouth Daily., Disp: 30 capsule, Rfl: 0  •  valsartan-hydrochlorothiazide (DIOVAN-HCT) 320-25 MG per tablet, 1 tablet Daily., Disp: , Rfl:     Allergies:   Allergies   Allergen Reactions   • Iodine Other (See Comments)     Closes sinuses   • Oxycodone-Acetaminophen Itching   • Zofran [Ondansetron] Hives       IPSS Questionnaire (AUA-7):  Over the past month…    1)  Incomplete Emptying:       How often have you had a sensation of not emptying you had the sensation of not emptying your bladder completely after you finished urinating?  2 - Less than half the time   2)  Frequency:       How often have you had the urinate again less than two hours after you finished urinating?  4 - More than half the time   3)  Intermittency:       How often have you found you stopped and started again several times when you urinated?   2 - Less than half the time   4) Urgency:      How often have you found it difficult to postpone urination?  3 - About half the time   5) Weak Stream:      How often have you had a weak urinary stream?  1 - Less than 1 time in 5   6) Straining:       How often have you had to push or strain to begin urination?  1 - Less than 1 time in 5   7) Nocturia:      How many times did you most typically get up to urinate from the time you went to bed at night until the time you got up in the morning?  2 - 2 times   Total Score:  15   The International Prostate Symptom Score (IPSS) is used to screen, diagnose, track symptoms of benign prostatic hyperplasia (BPH).   0-7 (Mild  "Symptoms) 8-19 (Moderate) 20-35 (Severe)   Quality of Life (QoL):  If you were to spend the rest of your life with your urinary condition just the way it is now, how would you feel about that? 5-Unhappy   Urine Leakage (Incontinence) 1-Mild (A few drops a day, no pad use)     Sexual Health Inventory for Men (SONALI)   Over the past 6 months:     1. How do you rate your confidence that you could get and keep an erection?  0 - No sexual activity    2. When you had erections with sexual  stimulation, how often were your erections hard enough for penetration (entering your partner)?  0 - No Sexual Activity    3. During sexual intercourse, how often were you able to maintain your erection after you had penetrated (entered) your partner?  0 - Did not attempt intercourse   4. During sexual intercourse, how difficult was it to maintain your erection to completion of intercourse?  0 - Did not attempt intercourse   5. When you attempted sexual intercourse, how often was it satisfactory for you?  0 - Did not attempt intercourse    Total Score: 0   The Sexual Health Inventory for Men further classifies ED severity with the following breakpoints:   1-7 (Severe ED) 8-11 (Moderate ED) 12-16 (Mild to Moderate ED) 17-21 (Mild ED)      Post void residual bladder scan:   0 mL     Objective     Physical Exam:   Vital Signs:   Vitals:    05/24/23 1559   Weight: 99.8 kg (220 lb)   Height: 177.8 cm (70\")     Body mass index is 31.57 kg/m².     Physical Exam  Constitutional:       Appearance: Normal appearance.   HENT:      Head: Normocephalic and atraumatic.      Nose: Nose normal.   Eyes:      Extraocular Movements: Extraocular movements intact.      Conjunctiva/sclera: Conjunctivae normal.      Pupils: Pupils are equal, round, and reactive to light.   Musculoskeletal:         General: Normal range of motion.      Cervical back: Normal range of motion and neck supple.   Skin:     General: Skin is warm and dry.      Findings: No lesion or " rash.   Neurological:      General: No focal deficit present.      Mental Status: He is alert and oriented to person, place, and time. Mental status is at baseline.   Psychiatric:         Mood and Affect: Mood normal.         Behavior: Behavior normal.         Labs:   Brief Urine Lab Results  (Last result in the past 365 days)      Color   Clarity   Blood   Leuk Est   Nitrite   Protein   CREAT   Urine HCG        05/24/23 1616 Yellow   Slightly Cloudy   3+   Negative   Negative   Negative                 Urine Culture        5/8/2023    09:04 5/8/2023    14:10   Urine Culture   Urine Culture >100,000 CFU/mL Escherichia coli   >100,000 CFU/mL Escherichia coli          Lab Results   Component Value Date    GLUCOSE 197 (H) 05/24/2023    CALCIUM 9.7 05/24/2023     05/24/2023    K 4.7 05/24/2023    CO2 31.0 (H) 05/24/2023     05/24/2023    BUN 23 05/24/2023    CREATININE 0.81 05/24/2023    EGFRIFNONA 96 02/17/2021    BCR 28.4 (H) 05/24/2023    ANIONGAP 8.0 05/24/2023       Lab Results   Component Value Date    WBC 12.31 (H) 05/24/2023    HGB 14.5 05/24/2023    HCT 45.6 05/24/2023    MCV 92.7 05/24/2023     05/24/2023       Images:   CT Abdomen Pelvis Without Contrast    Result Date: 5/8/2023  Impression: Mild to moderate left-sided hydroureteronephrosis without identifiable cause. Assessment for any left-sided pyelonephritis is severely limited in the absence of IV contrast and clinical correlation is required. There is nonobstructing bilateral nephrolithiasis. Questionable circumferential wall thickening of the urinary bladder which appears under distended. Correlate with urinalysis to exclude cystitis. There are surgical changes of prostatectomy. Gallbladder sludge. Electronically Signed: Luis Justin  5/8/2023 1:21 PM EDT  Workstation ID: [id number]    XR Pyelogram Retrograde    Result Date: 5/9/2023  Impression: 1. Intraoperative fluoroscopy during left-sided retrograde evaluation with stent  placement. Please see operative report for full findings and details. Electronically Signed: Jt Reyna  5/9/2023 4:58 PM EDT  Workstation ID: [id number]      Measures:   Tobacco:   Aldair Narvaez  reports that he has never smoked. He has never used smokeless tobacco.      Assessment / Plan      Assessment/Plan:   72 y.o. male who presented today for follow up of distal left ureteral stricture of unclear etiology which resulted in the patient developing left hydroureteronephrosis and urosepsis and E. coli bacteremia.  A left ureteral stent was placed.  Retrograde pyelogram demonstrates a distal left ureteral stricture and stenosis of unclear etiology.  He does have numerous left sided pelvic clips associated with his previous open prostatectomy.  He also has a history of nephrolithiasis and undergone multiple previous procedures and was apparently told that he had a left ureteral injury at one point.  I believe there is a high chance he has developed a left ureteral stricture secondary to previous injury during ureteroscopy or lithotripsy or stone passage.  It is unclear.  Regardless I discussed he needs to undergo a diagnostic ureteroscopy at some point in the near future and we discussed options for his left ureteral stricture, we can attempt a ureteral balloon dilation and stent replacement which would be the least invasive option.  Other options include a chronic stent which needs to be exchanged every 6 to 12 months.  Finally the other option is a distal left ureteral reimplantation which can be performed robotically but would likely require a psoas hitch, possible bladder cystotomy, possible catheter for 1 week and is more invasive.  Patient states he would like to explore ureteral balloon dilation first and if he fails this we will consider a ureteral reimplantation which is a reasonable approach.  The risk of a ureteral balloon dilation include ureteral injury, need for ureteral repair, need for prolonged  stent, infection, hematuria, anesthesia related complications etc.    We will proceed with diagnostic left ureteroscopy, ureteral balloon dilation, stent exchange at the surgery center next available.    Diagnoses and all orders for this visit:    1. Hydronephrosis of left kidney (Primary)  -     POC Urinalysis Dipstick, Automated  -     Urine Culture - Urine, Urine, Clean Catch  -     External Facility Surgical/Procedural Request; Future    2. Urinary incontinence, unspecified type  -     POC Urinalysis Dipstick, Automated    3. Ureteral stricture, left  -     Urine Culture - Urine, Urine, Clean Catch  -     External Facility Surgical/Procedural Request; Future           Follow Up:   No follow-ups on file.    I spent approximately 30 minutes providing clinical care for this patient; including review of patient's chart and provider documentation, face to face time spent with patient in examination room (obtaining history, performing physical exam, discussing diagnosis and management options), placing orders, and completing patient documentation.     Isma Justin MD  [facility] [place]

## 2023-05-24 NOTE — OUTREACH NOTE
"General Surgery Week 2 Survey    Flowsheet Row Responses   [facility] patient discharged from? [facility]   Does the patient have one of the following disease processes/diagnoses(primary or secondary)? General Surgery   Week 2 attempt successful? Yes   Call start time 1900   Call end time 1903   Discharge diagnosis CYSTOSCOPY  LEFT RETROGRADE PYELOGRAM   LEFT URETERAL STENT PLACEMENT   Is the patient taking all medications as directed (includes completed medication regime)? Yes   Medication comments States he is still getting his IV antibiotics   Does the patient have a follow up appointment scheduled with their surgeon? Yes   Has the patient kept scheduled appointments due by today? Yes   Psychosocial issues? No   What is the patient's perception of their health status since discharge? Improving   Is the patient/caregiver able to teach back the hierarchy of who to call/visit for symptoms/problems? PCP, Specialist, Home health nurse, Urgent Care, ED, 911 Yes   Additional teach back comments States he is doing \"pretty well\".  Has been going to all his follow ups and getting his IV antibiotics.   Week 2 call completed? Yes   Graduated/Revoked comments Denies quesitons or needs at this time.          Faiza MORALES - Licensed Nurse  "

## 2023-05-25 LAB — BACTERIA SPEC AEROBE CULT: NO GROWTH

## 2023-05-29 ENCOUNTER — LAB (OUTPATIENT)
Dept: LAB | Facility: HOSPITAL | Age: 73
End: 2023-05-29

## 2023-05-29 ENCOUNTER — TRANSCRIBE ORDERS (OUTPATIENT)
Dept: LAB | Facility: HOSPITAL | Age: 73
End: 2023-05-29

## 2023-05-29 DIAGNOSIS — B96.29 NON-SHIGA TOXIN-PRODUCING E. COLI: ICD-10-CM

## 2023-05-29 DIAGNOSIS — N13.6 ACUTE PYONEPHROSIS WITHOUT RENAL MEDULLARY NECROSIS: ICD-10-CM

## 2023-05-29 DIAGNOSIS — N10 ACUTE PYELONEPHRITIS WITHOUT LESION OF RENAL MEDULLARY NECROSIS: ICD-10-CM

## 2023-05-29 DIAGNOSIS — N10 ACUTE PYELONEPHRITIS WITHOUT LESION OF RENAL MEDULLARY NECROSIS: Primary | ICD-10-CM

## 2023-05-29 LAB
ALBUMIN SERPL-MCNC: 3.8 G/DL (ref 3.5–5.2)
ALBUMIN/GLOB SERPL: 1.4 G/DL
ALP SERPL-CCNC: 77 U/L (ref 39–117)
ALT SERPL W P-5'-P-CCNC: 28 U/L (ref 1–41)
ANION GAP SERPL CALCULATED.3IONS-SCNC: 10 MMOL/L (ref 5–15)
AST SERPL-CCNC: 20 U/L (ref 1–40)
BASOPHILS # BLD AUTO: 0.03 10*3/MM3 (ref 0–0.2)
BASOPHILS NFR BLD AUTO: 0.4 % (ref 0–1.5)
BILIRUB SERPL-MCNC: 0.2 MG/DL (ref 0–1.2)
BUN SERPL-MCNC: 19 MG/DL (ref 8–23)
BUN/CREAT SERPL: 24.4 (ref 7–25)
CALCIUM SPEC-SCNC: 9.4 MG/DL (ref 8.6–10.5)
CHLORIDE SERPL-SCNC: 103 MMOL/L (ref 98–107)
CO2 SERPL-SCNC: 27 MMOL/L (ref 22–29)
CREAT SERPL-MCNC: 0.78 MG/DL (ref 0.76–1.27)
CRP SERPL-MCNC: 1.32 MG/DL (ref 0–0.5)
DEPRECATED RDW RBC AUTO: 49.9 FL (ref 37–54)
EGFRCR SERPLBLD CKD-EPI 2021: 95.9 ML/MIN/1.73
EOSINOPHIL # BLD AUTO: 0.18 10*3/MM3 (ref 0–0.4)
EOSINOPHIL NFR BLD AUTO: 2.2 % (ref 0.3–6.2)
ERYTHROCYTE [DISTWIDTH] IN BLOOD BY AUTOMATED COUNT: 14.7 % (ref 12.3–15.4)
ERYTHROCYTE [SEDIMENTATION RATE] IN BLOOD: 27 MM/HR (ref 0–20)
GLOBULIN UR ELPH-MCNC: 2.8 GM/DL
GLUCOSE SERPL-MCNC: 195 MG/DL (ref 65–99)
HCT VFR BLD AUTO: 42.9 % (ref 37.5–51)
HGB BLD-MCNC: 13.6 G/DL (ref 13–17.7)
IMM GRANULOCYTES # BLD AUTO: 0.06 10*3/MM3 (ref 0–0.05)
IMM GRANULOCYTES NFR BLD AUTO: 0.7 % (ref 0–0.5)
LYMPHOCYTES # BLD AUTO: 2.32 10*3/MM3 (ref 0.7–3.1)
LYMPHOCYTES NFR BLD AUTO: 28.7 % (ref 19.6–45.3)
MCH RBC QN AUTO: 29.4 PG (ref 26.6–33)
MCHC RBC AUTO-ENTMCNC: 31.7 G/DL (ref 31.5–35.7)
MCV RBC AUTO: 92.9 FL (ref 79–97)
MONOCYTES # BLD AUTO: 0.57 10*3/MM3 (ref 0.1–0.9)
MONOCYTES NFR BLD AUTO: 7.1 % (ref 5–12)
NEUTROPHILS NFR BLD AUTO: 4.91 10*3/MM3 (ref 1.7–7)
NEUTROPHILS NFR BLD AUTO: 60.9 % (ref 42.7–76)
NRBC BLD AUTO-RTO: 0 /100 WBC (ref 0–0.2)
PLATELET # BLD AUTO: 161 10*3/MM3 (ref 140–450)
PMV BLD AUTO: 8.7 FL (ref 6–12)
POTASSIUM SERPL-SCNC: 4.8 MMOL/L (ref 3.5–5.2)
PROT SERPL-MCNC: 6.6 G/DL (ref 6–8.5)
RBC # BLD AUTO: 4.62 10*6/MM3 (ref 4.14–5.8)
SODIUM SERPL-SCNC: 140 MMOL/L (ref 136–145)
WBC NRBC COR # BLD: 8.07 10*3/MM3 (ref 3.4–10.8)

## 2023-05-29 PROCEDURE — 36415 COLL VENOUS BLD VENIPUNCTURE: CPT

## 2023-05-29 PROCEDURE — 85025 COMPLETE CBC W/AUTO DIFF WBC: CPT

## 2023-05-29 PROCEDURE — 86140 C-REACTIVE PROTEIN: CPT

## 2023-05-29 PROCEDURE — 85652 RBC SED RATE AUTOMATED: CPT

## 2023-05-29 PROCEDURE — 80053 COMPREHEN METABOLIC PANEL: CPT

## 2023-07-25 ENCOUNTER — TRANSCRIBE ORDERS (OUTPATIENT)
Dept: LAB | Facility: HOSPITAL | Age: 73
End: 2023-07-25
Payer: MEDICARE

## 2023-07-25 ENCOUNTER — LAB (OUTPATIENT)
Dept: LAB | Facility: HOSPITAL | Age: 73
End: 2023-07-25
Payer: MEDICARE

## 2023-07-25 ENCOUNTER — HOSPITAL ENCOUNTER (OUTPATIENT)
Dept: ULTRASOUND IMAGING | Facility: HOSPITAL | Age: 73
Discharge: HOME | End: 2023-07-25
Admitting: STUDENT IN AN ORGANIZED HEALTH CARE EDUCATION/TRAINING PROGRAM
Payer: MEDICARE

## 2023-07-25 DIAGNOSIS — I48.0 PAF (PAROXYSMAL ATRIAL FIBRILLATION): ICD-10-CM

## 2023-07-25 DIAGNOSIS — E78.00 PURE HYPERCHOLESTEROLEMIA: ICD-10-CM

## 2023-07-25 DIAGNOSIS — E78.00 PURE HYPERCHOLESTEROLEMIA: Primary | ICD-10-CM

## 2023-07-25 DIAGNOSIS — N13.5 URETERAL STRICTURE, LEFT: ICD-10-CM

## 2023-07-25 LAB
ALBUMIN SERPL-MCNC: 4.5 G/DL (ref 3.5–5.2)
ALP SERPL-CCNC: 84 U/L (ref 39–117)
ALT SERPL W P-5'-P-CCNC: 25 U/L (ref 1–41)
ANION GAP SERPL CALCULATED.3IONS-SCNC: 12 MMOL/L (ref 5–15)
AST SERPL-CCNC: 25 U/L (ref 1–40)
BILIRUB CONJ SERPL-MCNC: <0.2 MG/DL (ref 0–0.3)
BILIRUB INDIRECT SERPL-MCNC: NORMAL MG/DL
BILIRUB SERPL-MCNC: 0.5 MG/DL (ref 0–1.2)
BUN SERPL-MCNC: 17 MG/DL (ref 8–23)
BUN/CREAT SERPL: 18.5 (ref 7–25)
CALCIUM SPEC-SCNC: 9.7 MG/DL (ref 8.6–10.5)
CHLORIDE SERPL-SCNC: 100 MMOL/L (ref 98–107)
CHOLEST SERPL-MCNC: 127 MG/DL (ref 0–200)
CO2 SERPL-SCNC: 27 MMOL/L (ref 22–29)
CREAT SERPL-MCNC: 0.92 MG/DL (ref 0.76–1.27)
DEPRECATED RDW RBC AUTO: 43.1 FL (ref 37–54)
EGFRCR SERPLBLD CKD-EPI 2021: 89.5 ML/MIN/1.73
ERYTHROCYTE [DISTWIDTH] IN BLOOD BY AUTOMATED COUNT: 13.6 % (ref 12.3–15.4)
GLUCOSE SERPL-MCNC: 139 MG/DL (ref 65–99)
HCT VFR BLD AUTO: 44.6 % (ref 37.5–51)
HDLC SERPL-MCNC: 28 MG/DL (ref 40–60)
HGB BLD-MCNC: 14.8 G/DL (ref 13–17.7)
LDLC SERPL CALC-MCNC: 60 MG/DL (ref 0–100)
LDLC/HDLC SERPL: 1.84 {RATIO}
MCH RBC QN AUTO: 29 PG (ref 26.6–33)
MCHC RBC AUTO-ENTMCNC: 33.2 G/DL (ref 31.5–35.7)
MCV RBC AUTO: 87.3 FL (ref 79–97)
PLATELET # BLD AUTO: 245 10*3/MM3 (ref 140–450)
PMV BLD AUTO: 10.3 FL (ref 6–12)
POTASSIUM SERPL-SCNC: 4.3 MMOL/L (ref 3.5–5.2)
PROT SERPL-MCNC: 7.3 G/DL (ref 6–8.5)
RBC # BLD AUTO: 5.11 10*6/MM3 (ref 4.14–5.8)
SODIUM SERPL-SCNC: 139 MMOL/L (ref 136–145)
TRIGL SERPL-MCNC: 238 MG/DL (ref 0–150)
VLDLC SERPL-MCNC: 39 MG/DL (ref 5–40)
WBC NRBC COR # BLD: 9.81 10*3/MM3 (ref 3.4–10.8)

## 2023-07-25 PROCEDURE — 80061 LIPID PANEL: CPT

## 2023-07-25 PROCEDURE — 85027 COMPLETE CBC AUTOMATED: CPT

## 2023-07-25 PROCEDURE — 80048 BASIC METABOLIC PNL TOTAL CA: CPT

## 2023-07-25 PROCEDURE — 36415 COLL VENOUS BLD VENIPUNCTURE: CPT

## 2023-07-25 PROCEDURE — 76775 US EXAM ABDO BACK WALL LIM: CPT

## 2023-07-25 PROCEDURE — 80076 HEPATIC FUNCTION PANEL: CPT

## 2023-07-29 DIAGNOSIS — N13.5 URETERAL STRICTURE, LEFT: ICD-10-CM

## 2023-07-31 RX ORDER — TAMSULOSIN HYDROCHLORIDE 0.4 MG/1
CAPSULE ORAL
Qty: 15 CAPSULE | Refills: 0 | OUTPATIENT
Start: 2023-07-31

## 2023-08-02 ENCOUNTER — OFFICE VISIT (OUTPATIENT)
Dept: UROLOGY | Facility: CLINIC | Age: 73
End: 2023-08-02
Payer: MEDICARE

## 2023-08-02 VITALS — HEIGHT: 70 IN | BODY MASS INDEX: 31.57 KG/M2

## 2023-08-02 DIAGNOSIS — R32 URINARY INCONTINENCE, UNSPECIFIED TYPE: ICD-10-CM

## 2023-08-02 DIAGNOSIS — N13.5 URETERAL STRICTURE, LEFT: ICD-10-CM

## 2023-08-02 DIAGNOSIS — C61 PROSTATE CANCER: ICD-10-CM

## 2023-08-02 DIAGNOSIS — N20.0 RIGHT NEPHROLITHIASIS: Primary | ICD-10-CM

## 2023-08-02 DIAGNOSIS — N13.30 HYDRONEPHROSIS OF LEFT KIDNEY: ICD-10-CM

## 2023-08-02 LAB
BILIRUB BLD-MCNC: NEGATIVE MG/DL
CLARITY, POC: CLEAR
COLOR UR: YELLOW
EXPIRATION DATE: NORMAL
GLUCOSE UR STRIP-MCNC: NORMAL MG/DL
KETONES UR QL: NEGATIVE
LEUKOCYTE EST, POC: NEGATIVE
Lab: NORMAL
NITRITE UR-MCNC: NEGATIVE MG/ML
PH UR: 6 [PH] (ref 5–8)
PROT UR STRIP-MCNC: NEGATIVE MG/DL
RBC # UR STRIP: NEGATIVE /UL
SP GR UR: 1.01 (ref 1–1.03)
UROBILINOGEN UR QL: NORMAL

## 2023-08-07 ENCOUNTER — TELEPHONE (OUTPATIENT)
Dept: UROLOGY | Facility: CLINIC | Age: 73
End: 2023-08-07

## 2023-08-07 NOTE — TELEPHONE ENCOUNTER
Provider: DR DAVILA  Caller: YUMIKO BERNSTEIN  Relationship to Patient: SELF  Reason for Call: PT CALLED TO CONFIRM WHEN SURGERY WOULD BE SCHEDULED TO REMOVE KIDNEY STONE.  STRESS TEST WAS COMPLETED 8/4/23.    PLS CALL PT TO DISCUSS

## 2023-08-23 ENCOUNTER — LAB REQUISITION (OUTPATIENT)
Dept: LAB | Facility: HOSPITAL | Age: 73
End: 2023-08-23
Payer: MEDICARE

## 2023-08-23 ENCOUNTER — OUTSIDE FACILITY SERVICE (OUTPATIENT)
Dept: UROLOGY | Facility: CLINIC | Age: 73
End: 2023-08-23
Payer: MEDICARE

## 2023-08-23 ENCOUNTER — DOCUMENTATION (OUTPATIENT)
Dept: UROLOGY | Facility: CLINIC | Age: 73
End: 2023-08-23

## 2023-08-23 ENCOUNTER — TELEPHONE (OUTPATIENT)
Dept: UROLOGY | Facility: CLINIC | Age: 73
End: 2023-08-23

## 2023-08-23 DIAGNOSIS — N13.5 URETERAL STRICTURE, LEFT: ICD-10-CM

## 2023-08-23 DIAGNOSIS — N20.0 RIGHT NEPHROLITHIASIS: Primary | ICD-10-CM

## 2023-08-23 DIAGNOSIS — N20.0 CALCULUS OF KIDNEY: ICD-10-CM

## 2023-08-23 DIAGNOSIS — N12 PYELONEPHRITIS: ICD-10-CM

## 2023-08-23 PROCEDURE — 82365 CALCULUS SPECTROSCOPY: CPT | Performed by: STUDENT IN AN ORGANIZED HEALTH CARE EDUCATION/TRAINING PROGRAM

## 2023-08-23 RX ORDER — NITROFURANTOIN 25; 75 MG/1; MG/1
100 CAPSULE ORAL 2 TIMES DAILY
Qty: 6 CAPSULE | Refills: 0 | Status: SHIPPED | OUTPATIENT
Start: 2023-08-23

## 2023-08-23 RX ORDER — PHENAZOPYRIDINE HYDROCHLORIDE 200 MG/1
200 TABLET, FILM COATED ORAL 3 TIMES DAILY PRN
Qty: 15 TABLET | Refills: 0 | Status: SHIPPED | OUTPATIENT
Start: 2023-08-23

## 2023-08-23 RX ORDER — HYOSCYAMINE SULFATE 0.125 MG
125 TABLET,DISINTEGRATING ORAL EVERY 4 HOURS PRN
Qty: 30 EACH | Refills: 1 | Status: SHIPPED | OUTPATIENT
Start: 2023-08-23

## 2023-08-23 RX ORDER — HYDROCODONE BITARTRATE AND ACETAMINOPHEN 5; 325 MG/1; MG/1
1 TABLET ORAL EVERY 8 HOURS PRN
Qty: 8 TABLET | Refills: 0 | Status: SHIPPED | OUTPATIENT
Start: 2023-08-23

## 2023-08-23 NOTE — TELEPHONE ENCOUNTER
S/p R ureteroscopy and lithotripsy, stent on strings.     Return to clinic in 8 wks with renal US prior.     Isma Justin MD

## 2023-08-23 NOTE — PROGRESS NOTES
[facility]   [address] [place] [id number]      OPERATIVE REPORT     Patient Name:  Aldair Narvaez  YOB: 1950    Patient MRN: [id number]    Date of Surgery:  8/23/23     Indications:  73 yo M with a history of left ureteral stricture status post recent left ureteral dilation and stent placement.  Postoperative renal ultrasound has demonstrated that his kidney is draining well with no hydronephrosis.  His stent has been removed.  On recent CT imaging he has a large 1 cm stone collection in the right mid/lower pole, he elects to proceed to the operating room today for definitive right ureteroscopy and laser lithotripsy for stone removal.  Informed consent reviewed and signed, risks discussed.    Pre-op Diagnosis:   Right nephrolithiasis    Post-op Diagnosis:   Right nephrolithiasis    Procedures Performed:  CYSTOSCOPY  Right RETROGRADE PYELOGRAM  Right URETEROSCOPY, LASER LITHOTRIPSY  BASKET STONE EXTRACTION  RIGHT URETERAL STENT PLACEMENT    Staff:  Isma Justin MD    Anesthesia: General    Estimated Blood Loss: Minimal    Implants: 4.8 Czech by 26 cm double-J stent on the right with strings    Specimen: Right kidney stone    Drains: None      Findings: Large greater than 1 cm stone collection in the right lower pole, multiple round stones requiring copious laser lithotripsy and basket stone extraction.  4.8 Czech by 26 ammeter double-J stent on right, with strings.    Complications: None    Description of Procedure:    After informed consent, the patient was brought back to the operating suite and moved over to the operating table. General anesthesia was smoothly induced, IV antibiotics were administered, and the patient was placed in the dorsal lithotomy position with careful attention focused on padding all pressure points. The patient was prepped and draped in standard fashion. A timeout was performed to ensure the correct patient and procedure    A 22Fr  cystoscope was used to cannulate the urethra. The urethra was of normal course and caliber.  Upon entering the bladder, pan-cystoscopy revealed no bladder abnormalities.  The bilateral ureteral orifices were visualized in their orthotopic positions.     Right retrograde Pyelogram Interpretation  Attention was then turned to the right ureteral orifice which was cannulated with a 5 Fr open ended ureteral catheter. A retrograde pyelogram was performed demonstrating normal course and caliber of the ureter, outlining the location of the renal pelvis and collecting system, with no obvious hydronephrosis, subtle filling defect in the lower pole likely consistent with stone locations.     A sensor wire was advanced up the right ureter and into the collecting system on fluoroscopy to serve as a safety wire which was clamped to the surgical drapes.     A 11 F x 13 x 46 cm ureteral access sheath was then advanced over the working wire under fluoroscopy. This was advanced up to the level of the proximal ureter without resistance . The working wire and inner obturator were then removed. A digital flexible ureteroscope was advanced through the access sheath into the renal pelvis. Pan-pyeloscopy was then performed which revealed a large collection of 6 stones in the lower pole, all circular and yellow in color. A 200 micron Thulium laser fiber was then advanced through the ureteroscope. The stones were fragmented into small fragments.  I then spent the next 30 minutes using a Nitinol wire basket for direct removal of the stone fragments through the access sheath, these were all sent for analysis.  I went back to the lower pole and used a 200 æm thulium laser fiber to fragment the residual small fragments, which were less than 1 mm into tiny dust fragments.  Pan pyeloscopy was then performed which confirmed no significant stone fragments, only tiny stone dust remaining. The ureter was then carefully inspected during removal of the  access sheath under direct visualization and found to be free of any injuries or stone.    CYSTO PLACEMENT  We switched back to the rigid cystoscope which was reinserted over the existing guidewire. A 4.8 F x 26 cm double J ureteral stent was advanced up the right ureter under direct visualization which confirmed good curl in the bladder. Fluoroscopy confirmed good curl in the kidney. The bladder was drained and this concluded our procedure.    Stent strings were then externalized and taped to the patient's penis with mastisol and tegaderm.     The patient was brought back to the PACU in stable condition. All scopes and instruments were in good working order at the end of the case. There were no complications.    Disposition/Follow Up: 6 weeks with renal ultrasound prior, remove stent on strings on Saturday 8/26/23.    Isma Justin MD     Date: 8/23/2023  Time: 11:53 EDT

## 2023-08-30 LAB
COLOR STONE: NORMAL
COM MFR STONE: 40 %
COMPN STONE: NORMAL
LABORATORY COMMENT REPORT: NORMAL
Lab: NORMAL
Lab: NORMAL
PHOTO: NORMAL
SIZE STONE: NORMAL MM
SPEC SOURCE SUBJ: NORMAL
URATE MFR STONE: 60 %
WT STONE: 146 MG

## 2023-09-05 ENCOUNTER — OFFICE VISIT (OUTPATIENT)
Dept: ENDOCRINOLOGY | Facility: CLINIC | Age: 73
End: 2023-09-05
Payer: MEDICARE

## 2023-09-05 VITALS
HEART RATE: 74 BPM | DIASTOLIC BLOOD PRESSURE: 75 MMHG | OXYGEN SATURATION: 96 % | SYSTOLIC BLOOD PRESSURE: 120 MMHG | HEIGHT: 70 IN | WEIGHT: 220 LBS | BODY MASS INDEX: 31.5 KG/M2

## 2023-09-05 DIAGNOSIS — I25.10 CAD, MULTIPLE VESSEL: ICD-10-CM

## 2023-09-05 DIAGNOSIS — E78.2 MIXED HYPERLIPIDEMIA: ICD-10-CM

## 2023-09-05 DIAGNOSIS — E11.65 UNCONTROLLED TYPE 2 DIABETES MELLITUS WITH HYPERGLYCEMIA: Primary | ICD-10-CM

## 2023-09-05 DIAGNOSIS — I10 BENIGN HYPERTENSION: ICD-10-CM

## 2023-09-05 LAB
EXPIRATION DATE: NORMAL
EXPIRATION DATE: NORMAL
GLUCOSE BLDC GLUCOMTR-MCNC: 114 MG/DL (ref 70–130)
HBA1C MFR BLD: 7.7 %
Lab: NORMAL
Lab: NORMAL

## 2023-09-05 RX ORDER — DAPAGLIFLOZIN 10 MG/1
10 TABLET, FILM COATED ORAL DAILY
COMMUNITY

## 2023-09-05 RX ORDER — GLIMEPIRIDE 4 MG/1
8 TABLET ORAL
Qty: 180 TABLET | Refills: 3 | Status: SHIPPED | OUTPATIENT
Start: 2023-09-05

## 2023-09-05 NOTE — ASSESSMENT & PLAN NOTE
Continue statin.  Lipids were not at goal last visit.  He reports another med has been added but he can't remember the name.  He will let us know.

## 2023-09-05 NOTE — ASSESSMENT & PLAN NOTE
Diabetes is worsening.  A1c increased but he had hospital stay with infection.  We discussed coming off farxiga but he wanted to continue it for now.  We discussed treatment options.  He wants to continue same meds for now.  Diabetes will be reassessed in 3 months.

## 2023-09-05 NOTE — PROGRESS NOTES
"     Office Note      Date: 2023  Patient Name: Aldair Narvaez  MRN: [id number]  : 1950    Chief Complaint   Patient presents with    Diabetes       History of Present Illness:   Aldair Narvaez is a 72 y.o. male who presents for Diabetes type 2. Diagnosed in: . Treated in past with oral agents. Current treatments: farxiga and glimepiride. Number of insulin shots per day: none. Checks blood sugar intermittently. Has low blood sugar: no. Aspirin use: No - on eliquis. Statin use: Yes. ACE-I/ARB use: Yes. Changes in health since last visit: kidney stones, urinary tract infection, sepsis. Last eye exam 2023.     Subjective      Diabetic Complications:  Eyes: No  Kidneys: No  Feet: No  Heart: Yes - stents and CABG    Diet and Exercise:  Meals per day: 2  Minutes of exercise per week: 0 mins.    Review of Systems:   Review of Systems   Constitutional: Negative.    Cardiovascular: Negative.    Gastrointestinal: Negative.    Endocrine: Negative.      The following portions of the patient's history were reviewed and updated as appropriate: allergies, current medications, past family history, past medical history, past social history, past surgical history, and problem list.    Objective     Visit Vitals  /75   Pulse 74   Ht 177.8 cm (70\")   Wt 99.8 kg (220 lb)   SpO2 96%   BMI 31.57 kg/m²       Physical Exam:  Physical Exam  Constitutional:       Appearance: Normal appearance.   Neurological:      Mental Status: He is alert.       Labs:    HbA1c  Lab Results   Component Value Date    HGBA1C 7.7 2023       CMP  Lab Results   Component Value Date    GLUCOSE 139 (H) 2023    BUN 17 2023    CREATININE 0.92 2023    EGFRIFNONA 96 2021    BCR 18.5 2023    K 4.3 2023    CO2 27.0 2023    CALCIUM 9.7 2023    AST 25 2023    ALT 25 2023        Lipid Panel  Lab Results   Component Value Date    HDL 28 (L) 2023    LDL 60 2023    TRIG 238 (H) " 07/25/2023        TSH  Lab Results   Component Value Date    TSH 0.577 05/09/2023        Hemoglobin A1C  Lab Results   Component Value Date    HGBA1C 7.7 09/05/2023        Microalbumin/Creatinine  Lab Results   Component Value Date    MALBCRERATIO 9.2 04/13/2023    MICROALBUR 1.2 04/13/2023           Assessment / Plan      Assessment & Plan:  Diagnoses and all orders for this visit:    1. Uncontrolled type 2 diabetes mellitus with hyperglycemia (Primary)  Assessment & Plan:  Diabetes is worsening.  A1c increased but he had hospital stay with infection.  We discussed coming off farxiga but he wanted to continue it for now.  We discussed treatment options.  He wants to continue same meds for now.  Diabetes will be reassessed in 3 months.    Orders:  -     POC Glucose, Blood  -     POC Glycosylated Hemoglobin (Hb A1C)    2. Benign hypertension  Assessment & Plan:  Hypertension is unchanged.  Continue current treatment regimen.  Blood pressure will be reassessed at the next regular appointment.      3. Mixed hyperlipidemia  Assessment & Plan:  Continue statin.  Lipids were not at goal last visit.  He reports another med has been added but he can't remember the name.  He will let us know.      4. CAD, multiple vessel  Assessment & Plan:  Continue eliquis, SGLT-2 inhibitor and statin.      Other orders  -     glimepiride (AMARYL) 4 MG tablet; Take 2 tablets by mouth Every Morning Before Breakfast.  Dispense: 180 tablet; Refill: 3      Current Outpatient Medications   Medication Instructions    albuterol sulfate  (90 Base) MCG/ACT inhaler As Needed    buPROPion XL (WELLBUTRIN XL) 150 MG 24 hr tablet Daily    coenzyme Q10 100 mg, Oral, Daily    Eliquis 5 MG tablet tablet Daily    Farxiga 10 mg, Oral, Daily    glimepiride (AMARYL) 8 mg, Oral, Every Morning Before Breakfast    glucose blood (Contour Next Test) test strip 1 strip 3 times daily dx: e11.65    HYDROcodone-acetaminophen (NORCO) 5-325 MG per tablet 1 tablet,  Oral, Every 8 Hours PRN    hyoscyamine sulfate (ANASPAZ) 0.125 mg, Translingual, Every 4 Hours PRN    metoprolol succinate XL (TOPROL-XL) 50 MG 24 hr tablet 1 tablet, Daily    nitrofurantoin (macrocrystal-monohydrate) (MACROBID) 100 mg, Oral, 2 Times Daily    nitroglycerin (NITROSTAT) 0.4 MG SL tablet nitroglycerin 0.4 mg sublingual tablet    oxybutynin XL (DITROPAN-XL) 10 mg, Oral, Daily    pantoprazole (PROTONIX) 40 MG EC tablet Daily    PHARMACY MEDS TO BED CONSULT Does not apply, Daily    phenazopyridine (PYRIDIUM) 200 MG tablet Take 1 tablet by mouth 3 (Three) Times a Day As Needed painful urination (Dysuria) for up to 15 doses.    rosuvastatin (CRESTOR) 20 mg, Oral, Daily    tamsulosin (FLOMAX) 0.4 MG capsule 24 hr capsule TAKE 1 CAPSULE BY MOUTH DAILY.    valsartan-hydrochlorothiazide (DIOVAN-HCT) 320-25 MG per tablet 1 tablet, Daily      Return in about 3 months (around 12/5/2023) for Recheck with A1c.    Steve Cohen MD   09/05/2023

## 2023-10-13 ENCOUNTER — TELEPHONE (OUTPATIENT)
Dept: UROLOGY | Facility: CLINIC | Age: 73
End: 2023-10-13
Payer: MEDICARE

## 2023-10-13 NOTE — TELEPHONE ENCOUNTER
Informed pt PSA, Urine culture need to be done prior to Thursday's appointment.  Patient will go to lab on Monday.  Patient will get US done on Tuesday, pt has appointment already.

## 2023-10-16 ENCOUNTER — LAB (OUTPATIENT)
Dept: LAB | Facility: HOSPITAL | Age: 73
End: 2023-10-16
Payer: MEDICARE

## 2023-10-16 DIAGNOSIS — C61 PROSTATE CANCER: ICD-10-CM

## 2023-10-16 DIAGNOSIS — N20.0 RIGHT NEPHROLITHIASIS: ICD-10-CM

## 2023-10-16 LAB — PSA SERPL-MCNC: <0.014 NG/ML (ref 0–4)

## 2023-10-16 PROCEDURE — 84153 ASSAY OF PSA TOTAL: CPT

## 2023-10-16 PROCEDURE — 36415 COLL VENOUS BLD VENIPUNCTURE: CPT

## 2023-10-16 PROCEDURE — 87086 URINE CULTURE/COLONY COUNT: CPT

## 2023-10-17 ENCOUNTER — HOSPITAL ENCOUNTER (OUTPATIENT)
Dept: ULTRASOUND IMAGING | Facility: HOSPITAL | Age: 73
Discharge: HOME | End: 2023-10-17
Admitting: STUDENT IN AN ORGANIZED HEALTH CARE EDUCATION/TRAINING PROGRAM
Payer: MEDICARE

## 2023-10-17 DIAGNOSIS — N13.5 URETERAL STRICTURE, LEFT: ICD-10-CM

## 2023-10-17 DIAGNOSIS — N20.0 RIGHT NEPHROLITHIASIS: ICD-10-CM

## 2023-10-17 LAB — BACTERIA SPEC AEROBE CULT: NORMAL

## 2023-10-17 PROCEDURE — 76775 US EXAM ABDO BACK WALL LIM: CPT

## 2023-10-17 NOTE — PROGRESS NOTES
No obvious UTI, mixed bacteria indicative of skin juni.  We will discuss at his follow-up.    Isma Justin MD

## 2023-10-19 ENCOUNTER — OFFICE VISIT (OUTPATIENT)
Dept: UROLOGY | Facility: CLINIC | Age: 73
End: 2023-10-19
Payer: MEDICARE

## 2023-10-19 VITALS — HEART RATE: 76 BPM | HEIGHT: 70 IN | WEIGHT: 227 LBS | OXYGEN SATURATION: 96 % | BODY MASS INDEX: 32.5 KG/M2

## 2023-10-19 DIAGNOSIS — N39.3 STRESS INCONTINENCE OF URINE: ICD-10-CM

## 2023-10-19 DIAGNOSIS — N20.0 RIGHT NEPHROLITHIASIS: ICD-10-CM

## 2023-10-19 DIAGNOSIS — N13.5 URETERAL STRICTURE, LEFT: ICD-10-CM

## 2023-10-19 DIAGNOSIS — N20.0 RECURRENT NEPHROLITHIASIS: Primary | ICD-10-CM

## 2023-10-19 DIAGNOSIS — C61 PROSTATE CANCER: ICD-10-CM

## 2023-10-19 RX ORDER — POTASSIUM CITRATE 5 MEQ/1
15 TABLET, EXTENDED RELEASE ORAL 2 TIMES DAILY WITH MEALS
Qty: 90 TABLET | Refills: 10 | Status: SHIPPED | OUTPATIENT
Start: 2023-10-19

## 2023-10-19 NOTE — PROGRESS NOTES
Follow Up Office Visit      Patient Name: Aldair Narvaez  : 1950   MRN: [id number]     Chief Complaint:    Chief Complaint   Patient presents with    Right Nephrolithiasis    Hydronephrosis of left kidney    Ureteral Stricture left    Prostate Cancer    Urinary Incontinence       Referring Provider: No ref. provider found    History of Present Illness: Aldair Narvaez is a 73 y.o. male who presents today history of nephrolithiasis and left ureteral stricture. He is s/p diagnostic left ureteroscopy and left ureteral balloon dilation on 2023 a stent was left in place and subsequently removed post-operatively.     He is s/p Cystoscopy, right ureteroscopy, laser lithotripsy and right ureteral stent placement on 23 for large 1cm renal calculus. He subsequently removed his ureteral stent at home.  Patient stone analysis demonstrates uric acid and calcium oxalate stones.  He has had multiple stone episodes throughout his life.    He reports continued urinary leakage. He is wearing 6-8 pads per day. He reports this has worsened over the last few months, he was previously wearing 3-4 pads per day. He denies dysuria. He denies flank pain.     UA negative. Bladder scan PVR 50 cc.   PSA 10/16/23; <0.014.   Renal US 10/17/23; Minimal hydronephrosis of left kidney.     Last BMP 2023 demonstrates creatinine 0.92 with a normal GFR.  No renal function assessment since this time.    Subjective      Review of System: Review of Systems   Genitourinary:  Positive for frequency and urgency. Negative for dysuria and flank pain.        Urinary incontinence      I have reviewed the ROS documented by my clinical staff, I have updated appropriately and I agree. Isma Justin MD    I have reviewed and the following portions of the patient's history were updated as appropriate: past family history, past medical history, past social history, past surgical history and problem list.    Medications:     Current  Outpatient Medications:     albuterol sulfate  (90 Base) MCG/ACT inhaler, As Needed., Disp: , Rfl:     buPROPion XL (WELLBUTRIN XL) 150 MG 24 hr tablet, Daily., Disp: , Rfl:     coenzyme Q10 100 MG capsule, Take 1 capsule by mouth Daily., Disp: , Rfl:     dapagliflozin Propanediol (Farxiga) 10 MG tablet, Take 10 mg by mouth Daily., Disp: , Rfl:     Eliquis 5 MG tablet tablet, Daily., Disp: , Rfl:     glimepiride (AMARYL) 4 MG tablet, Take 2 tablets by mouth Every Morning Before Breakfast., Disp: 180 tablet, Rfl: 3    glucose blood (Contour Next Test) test strip, 1 strip 3 times daily dx: e11.65, Disp: 300 each, Rfl: 3    HYDROcodone-acetaminophen (NORCO) 5-325 MG per tablet, Take 1 tablet by mouth Every 8 (Eight) Hours As Needed for Moderate Pain for up to 9 doses., Disp: 8 tablet, Rfl: 0    hyoscyamine sulfate (ANASPAZ) 0.125 MG tablet dispersible disintegrating tablet, Place 1 tablet on the tongue Every 4 (Four) Hours As Needed (bladder spasms)., Disp: 30 each, Rfl: 1    metoprolol succinate XL (TOPROL-XL) 50 MG 24 hr tablet, 1 tablet Daily., Disp: , Rfl:     nitrofurantoin, macrocrystal-monohydrate, (Macrobid) 100 MG capsule, Take 1 capsule by mouth 2 (Two) Times a Day., Disp: 6 capsule, Rfl: 0    nitroglycerin (NITROSTAT) 0.4 MG SL tablet, nitroglycerin 0.4 mg sublingual tablet, Disp: , Rfl:     pantoprazole (PROTONIX) 40 MG EC tablet, Daily., Disp: , Rfl:     PHARMACY MEDS TO BED CONSULT, Daily., Disp: , Rfl:     phenazopyridine (PYRIDIUM) 200 MG tablet, Take 1 tablet by mouth 3 (Three) Times a Day As Needed painful urination (Dysuria) for up to 15 doses., Disp: 15 tablet, Rfl: 0    rosuvastatin (CRESTOR) 20 MG tablet, Take 1 tablet by mouth Daily., Disp: , Rfl:     tamsulosin (FLOMAX) 0.4 MG capsule 24 hr capsule, TAKE 1 CAPSULE BY MOUTH DAILY., Disp: 15 capsule, Rfl: 0    valsartan-hydrochlorothiazide (DIOVAN-HCT) 320-25 MG per tablet, 1 tablet Daily., Disp: , Rfl:     potassium citrate (UROCIT-K) 5  MEQ (540 MG) CR tablet, Take 3 tablets by mouth 2 (Two) Times a Day With Meals., Disp: 90 tablet, Rfl: 10    Allergies:   Allergies   Allergen Reactions    Iodine Other (See Comments)     Closes sinuses    Oxycodone-Acetaminophen Itching    Zofran [Ondansetron] Hives       IPSS Questionnaire (AUA-7):  Over the past month…    1)  Incomplete Emptying:       How often have you had a sensation of not emptying you had the sensation of not emptying your bladder completely after you finished urinating?  4 - More than half the time   2)  Frequency:       How often have you had the urinate again less than two hours after you finished urinating?  4 - More than half the time   3)  Intermittency:       How often have you found you stopped and started again several times when you urinated?   2 - Less than half the time   4) Urgency:      How often have you found it difficult to postpone urination?  4 - More than half the time   5) Weak Stream:      How often have you had a weak urinary stream?  2 - Less than half the time   6) Straining:       How often have you had to push or strain to begin urination?  3 - About half the time   7) Nocturia:      How many times did you most typically get up to urinate from the time you went to bed at night until the time you got up in the morning?  4 - 4 times   Total Score:  23   The International Prostate Symptom Score (IPSS) is used to screen, diagnose, track symptoms of benign prostatic hyperplasia (BPH).   0-7 (Mild Symptoms) 8-19 (Moderate) 20-35 (Severe)   Quality of Life (QoL):  If you were to spend the rest of your life with your urinary condition just the way it is now, how would you feel about that? 4-Mostly Dissatisfied   Urine Leakage (Incontinence) 1-Mild (A few drops a day, no pad use)     Sexual Health Inventory for Men (SONALI)   Over the past 6 months:     1. How do you rate your confidence that you could get and keep an erection?  0 - No sexual activity    2. When you had  "erections with sexual  stimulation, how often were your erections hard enough for penetration (entering your partner)?  0 - No Sexual Activity    3. During sexual intercourse, how often were you able to maintain your erection after you had penetrated (entered) your partner?  0 - Did not attempt intercourse   4. During sexual intercourse, how difficult was it to maintain your erection to completion of intercourse?  0 - Did not attempt intercourse   5. When you attempted sexual intercourse, how often was it satisfactory for you?  0 - Did not attempt intercourse    Total Score: 0   The Sexual Health Inventory for Men further classifies ED severity with the following breakpoints:   1-7 (Severe ED) 8-11 (Moderate ED) 12-16 (Mild to Moderate ED) 17-21 (Mild ED)      Post void residual bladder scan:   50 mL     Objective     Physical Exam:   Vital Signs:   Vitals:    10/19/23 0952   Pulse: 76   SpO2: 96%   Weight: 103 kg (227 lb)   Height: 177.8 cm (70\")     Body mass index is 32.57 kg/m².     Physical Exam  Vitals and nursing note reviewed.   Constitutional:       Appearance: Normal appearance.   HENT:      Head: Normocephalic and atraumatic.      Nose: Nose normal.      Mouth/Throat:      Mouth: Mucous membranes are moist.   Eyes:      Pupils: Pupils are equal, round, and reactive to light.   Pulmonary:      Effort: Pulmonary effort is normal.   Abdominal:      General: Abdomen is flat.      Palpations: Abdomen is soft.   Musculoskeletal:         General: Normal range of motion.      Cervical back: Normal range of motion.   Skin:     General: Skin is warm and dry.      Capillary Refill: Capillary refill takes less than 2 seconds.   Neurological:      General: No focal deficit present.      Mental Status: He is alert.   Psychiatric:         Mood and Affect: Mood normal.       Labs:   Brief Urine Lab Results  (Last result in the past 365 days)        Color   Clarity   Blood   Leuk Est   Nitrite   Protein   CREAT   Urine " HCG        10/19/23 1004 Yellow   Clear   Negative   Negative   Negative   Negative                   Urine Culture          5/8/2023    09:04 5/8/2023    14:10 5/24/2023    18:13 10/16/2023    12:37   Urine Culture   Urine Culture >100,000 CFU/mL Escherichia coli  >100,000 CFU/mL Escherichia coli  No growth  25,000 CFU/mL Mixed Yas Isolated         Lab Results   Component Value Date    GLUCOSE 139 (H) 07/25/2023    CALCIUM 9.7 07/25/2023     07/25/2023    K 4.3 07/25/2023    CO2 27.0 07/25/2023     07/25/2023    BUN 17 07/25/2023    CREATININE 0.92 07/25/2023    EGFRIFNONA 96 02/17/2021    BCR 18.5 07/25/2023    ANIONGAP 12.0 07/25/2023       Lab Results   Component Value Date    WBC 9.81 07/25/2023    HGB 14.8 07/25/2023    HCT 44.6 07/25/2023    MCV 87.3 07/25/2023     07/25/2023       Images:   US Renal Bilateral    Result Date: 10/17/2023  1. Limited study due to body habitus and overlying bowel gas. 2. Minimal hydronephrosis left kidney. 3. Septated cystic structure left kidney incompletely evaluated. Correlation with outside evaluation if performed recommended. Otherwise, a follow-up can always be considered with dedicated CT or MRI utilizing a renal mass protocol. Alternatively, a repeat short-term follow-up ultrasound. 4. Debris within the bladder. Please correlate with urinalysis Electronically Signed: Luan Lopez MD  10/17/2023 1:20 PM EDT  Workstation ID: [id number]    US Renal Bilateral    Result Date: 7/26/2023  Impression: No hydronephrosis. Electronically Signed: Vikas Anthony MD  7/26/2023 11:12 AM EDT  Workstation ID: [id number]      Measures:   Tobacco:   Aldair Narvaez  reports that he has never smoked. He has never used smokeless tobacco..        Urine Incontinence: Patient reports that he is currently experiencing symptoms of urinary incontinence.     Assessment / Plan      Assessment/Plan:   73 y.o. male who presented today for follow up of prostate cancer,  nephrolithiasis, left ureteral stricture and worsening urinary incontinence.  We discussed options.  I reviewed his renal ultrasound, this demonstrates minimal left-sided hydronephrosis.  We will assess renal function to ensure no obvious obstruction.  There does not appear to be any obvious ureteral obstruction at this time after ureteral balloon dilation.  He has uric acid stones, he has recurrent stone history therefore I recommend he initiate 15 mill equivalent potassium citrate twice daily for stone prevention.  We will see him back in 1 year for stone surveillance with a renal ultrasound prior.  I will call him with BMP results.  We discussed options for his worsening DARRYL, this includes pelvic floor physical therapy or proceed with Cunningham clamp versus artificial urethral sphincter.  He would like to consider AUS placement and we will therefore refer him to [facility] reconstructive urologist for evaluation.    In regards to his prostate cancer he underwent a prostatectomy roughly 18 years ago and his PSA has been undetectable since this time.    Lab Results   Component Value Date    PSA <0.014 10/16/2023         Diagnoses and all orders for this visit:    1. Recurrent nephrolithiasis (Primary)  -     potassium citrate (UROCIT-K) 5 MEQ (540 MG) CR tablet; Take 3 tablets by mouth 2 (Two) Times a Day With Meals.  Dispense: 90 tablet; Refill: 10    2. Right nephrolithiasis  -     POC Urinalysis Dipstick, Automated  -     US Renal Bilateral; Future  -     potassium citrate (UROCIT-K) 5 MEQ (540 MG) CR tablet; Take 3 tablets by mouth 2 (Two) Times a Day With Meals.  Dispense: 90 tablet; Refill: 10    3. Stress incontinence of urine  -     POC Urinalysis Dipstick, Automated  -     Ambulatory Referral to Urology    4. Ureteral stricture, left  -     POC Urinalysis Dipstick, Automated  -     Basic Metabolic Panel; Future    5. Prostate cancer  -     POC Urinalysis Dipstick, Automated       Follow Up:    Return in about 1 year (around 10/19/2024) for Renal US prior .    I spent approximately 30 minutes providing clinical care for this patient; including review of patient's chart and provider documentation, face to face time spent with patient in examination room (obtaining history, performing physical exam, discussing diagnosis and management options), placing orders, and completing patient documentation.     Isma Justin MD  [facility] [place]

## 2023-10-29 ENCOUNTER — APPOINTMENT (OUTPATIENT)
Dept: CT IMAGING | Facility: HOSPITAL | Age: 73
End: 2023-10-29
Payer: MEDICARE

## 2023-10-29 ENCOUNTER — APPOINTMENT (OUTPATIENT)
Dept: GENERAL RADIOLOGY | Facility: HOSPITAL | Age: 73
End: 2023-10-29
Payer: MEDICARE

## 2023-10-29 ENCOUNTER — APPOINTMENT (OUTPATIENT)
Dept: MRI IMAGING | Facility: HOSPITAL | Age: 73
End: 2023-10-29
Payer: MEDICARE

## 2023-10-29 ENCOUNTER — HOSPITAL ENCOUNTER (INPATIENT)
Facility: HOSPITAL | Age: 73
LOS: 9 days | Discharge: NURSING, REHAB OR LONG-TERM CARE | End: 2023-11-07
Attending: EMERGENCY MEDICINE | Admitting: FAMILY MEDICINE
Payer: MEDICARE

## 2023-10-29 DIAGNOSIS — N17.9 AKI (ACUTE KIDNEY INJURY): ICD-10-CM

## 2023-10-29 DIAGNOSIS — N20.0 RIGHT NEPHROLITHIASIS: ICD-10-CM

## 2023-10-29 DIAGNOSIS — N13.30 HYDRONEPHROSIS OF LEFT KIDNEY: ICD-10-CM

## 2023-10-29 DIAGNOSIS — E11.65 HYPERGLYCEMIA DUE TO DIABETES MELLITUS: ICD-10-CM

## 2023-10-29 DIAGNOSIS — N39.0 SEPSIS DUE TO URINARY TRACT INFECTION: ICD-10-CM

## 2023-10-29 DIAGNOSIS — N10 ACUTE PYELONEPHRITIS: ICD-10-CM

## 2023-10-29 DIAGNOSIS — A41.9 SEPSIS WITH ACUTE RENAL FAILURE AND SEPTIC SHOCK, DUE TO UNSPECIFIED ORGANISM, UNSPECIFIED ACUTE RENAL FAILURE TYPE: ICD-10-CM

## 2023-10-29 DIAGNOSIS — N12 PYELONEPHRITIS: ICD-10-CM
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
## 2023-10-29 DIAGNOSIS — R65.21 SEPSIS WITH ACUTE RENAL FAILURE AND SEPTIC SHOCK, DUE TO UNSPECIFIED ORGANISM, UNSPECIFIED ACUTE RENAL FAILURE TYPE: ICD-10-CM

## 2023-10-29 DIAGNOSIS — R29.898 WEAKNESS OF RIGHT UPPER EXTREMITY: ICD-10-CM

## 2023-10-29 DIAGNOSIS — N17.9 SEPSIS WITH ACUTE RENAL FAILURE AND SEPTIC SHOCK, DUE TO UNSPECIFIED ORGANISM, UNSPECIFIED ACUTE RENAL FAILURE TYPE: ICD-10-CM

## 2023-10-29 DIAGNOSIS — R29.898 RIGHT ARM WEAKNESS: ICD-10-CM
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
## 2023-10-29 DIAGNOSIS — N15.1 RENAL ABSCESS, LEFT: ICD-10-CM

## 2023-10-29 DIAGNOSIS — A41.9 SEPSIS DUE TO URINARY TRACT INFECTION: ICD-10-CM

## 2023-10-29 DIAGNOSIS — T79.6XXA TRAUMATIC RHABDOMYOLYSIS, INITIAL ENCOUNTER: ICD-10-CM

## 2023-10-29 PROBLEM — M62.82 RHABDOMYOLYSIS: Status: ACTIVE | Noted: 2023-10-29

## 2023-10-29 LAB
ALT SERPL W P-5'-P-CCNC: 61 U/L (ref 1–41)
ANION GAP SERPL CALCULATED.3IONS-SCNC: 21 MMOL/L (ref 5–15)
APTT PPP: 30.5 SECONDS (ref 22–39)
AST SERPL-CCNC: 141 U/L (ref 1–40)
B PARAPERT DNA SPEC QL NAA+PROBE: NOT DETECTED
B PERT DNA SPEC QL NAA+PROBE: NOT DETECTED
BACTERIA UR QL AUTO: ABNORMAL /HPF
BASOPHILS # BLD AUTO: 0.15 10*3/MM3 (ref 0–0.2)
BASOPHILS NFR BLD AUTO: 0.5 % (ref 0–1.5)
BILIRUB UR QL STRIP: NEGATIVE
BUN SERPL-MCNC: 30 MG/DL (ref 8–23)
BUN/CREAT SERPL: 12.7 (ref 7–25)
C PNEUM DNA NPH QL NAA+NON-PROBE: NOT DETECTED
CALCIUM SPEC-SCNC: 9.1 MG/DL (ref 8.6–10.5)
CHLORIDE SERPL-SCNC: 92 MMOL/L (ref 98–107)
CK SERPL-CCNC: ABNORMAL U/L (ref 20–200)
CLARITY UR: ABNORMAL
CO2 SERPL-SCNC: 18 MMOL/L (ref 22–29)
COLOR UR: YELLOW
CREAT SERPL-MCNC: 2.36 MG/DL (ref 0.76–1.27)
D-LACTATE SERPL-SCNC: 5.1 MMOL/L (ref 0.5–2)
D-LACTATE SERPL-SCNC: 5.7 MMOL/L (ref 0.5–2)
D-LACTATE SERPL-SCNC: 6.8 MMOL/L (ref 0.5–2)
D-LACTATE SERPL-SCNC: 7.1 MMOL/L (ref 0.5–2)
DEPRECATED RDW RBC AUTO: 49.6 FL (ref 37–54)
EGFRCR SERPLBLD CKD-EPI 2021: 28.7 ML/MIN/1.73
EOSINOPHIL # BLD AUTO: 0.03 10*3/MM3 (ref 0–0.4)
EOSINOPHIL NFR BLD AUTO: 0.1 % (ref 0.3–6.2)
ERYTHROCYTE [DISTWIDTH] IN BLOOD BY AUTOMATED COUNT: 15.3 % (ref 12.3–15.4)
FLUAV SUBTYP SPEC NAA+PROBE: NOT DETECTED
FLUBV RNA ISLT QL NAA+PROBE: NOT DETECTED
GEN 5 2HR TROPONIN T REFLEX: 45 NG/L
GLUCOSE BLDC GLUCOMTR-MCNC: 179 MG/DL (ref 70–130)
GLUCOSE BLDC GLUCOMTR-MCNC: 256 MG/DL (ref 70–130)
GLUCOSE BLDC GLUCOMTR-MCNC: 324 MG/DL (ref 70–130)
GLUCOSE SERPL-MCNC: 438 MG/DL (ref 65–99)
GLUCOSE UR STRIP-MCNC: ABNORMAL MG/DL
HADV DNA SPEC NAA+PROBE: NOT DETECTED
HBA1C MFR BLD: 9.4 % (ref 4.8–5.6)
HCOV 229E RNA SPEC QL NAA+PROBE: NOT DETECTED
HCOV HKU1 RNA SPEC QL NAA+PROBE: NOT DETECTED
HCOV NL63 RNA SPEC QL NAA+PROBE: NOT DETECTED
HCOV OC43 RNA SPEC QL NAA+PROBE: NOT DETECTED
HCT VFR BLD AUTO: 44.9 % (ref 37.5–51)
HGB BLD-MCNC: 14.7 G/DL (ref 13–17.7)
HGB UR QL STRIP.AUTO: ABNORMAL
HMPV RNA NPH QL NAA+NON-PROBE: NOT DETECTED
HOLD SPECIMEN: NORMAL
HPIV1 RNA ISLT QL NAA+PROBE: NOT DETECTED
HPIV2 RNA SPEC QL NAA+PROBE: NOT DETECTED
HPIV3 RNA NPH QL NAA+PROBE: NOT DETECTED
HPIV4 P GENE NPH QL NAA+PROBE: NOT DETECTED
HYALINE CASTS UR QL AUTO: ABNORMAL /LPF
IMM GRANULOCYTES # BLD AUTO: 0.87 10*3/MM3 (ref 0–0.05)
IMM GRANULOCYTES NFR BLD AUTO: 3.1 % (ref 0–0.5)
KETONES UR QL STRIP: NEGATIVE
LEUKOCYTE ESTERASE UR QL STRIP.AUTO: ABNORMAL
LYMPHOCYTES # BLD AUTO: 0.81 10*3/MM3 (ref 0.7–3.1)
LYMPHOCYTES NFR BLD AUTO: 2.9 % (ref 19.6–45.3)
M PNEUMO IGG SER IA-ACNC: NOT DETECTED
MCH RBC QN AUTO: 28.8 PG (ref 26.6–33)
MCHC RBC AUTO-ENTMCNC: 32.7 G/DL (ref 31.5–35.7)
MCV RBC AUTO: 88 FL (ref 79–97)
MONOCYTES # BLD AUTO: 1.2 10*3/MM3 (ref 0.1–0.9)
MONOCYTES NFR BLD AUTO: 4.3 % (ref 5–12)
NEUTROPHILS NFR BLD AUTO: 25.05 10*3/MM3 (ref 1.7–7)
NEUTROPHILS NFR BLD AUTO: 89.1 % (ref 42.7–76)
NITRITE UR QL STRIP: NEGATIVE
NRBC BLD AUTO-RTO: 0 /100 WBC (ref 0–0.2)
PH UR STRIP.AUTO: 6 [PH] (ref 5–8)
PLATELET # BLD AUTO: 150 10*3/MM3 (ref 140–450)
PMV BLD AUTO: 9.9 FL (ref 6–12)
POTASSIUM SERPL-SCNC: 3.9 MMOL/L (ref 3.5–5.2)
PROT UR QL STRIP: ABNORMAL
QT INTERVAL: 394 MS
QTC INTERVAL: 513 MS
RBC # BLD AUTO: 5.1 10*6/MM3 (ref 4.14–5.8)
RBC # UR STRIP: ABNORMAL /HPF
REF LAB TEST METHOD: ABNORMAL
RHINOVIRUS RNA SPEC NAA+PROBE: DETECTED
RSV RNA NPH QL NAA+NON-PROBE: NOT DETECTED
SARS-COV-2 RNA NPH QL NAA+NON-PROBE: NOT DETECTED
SODIUM SERPL-SCNC: 131 MMOL/L (ref 136–145)
SP GR UR STRIP: 1.04 (ref 1–1.03)
SQUAMOUS #/AREA URNS HPF: ABNORMAL /HPF
TROPONIN T DELTA: -21 NG/L
TROPONIN T SERPL HS-MCNC: 66 NG/L
UROBILINOGEN UR QL STRIP: ABNORMAL
WBC # UR STRIP: ABNORMAL /HPF
WBC NRBC COR # BLD: 28.11 10*3/MM3 (ref 3.4–10.8)
WHOLE BLOOD HOLD COAG: NORMAL
WHOLE BLOOD HOLD SPECIMEN: NORMAL

## 2023-10-29 PROCEDURE — 87186 SC STD MICRODIL/AGAR DIL: CPT | Performed by: EMERGENCY MEDICINE

## 2023-10-29 PROCEDURE — 74176 CT ABD & PELVIS W/O CONTRAST: CPT

## 2023-10-29 PROCEDURE — 70450 CT HEAD/BRAIN W/O DYE: CPT

## 2023-10-29 PROCEDURE — 87150 DNA/RNA AMPLIFIED PROBE: CPT | Performed by: EMERGENCY MEDICINE

## 2023-10-29 PROCEDURE — 85730 THROMBOPLASTIN TIME PARTIAL: CPT | Performed by: EMERGENCY MEDICINE

## 2023-10-29 PROCEDURE — 63710000001 INSULIN REGULAR HUMAN PER 5 UNITS: Performed by: EMERGENCY MEDICINE

## 2023-10-29 PROCEDURE — 94799 UNLISTED PULMONARY SVC/PX: CPT

## 2023-10-29 PROCEDURE — 84484 ASSAY OF TROPONIN QUANT: CPT | Performed by: EMERGENCY MEDICINE

## 2023-10-29 PROCEDURE — 25010000002 CEFTRIAXONE PER 250 MG: Performed by: INTERNAL MEDICINE

## 2023-10-29 PROCEDURE — 99222 1ST HOSP IP/OBS MODERATE 55: CPT | Performed by: NURSE PRACTITIONER

## 2023-10-29 PROCEDURE — 25010000002 PIPERACILLIN SOD-TAZOBACTAM PER 1 G: Performed by: EMERGENCY MEDICINE

## 2023-10-29 PROCEDURE — 25810000003 SODIUM CHLORIDE 0.9 % SOLUTION: Performed by: INTERNAL MEDICINE

## 2023-10-29 PROCEDURE — 87040 BLOOD CULTURE FOR BACTERIA: CPT | Performed by: EMERGENCY MEDICINE

## 2023-10-29 PROCEDURE — 85025 COMPLETE CBC W/AUTO DIFF WBC: CPT | Performed by: EMERGENCY MEDICINE

## 2023-10-29 PROCEDURE — 87186 SC STD MICRODIL/AGAR DIL: CPT | Performed by: INTERNAL MEDICINE

## 2023-10-29 PROCEDURE — 0042T HC CT CEREBRAL PERFUSION W/WO CONTRAST: CPT

## 2023-10-29 PROCEDURE — 84450 TRANSFERASE (AST) (SGOT): CPT | Performed by: EMERGENCY MEDICINE

## 2023-10-29 PROCEDURE — 25010000002 VANCOMYCIN 10 G RECONSTITUTED SOLUTION: Performed by: EMERGENCY MEDICINE

## 2023-10-29 PROCEDURE — 81001 URINALYSIS AUTO W/SCOPE: CPT | Performed by: EMERGENCY MEDICINE

## 2023-10-29 PROCEDURE — 71045 X-RAY EXAM CHEST 1 VIEW: CPT

## 2023-10-29 PROCEDURE — 73030 X-RAY EXAM OF SHOULDER: CPT

## 2023-10-29 PROCEDURE — 83036 HEMOGLOBIN GLYCOSYLATED A1C: CPT | Performed by: INTERNAL MEDICINE

## 2023-10-29 PROCEDURE — 94660 CPAP INITIATION&MGMT: CPT

## 2023-10-29 PROCEDURE — 87086 URINE CULTURE/COLONY COUNT: CPT | Performed by: INTERNAL MEDICINE

## 2023-10-29 PROCEDURE — 80048 BASIC METABOLIC PNL TOTAL CA: CPT | Performed by: EMERGENCY MEDICINE

## 2023-10-29 PROCEDURE — 99223 1ST HOSP IP/OBS HIGH 75: CPT | Performed by: INTERNAL MEDICINE

## 2023-10-29 PROCEDURE — 36415 COLL VENOUS BLD VENIPUNCTURE: CPT

## 2023-10-29 PROCEDURE — 99285 EMERGENCY DEPT VISIT HI MDM: CPT

## 2023-10-29 PROCEDURE — 82948 REAGENT STRIP/BLOOD GLUCOSE: CPT

## 2023-10-29 PROCEDURE — 87077 CULTURE AEROBIC IDENTIFY: CPT | Performed by: INTERNAL MEDICINE

## 2023-10-29 PROCEDURE — 25810000003 SODIUM CHLORIDE 0.9 % SOLUTION: Performed by: EMERGENCY MEDICINE

## 2023-10-29 PROCEDURE — 70551 MRI BRAIN STEM W/O DYE: CPT

## 2023-10-29 PROCEDURE — 70498 CT ANGIOGRAPHY NECK: CPT

## 2023-10-29 PROCEDURE — 25510000001 IOPAMIDOL PER 1 ML: Performed by: EMERGENCY MEDICINE

## 2023-10-29 PROCEDURE — 83605 ASSAY OF LACTIC ACID: CPT | Performed by: EMERGENCY MEDICINE

## 2023-10-29 PROCEDURE — 70496 CT ANGIOGRAPHY HEAD: CPT

## 2023-10-29 PROCEDURE — 84460 ALANINE AMINO (ALT) (SGPT): CPT | Performed by: EMERGENCY MEDICINE

## 2023-10-29 PROCEDURE — 0202U NFCT DS 22 TRGT SARS-COV-2: CPT | Performed by: EMERGENCY MEDICINE

## 2023-10-29 PROCEDURE — 93005 ELECTROCARDIOGRAM TRACING: CPT | Performed by: EMERGENCY MEDICINE

## 2023-10-29 PROCEDURE — 82550 ASSAY OF CK (CPK): CPT | Performed by: EMERGENCY MEDICINE

## 2023-10-29 RX ORDER — SODIUM CHLORIDE 9 MG/ML
125 INJECTION, SOLUTION INTRAVENOUS CONTINUOUS
Status: ACTIVE | OUTPATIENT
Start: 2023-10-29 | End: 2023-10-30

## 2023-10-29 RX ORDER — IBUPROFEN 600 MG/1
1 TABLET ORAL
Status: DISCONTINUED | OUTPATIENT
Start: 2023-10-29 | End: 2023-11-07 | Stop reason: HOSPADM

## 2023-10-29 RX ORDER — SODIUM CHLORIDE 0.9 % (FLUSH) 0.9 %
10 SYRINGE (ML) INJECTION AS NEEDED
Status: DISCONTINUED | OUTPATIENT
Start: 2023-10-29 | End: 2023-11-07 | Stop reason: HOSPADM

## 2023-10-29 RX ORDER — TAMSULOSIN HYDROCHLORIDE 0.4 MG/1
0.4 CAPSULE ORAL DAILY
Status: DISCONTINUED | OUTPATIENT
Start: 2023-10-30 | End: 2023-11-07 | Stop reason: HOSPADM

## 2023-10-29 RX ORDER — ACETAMINOPHEN 325 MG/1
650 TABLET ORAL EVERY 6 HOURS PRN
Status: DISCONTINUED | OUTPATIENT
Start: 2023-10-29 | End: 2023-11-07 | Stop reason: HOSPADM

## 2023-10-29 RX ORDER — ACETAMINOPHEN 500 MG
1000 TABLET ORAL ONCE
Status: COMPLETED | OUTPATIENT
Start: 2023-10-30 | End: 2023-10-29

## 2023-10-29 RX ORDER — IPRATROPIUM BROMIDE AND ALBUTEROL SULFATE 2.5; .5 MG/3ML; MG/3ML
3 SOLUTION RESPIRATORY (INHALATION) EVERY 6 HOURS PRN
Status: DISCONTINUED | OUTPATIENT
Start: 2023-10-29 | End: 2023-11-07 | Stop reason: HOSPADM

## 2023-10-29 RX ORDER — NICOTINE POLACRILEX 4 MG
15 LOZENGE BUCCAL
Status: DISCONTINUED | OUTPATIENT
Start: 2023-10-29 | End: 2023-11-07 | Stop reason: HOSPADM

## 2023-10-29 RX ORDER — DEXTROSE MONOHYDRATE 25 G/50ML
25 INJECTION, SOLUTION INTRAVENOUS
Status: DISCONTINUED | OUTPATIENT
Start: 2023-10-29 | End: 2023-11-07 | Stop reason: HOSPADM

## 2023-10-29 RX ORDER — HYDROCODONE BITARTRATE AND ACETAMINOPHEN 5; 325 MG/1; MG/1
1 TABLET ORAL EVERY 4 HOURS PRN
Status: DISCONTINUED | OUTPATIENT
Start: 2023-10-29 | End: 2023-11-07 | Stop reason: HOSPADM

## 2023-10-29 RX ORDER — INSULIN LISPRO 100 [IU]/ML
2-7 INJECTION, SOLUTION INTRAVENOUS; SUBCUTANEOUS
Status: DISCONTINUED | OUTPATIENT
Start: 2023-10-30 | End: 2023-11-07 | Stop reason: HOSPADM

## 2023-10-29 RX ORDER — VANCOMYCIN 2 GRAM/500 ML IN 0.9 % SODIUM CHLORIDE INTRAVENOUS
20 ONCE
Qty: 500 ML | Refills: 0 | Status: COMPLETED | OUTPATIENT
Start: 2023-10-29 | End: 2023-10-29

## 2023-10-29 RX ORDER — HYDROCODONE BITARTRATE AND ACETAMINOPHEN 7.5; 325 MG/1; MG/1
1 TABLET ORAL ONCE
Status: COMPLETED | OUTPATIENT
Start: 2023-10-29 | End: 2023-10-29

## 2023-10-29 RX ORDER — ACETAMINOPHEN 500 MG
TABLET ORAL
Status: COMPLETED
Start: 2023-10-29 | End: 2023-10-29

## 2023-10-29 RX ORDER — SODIUM CHLORIDE 9 MG/ML
30 INJECTION, SOLUTION INTRAVENOUS ONCE
Status: COMPLETED | OUTPATIENT
Start: 2023-10-29 | End: 2023-10-29

## 2023-10-29 RX ADMIN — SODIUM CHLORIDE 500 ML: 9 INJECTION, SOLUTION INTRAVENOUS at 18:42

## 2023-10-29 RX ADMIN — Medication 1000 MG: at 23:05

## 2023-10-29 RX ADMIN — APIXABAN 2.5 MG: 2.5 TABLET, FILM COATED ORAL at 21:35

## 2023-10-29 RX ADMIN — CEFTRIAXONE SODIUM 2000 MG: 2 INJECTION, POWDER, FOR SOLUTION INTRAMUSCULAR; INTRAVENOUS at 21:35

## 2023-10-29 RX ADMIN — SODIUM CHLORIDE 500 ML: 9 INJECTION, SOLUTION INTRAVENOUS at 23:05

## 2023-10-29 RX ADMIN — ACETAMINOPHEN 650 MG: 325 TABLET ORAL at 22:56

## 2023-10-29 RX ADMIN — HYDROCODONE BITARTRATE AND ACETAMINOPHEN 1 TABLET: 5; 325 TABLET ORAL at 21:35

## 2023-10-29 RX ADMIN — VANCOMYCIN HYDROCHLORIDE 2000 MG: 10 INJECTION, POWDER, LYOPHILIZED, FOR SOLUTION INTRAVENOUS at 16:30

## 2023-10-29 RX ADMIN — HYDROCODONE BITARTRATE AND ACETAMINOPHEN 1 TABLET: 7.5; 325 TABLET ORAL at 16:00

## 2023-10-29 RX ADMIN — INSULIN HUMAN 15 UNITS: 100 INJECTION, SOLUTION PARENTERAL at 14:42

## 2023-10-29 RX ADMIN — IOPAMIDOL 115 ML: 755 INJECTION, SOLUTION INTRAVENOUS at 13:19

## 2023-10-29 RX ADMIN — ACETAMINOPHEN 1000 MG: 500 TABLET ORAL at 23:05

## 2023-10-29 RX ADMIN — PIPERACILLIN SODIUM AND TAZOBACTAM SODIUM 4.5 G: 4; .5 INJECTION, SOLUTION INTRAVENOUS at 14:46

## 2023-10-29 RX ADMIN — SODIUM CHLORIDE 2550 ML: 9 INJECTION, SOLUTION INTRAVENOUS at 14:09

## 2023-10-29 NOTE — H&P
[facility] Medicine Services  HISTORY AND PHYSICAL    Patient Name: Aldair Narvaez  : 1950  MRN: [id number]  Primary Care Physician: Miguel Angel Mireles MD    Subjective   Subjective     Chief Complaint: Weakness and fall    HPI:  Aldair Narvaez is a 73 y.o. male who  has a past medical history of Acid reflux, Arthritis, Benign hypertension, Colon polyp, Concern about heart attack without diagnosis, Coronary artery disease, Coronary atherosclerosis, Heart disease, Hyperlipidemia, Hypertension, Kidney stone, Mixed hyperlipidemia, Obesity, Prostate cancer, Sleep apnea, Type 2 diabetes mellitus, and Type 2 diabetes mellitus. who presented to the emergency room after a fall.  He felt okay yesterday.  He fell out of the bed and slept all night on his right side.  EMS was called this morning and returned him to his bed.  Noticed later in the day that he could not move his right arm and did not feel any better and was right to the emergency room.  Patient was found to have a urinary tract infection with acute renal failure as well as a significantly elevated white count and rhabdomyolysis.  He was treated as a code stroke but no perfusion deficits were found.  Thought that he could have a brachial plexus injury from his prolonged time on the floor last night.  Patient has previously seen by Dr. Justin for chronic left hydronephrosis.  CT scan was ordered in the emergency room.      Review of Systems   Patient denies weight loss, headaches, changes in vision, fever, chills, sore throat, shortness of breath, cough, nausea or vomiting, diarrhea, abdominal pain or distension, change in urine output or habits, joint pain, rash, itching, numbness/tingling, weakness or bleeding.    Otherwise complete ROS is negative except as mentioned in the HPI.    Personal History     PMH: He  has a past medical history of Acid reflux, Arthritis, Benign hypertension, Colon polyp, Concern about heart attack without  diagnosis, Coronary artery disease, Coronary atherosclerosis, Heart disease, Hyperlipidemia, Hypertension, Kidney stone, Mixed hyperlipidemia, Obesity, Prostate cancer, Sleep apnea, Type 2 diabetes mellitus, and Type 2 diabetes mellitus.   PSxH: He  has a past surgical history that includes Cardiac catheterization; Prostatectomy; Fetal surgery for congenital hernia; cystoscopy bladder stone lithotripsy; Carpal tunnel release; Inguinal Hernia Repair; Knee Arthroscopy; Umbilical hernia repair; Trigger finger release; Uvulopalatopharyngoplasty; cystoscopy bladder stone lithotripsy; Cardiac surgery (N/A); Coronary artery bypass graft (08/30/2022); Cystoscopy w/ ureteral stent placement (Left, 05/09/2023); and Kidney stone surgery.         FH: His family history includes Diabetes in his sister; Heart attack in his father; Heart disease in his father; Hyperlipidemia in his sister; Hypertension in his sister.   SH: He  reports that he has never smoked. He has never used smokeless tobacco. He reports that he does not currently use alcohol after a past usage of about 1.0 standard drink of alcohol per week. He reports that he does not use drugs.     Medications:  HYDROcodone-acetaminophen, PHARMACY MEDS TO BED CONSULT, albuterol sulfate HFA, apixaban, buPROPion XL, coenzyme Q10, dapagliflozin Propanediol, glimepiride, glucose blood, hyoscyamine sulfate, metoprolol succinate XL, nitrofurantoin (macrocrystal-monohydrate), nitroglycerin, pantoprazole, phenazopyridine, potassium citrate, rosuvastatin, tamsulosin, and valsartan-hydrochlorothiazide    Allergies   Allergen Reactions    Iodine Other (See Comments)     Closes sinuses    Oxycodone-Acetaminophen Itching    Zofran [Ondansetron] Hives       Objective   Objective     Vital Signs:   Temp:  [97.8 °F (36.6 °C)-98.2 °F (36.8 °C)] 98.2 °F (36.8 °C)  Heart Rate:  [100-102] 100  Resp:  [16-18] 18  BP: ()/(69-73) 94/69    Constitutional: Awake, alert, interactive and  pleasant ill, but nontoxic  Eyes: clear sclerae, no conjunctival injection  HENT: NCAT, mucous membranes moist  Neck: no masses or lymphadenopathy, trachea midline  Respiratory: good breath sounds bilaterally, respirations unlabored  Cardiovascular: RRR, no murmurs appreciated, palpable peripheral pulses  Abdomen:  soft, no HSM or masses palpable, based but nontender  Musculoskeletal: No peripheral edema, clubbing or cyanosis  Neurologic: Oriented x 3,                      Right arm is flaccid he is able to  some with his right hand, left arm and leg are normal                   cranial Nerves grossly intact, speech clear  Skin: No rashes or jaundice, he does have multiple abrasions across his right arm and legs  Psychiatric: Appropriate mood, insight      Result Review:  I have personally reviewed the results from the time of this admission   to 10/29/2023 18:02 EDT and agree with these findings:  [x]  Laboratory  [x]  Microbiology  [x]  Radiology  [x]  EKG/Telemetry   []  Cardiology/Vascular   []  Pathology  [x]  Old records  []  Other:  Most notable findings include: Significant elevated white count, lactic acid, creatinine 2.3 glucose 438      LAB RESULTS:      Lab 10/29/23  1614 10/29/23  1259   WBC  --  28.11*   HEMOGLOBIN  --  14.7   HEMATOCRIT  --  44.9   PLATELETS  --  150   NEUTROS ABS  --  25.05*   IMMATURE GRANS (ABS)  --  0.87*   LYMPHS ABS  --  0.81   MONOS ABS  --  1.20*   EOS ABS  --  0.03   MCV  --  88.0   LACTATE 5.1* 7.1*   APTT  --  30.5         Lab 10/29/23  1259   SODIUM 131*   POTASSIUM 3.9   CHLORIDE 92*   CO2 18.0*   ANION GAP 21.0*   BUN 30*   CREATININE 2.36*   EGFR 28.7*   GLUCOSE 438*   CALCIUM 9.1         Lab 10/29/23  1259   ALT (SGPT) 61*   AST (SGOT) 141*         Lab 10/29/23  1614 10/29/23  1259   HSTROP T 45* 66*                 Brief Urine Lab Results  (Last result in the past 365 days)        Color   Clarity   Blood   Leuk Est   Nitrite   Protein   CREAT   Urine HCG         10/29/23 1445 Yellow   Cloudy   Large (3+)   Small (1+)   Negative   100 mg/dL (2+)                 COVID19   Date Value Ref Range Status   10/29/2023 Not Detected Not Detected - Ref. Range Final       MRI Brain Without Contrast    Result Date: 10/29/2023  MRI BRAIN WO CONTRAST Date of Exam: 10/29/2023 3:30 PM EDT Indication: Stroke, follow up.  Comparison: CT head, CT perfusion, and CTA head and neck performed the same day Technique:  Routine multiplanar/multisequence sequence images of the brain were obtained without contrast administration. Findings: No midline shift. The ventricles and sulci appear within normal limits for patient's age. Basal cisterns appear patent. The pituitary gland appears to have normal morphology. Cerebellar tonsils appear normally positioned. No definite extra-axial collection and no definite findings of acute or chronic hemorrhage. No mass lesion, mass effect, or edema is identified. Mild T2 and FLAIR hyperintense signal foci are seen in the cerebral white matter. No definite restricted diffusion is identified at this time. There is mucosal thickening of the paranasal sinuses. Mastoid air cells appear clear. Globes and orbits appear unremarkable. No definite calvarial abnormality is identified.     Impression: Impression: 1.No definite findings of acute intracranial abnormality at this time. 2.Mild T2 and FLAIR hyperintense signal foci in the cerebral white matter are nonspecific but likely represent mild chronic small vessel ischemic changes. 3.Mucosal thickening of the paranasal sinuses could indicate acute sinusitis. Electronically Signed: Larry Hoff  10/29/2023 4:10 PM EDT  Workstation ID: [id number]    XR Shoulder 2+ View Right    Result Date: 10/29/2023  XR SHOULDER 2+ VW RIGHT Date of Exam: 10/29/2023 1:57 PM EDT Indication: Trauma Comparison: None available. FINDINGS:  No definite acute fracture or malalignment is seen. Glenohumeral joint is not well evaluated due to  positioning. There is suspected osteophyte formation with probable mild to moderate joint space narrowing. There appear to be moderate degenerative changes at the acromioclavicular joint. Calcifications projecting along the posterior humeral head may represent calcific tendinopathy. No other definite soft tissue abnormality.     Impression: 1.No definite radiographic findings of acute osseous shoulder abnormality. 2.Glenohumeral joint is not well evaluated due to positioning. There is suspected mild to moderate osteoarthritis. 3.Moderate degenerative changes at the acromioclavicular joint. Electronically Signed: Larry Hoff  10/29/2023 2:16 PM EDT  Workstation ID: [id number]    XR Chest 1 View    Result Date: 10/29/2023  XR CHEST 1 VW Date of Exam: 10/29/2023 1:47 PM EDT Indication: Acute Stroke Protocol (onset < 12 hrs) Comparison: None available. FINDINGS: There are prominent interstitial markings and mild left basilar opacities. No significant focal consolidation. No pneumothorax or significant pleural effusion. The heart appears mildly enlarged. Status post median sternotomy and CABG. Mediastinal contour  appears within normal limits.     Impression: 1.Prominent interstitial markings and mild left basilar opacities which could represent mild edema, pneumonia, or chronic lung disease. 2.Mild cardiomegaly. Status post median sternotomy and CABG. Electronically Signed: Larry Hoff  10/29/2023 2:00 PM EDT  Workstation ID: [id number]    CT Angiogram Head w AI Analysis of LVO    Result Date: 10/29/2023  CT ANGIOGRAM HEAD W AI ANALYSIS OF LVO, CT ANGIOGRAM NECK Date of Exam: 10/29/2023 1:13 PM EDT Indication: Neuro deficit, acute stroke suspected Neuro deficit, acute stroke suspected. Comparison: CT head and CT perfusion performed the same day. Technique: CTA of the head and neck was performed after the uneventful intravenous administration of iodinated contrast. Reconstructed coronal and sagittal images were also  obtained. In addition, a 3-D volume rendered image was created for interpretation. Automated exposure control and iterative reconstruction methods were used. Findings: Head: The vertebral and basilar arteries appear somewhat diminutive. There is fetal origin of the right posterior cerebral artery and partial fetal origin on the left. The posterior cerebral arteries appear grossly symmetric. No definite proximal occlusion or definite high-grade stenosis. There is calcific atherosclerosis of the cavernous portions of the internal carotid arteries. No high-grade stenosis is seen. The middle cerebral arteries appear to opacify as expected. There is a small right A1 segment of the anterior cerebral artery, likely a normal variant. There is a patent anterior communicating artery. The anterior cerebral arteries are patent distally and grossly symmetric. No definite focal occlusion or high-grade stenosis is identified in the bilateral M1 and M2 segments of the middle cerebral arteries. No other definite acute intracranial vascular abnormality is seen. Neck: Status post median sternotomy and CABG. The visualized central pulmonary arteries appear to opacify as expected. Visualized aortic arch appears within normal limits. There is bovine type branching anatomy with common origin of the right brachiocephalic and left common carotid arteries. The left common carotid and external carotid arteries are patent. There is minimal atherosclerosis at the carotid bifurcation. There is tortuosity of the internal carotid artery. No stenosis of greater than 50% is identified by NASCET criteria. Right brachiocephalic artery appears patent. Common carotid and external carotid arteries are patent. There is also tortuosity of the right internal carotid artery without stenosis of greater than 50% by NASCET criteria. The subclavian arteries appear patent. The vertebral arteries appear patent. The vertebral arteries appear somewhat diminutive,  likely normal variant as there appears to be fetal origin of the posterior cerebral arteries. No other definite acute vascular abnormality is seen. Nonvascular: No acute intracranial abnormality is identified. There is paranasal sinus mucosal thickening. No acute infiltrate is seen in the lung apices. 7 mm hypodensity is seen in the right lobe of thyroid. There is mild to moderate multilevel disc and facet joint degeneration in the cervical spine.     Impression: Impression: 1.No definite findings of acute intracranial large vessel occlusion. 2.No significant stenosis of greater than 50% is seen at the bilateral internal carotid arteries at this time. Electronically Signed: Larry Hoff  10/29/2023 1:49 PM EDT  Workstation ID: [id number]    CT Angiogram Neck    Result Date: 10/29/2023  CT ANGIOGRAM HEAD W AI ANALYSIS OF LVO, CT ANGIOGRAM NECK Date of Exam: 10/29/2023 1:13 PM EDT Indication: Neuro deficit, acute stroke suspected Neuro deficit, acute stroke suspected. Comparison: CT head and CT perfusion performed the same day. Technique: CTA of the head and neck was performed after the uneventful intravenous administration of iodinated contrast. Reconstructed coronal and sagittal images were also obtained. In addition, a 3-D volume rendered image was created for interpretation. Automated exposure control and iterative reconstruction methods were used. Findings: Head: The vertebral and basilar arteries appear somewhat diminutive. There is fetal origin of the right posterior cerebral artery and partial fetal origin on the left. The posterior cerebral arteries appear grossly symmetric. No definite proximal occlusion or definite high-grade stenosis. There is calcific atherosclerosis of the cavernous portions of the internal carotid arteries. No high-grade stenosis is seen. The middle cerebral arteries appear to opacify as expected. There is a small right A1 segment of the anterior cerebral artery, likely a normal variant.  There is a patent anterior communicating artery. The anterior cerebral arteries are patent distally and grossly symmetric. No definite focal occlusion or high-grade stenosis is identified in the bilateral M1 and M2 segments of the middle cerebral arteries. No other definite acute intracranial vascular abnormality is seen. Neck: Status post median sternotomy and CABG. The visualized central pulmonary arteries appear to opacify as expected. Visualized aortic arch appears within normal limits. There is bovine type branching anatomy with common origin of the right brachiocephalic and left common carotid arteries. The left common carotid and external carotid arteries are patent. There is minimal atherosclerosis at the carotid bifurcation. There is tortuosity of the internal carotid artery. No stenosis of greater than 50% is identified by NASCET criteria. Right brachiocephalic artery appears patent. Common carotid and external carotid arteries are patent. There is also tortuosity of the right internal carotid artery without stenosis of greater than 50% by NASCET criteria. The subclavian arteries appear patent. The vertebral arteries appear patent. The vertebral arteries appear somewhat diminutive, likely normal variant as there appears to be fetal origin of the posterior cerebral arteries. No other definite acute vascular abnormality is seen. Nonvascular: No acute intracranial abnormality is identified. There is paranasal sinus mucosal thickening. No acute infiltrate is seen in the lung apices. 7 mm hypodensity is seen in the right lobe of thyroid. There is mild to moderate multilevel disc and facet joint degeneration in the cervical spine.     Impression: Impression: 1.No definite findings of acute intracranial large vessel occlusion. 2.No significant stenosis of greater than 50% is seen at the bilateral internal carotid arteries at this time. Electronically Signed: Larry Hoff  10/29/2023 1:49 PM EDT  Workstation ID:  [id number]    CT CEREBRAL PERFUSION WITH & WITHOUT CONTRAST    Result Date: 10/29/2023  CT CEREBRAL PERFUSION W WO CONTRAST Date of Exam: 10/29/2023 1:13 PM EDT Indication: Neuro deficit, acute stroke suspected.  Comparison: CT head performed the same day. Technique: Axial CT images of the brain were obtained prior to and after the administration of 115 mL Isovue-370. Core blood volume, core blood flow, mean transit time, and Tmax images were obtained utilizing the Rapid software protocol. A limited CT angiogram of the head was also performed to measure the blood vessel density. The radiation dose reduction device was turned on for each scan per the ALARA (As Low as Reasonably Achievable) protocol. FINDINGS: CT Perfusion: CBF (<30%) volume: 0 mL Tmax (>6.0s) volume: 0 mL Mismatch volume: 0 mL Mismatch ratio: None No definite perfusion abnormality is seen in the visualized parenchyma to indicate ischemia or infarct.     Impression: No CT perfusion evidence of acute infarction or ischemia. Electronically Signed: Larry Hoff  10/29/2023 1:35 PM EDT  Workstation ID: [id number]    CT Head Without Contrast Stroke Protocol    Result Date: 10/29/2023  CT HEAD WO CONTRAST STROKE PROTOCOL Date of Exam: 10/29/2023 1:03 PM EDT Indication: Neuro deficit, acute, stroke suspected Neuro deficit, acute stroke suspected. Comparison: None available. Technique: Axial CT images were obtained of the head without contrast administration.  Reconstructed coronal images were also obtained. Automated exposure control and iterative construction methods were used. Scan Time: 1:11 p.m. Results discussed with stroke team navigator at 1:17 p.m FINDINGS: No midline shift. Basal cisterns appear patent.  Ventricles and sulci appear within normal limits for patient age. No extra-axial collection or acute hemorrhage is identified.  No definite mass lesion, edema, or significant mass effect is seen.  There is hypoattenuation in the white matter, which is  nonspecific, but is most commonly seen with chronic small vessel ischemic changes. No definite CT findings of acute infarction. There is paranasal sinus mucosal thickening. Mastoid air cells appear clear.  No acute calvarial abnormality is identified.     Impression: 1.No definite CT findings of acute intracranial abnormality. Probable mild chronic small vessel ischemic changes. 2.Paranasal sinus mucosal thickening which could indicate sinusitis. Electronically Signed: Larry Coradoizabella  10/29/2023 1:18 PM EDT  Workstation ID: [id number]         The patient has started, but not completed, their COVID-19 vaccination series.    Assessment & Plan   Assessment / Plan       UTI (urinary tract infection)    Mixed hyperlipidemia    Uncontrolled type 2 diabetes mellitus with hyperglycemia    CAD, multiple vessel    Personal history of prostate cancer    NEETA (acute kidney injury)    Rhabdomyolysis    Right arm weakness      Assessment & Plan:  73-year-old with prostate cancer, chronic left ureteral stricture and hydronephrosis prior E. coli bacteremia, hyperlipidemia, type 2 diabetes, coronary artery disease who presents with a fall, weakness, subsequent right arm weakness and a UTI    Sepsis due to UTI/ Possible renal abscess  NEETA  Lactic Acidosis  Patient looks better in person than he does data  -Continue aggressive IV hydration, reviewed previous cultures and only grew E. coli  -Cont vanc and high dose Zosyn  -follow cultures  --LONG discussion with Urology(Sanjuanita) and IR (Anderson) this evening---regarding  -- patient with evidence of renal abscess that needs to be drained- BUT he got half an eliquis dose this morning--Temp of 103 is better with tylenol and BP is stable  --cont villarreal    T2DM with hyperglycemia  -cont frequent FS and SQ insulin  -hold farxiga and amaryl    HO HTN, CAD, Afib  -hold BP meds   -may need beta blocker back    Fall with right arm weakness  -NCS  -neuro eval    DVT prophylaxis:eliquis on hold last dose  10/29 2.5 mg    Right arm weakness  -check NCV  -Consult PT.OT    CODE STATUS:  Code Status (Patient has no pulse and is not breathing): CPR (Attempt to Resuscitate)  Medical Interventions (Patient has pulse or is breathing): Full    Expected Discharge TBD  Discussed with daughter over the phone earlier-- her ? is an internist in [place].   Wife is at the bedside updated to all the events.    Electronically signed by Dorothea Carrizales MD 10/29/23 18:02 EDT

## 2023-10-29 NOTE — Clinical Note
Level of Care: Telemetry [5]   Diagnosis: UTI (urinary tract infection) [[id number]]   Admitting Physician: ANGELICA HUDSON [[id number]]   Attending Physician: ANGELICA HUDSON [[id number]]

## 2023-10-29 NOTE — CONSULTS
Stroke Consult Note    Patient Name: Aldair Narvaez   MRN: [id number]  Age: 73 y.o.  Sex: male  : 1950    Primary Care Physician: Miguel Angel Mireles MD  Referring Physician:  Dr. Weston    TIME STROKE TEAM CALLED: 1250 EST     TIME PATIENT SEEN: 1255 EST    Handedness: Right  Race:      Chief Complaint/Reason for Consultation: Right arm weakness and numbness, right shoulder pain    Subjective .  HPI: Aldair Narvaez is a 73-year-old, , right-handed male with known diagnosis of HTN, HLD, CAD, T2DM, GRANT, and obesity who presents with right arm weakness and numbness.  Last known well last night at midnight when he went to bed.  Patient fell at some point in the night; he does not remember falling.  He states that he laid on the floor on top of his right arm for about 7 hours before his wife found him.  EMS was called as he was unable to get him up; they assisted him back into bed as it was felt that his right arm symptoms would improve.  As he was still unable to move his arm a couple hours later they called EMS again to transport to the ED.    On exam patient is alert and oriented x3.  His speech is clear and fluent, no aphasia.  Gaze is normal, all visual fields are intact.  Face is symmetrical, tongue protrudes midline.  Patient is able to raise his shoulders though he does endorse right shoulder pain.  He is unable to raise his right arm off the bed he can minimally move his right fingers but cannot make a fist.  No drift in any other extremity.  He has no sensation in his right arm to light touch.  He did ambulate from the [facility] to the CT table and his gait is unsteady he notes this is abnormal for him.  Patient did also report that he has had a sinus infection for a few days.    Last Known Normal Date/Time: 0000 midnight EST     Review of Systems   Constitutional:  Positive for fatigue. Negative for fever.   HENT:  Positive for congestion, postnasal drip and sinus pressure.    Eyes:  Negative for  visual disturbance.   Respiratory:  Negative for cough and shortness of breath.    Cardiovascular:  Negative for chest pain and palpitations.   Gastrointestinal:  Negative for nausea and vomiting.   Musculoskeletal:  Positive for gait problem.        Unsteady   Skin: Negative.    Neurological:  Positive for dizziness, weakness and numbness. Negative for facial asymmetry, speech difficulty and headaches.   Psychiatric/Behavioral: Negative.        Past Medical History:   Diagnosis Date    Acid reflux     Arthritis     Benign hypertension     Colon polyp     Concern about heart attack without diagnosis     Coronary artery disease     Coronary atherosclerosis     Heart disease     Hyperlipidemia     Hypertension     Kidney stone     Mixed hyperlipidemia     Obesity     body mass index 30+-obesity    Prostate cancer     Sleep apnea     Type 2 diabetes mellitus     Type 2 diabetes mellitus      Past Surgical History:   Procedure Laterality Date    CARDIAC CATHETERIZATION      CARDIAC SURGERY N/A     [place]    CARPAL TUNNEL RELEASE      CORONARY ARTERY BYPASS GRAFT  08/30/2022    CYSTOSCOPY BLADDER STONE LITHOTRIPSY      CYSTOSCOPY BLADDER STONE LITHOTRIPSY      CYSTOSCOPY W/ URETERAL STENT PLACEMENT Left 05/09/2023    Procedure: CYSTOSCOPY LEFT RETROGRADE PYELOGRAM AND LEFT URETERAL STENT PLACEMENT;  Surgeon: Isma Justin MD;  [facility];  Service: Urology;  Laterality: Left;    FETAL SURGERY FOR CONGENITAL HERNIA      INGUINAL HERNIA REPAIR      KIDNEY STONE SURGERY      KNEE ARTHROSCOPY      PROSTATECTOMY      TRIGGER FINGER RELEASE      UMBILICAL HERNIA REPAIR      UVULOPALATOPHARYNGOPLASTY       Family History   Problem Relation Age of Onset    Heart attack Father     Heart disease Father     Diabetes Sister     Hyperlipidemia Sister     Hypertension Sister      Social History     Socioeconomic History    Marital status:    Tobacco Use    Smoking status: Never    Smokeless tobacco:  Never   Vaping Use    Vaping Use: Never used   Substance and Sexual Activity    Alcohol use: Not Currently     Alcohol/week: 1.0 standard drink of alcohol     Types: 1 Drinks containing 0.5 oz of alcohol per week    Drug use: Never    Sexual activity: Defer     Allergies   Allergen Reactions    Iodine Other (See Comments)     Closes sinuses    Oxycodone-Acetaminophen Itching    Zofran [Ondansetron] Hives     Prior to Admission medications    Medication Sig Start Date End Date Taking? Authorizing Provider   albuterol sulfate  (90 Base) MCG/ACT inhaler As Needed. 6/12/22   Key Lucas MD   buPROPion XL (WELLBUTRIN XL) 150 MG 24 hr tablet Daily. 3/4/22   Key Lucas MD   coenzyme Q10 100 MG capsule Take 1 capsule by mouth Daily.    Key Lucas MD   dapagliflozin Propanediol (Farxiga) 10 MG tablet Take 10 mg by mouth Daily.    Key Lucas MD   Eliquis 5 MG tablet tablet Daily. 11/16/22   Key Lucas MD   glimepiride (AMARYL) 4 MG tablet Take 2 tablets by mouth Every Morning Before Breakfast. 9/5/23   Steve Cohen MD   glucose blood (Contour Next Test) test strip 1 strip 3 times daily dx: e11.65 9/23/22   Steve Cohen MD   HYDROcodone-acetaminophen (NORCO) 5-325 MG per tablet Take 1 tablet by mouth Every 8 (Eight) Hours As Needed for Moderate Pain for up to 9 doses. 8/23/23   Isma Justin MD   hyoscyamine sulfate (ANASPAZ) 0.125 MG tablet dispersible disintegrating tablet Place 1 tablet on the tongue Every 4 (Four) Hours As Needed (bladder spasms). 8/23/23   Isma Justin MD   metoprolol succinate XL (TOPROL-XL) 50 MG 24 hr tablet 1 tablet Daily. 11/6/20   Key Lucas MD   nitrofurantoin, macrocrystal-monohydrate, (Macrobid) 100 MG capsule Take 1 capsule by mouth 2 (Two) Times a Day. 8/23/23   Isma Justin MD   nitroglycerin (NITROSTAT) 0.4 MG SL tablet nitroglycerin 0.4 mg sublingual tablet    Provider  MD Key   pantoprazole (PROTONIX) 40 MG EC tablet Daily. 4/12/21   Key Lucas MD   PHARMACY MEDS TO BED CONSULT Daily. 5/11/23   Raymon Weinberg MD   phenazopyridine (PYRIDIUM) 200 MG tablet Take 1 tablet by mouth 3 (Three) Times a Day As Needed painful urination (Dysuria) for up to 15 doses. 8/23/23   Isma Justin MD   potassium citrate (UROCIT-K) 5 MEQ (540 MG) CR tablet Take 3 tablets by mouth 2 (Two) Times a Day With Meals. 10/19/23   Isma Justin MD   rosuvastatin (CRESTOR) 20 MG tablet Take 1 tablet by mouth Daily. 11/23/20   Key Lucas MD   tamsulosin (FLOMAX) 0.4 MG capsule 24 hr capsule TAKE 1 CAPSULE BY MOUTH DAILY. 7/17/23   Isma Justin MD   valsartan-hydrochlorothiazide (DIOVAN-HCT) 320-25 MG per tablet 1 tablet Daily. 11/6/20   Key Lucas MD             Objective     Temp:  [97.8 °F (36.6 °C)] 97.8 °F (36.6 °C)  Heart Rate:  [101-102] 102  Resp:  [16] 16  BP: (110-114)/(73) 110/73  Neurological Exam  Mental Status  Alert. Oriented to person, place, and time. Speech is normal. Language is fluent with no aphasia. Attention and concentration are normal.    Cranial Nerves  CN II: Visual fields full to confrontation.  CN III, IV, VI: Extraocular movements intact bilaterally. Normal lids and orbits bilaterally. Pupils equal round and reactive to light bilaterally.  CN V: Facial sensation is normal.  CN VII: Full and symmetric facial movement.  CN XI: Shoulder shrug strength is normal.  CN XII: Tongue midline without atrophy or fasciculations.    Motor  Normal muscle bulk throughout. No fasciculations present. Decreased muscle tone. No abnormal involuntary movements. Strength is 5/5 in all four extremities except as noted.  RUE 1/5.    Sensory  Light touch abnormality:   Unable to detect light touch in the right arm.    Reflexes  Right Plantar: downgoing  Left Plantar: downgoing    Coordination    No dysmetria out of proportion to  weakness.    Gait  Casual gait: Normal stance. Reduced stride length. Hesitant gait.      Physical Exam  Vitals reviewed.   Constitutional:       Appearance: Normal appearance.   HENT:      Head: Normocephalic and atraumatic.   Eyes:      General: Lids are normal.      Extraocular Movements: Extraocular movements intact.      Pupils: Pupils are equal, round, and reactive to light.   Cardiovascular:      Rate and Rhythm: Normal rate.      Pulses:           Radial pulses are 2+ on the right side and 2+ on the left side.   Pulmonary:      Effort: Pulmonary effort is normal. No respiratory distress.   Musculoskeletal:         General: No swelling. Normal range of motion.      Cervical back: Normal range of motion and neck supple.   Skin:     General: Skin is warm and dry.   Neurological:      Mental Status: He is alert and oriented to person, place, and time.      Cranial Nerves: No cranial nerve deficit.      Sensory: Sensory deficit present.      Motor: Weakness present.   Psychiatric:         Mood and Affect: Mood normal.         Speech: Speech normal.         Behavior: Behavior normal.         Acute Stroke Data    IV Thrombolytic (TPA/Tenecteplase) Inclusion / Exclusion Criteria    Time: 13:00 EDT  Person Administering Scale: JERRICA Alvarado    Inclusion Criteria  [x]   18 years of age or greater   []   Onset of symptoms < 4.5 hours before beginning treatment (stroke onset = time patient was last seen well or without symptoms).   []   Diagnosis of acute ischemic stroke causing measurable disabling deficit (Complete Hemianopia, Any Aphasia, Visual or Sensory Extinction, Any weakness limiting sustained effort against gravity)   []   Any remaining deficit considered potentially disabling in view of patient and practitioner   Exclusion criteria (Do not proceed with Alteplase if any are checked under exclusion criteria)  [x]   Onset unknown or GREATER than 4.5 hours   []   ICH on CT/MRI   []   CT demonstrates  hypodensity representing acute or subacute infarct   []   Significant head trauma or prior stroke in the previous 3 months   []   Symptoms suggestive of subarachnoid hemorrhage   []   History of un-ruptured intracranial aneurysm GREATER than 10 mm   []   Recent intracranial or intraspinal surgery within the last 3 months   []   Arterial puncture at a non-compressible site in the previous 7 days   []   Active internal bleeding   []   Acute bleeding tendency   []   Platelet count LESS than 100,000 for known hematological diseases such as leukemia, thrombocytopenia or chronic cirrhosis   []   Current use of anticoagulant with INR GREATER than 1.7 or PT GREATER than 15 seconds, aPTT GREATER than 40 seconds   []   Heparin received within 48 hours, resulting in abnormally elevated aPTT GREATER than upper limit of normal   []   Current use of direct thrombin inhibitors or direct factor Xa inhibitors in the past 48 hours   []   Elevated blood pressure refractory to treatment (systolic GREATER than 185 mm/Hg or diastolic  GREATER than 110 mm/Hg   []   Suspected infective endocarditis and aortic arch dissection   []   Current use of therapeutic treatment dose of low-molecular-weight heparin (LMWH) within the previous 24 hours   []   Structural GI malignancy or bleed   Relative exclusion for all patients  []   Only minor nondisabling symptoms   []   Pregnancy   []   Seizure at onset with postictal residual neurological impairments   []   Major surgery or previous trauma within past 14 days   []   History of previous spontaneous ICH, intracranial neoplasm, or AV malformation   []   Postpartum (within previous 14 days)   []   Recent GI or urinary tract hemorrhage (within previous 21 days)   []   Recent acute MI (within previous 3 months)   []   History of unruptured intracranial aneurysm LESS than 10 mm   []   History of ruptured intracranial aneurysm   []   Blood glucose LESS than 50 mg/dL (2.7 mmol/L)   []   Dural puncture  within the last 7 days   []   Known GREATER than 10 cerebral microbleeds   Additional exclusions for patients with symptoms onset between 3 and 4.5 hours.  []   Age > 80.   []   On any anticoagulants regardless of INR  >>> Warfarin (Coumadin), Heparin, Enoxaparin (Lovenox), fondaparinux (Arixtra), bivalirudin (Angiomax), Argatroban, dabigatran (Pradaxa), rivaroxaban (Xarelto), or apixaban (Eliquis)   []   Severe stroke (NIHSS > 25).   []   History of BOTH diabetes and previous ischemic stroke.   []   The risks and benefits have been discussed with the patient or family related to the administration of IV alteplase for stroke symptoms.   []   I have discussed and reviewed the patient's case and imaging with the attending prior to IV Thrombolytic (TPA/Tenecteplase).    Time Thrombolytic administered       Hospital Meds:  Scheduled- HYDROcodone-acetaminophen, 1 tablet, Oral, Once  vancomycin, 20 mg/kg, Intravenous, Once      Infusions-     PRNs-   sodium chloride    Functional Status Prior to Current Stroke/Braeden Score: MRS 0    NIH Stroke Scale  Time: 13:00 EDT  Person Administering Scale: JERRICA Alvarado    1a  Level of consciousness: 0=alert; keenly responsive   1b. LOC questions:  0=Answers both questions correctly   1c. LOC commands: 0=Performs both tasks correctly   2.  Best Gaze: 0=normal   3.  Visual: 0=No visual loss   4. Facial Palsy: 0=Normal symmetric movement   5a.  Motor left arm: 0=No drift, limb holds 90 (or 45) degrees for full 10 seconds   5b.  Motor right arm: 3=No effort against gravity, limb falls   6a. motor left le=No drift, limb holds 90 (or 45) degrees for full 10 seconds   6b  Motor right le=No drift, limb holds 90 (or 45) degrees for full 10 seconds   7. Limb Ataxia: 0=Absent   8.  Sensory: 2=Severe to total sensory loss; patient is not aware of being touched in face, arm, leg   9. Best Language:  0=No aphasia, normal   10. Dysarthria: 0=Normal   11. Extinction and  Inattention: 0=No abnormality    Total:   5       Results Reviewed:  I have personally reviewed current lab, radiology, and data and agree with results.  WBC   Date Value Ref Range Status   10/29/2023 28.11 (H) 3.40 - 10.80 10*3/mm3 Final     RBC   Date Value Ref Range Status   10/29/2023 5.10 4.14 - 5.80 10*6/mm3 Final     Hemoglobin   Date Value Ref Range Status   10/29/2023 14.7 13.0 - 17.7 g/dL Final     Hematocrit   Date Value Ref Range Status   10/29/2023 44.9 37.5 - 51.0 % Final     MCV   Date Value Ref Range Status   10/29/2023 88.0 79.0 - 97.0 fL Final     MCH   Date Value Ref Range Status   10/29/2023 28.8 26.6 - 33.0 pg Final     MCHC   Date Value Ref Range Status   10/29/2023 32.7 31.5 - 35.7 g/dL Final     RDW   Date Value Ref Range Status   10/29/2023 15.3 12.3 - 15.4 % Final     RDW-SD   Date Value Ref Range Status   10/29/2023 49.6 37.0 - 54.0 fl Final     MPV   Date Value Ref Range Status   10/29/2023 9.9 6.0 - 12.0 fL Final     Platelets   Date Value Ref Range Status   10/29/2023 150 140 - 450 10*3/mm3 Final     Neutrophil %   Date Value Ref Range Status   10/29/2023 89.1 (H) 42.7 - 76.0 % Final     Lymphocyte %   Date Value Ref Range Status   10/29/2023 2.9 (L) 19.6 - 45.3 % Final     Monocyte %   Date Value Ref Range Status   10/29/2023 4.3 (L) 5.0 - 12.0 % Final     Eosinophil %   Date Value Ref Range Status   10/29/2023 0.1 (L) 0.3 - 6.2 % Final     Basophil %   Date Value Ref Range Status   10/29/2023 0.5 0.0 - 1.5 % Final     Immature Grans %   Date Value Ref Range Status   10/29/2023 3.1 (H) 0.0 - 0.5 % Final     Neutrophils, Absolute   Date Value Ref Range Status   10/29/2023 25.05 (H) 1.70 - 7.00 10*3/mm3 Final     Lymphocytes, Absolute   Date Value Ref Range Status   10/29/2023 0.81 0.70 - 3.10 10*3/mm3 Final     Monocytes, Absolute   Date Value Ref Range Status   10/29/2023 1.20 (H) 0.10 - 0.90 10*3/mm3 Final     Eosinophils, Absolute   Date Value Ref Range Status   10/29/2023 0.03 0.00  - 0.40 10*3/mm3 Final     Basophils, Absolute   Date Value Ref Range Status   10/29/2023 0.15 0.00 - 0.20 10*3/mm3 Final     Immature Grans, Absolute   Date Value Ref Range Status   10/29/2023 0.87 (H) 0.00 - 0.05 10*3/mm3 Final     nRBC   Date Value Ref Range Status   10/29/2023 0.0 0.0 - 0.2 /100 WBC Final     Lab Results   Component Value Date    GLUCOSE 438 (C) 10/29/2023    BUN 30 (H) 10/29/2023    CREATININE 2.36 (H) 10/29/2023    EGFR 28.7 (L) 10/29/2023    BCR 12.7 10/29/2023    K 3.9 10/29/2023    CO2 18.0 (L) 10/29/2023    CALCIUM 9.1 10/29/2023    ALBUMIN 4.5 07/25/2023    BILITOT 0.5 07/25/2023     (H) 10/29/2023    ALT 61 (H) 10/29/2023     Lactate 7.1    XR Shoulder 2+ View Right    Result Date: 10/29/2023  1.No definite radiographic findings of acute osseous shoulder abnormality. 2.Glenohumeral joint is not well evaluated due to positioning. There is suspected mild to moderate osteoarthritis. 3.Moderate degenerative changes at the acromioclavicular joint. Electronically Signed: Larry Hoff  10/29/2023 2:16 PM EDT  Workstation ID: [id number]    XR Chest 1 View    Result Date: 10/29/2023  1.Prominent interstitial markings and mild left basilar opacities which could represent mild edema, pneumonia, or chronic lung disease. 2.Mild cardiomegaly. Status post median sternotomy and CABG. Electronically Signed: Larry Hoff  10/29/2023 2:00 PM EDT  Workstation ID: [id number]    CT Angiogram Head w AI Analysis of LVO    Result Date: 10/29/2023  Impression: 1.No definite findings of acute intracranial large vessel occlusion. 2.No significant stenosis of greater than 50% is seen at the bilateral internal carotid arteries at this time. Electronically Signed: Larry Hoff  10/29/2023 1:49 PM EDT  Workstation ID: [id number]    CT Angiogram Neck    Result Date: 10/29/2023  Impression: 1.No definite findings of acute intracranial large vessel occlusion. 2.No significant stenosis of greater than 50% is seen at the  bilateral internal carotid arteries at this time. Electronically Signed: Larry Hoff  10/29/2023 1:49 PM EDT  Workstation ID: [id number]    CT CEREBRAL PERFUSION WITH & WITHOUT CONTRAST    Result Date: 10/29/2023  No CT perfusion evidence of acute infarction or ischemia. Electronically Signed: Larry Hoff  10/29/2023 1:35 PM EDT  Workstation ID: [id number]    CT Head Without Contrast Stroke Protocol    Result Date: 10/29/2023  1.No definite CT findings of acute intracranial abnormality. Probable mild chronic small vessel ischemic changes. 2.Paranasal sinus mucosal thickening which could indicate sinusitis. Electronically Signed: Larry Hoff  10/29/2023 1:18 PM EDT  Workstation ID: [id number]             Assessment/Plan:  73-year-old, , right-handed male with known diagnosis of HTN, HLD, CAD, T2DM, GRANT, and obesity who presents with right arm weakness and numbness.  Last known well last night at midnight when he went to bed.  Patient fell out of bed during the night and laid on the floor on top of his right arm for about 7 hours before his wife found him and called EMS to assist him back to bed.  He has had persistent right arm weakness (can only slightly move his fingers, no antigravity strength) and right arm numbness.  Patient also has an unsteady gait.  NIHSS of 5.  CT head negative for acute abnormality, evidence of sinusitis.  Not a candidate for IV thrombolytics secondary to time.  CTA head and neck negative for hemodynamically significant flow-limiting stenosis, no LVO or aneurysm.  CT perfusion is negative.    PTA antiplatelet: None  PTA anticoagulant: Eliquis 5 mg daily      Right arm weakness -brachial plexus injury v possible ischemia  -CT imaging reassuring  -We will obtain MRI brain without to evaluate for possible stroke  -Hold off on stroke order set until MRI is completed  -If negative suspect patient likely has brachial plexus injury and will likely need a EMG for further work-up    2.   Sepsis  -WBCs 28, lactic 7.1  -Appears to have UTI, recent admission with pyelonephritis  -Infectious work-up per primary team      Addendum:  MRI brain is negative for ischemia, no further stroke work up is needed    Kelly Tolliver, APRN  October 29, 2023  15:34 EDT

## 2023-10-29 NOTE — ED PROVIDER NOTES
Subjective   History of Present Illness  Mr. Narvaez presents with right upper extremity weakness.  He tells me he fell sometime during the night.  He tells me he yelled for his wife who eventually found him at 7:00 this morning.  She could not get him upright.  EMS came and put him in bed.  Since that time he has noticed weakness of his right upper extremity.  He called EMS again and presents with persisting right upper extremity weakness.  He tells me he feels like he is walking okay although when I examined him walking he is very unstable and admits that his gait is unstable.      Review of Systems    Past Medical History:   Diagnosis Date    Acid reflux     Arthritis     Benign hypertension     Colon polyp     Concern about heart attack without diagnosis     Coronary artery disease     Coronary atherosclerosis     Heart disease     Hyperlipidemia     Hypertension     Kidney stone     Mixed hyperlipidemia     Obesity     body mass index 30+-obesity    Prostate cancer     Sleep apnea     Type 2 diabetes mellitus     Type 2 diabetes mellitus        Allergies   Allergen Reactions    Iodine Other (See Comments)     Closes sinuses    Oxycodone-Acetaminophen Itching    Zofran [Ondansetron] Hives       Past Surgical History:   Procedure Laterality Date    CARDIAC CATHETERIZATION      CARDIAC SURGERY N/A     [place]    CARPAL TUNNEL RELEASE      CORONARY ARTERY BYPASS GRAFT  08/30/2022    CYSTOSCOPY BLADDER STONE LITHOTRIPSY      CYSTOSCOPY BLADDER STONE LITHOTRIPSY      CYSTOSCOPY W/ URETERAL STENT PLACEMENT Left 05/09/2023    Procedure: CYSTOSCOPY LEFT RETROGRADE PYELOGRAM AND LEFT URETERAL STENT PLACEMENT;  Surgeon: Isma Justin MD;  Location: [facility];  Service: Urology;  Laterality: Left;    FETAL SURGERY FOR CONGENITAL HERNIA      INGUINAL HERNIA REPAIR      KIDNEY STONE SURGERY      KNEE ARTHROSCOPY      PROSTATECTOMY      TRIGGER FINGER RELEASE      UMBILICAL HERNIA REPAIR       UVULOPALATOPHARYNGOPLASTY         Family History   Problem Relation Age of Onset    Heart attack Father     Heart disease Father     Diabetes Sister     Hyperlipidemia Sister     Hypertension Sister        Social History     Socioeconomic History    Marital status:    Tobacco Use    Smoking status: Never    Smokeless tobacco: Never   Vaping Use    Vaping Use: Never used   Substance and Sexual Activity    Alcohol use: Not Currently     Alcohol/week: 1.0 standard drink of alcohol     Types: 1 Drinks containing 0.5 oz of alcohol per week    Drug use: Never    Sexual activity: Defer           Objective   Physical Exam    Critical Care    Performed by: Titus Weston MD  Authorized by: Titus Weston MD    Critical care provider statement:     Critical care time (minutes):  35    Critical care was necessary to treat or prevent imminent or life-threatening deterioration of the following conditions:  CNS failure or compromise and sepsis    Critical care was time spent personally by me on the following activities:  Discussions with consultants, evaluation of patient's response to treatment, examination of patient, ordering and review of laboratory studies, ordering and performing treatments and interventions, ordering and review of radiographic studies, re-evaluation of patient's condition and review of old charts             ED Course  ED Course as of 10/29/23 1520   Sun Oct 29, 2023   1313 I saw him emergently in the CT scan as the result of a code stroke page.  I have examined him with the stroke navigator.  He has flaccidity of the right upper extremity.  I assisted him to walk over to the CT table and he required assistance.  He is on Eliquis and last known well was midnight so not candidate for thrombolysis.  Differential diagnosis would include acute CVA versus neuropraxia as he tells me his arm was pinned behind him all night. [DT]   1332 I spoke with Maria E from the stroke team.  She has reviewed his  perfusion study and advises that is normal.  She has reviewed his angiograms and there is artifact versus stenosis of the right side but nothing that would correlate with his current symptoms.  We will proceed with medical work-up and ultimately admission for MRI and further evaluation.  Possibly brachial plexus injury. [DT]   1352 I spoke with him and his wife.  He tells me he has been sick over the last week with runny nose and cough.  He has chronic urinary incontinence and wears depends.  Sepsis screen is positive, will initiate antibiotics.  He reports having a lot of pain in his shoulder.  He does not remember falling last night.  Stroke team has ordered MRI to evaluate further for acute CVA.  I think his upper extremity weakness is likely from neuropraxia.  I think he is septic, source unclear at this point. [DT]   1408 I reviewed and interpreted prior records.  He was admitted here on the eighth to the 11th of this year with pyelonephritis and resultant sepsis. [DT]   1443 Have reviewed chest x-ray which is unremarkable, no definite infiltrate.  On repeat exam he remains awake and alert.  He is asking for oxycodone for shoulder pain.  He tells me he does fine with it.  I pointed out that his chart indicates he has an allergy to oxycodone and he tells me it was just a small amount of itching.  Offered Tylenol and he feels like he needs something stronger, will give hydrocodone. [DT]   1447 I have ordered and interpreted an EKG which shows sinus tachycardia with a rate of 102.  Axis and intervals are normal.  There is loss of R waves inferiorly indicating possible prior inferior infarct.  There is no ST or T wave findings to indicate acute infarct or ischemia.  I have consulted our electronic medical record and we have no prior EKGs with which to compare [DT]   1448 His nurses in the room getting urine and during his second troponin. [DT]   1510 Urine looks convincing for infection.  Antibiotics are infusing.  [DT]      ED Course User Index  [DT] Titus Weston MD                                           Medical Decision Making  Please see course notes.  I saw him emergently on arrival in the CT scan.  I interpreted his CT at bedside.  I had numerous interactions with the stroke navigator as we managed his care.  I ordered and interpreted multiple labs consistent with septic shock.  I ordered multiple IV medications including fluid bolus and IV antibiotics.  Had multiple rhe evaluations of him.    Problems Addressed:  NEETA (acute kidney injury): complicated acute illness or injury that poses a threat to life or bodily functions  Fall in home, initial encounter: complicated acute illness or injury that poses a threat to life or bodily functions  Hyperglycemia due to diabetes mellitus: complicated acute illness or injury  Sepsis with acute renal failure and septic shock, due to unspecified organism, unspecified acute renal failure type: complicated acute illness or injury  Traumatic rhabdomyolysis, initial encounter: complicated acute illness or injury that poses a threat to life or bodily functions  Weakness of right upper extremity: complicated acute illness or injury that poses a threat to life or bodily functions    Amount and/or Complexity of Data Reviewed  Independent Historian: spouse  External Data Reviewed: notes.  Labs: ordered. Decision-making details documented in ED Course.  Radiology: ordered. Decision-making details documented in ED Course.  ECG/medicine tests: ordered.    Risk  OTC drugs.  Prescription drug management.  Decision regarding hospitalization.    Critical Care  Total time providing critical care: 35 minutes        Final diagnoses:   Fall in home, initial encounter   Traumatic rhabdomyolysis, initial encounter   NEETA (acute kidney injury)   Sepsis with acute renal failure and septic shock, due to unspecified organism, unspecified acute renal failure type   Weakness of right upper extremity    Hyperglycemia due to diabetes mellitus       ED Disposition  ED Disposition       ED Disposition   Decision to Admit    Condition   --    Comment   Level of Care: Telemetry [5]   Diagnosis: UTI (urinary tract infection) [[id number]]   Admitting Physician: ANGELICA HUDSON [[id number]]   Attending Physician: ANGELICA HUDSON [[id number]   Certification: I Certify That Inpatient Hospital Services Are Medically Necessary For Greater Than 2 Midnights                 No follow-up provider specified.       Medication List      No changes were made to your prescriptions during this visit.            Titus Weston MD  10/29/23 1521

## 2023-10-30 ENCOUNTER — APPOINTMENT (OUTPATIENT)
Dept: CT IMAGING | Facility: HOSPITAL | Age: 73
End: 2023-10-30
Payer: MEDICARE

## 2023-10-30 ENCOUNTER — APPOINTMENT (OUTPATIENT)
Dept: NEUROLOGY | Facility: HOSPITAL | Age: 73
End: 2023-10-30
Payer: MEDICARE

## 2023-10-30 LAB
ALBUMIN SERPL-MCNC: 2.7 G/DL (ref 3.5–5.2)
ALBUMIN/GLOB SERPL: 1 G/DL
ALP SERPL-CCNC: 93 U/L (ref 39–117)
ALT SERPL W P-5'-P-CCNC: 83 U/L (ref 1–41)
ANION GAP SERPL CALCULATED.3IONS-SCNC: 12 MMOL/L (ref 5–15)
AST SERPL-CCNC: 252 U/L (ref 1–40)
BACTERIA BLD CULT: ABNORMAL
BASOPHILS # BLD MANUAL: 0 10*3/MM3 (ref 0–0.2)
BASOPHILS NFR BLD MANUAL: 0 % (ref 0–1.5)
BILIRUB SERPL-MCNC: 0.4 MG/DL (ref 0–1.2)
BOTTLE TYPE: ABNORMAL
BUN SERPL-MCNC: 23 MG/DL (ref 8–23)
BUN/CREAT SERPL: 17.8 (ref 7–25)
BURR CELLS BLD QL SMEAR: ABNORMAL
CALCIUM SPEC-SCNC: 7.5 MG/DL (ref 8.6–10.5)
CHLORIDE SERPL-SCNC: 104 MMOL/L (ref 98–107)
CK SERPL-CCNC: ABNORMAL U/L (ref 20–200)
CO2 SERPL-SCNC: 20 MMOL/L (ref 22–29)
CREAT SERPL-MCNC: 1.29 MG/DL (ref 0.76–1.27)
D-LACTATE SERPL-SCNC: 3.4 MMOL/L (ref 0.5–2)
DEPRECATED RDW RBC AUTO: 51.7 FL (ref 37–54)
EGFRCR SERPLBLD CKD-EPI 2021: 59.3 ML/MIN/1.73
EOSINOPHIL # BLD MANUAL: 0 10*3/MM3 (ref 0–0.4)
EOSINOPHIL NFR BLD MANUAL: 0 % (ref 0.3–6.2)
ERYTHROCYTE [DISTWIDTH] IN BLOOD BY AUTOMATED COUNT: 15.9 % (ref 12.3–15.4)
GLOBULIN UR ELPH-MCNC: 2.8 GM/DL
GLUCOSE BLDC GLUCOMTR-MCNC: 110 MG/DL (ref 70–130)
GLUCOSE BLDC GLUCOMTR-MCNC: 134 MG/DL (ref 70–130)
GLUCOSE BLDC GLUCOMTR-MCNC: 135 MG/DL (ref 70–130)
GLUCOSE BLDC GLUCOMTR-MCNC: 154 MG/DL (ref 70–130)
GLUCOSE BLDC GLUCOMTR-MCNC: 155 MG/DL (ref 70–130)
GLUCOSE BLDC GLUCOMTR-MCNC: 241 MG/DL (ref 70–130)
GLUCOSE SERPL-MCNC: 140 MG/DL (ref 65–99)
HCT VFR BLD AUTO: 39.7 % (ref 37.5–51)
HGB BLD-MCNC: 13 G/DL (ref 13–17.7)
INR PPP: 1.44 (ref 0.89–1.12)
LYMPHOCYTES # BLD MANUAL: 0 10*3/MM3 (ref 0.7–3.1)
LYMPHOCYTES NFR BLD MANUAL: 2 % (ref 5–12)
MCH RBC QN AUTO: 29 PG (ref 26.6–33)
MCHC RBC AUTO-ENTMCNC: 32.7 G/DL (ref 31.5–35.7)
MCV RBC AUTO: 88.4 FL (ref 79–97)
MONOCYTES # BLD: 0.29 10*3/MM3 (ref 0.1–0.9)
NEUTROPHILS # BLD AUTO: 14.27 10*3/MM3 (ref 1.7–7)
NEUTROPHILS NFR BLD MANUAL: 62 % (ref 42.7–76)
NEUTS BAND NFR BLD MANUAL: 36 % (ref 0–5)
NRBC SPEC MANUAL: 0 /100 WBC (ref 0–0.2)
PLAT MORPH BLD: NORMAL
PLATELET # BLD AUTO: 88 10*3/MM3 (ref 140–450)
PMV BLD AUTO: 10.5 FL (ref 6–12)
POTASSIUM SERPL-SCNC: 3.6 MMOL/L (ref 3.5–5.2)
PROT SERPL-MCNC: 5.5 G/DL (ref 6–8.5)
PROTHROMBIN TIME: 17.7 SECONDS (ref 12.2–14.5)
RBC # BLD AUTO: 4.49 10*6/MM3 (ref 4.14–5.8)
SODIUM SERPL-SCNC: 136 MMOL/L (ref 136–145)
VANCOMYCIN SERPL-MCNC: 11 MCG/ML (ref 5–40)
VARIANT LYMPHS NFR BLD MANUAL: 0 % (ref 19.6–45.3)
WBC MORPH BLD: NORMAL
WBC NRBC COR # BLD: 14.56 10*3/MM3 (ref 3.4–10.8)

## 2023-10-30 PROCEDURE — 95908 NRV CNDJ TST 3-4 STUDIES: CPT | Performed by: PSYCHIATRY & NEUROLOGY

## 2023-10-30 PROCEDURE — 99232 SBSQ HOSP IP/OBS MODERATE 35: CPT | Performed by: INTERNAL MEDICINE

## 2023-10-30 PROCEDURE — 99232 SBSQ HOSP IP/OBS MODERATE 35: CPT | Performed by: NURSE PRACTITIONER

## 2023-10-30 PROCEDURE — 25010000002 MIDAZOLAM PER 1 MG: Performed by: RADIOLOGY

## 2023-10-30 PROCEDURE — 80202 ASSAY OF VANCOMYCIN: CPT

## 2023-10-30 PROCEDURE — 82948 REAGENT STRIP/BLOOD GLUCOSE: CPT

## 2023-10-30 PROCEDURE — 87077 CULTURE AEROBIC IDENTIFY: CPT | Performed by: INTERNAL MEDICINE

## 2023-10-30 PROCEDURE — 94660 CPAP INITIATION&MGMT: CPT

## 2023-10-30 PROCEDURE — 87205 SMEAR GRAM STAIN: CPT | Performed by: INTERNAL MEDICINE

## 2023-10-30 PROCEDURE — 95908 NRV CNDJ TST 3-4 STUDIES: CPT

## 2023-10-30 PROCEDURE — 25010000002 LIDOCAINE 1 % SOLUTION: Performed by: RADIOLOGY

## 2023-10-30 PROCEDURE — 25010000002 MEROPENEM PER 100 MG

## 2023-10-30 PROCEDURE — 85007 BL SMEAR W/DIFF WBC COUNT: CPT | Performed by: INTERNAL MEDICINE

## 2023-10-30 PROCEDURE — 83605 ASSAY OF LACTIC ACID: CPT | Performed by: INTERNAL MEDICINE

## 2023-10-30 PROCEDURE — 95886 MUSC TEST DONE W/N TEST COMP: CPT | Performed by: PSYCHIATRY & NEUROLOGY

## 2023-10-30 PROCEDURE — 25010000002 MEROPENEM PER 100 MG: Performed by: INTERNAL MEDICINE

## 2023-10-30 PROCEDURE — 94799 UNLISTED PULMONARY SVC/PX: CPT

## 2023-10-30 PROCEDURE — 82550 ASSAY OF CK (CPK): CPT | Performed by: INTERNAL MEDICINE

## 2023-10-30 PROCEDURE — C1729 CATH, DRAINAGE: HCPCS

## 2023-10-30 PROCEDURE — 99152 MOD SED SAME PHYS/QHP 5/>YRS: CPT

## 2023-10-30 PROCEDURE — 87070 CULTURE OTHR SPECIMN AEROBIC: CPT | Performed by: INTERNAL MEDICINE

## 2023-10-30 PROCEDURE — 80053 COMPREHEN METABOLIC PANEL: CPT | Performed by: INTERNAL MEDICINE

## 2023-10-30 PROCEDURE — 25810000003 SODIUM CHLORIDE 0.9 % SOLUTION

## 2023-10-30 PROCEDURE — 25010000002 VANCOMYCIN 10 G RECONSTITUTED SOLUTION

## 2023-10-30 PROCEDURE — 87186 SC STD MICRODIL/AGAR DIL: CPT | Performed by: INTERNAL MEDICINE

## 2023-10-30 PROCEDURE — 85025 COMPLETE CBC W/AUTO DIFF WBC: CPT | Performed by: INTERNAL MEDICINE

## 2023-10-30 PROCEDURE — 99233 SBSQ HOSP IP/OBS HIGH 50: CPT | Performed by: STUDENT IN AN ORGANIZED HEALTH CARE EDUCATION/TRAINING PROGRAM

## 2023-10-30 PROCEDURE — 0T9130Z DRAINAGE OF LEFT KIDNEY WITH DRAINAGE DEVICE, PERCUTANEOUS APPROACH: ICD-10-PCS | Performed by: FAMILY MEDICINE

## 2023-10-30 PROCEDURE — 63710000001 INSULIN LISPRO (HUMAN) PER 5 UNITS: Performed by: INTERNAL MEDICINE

## 2023-10-30 PROCEDURE — 25810000003 SODIUM CHLORIDE 0.9 % SOLUTION: Performed by: NURSE PRACTITIONER

## 2023-10-30 PROCEDURE — 85610 PROTHROMBIN TIME: CPT | Performed by: INTERNAL MEDICINE

## 2023-10-30 PROCEDURE — 25810000003 SODIUM CHLORIDE 0.9 % SOLUTION: Performed by: INTERNAL MEDICINE

## 2023-10-30 PROCEDURE — 95886 MUSC TEST DONE W/N TEST COMP: CPT

## 2023-10-30 PROCEDURE — 25010000002 PIPERACILLIN SOD-TAZOBACTAM PER 1 G: Performed by: INTERNAL MEDICINE

## 2023-10-30 RX ORDER — MIDAZOLAM HYDROCHLORIDE 1 MG/ML
INJECTION INTRAMUSCULAR; INTRAVENOUS
Status: DISPENSED
Start: 2023-10-30 | End: 2023-10-31

## 2023-10-30 RX ORDER — SODIUM CHLORIDE 9 MG/ML
125 INJECTION, SOLUTION INTRAVENOUS CONTINUOUS
Status: ACTIVE | OUTPATIENT
Start: 2023-10-30 | End: 2023-10-31

## 2023-10-30 RX ORDER — FENTANYL CITRATE 50 UG/ML
INJECTION, SOLUTION INTRAMUSCULAR; INTRAVENOUS
Status: DISPENSED
Start: 2023-10-30 | End: 2023-10-31

## 2023-10-30 RX ORDER — MIDAZOLAM HYDROCHLORIDE 1 MG/ML
INJECTION INTRAMUSCULAR; INTRAVENOUS AS NEEDED
Status: COMPLETED | OUTPATIENT
Start: 2023-10-30 | End: 2023-10-30

## 2023-10-30 RX ORDER — LIDOCAINE HYDROCHLORIDE 10 MG/ML
10 INJECTION, SOLUTION INFILTRATION; PERINEURAL ONCE
Status: COMPLETED | OUTPATIENT
Start: 2023-10-30 | End: 2023-10-30

## 2023-10-30 RX ORDER — VANCOMYCIN/0.9 % SOD CHLORIDE 1.5G/250ML
15 PLASTIC BAG, INJECTION (ML) INTRAVENOUS
Status: DISCONTINUED | OUTPATIENT
Start: 2023-10-30 | End: 2023-10-31

## 2023-10-30 RX ADMIN — HYDROCODONE BITARTRATE AND ACETAMINOPHEN 1 TABLET: 5; 325 TABLET ORAL at 15:58

## 2023-10-30 RX ADMIN — MIDAZOLAM HYDROCHLORIDE 1 MG: 1 INJECTION, SOLUTION INTRAMUSCULAR; INTRAVENOUS at 14:30

## 2023-10-30 RX ADMIN — HYDROCODONE BITARTRATE AND ACETAMINOPHEN 1 TABLET: 5; 325 TABLET ORAL at 10:17

## 2023-10-30 RX ADMIN — INSULIN LISPRO 2 UNITS: 100 INJECTION, SOLUTION INTRAVENOUS; SUBCUTANEOUS at 00:09

## 2023-10-30 RX ADMIN — SODIUM CHLORIDE 1000 ML: 9 INJECTION, SOLUTION INTRAVENOUS at 00:10

## 2023-10-30 RX ADMIN — TAMSULOSIN HYDROCHLORIDE 0.4 MG: 0.4 CAPSULE ORAL at 10:17

## 2023-10-30 RX ADMIN — MEROPENEM 1000 MG: 1 INJECTION, POWDER, FOR SOLUTION INTRAVENOUS at 21:33

## 2023-10-30 RX ADMIN — LIDOCAINE HYDROCHLORIDE 10 ML: 10 INJECTION, SOLUTION INFILTRATION; PERINEURAL at 15:00

## 2023-10-30 RX ADMIN — MEROPENEM 1000 MG: 1 INJECTION, POWDER, FOR SOLUTION INTRAVENOUS at 15:58

## 2023-10-30 RX ADMIN — SODIUM CHLORIDE 125 ML/HR: 9 INJECTION, SOLUTION INTRAVENOUS at 15:58

## 2023-10-30 RX ADMIN — MEROPENEM 1000 MG: 1 INJECTION, POWDER, FOR SOLUTION INTRAVENOUS at 02:29

## 2023-10-30 RX ADMIN — HYDROCODONE BITARTRATE AND ACETAMINOPHEN 1 TABLET: 5; 325 TABLET ORAL at 21:59

## 2023-10-30 RX ADMIN — SODIUM CHLORIDE 500 ML: 9 INJECTION, SOLUTION INTRAVENOUS at 01:35

## 2023-10-30 RX ADMIN — VANCOMYCIN HYDROCHLORIDE 1500 MG: 10 INJECTION, POWDER, LYOPHILIZED, FOR SOLUTION INTRAVENOUS at 17:09

## 2023-10-30 RX ADMIN — PIPERACILLIN SODIUM AND TAZOBACTAM SODIUM 4.5 G: 4; .5 INJECTION, SOLUTION INTRAVENOUS at 00:48

## 2023-10-30 NOTE — INTERVAL H&P NOTE
H&P reviewed. The patient was examined and there are no changes to the H&P.      The patient has a low SBP. Will give IVF and drain renal abscess.

## 2023-10-30 NOTE — CONSULTS
Aldair MUIR Brice  1950  [id number]    Date of Consult: 10/30/2023    10/29/2023      Requesting Provider: Dr Carrizales  Evaluating Physician: Joel Ham MD    Chief Complaint: fatigue    Reason for Consultation: pyelonephritis, blood cultures positive    History of present illness:     Patient is a 73 y.o.  Yr old male with history of prostate cancer and renal calculi, admitted in May 2023 requiring left ureteral stent, sepsis presentation with E. Coli pyelonephritis/septicemia and positive urine/blood cultures, transitioned to IV ceftriaxone and finished therapy; he presented to the hospital on October 29 with fall, generalized weakness and noted to have acute UTI/sepsis/acute kidney injury per medicine team. Creatinine 2.36 in the emergency room, significantly elevated lactate and white blood cell count of 28K; respiratory panel positive for rhinovirus, blood culture subsequently with gram-negative mavis and urine with gram-negative mavis; CT scan of the abdomen with left upper pole cyst that had increased in size from May with irregular gaseous lucency suspicious for infected renal cyst per radiology along with mild left hydronephrosis/hydroureter; urology has seen and making arrangements for IR consultation for left renal abscess drain versus aspiration; urology plans for cystoscopy/stent on October 31    He remains generally fatigued, has scrapes on both lower extremities from his fall; fever has improved with some vague left flank pain that is not severe, dull at present, nonradiating, better since admission and does not attach a numerical severity.  Denies hematuria or overt pyuria.  Has had intermittent urinary frequency.  Denies hesitancy    Some dry cough, denies dyspnea at this time; no hemoptysis.  No headache photophobia or neck stiffness.  Denies nausea vomiting diarrhea or abdominal pain today.  No new skin rash    No raw or undercooked food.  No unpasteurized milk or milk products.  No animal  insect or arthropod exposure.  No tick bites.  No outdoor camping or hunting exposure.  No travel exposure.  No ill exposure.  No history of TB or TB exposure.   Denies a history of MRSA/VRE and no history of C. difficile or ESBL/KPC  organisms.    Past Medical History:   Diagnosis Date    Acid reflux     Arthritis     Benign hypertension     Colon polyp     Concern about heart attack without diagnosis     Coronary artery disease     Coronary atherosclerosis     Heart disease     Hyperlipidemia     Hypertension     Kidney stone     Mixed hyperlipidemia     Obesity     body mass index 30+-obesity    Prostate cancer     Sleep apnea     Type 2 diabetes mellitus     Type 2 diabetes mellitus        Past Surgical History:   Procedure Laterality Date    CARDIAC CATHETERIZATION      CARDIAC SURGERY N/A     [place]    CARPAL TUNNEL RELEASE      CORONARY ARTERY BYPASS GRAFT  08/30/2022    CYSTOSCOPY BLADDER STONE LITHOTRIPSY      CYSTOSCOPY BLADDER STONE LITHOTRIPSY      CYSTOSCOPY W/ URETERAL STENT PLACEMENT Left 05/09/2023    Procedure: CYSTOSCOPY LEFT RETROGRADE PYELOGRAM AND LEFT URETERAL STENT PLACEMENT;  Surgeon: Isma Justin MD;  Location: [facility];  Service: Urology;  Laterality: Left;    FETAL SURGERY FOR CONGENITAL HERNIA      INGUINAL HERNIA REPAIR      KIDNEY STONE SURGERY      KNEE ARTHROSCOPY      PROSTATECTOMY      TRIGGER FINGER RELEASE      UMBILICAL HERNIA REPAIR      UVULOPALATOPHARYNGOPLASTY         Pediatric History   Patient Parents    Not on file     Other Topics Concern    Not on file   Social History Narrative    Not on file       family history includes Diabetes in his sister; Heart attack in his father; Heart disease in his father; Hyperlipidemia in his sister; Hypertension in his sister.    Allergies   Allergen Reactions    Iodine Other (See Comments)     Closes sinuses    Oxycodone-Acetaminophen Itching    Zofran [Ondansetron] Hives       Medication:  Current Facility-Administered  Medications   Medication Dose Route Frequency Provider Last Rate Last Admin    acetaminophen (TYLENOL) tablet 650 mg  650 mg Oral Q6H PRN Salvatore BrendaJERRICA   650 mg at 10/29/23 2256    dextrose (D50W) (25 g/50 mL) IV injection 25 g  25 g Intravenous Q15 Min PRN Dorothea Carrizales MD        dextrose (GLUTOSE) oral gel 15 g  15 g Oral Q15 Min PRN Dorothea Carrizales MD        glucagon (GLUCAGEN) injection 1 mg  1 mg Intramuscular Q15 Min PRN Dorothea Carrizales MD        HYDROcodone-acetaminophen (NORCO) 5-325 MG per tablet 1 tablet  1 tablet Oral Q4H PRN Dorothea Carrizales MD   1 tablet at 10/29/23 2135    Insulin Lispro (humaLOG) injection 2-7 Units  2-7 Units Subcutaneous Q4H Dorothea Carrizales MD   2 Units at 10/30/23 0009    ipratropium-albuterol (DUO-NEB) nebulizer solution 3 mL  3 mL Nebulization Q6H PRN Dorothea Carrizales MD        meropenem (MERREM) 1,000 mg in sodium chloride 0.9 % 100 mL IVPB  1,000 mg Intravenous Q12H Janie Christianson DO        Pharmacy to dose vancomycin   Does not apply Continuous PRN Dorothea Carrizales MD        sodium chloride 0.9 % flush 10 mL  10 mL Intravenous PRN Titus Weston MD        tamsulosin (FLOMAX) 24 hr capsule 0.4 mg  0.4 mg Oral Daily Dorothea Carrizales MD        vancomycin IVPB 1500 mg in 0.9% NaCl (Premix) 500 mL  15 mg/kg Intravenous Q24H Yamileth Coleman, PharmD           Antibiotics:  Anti-Infectives (From admission, onward)      Ordered     Dose/Rate Route Frequency Start Stop    10/30/23 0729  vancomycin IVPB 1500 mg in 0.9% NaCl (Premix) 500 mL        Ordering Provider: Yamileth Coleman, PharmD    15 mg/kg × 103 kg  333.3 mL/hr over 90 Minutes Intravenous Every 24 Hours Scheduled 10/30/23 1400 11/06/23 1359    10/30/23 0221  meropenem (MERREM) 1,000 mg in sodium chloride 0.9 % 100 mL IVPB        Ordering Provider: Janie Christianson DO    1,000 mg  over 3 Hours Intravenous Every 12 Hours Scheduled 10/30/23 1200 11/04/23 0859    10/30/23 0221  meropenem (MERREM) 1,000  mg in sodium chloride 0.9 % 100 mL IVPB        Ordering Provider: Janie Christianson,     1,000 mg  over 30 Minutes Intravenous Once 10/30/23 0315 10/30/23 0259    10/29/23 2314  piperacillin-tazobactam (ZOSYN) 4.5 g in iso-osmotic dextrose 100 mL IVPB (premix)        Ordering Provider: Dorothea Carrizales MD    4.5 g  over 30 Minutes Intravenous Once 10/30/23 0000 10/30/23 0118    10/29/23 2311  Pharmacy to dose vancomycin        Ordering Provider: Dorothea Carrizales MD     Does not apply Continuous PRN 10/29/23 2311 11/03/23 2310    10/29/23 1353  vancomycin IVPB 2000 mg in 0.9% Sodium Chloride 500 mL        Ordering Provider: Titus Weston MD    20 mg/kg × 103 kg  250 mL/hr over 120 Minutes Intravenous Once 10/29/23 1430 10/29/23 1830    10/29/23 1353  piperacillin-tazobactam (ZOSYN) 4.5 g in iso-osmotic dextrose 100 mL IVPB (premix)        Ordering Provider: Titus Weston MD    4.5 g Intravenous Once 10/29/23 1409 10/29/23 1634              Review of Systems    Constitutional-- No   sweats.  Appetite diminished with fatigue.  Heent-- No new vision, hearing or throat complaints.  No epistaxis or oral sores.  Denies odynophagia or dysphagia.  No flashers, floaters or eye pain. No odynophagia or dysphagia. No headache, photophobia or neck stiffness.  CV-- No chest pain, palpitation or syncope  Resp-- see above  GI- see above.  No hematochezia, melena, or hematemesis. Denies jaundice or chronic liver disease.  -- see above  Lymph- no swollen lymph nodes in neck/axilla or groin.   Heme- No active bruising or bleeding; no Hx of DVT or PE.  MS-- no swelling or pain in the bones or joints of arms/legs.  No new back pain.  Neuro-- right arm weakness associated with fall and some concern by medicine team for brachial plexus injury.  No seizures.    Full 12 point review of systems reviewed and negative otherwise for acute complaints, except for above    Physical Exam:   Vital Signs   BP 95/63 (BP Location: Left arm,  "Patient Position: Lying)   Pulse 85   Temp 97.6 °F (36.4 °C) (Oral)   Resp 20   Ht 177.8 cm (70\")   Wt 103 kg (227 lb 14.4 oz)   SpO2 94%   BMI 32.70 kg/m²     GENERAL: Awake and alert, in no acute distress. Generally debilitated  HEENT: Normocephalic, atraumatic.  No conjunctival injection. No icterus. Oropharynx clear without evidence of thrush or exudate. No evidence of peridontal disease.    NECK: Supple without nuchal rigidity. No mass.  LYMPH: No cervical, axillary or inguinal lymphadenopathy.  HEART: RRR; No murmur, rubs, gallops.   LUNGS: diminished at bases but otherwise Clear to auscultation bilaterally without wheezing, rales, rhonchi. Normal respiratory effort. Nonlabored. No dullness.  ABDOMEN: Soft, nontender, nondistended. Positive bowel sounds. No rebound or guarding. NO mass or HSM.  EXT:  multifocal excoriations both lower extremities associated with recent fall.  No redness induration or warmth.  No discrete mass bulge or fluctuance.  No crepitus or bulla  : Genitalia generally unremarkable.    MSK: FROM without joint effusions noted arms/legs.    SKIN: Warm and dry without cutaneous eruptions on Inspection/palpation.    NEURO: he awakens and follows basic commands but has difficulty cooperating with detailed motor or sensory exam given positioning in bed    No peripheral stigmata/phenomena of endocarditis    IV without obvious redness or drainage    Laboratory Data    Results from last 7 days   Lab Units 10/30/23  0320 10/29/23  1259   WBC 10*3/mm3 14.56* 28.11*   HEMOGLOBIN g/dL 13.0 14.7   HEMATOCRIT % 39.7 44.9   PLATELETS 10*3/mm3 88* 150     Results from last 7 days   Lab Units 10/30/23  0320   SODIUM mmol/L 136   POTASSIUM mmol/L 3.6   CHLORIDE mmol/L 104   CO2 mmol/L 20.0*   BUN mg/dL 23   CREATININE mg/dL 1.29*   GLUCOSE mg/dL 140*   CALCIUM mg/dL 7.5*     Results from last 7 days   Lab Units 10/30/23  0320   ALK PHOS U/L 93   BILIRUBIN mg/dL 0.4   ALT (SGPT) U/L 83*   AST (SGOT) " U/L 252*               Estimated Creatinine Clearance: 63.1 mL/min (A) (by C-G formula based on SCr of 1.29 mg/dL (H)).      Microbiology:      Radiology:  Imaging Results (Last 72 Hours)       Procedure Component Value Units Date/Time    CT Abdomen Pelvis Without Contrast [[id number]] Collected: 10/29/23 2001     Updated: 10/29/23 2020    Narrative:      CT ABDOMEN PELVIS WO CONTRAST    Date of Exam: 10/29/2023 7:17 PM EDT    Indication: Flank pain, kidney stone suspected  septic with UTI HO obstruction.    Comparison: May 8, 2023. Renal ultrasound October 17, 2023.    Technique: Axial CT images were obtained of the abdomen and pelvis without the administration of contrast. Reconstructed coronal and sagittal images were also obtained. Automated exposure control and iterative construction methods were used.      Findings:  There is suspected dependent atelectasis in the lower lobes. Status post median sternotomy. There is calcific atherosclerosis of the aorta. No significant pleural or pericardial effusion is seen. Heart size appears within normal limits.    There is excreted contrast in the renal collecting systems, ureters, and bladder from CT angiography performed the same day. Contrast does limit assessment for urinary tract calculi.    There is mild left hydronephrosis and hydroureter which appears similar to the prior exam. The distal left ureter is nonopacified and nondilated. No clear obstructing etiology is identified. There is a hypodense lesion at the anterior upper pole of the   left kidney which was present on the prior exam suggestive of a renal cyst, but on the current exam, the lesion measures up to approximately 3.7 cm, previously 2.9 cm, and there is now an irregular gaseous lucency within the lesion. There is some   surrounding fat stranding which appears mildly increased. These findings are concerning for superimposed gas forming infection within a renal cyst.    Probable right renal cyst, as  before. No definite new right renal abnormality. Right ureter appears patent. No definite right hydronephrosis.    The bladder is distended with contrast. No suspicious focal bladder abnormality is seen. No significant bladder wall thickening. Multiple surgical clips are seen in the pelvis. There has been prostatectomy.    Hyperdensity within the gallbladder suggesting cholelithiasis, as before. No suspicious hepatic abnormality is seen. No definite splenomegaly. No suspicious adrenal lesion. No acute pancreatic abnormality. There is atherosclerosis of the aorta. No   definite lymphadenopathy. No acute gastric abnormality. No small bowel dilatation. There is diverticulosis of the colon. No definite acute colonic or rectal abnormality is seen. No significant pelvic lymphadenopathy. There is midline abdominal scarring.    There are degenerative changes in the bilateral hips and in the thoracolumbar spine.      Impression:      Impression:  1.Left upper pole cyst appears increased in size compared to May 2023 and now contains irregular gaseous lucency with some new mild surrounding fat stranding. Findings are suspicious for infected renal cyst with gas-forming infection. Also recommend   correlation recent surgery as this could also be related to recent intervention.  2.Mild left hydronephrosis and hydroureter appears similar to the prior exam. The distal left ureter is nonopacified and nondilated. No clear obstructing etiology is identified.  3.Excreted contrast from prior CTA in the renal collecting systems, ureters, and bladder. This limits assessment for urinary tract calculi.  4.Cholelithiasis.  5.Calcific atherosclerosis.  6.Postoperative changes in the pelvis with evidence of prostatectomy.        Electronically Signed: Larry Hoff    10/29/2023 8:16 PM EDT    Workstation ID: [id number]    MRI Brain Without Contrast [[id number]] Collected: 10/29/23 1605     Updated: 10/29/23 1613    Narrative:      MRI BRAIN WO  CONTRAST    Date of Exam: 10/29/2023 3:30 PM EDT    Indication: Stroke, follow up.     Comparison: CT head, CT perfusion, and CTA head and neck performed the same day    Technique:  Routine multiplanar/multisequence sequence images of the brain were obtained without contrast administration.      Findings:  No midline shift. The ventricles and sulci appear within normal limits for patient's age. Basal cisterns appear patent. The pituitary gland appears to have normal morphology. Cerebellar tonsils appear normally positioned.    No definite extra-axial collection and no definite findings of acute or chronic hemorrhage. No mass lesion, mass effect, or edema is identified. Mild T2 and FLAIR hyperintense signal foci are seen in the cerebral white matter. No definite restricted   diffusion is identified at this time.    There is mucosal thickening of the paranasal sinuses. Mastoid air cells appear clear. Globes and orbits appear unremarkable. No definite calvarial abnormality is identified.        Impression:      Impression:  1.No definite findings of acute intracranial abnormality at this time.  2.Mild T2 and FLAIR hyperintense signal foci in the cerebral white matter are nonspecific but likely represent mild chronic small vessel ischemic changes.  3.Mucosal thickening of the paranasal sinuses could indicate acute sinusitis.        Electronically Signed: Larry Hoff    10/29/2023 4:10 PM EDT    Workstation ID: [id number]    XR Shoulder 2+ View Right [[id number]] Collected: 10/29/23 1414     Updated: 10/29/23 1419    Narrative:        XR SHOULDER 2+ VW RIGHT    Date of Exam: 10/29/2023 1:57 PM EDT    Indication: Trauma    Comparison: None available.    FINDINGS:    No definite acute fracture or malalignment is seen. Glenohumeral joint is not well evaluated due to positioning. There is suspected osteophyte formation with probable mild to moderate joint space narrowing. There appear to be moderate degenerative   changes at  the acromioclavicular joint. Calcifications projecting along the posterior humeral head may represent calcific tendinopathy. No other definite soft tissue abnormality.      Impression:      1.No definite radiographic findings of acute osseous shoulder abnormality.  2.Glenohumeral joint is not well evaluated due to positioning. There is suspected mild to moderate osteoarthritis.  3.Moderate degenerative changes at the acromioclavicular joint.        Electronically Signed: Larry Hoff    10/29/2023 2:16 PM EDT    Workstation ID: [id number]    XR Chest 1 View [[id number]] Collected: 10/29/23 1359     Updated: 10/29/23 1403    Narrative:        XR CHEST 1 VW    Date of Exam: 10/29/2023 1:47 PM EDT    Indication: Acute Stroke Protocol (onset < 12 hrs)    Comparison: None available.    FINDINGS:  There are prominent interstitial markings and mild left basilar opacities. No significant focal consolidation. No pneumothorax or significant pleural effusion. The heart appears mildly enlarged. Status post median sternotomy and CABG. Mediastinal contour   appears within normal limits.      Impression:      1.Prominent interstitial markings and mild left basilar opacities which could represent mild edema, pneumonia, or chronic lung disease.  2.Mild cardiomegaly. Status post median sternotomy and CABG.        Electronically Signed: Larry Hoff    10/29/2023 2:00 PM EDT    Workstation ID: [id number]    CT Angiogram Head w AI Analysis of LVO [[id number]] Collected: 10/29/23 1335     Updated: 10/29/23 1352    Narrative:      CT ANGIOGRAM HEAD W AI ANALYSIS OF LVO, CT ANGIOGRAM NECK    Date of Exam: 10/29/2023 1:13 PM EDT    Indication: Neuro deficit, acute stroke suspected  Neuro deficit, acute stroke suspected.    Comparison: CT head and CT perfusion performed the same day.    Technique: CTA of the head and neck was performed after the uneventful intravenous administration of iodinated contrast. Reconstructed coronal and sagittal images  were also obtained. In addition, a 3-D volume rendered image was created for   interpretation. Automated exposure control and iterative reconstruction methods were used.     Findings:  Head: The vertebral and basilar arteries appear somewhat diminutive. There is fetal origin of the right posterior cerebral artery and partial fetal origin on the left. The posterior cerebral arteries appear grossly symmetric. No definite proximal   occlusion or definite high-grade stenosis.    There is calcific atherosclerosis of the cavernous portions of the internal carotid arteries. No high-grade stenosis is seen. The middle cerebral arteries appear to opacify as expected. There is a small right A1 segment of the anterior cerebral artery,   likely a normal variant. There is a patent anterior communicating artery. The anterior cerebral arteries are patent distally and grossly symmetric. No definite focal occlusion or high-grade stenosis is identified in the bilateral M1 and M2 segments of   the middle cerebral arteries. No other definite acute intracranial vascular abnormality is seen.    Neck: Status post median sternotomy and CABG. The visualized central pulmonary arteries appear to opacify as expected. Visualized aortic arch appears within normal limits. There is bovine type branching anatomy with common origin of the right   brachiocephalic and left common carotid arteries. The left common carotid and external carotid arteries are patent. There is minimal atherosclerosis at the carotid bifurcation. There is tortuosity of the internal carotid artery. No stenosis of greater   than 50% is identified by NASCET criteria.    Right brachiocephalic artery appears patent. Common carotid and external carotid arteries are patent. There is also tortuosity of the right internal carotid artery without stenosis of greater than 50% by NASCET criteria.    The subclavian arteries appear patent. The vertebral arteries appear patent. The vertebral  arteries appear somewhat diminutive, likely normal variant as there appears to be fetal origin of the posterior cerebral arteries.    No other definite acute vascular abnormality is seen.    Nonvascular: No acute intracranial abnormality is identified. There is paranasal sinus mucosal thickening. No acute infiltrate is seen in the lung apices. 7 mm hypodensity is seen in the right lobe of thyroid.    There is mild to moderate multilevel disc and facet joint degeneration in the cervical spine.      Impression:      Impression:  1.No definite findings of acute intracranial large vessel occlusion.  2.No significant stenosis of greater than 50% is seen at the bilateral internal carotid arteries at this time.       Electronically Signed: Larry Hoff    10/29/2023 1:49 PM EDT    Workstation ID: [id number]    CT Angiogram Neck [[id number]] Collected: 10/29/23 1335     Updated: 10/29/23 1352    Narrative:      CT ANGIOGRAM HEAD W AI ANALYSIS OF LVO, CT ANGIOGRAM NECK    Date of Exam: 10/29/2023 1:13 PM EDT    Indication: Neuro deficit, acute stroke suspected  Neuro deficit, acute stroke suspected.    Comparison: CT head and CT perfusion performed the same day.    Technique: CTA of the head and neck was performed after the uneventful intravenous administration of iodinated contrast. Reconstructed coronal and sagittal images were also obtained. In addition, a 3-D volume rendered image was created for   interpretation. Automated exposure control and iterative reconstruction methods were used.     Findings:  Head: The vertebral and basilar arteries appear somewhat diminutive. There is fetal origin of the right posterior cerebral artery and partial fetal origin on the left. The posterior cerebral arteries appear grossly symmetric. No definite proximal   occlusion or definite high-grade stenosis.    There is calcific atherosclerosis of the cavernous portions of the internal carotid arteries. No high-grade stenosis is seen. The  middle cerebral arteries appear to opacify as expected. There is a small right A1 segment of the anterior cerebral artery,   likely a normal variant. There is a patent anterior communicating artery. The anterior cerebral arteries are patent distally and grossly symmetric. No definite focal occlusion or high-grade stenosis is identified in the bilateral M1 and M2 segments of   the middle cerebral arteries. No other definite acute intracranial vascular abnormality is seen.    Neck: Status post median sternotomy and CABG. The visualized central pulmonary arteries appear to opacify as expected. Visualized aortic arch appears within normal limits. There is bovine type branching anatomy with common origin of the right   brachiocephalic and left common carotid arteries. The left common carotid and external carotid arteries are patent. There is minimal atherosclerosis at the carotid bifurcation. There is tortuosity of the internal carotid artery. No stenosis of greater   than 50% is identified by NASCET criteria.    Right brachiocephalic artery appears patent. Common carotid and external carotid arteries are patent. There is also tortuosity of the right internal carotid artery without stenosis of greater than 50% by NASCET criteria.    The subclavian arteries appear patent. The vertebral arteries appear patent. The vertebral arteries appear somewhat diminutive, likely normal variant as there appears to be fetal origin of the posterior cerebral arteries.    No other definite acute vascular abnormality is seen.    Nonvascular: No acute intracranial abnormality is identified. There is paranasal sinus mucosal thickening. No acute infiltrate is seen in the lung apices. 7 mm hypodensity is seen in the right lobe of thyroid.    There is mild to moderate multilevel disc and facet joint degeneration in the cervical spine.      Impression:      Impression:  1.No definite findings of acute intracranial large vessel occlusion.  2.No  significant stenosis of greater than 50% is seen at the bilateral internal carotid arteries at this time.       Electronically Signed: Larry Hoff    10/29/2023 1:49 PM EDT    Workstation ID: [id number]    CT CEREBRAL PERFUSION WITH & WITHOUT CONTRAST [[id number]] Collected: 10/29/23 1332     Updated: 10/29/23 1338    Narrative:      CT CEREBRAL PERFUSION W WO CONTRAST    Date of Exam: 10/29/2023 1:13 PM EDT    Indication: Neuro deficit, acute stroke suspected.     Comparison: CT head performed the same day.    Technique: Axial CT images of the brain were obtained prior to and after the administration of 115 mL Isovue-370. Core blood volume, core blood flow, mean transit time, and Tmax images were obtained utilizing the Rapid software protocol. A limited CT   angiogram of the head was also performed to measure the blood vessel density.    The radiation dose reduction device was turned on for each scan per the ALARA (As Low as Reasonably Achievable) protocol.        FINDINGS:     CT Perfusion:  CBF (<30%) volume: 0 mL  Tmax (>6.0s) volume: 0 mL  Mismatch volume: 0 mL  Mismatch ratio: None    No definite perfusion abnormality is seen in the visualized parenchyma to indicate ischemia or infarct.      Impression:      No CT perfusion evidence of acute infarction or ischemia.        Electronically Signed: Larry Hoff    10/29/2023 1:35 PM EDT    Workstation ID: [id number]    CT Head Without Contrast Stroke Protocol [[id number]] Collected: 10/29/23 1314     Updated: 10/29/23 1321    Narrative:      CT HEAD WO CONTRAST STROKE PROTOCOL    Date of Exam: 10/29/2023 1:03 PM EDT    Indication: Neuro deficit, acute, stroke suspected  Neuro deficit, acute stroke suspected.    Comparison: None available.    Technique: Axial CT images were obtained of the head without contrast administration.  Reconstructed coronal images were also obtained. Automated exposure control and iterative construction methods were used.    Scan Time: 1:11  p.m.  Results discussed with stroke team navigator at 1:17 p.m      FINDINGS:  No midline shift. Basal cisterns appear patent.  Ventricles and sulci appear within normal limits for patient age.    No extra-axial collection or acute hemorrhage is identified.  No definite mass lesion, edema, or significant mass effect is seen.  There is hypoattenuation in the white matter, which is nonspecific, but is most commonly seen with chronic small vessel   ischemic changes. No definite CT findings of acute infarction.    There is paranasal sinus mucosal thickening. Mastoid air cells appear clear.  No acute calvarial abnormality is identified.      Impression:      1.No definite CT findings of acute intracranial abnormality. Probable mild chronic small vessel ischemic changes.  2.Paranasal sinus mucosal thickening which could indicate sinusitis.        Electronically Signed: Larry Hoff    10/29/2023 1:18 PM EDT    Workstation ID: [id number]              Impression:     --acute gram-negative mavis sepsis/septicemia, urinary source with a gram-negative mavis complicated UTI associated with abnormal CT scan and possible infected cyst, further complicated by hydronephrosis/stone and urology following for interventional plans.  IV Merrem ongoing with plans to taper antibiotics once further data. Sepsis parameters improving    --acute complicated UTI, probable left pyelonephritis with abnormal CT scan raising concern for infected cyst/abscess, hydronephrosis and plans for intervention by urology.  Culture data pending.     --Acute kidney injury per medicine team, baseline unclear; certainly at risk for prerenal etiology with sepsis at admission and risk for ATN.  Monitor to help guide future adjustments in antimicrobials    --history fall with right arm weakness and further workup/management at discretion of medicine team/neurology.  Some concern mention for brachial plexus injury    PLAN: Thank you for asking us to see ORLIN Galloway  recommend the following:     --IV Merrem    --Check/review labs cultures and scans     --Partial history per nursing staff     --Discussed with microbiology     --Urology following; Intervention planned     --Discussed with pharmacy     --Highly complex at of issues with high risk for further serious morbidity and other serious sequela    Joel Hma MD  10/30/2023

## 2023-10-30 NOTE — PROGRESS NOTES
I was asked by Dr. Christianson to evaluate the patient for ICU transfer.    Patient has received multiple fluid boluses for borderline blood pressure in the setting of sepsis of urinary origin.  He does have a renal abscess and urology has been consulted.    Patient is currently comfortable.  Mean arterial pressures are running in the upper 60s for the past several hours.  Current heart rate is 100 bpm.  Lactic acid most recently was 3.4 at 12:47 AM (down from 6.8 at 22:55).  He is on appropriate antibiotics including meropenem and vancomycin.  Patient's current temp is 97.9 (was previously up to 103.2).      I do feel that the patient has received adequate volume resuscitation at this point.  I would continue maintenance fluid.  I would recommend continuing to monitor his blood pressure and heart rate.  I would not recommend initiation of pressors at this point.  If patient has decline in blood pressure/worsening tachycardia please contact us again and we will transfer to the ICU for consideration of initiation of pressors.  Discussed with Dr. Wang.

## 2023-10-30 NOTE — PROGRESS NOTES
Pharmacy Consult-Vancomycin Dosing  Aldair Narvaez is a  73 y.o. male receiving vancomycin therapy.     Indication:UTI, intra-abdominal infection, renal abscess   Consulting Provider: hospitalist  ID Consult:   Goal  to 600 mg/L*hr  Goal Trough: 15-20    Current Antimicrobial Therapy  Anti-Infectives (From admission, onward)      Ordered     Dose/Rate Route Frequency Start Stop    10/30/23 0221  meropenem (MERREM) 1,000 mg in sodium chloride 0.9 % 100 mL IVPB        Ordering Provider: Janie Christianson DO    1,000 mg  over 3 Hours Intravenous Every 12 Hours Scheduled 10/30/23 1200 11/04/23 0859    10/29/23 2312  vancomycin (dosing per levels)        Ordering Provider: Luis Schneider, PharmD     Does not apply Daily 10/30/23 0900 11/06/23 0859    10/30/23 0221  meropenem (MERREM) 1,000 mg in sodium chloride 0.9 % 100 mL IVPB        Ordering Provider: Janie Christianson DO    1,000 mg  over 30 Minutes Intravenous Once 10/30/23 0315 10/30/23 0259    10/29/23 2314  piperacillin-tazobactam (ZOSYN) 4.5 g in iso-osmotic dextrose 100 mL IVPB (premix)        Ordering Provider: Dorothea Carrizales MD    4.5 g  over 30 Minutes Intravenous Once 10/30/23 0000 10/30/23 0118    10/29/23 2311  Pharmacy to dose vancomycin        Ordering Provider: Dorothea Carrizales MD     Does not apply Continuous PRN 10/29/23 2311 11/03/23 2310    10/29/23 1353  vancomycin IVPB 2000 mg in 0.9% Sodium Chloride 500 mL        Ordering Provider: Titus Weston MD    20 mg/kg × 103 kg  250 mL/hr over 120 Minutes Intravenous Once 10/29/23 1430 10/29/23 1830    10/29/23 1353  piperacillin-tazobactam (ZOSYN) 4.5 g in iso-osmotic dextrose 100 mL IVPB (premix)        Ordering Provider: Titus Weston MD    4.5 g Intravenous Once 10/29/23 1409 10/29/23 1634          Allergies  Allergies as of 10/29/2023 - Reviewed 10/29/2023   Allergen Reaction Noted    Iodine Other (See Comments) 11/09/2020    Oxycodone-acetaminophen Itching 01/08/2014    Zofran  "[ondansetron] Hives 05/08/2023     Labs  Results from last 7 days   Lab Units 10/30/23  0320 10/29/23  1259   BUN mg/dL 23 30*   CREATININE mg/dL 1.29* 2.36*     Results from last 7 days   Lab Units 10/30/23  0320 10/29/23  1259   WBC 10*3/mm3 14.56* 28.11*     Evaluation of Dosing     Last Dose Received in the ED/Outside Facility: vancomycin 2000 mg IV x1 on 10/29 at 1630  Is Patient on Dialysis or Renal Replacement: no    Ht - 177.8 cm (70\")  Wt - 103 kg (227 lb 14.4 oz)    Estimated Creatinine Clearance: 63.1 mL/min (A) (by C-G formula based on SCr of 1.29 mg/dL (H)).    Intake & Output (last 3 days)         10/27 0701  10/28 0700 10/28 0701  10/29 0700 10/29 0701  10/30 0700 10/30 0701  10/31 0700    I.V. (mL/kg)   500 (4.9)     Total Intake(mL/kg)   500 (4.9)     Urine (mL/kg/hr)   1025     Total Output   1025     Net   -525             Urine Unmeasured Occurrence   1 x           Microbiology and Radiology  Microbiology Results (last 10 days)       Procedure Component Value - Date/Time    Respiratory Panel PCR w/COVID-19(SARS-CoV-2) [facility]/LATOSHA/M[facility]IAN/[facility]/MICHAEL In-House, NP Swab in UTM/VTM, 3-4 HR TAT - Swab, Nasopharynx [[id number]]  (Abnormal) Collected: 10/29/23 1444    Lab Status: Final result Specimen: Swab from Nasopharynx Updated: 10/29/23 1647     ADENOVIRUS, PCR Not Detected     Coronavirus 229E Not Detected     Coronavirus HKU1 Not Detected     Coronavirus NL63 Not Detected     Coronavirus OC43 Not Detected     COVID19 Not Detected     Human Metapneumovirus Not Detected     Human Rhinovirus/Enterovirus Detected     Influenza A PCR Not Detected     Influenza B PCR Not Detected     Parainfluenza Virus 1 Not Detected     Parainfluenza Virus 2 Not Detected     Parainfluenza Virus 3 Not Detected     Parainfluenza Virus 4 Not Detected     RSV, PCR Not Detected     Bordetella pertussis pcr Not Detected     Bordetella parapertussis PCR Not Detected     Chlamydophila pneumoniae PCR Not Detected     Mycoplasma " pneumo by PCR Not Detected    Narrative:      In the setting of a positive respiratory panel with a viral infection PLUS a negative procalcitonin without other underlying concern for bacterial infection, consider observing off antibiotics or discontinuation of antibiotics and continue supportive care. If the respiratory panel is positive for atypical bacterial infection (Bordetella pertussis, Chlamydophila pneumoniae, or Mycoplasma pneumoniae), consider antibiotic de-escalation to target atypical bacterial infection.          Reported Vancomycin Levels    Results from last 7 days   Lab Units 10/30/23  0320   VANCOMYCIN RM mcg/mL 11.00        [employer] AUC Calculation:    Current AUC:   263 mg/L*hr    Predicted Steady State AUC on Current Dose:   mg/L*hr  _________________________________    Predicted Steady State AUC on New Dose:    462 mg/L*hr    Assessment/Plan:    1. Pharmacy to dose vancomycin for sepsis, renal abscess.   Goal  to 600 mg/L*hr  2. Patient received a loading dose of vancomycin 2000 mg IV x1 on 10/29 at 1630.  3. Will start on a maintenance dose of vancomycin 1500 mg IV q24h (~ 14.6 mg/kg).  4. Vancomycin random level was drawn ~ 9 hours post loading dose = 11 mcg/mL.        SCr down to 1.29 today, from 2.36 yesterday; Tmax = 103.2; afebrile; UOP unmeasured.     5. Vancomycin random level ordered for 11/1 at 0600 to continue to evaluate vancomycin clearance and dosing needs.  6. Monitor renal function, cultures and sensitivities, and clinical status, and adjust regimen as necessary.  Pharmacy will continue to follow.     Yamileth Coleman, PharmD  10/30/2023  07:26 EDT

## 2023-10-30 NOTE — PROGRESS NOTES
[facility] Medicine Services  PROGRESS NOTE    Patient Name: Aldair Narvaez  : 1950  MRN: [id number]    Date of Admission: 10/29/2023  Primary Care Physician: Miguel Angel Mireles MD    Subjective   Subjective     CC:  fell from bed     HPI:feeling better after drain placed       Objective   Objective     Vital Signs:   Temp:  [97.6 °F (36.4 °C)-103.2 °F (39.6 °C)] 97.6 °F (36.4 °C)  Heart Rate:  [] 85  Resp:  [16-32] 20  BP: ()/(54-93) 95/63     Physical Exam:  Gen:  WD/WN  Neuro: alert and oriented, clear speech, follows commands, grossly nonfocal  HEENT:  NC/AT PERRL, OP benign  Neck:  Supple, no LAD  Heart RRR no murmur, rub, or gallop  Abd:  Soft, nontender, no rebound or guarding, pos BS  Extrem:  No c/c/e  Flank - bloody drainage from drain  RUE -  2+.  Shoulder shrug intact.  No movement at elbow        Results Reviewed:  LAB RESULTS:      Lab 10/30/23  0320 10/30/23  0047 10/29/23  2255 10/29/23  2023 10/29/23  1614 10/29/23  1259   WBC 14.56*  --   --   --   --  28.11*   HEMOGLOBIN 13.0  --   --   --   --  14.7   HEMATOCRIT 39.7  --   --   --   --  44.9   PLATELETS 88*  --   --   --   --  150   NEUTROS ABS 14.27*  --   --   --   --  25.05*   IMMATURE GRANS (ABS)  --   --   --   --   --  0.87*   LYMPHS ABS  --   --   --   --   --  0.81   MONOS ABS  --   --   --   --   --  1.20*   EOS ABS 0.00  --   --   --   --  0.03   MCV 88.4  --   --   --   --  88.0   LACTATE  --  3.4* 6.8* 5.7* 5.1* 7.1*   PROTIME 17.7*  --   --   --   --   --    APTT  --   --   --   --   --  30.5         Lab 10/30/23  0320 10/29/23  1259   SODIUM 136 131*   POTASSIUM 3.6 3.9   CHLORIDE 104 92*   CO2 20.0* 18.0*   ANION GAP 12.0 21.0*   BUN 23 30*   CREATININE 1.29* 2.36*   EGFR 59.3* 28.7*   GLUCOSE 140* 438*   CALCIUM 7.5* 9.1   HEMOGLOBIN A1C  --  9.40*         Lab 10/30/23  0320 10/29/23  1259   TOTAL PROTEIN 5.5*  --    ALBUMIN 2.7*  --    GLOBULIN 2.8  --    ALT (SGPT) 83* 61*   AST (SGOT)  252* 141*   BILIRUBIN 0.4  --    ALK PHOS 93  --          Lab 10/30/23  0320 10/29/23  1614 10/29/23  1259   HSTROP T  --  45* 66*   PROTIME 17.7*  --   --    INR 1.44*  --   --                  Brief Urine Lab Results  (Last result in the past 365 days)        Color   Clarity   Blood   Leuk Est   Nitrite   Protein   CREAT   Urine HCG        10/29/23 1445 Yellow   Cloudy   Large (3+)   Small (1+)   Negative   100 mg/dL (2+)                   Microbiology Results Abnormal       None            CT Abdomen Pelvis Without Contrast    Result Date: 10/29/2023  CT ABDOMEN PELVIS WO CONTRAST Date of Exam: 10/29/2023 7:17 PM EDT Indication: Flank pain, kidney stone suspected septic with UTI HO obstruction. Comparison: May 8, 2023. Renal ultrasound October 17, 2023. Technique: Axial CT images were obtained of the abdomen and pelvis without the administration of contrast. Reconstructed coronal and sagittal images were also obtained. Automated exposure control and iterative construction methods were used. Findings: There is suspected dependent atelectasis in the lower lobes. Status post median sternotomy. There is calcific atherosclerosis of the aorta. No significant pleural or pericardial effusion is seen. Heart size appears within normal limits. There is excreted contrast in the renal collecting systems, ureters, and bladder from CT angiography performed the same day. Contrast does limit assessment for urinary tract calculi. There is mild left hydronephrosis and hydroureter which appears similar to the prior exam. The distal left ureter is nonopacified and nondilated. No clear obstructing etiology is identified. There is a hypodense lesion at the anterior upper pole of the left kidney which was present on the prior exam suggestive of a renal cyst, but on the current exam, the lesion measures up to approximately 3.7 cm, previously 2.9 cm, and there is now an irregular gaseous lucency within the lesion. There is some  surrounding fat stranding which appears mildly increased. These findings are concerning for superimposed gas forming infection within a renal cyst. Probable right renal cyst, as before. No definite new right renal abnormality. Right ureter appears patent. No definite right hydronephrosis. The bladder is distended with contrast. No suspicious focal bladder abnormality is seen. No significant bladder wall thickening. Multiple surgical clips are seen in the pelvis. There has been prostatectomy. Hyperdensity within the gallbladder suggesting cholelithiasis, as before. No suspicious hepatic abnormality is seen. No definite splenomegaly. No suspicious adrenal lesion. No acute pancreatic abnormality. There is atherosclerosis of the aorta. No definite lymphadenopathy. No acute gastric abnormality. No small bowel dilatation. There is diverticulosis of the colon. No definite acute colonic or rectal abnormality is seen. No significant pelvic lymphadenopathy. There is midline abdominal scarring. There are degenerative changes in the bilateral hips and in the thoracolumbar spine.     Impression: Impression: 1.Left upper pole cyst appears increased in size compared to May 2023 and now contains irregular gaseous lucency with some new mild surrounding fat stranding. Findings are suspicious for infected renal cyst with gas-forming infection. Also recommend correlation recent surgery as this could also be related to recent intervention. 2.Mild left hydronephrosis and hydroureter appears similar to the prior exam. The distal left ureter is nonopacified and nondilated. No clear obstructing etiology is identified. 3.Excreted contrast from prior CTA in the renal collecting systems, ureters, and bladder. This limits assessment for urinary tract calculi. 4.Cholelithiasis. 5.Calcific atherosclerosis. 6.Postoperative changes in the pelvis with evidence of prostatectomy. Electronically Signed: Larry Hoff  10/29/2023 8:16 PM EDT  Workstation  ID: [id number]    MRI Brain Without Contrast    Result Date: 10/29/2023  MRI BRAIN WO CONTRAST Date of Exam: 10/29/2023 3:30 PM EDT Indication: Stroke, follow up.  Comparison: CT head, CT perfusion, and CTA head and neck performed the same day Technique:  Routine multiplanar/multisequence sequence images of the brain were obtained without contrast administration. Findings: No midline shift. The ventricles and sulci appear within normal limits for patient's age. Basal cisterns appear patent. The pituitary gland appears to have normal morphology. Cerebellar tonsils appear normally positioned. No definite extra-axial collection and no definite findings of acute or chronic hemorrhage. No mass lesion, mass effect, or edema is identified. Mild T2 and FLAIR hyperintense signal foci are seen in the cerebral white matter. No definite restricted diffusion is identified at this time. There is mucosal thickening of the paranasal sinuses. Mastoid air cells appear clear. Globes and orbits appear unremarkable. No definite calvarial abnormality is identified.     Impression: Impression: 1.No definite findings of acute intracranial abnormality at this time. 2.Mild T2 and FLAIR hyperintense signal foci in the cerebral white matter are nonspecific but likely represent mild chronic small vessel ischemic changes. 3.Mucosal thickening of the paranasal sinuses could indicate acute sinusitis. Electronically Signed: Larry Hoff  10/29/2023 4:10 PM EDT  Workstation ID: [id number]    XR Shoulder 2+ View Right    Result Date: 10/29/2023  XR SHOULDER 2+ VW RIGHT Date of Exam: 10/29/2023 1:57 PM EDT Indication: Trauma Comparison: None available. FINDINGS:  No definite acute fracture or malalignment is seen. Glenohumeral joint is not well evaluated due to positioning. There is suspected osteophyte formation with probable mild to moderate joint space narrowing. There appear to be moderate degenerative changes at the acromioclavicular joint.  Calcifications projecting along the posterior humeral head may represent calcific tendinopathy. No other definite soft tissue abnormality.     Impression: 1.No definite radiographic findings of acute osseous shoulder abnormality. 2.Glenohumeral joint is not well evaluated due to positioning. There is suspected mild to moderate osteoarthritis. 3.Moderate degenerative changes at the acromioclavicular joint. Electronically Signed: Larry Coradoizabella  10/29/2023 2:16 PM EDT  Workstation ID: [id number]    XR Chest 1 View    Result Date: 10/29/2023  XR CHEST 1 VW Date of Exam: 10/29/2023 1:47 PM EDT Indication: Acute Stroke Protocol (onset < 12 hrs) Comparison: None available. FINDINGS: There are prominent interstitial markings and mild left basilar opacities. No significant focal consolidation. No pneumothorax or significant pleural effusion. The heart appears mildly enlarged. Status post median sternotomy and CABG. Mediastinal contour  appears within normal limits.     Impression: 1.Prominent interstitial markings and mild left basilar opacities which could represent mild edema, pneumonia, or chronic lung disease. 2.Mild cardiomegaly. Status post median sternotomy and CABG. Electronically Signed: Larry Coradoizabella  10/29/2023 2:00 PM EDT  Workstation ID: [id number]    CT Angiogram Head w AI Analysis of LVO    Result Date: 10/29/2023  CT ANGIOGRAM HEAD W AI ANALYSIS OF LVO, CT ANGIOGRAM NECK Date of Exam: 10/29/2023 1:13 PM EDT Indication: Neuro deficit, acute stroke suspected Neuro deficit, acute stroke suspected. Comparison: CT head and CT perfusion performed the same day. Technique: CTA of the head and neck was performed after the uneventful intravenous administration of iodinated contrast. Reconstructed coronal and sagittal images were also obtained. In addition, a 3-D volume rendered image was created for interpretation. Automated exposure control and iterative reconstruction methods were used. Findings: Head: The vertebral and  basilar arteries appear somewhat diminutive. There is fetal origin of the right posterior cerebral artery and partial fetal origin on the left. The posterior cerebral arteries appear grossly symmetric. No definite proximal occlusion or definite high-grade stenosis. There is calcific atherosclerosis of the cavernous portions of the internal carotid arteries. No high-grade stenosis is seen. The middle cerebral arteries appear to opacify as expected. There is a small right A1 segment of the anterior cerebral artery, likely a normal variant. There is a patent anterior communicating artery. The anterior cerebral arteries are patent distally and grossly symmetric. No definite focal occlusion or high-grade stenosis is identified in the bilateral M1 and M2 segments of the middle cerebral arteries. No other definite acute intracranial vascular abnormality is seen. Neck: Status post median sternotomy and CABG. The visualized central pulmonary arteries appear to opacify as expected. Visualized aortic arch appears within normal limits. There is bovine type branching anatomy with common origin of the right brachiocephalic and left common carotid arteries. The left common carotid and external carotid arteries are patent. There is minimal atherosclerosis at the carotid bifurcation. There is tortuosity of the internal carotid artery. No stenosis of greater than 50% is identified by NASCET criteria. Right brachiocephalic artery appears patent. Common carotid and external carotid arteries are patent. There is also tortuosity of the right internal carotid artery without stenosis of greater than 50% by NASCET criteria. The subclavian arteries appear patent. The vertebral arteries appear patent. The vertebral arteries appear somewhat diminutive, likely normal variant as there appears to be fetal origin of the posterior cerebral arteries. No other definite acute vascular abnormality is seen. Nonvascular: No acute intracranial abnormality  is identified. There is paranasal sinus mucosal thickening. No acute infiltrate is seen in the lung apices. 7 mm hypodensity is seen in the right lobe of thyroid. There is mild to moderate multilevel disc and facet joint degeneration in the cervical spine.     Impression: Impression: 1.No definite findings of acute intracranial large vessel occlusion. 2.No significant stenosis of greater than 50% is seen at the bilateral internal carotid arteries at this time. Electronically Signed: Larry Hoff  10/29/2023 1:49 PM EDT  Workstation ID: [id number]    CT Angiogram Neck    Result Date: 10/29/2023  CT ANGIOGRAM HEAD W AI ANALYSIS OF LVO, CT ANGIOGRAM NECK Date of Exam: 10/29/2023 1:13 PM EDT Indication: Neuro deficit, acute stroke suspected Neuro deficit, acute stroke suspected. Comparison: CT head and CT perfusion performed the same day. Technique: CTA of the head and neck was performed after the uneventful intravenous administration of iodinated contrast. Reconstructed coronal and sagittal images were also obtained. In addition, a 3-D volume rendered image was created for interpretation. Automated exposure control and iterative reconstruction methods were used. Findings: Head: The vertebral and basilar arteries appear somewhat diminutive. There is fetal origin of the right posterior cerebral artery and partial fetal origin on the left. The posterior cerebral arteries appear grossly symmetric. No definite proximal occlusion or definite high-grade stenosis. There is calcific atherosclerosis of the cavernous portions of the internal carotid arteries. No high-grade stenosis is seen. The middle cerebral arteries appear to opacify as expected. There is a small right A1 segment of the anterior cerebral artery, likely a normal variant. There is a patent anterior communicating artery. The anterior cerebral arteries are patent distally and grossly symmetric. No definite focal occlusion or high-grade stenosis is identified in the  bilateral M1 and M2 segments of the middle cerebral arteries. No other definite acute intracranial vascular abnormality is seen. Neck: Status post median sternotomy and CABG. The visualized central pulmonary arteries appear to opacify as expected. Visualized aortic arch appears within normal limits. There is bovine type branching anatomy with common origin of the right brachiocephalic and left common carotid arteries. The left common carotid and external carotid arteries are patent. There is minimal atherosclerosis at the carotid bifurcation. There is tortuosity of the internal carotid artery. No stenosis of greater than 50% is identified by NASCET criteria. Right brachiocephalic artery appears patent. Common carotid and external carotid arteries are patent. There is also tortuosity of the right internal carotid artery without stenosis of greater than 50% by NASCET criteria. The subclavian arteries appear patent. The vertebral arteries appear patent. The vertebral arteries appear somewhat diminutive, likely normal variant as there appears to be fetal origin of the posterior cerebral arteries. No other definite acute vascular abnormality is seen. Nonvascular: No acute intracranial abnormality is identified. There is paranasal sinus mucosal thickening. No acute infiltrate is seen in the lung apices. 7 mm hypodensity is seen in the right lobe of thyroid. There is mild to moderate multilevel disc and facet joint degeneration in the cervical spine.     Impression: Impression: 1.No definite findings of acute intracranial large vessel occlusion. 2.No significant stenosis of greater than 50% is seen at the bilateral internal carotid arteries at this time. Electronically Signed: Larry Hoff  10/29/2023 1:49 PM EDT  Workstation ID: [id number]    CT CEREBRAL PERFUSION WITH & WITHOUT CONTRAST    Result Date: 10/29/2023  CT CEREBRAL PERFUSION W WO CONTRAST Date of Exam: 10/29/2023 1:13 PM EDT Indication: Neuro deficit, acute  stroke suspected.  Comparison: CT head performed the same day. Technique: Axial CT images of the brain were obtained prior to and after the administration of 115 mL Isovue-370. Core blood volume, core blood flow, mean transit time, and Tmax images were obtained utilizing the Rapid software protocol. A limited CT angiogram of the head was also performed to measure the blood vessel density. The radiation dose reduction device was turned on for each scan per the ALARA (As Low as Reasonably Achievable) protocol. FINDINGS: CT Perfusion: CBF (<30%) volume: 0 mL Tmax (>6.0s) volume: 0 mL Mismatch volume: 0 mL Mismatch ratio: None No definite perfusion abnormality is seen in the visualized parenchyma to indicate ischemia or infarct.     Impression: No CT perfusion evidence of acute infarction or ischemia. Electronically Signed: Larry Hoff  10/29/2023 1:35 PM EDT  Workstation ID: [id number]    CT Head Without Contrast Stroke Protocol    Result Date: 10/29/2023  CT HEAD WO CONTRAST STROKE PROTOCOL Date of Exam: 10/29/2023 1:03 PM EDT Indication: Neuro deficit, acute, stroke suspected Neuro deficit, acute stroke suspected. Comparison: None available. Technique: Axial CT images were obtained of the head without contrast administration.  Reconstructed coronal images were also obtained. Automated exposure control and iterative construction methods were used. Scan Time: 1:11 p.m. Results discussed with stroke team navigator at 1:17 p.m FINDINGS: No midline shift. Basal cisterns appear patent.  Ventricles and sulci appear within normal limits for patient age. No extra-axial collection or acute hemorrhage is identified.  No definite mass lesion, edema, or significant mass effect is seen.  There is hypoattenuation in the white matter, which is nonspecific, but is most commonly seen with chronic small vessel ischemic changes. No definite CT findings of acute infarction. There is paranasal sinus mucosal thickening. Mastoid air cells  appear clear.  No acute calvarial abnormality is identified.     Impression: 1.No definite CT findings of acute intracranial abnormality. Probable mild chronic small vessel ischemic changes. 2.Paranasal sinus mucosal thickening which could indicate sinusitis. Electronically Signed: Larry Hfof  10/29/2023 1:18 PM EDT  Workstation ID: [id number]         Current medications:  Scheduled Meds:insulin lispro, 2-7 Units, Subcutaneous, Q4H  meropenem, 1,000 mg, Intravenous, Q12H  tamsulosin, 0.4 mg, Oral, Daily  vancomycin, 15 mg/kg, Intravenous, Q24H      Continuous Infusions:Pharmacy to dose vancomycin,   sodium chloride, 125 mL/hr      PRN Meds:.  acetaminophen    dextrose    dextrose    glucagon (human recombinant)    HYDROcodone-acetaminophen    ipratropium-albuterol    Pharmacy to dose vancomycin    sodium chloride    Assessment & Plan   Assessment & Plan     Active Hospital Problems    Diagnosis  POA    **UTI (urinary tract infection) [N39.0]  Yes    NEETA (acute kidney injury) [N17.9]  Yes    Rhabdomyolysis [M62.82]  Yes    Right arm weakness [R29.898]  Yes    Personal history of prostate cancer [Z85.46]  Not Applicable    CAD, multiple vessel [I25.10]  Yes    Uncontrolled type 2 diabetes mellitus with hyperglycemia [E11.65]  Yes    Mixed hyperlipidemia [E78.2]  Yes      Resolved Hospital Problems   No resolved problems to display.        Brief Hospital Course to date:  Aldair Narvaez is a 73 y.o. male w hydronephrosis,  HTN HL DM CAD  prostate CA GRANT fell out of bed, lay on the floor for a long time,  later noticed he could not move his right arm, has a UTI and NEETA and temp 103.        Sepsis due to UTI/ Possible renal abscess  NEETA  Lactic Acidosis  - CT scan shows 3.7cm infected cyst/abscess at L upper pole  - Cont abx per dr Ham   - IR  10/30 L renal  drain placed   - Dr Justin placed L ureteral stent 10/31   -follow cultures  - last dose Eliquis was 10/28 AM - defer for now   - Farooq   - creat was 2.4 on  admission, now 1.3      T2DM with hyperglycemia  - improving slowly     HO HTN, CAD, Afib  -hold BP meds   -may need beta blocker back    Fall at home, lay wedged under furniture for hours   R arm paresis - suspect nerve palsy from compression x hours  - PT OT   - stroke team originally consulted, MRI neg, suspect this is a local nerve injury .  Slow improvement.  Consutl neuro for recs, ? If he needs NCV  - follow CK   - continue saline infusion given elev CK      Expected Discharge Location and Transportation: tbd  Expected Discharge tbd  Expected discharge date/ time has not been documented.     DVT prophylaxis:  No DVT prophylaxis order currently exists.     AM-PAC 6 Clicks Score (PT): 18 (10/29/23 2000)    CODE STATUS:   Code Status and Medical Interventions:   Ordered at: 10/29/23 1620     Code Status (Patient has no pulse and is not breathing):    CPR (Attempt to Resuscitate)     Medical Interventions (Patient has pulse or is breathing):    Full       Berenice Ellis MD  10/30/23

## 2023-10-30 NOTE — PROGRESS NOTES
[facility] Consult Note     Date of Consult: 10/29/2023     Patient Name: Aldair Narvaez  MRN: [id number]   : 1950     Referring Provider: * No referring provider recorded for this case *    Care Team: Patient Care Team:  Miguel Angel Mireles MD as PCP - General (Family Medicine)  Steve Cohen MD as Consulting Physician (Endocrinology)     Reason for Hospitalization: weakness, fall    History of Present Illness: Was contacted for hospital consultation on Aldair Narvaez a 73 y.o. male who presents to the hospital for weakness and fall. PMH notable for PrCa, recurrent nephrolithiasis, L ureteral stricture, and urinary incontinence. He is s/p L ureteroscopy with ureteral balloon dilation in 2023; previously managed with indwelling stent.  The patient's indwelling ureteral stent was removed on 2023.  Renal ultrasound 2023 demonstrated no obvious hydronephrosis bilaterally.  His creatinine was stable at 0.92 in late July.    In addition, I took the patient back to the operating room 2023 for right ureteroscopy and laser lithotripsy of a right kidney stone.    Urology consulted overnight for flank pain, fever to 103 F, NEETA, and newly diagnosed left renal abscess on CT. This AM he is laying comfortably in bed with CPAP in place; reports no acute concerns.     I reviewed the patient's CT scan which demonstrates a 3.5+ centimeter left renal abscess with gas and fluid consistent with abscess.  He has moderate left-sided hydro ureteral nephrosis to the level of the distal ureter concerning for left ureteral stricture recurrence.    He has elevated LFTs and normal bilirubin, creatinine has improved to 1.29 but was more than 2.3 on admission.  White blood cell count 14.5.  He has been hypotensive.  He has been febrile to 103 Fahrenheit.    The patient is tachycardic on examination at 110s.    Prior to being found down at home, he was reporting a 10-day history of upper respiratory  infection symptoms and feeling unwell.  He states he has had a strong urinary stream with no obvious concerns.  He denies significant flank pain.    Urinalysis with blood, leukocytes, nitrite negative, 3+ bacteria noted.    Blood cultures are positive for E. coli, urine cultures are positive as well.    Subjective      Pertinent items are noted in HPI.     Past Medical History:   Past Medical History:   Diagnosis Date    Acid reflux     Arthritis     Benign hypertension     Colon polyp     Concern about heart attack without diagnosis     Coronary artery disease     Coronary atherosclerosis     Heart disease     Hyperlipidemia     Hypertension     Kidney stone     Mixed hyperlipidemia     Obesity     body mass index 30+-obesity    Prostate cancer     Sleep apnea     Type 2 diabetes mellitus     Type 2 diabetes mellitus        Past Surgical History:   Past Surgical History:   Procedure Laterality Date    CARDIAC CATHETERIZATION      CARDIAC SURGERY N/A     [place]    CARPAL TUNNEL RELEASE      CORONARY ARTERY BYPASS GRAFT  08/30/2022    CYSTOSCOPY BLADDER STONE LITHOTRIPSY      CYSTOSCOPY BLADDER STONE LITHOTRIPSY      CYSTOSCOPY W/ URETERAL STENT PLACEMENT Left 05/09/2023    Procedure: CYSTOSCOPY LEFT RETROGRADE PYELOGRAM AND LEFT URETERAL STENT PLACEMENT;  Surgeon: Isma Justin MD;  Location: [facility];  Service: Urology;  Laterality: Left;    FETAL SURGERY FOR CONGENITAL HERNIA      INGUINAL HERNIA REPAIR      KIDNEY STONE SURGERY      KNEE ARTHROSCOPY      PROSTATECTOMY      TRIGGER FINGER RELEASE      UMBILICAL HERNIA REPAIR      UVULOPALATOPHARYNGOPLASTY         Family History:   Family History   Problem Relation Age of Onset    Heart attack Father     Heart disease Father     Diabetes Sister     Hyperlipidemia Sister     Hypertension Sister        Social History:   Social History     Socioeconomic History    Marital status:    Tobacco Use    Smoking status: Never    Smokeless tobacco:  Never   Vaping Use    Vaping Use: Never used   Substance and Sexual Activity    Alcohol use: Not Currently     Alcohol/week: 1.0 standard drink of alcohol     Types: 1 Drinks containing 0.5 oz of alcohol per week    Drug use: Never    Sexual activity: Defer       Medications:     Current Facility-Administered Medications:     acetaminophen (TYLENOL) tablet 650 mg, 650 mg, Oral, Q6H PRN, Brenda Chase APRN, 650 mg at 10/29/23 2256    dextrose (D50W) (25 g/50 mL) IV injection 25 g, 25 g, Intravenous, Q15 Min PRN, Dorothea Carrizales MD    dextrose (GLUTOSE) oral gel 15 g, 15 g, Oral, Q15 Min PRN, Dorothea Carrizales MD    glucagon (GLUCAGEN) injection 1 mg, 1 mg, Intramuscular, Q15 Min PRN, Dorothea Carrizales MD    HYDROcodone-acetaminophen (NORCO) 5-325 MG per tablet 1 tablet, 1 tablet, Oral, Q4H PRN, Dorothea Carrizales MD, 1 tablet at 10/29/23 2135    Insulin Lispro (humaLOG) injection 2-7 Units, 2-7 Units, Subcutaneous, Q4H, Dorothea Carrizales MD, 2 Units at 10/30/23 0009    ipratropium-albuterol (DUO-NEB) nebulizer solution 3 mL, 3 mL, Nebulization, Q6H PRN, Dorothea Carrizales MD    meropenem (MERREM) 1,000 mg in sodium chloride 0.9 % 100 mL IVPB, 1,000 mg, Intravenous, Q12H, Janie Christianson,     Pharmacy to dose vancomycin, , Does not apply, Continuous PRN, Dorothea Carrizales MD    sodium chloride 0.9 % flush 10 mL, 10 mL, Intravenous, PRN, Titus Weston MD    tamsulosin (FLOMAX) 24 hr capsule 0.4 mg, 0.4 mg, Oral, Daily, Dorothea Carrizales MD    vancomycin IVPB 1500 mg in 0.9% NaCl (Premix) 500 mL, 15 mg/kg, Intravenous, Q24H, Yamileth Coleman, PharmD    Allergies:   Allergies   Allergen Reactions    Iodine Other (See Comments)     Closes sinuses    Oxycodone-Acetaminophen Itching    Zofran [Ondansetron] Hives       Problem:     UTI (urinary tract infection)    Mixed hyperlipidemia    Uncontrolled type 2 diabetes mellitus with hyperglycemia    CAD, multiple vessel    Personal history of prostate cancer    NEETA  (acute kidney injury)    Rhabdomyolysis    Right arm weakness       Review of Systems:   Review of Systems   Constitutional:  Positive for fever.   HENT: Negative.     Eyes: Negative.    Respiratory:          CPAP at night   Cardiovascular: Negative.    Gastrointestinal: Negative.    Endocrine: Negative.    Genitourinary:  Positive for flank pain.   Skin: Negative.    Allergic/Immunologic: Negative.    Neurological: Negative.    Hematological: Negative.    Psychiatric/Behavioral: Negative.           Objective     Physical Exam:  Vital Signs:   Temp:  [97.6 °F (36.4 °C)-103.2 °F (39.6 °C)] 97.6 °F (36.4 °C)  Heart Rate:  [] 84  Resp:  [16-32] 20  BP: ()/(54-93) 95/63  103 kg (227 lb 14.4 oz)  Body mass index is 32.7 kg/m².       Constitutional: Awake, alert    Eyes: PERRLA, sclerae anicteric, no conjunctival injection   HENT: Normocephalic, atraumatic, mucous membranes moist   Neck: trachea midline   Respiratory: Equal chest rise, non-labored respirations    Cardiovascular: well-perfused, tachycardic 110   Gastrointestinal: Soft, nontender, non-distended   Musculoskeletal: No bilateral ankle edema, no clubbing or cyanosis to extremities   Genitourinary: Left CVA tenderness mild, Farooq catheter in place draining clear yellow urine   Psychiatric: Appropriate affect, cooperative   Neurologic: Oriented x 3, Cranial Nerves grossly intact, speech clear   Skin: No rashes       Urine   I/O last 3 completed shifts:  In: 500 [I.V.:500]  Out: 1025 [Urine:1025]    Labs  Lab Results   Component Value Date    GLUCOSE 140 (H) 10/30/2023    CALCIUM 7.5 (L) 10/30/2023     10/30/2023    K 3.6 10/30/2023    CO2 20.0 (L) 10/30/2023     10/30/2023    BUN 23 10/30/2023    CREATININE 1.29 (H) 10/30/2023    EGFRIFNONA 96 02/17/2021    BCR 17.8 10/30/2023    ANIONGAP 12.0 10/30/2023       Lab Results   Component Value Date    WBC 14.56 (H) 10/30/2023    HGB 13.0 10/30/2023    HCT 39.7 10/30/2023    MCV 88.4 10/30/2023  "   PLT 88 (L) 10/30/2023       No results found for: \"URINECX\"    Brief Urine Lab Results  (Last result in the past 365 days)        Color   Clarity   Blood   Leuk Est   Nitrite   Protein   CREAT   Urine HCG        10/29/23 1445 Yellow   Cloudy   Large (3+)   Small (1+)   Negative   100 mg/dL (2+)                   Radiographic Studies  CT Abdomen Pelvis Without Contrast    Result Date: 10/29/2023  Impression: 1.Left upper pole cyst appears increased in size compared to May 2023 and now contains irregular gaseous lucency with some new mild surrounding fat stranding. Findings are suspicious for infected renal cyst with gas-forming infection. Also recommend correlation recent surgery as this could also be related to recent intervention. 2.Mild left hydronephrosis and hydroureter appears similar to the prior exam. The distal left ureter is nonopacified and nondilated. No clear obstructing etiology is identified. 3.Excreted contrast from prior CTA in the renal collecting systems, ureters, and bladder. This limits assessment for urinary tract calculi. 4.Cholelithiasis. 5.Calcific atherosclerosis. 6.Postoperative changes in the pelvis with evidence of prostatectomy. Electronically Signed: Larry Hoff  10/29/2023 8:16 PM EDT  Workstation ID: [id number]    MRI Brain Without Contrast    Result Date: 10/29/2023  Impression: 1.No definite findings of acute intracranial abnormality at this time. 2.Mild T2 and FLAIR hyperintense signal foci in the cerebral white matter are nonspecific but likely represent mild chronic small vessel ischemic changes. 3.Mucosal thickening of the paranasal sinuses could indicate acute sinusitis. Electronically Signed: Larry Hoff  10/29/2023 4:10 PM EDT  Workstation ID: [id number]    XR Shoulder 2+ View Right    Result Date: 10/29/2023  1.No definite radiographic findings of acute osseous shoulder abnormality. 2.Glenohumeral joint is not well evaluated due to positioning. There is suspected mild to " moderate osteoarthritis. 3.Moderate degenerative changes at the acromioclavicular joint. Electronically Signed: Larry Luis Eduardo  10/29/2023 2:16 PM EDT  Workstation ID: [id number]    XR Chest 1 View    Result Date: 10/29/2023  1.Prominent interstitial markings and mild left basilar opacities which could represent mild edema, pneumonia, or chronic lung disease. 2.Mild cardiomegaly. Status post median sternotomy and CABG. Electronically Signed: Larry Luis Eduardo  10/29/2023 2:00 PM EDT  Workstation ID: [id number]    CT Angiogram Head w AI Analysis of LVO    Result Date: 10/29/2023  Impression: 1.No definite findings of acute intracranial large vessel occlusion. 2.No significant stenosis of greater than 50% is seen at the bilateral internal carotid arteries at this time. Electronically Signed: Larry Luis Eduardo  10/29/2023 1:49 PM EDT  Workstation ID: [id number]    CT Angiogram Neck    Result Date: 10/29/2023  Impression: 1.No definite findings of acute intracranial large vessel occlusion. 2.No significant stenosis of greater than 50% is seen at the bilateral internal carotid arteries at this time. Electronically Signed: Larry Luis Eduardo  10/29/2023 1:49 PM EDT  Workstation ID: [id number]    CT CEREBRAL PERFUSION WITH & WITHOUT CONTRAST    Result Date: 10/29/2023  No CT perfusion evidence of acute infarction or ischemia. Electronically Signed: Larry Luis Eduardo  10/29/2023 1:35 PM EDT  Workstation ID: [id number]    CT Head Without Contrast Stroke Protocol    Result Date: 10/29/2023  1.No definite CT findings of acute intracranial abnormality. Probable mild chronic small vessel ischemic changes. 2.Paranasal sinus mucosal thickening which could indicate sinusitis. Electronically Signed: Larry Luis Eduardo  10/29/2023 1:18 PM EDT  Workstation ID: [id number]    US Renal Bilateral    Result Date: 10/17/2023  1. Limited study due to body habitus and overlying bowel gas. 2. Minimal hydronephrosis left kidney. 3. Septated cystic structure left kidney incompletely  evaluated. Correlation with outside evaluation if performed recommended. Otherwise, a follow-up can always be considered with dedicated CT or MRI utilizing a renal mass protocol. Alternatively, a repeat short-term follow-up ultrasound. 4. Debris within the bladder. Please correlate with urinalysis Electronically Signed: Luan Lopez MD  10/17/2023 1:20 PM EDT  Workstation ID: [id number]     Results Review:   I reviewed the patient's new clinical results.     Imaging Results (Last 72 Hours)       Procedure Component Value Units Date/Time    CT Abdomen Pelvis Without Contrast [[id number]] Collected: 10/29/23 2001     Updated: 10/29/23 2020    Narrative:      CT ABDOMEN PELVIS WO CONTRAST    Date of Exam: 10/29/2023 7:17 PM EDT    Indication: Flank pain, kidney stone suspected  septic with UTI HO obstruction.    Comparison: May 8, 2023. Renal ultrasound October 17, 2023.    Technique: Axial CT images were obtained of the abdomen and pelvis without the administration of contrast. Reconstructed coronal and sagittal images were also obtained. Automated exposure control and iterative construction methods were used.      Findings:  There is suspected dependent atelectasis in the lower lobes. Status post median sternotomy. There is calcific atherosclerosis of the aorta. No significant pleural or pericardial effusion is seen. Heart size appears within normal limits.    There is excreted contrast in the renal collecting systems, ureters, and bladder from CT angiography performed the same day. Contrast does limit assessment for urinary tract calculi.    There is mild left hydronephrosis and hydroureter which appears similar to the prior exam. The distal left ureter is nonopacified and nondilated. No clear obstructing etiology is identified. There is a hypodense lesion at the anterior upper pole of the   left kidney which was present on the prior exam suggestive of a renal cyst, but on the current exam, the lesion measures up  to approximately 3.7 cm, previously 2.9 cm, and there is now an irregular gaseous lucency within the lesion. There is some   surrounding fat stranding which appears mildly increased. These findings are concerning for superimposed gas forming infection within a renal cyst.    Probable right renal cyst, as before. No definite new right renal abnormality. Right ureter appears patent. No definite right hydronephrosis.    The bladder is distended with contrast. No suspicious focal bladder abnormality is seen. No significant bladder wall thickening. Multiple surgical clips are seen in the pelvis. There has been prostatectomy.    Hyperdensity within the gallbladder suggesting cholelithiasis, as before. No suspicious hepatic abnormality is seen. No definite splenomegaly. No suspicious adrenal lesion. No acute pancreatic abnormality. There is atherosclerosis of the aorta. No   definite lymphadenopathy. No acute gastric abnormality. No small bowel dilatation. There is diverticulosis of the colon. No definite acute colonic or rectal abnormality is seen. No significant pelvic lymphadenopathy. There is midline abdominal scarring.    There are degenerative changes in the bilateral hips and in the thoracolumbar spine.      Impression:      Impression:  1.Left upper pole cyst appears increased in size compared to May 2023 and now contains irregular gaseous lucency with some new mild surrounding fat stranding. Findings are suspicious for infected renal cyst with gas-forming infection. Also recommend   correlation recent surgery as this could also be related to recent intervention.  2.Mild left hydronephrosis and hydroureter appears similar to the prior exam. The distal left ureter is nonopacified and nondilated. No clear obstructing etiology is identified.  3.Excreted contrast from prior CTA in the renal collecting systems, ureters, and bladder. This limits assessment for urinary tract calculi.  4.Cholelithiasis.  5.Calcific  atherosclerosis.  6.Postoperative changes in the pelvis with evidence of prostatectomy.        Electronically Signed: Larry Hoff    10/29/2023 8:16 PM EDT    Workstation ID: [id number]    MRI Brain Without Contrast [[id number]] Collected: 10/29/23 1605     Updated: 10/29/23 1613    Narrative:      MRI BRAIN WO CONTRAST    Date of Exam: 10/29/2023 3:30 PM EDT    Indication: Stroke, follow up.     Comparison: CT head, CT perfusion, and CTA head and neck performed the same day    Technique:  Routine multiplanar/multisequence sequence images of the brain were obtained without contrast administration.      Findings:  No midline shift. The ventricles and sulci appear within normal limits for patient's age. Basal cisterns appear patent. The pituitary gland appears to have normal morphology. Cerebellar tonsils appear normally positioned.    No definite extra-axial collection and no definite findings of acute or chronic hemorrhage. No mass lesion, mass effect, or edema is identified. Mild T2 and FLAIR hyperintense signal foci are seen in the cerebral white matter. No definite restricted   diffusion is identified at this time.    There is mucosal thickening of the paranasal sinuses. Mastoid air cells appear clear. Globes and orbits appear unremarkable. No definite calvarial abnormality is identified.        Impression:      Impression:  1.No definite findings of acute intracranial abnormality at this time.  2.Mild T2 and FLAIR hyperintense signal foci in the cerebral white matter are nonspecific but likely represent mild chronic small vessel ischemic changes.  3.Mucosal thickening of the paranasal sinuses could indicate acute sinusitis.        Electronically Signed: Larry Hoff    10/29/2023 4:10 PM EDT    Workstation ID: [id number]    XR Shoulder 2+ View Right [[id number]] Collected: 10/29/23 1414     Updated: 10/29/23 1419    Narrative:        XR SHOULDER 2+ VW RIGHT    Date of Exam: 10/29/2023 1:57 PM EDT    Indication:  Trauma    Comparison: None available.    FINDINGS:    No definite acute fracture or malalignment is seen. Glenohumeral joint is not well evaluated due to positioning. There is suspected osteophyte formation with probable mild to moderate joint space narrowing. There appear to be moderate degenerative   changes at the acromioclavicular joint. Calcifications projecting along the posterior humeral head may represent calcific tendinopathy. No other definite soft tissue abnormality.      Impression:      1.No definite radiographic findings of acute osseous shoulder abnormality.  2.Glenohumeral joint is not well evaluated due to positioning. There is suspected mild to moderate osteoarthritis.  3.Moderate degenerative changes at the acromioclavicular joint.        Electronically Signed: Larry Hoff    10/29/2023 2:16 PM EDT    Workstation ID: [id number]    XR Chest 1 View [[id number]] Collected: 10/29/23 1359     Updated: 10/29/23 1403    Narrative:        XR CHEST 1 VW    Date of Exam: 10/29/2023 1:47 PM EDT    Indication: Acute Stroke Protocol (onset < 12 hrs)    Comparison: None available.    FINDINGS:  There are prominent interstitial markings and mild left basilar opacities. No significant focal consolidation. No pneumothorax or significant pleural effusion. The heart appears mildly enlarged. Status post median sternotomy and CABG. Mediastinal contour   appears within normal limits.      Impression:      1.Prominent interstitial markings and mild left basilar opacities which could represent mild edema, pneumonia, or chronic lung disease.  2.Mild cardiomegaly. Status post median sternotomy and CABG.        Electronically Signed: Larry Hoff    10/29/2023 2:00 PM EDT    Workstation ID: [id number]    CT Angiogram Head w AI Analysis of LVO [[id number]] Collected: 10/29/23 1335     Updated: 10/29/23 1352    Narrative:      CT ANGIOGRAM HEAD W AI ANALYSIS OF LVO, CT ANGIOGRAM NECK    Date of Exam: 10/29/2023 1:13 PM  EDT    Indication: Neuro deficit, acute stroke suspected  Neuro deficit, acute stroke suspected.    Comparison: CT head and CT perfusion performed the same day.    Technique: CTA of the head and neck was performed after the uneventful intravenous administration of iodinated contrast. Reconstructed coronal and sagittal images were also obtained. In addition, a 3-D volume rendered image was created for   interpretation. Automated exposure control and iterative reconstruction methods were used.     Findings:  Head: The vertebral and basilar arteries appear somewhat diminutive. There is fetal origin of the right posterior cerebral artery and partial fetal origin on the left. The posterior cerebral arteries appear grossly symmetric. No definite proximal   occlusion or definite high-grade stenosis.    There is calcific atherosclerosis of the cavernous portions of the internal carotid arteries. No high-grade stenosis is seen. The middle cerebral arteries appear to opacify as expected. There is a small right A1 segment of the anterior cerebral artery,   likely a normal variant. There is a patent anterior communicating artery. The anterior cerebral arteries are patent distally and grossly symmetric. No definite focal occlusion or high-grade stenosis is identified in the bilateral M1 and M2 segments of   the middle cerebral arteries. No other definite acute intracranial vascular abnormality is seen.    Neck: Status post median sternotomy and CABG. The visualized central pulmonary arteries appear to opacify as expected. Visualized aortic arch appears within normal limits. There is bovine type branching anatomy with common origin of the right   brachiocephalic and left common carotid arteries. The left common carotid and external carotid arteries are patent. There is minimal atherosclerosis at the carotid bifurcation. There is tortuosity of the internal carotid artery. No stenosis of greater   than 50% is identified by NASCET  criteria.    Right brachiocephalic artery appears patent. Common carotid and external carotid arteries are patent. There is also tortuosity of the right internal carotid artery without stenosis of greater than 50% by NASCET criteria.    The subclavian arteries appear patent. The vertebral arteries appear patent. The vertebral arteries appear somewhat diminutive, likely normal variant as there appears to be fetal origin of the posterior cerebral arteries.    No other definite acute vascular abnormality is seen.    Nonvascular: No acute intracranial abnormality is identified. There is paranasal sinus mucosal thickening. No acute infiltrate is seen in the lung apices. 7 mm hypodensity is seen in the right lobe of thyroid.    There is mild to moderate multilevel disc and facet joint degeneration in the cervical spine.      Impression:      Impression:  1.No definite findings of acute intracranial large vessel occlusion.  2.No significant stenosis of greater than 50% is seen at the bilateral internal carotid arteries at this time.       Electronically Signed: Larry Hoff    10/29/2023 1:49 PM EDT    Workstation ID: [id number]    CT Angiogram Neck [[id number]] Collected: 10/29/23 1335     Updated: 10/29/23 1352    Narrative:      CT ANGIOGRAM HEAD W AI ANALYSIS OF LVO, CT ANGIOGRAM NECK    Date of Exam: 10/29/2023 1:13 PM EDT    Indication: Neuro deficit, acute stroke suspected  Neuro deficit, acute stroke suspected.    Comparison: CT head and CT perfusion performed the same day.    Technique: CTA of the head and neck was performed after the uneventful intravenous administration of iodinated contrast. Reconstructed coronal and sagittal images were also obtained. In addition, a 3-D volume rendered image was created for   interpretation. Automated exposure control and iterative reconstruction methods were used.     Findings:  Head: The vertebral and basilar arteries appear somewhat diminutive. There is fetal origin of the  right posterior cerebral artery and partial fetal origin on the left. The posterior cerebral arteries appear grossly symmetric. No definite proximal   occlusion or definite high-grade stenosis.    There is calcific atherosclerosis of the cavernous portions of the internal carotid arteries. No high-grade stenosis is seen. The middle cerebral arteries appear to opacify as expected. There is a small right A1 segment of the anterior cerebral artery,   likely a normal variant. There is a patent anterior communicating artery. The anterior cerebral arteries are patent distally and grossly symmetric. No definite focal occlusion or high-grade stenosis is identified in the bilateral M1 and M2 segments of   the middle cerebral arteries. No other definite acute intracranial vascular abnormality is seen.    Neck: Status post median sternotomy and CABG. The visualized central pulmonary arteries appear to opacify as expected. Visualized aortic arch appears within normal limits. There is bovine type branching anatomy with common origin of the right   brachiocephalic and left common carotid arteries. The left common carotid and external carotid arteries are patent. There is minimal atherosclerosis at the carotid bifurcation. There is tortuosity of the internal carotid artery. No stenosis of greater   than 50% is identified by NASCET criteria.    Right brachiocephalic artery appears patent. Common carotid and external carotid arteries are patent. There is also tortuosity of the right internal carotid artery without stenosis of greater than 50% by NASCET criteria.    The subclavian arteries appear patent. The vertebral arteries appear patent. The vertebral arteries appear somewhat diminutive, likely normal variant as there appears to be fetal origin of the posterior cerebral arteries.    No other definite acute vascular abnormality is seen.    Nonvascular: No acute intracranial abnormality is identified. There is paranasal sinus  mucosal thickening. No acute infiltrate is seen in the lung apices. 7 mm hypodensity is seen in the right lobe of thyroid.    There is mild to moderate multilevel disc and facet joint degeneration in the cervical spine.      Impression:      Impression:  1.No definite findings of acute intracranial large vessel occlusion.  2.No significant stenosis of greater than 50% is seen at the bilateral internal carotid arteries at this time.       Electronically Signed: Larry Hoff    10/29/2023 1:49 PM EDT    Workstation ID: [id number]    CT CEREBRAL PERFUSION WITH & WITHOUT CONTRAST [[id number]] Collected: 10/29/23 1332     Updated: 10/29/23 1338    Narrative:      CT CEREBRAL PERFUSION W WO CONTRAST    Date of Exam: 10/29/2023 1:13 PM EDT    Indication: Neuro deficit, acute stroke suspected.     Comparison: CT head performed the same day.    Technique: Axial CT images of the brain were obtained prior to and after the administration of 115 mL Isovue-370. Core blood volume, core blood flow, mean transit time, and Tmax images were obtained utilizing the Rapid software protocol. A limited CT   angiogram of the head was also performed to measure the blood vessel density.    The radiation dose reduction device was turned on for each scan per the ALARA (As Low as Reasonably Achievable) protocol.        FINDINGS:     CT Perfusion:  CBF (<30%) volume: 0 mL  Tmax (>6.0s) volume: 0 mL  Mismatch volume: 0 mL  Mismatch ratio: None    No definite perfusion abnormality is seen in the visualized parenchyma to indicate ischemia or infarct.      Impression:      No CT perfusion evidence of acute infarction or ischemia.        Electronically Signed: Larry Hoff    10/29/2023 1:35 PM EDT    Workstation ID: [id number]    CT Head Without Contrast Stroke Protocol [[id number]] Collected: 10/29/23 1314     Updated: 10/29/23 1321    Narrative:      CT HEAD WO CONTRAST STROKE PROTOCOL    Date of Exam: 10/29/2023 1:03 PM EDT    Indication: Neuro deficit,  acute, stroke suspected  Neuro deficit, acute stroke suspected.    Comparison: None available.    Technique: Axial CT images were obtained of the head without contrast administration.  Reconstructed coronal images were also obtained. Automated exposure control and iterative construction methods were used.    Scan Time: 1:11 p.m.  Results discussed with stroke team navigator at 1:17 p.m      FINDINGS:  No midline shift. Basal cisterns appear patent.  Ventricles and sulci appear within normal limits for patient age.    No extra-axial collection or acute hemorrhage is identified.  No definite mass lesion, edema, or significant mass effect is seen.  There is hypoattenuation in the white matter, which is nonspecific, but is most commonly seen with chronic small vessel   ischemic changes. No definite CT findings of acute infarction.    There is paranasal sinus mucosal thickening. Mastoid air cells appear clear.  No acute calvarial abnormality is identified.      Impression:      1.No definite CT findings of acute intracranial abnormality. Probable mild chronic small vessel ischemic changes.  2.Paranasal sinus mucosal thickening which could indicate sinusitis.        Electronically Signed: Larry Hoff    10/29/2023 1:18 PM EDT    Workstation ID: [id number]            Assessment / Plan      Assessment/Plan:    73yM with a history of PrCa, recurrent nephrolithiasis, L ureteral stricture, and urinary incontinence.  Underwent left ureteral stricture dilation and stent placement in June and July, stent was removed in July.  Postoperative renal ultrasound with no recurrence of hydronephrosis.  Status post right ureteroscopy lithotripsy in August, did well afterwards.      I saw him in clinic last 10/19/2023.  He was doing well at that time.  Urinalysis was clear.  Renal ultrasound 10/17/2023 demonstrating minimal hydronephrosis of left kidney.  We discussed the risk of stricture recurrence in the left kidney.    He has likely had  a recurrent left stricture with moderate hydronephrosis noted however this is confused by the fact that the patient's bladder was fairly distended which could result in left hydroureteronephrosis.  I think the most likely etiology of his left renal abscess is a recurrent left ureteral stricture with delayed drainage of his left kidney.  This will require a left ureteral stent while inpatient.  We will plan for retrograde pyelogram and left stent placement for complete urinary decompression.  In addition the patient will need a consultation with interventional radiology for IR guided left renal abscess drain for source control.      I recommend infectious disease consult for management of antibiotics.    Plan   -IR consultation for left renal abscess drain placement versus aspiration  -I will plan to take the patient to the operating room for cystoscopy and left ureteral stent placement tomorrow 10-31-23 AM  - hold anticoagulation  - IR procedure appears scheduled today  - NPO @ [facility] for urology procedure  -Continue broad-spectrum antibiotics  -Continue Farooq catheterization    I discussed the patients findings and my recommendations with the patient, I also spoke to the patient's daughter Toya over the phone.    Isma Justin MD    Original evaluation was performed by Kristan Pearce MD, however given patient acuity, a repeat evaluation performed by myself took place and addended documentation was performed by me.    I have reviewed the notes, assessments, and/or procedures performed with resident physician, Kristan Pearce MD, I concur with her/his documentation of Aldair Narvaez.

## 2023-10-30 NOTE — PLAN OF CARE
Goal Outcome Evaluation:        Problem: Adult Inpatient Plan of Care  Goal: Plan of Care Review  Outcome: Ongoing, Progressing  Goal: Patient-Specific Goal (Individualized)  Outcome: Ongoing, Progressing  Goal: Absence of Hospital-Acquired Illness or Injury  Outcome: Ongoing, Progressing  Intervention: Identify and Manage Fall Risk  Recent Flowsheet Documentation  Taken 10/30/2023 1000 by Glory Ortega RN  Safety Promotion/Fall Prevention:   activity supervised   assistive device/personal items within reach   clutter free environment maintained   room organization consistent   safety round/check completed   toileting scheduled  Taken 10/30/2023 0800 by Glory Ortega RN  Safety Promotion/Fall Prevention:   activity supervised   assistive device/personal items within reach   clutter free environment maintained   room organization consistent   safety round/check completed   toileting scheduled  Intervention: Prevent Skin Injury  Recent Flowsheet Documentation  Taken 10/30/2023 1000 by Glory Ortega RN  Body Position: position changed independently  Skin Protection:   incontinence pads utilized   transparent dressing maintained  Taken 10/30/2023 0800 by Glory Ortega RN  Body Position: position changed independently  Skin Protection:   incontinence pads utilized   transparent dressing maintained  Intervention: Prevent and Manage VTE (Venous Thromboembolism) Risk  Recent Flowsheet Documentation  Taken 10/30/2023 1000 by Glory Ortega RN  Activity Management: activity encouraged  Taken 10/30/2023 0800 by Glory Ortega RN  Activity Management: activity encouraged  Goal: Optimal Comfort and Wellbeing  Outcome: Ongoing, Progressing  Intervention: Monitor Pain and Promote Comfort  Recent Flowsheet Documentation  Taken 10/30/2023 0800 by Glory Ortega RN  Pain Management Interventions:   care clustered   see MAR  Intervention: Provide Person-Centered Care  Recent Flowsheet Documentation  Taken 10/30/2023  0800 by Glory Ortega RN  Trust Relationship/Rapport: care explained  Goal: Readiness for Transition of Care  Outcome: Ongoing, Progressing     Problem: Skin Injury Risk Increased  Goal: Skin Health and Integrity  Outcome: Ongoing, Progressing  Intervention: Optimize Skin Protection  Recent Flowsheet Documentation  Taken 10/30/2023 1000 by Glory Ortega RN  Pressure Reduction Devices:   chair cushion utilized   positioning supports utilized  Skin Protection:   incontinence pads utilized   transparent dressing maintained  Taken 10/30/2023 0800 by Glory Ortega RN  Pressure Reduction Devices:   chair cushion utilized   positioning supports utilized  Skin Protection:   incontinence pads utilized   transparent dressing maintained     Problem: Fall Injury Risk  Goal: Absence of Fall and Fall-Related Injury  Outcome: Ongoing, Progressing  Intervention: Promote Injury-Free Environment  Recent Flowsheet Documentation  Taken 10/30/2023 1000 by Glory Ortega RN  Safety Promotion/Fall Prevention:   activity supervised   assistive device/personal items within reach   clutter free environment maintained   room organization consistent   safety round/check completed   toileting scheduled  Taken 10/30/2023 0800 by Glory Ortega RN  Safety Promotion/Fall Prevention:   activity supervised   assistive device/personal items within reach   clutter free environment maintained   room organization consistent   safety round/check completed   toileting scheduled

## 2023-10-30 NOTE — PAYOR COMM NOTE
"Yumiko Bernstein (73 y.o. Male)       Date of Birth   1950    Social Security Number       Address   [address] [place] [address]    Home Phone   [phone]    MRN   [id number]       Taoist   Worship    Marital Status                               Admission Date   10/29/23    Admission Type   Emergency    Admitting Provider   Berenice Ellis MD    Attending Provider   Berenice Ellis MD    Department, Room/Bed   [address], S584/1       Discharge Date       Discharge Disposition       Discharge Destination                                 Attending Provider: Berenice Ellis MD    Allergies: Iodine, Oxycodone-acetaminophen, Zofran [Ondansetron]    Isolation: Droplet   Infection: Rhinovirus  (10/29/23)   Code Status: CPR    Ht: 177.8 cm (70\")   Wt: 103 kg (227 lb 14.4 oz)    Admission Cmt: None   Principal Problem: UTI (urinary tract infection) [N39.0]                   Active Insurance as of 10/29/2023       Primary Coverage       Payor Plan Insurance Group Employer/Plan Group    ANTHEM MEDICARE REPLACEMENT ANTHEM MEDICARE ADVANTAGE [id number]       Payor Plan Address Payor Plan Phone Number Payor Plan Fax Number Effective Dates    [address] [phone]  2/1/2018 - None Entered    [facility] [id number]         Subscriber Name Subscriber Birth Date Member ID       YUMIKO BERNSTEIN 1950 [id number]                     Emergency Contacts        (Rel.) Home Phone Work Phone Mobile Phone    GORAN BERNSTEIN (Spouse) [phone] -- --    Adelaide Joseph (Daughter) [phone] -- --              [place]: [facility] [id number] Tax ID [id number]  Insurance Information                  ANTHEM MEDICARE REPLACEMENT/ANTHEM MEDICARE ADVANTAGE Phone: [phone]    Subscriber: BernsteinYumiko landis Subscriber#: [id number]    Group#: [id number] Precert#: [id number]             History & Physical        Dorothea Carrizales MD at 10/29/23 [address] [place] [address] " [facility]  HISTORY AND PHYSICAL    Patient Name: Aldair Narvaez  : 1950  MRN: [id number]  Primary Care Physician: Miguel Angel Mireles MD    Subjective  Subjective     Chief Complaint: Weakness and fall    HPI:  Aldair Narvaez is a 73 y.o. male who  has a past medical history of Acid reflux, Arthritis, Benign hypertension, Colon polyp, Concern about heart attack without diagnosis, Coronary artery disease, Coronary atherosclerosis, Heart disease, Hyperlipidemia, Hypertension, Kidney stone, Mixed hyperlipidemia, Obesity, Prostate cancer, Sleep apnea, Type 2 diabetes mellitus, and Type 2 diabetes mellitus. who presented to the emergency room after a fall.  He felt okay yesterday.  He fell out of the bed and slept all night on his right side.  EMS was called this morning and returned him to his bed.  Noticed later in the day that he could not move his right arm and did not feel any better and was right to the emergency room.  Patient was found to have a urinary tract infection with acute renal failure as well as a significantly elevated white count and rhabdomyolysis.  He was treated as a code stroke but no perfusion deficits were found.  Thought that he could have a brachial plexus injury from his prolonged time on the floor last night.  Patient has previously seen by Dr. Justin for chronic left hydronephrosis.  CT scan was ordered in the emergency room.      Review of Systems   Patient denies weight loss, headaches, changes in vision, fever, chills, sore throat, shortness of breath, cough, nausea or vomiting, diarrhea, abdominal pain or distension, change in urine output or habits, joint pain, rash, itching, numbness/tingling, weakness or bleeding.    Otherwise complete ROS is negative except as mentioned in the HPI.    Personal History     PMH: He  has a past medical history of Acid reflux, Arthritis, Benign hypertension, Colon polyp, Concern about heart attack without diagnosis, Coronary  artery disease, Coronary atherosclerosis, Heart disease, Hyperlipidemia, Hypertension, Kidney stone, Mixed hyperlipidemia, Obesity, Prostate cancer, Sleep apnea, Type 2 diabetes mellitus, and Type 2 diabetes mellitus.   PSxH: He  has a past surgical history that includes Cardiac catheterization; Prostatectomy; Fetal surgery for congenital hernia; cystoscopy bladder stone lithotripsy; Carpal tunnel release; Inguinal Hernia Repair; Knee Arthroscopy; Umbilical hernia repair; Trigger finger release; Uvulopalatopharyngoplasty; cystoscopy bladder stone lithotripsy; Cardiac surgery (N/A); Coronary artery bypass graft (08/30/2022); Cystoscopy w/ ureteral stent placement (Left, 05/09/2023); and Kidney stone surgery.         FH: His family history includes Diabetes in his sister; Heart attack in his father; Heart disease in his father; Hyperlipidemia in his sister; Hypertension in his sister.   SH: He  reports that he has never smoked. He has never used smokeless tobacco. He reports that he does not currently use alcohol after a past usage of about 1.0 standard drink of alcohol per week. He reports that he does not use drugs.     Medications:  HYDROcodone-acetaminophen, PHARMACY MEDS TO BED CONSULT, albuterol sulfate HFA, apixaban, buPROPion XL, coenzyme Q10, dapagliflozin Propanediol, glimepiride, glucose blood, hyoscyamine sulfate, metoprolol succinate XL, nitrofurantoin (macrocrystal-monohydrate), nitroglycerin, pantoprazole, phenazopyridine, potassium citrate, rosuvastatin, tamsulosin, and valsartan-hydrochlorothiazide    Allergies   Allergen Reactions    Iodine Other (See Comments)     Closes sinuses    Oxycodone-Acetaminophen Itching    Zofran [Ondansetron] Hives       Objective  Objective     Vital Signs:   Temp:  [97.8 °F (36.6 °C)-98.2 °F (36.8 °C)] 98.2 °F (36.8 °C)  Heart Rate:  [100-102] 100  Resp:  [16-18] 18  BP: ()/(69-73) 94/69    Constitutional: Awake, alert, interactive and pleasant ill, but  nontoxic  Eyes: clear sclerae, no conjunctival injection  HENT: NCAT, mucous membranes moist  Neck: no masses or lymphadenopathy, trachea midline  Respiratory: good breath sounds bilaterally, respirations unlabored  Cardiovascular: RRR, no murmurs appreciated, palpable peripheral pulses  Abdomen:  soft, no HSM or masses palpable, based but nontender  Musculoskeletal: No peripheral edema, clubbing or cyanosis  Neurologic: Oriented x 3,                      Right arm is flaccid he is able to  some with his right hand, left arm and leg are normal                   cranial Nerves grossly intact, speech clear  Skin: No rashes or jaundice, he does have multiple abrasions across his right arm and legs  Psychiatric: Appropriate mood, insight      Result Review:  I have personally reviewed the results from the time of this admission   to 10/29/2023 18:02 EDT and agree with these findings:  [x]  Laboratory  [x]  Microbiology  [x]  Radiology  [x]  EKG/Telemetry   []  Cardiology/Vascular   []  Pathology  [x]  Old records  []  Other:  Most notable findings include: Significant elevated white count, lactic acid, creatinine 2.3 glucose 438      LAB RESULTS:      Lab 10/29/23  1614 10/29/23  1259   WBC  --  28.11*   HEMOGLOBIN  --  14.7   HEMATOCRIT  --  44.9   PLATELETS  --  150   NEUTROS ABS  --  25.05*   IMMATURE GRANS (ABS)  --  0.87*   LYMPHS ABS  --  0.81   MONOS ABS  --  1.20*   EOS ABS  --  0.03   MCV  --  88.0   LACTATE 5.1* 7.1*   APTT  --  30.5         Lab 10/29/23  1259   SODIUM 131*   POTASSIUM 3.9   CHLORIDE 92*   CO2 18.0*   ANION GAP 21.0*   BUN 30*   CREATININE 2.36*   EGFR 28.7*   GLUCOSE 438*   CALCIUM 9.1         Lab 10/29/23  1259   ALT (SGPT) 61*   AST (SGOT) 141*         Lab 10/29/23  1614 10/29/23  1259   HSTROP T 45* 66*                 Brief Urine Lab Results  (Last result in the past 365 days)        Color   Clarity   Blood   Leuk Est   Nitrite   Protein   CREAT   Urine HCG        10/29/23 3830  Yellow   Cloudy   Large (3+)   Small (1+)   Negative   100 mg/dL (2+)                 COVID19   Date Value Ref Range Status   10/29/2023 Not Detected Not Detected - Ref. Range Final       MRI Brain Without Contrast    Result Date: 10/29/2023  MRI BRAIN WO CONTRAST Date of Exam: 10/29/2023 3:30 PM EDT Indication: Stroke, follow up.  Comparison: CT head, CT perfusion, and CTA head and neck performed the same day Technique:  Routine multiplanar/multisequence sequence images of the brain were obtained without contrast administration. Findings: No midline shift. The ventricles and sulci appear within normal limits for patient's age. Basal cisterns appear patent. The pituitary gland appears to have normal morphology. Cerebellar tonsils appear normally positioned. No definite extra-axial collection and no definite findings of acute or chronic hemorrhage. No mass lesion, mass effect, or edema is identified. Mild T2 and FLAIR hyperintense signal foci are seen in the cerebral white matter. No definite restricted diffusion is identified at this time. There is mucosal thickening of the paranasal sinuses. Mastoid air cells appear clear. Globes and orbits appear unremarkable. No definite calvarial abnormality is identified.     Impression: Impression: 1.No definite findings of acute intracranial abnormality at this time. 2.Mild T2 and FLAIR hyperintense signal foci in the cerebral white matter are nonspecific but likely represent mild chronic small vessel ischemic changes. 3.Mucosal thickening of the paranasal sinuses could indicate acute sinusitis. Electronically Signed: Larry Hoff  10/29/2023 4:10 PM EDT  Workstation ID: [id number]    XR Shoulder 2+ View Right    Result Date: 10/29/2023  XR SHOULDER 2+ VW RIGHT Date of Exam: 10/29/2023 1:57 PM EDT Indication: Trauma Comparison: None available. FINDINGS:  No definite acute fracture or malalignment is seen. Glenohumeral joint is not well evaluated due to positioning. There is  suspected osteophyte formation with probable mild to moderate joint space narrowing. There appear to be moderate degenerative changes at the acromioclavicular joint. Calcifications projecting along the posterior humeral head may represent calcific tendinopathy. No other definite soft tissue abnormality.     Impression: 1.No definite radiographic findings of acute osseous shoulder abnormality. 2.Glenohumeral joint is not well evaluated due to positioning. There is suspected mild to moderate osteoarthritis. 3.Moderate degenerative changes at the acromioclavicular joint. Electronically Signed: Larry Hoff  10/29/2023 2:16 PM EDT  Workstation ID: [id number]    XR Chest 1 View    Result Date: 10/29/2023  XR CHEST 1 VW Date of Exam: 10/29/2023 1:47 PM EDT Indication: Acute Stroke Protocol (onset < 12 hrs) Comparison: None available. FINDINGS: There are prominent interstitial markings and mild left basilar opacities. No significant focal consolidation. No pneumothorax or significant pleural effusion. The heart appears mildly enlarged. Status post median sternotomy and CABG. Mediastinal contour  appears within normal limits.     Impression: 1.Prominent interstitial markings and mild left basilar opacities which could represent mild edema, pneumonia, or chronic lung disease. 2.Mild cardiomegaly. Status post median sternotomy and CABG. Electronically Signed: Larry Luis Eduardo  10/29/2023 2:00 PM EDT  Workstation ID: [id number]    CT Angiogram Head w AI Analysis of LVO    Result Date: 10/29/2023  CT ANGIOGRAM HEAD W AI ANALYSIS OF LVO, CT ANGIOGRAM NECK Date of Exam: 10/29/2023 1:13 PM EDT Indication: Neuro deficit, acute stroke suspected Neuro deficit, acute stroke suspected. Comparison: CT head and CT perfusion performed the same day. Technique: CTA of the head and neck was performed after the uneventful intravenous administration of iodinated contrast. Reconstructed coronal and sagittal images were also obtained. In addition, a 3-D  volume rendered image was created for interpretation. Automated exposure control and iterative reconstruction methods were used. Findings: Head: The vertebral and basilar arteries appear somewhat diminutive. There is fetal origin of the right posterior cerebral artery and partial fetal origin on the left. The posterior cerebral arteries appear grossly symmetric. No definite proximal occlusion or definite high-grade stenosis. There is calcific atherosclerosis of the cavernous portions of the internal carotid arteries. No high-grade stenosis is seen. The middle cerebral arteries appear to opacify as expected. There is a small right A1 segment of the anterior cerebral artery, likely a normal variant. There is a patent anterior communicating artery. The anterior cerebral arteries are patent distally and grossly symmetric. No definite focal occlusion or high-grade stenosis is identified in the bilateral M1 and M2 segments of the middle cerebral arteries. No other definite acute intracranial vascular abnormality is seen. Neck: Status post median sternotomy and CABG. The visualized central pulmonary arteries appear to opacify as expected. Visualized aortic arch appears within normal limits. There is bovine type branching anatomy with common origin of the right brachiocephalic and left common carotid arteries. The left common carotid and external carotid arteries are patent. There is minimal atherosclerosis at the carotid bifurcation. There is tortuosity of the internal carotid artery. No stenosis of greater than 50% is identified by NASCET criteria. Right brachiocephalic artery appears patent. Common carotid and external carotid arteries are patent. There is also tortuosity of the right internal carotid artery without stenosis of greater than 50% by NASCET criteria. The subclavian arteries appear patent. The vertebral arteries appear patent. The vertebral arteries appear somewhat diminutive, likely normal variant as there  appears to be fetal origin of the posterior cerebral arteries. No other definite acute vascular abnormality is seen. Nonvascular: No acute intracranial abnormality is identified. There is paranasal sinus mucosal thickening. No acute infiltrate is seen in the lung apices. 7 mm hypodensity is seen in the right lobe of thyroid. There is mild to moderate multilevel disc and facet joint degeneration in the cervical spine.     Impression: Impression: 1.No definite findings of acute intracranial large vessel occlusion. 2.No significant stenosis of greater than 50% is seen at the bilateral internal carotid arteries at this time. Electronically Signed: Larry Hoff  10/29/2023 1:49 PM EDT  Workstation ID: [id number]    CT Angiogram Neck    Result Date: 10/29/2023  CT ANGIOGRAM HEAD W AI ANALYSIS OF LVO, CT ANGIOGRAM NECK Date of Exam: 10/29/2023 1:13 PM EDT Indication: Neuro deficit, acute stroke suspected Neuro deficit, acute stroke suspected. Comparison: CT head and CT perfusion performed the same day. Technique: CTA of the head and neck was performed after the uneventful intravenous administration of iodinated contrast. Reconstructed coronal and sagittal images were also obtained. In addition, a 3-D volume rendered image was created for interpretation. Automated exposure control and iterative reconstruction methods were used. Findings: Head: The vertebral and basilar arteries appear somewhat diminutive. There is fetal origin of the right posterior cerebral artery and partial fetal origin on the left. The posterior cerebral arteries appear grossly symmetric. No definite proximal occlusion or definite high-grade stenosis. There is calcific atherosclerosis of the cavernous portions of the internal carotid arteries. No high-grade stenosis is seen. The middle cerebral arteries appear to opacify as expected. There is a small right A1 segment of the anterior cerebral artery, likely a normal variant. There is a patent anterior  communicating artery. The anterior cerebral arteries are patent distally and grossly symmetric. No definite focal occlusion or high-grade stenosis is identified in the bilateral M1 and M2 segments of the middle cerebral arteries. No other definite acute intracranial vascular abnormality is seen. Neck: Status post median sternotomy and CABG. The visualized central pulmonary arteries appear to opacify as expected. Visualized aortic arch appears within normal limits. There is bovine type branching anatomy with common origin of the right brachiocephalic and left common carotid arteries. The left common carotid and external carotid arteries are patent. There is minimal atherosclerosis at the carotid bifurcation. There is tortuosity of the internal carotid artery. No stenosis of greater than 50% is identified by NASCET criteria. Right brachiocephalic artery appears patent. Common carotid and external carotid arteries are patent. There is also tortuosity of the right internal carotid artery without stenosis of greater than 50% by NASCET criteria. The subclavian arteries appear patent. The vertebral arteries appear patent. The vertebral arteries appear somewhat diminutive, likely normal variant as there appears to be fetal origin of the posterior cerebral arteries. No other definite acute vascular abnormality is seen. Nonvascular: No acute intracranial abnormality is identified. There is paranasal sinus mucosal thickening. No acute infiltrate is seen in the lung apices. 7 mm hypodensity is seen in the right lobe of thyroid. There is mild to moderate multilevel disc and facet joint degeneration in the cervical spine.     Impression: Impression: 1.No definite findings of acute intracranial large vessel occlusion. 2.No significant stenosis of greater than 50% is seen at the bilateral internal carotid arteries at this time. Electronically Signed: Larry Hoff  10/29/2023 1:49 PM EDT  Workstation ID: [id number]    CT CEREBRAL  PERFUSION WITH & WITHOUT CONTRAST    Result Date: 10/29/2023  CT CEREBRAL PERFUSION W WO CONTRAST Date of Exam: 10/29/2023 1:13 PM EDT Indication: Neuro deficit, acute stroke suspected.  Comparison: CT head performed the same day. Technique: Axial CT images of the brain were obtained prior to and after the administration of 115 mL Isovue-370. Core blood volume, core blood flow, mean transit time, and Tmax images were obtained utilizing the Rapid software protocol. A limited CT angiogram of the head was also performed to measure the blood vessel density. The radiation dose reduction device was turned on for each scan per the ALARA (As Low as Reasonably Achievable) protocol. FINDINGS: CT Perfusion: CBF (<30%) volume: 0 mL Tmax (>6.0s) volume: 0 mL Mismatch volume: 0 mL Mismatch ratio: None No definite perfusion abnormality is seen in the visualized parenchyma to indicate ischemia or infarct.     Impression: No CT perfusion evidence of acute infarction or ischemia. Electronically Signed: Larry Hoff  10/29/2023 1:35 PM EDT  Workstation ID: [id number]    CT Head Without Contrast Stroke Protocol    Result Date: 10/29/2023  CT HEAD WO CONTRAST STROKE PROTOCOL Date of Exam: 10/29/2023 1:03 PM EDT Indication: Neuro deficit, acute, stroke suspected Neuro deficit, acute stroke suspected. Comparison: None available. Technique: Axial CT images were obtained of the head without contrast administration.  Reconstructed coronal images were also obtained. Automated exposure control and iterative construction methods were used. Scan Time: 1:11 p.m. Results discussed with stroke team navigator at 1:17 p.m FINDINGS: No midline shift. Basal cisterns appear patent.  Ventricles and sulci appear within normal limits for patient age. No extra-axial collection or acute hemorrhage is identified.  No definite mass lesion, edema, or significant mass effect is seen.  There is hypoattenuation in the white matter, which is nonspecific, but is most  commonly seen with chronic small vessel ischemic changes. No definite CT findings of acute infarction. There is paranasal sinus mucosal thickening. Mastoid air cells appear clear.  No acute calvarial abnormality is identified.     Impression: 1.No definite CT findings of acute intracranial abnormality. Probable mild chronic small vessel ischemic changes. 2.Paranasal sinus mucosal thickening which could indicate sinusitis. Electronically Signed: Larry Luis Eduardo  10/29/2023 1:18 PM EDT  Workstation ID: [id number]         The patient has started, but not completed, their COVID-19 vaccination series.    Assessment & Plan  Assessment / Plan       UTI (urinary tract infection)    Mixed hyperlipidemia    Uncontrolled type 2 diabetes mellitus with hyperglycemia    CAD, multiple vessel    Personal history of prostate cancer    NEETA (acute kidney injury)    Rhabdomyolysis    Right arm weakness      Assessment & Plan:  73-year-old with prostate cancer, chronic left ureteral stricture and hydronephrosis prior E. coli bacteremia, hyperlipidemia, type 2 diabetes, coronary artery disease who presents with a fall, weakness, subsequent right arm weakness and a UTI    Sepsis due to UTI/ Possible renal abscess  NEETA  Lactic Acidosis  Patient looks better in person than he does data  -Continue aggressive IV hydration, reviewed previous cultures and only grew E. coli  -Cont vanc and high dose Zosyn  -follow cultures  --LONG discussion with Urology(Sanjuanita) and IR (Anderson) this evening---regarding  -- patient with evidence of renal abscess that needs to be drained- BUT he got half an eliquis dose this morning--Temp of 103 is better with tylenol and BP is stable  --cont villarreal    T2DM with hyperglycemia  -cont frequent FS and SQ insulin  -hold farxiga and amaryl    HO HTN, CAD, Afib  -hold BP meds   -may need beta blocker back    Fall with right arm weakness  -NCS  -neuro eval    DVT prophylaxis:eliquis on hold last dose 10/29 2.5 mg    Right arm  weakness  -check NCV  -Consult PT.OT    CODE STATUS:  Code Status (Patient has no pulse and is not breathing): CPR (Attempt to Resuscitate)  Medical Interventions (Patient has pulse or is breathing): Full    Expected Discharge TBD  Discussed with daughter over the phone earlier-- her ? is an internist in [place].   Wife is at the bedside updated to all the events.    Electronically signed by Dorothea Carrizales MD 10/29/23 18:02 EDT            Electronically signed by Dorothea Carrizales MD at 10/30/23 0933       Current Facility-Administered Medications   Medication Dose Route Frequency Provider Last Rate Last Admin    acetaminophen (TYLENOL) tablet 650 mg  650 mg Oral Q6H PRN Brenda Chase APRN   650 mg at 10/29/23 2256    dextrose (D50W) (25 g/50 mL) IV injection 25 g  25 g Intravenous Q15 Min PRN Dorothea Carrizales MD        dextrose (GLUTOSE) oral gel 15 g  15 g Oral Q15 Min PRN Dorothea Carrizales MD        glucagon (GLUCAGEN) injection 1 mg  1 mg Intramuscular Q15 Min PRN Dorothea Carrizales MD        HYDROcodone-acetaminophen (NORCO) 5-325 MG per tablet 1 tablet  1 tablet Oral Q4H PRN Dorothea Carrizales MD   1 tablet at 10/29/23 2135    Insulin Lispro (humaLOG) injection 2-7 Units  2-7 Units Subcutaneous Q4H Dorothea Carrizales MD   2 Units at 10/30/23 0009    ipratropium-albuterol (DUO-NEB) nebulizer solution 3 mL  3 mL Nebulization Q6H PRN Dorothea Carrizales MD        meropenem (MERREM) 1,000 mg in sodium chloride 0.9 % 100 mL IVPB  1,000 mg Intravenous Q12H Janie Christianson DO        Pharmacy to dose vancomycin   Does not apply Continuous PRN Dorothea Carrizales MD        sodium chloride 0.9 % flush 10 mL  10 mL Intravenous PRN Titus Weston MD        tamsulosin (FLOMAX) 24 hr capsule 0.4 mg  0.4 mg Oral Daily Dorothea Carrizales MD        vancomycin IVPB 1500 mg in 0.9% NaCl (Premix) 500 mL  15 mg/kg Intravenous Q24H Yamileth Coleman, PharmD         Lab Results (last 24 hours)       Procedure Component Value  Units Date/Time    Blood Culture - Blood, Hand, Right [[id number]]  (Abnormal) Collected: 10/29/23 1446    Specimen: Blood from Hand, Right Updated: 10/30/23 0949     Blood Culture Abnormal Stain     Gram Stain Anaerobic Bottle Gram negative bacilli      Aerobic Bottle Gram negative bacilli    Urine Culture - Urine, Urine, Clean Catch [[id number]]  (Abnormal) Collected: 10/29/23 1445    Specimen: Urine, Clean Catch Updated: 10/30/23 0928     Urine Culture >100,000 CFU/mL Gram Negative Bacilli    Narrative:      Colonization of the urinary tract without infection is common. Treatment is discouraged unless the patient is symptomatic, pregnant, or undergoing an invasive urologic procedure.    Blood Culture ID, PCR - Blood, Hand, Right [[id number]]  (Abnormal) Collected: 10/29/23 1446    Specimen: Blood from Hand, Right Updated: 10/30/23 0843     BCID, PCR Escherichia coli. Identification by BCID2 PCR.     BOTTLE TYPE Anaerobic Bottle    Narrative:      No resistance genes detected.    CK [[id number]]  (Abnormal) Collected: 10/30/23 0320    Specimen: Blood Updated: 10/30/23 0710     Creatine Kinase 16,126 U/L     Comprehensive Metabolic Panel [[id number]]  (Abnormal) Collected: 10/30/23 0320    Specimen: Blood Updated: 10/30/23 0658     Glucose 140 mg/dL      BUN 23 mg/dL      Creatinine 1.29 mg/dL      Sodium 136 mmol/L      Potassium 3.6 mmol/L      Chloride 104 mmol/L      CO2 20.0 mmol/L      Calcium 7.5 mg/dL      Total Protein 5.5 g/dL      Albumin 2.7 g/dL      ALT (SGPT) 83 U/L      AST (SGOT) 252 U/L      Alkaline Phosphatase 93 U/L      Total Bilirubin 0.4 mg/dL      Globulin 2.8 gm/dL      Comment: Calculated Result        A/G Ratio 1.0 g/dL      BUN/Creatinine Ratio 17.8     Anion Gap 12.0 mmol/L      eGFR 59.3 mL/min/1.73     Narrative:      GFR Normal >60  Chronic Kidney Disease <60  Kidney Failure <15    The GFR formula is only valid for adults with stable renal function between ages 18 and 70.    POC Glucose  Once [[id number]]  (Abnormal) Collected: 10/30/23 0655    Specimen: Blood Updated: 10/30/23 0657     Glucose 135 mg/dL     Vancomycin, Random [[id number]]  (Normal) Collected: 10/30/23 0320    Specimen: Blood Updated: 10/30/23 0657     Vancomycin Random 11.00 mcg/mL     Narrative:      Therapeutic Ranges for Vancomycin    Vancomycin Random   5.0-40.0 mcg/mL  Vancomycin Trough   5.0-20.0 mcg/mL  Vancomycin Peak     20.0-40.0 mcg/mL    CBC & Differential [[id number]]  (Abnormal) Collected: 10/30/23 0320    Specimen: Blood Updated: 10/30/23 0632    Narrative:      The following orders were created for panel order CBC & Differential.  Procedure                               Abnormality         Status                     ---------                               -----------         ------                     CBC Auto Differential[[id number]]        Abnormal            Final result               Scan Slide[[id number]]                                                                    Please view results for these tests on the individual orders.    CBC Auto Differential [[id number]]  (Abnormal) Collected: 10/30/23 0320    Specimen: Blood Updated: 10/30/23 0632     WBC 14.56 10*3/mm3      RBC 4.49 10*6/mm3      Hemoglobin 13.0 g/dL      Hematocrit 39.7 %      MCV 88.4 fL      MCH 29.0 pg      MCHC 32.7 g/dL      RDW 15.9 %      RDW-SD 51.7 fl      MPV 10.5 fL      Platelets 88 10*3/mm3     Narrative:      The previously reported component NRBC is no longer being reported. Previous result was 0.0 /100 WBC (Reference Range: 0.0-0.2 /100 WBC) on 10/30/2023 at 0557 EDT.    Manual Differential [[id number]]  (Abnormal) Collected: 10/30/23 0320    Specimen: Blood Updated: 10/30/23 0632     Neutrophil % 62.0 %      Lymphocyte % 0.0 %      Monocyte % 2.0 %      Eosinophil % 0.0 %      Basophil % 0.0 %      Bands %  36.0 %      Neutrophils Absolute 14.27 10*3/mm3      Lymphocytes Absolute 0.00 10*3/mm3      Monocytes Absolute 0.29 10*3/mm3       Eosinophils Absolute 0.00 10*3/mm3      Basophils Absolute 0.00 10*3/mm3      nRBC 0.0 /100 WBC      Crenated RBC's Slight/1+     WBC Morphology Normal     Platelet Morphology Normal    Protime-INR [[id number]]  (Abnormal) Collected: 10/30/23 0320    Specimen: Blood Updated: 10/30/23 0600     Protime 17.7 Seconds      INR 1.44    POC Glucose Once [[id number]]  (Abnormal) Collected: 10/30/23 0256    Specimen: Blood Updated: 10/30/23 0257     Glucose 134 mg/dL     STAT Lactic Acid, Reflex [[id number]]  (Abnormal) Collected: 10/30/23 0047    Specimen: Blood Updated: 10/30/23 0121     Lactate 3.4 mmol/L      Comment: Falsely depressed results may occur on samples drawn from patients receiving N-Acetylcysteine (NAC) or Metamizole.       POC Glucose Once [[id number]]  (Abnormal) Collected: 10/29/23 2343    Specimen: Blood Updated: 10/29/23 2344     Glucose 179 mg/dL     STAT Lactic Acid, Reflex [[id number]]  (Abnormal) Collected: 10/29/23 2255    Specimen: Blood Updated: 10/29/23 2338     Lactate 6.8 mmol/L      Comment: Falsely depressed results may occur on samples drawn from patients receiving N-Acetylcysteine (NAC) or Metamizole.       STAT Lactic Acid, Reflex [[id number]]  (Abnormal) Collected: 10/29/23 2023    Specimen: Blood Updated: 10/29/23 2147     Lactate 5.7 mmol/L      Comment: Falsely depressed results may occur on samples drawn from patients receiving N-Acetylcysteine (NAC) or Metamizole.       POC Glucose Once [[id number]]  (Abnormal) Collected: 10/29/23 2022    Specimen: Blood Updated: 10/29/23 2023     Glucose 256 mg/dL     Hemoglobin A1c [[id number]]  (Abnormal) Collected: 10/29/23 1259    Specimen: Blood Updated: 10/29/23 1836     Hemoglobin A1C 9.40 %     Narrative:      Hemoglobin A1C Ranges:    Increased Risk for Diabetes  5.7% to 6.4%  Diabetes                     >= 6.5%  Diabetic Goal                < 7.0%    POC Glucose Once [[id number]]  (Abnormal) Collected: 10/29/23 1710    Specimen: Blood  Updated: 10/29/23 1711     Glucose 324 mg/dL     [facility] [facility] [[id number]] Collected: 10/29/23 1259    Specimen: Blood Updated: 10/29/23 1700    Narrative:      The following orders were created for panel order [place] [facility].  Procedure                               Abnormality         Status                     ---------                               -----------         ------                     Green Top (Gel)[[id number]]                                  Final result               Lavender Top[[id number]]                                     Final result               Gold Top - SST[[id number]]                                   Final result               Tsai Top[[id number]]                                         Final result               Light Blue Top[[id number]]                                   Final result                 Please view results for these tests on the individual orders.    Tsai Top [[id number]] Collected: 10/29/23 1259    Specimen: Blood Updated: 10/29/23 1700     Extra Tube Hold for add-ons.     Comment: Auto resulted.       Respiratory Panel PCR w/COVID-19(SARS-CoV-2) RAY/LATOSHA/MIRIAN/PAD/COR/MAD/MICHAEL In-House, NP Swab in UTM/VTM, 3-4 HR TAT - Swab, Nasopharynx [[id number]]  (Abnormal) Collected: 10/29/23 1444    Specimen: Swab from Nasopharynx Updated: 10/29/23 1647     ADENOVIRUS, PCR Not Detected     Coronavirus 229E Not Detected     Coronavirus HKU1 Not Detected     Coronavirus NL63 Not Detected     Coronavirus OC43 Not Detected     COVID19 Not Detected     Human Metapneumovirus Not Detected     Human Rhinovirus/Enterovirus Detected     Influenza A PCR Not Detected     Influenza B PCR Not Detected     Parainfluenza Virus 1 Not Detected     Parainfluenza Virus 2 Not Detected     Parainfluenza Virus 3 Not Detected     Parainfluenza Virus 4 Not Detected     RSV, PCR Not Detected     Bordetella pertussis pcr Not Detected     Bordetella parapertussis PCR Not Detected     Chlamydophila pneumoniae PCR Not  Detected     Mycoplasma pneumo by PCR Not Detected    Narrative:      In the setting of a positive respiratory panel with a viral infection PLUS a negative procalcitonin without other underlying concern for bacterial infection, consider observing off antibiotics or discontinuation of antibiotics and continue supportive care. If the respiratory panel is positive for atypical bacterial infection (Bordetella pertussis, Chlamydophila pneumoniae, or Mycoplasma pneumoniae), consider antibiotic de-escalation to target atypical bacterial infection.    STAT Lactic Acid, Reflex [[id number]]  (Abnormal) Collected: 10/29/23 1614    Specimen: Blood Updated: 10/29/23 1643     Lactate 5.1 mmol/L      Comment: Falsely depressed results may occur on samples drawn from patients receiving N-Acetylcysteine (NAC) or Metamizole.       High Sensitivity Troponin T 2Hr [[id number]]  (Abnormal) Collected: 10/29/23 1614    Specimen: Blood Updated: 10/29/23 1641     HS Troponin T 45 ng/L      Troponin T Delta -21 ng/L     Narrative:      High Sensitive Troponin T Reference Range:  <10.0 ng/L- Negative Female for AMI  <15.0 ng/L- Negative Male for AMI  >=10 - Abnormal Female indicating possible myocardial injury.  >=15 - Abnormal Male indicating possible myocardial injury.   Clinicians would have to utilize clinical acumen, EKG, Troponin, and serial changes to determine if it is an Acute Myocardial Infarction or myocardial injury due to an underlying chronic condition.         Blood Culture - Blood, Arm, Right [[id number]] Collected: 10/29/23 1435    Specimen: Blood from Arm, Right Updated: 10/29/23 1617    Urinalysis With Microscopic If Indicated (No Culture) - Urine, Clean Catch [[id number]]  (Abnormal) Collected: 10/29/23 1445    Specimen: Urine, Clean Catch Updated: 10/29/23 1509     Color, UA Yellow     Appearance, UA Cloudy     pH, UA 6.0     Specific Gravity, UA 1.039     Glucose, UA >=1000 mg/dL (3+)     Ketones, UA Negative     Bilirubin,  UA Negative     Blood, UA Large (3+)     Protein,  mg/dL (2+)     Leuk Esterase, UA Small (1+)     Nitrite, UA Negative     Urobilinogen, UA 0.2 E.U./dL    Urinalysis, Microscopic Only - Urine, Clean Catch [[id number]]  (Abnormal) Collected: 10/29/23 1445    Specimen: Urine, Clean Catch Updated: 10/29/23 1509     RBC, UA 11-20 /HPF      WBC, UA Too Numerous to Count /HPF      Bacteria, UA 3+ /HPF      Squamous Epithelial Cells, UA 0-2 /HPF      Hyaline Casts, UA 0-6 /LPF      Methodology Automated Microscopy    Green Top (Gel) [[id number]] Collected: 10/29/23 1259    Specimen: Blood Updated: 10/29/23 1401     Extra Tube Hold for add-ons.     Comment: Auto resulted.       Lavender Top [[id number]] Collected: 10/29/23 1259    Specimen: Blood Updated: 10/29/23 1401     Extra Tube hold for add-on     Comment: Auto resulted       Gold Top - SST [[id number]] Collected: 10/29/23 1259    Specimen: Blood Updated: 10/29/23 1401     Extra Tube Hold for add-ons.     Comment: Auto resulted.       Light Blue Top [[id number]] Collected: 10/29/23 1259    Specimen: Blood Updated: 10/29/23 1401     Extra Tube Hold for add-ons.     Comment: Auto resulted       Lactic Acid, Plasma [[id number]]  (Abnormal) Collected: 10/29/23 1259    Specimen: Blood Updated: 10/29/23 1354     Lactate 7.1 mmol/L      Comment: Falsely depressed results may occur on samples drawn from patients receiving N-Acetylcysteine (NAC) or Metamizole.       CK [[id number]]  (Abnormal) Collected: 10/29/23 1259    Specimen: Blood Updated: 10/29/23 1345     Creatine Kinase 10,114 U/L     Basic Metabolic Panel [[id number]]  (Abnormal) Collected: 10/29/23 1259    Specimen: Blood Updated: 10/29/23 1339     Glucose 438 mg/dL      BUN 30 mg/dL      Creatinine 2.36 mg/dL      Sodium 131 mmol/L      Potassium 3.9 mmol/L      Comment: Slight hemolysis detected by analyzer. Results may be affected.        Chloride 92 mmol/L      CO2 18.0 mmol/L      Calcium 9.1 mg/dL       BUN/Creatinine Ratio 12.7     Anion Gap 21.0 mmol/L      eGFR 28.7 mL/min/1.73     Narrative:      GFR Normal >60  Chronic Kidney Disease <60  Kidney Failure <15    The GFR formula is only valid for adults with stable renal function between ages 18 and 70.    Single High Sensitivity Troponin T [[id number]]  (Abnormal) Collected: 10/29/23 1259    Specimen: Blood Updated: 10/29/23 1339     HS Troponin T 66 ng/L     Narrative:      High Sensitive Troponin T Reference Range:  <10.0 ng/L- Negative Female for AMI  <15.0 ng/L- Negative Male for AMI  >=10 - Abnormal Female indicating possible myocardial injury.  >=15 - Abnormal Male indicating possible myocardial injury.   Clinicians would have to utilize clinical acumen, EKG, Troponin, and serial changes to determine if it is an Acute Myocardial Infarction or myocardial injury due to an underlying chronic condition.         aPTT [[id number]]  (Normal) Collected: 10/29/23 1259    Specimen: Blood Updated: 10/29/23 1336     PTT 30.5 seconds     Narrative:      PTT = The equivalent PTT values for the therapeutic range of heparin levels at 0.3 to 0.5 U/ml are 60 to 70 seconds.    AST [[id number]]  (Abnormal) Collected: 10/29/23 1259    Specimen: Blood Updated: 10/29/23 1333     AST (SGOT) 141 U/L     ALT [[id number]]  (Abnormal) Collected: 10/29/23 1259    Specimen: Blood Updated: 10/29/23 1333     ALT (SGPT) 61 U/L     CBC & Differential [[id number]]  (Abnormal) Collected: 10/29/23 1259    Specimen: Blood Updated: 10/29/23 1320    Narrative:      The following orders were created for panel order CBC & Differential.  Procedure                               Abnormality         Status                     ---------                               -----------         ------                     CBC Auto Differential[[id number]]        Abnormal            Final result                 Please view results for these tests on the individual orders.    CBC Auto Differential [[id number]]   (Abnormal) Collected: 10/29/23 1259    Specimen: Blood Updated: 10/29/23 1320     WBC 28.11 10*3/mm3      RBC 5.10 10*6/mm3      Hemoglobin 14.7 g/dL      Hematocrit 44.9 %      MCV 88.0 fL      MCH 28.8 pg      MCHC 32.7 g/dL      RDW 15.3 %      RDW-SD 49.6 fl      MPV 9.9 fL      Platelets 150 10*3/mm3      Neutrophil % 89.1 %      Lymphocyte % 2.9 %      Monocyte % 4.3 %      Eosinophil % 0.1 %      Basophil % 0.5 %      Immature Grans % 3.1 %      Neutrophils, Absolute 25.05 10*3/mm3      Lymphocytes, Absolute 0.81 10*3/mm3      Monocytes, Absolute 1.20 10*3/mm3      Eosinophils, Absolute 0.03 10*3/mm3      Basophils, Absolute 0.15 10*3/mm3      Immature Grans, Absolute 0.87 10*3/mm3      nRBC 0.0 /100 WBC           Imaging Results (Last 24 Hours)       Procedure Component Value Units Date/Time    CT Abdomen Pelvis Without Contrast [[id number]] Collected: 10/29/23 2001     Updated: 10/29/23 2020    Narrative:      CT ABDOMEN PELVIS WO CONTRAST    Date of Exam: 10/29/2023 7:17 PM EDT    Indication: Flank pain, kidney stone suspected  septic with UTI HO obstruction.    Comparison: May 8, 2023. Renal ultrasound October 17, 2023.    Technique: Axial CT images were obtained of the abdomen and pelvis without the administration of contrast. Reconstructed coronal and sagittal images were also obtained. Automated exposure control and iterative construction methods were used.      Findings:  There is suspected dependent atelectasis in the lower lobes. Status post median sternotomy. There is calcific atherosclerosis of the aorta. No significant pleural or pericardial effusion is seen. Heart size appears within normal limits.    There is excreted contrast in the renal collecting systems, ureters, and bladder from CT angiography performed the same day. Contrast does limit assessment for urinary tract calculi.    There is mild left hydronephrosis and hydroureter which appears similar to the prior exam. The distal left ureter  is nonopacified and nondilated. No clear obstructing etiology is identified. There is a hypodense lesion at the anterior upper pole of the   left kidney which was present on the prior exam suggestive of a renal cyst, but on the current exam, the lesion measures up to approximately 3.7 cm, previously 2.9 cm, and there is now an irregular gaseous lucency within the lesion. There is some   surrounding fat stranding which appears mildly increased. These findings are concerning for superimposed gas forming infection within a renal cyst.    Probable right renal cyst, as before. No definite new right renal abnormality. Right ureter appears patent. No definite right hydronephrosis.    The bladder is distended with contrast. No suspicious focal bladder abnormality is seen. No significant bladder wall thickening. Multiple surgical clips are seen in the pelvis. There has been prostatectomy.    Hyperdensity within the gallbladder suggesting cholelithiasis, as before. No suspicious hepatic abnormality is seen. No definite splenomegaly. No suspicious adrenal lesion. No acute pancreatic abnormality. There is atherosclerosis of the aorta. No   definite lymphadenopathy. No acute gastric abnormality. No small bowel dilatation. There is diverticulosis of the colon. No definite acute colonic or rectal abnormality is seen. No significant pelvic lymphadenopathy. There is midline abdominal scarring.    There are degenerative changes in the bilateral hips and in the thoracolumbar spine.      Impression:      Impression:  1.Left upper pole cyst appears increased in size compared to May 2023 and now contains irregular gaseous lucency with some new mild surrounding fat stranding. Findings are suspicious for infected renal cyst with gas-forming infection. Also recommend   correlation recent surgery as this could also be related to recent intervention.  2.Mild left hydronephrosis and hydroureter appears similar to the prior exam. The distal  left ureter is nonopacified and nondilated. No clear obstructing etiology is identified.  3.Excreted contrast from prior CTA in the renal collecting systems, ureters, and bladder. This limits assessment for urinary tract calculi.  4.Cholelithiasis.  5.Calcific atherosclerosis.  6.Postoperative changes in the pelvis with evidence of prostatectomy.        Electronically Signed: Larry Luis Eduardo    10/29/2023 8:16 PM EDT    Workstation ID: [id number]    MRI Brain Without Contrast [[id number]] Collected: 10/29/23 1605     Updated: 10/29/23 1613    Narrative:      MRI BRAIN WO CONTRAST    Date of Exam: 10/29/2023 3:30 PM EDT    Indication: Stroke, follow up.     Comparison: CT head, CT perfusion, and CTA head and neck performed the same day    Technique:  Routine multiplanar/multisequence sequence images of the brain were obtained without contrast administration.      Findings:  No midline shift. The ventricles and sulci appear within normal limits for patient's age. Basal cisterns appear patent. The pituitary gland appears to have normal morphology. Cerebellar tonsils appear normally positioned.    No definite extra-axial collection and no definite findings of acute or chronic hemorrhage. No mass lesion, mass effect, or edema is identified. Mild T2 and FLAIR hyperintense signal foci are seen in the cerebral white matter. No definite restricted   diffusion is identified at this time.    There is mucosal thickening of the paranasal sinuses. Mastoid air cells appear clear. Globes and orbits appear unremarkable. No definite calvarial abnormality is identified.        Impression:      Impression:  1.No definite findings of acute intracranial abnormality at this time.  2.Mild T2 and FLAIR hyperintense signal foci in the cerebral white matter are nonspecific but likely represent mild chronic small vessel ischemic changes.  3.Mucosal thickening of the paranasal sinuses could indicate acute sinusitis.        Electronically Signed:  Larry Hoff    10/29/2023 4:10 PM EDT    Workstation ID: [id number]    XR Shoulder 2+ View Right [[id number]] Collected: 10/29/23 1414     Updated: 10/29/23 1419    Narrative:        XR SHOULDER 2+ VW RIGHT    Date of Exam: 10/29/2023 1:57 PM EDT    Indication: Trauma    Comparison: None available.    FINDINGS:    No definite acute fracture or malalignment is seen. Glenohumeral joint is not well evaluated due to positioning. There is suspected osteophyte formation with probable mild to moderate joint space narrowing. There appear to be moderate degenerative   changes at the acromioclavicular joint. Calcifications projecting along the posterior humeral head may represent calcific tendinopathy. No other definite soft tissue abnormality.      Impression:      1.No definite radiographic findings of acute osseous shoulder abnormality.  2.Glenohumeral joint is not well evaluated due to positioning. There is suspected mild to moderate osteoarthritis.  3.Moderate degenerative changes at the acromioclavicular joint.        Electronically Signed: Larry Hoff    10/29/2023 2:16 PM EDT    Workstation ID: [id number]    XR Chest 1 View [[id number]] Collected: 10/29/23 1359     Updated: 10/29/23 1403    Narrative:        XR CHEST 1 VW    Date of Exam: 10/29/2023 1:47 PM EDT    Indication: Acute Stroke Protocol (onset < 12 hrs)    Comparison: None available.    FINDINGS:  There are prominent interstitial markings and mild left basilar opacities. No significant focal consolidation. No pneumothorax or significant pleural effusion. The heart appears mildly enlarged. Status post median sternotomy and CABG. Mediastinal contour   appears within normal limits.      Impression:      1.Prominent interstitial markings and mild left basilar opacities which could represent mild edema, pneumonia, or chronic lung disease.  2.Mild cardiomegaly. Status post median sternotomy and CABG.        Electronically Signed: Larry Hoff    10/29/2023 2:00 PM  EDT    Workstation ID: [id number]    CT Angiogram Head w AI Analysis of LVO [[id number]] Collected: 10/29/23 1335     Updated: 10/29/23 1352    Narrative:      CT ANGIOGRAM HEAD W AI ANALYSIS OF LVO, CT ANGIOGRAM NECK    Date of Exam: 10/29/2023 1:13 PM EDT    Indication: Neuro deficit, acute stroke suspected  Neuro deficit, acute stroke suspected.    Comparison: CT head and CT perfusion performed the same day.    Technique: CTA of the head and neck was performed after the uneventful intravenous administration of iodinated contrast. Reconstructed coronal and sagittal images were also obtained. In addition, a 3-D volume rendered image was created for   interpretation. Automated exposure control and iterative reconstruction methods were used.     Findings:  Head: The vertebral and basilar arteries appear somewhat diminutive. There is fetal origin of the right posterior cerebral artery and partial fetal origin on the left. The posterior cerebral arteries appear grossly symmetric. No definite proximal   occlusion or definite high-grade stenosis.    There is calcific atherosclerosis of the cavernous portions of the internal carotid arteries. No high-grade stenosis is seen. The middle cerebral arteries appear to opacify as expected. There is a small right A1 segment of the anterior cerebral artery,   likely a normal variant. There is a patent anterior communicating artery. The anterior cerebral arteries are patent distally and grossly symmetric. No definite focal occlusion or high-grade stenosis is identified in the bilateral M1 and M2 segments of   the middle cerebral arteries. No other definite acute intracranial vascular abnormality is seen.    Neck: Status post median sternotomy and CABG. The visualized central pulmonary arteries appear to opacify as expected. Visualized aortic arch appears within normal limits. There is bovine type branching anatomy with common origin of the right   brachiocephalic and left common  carotid arteries. The left common carotid and external carotid arteries are patent. There is minimal atherosclerosis at the carotid bifurcation. There is tortuosity of the internal carotid artery. No stenosis of greater   than 50% is identified by NASCET criteria.    Right brachiocephalic artery appears patent. Common carotid and external carotid arteries are patent. There is also tortuosity of the right internal carotid artery without stenosis of greater than 50% by NASCET criteria.    The subclavian arteries appear patent. The vertebral arteries appear patent. The vertebral arteries appear somewhat diminutive, likely normal variant as there appears to be fetal origin of the posterior cerebral arteries.    No other definite acute vascular abnormality is seen.    Nonvascular: No acute intracranial abnormality is identified. There is paranasal sinus mucosal thickening. No acute infiltrate is seen in the lung apices. 7 mm hypodensity is seen in the right lobe of thyroid.    There is mild to moderate multilevel disc and facet joint degeneration in the cervical spine.      Impression:      Impression:  1.No definite findings of acute intracranial large vessel occlusion.  2.No significant stenosis of greater than 50% is seen at the bilateral internal carotid arteries at this time.       Electronically Signed: Larry Hoff    10/29/2023 1:49 PM EDT    Workstation ID: [id number]    CT Angiogram Neck [[id number]] Collected: 10/29/23 1335     Updated: 10/29/23 1352    Narrative:      CT ANGIOGRAM HEAD W AI ANALYSIS OF LVO, CT ANGIOGRAM NECK    Date of Exam: 10/29/2023 1:13 PM EDT    Indication: Neuro deficit, acute stroke suspected  Neuro deficit, acute stroke suspected.    Comparison: CT head and CT perfusion performed the same day.    Technique: CTA of the head and neck was performed after the uneventful intravenous administration of iodinated contrast. Reconstructed coronal and sagittal images were also obtained. In  addition, a 3-D volume rendered image was created for   interpretation. Automated exposure control and iterative reconstruction methods were used.     Findings:  Head: The vertebral and basilar arteries appear somewhat diminutive. There is fetal origin of the right posterior cerebral artery and partial fetal origin on the left. The posterior cerebral arteries appear grossly symmetric. No definite proximal   occlusion or definite high-grade stenosis.    There is calcific atherosclerosis of the cavernous portions of the internal carotid arteries. No high-grade stenosis is seen. The middle cerebral arteries appear to opacify as expected. There is a small right A1 segment of the anterior cerebral artery,   likely a normal variant. There is a patent anterior communicating artery. The anterior cerebral arteries are patent distally and grossly symmetric. No definite focal occlusion or high-grade stenosis is identified in the bilateral M1 and M2 segments of   the middle cerebral arteries. No other definite acute intracranial vascular abnormality is seen.    Neck: Status post median sternotomy and CABG. The visualized central pulmonary arteries appear to opacify as expected. Visualized aortic arch appears within normal limits. There is bovine type branching anatomy with common origin of the right   brachiocephalic and left common carotid arteries. The left common carotid and external carotid arteries are patent. There is minimal atherosclerosis at the carotid bifurcation. There is tortuosity of the internal carotid artery. No stenosis of greater   than 50% is identified by NASCET criteria.    Right brachiocephalic artery appears patent. Common carotid and external carotid arteries are patent. There is also tortuosity of the right internal carotid artery without stenosis of greater than 50% by NASCET criteria.    The subclavian arteries appear patent. The vertebral arteries appear patent. The vertebral arteries appear  somewhat diminutive, likely normal variant as there appears to be fetal origin of the posterior cerebral arteries.    No other definite acute vascular abnormality is seen.    Nonvascular: No acute intracranial abnormality is identified. There is paranasal sinus mucosal thickening. No acute infiltrate is seen in the lung apices. 7 mm hypodensity is seen in the right lobe of thyroid.    There is mild to moderate multilevel disc and facet joint degeneration in the cervical spine.      Impression:      Impression:  1.No definite findings of acute intracranial large vessel occlusion.  2.No significant stenosis of greater than 50% is seen at the bilateral internal carotid arteries at this time.       Electronically Signed: Larry Hoff    10/29/2023 1:49 PM EDT    Workstation ID: [id number]    CT CEREBRAL PERFUSION WITH & WITHOUT CONTRAST [[id number]] Collected: 10/29/23 1332     Updated: 10/29/23 1338    Narrative:      CT CEREBRAL PERFUSION W WO CONTRAST    Date of Exam: 10/29/2023 1:13 PM EDT    Indication: Neuro deficit, acute stroke suspected.     Comparison: CT head performed the same day.    Technique: Axial CT images of the brain were obtained prior to and after the administration of 115 mL Isovue-370. Core blood volume, core blood flow, mean transit time, and Tmax images were obtained utilizing the Rapid software protocol. A limited CT   angiogram of the head was also performed to measure the blood vessel density.    The radiation dose reduction device was turned on for each scan per the ALARA (As Low as Reasonably Achievable) protocol.        FINDINGS:     CT Perfusion:  CBF (<30%) volume: 0 mL  Tmax (>6.0s) volume: 0 mL  Mismatch volume: 0 mL  Mismatch ratio: None    No definite perfusion abnormality is seen in the visualized parenchyma to indicate ischemia or infarct.      Impression:      No CT perfusion evidence of acute infarction or ischemia.        Electronically Signed: Larry Hoff    10/29/2023 1:35 PM  EDT    Workstation ID: [id number]    CT Head Without Contrast Stroke Protocol [[id number]] Collected: 10/29/23 1314     Updated: 10/29/23 1321    Narrative:      CT HEAD WO CONTRAST STROKE PROTOCOL    Date of Exam: 10/29/2023 1:03 PM EDT    Indication: Neuro deficit, acute, stroke suspected  Neuro deficit, acute stroke suspected.    Comparison: None available.    Technique: Axial CT images were obtained of the head without contrast administration.  Reconstructed coronal images were also obtained. Automated exposure control and iterative construction methods were used.    Scan Time: 1:11 p.m.  Results discussed with stroke team navigator at 1:17 p.m      FINDINGS:  No midline shift. Basal cisterns appear patent.  Ventricles and sulci appear within normal limits for patient age.    No extra-axial collection or acute hemorrhage is identified.  No definite mass lesion, edema, or significant mass effect is seen.  There is hypoattenuation in the white matter, which is nonspecific, but is most commonly seen with chronic small vessel   ischemic changes. No definite CT findings of acute infarction.    There is paranasal sinus mucosal thickening. Mastoid air cells appear clear.  No acute calvarial abnormality is identified.      Impression:      1.No definite CT findings of acute intracranial abnormality. Probable mild chronic small vessel ischemic changes.  2.Paranasal sinus mucosal thickening which could indicate sinusitis.        Electronically Signed: Larry Hoff    10/29/2023 1:18 PM EDT    Workstation ID: [id number]          Orders (last 24 hrs)        Start     Ordered    11/01/23 0600  Vancomycin, Random  Timed         10/30/23 0732    10/30/23 1400  vancomycin IVPB 1500 mg in 0.9% NaCl (Premix) 500 mL  Every 24 Hours Scheduled         10/30/23 0729    10/30/23 1200  meropenem (MERREM) 1,000 mg in sodium chloride 0.9 % 100 mL IVPB  Every 12 Hours Scheduled         10/30/23 0221    10/30/23 0940  CT Guided Percutaneous  Drain Kidney Renal  1 Time Imaging         10/30/23 0932    10/30/23 0936  Obtain Informed Consent  Once         10/30/23 0935    10/30/23 0935  Consult to Interventional Radiology  1 Time Imaging,   Status:  Canceled         10/30/23 0934    10/30/23 0932  CT Guided Nephrostomy Tube Placement  1 Time Imaging,   Status:  Canceled         10/30/23 0932    10/30/23 0912  NPO Diet NPO Type: Sips with Meds  Diet Effective Now         10/30/23 0911    10/30/23 0900  tamsulosin (FLOMAX) 24 hr capsule 0.4 mg  Daily         10/29/23 1805    10/30/23 0900  vancomycin (dosing per levels)  Daily,   Status:  Discontinued         10/29/23 2312    10/30/23 0724  Blood Culture ID, PCR - Blood, Hand, Right  Once         10/30/23 0723    10/30/23 0702  Inpatient Infectious Diseases Consult  [place]         Specialty:  Infectious Diseases  Provider:  Joel Ham MD    10/30/23 0013    10/30/23 0700  Nerve Conduction Test  Once         10/29/23 1834    10/30/23 0658  POC Glucose Once  PROCEDURE ONCE        Comments: Complete no more than 45 minutes prior to patient eating      10/30/23 0655    10/30/23 0629  Manual Differential  Once         10/30/23 0628    10/30/23 0600  CBC & Differential  Morning Draw         10/29/23 1805    10/30/23 0600  Comprehensive Metabolic Panel  Morning Draw         10/29/23 1805    10/30/23 0600  CBC Auto Differential  PROCEDURE ONCE         10/29/23 2202    10/30/23 0600  Vancomycin, Random  Morning Draw         10/29/23 2312    10/30/23 0600  piperacillin-tazobactam (ZOSYN) 4.5 g in iso-osmotic dextrose 100 mL IVPB (premix)  Every 8 Hours,   Status:  Discontinued         10/29/23 2314    10/30/23 0600  Protime-INR  Morning Draw         10/29/23 2338    10/30/23 0600  CK  Morning Draw         10/29/23 2339    10/30/23 0556  Scan Slide  Once,   Status:  Canceled         10/30/23 0555    10/30/23 0455  STAT Lactic Acid, Reflex  PROCEDURE ONCE,   Status:  Canceled         10/29/23 2318    10/30/23  0315  meropenem (MERREM) 1,000 mg in sodium chloride 0.9 % 100 mL IVPB  Once         10/30/23 0221    10/30/23 0258  POC Glucose Once  PROCEDURE ONCE        Comments: Complete no more than 45 minutes prior to patient eating      10/30/23 0256    10/30/23 0215  sodium chloride 0.9 % bolus 500 mL  Once         10/30/23 0124    10/30/23 0030  sodium chloride 0.9 % bolus 1,000 mL  Once         10/29/23 2338    10/30/23 0030  Insulin Lispro (humaLOG) injection 2-7 Units  Every 4 Hours Scheduled         10/29/23 2342    10/30/23 0000  sodium chloride 0.9 % bolus 500 mL  Once         10/29/23 2301    10/30/23 0000  acetaminophen (TYLENOL) tablet 1,000 mg  Once         10/29/23 2302    10/30/23 0000  piperacillin-tazobactam (ZOSYN) 4.5 g in iso-osmotic dextrose 100 mL IVPB (premix)  Once         10/29/23 2314    10/29/23 2345  POC Glucose Once  PROCEDURE ONCE        Comments: Complete no more than 45 minutes prior to patient eating      10/29/23 2343    10/29/23 2341  dextrose (GLUTOSE) oral gel 15 g  Every 15 Minutes PRN         10/29/23 2342    10/29/23 2341  dextrose (D50W) (25 g/50 mL) IV injection 25 g  Every 15 Minutes PRN         10/29/23 2342    10/29/23 2341  glucagon (GLUCAGEN) injection 1 mg  Every 15 Minutes PRN         10/29/23 2342    10/29/23 2340  STAT Lactic Acid, Reflex  Timed         10/29/23 2339    10/29/23 2323  STAT Lactic Acid, Reflex  PROCEDURE ONCE         10/29/23 2145    10/29/23 2311  Pharmacy to dose vancomycin  Continuous PRN         10/29/23 2311    10/29/23 2304  Insert Indwelling Urinary Catheter  Once        Comments: Follow Protocol As Outlined in Process Instructions (Open Order Report to View Full Instructions)   See Hyperspace for full Linked Orders Report.    10/29/23 2303    10/29/23 2304  Assess Need for Indwelling Urinary Catheter - Follow Removal Protocol  Continuous        Comments: Follow Protocol As Outlined in Process Instructions (Open Order Report to View Full Instructions)    See Hyperspace for full Linked Orders Report.    10/29/23 2303    10/29/23 2304  Urinary Catheter Care  Every Shift      See Hyperspace for full Linked Orders Report.    10/29/23 2303    10/29/23 2252  acetaminophen (TYLENOL) tablet 650 mg  Every 6 Hours PRN         10/29/23 2253    10/29/23 2100  cefTRIAXone (ROCEPHIN) 2,000 mg in sodium chloride 0.9 % 100 mL IVPB  Every 24 Hours,   Status:  Discontinued         10/29/23 1621    10/29/23 2100  apixaban (ELIQUIS) tablet 2.5 mg  Every 12 Hours Scheduled,   Status:  Discontinued         10/29/23 1805    10/29/23 2024  POC Glucose Once  PROCEDURE ONCE        Comments: Complete no more than 45 minutes prior to patient eating      10/29/23 2022    10/29/23 1914  STAT Lactic Acid, Reflex  PROCEDURE ONCE         10/29/23 1639    10/29/23 1900  HYDROcodone-acetaminophen (NORCO) 5-325 MG per tablet 1 tablet  Every 4 Hours PRN         10/29/23 1747    10/29/23 1845  sodium chloride 0.9 % bolus 500 mL  Once         10/29/23 1749    10/29/23 1834  OT Consult: Eval & Treat  Once         10/29/23 1834    10/29/23 1833  PT Consult: Eval & Treat Functional Mobility Below Baseline  Once         10/29/23 1834    10/29/23 1806  Hemoglobin A1c  Once         10/29/23 1805    10/29/23 1806  Inpatient Urology Consult  Once        Specialty:  Urology  Provider:  Isma Justin MD    10/29/23 1807    10/29/23 1800  POC Glucose Finger Q3H  Every 3 Hours      Comments: Complete no more than 45 minutes prior to patient eating      10/29/23 1615    10/29/23 1747  NIPPV-IPPV / BIPAP / CPAP  At Bedtime & As Needed-RT         10/29/23 1746    10/29/23 1712  POC Glucose Once  PROCEDURE ONCE        Comments: Complete no more than 45 minutes prior to patient eating      10/29/23 1710    10/29/23 1651  ipratropium-albuterol (DUO-NEB) nebulizer solution 3 mL  Every 6 Hours PRN         10/29/23 1651    10/29/23 1631  insulin regular (humuLIN R,novoLIN R) injection 5 Units  Once,   Status:   Discontinued         10/29/23 1615    10/29/23 1621  Code Status and Medical Interventions:  Continuous         10/29/23 1620    10/29/23 1614  CT Abdomen Pelvis Without Contrast  1 Time Imaging         10/29/23 1613    10/29/23 1602  sodium chloride 0.9 % infusion  Continuous         10/29/23 1546    10/29/23 1559  STAT Lactic Acid, Reflex  PROCEDURE ONCE         10/29/23 1354    10/29/23 1552  Urine Culture - Urine, Urine, Clean Catch  Once         10/29/23 1551    10/29/23 1546  Urinalysis With Culture If Indicated - Urine, Clean Catch  Once,   Status:  Canceled         10/29/23 1546    10/29/23 1520  Inpatient Admission  Once         10/29/23 1519    10/29/23 1520  ED IP Bed Request  Once         10/29/23 1519    10/29/23 1520  Inpatient Admission  Once         10/29/23 1519    10/29/23 1520  Diet: Regular/House Diet; Texture: Regular Texture (IDDSI 7); Fluid Consistency: Thin (IDDSI 0)  Diet Effective Now,   Status:  Canceled         10/29/23 1519    10/29/23 1508  Urinalysis, Microscopic Only - Urine, Clean Catch  Once         10/29/23 1507    10/29/23 1502  HYDROcodone-acetaminophen (NORCO) 7.5-325 MG per tablet 1 tablet  Once         10/29/23 1446    10/29/23 1459  High Sensitivity Troponin T 2Hr  PROCEDURE ONCE         10/29/23 1339    10/29/23 1449  Critical Care  Once        Comments: This order was created via procedure documentation    10/29/23 1448    10/29/23 1430  vancomycin IVPB 2000 mg in 0.9% Sodium Chloride 500 mL  Once         10/29/23 1353    10/29/23 1414  MRI Brain Without Contrast  1 Time Imaging         10/29/23 1414    10/29/23 1412  insulin regular (humuLIN R,novoLIN R) injection 15 Units  Once         10/29/23 1356    10/29/23 1409  sodium chloride 0.9 % infusion 2,550 mL  Once         10/29/23 1353    10/29/23 1409  piperacillin-tazobactam (ZOSYN) 4.5 g in iso-osmotic dextrose 100 mL IVPB (premix)  Once         10/29/23 1353    10/29/23 1400  Neuro Checks  Every 2 Hours      Comments:  On arrival and handoff, increase frequency as clinically indicated or for thrombolytic administration, otherwise Q2 hours.  Documentation of arrival assessment and any neuro change required.    10/29/23 1257    10/29/23 1357  Urinalysis With Culture If Indicated - Urine, Catheter  Once,   Status:  Canceled         10/29/23 1356    10/29/23 1355  Respiratory Panel PCR w/COVID-19(SARS-CoV-2) RAY/LATOSHA/MIRIAN/PAD/COR/MAD/MICHAEL In-House, NP Swab in UTM/VTM, 3-4 HR TAT - Swab, Nasopharynx  Once         10/29/23 1354    10/29/23 1355  XR Shoulder 2+ View Right  1 Time Imaging         10/29/23 1354    10/29/23 1354  Blood Culture - Blood, Hand, Right  Once         10/29/23 1353    10/29/23 1354  Blood Culture - Blood, Arm, Right  Once         10/29/23 1353    10/29/23 1340  Lactic Acid, Plasma  Once         10/29/23 1340    10/29/23 1335  iopamidol (ISOVUE-370) 76 % injection 150 mL  Once in Imaging         10/29/23 1319    10/29/23 1316  Basic Metabolic Panel  Once         10/29/23 1315    10/29/23 1315  CK  Once         10/29/23 1315    10/29/23 1315  High Sensitivity Troponin T  Once,   Status:  Canceled         10/29/23 1315    10/29/23 1315  Urinalysis With Microscopic If Indicated (No Culture) - Urine, Clean Catch  Once         10/29/23 1315    10/29/23 1257  Initiate Code Stroke  Once         10/29/23 1257    10/29/23 1257  Inpatient Neurology Consult Stroke  Once        Specialty:  Neurology  Provider:  (Not yet assigned)    10/29/23 1257    10/29/23 1257  Perform NIH Stroke Scale  Per Order Details        Comments: Perform NIH Stroke Scale frequency: Stat, prior to thrombolytic/intervention, repeat as needed per protocol    10/29/23 1257    10/29/23 1257  Measure Actual Weight  Once         10/29/23 1257    10/29/23 1257  Head of Bed 30 Degrees or Less  Until Discontinued         10/29/23 1257    10/29/23 1257  Undress and Gown  Once         10/29/23 1257    10/29/23 1257  Cardiac Monitoring  Continuous        Comments:  Follow Standing Orders As Outlined in Process Instructions (Open Order Report to View Full Instructions)    10/29/23 1257    10/29/23 1257  Continuous Pulse Oximetry  Continuous         10/29/23 1257    10/29/23 1257  Vital Signs  Per Hospital Policy        Comments: Every 30 minutes - increase frequency as clinically indicated or for thrombolytic administration.    10/29/23 1257    10/29/23 1257  Notify MD for SBP < 80 or > 200  Until Discontinued        Comments: Do not treat until SBP >= 220, unless patient is a thrombolytic candidate.    10/29/23 1257    10/29/23 1257  Notify Provider for SBP > 140 if Hemorrhagic Stroke  Once        Comments: Notify Provider for SBP greater than 140 if hemorrhagic Stroke and seek Nicardipine infusion for Hemorrhagic CVA order.    10/29/23 1257    10/29/23 1257  No Hypotonic Fluids  Until Discontinued         10/29/23 1257    10/29/23 1257  NPO Diet NPO Type: Strict NPO  Diet Effective Now,   Status:  Canceled         10/29/23 1257    10/29/23 1257  Nursing Dysphagia Screening (Complete Prior to Giving anything PO)  Once         10/29/23 1257    10/29/23 1257  RN to Place Order SLP Consult (IF swallow screen failed) - Eval & Treat Choosing Reason of RN Dysphagia Screen Failed  Once         10/29/23 1257    10/29/23 1257  CT Head Without Contrast Stroke Protocol  1 Time Imaging         10/29/23 1257    10/29/23 1257  CT Angiogram Head w AI Analysis of LVO  1 Time Imaging         10/29/23 1257    10/29/23 1257  CT Angiogram Neck  1 Time Imaging         10/29/23 1257    10/29/23 1257  CT CEREBRAL PERFUSION WITH & WITHOUT CONTRAST  1 Time Imaging         10/29/23 1257    10/29/23 1257  XR Chest 1 View  1 Time Imaging        Comments: Do not delay CT to obtain.    10/29/23 1257    10/29/23 1257  ECG 12 Lead ED Triage Standing Order; Acute Stroke (Onset <24 hrs)  Once        Comments: Do not delay CT to obtain    10/29/23 1257    10/29/23 1257  Insert Large-Bore Peripheral IV - Right  AC Preferred  Once         10/29/23 1257    10/29/23 1257  [place] Draw  Once         10/29/23 1257    10/29/23 1257  CBC & Differential  Once         10/29/23 1257    10/29/23 1257  POC CHEM 8  Once         10/29/23 1257    10/29/23 1257  Single High Sensitivity Troponin T  Once         10/29/23 1257    10/29/23 1257  POCT Protime/INR  Once         10/29/23 1257    10/29/23 1257  aPTT  Once         10/29/23 1257    10/29/23 1257  AST  Once         10/29/23 1257    10/29/23 1257  ALT  Once         10/29/23 1257    10/29/23 1257  Green Top (Gel)  PROCEDURE ONCE         10/29/23 1257    10/29/23 1257  Lavender Top  PROCEDURE ONCE         10/29/23 1257    10/29/23 1257  Gold Top - SST  PROCEDURE ONCE         10/29/23 1257    10/29/23 1257  Gray Top  PROCEDURE ONCE         10/29/23 1257    10/29/23 1257  Light Blue Top  PROCEDURE ONCE         10/29/23 1257    10/29/23 1257  CBC Auto Differential  PROCEDURE ONCE         10/29/23 1257    10/29/23 1256  sodium chloride 0.9 % flush 10 mL  As Needed         10/29/23 1257    Unscheduled  Oxygen Therapy- Nasal Cannula; Titrate 1-6 LPM Per SpO2; 90 - 95%  Continuous PRN       10/29/23 1257    Unscheduled  Follow Hypoglycemia Standing Orders For Blood Glucose <70 & Notify Provider of Treatment  As Needed      Comments: Follow Hypoglycemia Orders As Outlined in Process Instructions (Open Order Report to View Full Instructions)  Notify Provider Any Time Hypoglycemia Treatment is Administered    10/29/23 2342    Signed and Held  Obtain Informed Consent  Once         Signed and Held    Signed and Held  Body Fluid Culture - Body Fluid, Kidney, Left  Once         Signed and Held                     Physician Progress Notes (last 24 hours)        Víctor Gann MD at 10/30/23 0835                 Urology Hospital Consult Note     Date of Consult: 10/29/2023     Patient Name: Aldair Narvaez  MRN: [id number]   : 1950     Referring Provider: * No referring provider recorded for  this case *    Care Team: Patient Care Team:  Miguel Angel Mireles MD as PCP - General (Family Medicine)  Steve Cohen MD as Consulting Physician (Endocrinology)     Reason for Hospitalization: weakness, fall    History of Present Illness: Was contacted for hospital consultation on Aldair Condeb a 73 y.o. male who presents to the hospital for weakness and fall. PMH notable for PrCa, recurrent nephrolithiasis, L ureteral stricture, and urinary incontinence. He is s/p L ureteroscopy with ureteral balloon dilation in 6/2023; previously managed with indwelling stent.    Urology consulted overnight for flank pain, fever to 103 F, NEETA, and newly diagnosed left renal abscess on CT. This AM he is laying comfortably in bed with CPAP in place; reports no acute concerns.     Subjective      Pertinent items are noted in HPI.     Past Medical History:   Past Medical History:   Diagnosis Date    Acid reflux     Arthritis     Benign hypertension     Colon polyp     Concern about heart attack without diagnosis     Coronary artery disease     Coronary atherosclerosis     Heart disease     Hyperlipidemia     Hypertension     Kidney stone     Mixed hyperlipidemia     Obesity     body mass index 30+-obesity    Prostate cancer     Sleep apnea     Type 2 diabetes mellitus     Type 2 diabetes mellitus        Past Surgical History:   Past Surgical History:   Procedure Laterality Date    CARDIAC CATHETERIZATION      CARDIAC SURGERY N/A     [place]    CARPAL TUNNEL RELEASE      CORONARY ARTERY BYPASS GRAFT  08/30/2022    CYSTOSCOPY BLADDER STONE LITHOTRIPSY      CYSTOSCOPY BLADDER STONE LITHOTRIPSY      CYSTOSCOPY W/ URETERAL STENT PLACEMENT Left 05/09/2023    Procedure: CYSTOSCOPY LEFT RETROGRADE PYELOGRAM AND LEFT URETERAL STENT PLACEMENT;  Surgeon: Isma Justin MD;  Location: [facility];  Service: Urology;  Laterality: Left;    FETAL SURGERY FOR CONGENITAL HERNIA      INGUINAL HERNIA REPAIR      KIDNEY STONE SURGERY       KNEE ARTHROSCOPY      PROSTATECTOMY      TRIGGER FINGER RELEASE      UMBILICAL HERNIA REPAIR      UVULOPALATOPHARYNGOPLASTY         Family History:   Family History   Problem Relation Age of Onset    Heart attack Father     Heart disease Father     Diabetes Sister     Hyperlipidemia Sister     Hypertension Sister        Social History:   Social History     Socioeconomic History    Marital status:    Tobacco Use    Smoking status: Never    Smokeless tobacco: Never   Vaping Use    Vaping Use: Never used   Substance and Sexual Activity    Alcohol use: Not Currently     Alcohol/week: 1.0 standard drink of alcohol     Types: 1 Drinks containing 0.5 oz of alcohol per week    Drug use: Never    Sexual activity: Defer       Medications:     Current Facility-Administered Medications:     acetaminophen (TYLENOL) tablet 650 mg, 650 mg, Oral, Q6H PRN, Brenda Chase APRN, 650 mg at 10/29/23 2256    dextrose (D50W) (25 g/50 mL) IV injection 25 g, 25 g, Intravenous, Q15 Min PRN, Dorothea Carrizales MD    dextrose (GLUTOSE) oral gel 15 g, 15 g, Oral, Q15 Min PRN, Dorothea Carrizales MD    glucagon (GLUCAGEN) injection 1 mg, 1 mg, Intramuscular, Q15 Min PRN, Dorothea Carrizales MD    HYDROcodone-acetaminophen (NORCO) 5-325 MG per tablet 1 tablet, 1 tablet, Oral, Q4H PRN, Dorothea Carrizales MD, 1 tablet at 10/29/23 2135    Insulin Lispro (humaLOG) injection 2-7 Units, 2-7 Units, Subcutaneous, Q4H, Dorothea Carrizales MD, 2 Units at 10/30/23 0009    ipratropium-albuterol (DUO-NEB) nebulizer solution 3 mL, 3 mL, Nebulization, Q6H PRN, Dorothea Carrizales MD    meropenem (MERREM) 1,000 mg in sodium chloride 0.9 % 100 mL IVPB, 1,000 mg, Intravenous, Q12H, Janie Christianson DO    Pharmacy to dose vancomycin, , Does not apply, Continuous PRN, Dorothea Carrizales MD    sodium chloride 0.9 % flush 10 mL, 10 mL, Intravenous, PRN, Titus Weston MD    tamsulosin (FLOMAX) 24 hr capsule 0.4 mg, 0.4 mg, Oral, Daily, Dorothea Carrizales MD     vancomycin IVPB 1500 mg in 0.9% NaCl (Premix) 500 mL, 15 mg/kg, Intravenous, Q24H, Yamileth Coleman, PharmD    Allergies:   Allergies   Allergen Reactions    Iodine Other (See Comments)     Closes sinuses    Oxycodone-Acetaminophen Itching    Zofran [Ondansetron] Hives       Problem:     UTI (urinary tract infection)    Mixed hyperlipidemia    Uncontrolled type 2 diabetes mellitus with hyperglycemia    CAD, multiple vessel    Personal history of prostate cancer    NEETA (acute kidney injury)    Rhabdomyolysis    Right arm weakness       Review of Systems:   Review of Systems   Constitutional:  Positive for fever.   HENT: Negative.     Eyes: Negative.    Respiratory:          CPAP at night   Cardiovascular: Negative.    Gastrointestinal: Negative.    Endocrine: Negative.    Genitourinary:  Positive for flank pain.   Skin: Negative.    Allergic/Immunologic: Negative.    Neurological: Negative.    Hematological: Negative.    Psychiatric/Behavioral: Negative.           Objective     Physical Exam:  Vital Signs:   Temp:  [97.6 °F (36.4 °C)-103.2 °F (39.6 °C)] 97.6 °F (36.4 °C)  Heart Rate:  [] 84  Resp:  [16-32] 20  BP: ()/(54-93) 95/63  103 kg (227 lb 14.4 oz)  Body mass index is 32.7 kg/m².       Constitutional: Awake, alert    Eyes: PERRLA, sclerae anicteric, no conjunctival injection   HENT: Normocephalic, atraumatic, mucous membranes moist   Neck: trachea midline   Respiratory: Equal chest rise, non-labored respirations    Cardiovascular: well-perfused   Gastrointestinal: Soft, nontender, non-distended, R>L flank pain to palpation    Musculoskeletal: No bilateral ankle edema, no clubbing or cyanosis to extremities   Genitourinary: R>L CVAT   Psychiatric: Appropriate affect, cooperative   Neurologic: Oriented x 3, Cranial Nerves grossly intact, speech clear   Skin: No rashes       Urine   I/O last 3 completed shifts:  In: 500 [I.V.:500]  Out: 1025 [Urine:1025]    Labs  Lab Results   Component Value Date     "GLUCOSE 140 (H) 10/30/2023    CALCIUM 7.5 (L) 10/30/2023     10/30/2023    K 3.6 10/30/2023    CO2 20.0 (L) 10/30/2023     10/30/2023    BUN 23 10/30/2023    CREATININE 1.29 (H) 10/30/2023    EGFRIFNONA 96 02/17/2021    BCR 17.8 10/30/2023    ANIONGAP 12.0 10/30/2023       Lab Results   Component Value Date    WBC 14.56 (H) 10/30/2023    HGB 13.0 10/30/2023    HCT 39.7 10/30/2023    MCV 88.4 10/30/2023    PLT 88 (L) 10/30/2023       No results found for: \"URINECX\"    Brief Urine Lab Results  (Last result in the past 365 days)        Color   Clarity   Blood   Leuk Est   Nitrite   Protein   CREAT   Urine HCG        10/29/23 1445 Yellow   Cloudy   Large (3+)   Small (1+)   Negative   100 mg/dL (2+)                   Radiographic Studies  CT Abdomen Pelvis Without Contrast    Result Date: 10/29/2023  Impression: 1.Left upper pole cyst appears increased in size compared to May 2023 and now contains irregular gaseous lucency with some new mild surrounding fat stranding. Findings are suspicious for infected renal cyst with gas-forming infection. Also recommend correlation recent surgery as this could also be related to recent intervention. 2.Mild left hydronephrosis and hydroureter appears similar to the prior exam. The distal left ureter is nonopacified and nondilated. No clear obstructing etiology is identified. 3.Excreted contrast from prior CTA in the renal collecting systems, ureters, and bladder. This limits assessment for urinary tract calculi. 4.Cholelithiasis. 5.Calcific atherosclerosis. 6.Postoperative changes in the pelvis with evidence of prostatectomy. Electronically Signed: Larry Hoff  10/29/2023 8:16 PM EDT  Workstation ID: [id number]    MRI Brain Without Contrast    Result Date: 10/29/2023  Impression: 1.No definite findings of acute intracranial abnormality at this time. 2.Mild T2 and FLAIR hyperintense signal foci in the cerebral white matter are nonspecific but likely represent mild chronic " small vessel ischemic changes. 3.Mucosal thickening of the paranasal sinuses could indicate acute sinusitis. Electronically Signed: Larry Luis Eduardo  10/29/2023 4:10 PM EDT  Workstation ID: [id number]    XR Shoulder 2+ View Right    Result Date: 10/29/2023  1.No definite radiographic findings of acute osseous shoulder abnormality. 2.Glenohumeral joint is not well evaluated due to positioning. There is suspected mild to moderate osteoarthritis. 3.Moderate degenerative changes at the acromioclavicular joint. Electronically Signed: Larry Hoff  10/29/2023 2:16 PM EDT  Workstation ID: [id number]    XR Chest 1 View    Result Date: 10/29/2023  1.Prominent interstitial markings and mild left basilar opacities which could represent mild edema, pneumonia, or chronic lung disease. 2.Mild cardiomegaly. Status post median sternotomy and CABG. Electronically Signed: Larry Hoff  10/29/2023 2:00 PM EDT  Workstation ID: [id number]    CT Angiogram Head w AI Analysis of LVO    Result Date: 10/29/2023  Impression: 1.No definite findings of acute intracranial large vessel occlusion. 2.No significant stenosis of greater than 50% is seen at the bilateral internal carotid arteries at this time. Electronically Signed: Larry Luis Eduardo  10/29/2023 1:49 PM EDT  Workstation ID: [id number]    CT Angiogram Neck    Result Date: 10/29/2023  Impression: 1.No definite findings of acute intracranial large vessel occlusion. 2.No significant stenosis of greater than 50% is seen at the bilateral internal carotid arteries at this time. Electronically Signed: Larry Hoff  10/29/2023 1:49 PM EDT  Workstation ID: [id number]    CT CEREBRAL PERFUSION WITH & WITHOUT CONTRAST    Result Date: 10/29/2023  No CT perfusion evidence of acute infarction or ischemia. Electronically Signed: Larry Luis Eduardo  10/29/2023 1:35 PM EDT  Workstation ID: [id number]    CT Head Without Contrast Stroke Protocol    Result Date: 10/29/2023  1.No definite CT findings of acute intracranial  abnormality. Probable mild chronic small vessel ischemic changes. 2.Paranasal sinus mucosal thickening which could indicate sinusitis. Electronically Signed: Larry Hoff  10/29/2023 1:18 PM EDT  Workstation ID: [id number]    US Renal Bilateral    Result Date: 10/17/2023  1. Limited study due to body habitus and overlying bowel gas. 2. Minimal hydronephrosis left kidney. 3. Septated cystic structure left kidney incompletely evaluated. Correlation with outside evaluation if performed recommended. Otherwise, a follow-up can always be considered with dedicated CT or MRI utilizing a renal mass protocol. Alternatively, a repeat short-term follow-up ultrasound. 4. Debris within the bladder. Please correlate with urinalysis Electronically Signed: Luan Lopez MD  10/17/2023 1:20 PM EDT  Workstation ID: [id number]     Results Review:   I reviewed the patient's new clinical results.     Imaging Results (Last 72 Hours)       Procedure Component Value Units Date/Time    CT Abdomen Pelvis Without Contrast [[id number]] Collected: 10/29/23 2001     Updated: 10/29/23 2020    Narrative:      CT ABDOMEN PELVIS WO CONTRAST    Date of Exam: 10/29/2023 7:17 PM EDT    Indication: Flank pain, kidney stone suspected  septic with UTI HO obstruction.    Comparison: May 8, 2023. Renal ultrasound October 17, 2023.    Technique: Axial CT images were obtained of the abdomen and pelvis without the administration of contrast. Reconstructed coronal and sagittal images were also obtained. Automated exposure control and iterative construction methods were used.      Findings:  There is suspected dependent atelectasis in the lower lobes. Status post median sternotomy. There is calcific atherosclerosis of the aorta. No significant pleural or pericardial effusion is seen. Heart size appears within normal limits.    There is excreted contrast in the renal collecting systems, ureters, and bladder from CT angiography performed the same day. Contrast does  limit assessment for urinary tract calculi.    There is mild left hydronephrosis and hydroureter which appears similar to the prior exam. The distal left ureter is nonopacified and nondilated. No clear obstructing etiology is identified. There is a hypodense lesion at the anterior upper pole of the   left kidney which was present on the prior exam suggestive of a renal cyst, but on the current exam, the lesion measures up to approximately 3.7 cm, previously 2.9 cm, and there is now an irregular gaseous lucency within the lesion. There is some   surrounding fat stranding which appears mildly increased. These findings are concerning for superimposed gas forming infection within a renal cyst.    Probable right renal cyst, as before. No definite new right renal abnormality. Right ureter appears patent. No definite right hydronephrosis.    The bladder is distended with contrast. No suspicious focal bladder abnormality is seen. No significant bladder wall thickening. Multiple surgical clips are seen in the pelvis. There has been prostatectomy.    Hyperdensity within the gallbladder suggesting cholelithiasis, as before. No suspicious hepatic abnormality is seen. No definite splenomegaly. No suspicious adrenal lesion. No acute pancreatic abnormality. There is atherosclerosis of the aorta. No   definite lymphadenopathy. No acute gastric abnormality. No small bowel dilatation. There is diverticulosis of the colon. No definite acute colonic or rectal abnormality is seen. No significant pelvic lymphadenopathy. There is midline abdominal scarring.    There are degenerative changes in the bilateral hips and in the thoracolumbar spine.      Impression:      Impression:  1.Left upper pole cyst appears increased in size compared to May 2023 and now contains irregular gaseous lucency with some new mild surrounding fat stranding. Findings are suspicious for infected renal cyst with gas-forming infection. Also recommend   correlation  recent surgery as this could also be related to recent intervention.  2.Mild left hydronephrosis and hydroureter appears similar to the prior exam. The distal left ureter is nonopacified and nondilated. No clear obstructing etiology is identified.  3.Excreted contrast from prior CTA in the renal collecting systems, ureters, and bladder. This limits assessment for urinary tract calculi.  4.Cholelithiasis.  5.Calcific atherosclerosis.  6.Postoperative changes in the pelvis with evidence of prostatectomy.        Electronically Signed: Larry Hoff    10/29/2023 8:16 PM EDT    Workstation ID: [id number]    MRI Brain Without Contrast [[id number]] Collected: 10/29/23 1605     Updated: 10/29/23 1613    Narrative:      MRI BRAIN WO CONTRAST    Date of Exam: 10/29/2023 3:30 PM EDT    Indication: Stroke, follow up.     Comparison: CT head, CT perfusion, and CTA head and neck performed the same day    Technique:  Routine multiplanar/multisequence sequence images of the brain were obtained without contrast administration.      Findings:  No midline shift. The ventricles and sulci appear within normal limits for patient's age. Basal cisterns appear patent. The pituitary gland appears to have normal morphology. Cerebellar tonsils appear normally positioned.    No definite extra-axial collection and no definite findings of acute or chronic hemorrhage. No mass lesion, mass effect, or edema is identified. Mild T2 and FLAIR hyperintense signal foci are seen in the cerebral white matter. No definite restricted   diffusion is identified at this time.    There is mucosal thickening of the paranasal sinuses. Mastoid air cells appear clear. Globes and orbits appear unremarkable. No definite calvarial abnormality is identified.        Impression:      Impression:  1.No definite findings of acute intracranial abnormality at this time.  2.Mild T2 and FLAIR hyperintense signal foci in the cerebral white matter are nonspecific but likely  represent mild chronic small vessel ischemic changes.  3.Mucosal thickening of the paranasal sinuses could indicate acute sinusitis.        Electronically Signed: Larry Hoff    10/29/2023 4:10 PM EDT    Workstation ID: [id number]    XR Shoulder 2+ View Right [[id number]] Collected: 10/29/23 1414     Updated: 10/29/23 1419    Narrative:        XR SHOULDER 2+ VW RIGHT    Date of Exam: 10/29/2023 1:57 PM EDT    Indication: Trauma    Comparison: None available.    FINDINGS:    No definite acute fracture or malalignment is seen. Glenohumeral joint is not well evaluated due to positioning. There is suspected osteophyte formation with probable mild to moderate joint space narrowing. There appear to be moderate degenerative   changes at the acromioclavicular joint. Calcifications projecting along the posterior humeral head may represent calcific tendinopathy. No other definite soft tissue abnormality.      Impression:      1.No definite radiographic findings of acute osseous shoulder abnormality.  2.Glenohumeral joint is not well evaluated due to positioning. There is suspected mild to moderate osteoarthritis.  3.Moderate degenerative changes at the acromioclavicular joint.        Electronically Signed: aLrry Coradoizabella    10/29/2023 2:16 PM EDT    Workstation ID: [id number]    XR Chest 1 View [[id number]] Collected: 10/29/23 1359     Updated: 10/29/23 1403    Narrative:        XR CHEST 1 VW    Date of Exam: 10/29/2023 1:47 PM EDT    Indication: Acute Stroke Protocol (onset < 12 hrs)    Comparison: None available.    FINDINGS:  There are prominent interstitial markings and mild left basilar opacities. No significant focal consolidation. No pneumothorax or significant pleural effusion. The heart appears mildly enlarged. Status post median sternotomy and CABG. Mediastinal contour   appears within normal limits.      Impression:      1.Prominent interstitial markings and mild left basilar opacities which could represent mild edema,  pneumonia, or chronic lung disease.  2.Mild cardiomegaly. Status post median sternotomy and CABG.        Electronically Signed: Larry Luis Eduardo    10/29/2023 2:00 PM EDT    Workstation ID: [id number]    CT Angiogram Head w AI Analysis of LVO [[id number]] Collected: 10/29/23 1335     Updated: 10/29/23 1352    Narrative:      CT ANGIOGRAM HEAD W AI ANALYSIS OF LVO, CT ANGIOGRAM NECK    Date of Exam: 10/29/2023 1:13 PM EDT    Indication: Neuro deficit, acute stroke suspected  Neuro deficit, acute stroke suspected.    Comparison: CT head and CT perfusion performed the same day.    Technique: CTA of the head and neck was performed after the uneventful intravenous administration of iodinated contrast. Reconstructed coronal and sagittal images were also obtained. In addition, a 3-D volume rendered image was created for   interpretation. Automated exposure control and iterative reconstruction methods were used.     Findings:  Head: The vertebral and basilar arteries appear somewhat diminutive. There is fetal origin of the right posterior cerebral artery and partial fetal origin on the left. The posterior cerebral arteries appear grossly symmetric. No definite proximal   occlusion or definite high-grade stenosis.    There is calcific atherosclerosis of the cavernous portions of the internal carotid arteries. No high-grade stenosis is seen. The middle cerebral arteries appear to opacify as expected. There is a small right A1 segment of the anterior cerebral artery,   likely a normal variant. There is a patent anterior communicating artery. The anterior cerebral arteries are patent distally and grossly symmetric. No definite focal occlusion or high-grade stenosis is identified in the bilateral M1 and M2 segments of   the middle cerebral arteries. No other definite acute intracranial vascular abnormality is seen.    Neck: Status post median sternotomy and CABG. The visualized central pulmonary arteries appear to opacify as expected.  Visualized aortic arch appears within normal limits. There is bovine type branching anatomy with common origin of the right   brachiocephalic and left common carotid arteries. The left common carotid and external carotid arteries are patent. There is minimal atherosclerosis at the carotid bifurcation. There is tortuosity of the internal carotid artery. No stenosis of greater   than 50% is identified by NASCET criteria.    Right brachiocephalic artery appears patent. Common carotid and external carotid arteries are patent. There is also tortuosity of the right internal carotid artery without stenosis of greater than 50% by NASCET criteria.    The subclavian arteries appear patent. The vertebral arteries appear patent. The vertebral arteries appear somewhat diminutive, likely normal variant as there appears to be fetal origin of the posterior cerebral arteries.    No other definite acute vascular abnormality is seen.    Nonvascular: No acute intracranial abnormality is identified. There is paranasal sinus mucosal thickening. No acute infiltrate is seen in the lung apices. 7 mm hypodensity is seen in the right lobe of thyroid.    There is mild to moderate multilevel disc and facet joint degeneration in the cervical spine.      Impression:      Impression:  1.No definite findings of acute intracranial large vessel occlusion.  2.No significant stenosis of greater than 50% is seen at the bilateral internal carotid arteries at this time.       Electronically Signed: Larry Hoff    10/29/2023 1:49 PM EDT    Workstation ID: [id number]    CT Angiogram Neck [[id number]] Collected: 10/29/23 1335     Updated: 10/29/23 1352    Narrative:      CT ANGIOGRAM HEAD W AI ANALYSIS OF LVO, CT ANGIOGRAM NECK    Date of Exam: 10/29/2023 1:13 PM EDT    Indication: Neuro deficit, acute stroke suspected  Neuro deficit, acute stroke suspected.    Comparison: CT head and CT perfusion performed the same day.    Technique: CTA of the head and neck  was performed after the uneventful intravenous administration of iodinated contrast. Reconstructed coronal and sagittal images were also obtained. In addition, a 3-D volume rendered image was created for   interpretation. Automated exposure control and iterative reconstruction methods were used.     Findings:  Head: The vertebral and basilar arteries appear somewhat diminutive. There is fetal origin of the right posterior cerebral artery and partial fetal origin on the left. The posterior cerebral arteries appear grossly symmetric. No definite proximal   occlusion or definite high-grade stenosis.    There is calcific atherosclerosis of the cavernous portions of the internal carotid arteries. No high-grade stenosis is seen. The middle cerebral arteries appear to opacify as expected. There is a small right A1 segment of the anterior cerebral artery,   likely a normal variant. There is a patent anterior communicating artery. The anterior cerebral arteries are patent distally and grossly symmetric. No definite focal occlusion or high-grade stenosis is identified in the bilateral M1 and M2 segments of   the middle cerebral arteries. No other definite acute intracranial vascular abnormality is seen.    Neck: Status post median sternotomy and CABG. The visualized central pulmonary arteries appear to opacify as expected. Visualized aortic arch appears within normal limits. There is bovine type branching anatomy with common origin of the right   brachiocephalic and left common carotid arteries. The left common carotid and external carotid arteries are patent. There is minimal atherosclerosis at the carotid bifurcation. There is tortuosity of the internal carotid artery. No stenosis of greater   than 50% is identified by NASCET criteria.    Right brachiocephalic artery appears patent. Common carotid and external carotid arteries are patent. There is also tortuosity of the right internal carotid artery without stenosis of  greater than 50% by NASCET criteria.    The subclavian arteries appear patent. The vertebral arteries appear patent. The vertebral arteries appear somewhat diminutive, likely normal variant as there appears to be fetal origin of the posterior cerebral arteries.    No other definite acute vascular abnormality is seen.    Nonvascular: No acute intracranial abnormality is identified. There is paranasal sinus mucosal thickening. No acute infiltrate is seen in the lung apices. 7 mm hypodensity is seen in the right lobe of thyroid.    There is mild to moderate multilevel disc and facet joint degeneration in the cervical spine.      Impression:      Impression:  1.No definite findings of acute intracranial large vessel occlusion.  2.No significant stenosis of greater than 50% is seen at the bilateral internal carotid arteries at this time.       Electronically Signed: Larry Hoff    10/29/2023 1:49 PM EDT    Workstation ID: [id number]    CT CEREBRAL PERFUSION WITH & WITHOUT CONTRAST [[id number]] Collected: 10/29/23 1332     Updated: 10/29/23 1338    Narrative:      CT CEREBRAL PERFUSION W WO CONTRAST    Date of Exam: 10/29/2023 1:13 PM EDT    Indication: Neuro deficit, acute stroke suspected.     Comparison: CT head performed the same day.    Technique: Axial CT images of the brain were obtained prior to and after the administration of 115 mL Isovue-370. Core blood volume, core blood flow, mean transit time, and Tmax images were obtained utilizing the Rapid software protocol. A limited CT   angiogram of the head was also performed to measure the blood vessel density.    The radiation dose reduction device was turned on for each scan per the ALARA (As Low as Reasonably Achievable) protocol.        FINDINGS:     CT Perfusion:  CBF (<30%) volume: 0 mL  Tmax (>6.0s) volume: 0 mL  Mismatch volume: 0 mL  Mismatch ratio: None    No definite perfusion abnormality is seen in the visualized parenchyma to indicate ischemia or  infarct.      Impression:      No CT perfusion evidence of acute infarction or ischemia.        Electronically Signed: Larry Luis Eduardo    10/29/2023 1:35 PM EDT    Workstation ID: [id number]    CT Head Without Contrast Stroke Protocol [[id number]] Collected: 10/29/23 1314     Updated: 10/29/23 1321    Narrative:      CT HEAD WO CONTRAST STROKE PROTOCOL    Date of Exam: 10/29/2023 1:03 PM EDT    Indication: Neuro deficit, acute, stroke suspected  Neuro deficit, acute stroke suspected.    Comparison: None available.    Technique: Axial CT images were obtained of the head without contrast administration.  Reconstructed coronal images were also obtained. Automated exposure control and iterative construction methods were used.    Scan Time: 1:11 p.m.  Results discussed with stroke team navigator at 1:17 p.m      FINDINGS:  No midline shift. Basal cisterns appear patent.  Ventricles and sulci appear within normal limits for patient age.    No extra-axial collection or acute hemorrhage is identified.  No definite mass lesion, edema, or significant mass effect is seen.  There is hypoattenuation in the white matter, which is nonspecific, but is most commonly seen with chronic small vessel   ischemic changes. No definite CT findings of acute infarction.    There is paranasal sinus mucosal thickening. Mastoid air cells appear clear.  No acute calvarial abnormality is identified.      Impression:      1.No definite CT findings of acute intracranial abnormality. Probable mild chronic small vessel ischemic changes.  2.Paranasal sinus mucosal thickening which could indicate sinusitis.        Electronically Signed: Larry Luis Eduardo    10/29/2023 1:18 PM EDT    Workstation ID: [id number]            Assessment / Plan      Assessment/Plan:    73yM with a history of PrCa, recurrent nephrolithiasis, L ureteral stricture, and urinary incontinence. He presents to  for weakness, fall, and flank pain. He was febrile to 103 on admission; found to have  L renal abscess with mild bilateral hydronephrosis. Urology is consulted for evaluation.      - agree with IR consult for L renal abscess drain placement   - continue villarreal catheter   - continue antibiotics, tailor to Cx results   - urology will continue to follow    I discussed the patients findings and my recommendations with the patient.         Kristan Pearce MD   10/30/23  08:41 EDT                                                               Electronically signed by Víctor Gann MD at 10/30/23 0903       [facility] Romero MD at 10/30/23 0252          I was asked by Dr. Christianson to evaluate the patient for ICU transfer.    Patient has received multiple fluid boluses for borderline blood pressure in the setting of sepsis of urinary origin.  He does have a renal abscess and urology has been consulted.    Patient is currently comfortable.  Mean arterial pressures are running in the upper 60s for the past several hours.  Current heart rate is 100 bpm.  Lactic acid most recently was 3.4 at 12:47 AM (down from 6.8 at 22:55).  He is on appropriate antibiotics including meropenem and vancomycin.  Patient's current temp is 97.9 (was previously up to 103.2).      I do feel that the patient has received adequate volume resuscitation at this point.  I would continue maintenance fluid.  I would recommend continuing to monitor his blood pressure and heart rate.  I would not recommend initiation of pressors at this point.  If patient has decline in blood pressure/worsening tachycardia please contact us again and we will transfer to the ICU for consideration of initiation of pressors.  Discussed with Dr. Wang.      Electronically signed by Reji Romero MD at 10/30/23 4684          Consult Notes (last 24 hours)        Kelly Tolliver, APRN at 10/29/23 1534        Consult Orders    1. Inpatient Neurology Consult Stroke [[id number]] ordered by Titus Weston MD at 10/29/23 1257                 Stroke Consult  Note    Patient Name: Aldair Narvaez   MRN: [id number]  Age: 73 y.o.  Sex: male  : 1950    Primary Care Physician: Miguel Angel Mireles MD  Referring Physician:  Dr. Weston    TIME STROKE TEAM CALLED: 1250 EST     TIME PATIENT SEEN: 1255 EST    Handedness: Right  Race:      Chief Complaint/Reason for Consultation: Right arm weakness and numbness, right shoulder pain    Subjective .  HPI: Aldair Narvaez is a 73-year-old, , right-handed male with known diagnosis of HTN, HLD, CAD, T2DM, GRANT, and obesity who presents with right arm weakness and numbness.  Last known well last night at midnight when he went to bed.  Patient fell at some point in the night; he does not remember falling.  He states that he laid on the floor on top of his right arm for about 7 hours before his wife found him.  EMS was called as he was unable to get him up; they assisted him back into bed as it was felt that his right arm symptoms would improve.  As he was still unable to move his arm a couple hours later they called EMS again to transport to the ED.    On exam patient is alert and oriented x3.  His speech is clear and fluent, no aphasia.  Gaze is normal, all visual fields are intact.  Face is symmetrical, tongue protrudes midline.  Patient is able to raise his shoulders though he does endorse right shoulder pain.  He is unable to raise his right arm off the bed he can minimally move his right fingers but cannot make a fist.  No drift in any other extremity.  He has no sensation in his right arm to light touch.  He [facility] the [facility] to the CT table and his gait is unsteady he notes this is abnormal for him.  Patient did also report that he has had a sinus infection for a few days.    Last Known Normal Date/Time: 0000 midnight EST     Review of Systems   Constitutional:  Positive for fatigue. Negative for fever.   HENT:  Positive for congestion, postnasal drip and sinus pressure.    Eyes:  Negative for visual  disturbance.   Respiratory:  Negative for cough and shortness of breath.    Cardiovascular:  Negative for chest pain and palpitations.   Gastrointestinal:  Negative for nausea and vomiting.   Musculoskeletal:  Positive for gait problem.        Unsteady   Skin: Negative.    Neurological:  Positive for dizziness, weakness and numbness. Negative for facial asymmetry, speech difficulty and headaches.   Psychiatric/Behavioral: Negative.        Past Medical History:   Diagnosis Date    Acid reflux     Arthritis     Benign hypertension     Colon polyp     Concern about heart attack without diagnosis     Coronary artery disease     Coronary atherosclerosis     Heart disease     Hyperlipidemia     Hypertension     Kidney stone     Mixed hyperlipidemia     Obesity     body mass index 30+-obesity    Prostate cancer     Sleep apnea     Type 2 diabetes mellitus     Type 2 diabetes mellitus      Past Surgical History:   Procedure Laterality Date    CARDIAC CATHETERIZATION      CARDIAC SURGERY N/A     Triple [place] Pass    CARPAL TUNNEL RELEASE      CORONARY ARTERY BYPASS GRAFT  08/30/2022    CYSTOSCOPY BLADDER STONE LITHOTRIPSY      CYSTOSCOPY BLADDER STONE LITHOTRIPSY      CYSTOSCOPY W/ URETERAL STENT PLACEMENT Left 05/09/2023    Procedure: CYSTOSCOPY LEFT RETROGRADE PYELOGRAM AND LEFT URETERAL STENT PLACEMENT;  Surgeon: Isma Justin MD;  [facility]  Service: Urology;  Laterality: Left;    FETAL SURGERY FOR CONGENITAL HERNIA      INGUINAL HERNIA REPAIR      KIDNEY STONE SURGERY      KNEE ARTHROSCOPY      PROSTATECTOMY      TRIGGER FINGER RELEASE      UMBILICAL HERNIA REPAIR      UVULOPALATOPHARYNGOPLASTY       Family History   Problem Relation Age of Onset    Heart attack Father     Heart disease Father     Diabetes Sister     Hyperlipidemia Sister     Hypertension Sister      Social History     Socioeconomic History    Marital status:    Tobacco Use    Smoking status: Never    Smokeless tobacco: Never    Vaping Use    Vaping Use: Never used   Substance and Sexual Activity    Alcohol use: Not Currently     Alcohol/week: 1.0 standard drink of alcohol     Types: 1 Drinks containing 0.5 oz of alcohol per week    Drug use: Never    Sexual activity: Defer     Allergies   Allergen Reactions    Iodine Other (See Comments)     Closes sinuses    Oxycodone-Acetaminophen Itching    Zofran [Ondansetron] Hives     Prior to Admission medications    Medication Sig Start Date End Date Taking? Authorizing Provider   albuterol sulfate  (90 Base) MCG/ACT inhaler As Needed. 6/12/22   Key Lucas MD   buPROPion XL (WELLBUTRIN XL) 150 MG 24 hr tablet Daily. 3/4/22   Key Lucas MD   coenzyme Q10 100 MG capsule Take 1 capsule by mouth Daily.    Key Lucas MD   dapagliflozin Propanediol (Farxiga) 10 MG tablet Take 10 mg by mouth Daily.    Key Lucas MD   Eliquis 5 MG tablet tablet Daily. 11/16/22   Key Lucas MD   glimepiride (AMARYL) 4 MG tablet Take 2 tablets by mouth Every Morning Before Breakfast. 9/5/23   Steve Cohen MD   glucose blood (Contour Next Test) test strip 1 strip 3 times daily dx: e11.65 9/23/22   Steve Cohen MD   HYDROcodone-acetaminophen (NORCO) 5-325 MG per tablet Take 1 tablet by mouth Every 8 (Eight) Hours As Needed for Moderate Pain for up to 9 doses. 8/23/23   Isma Justin MD   hyoscyamine sulfate (ANASPAZ) 0.125 MG tablet dispersible disintegrating tablet Place 1 tablet on the tongue Every 4 (Four) Hours As Needed (bladder spasms). 8/23/23   Isma Justin MD   metoprolol succinate XL (TOPROL-XL) 50 MG 24 hr tablet 1 tablet Daily. 11/6/20   Key Lucas MD   nitrofurantoin, macrocrystal-monohydrate, (Macrobid) 100 MG capsule Take 1 capsule by mouth 2 (Two) Times a Day. 8/23/23   Isma Justin MD   nitroglycerin (NITROSTAT) 0.4 MG SL tablet nitroglycerin 0.4 mg sublingual tablet    Key Lucas MD    pantoprazole (PROTONIX) 40 MG EC tablet Daily. 4/12/21   Key Lucas MD   PHARMACY MEDS TO BED CONSULT Daily. 5/11/23   Raymon Weinberg MD   phenazopyridine (PYRIDIUM) 200 MG tablet Take 1 tablet by mouth 3 (Three) Times a Day As Needed painful urination (Dysuria) for up to 15 doses. 8/23/23   Isma Justin MD   potassium citrate (UROCIT-K) 5 MEQ (540 MG) CR tablet Take 3 tablets by mouth 2 (Two) Times a Day With Meals. 10/19/23   Isma Justin MD   rosuvastatin (CRESTOR) 20 MG tablet Take 1 tablet by mouth Daily. 11/23/20   Key Lucas MD   tamsulosin (FLOMAX) 0.4 MG capsule 24 hr capsule TAKE 1 CAPSULE BY MOUTH DAILY. 7/17/23   Isma Justin MD   valsartan-hydrochlorothiazide (DIOVAN-HCT) 320-25 MG per tablet 1 tablet Daily. 11/6/20   Key Lucas MD            Objective     Temp:  [97.8 °F (36.6 °C)] 97.8 °F (36.6 °C)  Heart Rate:  [101-102] 102  Resp:  [16] 16  BP: (110-114)/(73) 110/73  Neurological Exam  Mental Status  Alert. Oriented to person, place, and time. Speech is normal. Language is fluent with no aphasia. Attention and concentration are normal.    Cranial Nerves  CN II: Visual fields full to confrontation.  CN III, IV, VI: Extraocular movements intact bilaterally. Normal lids and orbits bilaterally. Pupils equal round and reactive to light bilaterally.  CN V: Facial sensation is normal.  CN VII: Full and symmetric facial movement.  CN XI: Shoulder shrug strength is normal.  CN XII: Tongue midline without atrophy or fasciculations.    Motor  Normal muscle bulk throughout. No fasciculations present. Decreased muscle tone. No abnormal involuntary movements. Strength is 5/5 in all four extremities except as noted.  RUE 1/5.    Sensory  Light touch abnormality:   Unable to detect light touch in the right arm.    Reflexes  Right Plantar: downgoing  Left Plantar: downgoing    Coordination    No dysmetria out of proportion to weakness.    Gait  Casual  gait: Normal stance. Reduced stride length. Hesitant gait.      Physical Exam  Vitals reviewed.   Constitutional:       Appearance: Normal appearance.   HENT:      Head: Normocephalic and atraumatic.   Eyes:      General: Lids are normal.      Extraocular Movements: Extraocular movements intact.      Pupils: Pupils are equal, round, and reactive to light.   Cardiovascular:      Rate and Rhythm: Normal rate.      Pulses:           Radial pulses are 2+ on the right side and 2+ on the left side.   Pulmonary:      Effort: Pulmonary effort is normal. No respiratory distress.   Musculoskeletal:         General: No swelling. Normal range of motion.      Cervical back: Normal range of motion and neck supple.   Skin:     General: Skin is warm and dry.   Neurological:      Mental Status: He is alert and oriented to person, place, and time.      Cranial Nerves: No cranial nerve deficit.      Sensory: Sensory deficit present.      Motor: Weakness present.   Psychiatric:         Mood and Affect: Mood normal.         Speech: Speech normal.         Behavior: Behavior normal.         Acute Stroke Data    IV Thrombolytic (TPA/Tenecteplase) Inclusion / Exclusion Criteria    Time: 13:00 EDT  Person Administering Scale: JERRICA Alvarado    Inclusion Criteria  [x]   18 years of age or greater   []   Onset of symptoms < 4.5 hours before beginning treatment (stroke onset = time patient was last seen well or without symptoms).   []   Diagnosis of acute ischemic stroke causing measurable disabling deficit (Complete Hemianopia, Any Aphasia, Visual or Sensory Extinction, Any weakness limiting sustained effort against gravity)   []   Any remaining deficit considered potentially disabling in view of patient and practitioner   Exclusion criteria (Do not proceed with Alteplase if any are checked under exclusion criteria)  [x]   Onset unknown or GREATER than 4.5 hours   []   ICH on CT/MRI   []   CT demonstrates hypodensity representing acute  or subacute infarct   []   Significant head trauma or prior stroke in the previous 3 months   []   Symptoms suggestive of subarachnoid hemorrhage   []   History of un-ruptured intracranial aneurysm GREATER than 10 mm   []   Recent intracranial or intraspinal surgery within the last 3 months   []   Arterial puncture at a non-compressible site in the previous 7 days   []   Active internal bleeding   []   Acute bleeding tendency   []   Platelet count LESS than 100,000 for known hematological diseases such as leukemia, thrombocytopenia or chronic cirrhosis   []   Current use of anticoagulant with INR GREATER than 1.7 or PT GREATER than 15 seconds, aPTT GREATER than 40 seconds   []   Heparin received within 48 hours, resulting in abnormally elevated aPTT GREATER than upper limit of normal   []   Current use of direct thrombin inhibitors or direct factor Xa inhibitors in the past 48 hours   []   Elevated blood pressure refractory to treatment (systolic GREATER than 185 mm/Hg or diastolic  GREATER than 110 mm/Hg   []   Suspected infective endocarditis and aortic arch dissection   []   Current use of therapeutic treatment dose of low-molecular-weight heparin (LMWH) within the previous 24 hours   []   Structural GI malignancy or bleed   Relative exclusion for all patients  []   Only minor nondisabling symptoms   []   Pregnancy   []   Seizure at onset with postictal residual neurological impairments   []   Major surgery or previous trauma within past 14 days   []   History of previous spontaneous ICH, intracranial neoplasm, or AV malformation   []   Postpartum (within previous 14 days)   []   Recent GI or urinary tract hemorrhage (within previous 21 days)   []   Recent acute MI (within previous 3 months)   []   History of unruptured intracranial aneurysm LESS than 10 mm   []   History of ruptured intracranial aneurysm   []   Blood glucose LESS than 50 mg/dL (2.7 mmol/L)   []   Dural puncture within the last 7 days   []    Known GREATER than 10 cerebral microbleeds   Additional exclusions for patients with symptoms onset between 3 and 4.5 hours.  []   Age > 80.   []   On any anticoagulants regardless of INR  >>> Warfarin (Coumadin), Heparin, Enoxaparin (Lovenox), fondaparinux (Arixtra), bivalirudin (Angiomax), Argatroban, dabigatran (Pradaxa), rivaroxaban (Xarelto), or apixaban (Eliquis)   []   Severe stroke (NIHSS > 25).   []   History of BOTH diabetes and previous ischemic stroke.   []   The risks and benefits have been discussed with the patient or family related to the administration of IV alteplase for stroke symptoms.   []   I have discussed and reviewed the patient's case and imaging with the attending prior to IV Thrombolytic (TPA/Tenecteplase).    Time Thrombolytic administered       [facility] Meds:  Scheduled- HYDROcodone-acetaminophen, 1 tablet, Oral, Once  vancomycin, 20 mg/kg, Intravenous, Once      Infusions-     PRNs-   sodium chloride    Functional Status Prior to Current Stroke/Braeden Score: MRS 0    NIH Stroke Scale  Time: 13:00 EDT  Person Administering Scale: JERRICA Alvarado    1a  Level of consciousness: 0=alert; keenly responsive   1b. LOC questions:  0=Answers both questions correctly   1c. LOC commands: 0=Performs both tasks correctly   2.  Best Gaze: 0=normal   3.  Visual: 0=No visual loss   4. Facial Palsy: 0=Normal symmetric movement   5a.  Motor left arm: 0=No drift, limb holds 90 (or 45) degrees for full 10 seconds   5b.  Motor right arm: 3=No effort against gravity, limb falls   6a. motor left le=No drift, limb holds 90 (or 45) degrees for full 10 seconds   6b  Motor right le=No drift, limb holds 90 (or 45) degrees for full 10 seconds   7. Limb Ataxia: 0=Absent   8.  Sensory: 2=Severe to total sensory loss; patient is not aware of being touched in face, arm, leg   9. Best Language:  0=No aphasia, normal   10. Dysarthria: 0=Normal   11. Extinction and Inattention: 0=No abnormality    Total:    5       Results Reviewed:  I have personally reviewed current lab, radiology, and data and agree with results.  WBC   Date Value Ref Range Status   10/29/2023 28.11 (H) 3.40 - 10.80 10*3/mm3 Final     RBC   Date Value Ref Range Status   10/29/2023 5.10 4.14 - 5.80 10*6/mm3 Final     Hemoglobin   Date Value Ref Range Status   10/29/2023 14.7 13.0 - 17.7 g/dL Final     Hematocrit   Date Value Ref Range Status   10/29/2023 44.9 37.5 - 51.0 % Final     MCV   Date Value Ref Range Status   10/29/2023 88.0 79.0 - 97.0 fL Final     MCH   Date Value Ref Range Status   10/29/2023 28.8 26.6 - 33.0 pg Final     MCHC   Date Value Ref Range Status   10/29/2023 32.7 31.5 - 35.7 g/dL Final     RDW   Date Value Ref Range Status   10/29/2023 15.3 12.3 - 15.4 % Final     RDW-SD   Date Value Ref Range Status   10/29/2023 49.6 37.0 - 54.0 fl Final     MPV   Date Value Ref Range Status   10/29/2023 9.9 6.0 - 12.0 fL Final     Platelets   Date Value Ref Range Status   10/29/2023 150 140 - 450 10*3/mm3 Final     Neutrophil %   Date Value Ref Range Status   10/29/2023 89.1 (H) 42.7 - 76.0 % Final     Lymphocyte %   Date Value Ref Range Status   10/29/2023 2.9 (L) 19.6 - 45.3 % Final     Monocyte %   Date Value Ref Range Status   10/29/2023 4.3 (L) 5.0 - 12.0 % Final     Eosinophil %   Date Value Ref Range Status   10/29/2023 0.1 (L) 0.3 - 6.2 % Final     Basophil %   Date Value Ref Range Status   10/29/2023 0.5 0.0 - 1.5 % Final     Immature Grans %   Date Value Ref Range Status   10/29/2023 3.1 (H) 0.0 - 0.5 % Final     Neutrophils, Absolute   Date Value Ref Range Status   10/29/2023 25.05 (H) 1.70 - 7.00 10*3/mm3 Final     Lymphocytes, Absolute   Date Value Ref Range Status   10/29/2023 0.81 0.70 - 3.10 10*3/mm3 Final     Monocytes, Absolute   Date Value Ref Range Status   10/29/2023 1.20 (H) 0.10 - 0.90 10*3/mm3 Final     Eosinophils, Absolute   Date Value Ref Range Status   10/29/2023 0.03 0.00 - 0.40 10*3/mm3 Final     Basophils,  Absolute   Date Value Ref Range Status   10/29/2023 0.15 0.00 - 0.20 10*3/mm3 Final     Immature Grans, Absolute   Date Value Ref Range Status   10/29/2023 0.87 (H) 0.00 - 0.05 10*3/mm3 Final     nRBC   Date Value Ref Range Status   10/29/2023 0.0 0.0 - 0.2 /100 WBC Final     Lab Results   Component Value Date    GLUCOSE 438 (C) 10/29/2023    BUN 30 (H) 10/29/2023    CREATININE 2.36 (H) 10/29/2023    EGFR 28.7 (L) 10/29/2023    BCR 12.7 10/29/2023    K 3.9 10/29/2023    CO2 18.0 (L) 10/29/2023    CALCIUM 9.1 10/29/2023    ALBUMIN 4.5 07/25/2023    BILITOT 0.5 07/25/2023     (H) 10/29/2023    ALT 61 (H) 10/29/2023     Lactate 7.1    XR Shoulder 2+ View Right    Result Date: 10/29/2023  1.No definite radiographic findings of acute osseous shoulder abnormality. 2.Glenohumeral joint is not well evaluated due to positioning. There is suspected mild to moderate osteoarthritis. 3.Moderate degenerative changes at the acromioclavicular joint. Electronically Signed: Larry Hoff  10/29/2023 2:16 PM EDT  Workstation ID: [id number]    XR Chest 1 View    Result Date: 10/29/2023  1.Prominent interstitial markings and mild left basilar opacities which could represent mild edema, pneumonia, or chronic lung disease. 2.Mild cardiomegaly. Status post median sternotomy and CABG. Electronically Signed: Larry Hoff  10/29/2023 2:00 PM EDT  Workstation ID: [id number]    CT Angiogram Head w AI Analysis of LVO    Result Date: 10/29/2023  Impression: 1.No definite findings of acute intracranial large vessel occlusion. 2.No significant stenosis of greater than 50% is seen at the bilateral internal carotid arteries at this time. Electronically Signed: Larry Hoff  10/29/2023 1:49 PM EDT  Workstation ID: [id number]    CT Angiogram Neck    Result Date: 10/29/2023  Impression: 1.No definite findings of acute intracranial large vessel occlusion. 2.No significant stenosis of greater than 50% is seen at the bilateral internal carotid arteries at  this time. Electronically Signed: Larry Hoff  10/29/2023 1:49 PM EDT  Workstation ID: [id number]    CT CEREBRAL PERFUSION WITH & WITHOUT CONTRAST    Result Date: 10/29/2023  No CT perfusion evidence of acute infarction or ischemia. Electronically Signed: Larry Hoff  10/29/2023 1:35 PM EDT  Workstation ID: [id number]    CT Head Without Contrast Stroke Protocol    Result Date: 10/29/2023  1.No definite CT findings of acute intracranial abnormality. Probable mild chronic small vessel ischemic changes. 2.Paranasal sinus mucosal thickening which could indicate sinusitis. Electronically Signed: Larry Hoff  10/29/2023 1:18 PM EDT  Workstation ID: [id number]            Assessment/Plan:  73-year-old, , right-handed male with known diagnosis of HTN, HLD, CAD, T2DM, GRANT, and obesity who presents with right arm weakness and numbness.  Last known well last night at midnight when he went to bed.  Patient fell out of bed during the night and laid on the floor on top of his right arm for about 7 hours before his wife found him and called EMS to assist him back to bed.  He has had persistent right arm weakness (can only slightly move his fingers, no antigravity strength) and right arm numbness.  Patient also has an unsteady gait.  NIHSS of 5.  CT head negative for acute abnormality, evidence of sinusitis.  Not a candidate for IV thrombolytics secondary to time.  CTA head and neck negative for hemodynamically significant flow-limiting stenosis, no LVO or aneurysm.  CT perfusion is negative.    PTA antiplatelet: None  PTA anticoagulant: Eliquis 5 mg daily      Right arm weakness -brachial plexus injury v possible ischemia  -CT imaging reassuring  -We will obtain MRI brain without to evaluate for possible stroke  -Hold off on stroke order set until MRI is completed  -If negative suspect patient likely has brachial plexus injury and will likely need a EMG for further work-up    2.  Sepsis  -WBCs 28, lactic 7.1  -Appears to have  UTI, recent admission with pyelonephritis  -Infectious work-up per primary team      Addendum:  MRI brain is negative for ischemia, no further stroke work up is needed    JERRICA Alvarado  October 29, 2023  15:34 EDT                Electronically signed by Kelly Tolliver APRN at 10/29/23 3806

## 2023-10-30 NOTE — PROGRESS NOTES
[facility]   Urology Progress Note    Patient Name: Aldair Narvaez  : 1950  MRN: [id number]  Primary Care Physician:  Miguel Angel Mireles MD  Date of admission: 10/29/2023    Subjective   Subjective     Chief Complaint: Weakness    HPI:  Patient resting in bed. Villarreal catheter draining yellow urine output. He is POD 0 s/p Left renal abscess drain placement with IR. He is currently afebrile.     Review of Systems   All systems were reviewed and negative except for: villarreal catheter, tachycardic, left renal abscess drain    Objective   Objective     Vitals:   Temp:  [97.6 °F (36.4 °C)-103.2 °F (39.6 °C)] 98.3 °F (36.8 °C)  Heart Rate:  [] 102  Resp:  [18-32] 20  BP: ()/(54-93) 120/71  Flow (L/min):  [2] 2  Physical Exam    Constitutional: Awake in bed, alert   Eyes: PERRLA, sclerae anicteric, no conjunctival injection   HENT: Normocephalic, atraumatic, mucous membranes moist   Neck: Supple, trachea midline   Respiratory: Equal chest rise, non-labored respirations    Cardiovascular: RRR   Gastrointestinal: Soft, nontender, non-distended   Genitourinary: Villarreal catheter with yellow urine output. Left renal abscess drain in place with sanguineous output.    Musculoskeletal: No lower extremity edema bilaterally, no clubbing or cyanosis to extremities   Psychiatric: Appropriate affect, cooperative   Neurologic: Oriented x 3,  Cranial Nerves grossly intact, speech clear   Skin: No rashes     Result Review    Result Review:  I have personally reviewed the results from the time of this admission to 10/30/2023 16:42 EDT and agree with these findings:  [x]  Laboratory  []  Microbiology  []  Radiology  []  EKG/Telemetry   []  Cardiology/Vascular   []  Pathology  []  Old records  [x]  Other: vital signs    Most notable findings include: -105.     Assessment & Plan   Assessment / Plan     Brief Patient Summary:  Aldair Narvaez is a 73 y.o. male with PMH of prostate cancer, recurrent nephrolithiasis, left ureteral  stricture and urinary incontinence who presented to [facility] for worsening weakness found to have hydronephrosis and left renal abscess. He is now POD 0 s/p Left renal abscess drain placement with IR.     Active Hospital Problems:  Active Hospital Problems    Diagnosis     **UTI (urinary tract infection)     NEETA (acute kidney injury)     Rhabdomyolysis     Right arm weakness     Hydronephrosis of left kidney     Personal history of prostate cancer     Sepsis due to urinary tract infection     CAD, multiple vessel     Uncontrolled type 2 diabetes mellitus with hyperglycemia     Mixed hyperlipidemia        Plan:   -Continue villarreal catheter, continue IR renal abscess drain.   -Continue abx.  -NPO after MN.  -Hold anticoagulation.  -Plan for OR 10/31 with Dr. Justin for Cystoscopy, Left ureteral stent placement.    will follow, please call if any questions.   D/w Dr. Justin.     DVT prophylaxis:  No DVT prophylaxis order currently exists.    CODE STATUS:   Code Status (Patient has no pulse and is not breathing): CPR (Attempt to Resuscitate)  Medical Interventions (Patient has pulse or is breathing): Full    Disposition:  I expect patient to remain inpatient.    Electronically signed by JERRICA Damon, 10/30/23, 4:42 PM EDT.

## 2023-10-30 NOTE — PROGRESS NOTES
"Pharmacy Consult-Vancomycin Dosing  Aldair Narvaez is a  73 y.o. male receiving vancomycin therapy.     Indication:UTI, intra-abdominal infection, renal abscess   Consulting Provider: hospitalist  ID Consult:     Goal Trough: 15-20    Current Antimicrobial Therapy  Anti-Infectives (From admission, onward)      Ordered     Dose/Rate Route Frequency Start Stop    10/29/23 2312  vancomycin (dosing per levels)        Ordering Provider: Luis Schneider, PharmD     Does not apply Daily 10/30/23 0900 11/06/23 0859    10/29/23 2311  Pharmacy to dose vancomycin        Ordering Provider: Dorothea Carrizales MD     Does not apply Continuous PRN 10/29/23 2311 11/03/23 2310    10/29/23 1621  cefTRIAXone (ROCEPHIN) 2,000 mg in sodium chloride 0.9 % 100 mL IVPB        Ordering Provider: Dorothea Carrizales MD    2,000 mg  200 mL/hr over 30 Minutes Intravenous Every 24 Hours 10/29/23 2100 11/03/23 2059    10/29/23 1353  vancomycin IVPB 2000 mg in 0.9% Sodium Chloride 500 mL        Ordering Provider: Titus Weston MD    20 mg/kg × 103 kg  250 mL/hr over 120 Minutes Intravenous Once 10/29/23 1430 10/29/23 1830    10/29/23 1353  piperacillin-tazobactam (ZOSYN) 4.5 g in iso-osmotic dextrose 100 mL IVPB (premix)        Ordering Provider: Titus Weston MD    4.5 g Intravenous Once 10/29/23 1409 10/29/23 1634            Allergies  Allergies as of 10/29/2023 - Reviewed 10/29/2023   Allergen Reaction Noted    Iodine Other (See Comments) 11/09/2020    Oxycodone-acetaminophen Itching 01/08/2014    Zofran [ondansetron] Hives 05/08/2023       Labs    Results from last 7 days   Lab Units 10/29/23  1259   BUN mg/dL 30*   CREATININE mg/dL 2.36*       Results from last 7 days   Lab Units 10/29/23  1259   WBC 10*3/mm3 28.11*       Evaluation of Dosing     Last Dose Received in the ED/Outside Facility: 2000mg  Is Patient on Dialysis or Renal Replacement: no    Ht - 177.8 cm (70\")  Wt - 103 kg (227 lb 14.4 oz)    Estimated Creatinine Clearance: 34.5 " mL/min (A) (by C-G formula based on SCr of 2.36 mg/dL (H)).    Intake & Output (last 3 days)         10/27 0701  10/28 0700 10/28 0701  10/29 0700 10/29 0701  10/30 0700    I.V. (mL/kg)   500 (4.9)    Total Intake(mL/kg)   500 (4.9)    Urine (mL/kg/hr)   375    Total Output   375    Net   +125                   Microbiology and Radiology  Microbiology Results (last 10 days)       Procedure Component Value - Date/Time    Respiratory Panel PCR w/COVID-19(SARS-CoV-2) RAY/LATOSHA/MIRIAN/PAD/COR/MAD/MICHAEL In-House, NP Swab in UTM/VTM, 3-4 HR TAT - Swab, Nasopharynx [[id number]]  (Abnormal) Collected: 10/29/23 1444    Lab Status: Final result Specimen: Swab from Nasopharynx Updated: 10/29/23 1647     ADENOVIRUS, PCR Not Detected     Coronavirus 229E Not Detected     Coronavirus HKU1 Not Detected     Coronavirus NL63 Not Detected     Coronavirus OC43 Not Detected     COVID19 Not Detected     Human Metapneumovirus Not Detected     Human Rhinovirus/Enterovirus Detected     Influenza A PCR Not Detected     Influenza B PCR Not Detected     Parainfluenza Virus 1 Not Detected     Parainfluenza Virus 2 Not Detected     Parainfluenza Virus 3 Not Detected     Parainfluenza Virus 4 Not Detected     RSV, PCR Not Detected     Bordetella pertussis pcr Not Detected     Bordetella parapertussis PCR Not Detected     Chlamydophila pneumoniae PCR Not Detected     Mycoplasma pneumo by PCR Not Detected    Narrative:      In the setting of a positive respiratory panel with a viral infection PLUS a negative procalcitonin without other underlying concern for bacterial infection, consider observing off antibiotics or discontinuation of antibiotics and continue supportive care. If the respiratory panel is positive for atypical bacterial infection (Bordetella pertussis, Chlamydophila pneumoniae, or Mycoplasma pneumoniae), consider antibiotic de-escalation to target atypical bacterial infection.            Vancomycin Levels:                         Assessment/Plan:  The patient will be started on a bolus of 20mg/kg for a dose of 2000mg . Given the patient's current renal status, will dose per random levels and obtain a random level with morning labs before ordering another dose.  Due to infection severity, will target a trough of 15-20.    Pharmacy will continue to follow the patient’s culture results and clinical progress daily.    Luis Schneider, NedraD

## 2023-10-30 NOTE — PROCEDURES
The following procedure was performed: CT PAD L kidney abscess.    Please see corresponding Radiology report for in detail procedural information. The Radiology report will be dictated shortly, if not done so already. Please see the IR RN note for the information regarding medicines administered if any, wang-procedural vitals and I/O information.

## 2023-10-30 NOTE — CASE MANAGEMENT/SOCIAL WORK
Discharge Planning Assessment  [facility]     Patient Name: Aldair Narvaez  MRN: [id number]  Today's Date: 10/30/2023    Admit Date: 10/29/2023    Plan: home   Discharge Needs Assessment       Row Name 10/30/23 0913       Living Environment    People in Home spouse    Current Living Arrangements home    Potentially Unsafe Housing Conditions none    In the past 12 months has the electric, gas, oil, or water company threatened to shut off services in your home? No    Primary Care Provided by self    Provides Primary Care For no one    Family Caregiver if Needed none    Quality of Family Relationships supportive;involved       Resource/Environmental Concerns    Transportation Concerns none       Food Insecurity    Within the past 12 months, you worried that your food would run out before you got the money to buy more. Never true    Within the past 12 months, the food you bought just didn't last and you didn't have money to get more. Never true       Transition Planning    Patient/Family Anticipates Transition to home with family    Patient/Family Anticipated Services at Transition none    Transportation Anticipated family or friend will provide       Discharge Needs Assessment    Readmission Within the Last 30 Days no previous admission in last 30 days    Equipment Currently Used at Home cane, kalyan;none    Concerns to be Addressed no discharge needs identified;discharge planning    Equipment Needed After Discharge none      Row Name 10/30/23 0902       Living Environment    People in Home spouse    Current Living Arrangements home    Potentially Unsafe Housing Conditions none    In the past 12 months has the electric, gas, oil, or water company threatened to shut off services in your home? No    Primary Care Provided by self    Provides Primary Care For no one    Family Caregiver if Needed none    Quality of Family Relationships supportive                   Discharge Plan       Row Name 10/30/23 0914       Plan     Plan home    Patient/Family in Agreement with Plan yes    Plan Comments Met with Mr. Narvaez and his wife at the bedside to initiate discharge plan. Mr. Narvaez shares a home in [place] with his wife. He is independent with activities of daily living. He denies the use of DME and is not current with home health or outpatient services at this time. His primary care provider si Dr. Miguel Angel Mireles. He denies problems with accessing medical care or obtaining medications. His current plan is home with his wife at discharge, and wife will transport. No discharge needs were identified today.  will continue to follow plan of care and assist with discharge planning needs as indicated.    Final Discharge Disposition Code 01 - home or self-care                  Continued Care and Services - Admitted Since 10/29/2023    Coordination has not been started for this encounter.          Demographic Summary       Row Name 10/30/23 0902       General Information    Admission Type inpatient    Arrived From home    Referral Source admission list    Reason for Consult discharge planning    Preferred Language English       Contact Information    Permission Granted to Share Info With     Contact Information Obtained for                    Functional Status       Row Name 10/30/23 0902       Functional Status    Usual Activity Tolerance good    Current Activity Tolerance other (see comments)  wilda       Physical Activity    On average, how many days per week do you engage in moderate to strenuous exercise (like a brisk walk)? 3 days    On average, how many minutes do you engage in exercise at this level? 30 min    Number of minutes of exercise per week 90       Assessment of Health Literacy    How often do you have someone help you read hospital materials? Never    How often do you have problems learning about your medical condition because of difficulty understanding written information? Never    How often do  you have a problem understanding what is told to you about your medical condition? Never    How confident are you filling out medical forms by yourself? Extremely    Health Literacy Good       Functional Status, IADL    Medications independent    Meal Preparation independent    Housekeeping independent    Laundry independent    Shopping independent       Mental Status    General Appearance WDL WDL       Mental Status Summary    Recent Changes in Mental Status/Cognitive Functioning unable to assess                   Psychosocial    No documentation.                  Abuse/Neglect    No documentation.                  Legal    No documentation.                  Substance Abuse    No documentation.                  Patient Forms    No documentation.                     Ree Hidalgo RN

## 2023-10-30 NOTE — H&P (VIEW-ONLY)
[facility] Consult Note     Date of Consult: 10/29/2023     Patient Name: Aldair Narvaez  MRN: [id number]   : 1950     Referring Provider: * No referring provider recorded for this case *    Care Team: Patient Care Team:  Miguel Angel Mireles MD as PCP - General (Family Medicine)  Steve Cohen MD as Consulting Physician (Endocrinology)     Reason for Hospitalization: weakness, fall    History of Present Illness: Was contacted for hospital consultation on Aldair Narvaez a 73 y.o. male who presents to the hospital for weakness and fall. PMH notable for PrCa, recurrent nephrolithiasis, L ureteral stricture, and urinary incontinence. He is s/p L ureteroscopy with ureteral balloon dilation in 2023; previously managed with indwelling stent.  The patient's indwelling ureteral stent was removed on 2023.  Renal ultrasound 2023 demonstrated no obvious hydronephrosis bilaterally.  His creatinine was stable at 0.92 in late July.    In addition, I took the patient back to the operating room 2023 for right ureteroscopy and laser lithotripsy of a right kidney stone.    Urology consulted overnight for flank pain, fever to 103 F, NEETA, and newly diagnosed left renal abscess on CT. This AM he is laying comfortably in bed with CPAP in place; reports no acute concerns.     I reviewed the patient's CT scan which demonstrates a 3.5+ centimeter left renal abscess with gas and fluid consistent with abscess.  He has moderate left-sided hydro ureteral nephrosis to the level of the distal ureter concerning for left ureteral stricture recurrence.    He has elevated LFTs and normal bilirubin, creatinine has improved to 1.29 but was more than 2.3 on admission.  White blood cell count 14.5.  He has been hypotensive.  He has been febrile to 103 Fahrenheit.    The patient is tachycardic on examination at 110s.    Prior to being found down at home, he was reporting a 10-day history of upper respiratory  infection symptoms and feeling unwell.  He states he has had a strong urinary stream with no obvious concerns.  He denies significant flank pain.    Urinalysis with blood, leukocytes, nitrite negative, 3+ bacteria noted.    Blood cultures are positive for E. coli, urine cultures are positive as well.    Subjective      Pertinent items are noted in HPI.     Past Medical History:   Past Medical History:   Diagnosis Date    Acid reflux     Arthritis     Benign hypertension     Colon polyp     Concern about heart attack without diagnosis     Coronary artery disease     Coronary atherosclerosis     Heart disease     Hyperlipidemia     Hypertension     Kidney stone     Mixed hyperlipidemia     Obesity     body mass index 30+-obesity    Prostate cancer     Sleep apnea     Type 2 diabetes mellitus     Type 2 diabetes mellitus        Past Surgical History:   Past Surgical History:   Procedure Laterality Date    CARDIAC CATHETERIZATION      CARDIAC SURGERY N/A     [place]    CARPAL TUNNEL RELEASE      CORONARY ARTERY BYPASS GRAFT  08/30/2022    CYSTOSCOPY BLADDER STONE LITHOTRIPSY      CYSTOSCOPY BLADDER STONE LITHOTRIPSY      CYSTOSCOPY W/ URETERAL STENT PLACEMENT Left 05/09/2023    Procedure: CYSTOSCOPY LEFT RETROGRADE PYELOGRAM AND LEFT URETERAL STENT PLACEMENT;  Surgeon: Isma Justin MD;  Location: [facility];  Service: Urology;  Laterality: Left;    FETAL SURGERY FOR CONGENITAL HERNIA      INGUINAL HERNIA REPAIR      KIDNEY STONE SURGERY      KNEE ARTHROSCOPY      PROSTATECTOMY      TRIGGER FINGER RELEASE      UMBILICAL HERNIA REPAIR      UVULOPALATOPHARYNGOPLASTY         Family History:   Family History   Problem Relation Age of Onset    Heart attack Father     Heart disease Father     Diabetes Sister     Hyperlipidemia Sister     Hypertension Sister        Social History:   Social History     Socioeconomic History    Marital status:    Tobacco Use    Smoking status: Never    Smokeless tobacco:  Never   Vaping Use    Vaping Use: Never used   Substance and Sexual Activity    Alcohol use: Not Currently     Alcohol/week: 1.0 standard drink of alcohol     Types: 1 Drinks containing 0.5 oz of alcohol per week    Drug use: Never    Sexual activity: Defer       Medications:     Current Facility-Administered Medications:     acetaminophen (TYLENOL) tablet 650 mg, 650 mg, Oral, Q6H PRN, Brenda Chase APRN, 650 mg at 10/29/23 2256    dextrose (D50W) (25 g/50 mL) IV injection 25 g, 25 g, Intravenous, Q15 Min PRN, Dorothea Carrizales MD    dextrose (GLUTOSE) oral gel 15 g, 15 g, Oral, Q15 Min PRN, Dorothea Carrizales MD    glucagon (GLUCAGEN) injection 1 mg, 1 mg, Intramuscular, Q15 Min PRN, Dorothea Carrizales MD    HYDROcodone-acetaminophen (NORCO) 5-325 MG per tablet 1 tablet, 1 tablet, Oral, Q4H PRN, Dorothea Carrizaels MD, 1 tablet at 10/29/23 2135    Insulin Lispro (humaLOG) injection 2-7 Units, 2-7 Units, Subcutaneous, Q4H, Dorothea Carrizales MD, 2 Units at 10/30/23 0009    ipratropium-albuterol (DUO-NEB) nebulizer solution 3 mL, 3 mL, Nebulization, Q6H PRN, Dorothea Carrizales MD    meropenem (MERREM) 1,000 mg in sodium chloride 0.9 % 100 mL IVPB, 1,000 mg, Intravenous, Q12H, Janie Christianson,     Pharmacy to dose vancomycin, , Does not apply, Continuous PRN, Dorothea Carrizales MD    sodium chloride 0.9 % flush 10 mL, 10 mL, Intravenous, PRN, Titus Weston MD    tamsulosin (FLOMAX) 24 hr capsule 0.4 mg, 0.4 mg, Oral, Daily, Dorothea Carrizales MD    vancomycin IVPB 1500 mg in 0.9% NaCl (Premix) 500 mL, 15 mg/kg, Intravenous, Q24H, Yamileth Coleman, PharmD    Allergies:   Allergies   Allergen Reactions    Iodine Other (See Comments)     Closes sinuses    Oxycodone-Acetaminophen Itching    Zofran [Ondansetron] Hives       Problem:     UTI (urinary tract infection)    Mixed hyperlipidemia    Uncontrolled type 2 diabetes mellitus with hyperglycemia    CAD, multiple vessel    Personal history of prostate cancer    NEETA  (acute kidney injury)    Rhabdomyolysis    Right arm weakness       Review of Systems:   Review of Systems   Constitutional:  Positive for fever.   HENT: Negative.     Eyes: Negative.    Respiratory:          CPAP at night   Cardiovascular: Negative.    Gastrointestinal: Negative.    Endocrine: Negative.    Genitourinary:  Positive for flank pain.   Skin: Negative.    Allergic/Immunologic: Negative.    Neurological: Negative.    Hematological: Negative.    Psychiatric/Behavioral: Negative.           Objective     Physical Exam:  Vital Signs:   Temp:  [97.6 °F (36.4 °C)-103.2 °F (39.6 °C)] 97.6 °F (36.4 °C)  Heart Rate:  [] 84  Resp:  [16-32] 20  BP: ()/(54-93) 95/63  103 kg (227 lb 14.4 oz)  Body mass index is 32.7 kg/m².       Constitutional: Awake, alert    Eyes: PERRLA, sclerae anicteric, no conjunctival injection   HENT: Normocephalic, atraumatic, mucous membranes moist   Neck: trachea midline   Respiratory: Equal chest rise, non-labored respirations    Cardiovascular: well-perfused, tachycardic 110   Gastrointestinal: Soft, nontender, non-distended   Musculoskeletal: No bilateral ankle edema, no clubbing or cyanosis to extremities   Genitourinary: Left CVA tenderness mild, Farooq catheter in place draining clear yellow urine   Psychiatric: Appropriate affect, cooperative   Neurologic: Oriented x 3, Cranial Nerves grossly intact, speech clear   Skin: No rashes       Urine   I/O last 3 completed shifts:  In: 500 [I.V.:500]  Out: 1025 [Urine:1025]    Labs  Lab Results   Component Value Date    GLUCOSE 140 (H) 10/30/2023    CALCIUM 7.5 (L) 10/30/2023     10/30/2023    K 3.6 10/30/2023    CO2 20.0 (L) 10/30/2023     10/30/2023    BUN 23 10/30/2023    CREATININE 1.29 (H) 10/30/2023    EGFRIFNONA 96 02/17/2021    BCR 17.8 10/30/2023    ANIONGAP 12.0 10/30/2023       Lab Results   Component Value Date    WBC 14.56 (H) 10/30/2023    HGB 13.0 10/30/2023    HCT 39.7 10/30/2023    MCV 88.4 10/30/2023  "   PLT 88 (L) 10/30/2023       No results found for: \"URINECX\"    Brief Urine Lab Results  (Last result in the past 365 days)        Color   Clarity   Blood   Leuk Est   Nitrite   Protein   CREAT   Urine HCG        10/29/23 1445 Yellow   Cloudy   Large (3+)   Small (1+)   Negative   100 mg/dL (2+)                   Radiographic Studies  CT Abdomen Pelvis Without Contrast    Result Date: 10/29/2023  Impression: 1.Left upper pole cyst appears increased in size compared to May 2023 and now contains irregular gaseous lucency with some new mild surrounding fat stranding. Findings are suspicious for infected renal cyst with gas-forming infection. Also recommend correlation recent surgery as this could also be related to recent intervention. 2.Mild left hydronephrosis and hydroureter appears similar to the prior exam. The distal left ureter is nonopacified and nondilated. No clear obstructing etiology is identified. 3.Excreted contrast from prior CTA in the renal collecting systems, ureters, and bladder. This limits assessment for urinary tract calculi. 4.Cholelithiasis. 5.Calcific atherosclerosis. 6.Postoperative changes in the pelvis with evidence of prostatectomy. Electronically Signed: Larry Hoff  10/29/2023 8:16 PM EDT  Workstation ID: [id number]    MRI Brain Without Contrast    Result Date: 10/29/2023  Impression: 1.No definite findings of acute intracranial abnormality at this time. 2.Mild T2 and FLAIR hyperintense signal foci in the cerebral white matter are nonspecific but likely represent mild chronic small vessel ischemic changes. 3.Mucosal thickening of the paranasal sinuses could indicate acute sinusitis. Electronically Signed: Larry Hoff  10/29/2023 4:10 PM EDT  Workstation ID: [id number]    XR Shoulder 2+ View Right    Result Date: 10/29/2023  1.No definite radiographic findings of acute osseous shoulder abnormality. 2.Glenohumeral joint is not well evaluated due to positioning. There is suspected mild to " moderate osteoarthritis. 3.Moderate degenerative changes at the acromioclavicular joint. Electronically Signed: Larry Luis Eduardo  10/29/2023 2:16 PM EDT  Workstation ID: [id number]    XR Chest 1 View    Result Date: 10/29/2023  1.Prominent interstitial markings and mild left basilar opacities which could represent mild edema, pneumonia, or chronic lung disease. 2.Mild cardiomegaly. Status post median sternotomy and CABG. Electronically Signed: Larry Luis Eduardo  10/29/2023 2:00 PM EDT  Workstation ID: [id number]    CT Angiogram Head w AI Analysis of LVO    Result Date: 10/29/2023  Impression: 1.No definite findings of acute intracranial large vessel occlusion. 2.No significant stenosis of greater than 50% is seen at the bilateral internal carotid arteries at this time. Electronically Signed: Larry Luis Eduardo  10/29/2023 1:49 PM EDT  Workstation ID: [id number]    CT Angiogram Neck    Result Date: 10/29/2023  Impression: 1.No definite findings of acute intracranial large vessel occlusion. 2.No significant stenosis of greater than 50% is seen at the bilateral internal carotid arteries at this time. Electronically Signed: Larry Luis Eduardo  10/29/2023 1:49 PM EDT  Workstation ID: [id number]    CT CEREBRAL PERFUSION WITH & WITHOUT CONTRAST    Result Date: 10/29/2023  No CT perfusion evidence of acute infarction or ischemia. Electronically Signed: Larry Luis Eduardo  10/29/2023 1:35 PM EDT  Workstation ID: [id number]    CT Head Without Contrast Stroke Protocol    Result Date: 10/29/2023  1.No definite CT findings of acute intracranial abnormality. Probable mild chronic small vessel ischemic changes. 2.Paranasal sinus mucosal thickening which could indicate sinusitis. Electronically Signed: Larry Luis Eduardo  10/29/2023 1:18 PM EDT  Workstation ID: [id number]    US Renal Bilateral    Result Date: 10/17/2023  1. Limited study due to body habitus and overlying bowel gas. 2. Minimal hydronephrosis left kidney. 3. Septated cystic structure left kidney incompletely  evaluated. Correlation with outside evaluation if performed recommended. Otherwise, a follow-up can always be considered with dedicated CT or MRI utilizing a renal mass protocol. Alternatively, a repeat short-term follow-up ultrasound. 4. Debris within the bladder. Please correlate with urinalysis Electronically Signed: Luan Lopez MD  10/17/2023 1:20 PM EDT  Workstation ID: [id number]     Results Review:   I reviewed the patient's new clinical results.     Imaging Results (Last 72 Hours)       Procedure Component Value Units Date/Time    CT Abdomen Pelvis Without Contrast [[id number]] Collected: 10/29/23 2001     Updated: 10/29/23 2020    Narrative:      CT ABDOMEN PELVIS WO CONTRAST    Date of Exam: 10/29/2023 7:17 PM EDT    Indication: Flank pain, kidney stone suspected  septic with UTI HO obstruction.    Comparison: May 8, 2023. Renal ultrasound October 17, 2023.    Technique: Axial CT images were obtained of the abdomen and pelvis without the administration of contrast. Reconstructed coronal and sagittal images were also obtained. Automated exposure control and iterative construction methods were used.      Findings:  There is suspected dependent atelectasis in the lower lobes. Status post median sternotomy. There is calcific atherosclerosis of the aorta. No significant pleural or pericardial effusion is seen. Heart size appears within normal limits.    There is excreted contrast in the renal collecting systems, ureters, and bladder from CT angiography performed the same day. Contrast does limit assessment for urinary tract calculi.    There is mild left hydronephrosis and hydroureter which appears similar to the prior exam. The distal left ureter is nonopacified and nondilated. No clear obstructing etiology is identified. There is a hypodense lesion at the anterior upper pole of the   left kidney which was present on the prior exam suggestive of a renal cyst, but on the current exam, the lesion measures up  to approximately 3.7 cm, previously 2.9 cm, and there is now an irregular gaseous lucency within the lesion. There is some   surrounding fat stranding which appears mildly increased. These findings are concerning for superimposed gas forming infection within a renal cyst.    Probable right renal cyst, as before. No definite new right renal abnormality. Right ureter appears patent. No definite right hydronephrosis.    The bladder is distended with contrast. No suspicious focal bladder abnormality is seen. No significant bladder wall thickening. Multiple surgical clips are seen in the pelvis. There has been prostatectomy.    Hyperdensity within the gallbladder suggesting cholelithiasis, as before. No suspicious hepatic abnormality is seen. No definite splenomegaly. No suspicious adrenal lesion. No acute pancreatic abnormality. There is atherosclerosis of the aorta. No   definite lymphadenopathy. No acute gastric abnormality. No small bowel dilatation. There is diverticulosis of the colon. No definite acute colonic or rectal abnormality is seen. No significant pelvic lymphadenopathy. There is midline abdominal scarring.    There are degenerative changes in the bilateral hips and in the thoracolumbar spine.      Impression:      Impression:  1.Left upper pole cyst appears increased in size compared to May 2023 and now contains irregular gaseous lucency with some new mild surrounding fat stranding. Findings are suspicious for infected renal cyst with gas-forming infection. Also recommend   correlation recent surgery as this could also be related to recent intervention.  2.Mild left hydronephrosis and hydroureter appears similar to the prior exam. The distal left ureter is nonopacified and nondilated. No clear obstructing etiology is identified.  3.Excreted contrast from prior CTA in the renal collecting systems, ureters, and bladder. This limits assessment for urinary tract calculi.  4.Cholelithiasis.  5.Calcific  atherosclerosis.  6.Postoperative changes in the pelvis with evidence of prostatectomy.        Electronically Signed: Larry Hoff    10/29/2023 8:16 PM EDT    Workstation ID: [id number]    MRI Brain Without Contrast [[id number]] Collected: 10/29/23 1605     Updated: 10/29/23 1613    Narrative:      MRI BRAIN WO CONTRAST    Date of Exam: 10/29/2023 3:30 PM EDT    Indication: Stroke, follow up.     Comparison: CT head, CT perfusion, and CTA head and neck performed the same day    Technique:  Routine multiplanar/multisequence sequence images of the brain were obtained without contrast administration.      Findings:  No midline shift. The ventricles and sulci appear within normal limits for patient's age. Basal cisterns appear patent. The pituitary gland appears to have normal morphology. Cerebellar tonsils appear normally positioned.    No definite extra-axial collection and no definite findings of acute or chronic hemorrhage. No mass lesion, mass effect, or edema is identified. Mild T2 and FLAIR hyperintense signal foci are seen in the cerebral white matter. No definite restricted   diffusion is identified at this time.    There is mucosal thickening of the paranasal sinuses. Mastoid air cells appear clear. Globes and orbits appear unremarkable. No definite calvarial abnormality is identified.        Impression:      Impression:  1.No definite findings of acute intracranial abnormality at this time.  2.Mild T2 and FLAIR hyperintense signal foci in the cerebral white matter are nonspecific but likely represent mild chronic small vessel ischemic changes.  3.Mucosal thickening of the paranasal sinuses could indicate acute sinusitis.        Electronically Signed: Larry Hoff    10/29/2023 4:10 PM EDT    Workstation ID: [id number]    XR Shoulder 2+ View Right [[id number]] Collected: 10/29/23 1414     Updated: 10/29/23 1419    Narrative:        XR SHOULDER 2+ VW RIGHT    Date of Exam: 10/29/2023 1:57 PM EDT    Indication:  Trauma    Comparison: None available.    FINDINGS:    No definite acute fracture or malalignment is seen. Glenohumeral joint is not well evaluated due to positioning. There is suspected osteophyte formation with probable mild to moderate joint space narrowing. There appear to be moderate degenerative   changes at the acromioclavicular joint. Calcifications projecting along the posterior humeral head may represent calcific tendinopathy. No other definite soft tissue abnormality.      Impression:      1.No definite radiographic findings of acute osseous shoulder abnormality.  2.Glenohumeral joint is not well evaluated due to positioning. There is suspected mild to moderate osteoarthritis.  3.Moderate degenerative changes at the acromioclavicular joint.        Electronically Signed: Larry Hoff    10/29/2023 2:16 PM EDT    Workstation ID: [id number]    XR Chest 1 View [[id number]] Collected: 10/29/23 1359     Updated: 10/29/23 1403    Narrative:        XR CHEST 1 VW    Date of Exam: 10/29/2023 1:47 PM EDT    Indication: Acute Stroke Protocol (onset < 12 hrs)    Comparison: None available.    FINDINGS:  There are prominent interstitial markings and mild left basilar opacities. No significant focal consolidation. No pneumothorax or significant pleural effusion. The heart appears mildly enlarged. Status post median sternotomy and CABG. Mediastinal contour   appears within normal limits.      Impression:      1.Prominent interstitial markings and mild left basilar opacities which could represent mild edema, pneumonia, or chronic lung disease.  2.Mild cardiomegaly. Status post median sternotomy and CABG.        Electronically Signed: Larry Hoff    10/29/2023 2:00 PM EDT    Workstation ID: [id number]    CT Angiogram Head w AI Analysis of LVO [[id number]] Collected: 10/29/23 1335     Updated: 10/29/23 1352    Narrative:      CT ANGIOGRAM HEAD W AI ANALYSIS OF LVO, CT ANGIOGRAM NECK    Date of Exam: 10/29/2023 1:13 PM  EDT    Indication: Neuro deficit, acute stroke suspected  Neuro deficit, acute stroke suspected.    Comparison: CT head and CT perfusion performed the same day.    Technique: CTA of the head and neck was performed after the uneventful intravenous administration of iodinated contrast. Reconstructed coronal and sagittal images were also obtained. In addition, a 3-D volume rendered image was created for   interpretation. Automated exposure control and iterative reconstruction methods were used.     Findings:  Head: The vertebral and basilar arteries appear somewhat diminutive. There is fetal origin of the right posterior cerebral artery and partial fetal origin on the left. The posterior cerebral arteries appear grossly symmetric. No definite proximal   occlusion or definite high-grade stenosis.    There is calcific atherosclerosis of the cavernous portions of the internal carotid arteries. No high-grade stenosis is seen. The middle cerebral arteries appear to opacify as expected. There is a small right A1 segment of the anterior cerebral artery,   likely a normal variant. There is a patent anterior communicating artery. The anterior cerebral arteries are patent distally and grossly symmetric. No definite focal occlusion or high-grade stenosis is identified in the bilateral M1 and M2 segments of   the middle cerebral arteries. No other definite acute intracranial vascular abnormality is seen.    Neck: Status post median sternotomy and CABG. The visualized central pulmonary arteries appear to opacify as expected. Visualized aortic arch appears within normal limits. There is bovine type branching anatomy with common origin of the right   brachiocephalic and left common carotid arteries. The left common carotid and external carotid arteries are patent. There is minimal atherosclerosis at the carotid bifurcation. There is tortuosity of the internal carotid artery. No stenosis of greater   than 50% is identified by NASCET  criteria.    Right brachiocephalic artery appears patent. Common carotid and external carotid arteries are patent. There is also tortuosity of the right internal carotid artery without stenosis of greater than 50% by NASCET criteria.    The subclavian arteries appear patent. The vertebral arteries appear patent. The vertebral arteries appear somewhat diminutive, likely normal variant as there appears to be fetal origin of the posterior cerebral arteries.    No other definite acute vascular abnormality is seen.    Nonvascular: No acute intracranial abnormality is identified. There is paranasal sinus mucosal thickening. No acute infiltrate is seen in the lung apices. 7 mm hypodensity is seen in the right lobe of thyroid.    There is mild to moderate multilevel disc and facet joint degeneration in the cervical spine.      Impression:      Impression:  1.No definite findings of acute intracranial large vessel occlusion.  2.No significant stenosis of greater than 50% is seen at the bilateral internal carotid arteries at this time.       Electronically Signed: Larry Hoff    10/29/2023 1:49 PM EDT    Workstation ID: [id number]    CT Angiogram Neck [[id number]] Collected: 10/29/23 1335     Updated: 10/29/23 1352    Narrative:      CT ANGIOGRAM HEAD W AI ANALYSIS OF LVO, CT ANGIOGRAM NECK    Date of Exam: 10/29/2023 1:13 PM EDT    Indication: Neuro deficit, acute stroke suspected  Neuro deficit, acute stroke suspected.    Comparison: CT head and CT perfusion performed the same day.    Technique: CTA of the head and neck was performed after the uneventful intravenous administration of iodinated contrast. Reconstructed coronal and sagittal images were also obtained. In addition, a 3-D volume rendered image was created for   interpretation. Automated exposure control and iterative reconstruction methods were used.     Findings:  Head: The vertebral and basilar arteries appear somewhat diminutive. There is fetal origin of the  right posterior cerebral artery and partial fetal origin on the left. The posterior cerebral arteries appear grossly symmetric. No definite proximal   occlusion or definite high-grade stenosis.    There is calcific atherosclerosis of the cavernous portions of the internal carotid arteries. No high-grade stenosis is seen. The middle cerebral arteries appear to opacify as expected. There is a small right A1 segment of the anterior cerebral artery,   likely a normal variant. There is a patent anterior communicating artery. The anterior cerebral arteries are patent distally and grossly symmetric. No definite focal occlusion or high-grade stenosis is identified in the bilateral M1 and M2 segments of   the middle cerebral arteries. No other definite acute intracranial vascular abnormality is seen.    Neck: Status post median sternotomy and CABG. The visualized central pulmonary arteries appear to opacify as expected. Visualized aortic arch appears within normal limits. There is bovine type branching anatomy with common origin of the right   brachiocephalic and left common carotid arteries. The left common carotid and external carotid arteries are patent. There is minimal atherosclerosis at the carotid bifurcation. There is tortuosity of the internal carotid artery. No stenosis of greater   than 50% is identified by NASCET criteria.    Right brachiocephalic artery appears patent. Common carotid and external carotid arteries are patent. There is also tortuosity of the right internal carotid artery without stenosis of greater than 50% by NASCET criteria.    The subclavian arteries appear patent. The vertebral arteries appear patent. The vertebral arteries appear somewhat diminutive, likely normal variant as there appears to be fetal origin of the posterior cerebral arteries.    No other definite acute vascular abnormality is seen.    Nonvascular: No acute intracranial abnormality is identified. There is paranasal sinus  mucosal thickening. No acute infiltrate is seen in the lung apices. 7 mm hypodensity is seen in the right lobe of thyroid.    There is mild to moderate multilevel disc and facet joint degeneration in the cervical spine.      Impression:      Impression:  1.No definite findings of acute intracranial large vessel occlusion.  2.No significant stenosis of greater than 50% is seen at the bilateral internal carotid arteries at this time.       Electronically Signed: Larry Hoff    10/29/2023 1:49 PM EDT    Workstation ID: [id number]    CT CEREBRAL PERFUSION WITH & WITHOUT CONTRAST [[id number]] Collected: 10/29/23 1332     Updated: 10/29/23 1338    Narrative:      CT CEREBRAL PERFUSION W WO CONTRAST    Date of Exam: 10/29/2023 1:13 PM EDT    Indication: Neuro deficit, acute stroke suspected.     Comparison: CT head performed the same day.    Technique: Axial CT images of the brain were obtained prior to and after the administration of 115 mL Isovue-370. Core blood volume, core blood flow, mean transit time, and Tmax images were obtained utilizing the Rapid software protocol. A limited CT   angiogram of the head was also performed to measure the blood vessel density.    The radiation dose reduction device was turned on for each scan per the ALARA (As Low as Reasonably Achievable) protocol.        FINDINGS:     CT Perfusion:  CBF (<30%) volume: 0 mL  Tmax (>6.0s) volume: 0 mL  Mismatch volume: 0 mL  Mismatch ratio: None    No definite perfusion abnormality is seen in the visualized parenchyma to indicate ischemia or infarct.      Impression:      No CT perfusion evidence of acute infarction or ischemia.        Electronically Signed: Larry Hoff    10/29/2023 1:35 PM EDT    Workstation ID: [id number]    CT Head Without Contrast Stroke Protocol [[id number]] Collected: 10/29/23 1314     Updated: 10/29/23 1321    Narrative:      CT HEAD WO CONTRAST STROKE PROTOCOL    Date of Exam: 10/29/2023 1:03 PM EDT    Indication: Neuro deficit,  acute, stroke suspected  Neuro deficit, acute stroke suspected.    Comparison: None available.    Technique: Axial CT images were obtained of the head without contrast administration.  Reconstructed coronal images were also obtained. Automated exposure control and iterative construction methods were used.    Scan Time: 1:11 p.m.  Results discussed with stroke team navigator at 1:17 p.m      FINDINGS:  No midline shift. Basal cisterns appear patent.  Ventricles and sulci appear within normal limits for patient age.    No extra-axial collection or acute hemorrhage is identified.  No definite mass lesion, edema, or significant mass effect is seen.  There is hypoattenuation in the white matter, which is nonspecific, but is most commonly seen with chronic small vessel   ischemic changes. No definite CT findings of acute infarction.    There is paranasal sinus mucosal thickening. Mastoid air cells appear clear.  No acute calvarial abnormality is identified.      Impression:      1.No definite CT findings of acute intracranial abnormality. Probable mild chronic small vessel ischemic changes.  2.Paranasal sinus mucosal thickening which could indicate sinusitis.        Electronically Signed: Larry Hoff    10/29/2023 1:18 PM EDT    Workstation ID: [id number]            Assessment / Plan      Assessment/Plan:    73yM with a history of PrCa, recurrent nephrolithiasis, L ureteral stricture, and urinary incontinence.  Underwent left ureteral stricture dilation and stent placement in June and July, stent was removed in July.  Postoperative renal ultrasound with no recurrence of hydronephrosis.  Status post right ureteroscopy lithotripsy in August, did well afterwards.      I saw him in clinic last 10/19/2023.  He was doing well at that time.  Urinalysis was clear.  Renal ultrasound 10/17/2023 demonstrating minimal hydronephrosis of left kidney.  We discussed the risk of stricture recurrence in the left kidney.    He has likely had  a recurrent left stricture with moderate hydronephrosis noted however this is confused by the fact that the patient's bladder was fairly distended which could result in left hydroureteronephrosis.  I think the most likely etiology of his left renal abscess is a recurrent left ureteral stricture with delayed drainage of his left kidney.  This will require a left ureteral stent while inpatient.  We will plan for retrograde pyelogram and left stent placement for complete urinary decompression.  In addition the patient will need a consultation with interventional radiology for IR guided left renal abscess drain for source control.      I recommend infectious disease consult for management of antibiotics.    Plan   -IR consultation for left renal abscess drain placement versus aspiration  -I will plan to take the patient to the operating room for cystoscopy and left ureteral stent placement tomorrow 10-31-23 AM  - hold anticoagulation  - IR procedure appears scheduled today  - NPO @ [facility] for urology procedure  -Continue broad-spectrum antibiotics  -Continue Farooq catheterization    I discussed the patients findings and my recommendations with the patient, I also spoke to the patient's daughter Toya over the phone.    Isma Justin MD    Original evaluation was performed by Kristan Pearce MD, however given patient acuity, a repeat evaluation performed by myself took place and addended documentation was performed by me.    I have reviewed the notes, assessments, and/or procedures performed with resident physician, Kristan Pearce MD, I concur with her/his documentation of Aldair Narvaez.

## 2023-10-30 NOTE — NURSING NOTE
Image guided abscess drain placed by Dr Fields. 8 [place] Locking drain to bulb suction placed; with 10 mls removed. Labs obtained. Sedation time of 19 minutes, 1 mg Versed given. Patient tolerated well. Report called to nurse. [phone]

## 2023-10-31 ENCOUNTER — APPOINTMENT (OUTPATIENT)
Dept: MRI IMAGING | Facility: HOSPITAL | Age: 73
End: 2023-10-31
Payer: MEDICARE

## 2023-10-31 ENCOUNTER — ANESTHESIA EVENT (OUTPATIENT)
Dept: PERIOP | Facility: HOSPITAL | Age: 73
End: 2023-10-31
Payer: MEDICARE

## 2023-10-31 ENCOUNTER — ANESTHESIA (OUTPATIENT)
Dept: PERIOP | Facility: HOSPITAL | Age: 73
End: 2023-10-31
Payer: MEDICARE

## 2023-10-31 ENCOUNTER — APPOINTMENT (OUTPATIENT)
Dept: GENERAL RADIOLOGY | Facility: HOSPITAL | Age: 73
End: 2023-10-31
Payer: MEDICARE

## 2023-10-31 LAB
BACTERIA SPEC AEROBE CULT: ABNORMAL
GLUCOSE BLDC GLUCOMTR-MCNC: 115 MG/DL (ref 70–130)
GLUCOSE BLDC GLUCOMTR-MCNC: 127 MG/DL (ref 70–130)
GLUCOSE BLDC GLUCOMTR-MCNC: 169 MG/DL (ref 70–130)
GLUCOSE BLDC GLUCOMTR-MCNC: 306 MG/DL (ref 70–130)
GLUCOSE BLDC GLUCOMTR-MCNC: 311 MG/DL (ref 70–130)

## 2023-10-31 PROCEDURE — 99233 SBSQ HOSP IP/OBS HIGH 50: CPT

## 2023-10-31 PROCEDURE — 25010000002 DEXAMETHASONE PER 1 MG: Performed by: NURSE ANESTHETIST, CERTIFIED REGISTERED

## 2023-10-31 PROCEDURE — 25010000002 MORPHINE PER 10 MG: Performed by: INTERNAL MEDICINE

## 2023-10-31 PROCEDURE — 72141 MRI NECK SPINE W/O DYE: CPT

## 2023-10-31 PROCEDURE — 25010000002 MEROPENEM PER 100 MG: Performed by: STUDENT IN AN ORGANIZED HEALTH CARE EDUCATION/TRAINING PROGRAM

## 2023-10-31 PROCEDURE — 52351 CYSTOURETERO & OR PYELOSCOPE: CPT | Performed by: STUDENT IN AN ORGANIZED HEALTH CARE EDUCATION/TRAINING PROGRAM

## 2023-10-31 PROCEDURE — 25510000001 IOPAMIDOL 61 % SOLUTION: Performed by: STUDENT IN AN ORGANIZED HEALTH CARE EDUCATION/TRAINING PROGRAM

## 2023-10-31 PROCEDURE — C1769 GUIDE WIRE: HCPCS | Performed by: STUDENT IN AN ORGANIZED HEALTH CARE EDUCATION/TRAINING PROGRAM

## 2023-10-31 PROCEDURE — 74420 UROGRAPHY RTRGR +-KUB: CPT | Performed by: STUDENT IN AN ORGANIZED HEALTH CARE EDUCATION/TRAINING PROGRAM

## 2023-10-31 PROCEDURE — 74420 UROGRAPHY RTRGR +-KUB: CPT

## 2023-10-31 PROCEDURE — 82948 REAGENT STRIP/BLOOD GLUCOSE: CPT

## 2023-10-31 PROCEDURE — 52332 CYSTOSCOPY AND TREATMENT: CPT | Performed by: STUDENT IN AN ORGANIZED HEALTH CARE EDUCATION/TRAINING PROGRAM

## 2023-10-31 PROCEDURE — 99232 SBSQ HOSP IP/OBS MODERATE 35: CPT | Performed by: STUDENT IN AN ORGANIZED HEALTH CARE EDUCATION/TRAINING PROGRAM

## 2023-10-31 PROCEDURE — 25010000002 PROPOFOL 10 MG/ML EMULSION: Performed by: NURSE ANESTHETIST, CERTIFIED REGISTERED

## 2023-10-31 PROCEDURE — 73221 MRI JOINT UPR EXTREM W/O DYE: CPT

## 2023-10-31 PROCEDURE — 25810000003 SODIUM CHLORIDE 0.9 % SOLUTION: Performed by: ANESTHESIOLOGY

## 2023-10-31 PROCEDURE — BT141ZZ FLUOROSCOPY OF KIDNEYS, URETERS AND BLADDER USING LOW OSMOLAR CONTRAST: ICD-10-PCS | Performed by: STUDENT IN AN ORGANIZED HEALTH CARE EDUCATION/TRAINING PROGRAM

## 2023-10-31 PROCEDURE — 0T778DZ DILATION OF LEFT URETER WITH INTRALUMINAL DEVICE, VIA NATURAL OR ARTIFICIAL OPENING ENDOSCOPIC: ICD-10-PCS | Performed by: STUDENT IN AN ORGANIZED HEALTH CARE EDUCATION/TRAINING PROGRAM

## 2023-10-31 PROCEDURE — 25010000002 FENTANYL CITRATE (PF) 100 MCG/2ML SOLUTION: Performed by: NURSE ANESTHETIST, CERTIFIED REGISTERED

## 2023-10-31 PROCEDURE — C2617 STENT, NON-COR, TEM W/O DEL: HCPCS | Performed by: STUDENT IN AN ORGANIZED HEALTH CARE EDUCATION/TRAINING PROGRAM

## 2023-10-31 PROCEDURE — 99232 SBSQ HOSP IP/OBS MODERATE 35: CPT | Performed by: INTERNAL MEDICINE

## 2023-10-31 PROCEDURE — 25010000002 MEROPENEM PER 100 MG

## 2023-10-31 PROCEDURE — 63710000001 INSULIN LISPRO (HUMAN) PER 5 UNITS: Performed by: STUDENT IN AN ORGANIZED HEALTH CARE EDUCATION/TRAINING PROGRAM

## 2023-10-31 PROCEDURE — 25810000003 SODIUM CHLORIDE 0.9 % SOLUTION: Performed by: INTERNAL MEDICINE

## 2023-10-31 DEVICE — URETERAL STENT WITH SIDE HOLES 6FX24CM
Type: IMPLANTABLE DEVICE | Site: URETER | Status: FUNCTIONAL
Brand: TRIA™ FIRM

## 2023-10-31 RX ORDER — PROMETHAZINE HYDROCHLORIDE 25 MG/1
25 SUPPOSITORY RECTAL ONCE AS NEEDED
Status: DISCONTINUED | OUTPATIENT
Start: 2023-10-31 | End: 2023-10-31 | Stop reason: HOSPADM

## 2023-10-31 RX ORDER — SODIUM CHLORIDE 0.9 % (FLUSH) 0.9 %
3 SYRINGE (ML) INJECTION EVERY 12 HOURS SCHEDULED
Status: DISCONTINUED | OUTPATIENT
Start: 2023-10-31 | End: 2023-10-31 | Stop reason: HOSPADM

## 2023-10-31 RX ORDER — MAGNESIUM HYDROXIDE 1200 MG/15ML
LIQUID ORAL AS NEEDED
Status: DISCONTINUED | OUTPATIENT
Start: 2023-10-31 | End: 2023-10-31 | Stop reason: HOSPADM

## 2023-10-31 RX ORDER — MORPHINE SULFATE 2 MG/ML
2 INJECTION, SOLUTION INTRAMUSCULAR; INTRAVENOUS ONCE
Status: COMPLETED | OUTPATIENT
Start: 2023-10-31 | End: 2023-10-31

## 2023-10-31 RX ORDER — DEXAMETHASONE SODIUM PHOSPHATE 4 MG/ML
INJECTION, SOLUTION INTRA-ARTICULAR; INTRALESIONAL; INTRAMUSCULAR; INTRAVENOUS; SOFT TISSUE AS NEEDED
Status: DISCONTINUED | OUTPATIENT
Start: 2023-10-31 | End: 2023-10-31 | Stop reason: SURG

## 2023-10-31 RX ORDER — HYDROMORPHONE HYDROCHLORIDE 1 MG/ML
0.5 INJECTION, SOLUTION INTRAMUSCULAR; INTRAVENOUS; SUBCUTANEOUS
Status: DISCONTINUED | OUTPATIENT
Start: 2023-10-31 | End: 2023-10-31 | Stop reason: HOSPADM

## 2023-10-31 RX ORDER — SODIUM CHLORIDE 0.9 % (FLUSH) 0.9 %
10 SYRINGE (ML) INJECTION EVERY 12 HOURS SCHEDULED
Status: DISCONTINUED | OUTPATIENT
Start: 2023-10-31 | End: 2023-10-31 | Stop reason: HOSPADM

## 2023-10-31 RX ORDER — HYDRALAZINE HYDROCHLORIDE 20 MG/ML
5 INJECTION INTRAMUSCULAR; INTRAVENOUS
Status: DISCONTINUED | OUTPATIENT
Start: 2023-10-31 | End: 2023-10-31 | Stop reason: HOSPADM

## 2023-10-31 RX ORDER — FENTANYL CITRATE 50 UG/ML
INJECTION, SOLUTION INTRAMUSCULAR; INTRAVENOUS AS NEEDED
Status: DISCONTINUED | OUTPATIENT
Start: 2023-10-31 | End: 2023-10-31 | Stop reason: SURG

## 2023-10-31 RX ORDER — MEPERIDINE HYDROCHLORIDE 25 MG/ML
12.5 INJECTION INTRAMUSCULAR; INTRAVENOUS; SUBCUTANEOUS
Status: DISCONTINUED | OUTPATIENT
Start: 2023-10-31 | End: 2023-10-31 | Stop reason: HOSPADM

## 2023-10-31 RX ORDER — SODIUM CHLORIDE 9 MG/ML
40 INJECTION, SOLUTION INTRAVENOUS AS NEEDED
Status: DISCONTINUED | OUTPATIENT
Start: 2023-10-31 | End: 2023-10-31 | Stop reason: HOSPADM

## 2023-10-31 RX ORDER — FAMOTIDINE 10 MG/ML
20 INJECTION, SOLUTION INTRAVENOUS
Status: COMPLETED | OUTPATIENT
Start: 2023-10-31 | End: 2023-10-31

## 2023-10-31 RX ORDER — SODIUM CHLORIDE 0.9 % (FLUSH) 0.9 %
10 SYRINGE (ML) INJECTION AS NEEDED
Status: DISCONTINUED | OUTPATIENT
Start: 2023-10-31 | End: 2023-10-31 | Stop reason: HOSPADM

## 2023-10-31 RX ORDER — FENTANYL CITRATE 50 UG/ML
50 INJECTION, SOLUTION INTRAMUSCULAR; INTRAVENOUS
Status: DISCONTINUED | OUTPATIENT
Start: 2023-10-31 | End: 2023-10-31 | Stop reason: HOSPADM

## 2023-10-31 RX ORDER — PROPOFOL 10 MG/ML
VIAL (ML) INTRAVENOUS AS NEEDED
Status: DISCONTINUED | OUTPATIENT
Start: 2023-10-31 | End: 2023-10-31 | Stop reason: SURG

## 2023-10-31 RX ORDER — DROPERIDOL 2.5 MG/ML
0.62 INJECTION, SOLUTION INTRAMUSCULAR; INTRAVENOUS
Status: DISCONTINUED | OUTPATIENT
Start: 2023-10-31 | End: 2023-10-31 | Stop reason: HOSPADM

## 2023-10-31 RX ORDER — GABAPENTIN 100 MG/1
100 CAPSULE ORAL 3 TIMES DAILY PRN
Status: DISCONTINUED | OUTPATIENT
Start: 2023-10-31 | End: 2023-11-07 | Stop reason: HOSPADM

## 2023-10-31 RX ORDER — LIDOCAINE HYDROCHLORIDE 10 MG/ML
INJECTION, SOLUTION EPIDURAL; INFILTRATION; INTRACAUDAL; PERINEURAL AS NEEDED
Status: DISCONTINUED | OUTPATIENT
Start: 2023-10-31 | End: 2023-10-31 | Stop reason: SURG

## 2023-10-31 RX ORDER — SODIUM CHLORIDE 9 MG/ML
9 INJECTION, SOLUTION INTRAVENOUS CONTINUOUS PRN
Status: DISCONTINUED | OUTPATIENT
Start: 2023-10-31 | End: 2023-11-07 | Stop reason: HOSPADM

## 2023-10-31 RX ORDER — IPRATROPIUM BROMIDE AND ALBUTEROL SULFATE 2.5; .5 MG/3ML; MG/3ML
3 SOLUTION RESPIRATORY (INHALATION) ONCE AS NEEDED
Status: DISCONTINUED | OUTPATIENT
Start: 2023-10-31 | End: 2023-10-31 | Stop reason: HOSPADM

## 2023-10-31 RX ORDER — SODIUM CHLORIDE 0.9 % (FLUSH) 0.9 %
3-10 SYRINGE (ML) INJECTION AS NEEDED
Status: DISCONTINUED | OUTPATIENT
Start: 2023-10-31 | End: 2023-10-31 | Stop reason: HOSPADM

## 2023-10-31 RX ORDER — PROMETHAZINE HYDROCHLORIDE 25 MG/1
25 TABLET ORAL ONCE AS NEEDED
Status: DISCONTINUED | OUTPATIENT
Start: 2023-10-31 | End: 2023-10-31 | Stop reason: HOSPADM

## 2023-10-31 RX ORDER — SODIUM CHLORIDE 9 MG/ML
100 INJECTION, SOLUTION INTRAVENOUS CONTINUOUS
Status: ACTIVE | OUTPATIENT
Start: 2023-10-31 | End: 2023-11-01

## 2023-10-31 RX ORDER — PHENYLEPHRINE HCL IN 0.9% NACL 1 MG/10 ML
SYRINGE (ML) INTRAVENOUS AS NEEDED
Status: DISCONTINUED | OUTPATIENT
Start: 2023-10-31 | End: 2023-10-31 | Stop reason: SURG

## 2023-10-31 RX ORDER — DROPERIDOL 2.5 MG/ML
0.62 INJECTION, SOLUTION INTRAMUSCULAR; INTRAVENOUS ONCE AS NEEDED
Status: DISCONTINUED | OUTPATIENT
Start: 2023-10-31 | End: 2023-10-31 | Stop reason: HOSPADM

## 2023-10-31 RX ORDER — LABETALOL HYDROCHLORIDE 5 MG/ML
5 INJECTION, SOLUTION INTRAVENOUS
Status: DISCONTINUED | OUTPATIENT
Start: 2023-10-31 | End: 2023-10-31 | Stop reason: HOSPADM

## 2023-10-31 RX ORDER — NALOXONE HCL 0.4 MG/ML
0.4 VIAL (ML) INJECTION AS NEEDED
Status: DISCONTINUED | OUTPATIENT
Start: 2023-10-31 | End: 2023-10-31 | Stop reason: HOSPADM

## 2023-10-31 RX ADMIN — FAMOTIDINE 20 MG: 10 INJECTION INTRAVENOUS at 06:40

## 2023-10-31 RX ADMIN — MORPHINE SULFATE 2 MG: 2 INJECTION, SOLUTION INTRAMUSCULAR; INTRAVENOUS at 05:11

## 2023-10-31 RX ADMIN — FENTANYL CITRATE 50 MCG: 50 INJECTION, SOLUTION INTRAMUSCULAR; INTRAVENOUS at 07:31

## 2023-10-31 RX ADMIN — PROPOFOL 100 MG: 10 INJECTION, EMULSION INTRAVENOUS at 07:31

## 2023-10-31 RX ADMIN — Medication 100 MCG: at 07:48

## 2023-10-31 RX ADMIN — MEROPENEM 1000 MG: 1 INJECTION, POWDER, FOR SOLUTION INTRAVENOUS at 13:00

## 2023-10-31 RX ADMIN — FENTANYL CITRATE 50 MCG: 50 INJECTION, SOLUTION INTRAMUSCULAR; INTRAVENOUS at 07:40

## 2023-10-31 RX ADMIN — Medication 200 MCG: at 07:58

## 2023-10-31 RX ADMIN — MEROPENEM 1000 MG: 1 INJECTION, POWDER, FOR SOLUTION INTRAVENOUS at 06:39

## 2023-10-31 RX ADMIN — PROPOFOL 50 MG: 10 INJECTION, EMULSION INTRAVENOUS at 07:36

## 2023-10-31 RX ADMIN — INSULIN LISPRO 5 UNITS: 100 INJECTION, SOLUTION INTRAVENOUS; SUBCUTANEOUS at 21:22

## 2023-10-31 RX ADMIN — DEXAMETHASONE SODIUM PHOSPHATE 4 MG: 4 INJECTION, SOLUTION INTRAMUSCULAR; INTRAVENOUS at 07:52

## 2023-10-31 RX ADMIN — Medication 100 MCG: at 08:07

## 2023-10-31 RX ADMIN — MEROPENEM 1000 MG: 1 INJECTION, POWDER, FOR SOLUTION INTRAVENOUS at 21:23

## 2023-10-31 RX ADMIN — LIDOCAINE HYDROCHLORIDE 50 MG: 10 INJECTION, SOLUTION EPIDURAL; INFILTRATION; INTRACAUDAL; PERINEURAL at 07:31

## 2023-10-31 RX ADMIN — INSULIN LISPRO 5 UNITS: 100 INJECTION, SOLUTION INTRAVENOUS; SUBCUTANEOUS at 17:47

## 2023-10-31 RX ADMIN — SODIUM CHLORIDE 9 ML/HR: 9 INJECTION, SOLUTION INTRAVENOUS at 06:38

## 2023-10-31 RX ADMIN — INSULIN LISPRO 2 UNITS: 100 INJECTION, SOLUTION INTRAVENOUS; SUBCUTANEOUS at 12:47

## 2023-10-31 RX ADMIN — SODIUM CHLORIDE 100 ML/HR: 9 INJECTION, SOLUTION INTRAVENOUS at 15:15

## 2023-10-31 NOTE — BRIEF OP NOTE
CYSTOSCOPY RETROGRADE PYELOGRAM  Progress Note    Aldair Narvaez  10/31/2023    Pre-op Diagnosis:   Hydronephrosis of left kidney [N13.30]  Acute pyelonephritis [N10]  Sepsis due to urinary tract infection [A41.9, N39.0]       Post-Op Diagnosis Codes:     * Hydronephrosis of left kidney [N13.30]     * Acute pyelonephritis [N10]     * Sepsis due to urinary tract infection [A41.9, N39.0]       Procedure(s):  CYSTOSCOPY   BILATERAL RETROGRADE PYELOGRAM   LEFT URETERAL STENT PLACEMENT    Surgeon(s):  Isma Justin MD    Anesthesia: General    Staff:   Circulator: Gill Moore RN  Scrub Person: Lynn Alfred RN       Estimated Blood Loss: none    Urine Voided: * No values recorded between 10/31/2023  7:25 AM and 10/31/2023  8:05 AM *    Specimens:                None          Drains:   Closed/Suction Drain 1 Left Back Bulb 8 Fr. (Active)   Site Description Unable to view 10/31/23 0400   Dressing Status Clean;Dry;Intact 10/31/23 0400   Drainage Appearance Bloody 10/31/23 0400   Status To bulb suction 10/31/23 0400       Urethral Catheter Silicone 16 Fr. (Active)       [REMOVED] Urethral Catheter Coude 16 Fr. (Removed)   Daily Indications Acute Urinary Retention 10/31/23 0400   Site Assessment Clean;Skin intact 10/31/23 0400   Collection Container Standard drainage bag 10/31/23 0400   Securement Method Securing device 10/31/23 0400   Catheter care complete Yes 10/30/23 1130   Output (mL) 550 mL 10/31/23 0439       Findings: Left mild hydronephrosis noted, distal ureteral stenosis has recurred a few cm above ureterovesical junction. Left 6 Fr x 24 cm Tria stent. Right retrograde with no hydro, drains well. 16 Fr villarreal replaced.       Complications: None          Isma Justin MD     Date: 10/31/2023  Time: 08:13 EDT

## 2023-10-31 NOTE — ANESTHESIA PROCEDURE NOTES
Airway  Urgency: elective    Date/Time: 10/31/2023 7:34 AM  Airway not difficult    General Information and Staff    Patient location during procedure: OR  CRNA/CAA: Mónica Colbert CRNA    Indications and Patient Condition  Indications for airway management: airway protection    Preoxygenated: yes  Mask difficulty assessment: 0 - not attempted    Final Airway Details  Final airway type: supraglottic airway      Successful airway: I-gel  Size 4     Number of attempts at approach: 1  Assessment: lips, teeth, and gum same as pre-op    Additional Comments  LMA placed without difficulty, ventilation with assist, equal breath sounds and symmetric chest rise and fall

## 2023-10-31 NOTE — ANESTHESIA PREPROCEDURE EVALUATION
Anesthesia Evaluation     Patient summary reviewed and Nursing notes reviewed   NPO Solid Status: > 8 hours  NPO Liquid Status: > 2 hours           Airway   Mallampati: I  TM distance: >3 FB  No difficulty expected  Dental    (+) edentulous, upper dentures and lower dentures    Pulmonary    (+) asthma (mild intermittent MDI rarely),home oxygen, sleep apnea  (-) not a smoker  Cardiovascular     ECG reviewed    (+) hypertension, CAD, CABG >6 Months, cardiac stents (many befolre Heart surgery) more than 12 months ago , dysrhythmias Paroxysmal Atrial Fib, hyperlipidemia  (-) angina    ROS comment: ECG  LVH IMI?      Neuro/Psych  (-) seizures, CVA  GI/Hepatic/Renal/Endo    (+) obesity, morbid obesity, GERD, renal disease (creat was >2  now 1.2)- ARF, diabetes mellitus (A1C inc) type 2 poorly controlled    Musculoskeletal     Abdominal    Substance History      OB/GYN          Other   arthritis, blood dyscrasia thrombocytopenia,   history of cancer    ROS/Med Hx Other: PLTS 88K    Inc PT   Admitted w Urosepsis similar [place] this year                     Anesthesia Plan    ASA 3     general     (Potential sepsis hypotension     IV Beta blocker if possible )  intravenous induction     Anesthetic plan, risks, benefits, and alternatives have been provided, discussed and informed consent has been obtained with: patient.    Plan discussed with CRNA.        CODE STATUS:    Code Status (Patient has no pulse and is not breathing): CPR (Attempt to Resuscitate)  Medical Interventions (Patient has pulse or is breathing): Full

## 2023-10-31 NOTE — PROGRESS NOTES
[facility] Medicine Services  PROGRESS NOTE    Patient Name: Aldair Narvaez  : 1950  MRN: [id number]    Date of Admission: 10/29/2023  Primary Care Physician: Miguel Angel Mireles MD    Subjective   Subjective     CC:  fell from bed     HPI:f  feels he is improving overall.  Pain is controlled - is worst in R shoulder.  Regaining some  strength in R hand      Objective   Objective     Vital Signs:   Temp:  [97.5 °F (36.4 °C)-99.2 °F (37.3 °C)] 98.3 °F (36.8 °C)  Heart Rate:  [] 100  Resp:  [17-26] 20  BP: ()/(54-87) 136/79  Flow (L/min):  [2-4] 3  FiO2 (%):  [94 %-100 %] 100 %     Physical Exam:  Gen:  WD/WN  Neuro: alert and oriented, clear speech, follows commands, grossly nonfocal  HEENT:  NC/AT PERRL, OP benign  Neck:  Supple, no LAD  Heart RRR no murmur, rub, or gallop  Abd:  Soft, nontender, no rebound or guarding, pos BS  Extrem:  No c/c/e  Flank - bloody drainage from drain  RUE -  2+.  Shoulder shrug intact.  No movement at elbow but  is stronger than yesterday       Results Reviewed:  LAB RESULTS:      Lab 10/30/23  0320 10/30/23  0047 10/29/23  2255 10/29/23  2023 10/29/23  1614 10/29/23  1259   WBC 14.56*  --   --   --   --  28.11*   HEMOGLOBIN 13.0  --   --   --   --  14.7   HEMATOCRIT 39.7  --   --   --   --  44.9   PLATELETS 88*  --   --   --   --  150   NEUTROS ABS 14.27*  --   --   --   --  25.05*   IMMATURE GRANS (ABS)  --   --   --   --   --  0.87*   LYMPHS ABS  --   --   --   --   --  0.81   MONOS ABS  --   --   --   --   --  1.20*   EOS ABS 0.00  --   --   --   --  0.03   MCV 88.4  --   --   --   --  88.0   LACTATE  --  3.4* 6.8* 5.7* 5.1* 7.1*   PROTIME 17.7*  --   --   --   --   --    APTT  --   --   --   --   --  30.5         Lab 10/30/23  0320 10/29/23  1259   SODIUM 136 131*   POTASSIUM 3.6 3.9   CHLORIDE 104 92*   CO2 20.0* 18.0*   ANION GAP 12.0 21.0*   BUN 23 30*   CREATININE 1.29* 2.36*   EGFR 59.3* 28.7*   GLUCOSE 140* 438*   CALCIUM  7.5* 9.1   HEMOGLOBIN A1C  --  9.40*         Lab 10/30/23  0320 10/29/23  1259   TOTAL PROTEIN 5.5*  --    ALBUMIN 2.7*  --    GLOBULIN 2.8  --    ALT (SGPT) 83* 61*   AST (SGOT) 252* 141*   BILIRUBIN 0.4  --    ALK PHOS 93  --          Lab 10/30/23  0320 10/29/23  1614 10/29/23  1259   HSTROP T  --  45* 66*   PROTIME 17.7*  --   --    INR 1.44*  --   --                  Brief Urine Lab Results  (Last result in the past 365 days)        Color   Clarity   Blood   Leuk Est   Nitrite   Protein   CREAT   Urine HCG        10/29/23 1445 Yellow   Cloudy   Large (3+)   Small (1+)   Negative   100 mg/dL (2+)                   Microbiology Results Abnormal       Procedure Component Value - Date/Time    Blood Culture - Blood, Arm, Right [[id number]]  (Normal) Collected: 10/29/23 1435    Lab Status: Preliminary result Specimen: Blood from Arm, Right Updated: 10/30/23 1631     Blood Culture No growth at 24 hours            XR Pyelogram Retrograde    Result Date: 10/31/2023  Impression: Impression: A fluoroscopic unit was utilized for a procedure performed in the operating room. No interpretation was requested. Please refer to the operative report regarding findings. Please refer to PACS for patient radiation dose information. Bilateral retrograde ureteral and pyelograms showing stricture distal ureters. Left double-J stent placed in good position. Left renal abscess drain and multiple pelvic surgical staples also seen. No other incidental findings of significance. Electronically Signed: Molina Fields MD  10/31/2023 10:04 AM EDT  Workstation ID: [id number]    CT Guided Percutaneous Drain Kidney Renal    Result Date: 10/30/2023  CT GUIDED PERCUTANEOUS DRAIN KIDNEY RENAL  History: Abscess  : Molina Fields MD.  Modality: CT  DOSE REDUCTION: The examination was performed according to departmental dose-optimization program which includes automated exposure control, adjustment of the mA and/or kV according to patient  size and/or use of iterative reconstruction technique. Radiation dose: 993 mGy-cm.   SEDATION: Moderate sedation was administered. 1 milligram of Versed and 0 micrograms of fentanyl IV was used for moderate sedation. Total intra service time of sedation was 19 minutes. The sedation was administered and the patient's vital signs monitored  throughout the procedure and recorded in the patient's medical record by the nurse under my direct supervision. The patient systolic blood pressure was in 90s. He was started on IV saline infusion and fentanyl was not given. Anesthesia: Lidocaine, local infiltration.        Estimated blood loss:  < 5 cc.        Technique: A thorough discussion of the risks, benefits, and alternatives of the procedure, blood transfusion if needed, and if applicable, moderate sedation was carried out with the patient or the patient's next of kin. Any questions were answered. They verbalized  understanding. A written informed consent was then signed.  A timeout was performed prior to starting the procedure. The procedure room personnel used personal protective equipment. The operators used sterile gowns and gloves in addition. The surgical site was prepped with chlorhexidine gluconate and draped in the maximal sterile fashion. The patient was laid on the left lateral decubitus on the CT table. A preliminary limited CT scan was performed through the region of interest, if needed with a marking grid in place to localize the target, and to determine an access site, depth and angle. After local anesthesia a dermatotomy was performed if needed. Using CT guidance, an access needle was advanced into the target, gas-containing low-density area in the upper anterior cortex of the left kidney. Blood-tinged liquid was recovered. A guidewire was positioned in the collection. Over the wire following sequential dilatation of the track, an 8.5 [place] Farias-Romero drainage catheter was placed. The catheter was  secured to skin with nonabsorbable suture. The catheter was connected to a  fluid recovery system. An aseptic dressing was applied. A limited post-procedure CT was done. The patient was transferred to the recovery area and then discharged from the department in stable condition.  Complications: None immediate. Specimen: The specimen was labeled and sent to the lab in appropriate carriers & containers if such was requested by the ordering provider      Impression: Impression:                                                              Successful CT guided percutaneous abscess drainage catheter placement in a left renal abscess. An 8.5 [place] Farias-Romero drainage catheter was placed. Drain under dry. Flush with 5 cc of normal saline at least once a day. Once the output drops to 0, reimage. Thank you for the opportunity to assist in the care of your patient. Electronically Signed: Molina Fields MD  10/30/2023 4:01 PM EDT  Workstation ID: [id number]    EMG & Nerve Conduction Test    Result Date: 10/30/2023  Table formatting from the original result was not included. Images from the original result were not included. Indication: Right arm weakness, brachial plexopathy Clinical: 73 y.o.male who fell out of bed sometime Saturday night and laid on his right arm for approximately 7 hours.     His wife found him and he was taken to the ER where a stroke was ruled out.  His arm continues to be weak although he is beginning to get some movement in the hand and fingers.  On examination he has extremely limited movement of forearm extension and abduction and can only weakly abduct the thumb.  Brachial plexopathy is a clinical consideration.  His CK is noted to be elevated to 16,000. NCS/EMG TECHNICAL DATA: All studies are performed at a skin temperature of 34°C or greater. Distal sensory latencies are calculated to waveform peak.  Sensory amplitudes are measured peak to peak Distal motor latencies are calculated to waveform  onset.  Motor amplitudes are calculated baseline to peak Nerve Conduction Studies Anti Sensory Summary Table  Stim Site NR Peak (ms) Norm Peak (ms) P-T Amp (µV) Norm P-T Amp Site1 Site2 Delta-P (ms) Dist (cm) Brandt (m/s) Norm Brandt (m/s) Right Ulnar Anti Sensory (5th Digit) Wrist    3.4 <3.7 14.6 >15.0 Wrist 5th Digit 3.4 14.0 41  B Elbow NR     B Elbow Wrist  26.5  >53 A Elbow NR     A Elbow B Elbow  10.0  >53 Ortho Sensory Summary Table  Stim Site NR Peak (ms) Norm Peak (ms) P-T Amp (µV) Norm P-T Amp Site1 Site2 Delta-P (ms) Dist (cm) Brandt (m/s) Norm Brandt (m/s) Right Median Ortho Sensory (Wrist) 2nd Digit    3.0  5.6  2nd Digit Wrist 3.0 8.0 27  Palm    2.9  4.1  Palm Wrist 2.9 0.0   Right Ulnar Ortho Sensory (Wrist) 5th Digit NR     5th Digit Wrist  8.0   Palm NR     Palm Wrist  0.0   Motor Summary Table  Stim Site NR Onset (ms) Norm Onset (ms) O-P Amp (mV) Norm O-P Amp Site1 Site2 Delta-0 (ms) Dist (cm) Brandt (m/s) Norm Brandt (m/s) Right Median Motor (Abd Poll Brev) Wrist    4.8 <4.2 2.4 >5 Elbow Wrist 4.8 25.5 53 >50 Elbow    9.6  1.6        Right Ulnar Motor (Abd Dig Minimi) Wrist    2.9 <4.2 6.5 >3 B Elbow Wrist 4.4 26.0 59 >53 B Elbow    7.3  0.7  A Elbow B Elbow 2.9 10.0 34 >53 A Elbow    10.2  0.1        F Wave Studies  NR F-Lat (ms) Lat Norm (ms) L-R F-Lat (ms) L-R Lat Norm Right Median (Mrkrs) (Abd Poll Brev) NR  <33  <2.2 Right Ulnar (Mrkrs) (Abd Dig Min) NR  <36  <2.5 EMG  Side Muscle Nerve Root Ins Act Fibs Psw Amp Dur Poly Recrt Int Pat Comment Right Deltoid Axillary C5-6 Nml Nml Nml Nml Nml 0 Reduced Nml  Right ABD Dig Min Ulnar C8-T1 Nml Nml Nml Nml Nml 0 Nml Nml No voluntary Right Ext Digitorum Radial (Post Int) C7-8 Nml Nml Nml Nml Nml 0 Nml Nml Few voluntary Right Biceps Musculocut C5-6 Nml Nml Nml Nml Nml 0 Nml Nml No voluntary Right Triceps Radial C6-7-8 Nml Nml Nml Nml Nml 0 Reduced Nml  Right C6 Parasp Rami C6 Nml Nml Nml Nml Nml 0 Nml Nml  Waveforms:            FINDINGS: Nerve Conduction Studies:  Median sensory latency on the right is prolonged at 2.9 ms with reduced amplitude Ulnar orthodromic response was not seen Right median motor latency is prolonged at 4.8 ms with reduced amplitude Right median and right ulnar motor F wave latencies are absent Velocity of the right ulnar motor nerve is slowed across the elbow at 34 m/s.  There is a drop in amplitude between elbow and wrist although some of this may be for technical reasons. 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 Note-this study is done extremely early following the injury; if brachial plexopathy remains suspect, a repeat study in 4-6 weeks may be considered, at which time the EMG portion of the study would be expected to be more revealing This report is transcribed using the Dragon dictation system.     CT Abdomen Pelvis Without Contrast    Result Date: 10/29/2023  CT ABDOMEN PELVIS WO CONTRAST Date of Exam: 10/29/2023 7:17 PM EDT Indication: Flank pain, kidney stone suspected septic with UTI HO obstruction. Comparison: May 8, 2023. Renal ultrasound October 17, 2023. Technique: Axial CT images were obtained of the abdomen and pelvis without the administration of contrast. Reconstructed coronal and sagittal images were also obtained. Automated exposure control and iterative construction methods were used. Findings: There is suspected dependent atelectasis in the lower lobes. Status post median sternotomy. There is calcific atherosclerosis of the aorta. No significant pleural or pericardial effusion is seen.  Heart size appears within normal limits. There is excreted contrast in the renal collecting systems, ureters, and bladder from CT angiography performed the same day. Contrast does limit assessment for urinary tract calculi. There is mild left hydronephrosis and hydroureter which appears similar to the prior exam. The distal left ureter is nonopacified and nondilated. No clear obstructing etiology is identified. There is a hypodense lesion at the anterior upper pole of the left kidney which was present on the prior exam suggestive of a renal cyst, but on the current exam, the lesion measures up to approximately 3.7 cm, previously 2.9 cm, and there is now an irregular gaseous lucency within the lesion. There is some surrounding fat stranding which appears mildly increased. These findings are concerning for superimposed gas forming infection within a renal cyst. Probable right renal cyst, as before. No definite new right renal abnormality. Right ureter appears patent. No definite right hydronephrosis. The bladder is distended with contrast. No suspicious focal bladder abnormality is seen. No significant bladder wall thickening. Multiple surgical clips are seen in the pelvis. There has been prostatectomy. Hyperdensity within the gallbladder suggesting cholelithiasis, as before. No suspicious hepatic abnormality is seen. No definite splenomegaly. No suspicious adrenal lesion. No acute pancreatic abnormality. There is atherosclerosis of the aorta. No definite lymphadenopathy. No acute gastric abnormality. No small bowel dilatation. There is diverticulosis of the colon. No definite acute colonic or rectal abnormality is seen. No significant pelvic lymphadenopathy. There is midline abdominal scarring. There are degenerative changes in the bilateral hips and in the thoracolumbar spine.     Impression: Impression: 1.Left upper pole cyst appears increased in size compared to May 2023 and now contains irregular gaseous lucency  with some new mild surrounding fat stranding. Findings are suspicious for infected renal cyst with gas-forming infection. Also recommend correlation recent surgery as this could also be related to recent intervention. 2.Mild left hydronephrosis and hydroureter appears similar to the prior exam. The distal left ureter is nonopacified and nondilated. No clear obstructing etiology is identified. 3.Excreted contrast from prior CTA in the renal collecting systems, ureters, and bladder. This limits assessment for urinary tract calculi. 4.Cholelithiasis. 5.Calcific atherosclerosis. 6.Postoperative changes in the pelvis with evidence of prostatectomy. Electronically Signed: Larry Hoff  10/29/2023 8:16 PM EDT  Workstation ID: [id number]    MRI Brain Without Contrast    Result Date: 10/29/2023  MRI BRAIN WO CONTRAST Date of Exam: 10/29/2023 3:30 PM EDT Indication: Stroke, follow up.  Comparison: CT head, CT perfusion, and CTA head and neck performed the same day Technique:  Routine multiplanar/multisequence sequence images of the brain were obtained without contrast administration. Findings: No midline shift. The ventricles and sulci appear within normal limits for patient's age. Basal cisterns appear patent. The pituitary gland appears to have normal morphology. Cerebellar tonsils appear normally positioned. No definite extra-axial collection and no definite findings of acute or chronic hemorrhage. No mass lesion, mass effect, or edema is identified. Mild T2 and FLAIR hyperintense signal foci are seen in the cerebral white matter. No definite restricted diffusion is identified at this time. There is mucosal thickening of the paranasal sinuses. Mastoid air cells appear clear. Globes and orbits appear unremarkable. No definite calvarial abnormality is identified.     Impression: Impression: 1.No definite findings of acute intracranial abnormality at this time. 2.Mild T2 and FLAIR hyperintense signal foci in the cerebral white  matter are nonspecific but likely represent mild chronic small vessel ischemic changes. 3.Mucosal thickening of the paranasal sinuses could indicate acute sinusitis. Electronically Signed: Larry Luis Eduardo  10/29/2023 4:10 PM EDT  Workstation ID: [id number]    XR Shoulder 2+ View Right    Result Date: 10/29/2023  XR SHOULDER 2+ VW RIGHT Date of Exam: 10/29/2023 1:57 PM EDT Indication: Trauma Comparison: None available. FINDINGS:  No definite acute fracture or malalignment is seen. Glenohumeral joint is not well evaluated due to positioning. There is suspected osteophyte formation with probable mild to moderate joint space narrowing. There appear to be moderate degenerative changes at the acromioclavicular joint. Calcifications projecting along the posterior humeral head may represent calcific tendinopathy. No other definite soft tissue abnormality.     Impression: 1.No definite radiographic findings of acute osseous shoulder abnormality. 2.Glenohumeral joint is not well evaluated due to positioning. There is suspected mild to moderate osteoarthritis. 3.Moderate degenerative changes at the acromioclavicular joint. Electronically Signed: Larry Hoff  10/29/2023 2:16 PM EDT  Workstation ID: [id number]         Current medications:  Scheduled Meds:insulin lispro, 2-7 Units, Subcutaneous, Q4H  meropenem, 1,000 mg, Intravenous, Q8H  tamsulosin, 0.4 mg, Oral, Daily      Continuous Infusions:sodium chloride, 9 mL/hr, Last Rate: Stopped (10/31/23 0826)  sodium chloride, 100 mL/hr      PRN Meds:.  acetaminophen    dextrose    dextrose    glucagon (human recombinant)    HYDROcodone-acetaminophen    ipratropium-albuterol    sodium chloride    sodium chloride    Assessment & Plan   Assessment & Plan     Active Hospital Problems    Diagnosis  POA    **UTI (urinary tract infection) [N39.0]  Yes    NEETA (acute kidney injury) [N17.9]  Yes    Rhabdomyolysis [M62.82]  Yes    Right arm weakness [R29.898]  Yes    Hydronephrosis of left kidney  [N13.30]  Unknown    Personal history of prostate cancer [Z85.46]  Not Applicable    Sepsis due to urinary tract infection [A41.9, N39.0]  Unknown    CAD, multiple vessel [I25.10]  Yes    Uncontrolled type 2 diabetes mellitus with hyperglycemia [E11.65]  Yes    Mixed hyperlipidemia [E78.2]  Yes      Resolved Hospital Problems   No resolved problems to display.        Brief Hospital Course to date:  Aldair Narvaez is a 73 y.o. male w hydronephrosis,  HTN HL DM CAD  prostate CA GRANT fell out of bed, lay on the floor for a long time,  later noticed he could not move his right arm, has a UTI and NEETA and temp 103.        Sepsis due to UTI/ Possible renal abscess  NEETA  Lactic Acidosis  - CT scan shows 3.7cm infected cyst/abscess at L upper pole  - Cont abx per dr Ham   - IR  10/30 L renal  drain placed    1/31 L ureteral stent placement   - last dose Eliquis was 10/28 AM - defer for now   - Farooq   - creat was 2.4 on admission, now 1.3      T2DM with hyperglycemia  - improving slowly     HO HTN, CAD, Afib  -hold BP meds   -may need beta blocker back    Fall at home, lay wedged under furniture for hours   R arm paresis - suspect nerve palsy from compression x hours  - PT OT   - stroke team originally consulted, MRI neg, suspect this is a local nerve injury .  Slow improvement.    - discussed with neurologist: anticipate good resolution with PT and time   - Consutl neuro if pt desires  - follow CK   - continue saline infusion given elev CK      Expected Discharge Location and Transportation: tbd  Expected Discharge tbd  Expected Discharge Date: 11/7/2023; Expected Discharge Time:      DVT prophylaxis:  No DVT prophylaxis order currently exists.     AM-PAC 6 Clicks Score (PT): 18 (10/31/23 1250)    CODE STATUS:   Code Status and Medical Interventions:   Ordered at: 10/29/23 1620     Code Status (Patient has no pulse and is not breathing):    CPR (Attempt to Resuscitate)     Medical Interventions (Patient has pulse or is  breathing):    Full       Berenice Ellis MD  10/31/23

## 2023-10-31 NOTE — PROGRESS NOTES
[facility]    Progress Note    Patient Name: Aldair Narvaez  : 1950  MRN: [id number]  Primary Care Physician:  Miguel Angel Mireles MD  Date of admission: 10/29/2023    Subjective     Chief Complaint: Brachial plexus injury    History of Present Illness   Patient resting comfortably in bed.  Patient stated earlier today he did have some sharp shooting pain down his right arm.  He had notable weakness in proximal right upper extremity.     Review of Systems   General: Negative for fever, nausea, or vomiting.   Neurological: Positive for pain    Objective     Physical Exam  Vitals and nursing note reviewed.   Constitutional:       General: He is not in acute distress.     Appearance: He is not ill-appearing.   Eyes:      Extraocular Movements: Extraocular movements intact.      Pupils: Pupils are equal, round, and reactive to light.      Comments: No nystagmus noted   Neurological:      Mental Status: He is alert and oriented to person, place, and time.      Cranial Nerves: Cranial nerves 2-12 are intact.      Sensory: Sensation is intact.      Motor: Weakness present. No tremor or seizure activity.      Coordination: Finger-Nose-Finger Test abnormal.      Deep Tendon Reflexes: Reflexes abnormal. Babinski sign absent on the right side. Babinski sign absent on the left side.      Reflex Scores:       Bicep reflexes are 2+ on the right side and 0 on the left side.       Patellar reflexes are 2+ on the right side and 2+ on the left side.     Comments: Cranial Nerves   CN II: Pupils are equal, round, and reactive to light. Normal visual acuity and visual fields.    CN III IV VI: Extraocular movements are full without nystagmus.  CN V: Normal facial sensation and strength of muscles of mastication.  CN VII: Facial movements are symmetric. No weakness.  CN VIII:  Auditory acuity is normal.  CN IX & X:  Symmetric palatal movement.  CN XI: Sternocleidomastoid are normal.  No weakness.  CN XII: The tongue is  midline.  No atrophy or fasciculations.    Motor:  Neck extension strength 5/5    Neck Flexion  5/5   left trapezius strength 5/5    Right Trapezius strength 5/5    Left tricep strength 5/5   Right tricep strength 2/5  Left bicep strength 5/5   Right bicep strength 2/5  Left finger extension 5/5   Right finger extension 3/5  Left wrist flexion 5/5  Right wrist flexion strength 4/5   Left wrist extension strength  4/5   Right wrist extension 4/5  Bilateral hip extension 5/5   Bilateral hip flexion 5/5   BLE internal rotation 5/5   BLE external rotation 5/5   Bilateral foot extension 5/5   Bilateral foot flexion 5/5     Left  strength 5/5  Right  strength 2/5                Vitals:   Temp:  [97.5 °F (36.4 °C)-99.2 °F (37.3 °C)] 98.3 °F (36.8 °C)  Heart Rate:  [] 100  Resp:  [17-26] 20  BP: ()/(54-87) 136/79  Flow (L/min):  [2-4] 3  FiO2 (%):  [94 %-100 %] 100 %    Current Medications    Current Facility-Administered Medications:     acetaminophen (TYLENOL) tablet 650 mg, 650 mg, Oral, Q6H PRN, Isma Justin MD, 650 mg at 10/29/23 2256    dextrose (D50W) (25 g/50 mL) IV injection 25 g, 25 g, Intravenous, Q15 Min PRN, Isma Justin MD    dextrose (GLUTOSE) oral gel 15 g, 15 g, Oral, Q15 Min PRN, Isma Justin MD    glucagon (GLUCAGEN) injection 1 mg, 1 mg, Intramuscular, Q15 Min PRN, Isma Justin MD    HYDROcodone-acetaminophen (NORCO) 5-325 MG per tablet 1 tablet, 1 tablet, Oral, Q4H PRN, Isma Justin MD, 1 tablet at 10/30/23 2159    Insulin Lispro (humaLOG) injection 2-7 Units, 2-7 Units, Subcutaneous, Q4H, Isma Justin MD, 2 Units at 10/31/23 1247    ipratropium-albuterol (DUO-NEB) nebulizer solution 3 mL, 3 mL, Nebulization, Q6H PRN, Isma Justin MD    meropenem (MERREM) 1,000 mg in sodium chloride 0.9 % 100 mL IVPB, 1,000 mg, Intravenous, Q8H, Isma Justin MD, 1,000 mg at 10/31/23 1300    sodium chloride 0.9 % flush 10 mL, 10 mL,  "Intravenous, PRN, Isma Justin MD    sodium chloride 0.9 % infusion, 9 mL/hr, Intravenous, Continuous PRN, Isma Justin MD, Stopped at 10/31/23 0826    sodium chloride 0.9 % infusion, 100 mL/hr, Intravenous, Continuous, Mini, MD Berenice    tamsulosin (FLOMAX) 24 hr capsule 0.4 mg, 0.4 mg, Oral, Daily, Isma Justin MD, 0.4 mg at 10/30/23 1017    Laboratory Results:   Lab Results   Component Value Date    GLUCOSE 140 (H) 10/30/2023    CALCIUM 7.5 (L) 10/30/2023     10/30/2023    K 3.6 10/30/2023    CO2 20.0 (L) 10/30/2023     10/30/2023    BUN 23 10/30/2023    CREATININE 1.29 (H) 10/30/2023    EGFRIFNONA 96 02/17/2021    BCR 17.8 10/30/2023    ANIONGAP 12.0 10/30/2023     Lab Results   Component Value Date    WBC 14.56 (H) 10/30/2023    HGB 13.0 10/30/2023    HCT 39.7 10/30/2023    MCV 88.4 10/30/2023    PLT 88 (L) 10/30/2023     Lab Results   Component Value Date    CHOL 127 07/25/2023    CHOL 173 05/09/2023    CHOL 188 04/13/2023     Lab Results   Component Value Date    HDL 28 (L) 07/25/2023    HDL 39 (L) 05/09/2023    HDL 37 (L) 04/13/2023     Lab Results   Component Value Date    LDL 60 07/25/2023     (H) 05/09/2023     (H) 04/13/2023     Lab Results   Component Value Date    TRIG 238 (H) 07/25/2023    TRIG 114 05/09/2023    TRIG 213 (H) 04/13/2023     Lab Results   Component Value Date    HGBA1C 9.40 (H) 10/29/2023     Lab Results   Component Value Date    INR 1.44 (H) 10/30/2023    PROTIME 17.7 (H) 10/30/2023     No results found for: \"FOLATE\"  No results found for: \"[id number]\"    MRI Brain Without Contrast    Result Date: 10/29/2023  MRI BRAIN WO CONTRAST Date of Exam: 10/29/2023 3:30 PM EDT Indication: Stroke, follow up.  Comparison: CT head, CT perfusion, and CTA head and neck performed the same day Technique:  Routine multiplanar/multisequence sequence images of the brain were obtained without contrast administration. Findings: No midline shift. The " ventricles and sulci appear within normal limits for patient's age. Basal cisterns appear patent. The pituitary gland appears to have normal morphology. Cerebellar tonsils appear normally positioned. No definite extra-axial collection and no definite findings of acute or chronic hemorrhage. No mass lesion, mass effect, or edema is identified. Mild T2 and FLAIR hyperintense signal foci are seen in the cerebral white matter. No definite restricted diffusion is identified at this time. There is mucosal thickening of the paranasal sinuses. Mastoid air cells appear clear. Globes and orbits appear unremarkable. No definite calvarial abnormality is identified.     Impression: Impression: 1.No definite findings of acute intracranial abnormality at this time. 2.Mild T2 and FLAIR hyperintense signal foci in the cerebral white matter are nonspecific but likely represent mild chronic small vessel ischemic changes. 3.Mucosal thickening of the paranasal sinuses could indicate acute sinusitis. Electronically Signed: Larry Hoff  10/29/2023 4:10 PM EDT  Workstation ID: [id number]        Assessment / Plan       Brief Patient Summary:  Aldair Narvaez is a 73 y.o. male who has a past medical of HTN, HLD CAD, T2DM and GRANT who presented to [facility] with complaint of right arm weakness and numbness.  Patient experienced a fall the night previous to his admission that resulted with him laying on the floor on top of his right arm for approximately 7 hours.  He has had persistent right arm weakness and numbness.  Patient was initially seen by our stroke team with no acute findings.    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  Right arm showed overall reduction of voluntary motor units, mild abnormal but nonspecific finding.     Plan:   Brachial plexopathy  Right arm paresis  Fall at home  Possible need for repeat EMG  in 4 to 6 weeks as 1st nerve conduction study was done in process.  MRI Cervical  MRI brachial plexus   Gabapentin 100 mg as needed 3 times daily for nerve pain  Continue work with PT/OT  Once patient's active infection is resolved can consider steroid use.  Continue to monitor creatinine CK  Continue neurochecks  Patient educated on the slow recovery related to brachial plexopathy and the need for physical therapy.  General neurology will continue to follow    I have discussed the above with the patient, bedside RN Dr. Ellis  Time spent with patient: 60 minutes in face-to-face evaluation and management of the patient.      Yulia Gregory, APRN

## 2023-10-31 NOTE — OP NOTE
CYSTOSCOPY RETROGRADE PYELOGRAM  Procedure Report    Patient Name:  Aldair Narvaez  YOB: 1950    Date of Surgery:  10/31/2023     Indications: 73-year-old gentleman who underwent a remote prostatectomy for prostate cancer many years ago, he has already been seen in the hospital May 2023 for urosepsis related to left hydronephrosis and a ureteral stricture.  He was taken to the operating room for left ureteral stent placement.  He eventually underwent a left ureteral dilation and stent removal.  These procedures were performed June 2023.  He also underwent a right ureteral stone removal at the outpatient surgery center in August.  Patient was recently seen in clinic at which point he was doing well with a postoperative renal ultrasound demonstrating no obvious concerns.  He was then admitted 10/30/2023 with weakness, fall, urosepsis.  CT demonstrates persistent left hydroureteronephrosis in addition to a 3.5+ centimeter left renal abscess.  He has undergone IR drain placement for left renal abscess.  He presents to the operating room today for left ureteral stent placement and retrograde pyelogram regarding concern for recurrent left ureteral stricture.    Pre-op Diagnosis:   Hydronephrosis of left kidney [N13.30]  Acute pyelonephritis [N10]  Sepsis due to urinary tract infection [A41.9, N39.0]       Post-Op Diagnosis Codes:     * Hydronephrosis of left kidney [N13.30]     * Acute pyelonephritis [N10]     * Sepsis due to urinary tract infection [A41.9, N39.0]        Procedure(s):  CYSTOSCOPY   BILATERAL RETROGRADE PYELOGRAM (modifier 50)  LEFT URETERAL STENT PLACEMENT    Staff:  Surgeon(s):  Isma Justin MD         Anesthesia: General    Estimated Blood Loss: none    Implants:    Implant Name Type Inv. Item Serial No.  Lot No. LRB No. Used Action   STNT URETRL TRIA MF FIRM 6F 24CM - [id number] Stent STNT URETRL TRIA MF FIRM 6F 24CM  [employer] LATRICIA [id number] Left 1 Implanted        Specimen:          None        Findings: Normal right retrograde pyelogram.  Left retrograde pyelogram demonstrates recurrent distal ureteral stenosis with proximal hydroureteronephrosis.  Uncomplicated left ureteral stent placement, 6 Burkinan by [employer].    Complications: None    Description of Procedure:   The patient was identified in the preoperative holding area where informed consent was reviewed and signed. The patient was transported the operating room per anesthesia and placed supine on the operating table. Smooth endotracheal intubation was performed without issue after administration of general anesthesia. The patient was then placed in the dorsal lithotomy position where genitals were prepped and draped in the usual sterile fashion. A brief timeout was performed identifying the correct patient procedure and laterality. Perioperative antibiotics were administered. All pressure points were padded.  The patient's existing Farooq catheter was removed.    Procedure began by inserting a 22 Burkinan cystoscope atraumatically per the patient's urethra, the urethra was normal with no stricture, the prostate was absent after remote prostatectomy, there is no bladder neck contracture.  Entering into the bladder were pan cystoscopy was performed identifying bilateral orthotopic ureteral orifices and no evidence of bladder masses or other lesions.  The right ureter appeared somewhat stenotic and therefore given recent urosepsis decision was made to perform a retrograde pyelogram in addition to his left retrograde pyelogram    Right retrograde pyelogram interpretation  Attention was then turned to the right ureteral orifice. A 5 Fr open ended ureteral catheter was inserted into the distal ureter and contrast dye was injected up the ureter, a retrograde pyelogram was performed identifying normal course and caliber of the right ureter with no hydronephrosis noted.  The renal pelvis filled with contrast, no  filling defect is identified.  After 2 minutes the contrast drained effectively from the right kidney, no concern for obstruction on this side.    Left retrograde pyelogram interpretation  Attention was turned to the left ureteral orifice.  A 5 English open-ended ureteral catheter was inserted and the distal ureter and contrast dye was injected, minimal contrast appeared to be instilling beyond a point of stenosis a few centimeters above the ureterovesical junction.  The 5 English open-ended ureteral catheter was inserted into the area of stenosis and more contrast was injected at which point there appeared to be proximal hydroureteronephrosis secondary to obstruction of the distal ureter.  More contrast was injected and the location of the renal pelvis was identified.  There appeared to be left hydronephrosis.  Multiple images were taken to again identify the area of obstruction.  The ureteral catheter was removed.  Some contrast appeared to drain from the left kidney.    Given the ureteral stenosis noted, and likelihood that this left renal abscess developed as result of persistent obstruction of the left kidney, decision was made to place left ureteral stent    A Sensor wire was inserted through the working channel of the scope and advanced up the left ureteral orifice to the level of the renal pelvis on fluoroscopy. A 6 Fr x 24 CM double J ureteral stent was then advanced over the wire to the renal pelvis confirmed on fluoroscopy. The Sensor wire was then removed and a good proximal stent curl was visualized within the collecting system on fluoroscopy, and a distal stent curl was observed within the bladder on direct visualization. The patient's bladder was emptied. The cystoscope was then removed.    A new 16 English Farooq catheter was placed into the bladder.  10 cc of sterile water used to inflate the balloon.    The patient was awoken from general anesthesia and transported to the PACU in stable  condition.    PLAN  -Maintain Farooq catheter for maximal urinary drainage given urosepsis, follow-up renal function on repeat labs.  We will arrange follow-up with infectious disease and urology regarding left renal drain and left ureteral stent for persistent left ureteral stricture.            Isma Justin MD     Date: 10/31/2023  Time: 15:12 EDT

## 2023-10-31 NOTE — PROGRESS NOTES
[facility]   Urology Progress Note    Patient Name: Aldair Narvaez  : 1950  MRN: [id number]  Primary Care Physician:  Miguel Angel Mireles MD  Date of admission: 10/29/2023    Subjective   Subjective     Chief Complaint: left renal abscess, left hydronephrosis, left ureteral stricture     HPI:  Patient Reports no significant issues overnight. Remains tachycardic after IR left renal abscess drain placement. Labs pending today. Afebrile since 10/29. Body fluid gram stain demonstrates gram neg bacilli.     Review of Systems   All systems were reviewed and negative except for: None    Objective   Objective     Vitals:   Temp:  [98.2 °F (36.8 °C)-99.2 °F (37.3 °C)] 98.6 °F (37 °C)  Heart Rate:  [] 108  Resp:  [18-24] 18  BP: ()/(54-83) 134/83  Flow (L/min):  [2] 2  Physical Exam    Constitutional: Awake in bed, alert   Eyes: PERRLA, sclerae anicteric, no conjunctival injection   HENT: Normocephalic, atraumatic, mucous membranes moist   Neck: Supple, trachea midline   Respiratory: Equal chest rise, non-labored respirations    Cardiovascular: RRR, tachycardic 100s   Gastrointestinal: Soft, nontender, non-distended, left renal abscess drain with mild serosanguinous   Musculoskeletal: No lower extremity edema bilaterally, no clubbing or cyanosis to extremities   Psychiatric: Appropriate affect, cooperative   Neurologic: Oriented x 3,  Cranial Nerves grossly intact, speech clear   Skin: No rashes     Result Review    Result Review:  I have personally reviewed the results from the time of this admission to 10/31/2023 07:22 EDT and agree with these findings:  [x]  Laboratory  [x]  Microbiology  [x]  Radiology  []  EKG/Telemetry   []  Cardiology/Vascular   []  Pathology  []  Old records  []  Other:    Most notable findings include: CT drain placement images reviewed. Labs pending. Body fluid culture with gram neg bacilli    Assessment & Plan   Assessment / Plan     Brief Patient Summary:  Aldair Narvaez is a 73  y.o. male who has a history of left ureteral stricture likely as a result of remote prostatectomy, he has previously undergone a left ureteral dilation and previous left ureteral stent placement.  He is now admitted with urosepsis, found down, developed rhabdomyolysis.  Imaging demonstrates recurrent left-sided hydronephrosis and a 3.5+ centimeter left renal abscess.  He is now status post IR drain placement to left renal abscess.  Given his persistent tachycardia and persistent left hydronephrosis we discussed necessity of proceeding to the operating room for cystoscopy and left ureteral stent placement.  He elects to proceed.  Risk include bleeding, infection, dysuria, hematuria, anesthesia related complications.  Patient reports understanding and is willing to proceed.  Informed consent reviewed and signed.    Active Hospital Problems:  Active Hospital Problems    Diagnosis     **UTI (urinary tract infection)     NEETA (acute kidney injury)     Rhabdomyolysis     Right arm weakness     Hydronephrosis of left kidney     Personal history of prostate cancer     Sepsis due to urinary tract infection     CAD, multiple vessel     Uncontrolled type 2 diabetes mellitus with hyperglycemia     Mixed hyperlipidemia        Plan:   -IV antibiotics per primary team  -Follow-up body fluid culture  -Maintain left renal abscess drain, monitor output  -Proceed to OR today for cystoscopy, left retrograde pyelogram, left ureteral stent placement         DVT prophylaxis:  No DVT prophylaxis order currently exists.    CODE STATUS:   Code Status (Patient has no pulse and is not breathing): CPR (Attempt to Resuscitate)  Medical Interventions (Patient has pulse or is breathing): Full    Disposition:  I expect patient to remain admitted.    Electronically signed by Isma Justin MD, 10/31/23, 7:22 AM EDT.

## 2023-10-31 NOTE — PROGRESS NOTES
Enter Query Response Below      Query Response: severe sepsis              If applicable, please update the problem list.     Patient: Aldair Narvaez        : 1950  Account: [id number]           Admit Date:         How to Respond to this query:       a. Click New Note     b. Answer query within the yellow box.                c. Update the Problem List, if applicable.      If you have any questions about this query contact me at: [email or web address]     Dr. Ellis:    Patient presents to emergency room with weakness, fall out of bed.  On arrival, bp 110/61, down to 94/69, received ivf's, wbc 28.11, creatinine 2.36, lactate 7.1. Emergency room provider note states sepsis with acute renal failure, and septic shock.  Started on iv vancomycin, zosyn iv q8. History and physical states sepsis due to UTI, continue aggressive iv hydration, follow cultures. 10/30 Pulmonology progress note states asked to evaluate for ICU transfer, patient has received multiple fluid boluses for borderline blood pressure in the setting of sepsis of urinary origin, mean arterial pressure running in the upper 60s for past several hours, continue maintenance fluid and monitor blood pressure and heart rate.     Please clarify if patient treated/monitored for one or more of the following:    Septic shock  Sepsis with hypotension.   Other- specify______  Unable to determine.    By submitting this query, we are merely seeking further clarification of documentation to accurately reflect all conditions that you are monitoring, evaluating, treating or that extend the hospitalization or utilize additional resources of care. Please utilize your independent clinical judgment when addressing the question(s) above.     This query and your response, once completed, will be entered into the legal medical record.    Sincerely,  Nestor AMIN RN BSN   Clinical Documentation Integrity Program   [email or web address]

## 2023-10-31 NOTE — PROGRESS NOTES
Aldair MUIR Brice  1950  [id number]     Evaluating Physician: Joel Ham MD    Chief Complaint: fatigue    Reason for Consultation: pyelonephritis, blood cultures positive    History of present illness:     Patient is a 73 y.o.  Yr old male with history of prostate cancer and renal calculi, admitted in May 2023 requiring left ureteral stent, sepsis presentation with E. Coli pyelonephritis/septicemia and positive urine/blood cultures, transitioned to IV ceftriaxone and finished therapy; he presented to the hospital on October 29 with fall, generalized weakness and noted to have acute UTI/sepsis/acute kidney injury per medicine team. Creatinine 2.36 in the emergency room, significantly elevated lactate and white blood cell count of 28K; respiratory panel positive for rhinovirus, blood culture subsequently with gram-negative mavis and urine with gram-negative mavis; CT scan of the abdomen with left upper pole cyst that had increased in size from May with irregular gaseous lucency suspicious for infected renal cyst per radiology along with mild left hydronephrosis/hydroureter; urology has seen and making arrangements for IR consultation for left renal abscess drain versus aspiration; urology plans for cystoscopy/stent on October 31    10/30/23 IR for percutaneous abscess drain    10/31/23 Gram stain from abscess drain with gram-negative mavis. further surgery planned by urology. generally fatigued, same scrapes on both lower extremities from his fall; fever generally improved since admission with some vague left flank pain that is not severe, dull at present, nonradiating, better since admission and does not attach a numerical severity.  Denies hematuria or overt pyuria.  Has had intermittent urinary frequency prior to admission.  Denies hesitancy    Stable dry cough, denies dyspnea at this time; no hemoptysis.  No headache photophobia or neck stiffness.  Denies nausea vomiting diarrhea or abdominal pain today.  No  new skin rash       Past Medical History:   Diagnosis Date    Acid reflux     Arthritis     Benign hypertension     Colon polyp     Concern about heart attack without diagnosis     Coronary artery disease     Coronary atherosclerosis     Heart disease     Hyperlipidemia     Hypertension     Kidney stone     Mixed hyperlipidemia     Obesity     body mass index 30+-obesity    Prostate cancer     Sleep apnea     Type 2 diabetes mellitus     Type 2 diabetes mellitus        Past Surgical History:   Procedure Laterality Date    CARDIAC CATHETERIZATION      CARDIAC SURGERY N/A     [place]    CARPAL TUNNEL RELEASE      CORONARY ARTERY BYPASS GRAFT  08/30/2022    CYSTOSCOPY BLADDER STONE LITHOTRIPSY      CYSTOSCOPY BLADDER STONE LITHOTRIPSY      CYSTOSCOPY W/ URETERAL STENT PLACEMENT Left 05/09/2023    Procedure: CYSTOSCOPY LEFT RETROGRADE PYELOGRAM AND LEFT URETERAL STENT PLACEMENT;  Surgeon: Isma Justin MD;  Location: [facility];  Service: Urology;  Laterality: Left;    FETAL SURGERY FOR CONGENITAL HERNIA      INGUINAL HERNIA REPAIR      KIDNEY STONE SURGERY      KNEE ARTHROSCOPY      PROSTATECTOMY      TRIGGER FINGER RELEASE      UMBILICAL HERNIA REPAIR      UVULOPALATOPHARYNGOPLASTY         Pediatric History   Patient Parents    Not on file     Other Topics Concern    Not on file   Social History Narrative    Not on file       family history includes Diabetes in his sister; Heart attack in his father; Heart disease in his father; Hyperlipidemia in his sister; Hypertension in his sister.    Allergies   Allergen Reactions    Iodine Other (See Comments)     Closes sinuses    Oxycodone-Acetaminophen Itching    Zofran [Ondansetron] Hives               Review of Systems    10/31/23     Constitutional-- No   sweats.  Appetite diminished with fatigue.  Heent-- No new vision, hearing or throat complaints.  No epistaxis or oral sores.  Denies odynophagia or dysphagia.  No flashers, floaters or eye pain. No  "odynophagia or dysphagia. No headache, photophobia or neck stiffness.  CV-- No chest pain, palpitation or syncope  Resp-- see above  GI- see above.  No hematochezia, melena, or hematemesis. Denies jaundice or chronic liver disease.  -- see above  Lymph- no swollen lymph nodes in neck/axilla or groin.   Heme- No active bruising or bleeding; no Hx of DVT or PE.  MS-- no swelling or pain in the bones or joints of arms/legs.  No new back pain.  Neuro-- right arm weakness associated with fall and some concern by medicine team for brachial plexus injury.  No seizures.    Full 12 point review of systems reviewed and negative otherwise for acute complaints, except for above    Physical Exam:   Vital Signs   /83 (BP Location: Left arm, Patient Position: Lying)   Pulse 108   Temp 98.6 °F (37 °C) (Temporal)   Resp 18   Ht 177.8 cm (70\")   Wt 103 kg (227 lb 1.2 oz)   SpO2 98%   BMI 32.58 kg/m²     GENERAL: sleepy, in no acute distress. Generally debilitated  HEENT: Normocephalic, atraumatic.  No conjunctival injection. No icterus. Oropharynx clear without evidence of thrush or exudate. No evidence of peridontal disease.    NECK: Supple without nuchal rigidity. No mass.  LYMPH: No cervical, axillary or inguinal lymphadenopathy.  HEART: RRR; No murmur, rubs, gallops.   LUNGS: diminished at bases but otherwise Clear to auscultation bilaterally without wheezing, rales, rhonchi. Normal respiratory effort. Nonlabored. No dullness.  ABDOMEN: Soft, nontender, nondistended. Positive bowel sounds. No rebound or guarding. NO mass or HSM.  EXT:  multifocal excoriations both lower extremities associated with recent fall.  No redness induration or warmth.  No discrete mass bulge or fluctuance.  No crepitus or bulla  : Genitalia generally unremarkable.    MSK: FROM without joint effusions noted arms/legs.    SKIN: Warm and dry without cutaneous eruptions on Inspection/palpation.    NEURO: sleepy    No peripheral " stigmata/phenomena of endocarditis    IV without obvious redness or drainage    Laboratory Data    Results from last 7 days   Lab Units 10/30/23  0320 10/29/23  1259   WBC 10*3/mm3 14.56* 28.11*   HEMOGLOBIN g/dL 13.0 14.7   HEMATOCRIT % 39.7 44.9   PLATELETS 10*3/mm3 88* 150     Results from last 7 days   Lab Units 10/30/23  0320   SODIUM mmol/L 136   POTASSIUM mmol/L 3.6   CHLORIDE mmol/L 104   CO2 mmol/L 20.0*   BUN mg/dL 23   CREATININE mg/dL 1.29*   GLUCOSE mg/dL 140*   CALCIUM mg/dL 7.5*     Results from last 7 days   Lab Units 10/30/23  0320   ALK PHOS U/L 93   BILIRUBIN mg/dL 0.4   ALT (SGPT) U/L 83*   AST (SGOT) U/L 252*               Estimated Creatinine Clearance: 63.1 mL/min (A) (by C-G formula based on SCr of 1.29 mg/dL (H)).      Microbiology:      Radiology:  Imaging Results (Last 72 Hours)       Procedure Component Value Units Date/Time    XR Pyelogram Retrograde [[id number]] Resulted: 10/31/23 0738     Updated: 10/31/23 0738    CT Guided Percutaneous Drain Kidney Renal [[id number]] Collected: 10/30/23 1556     Updated: 10/30/23 1604    Narrative:      CT GUIDED PERCUTANEOUS DRAIN KIDNEY RENAL     History: Abscess     : Molina Fields MD.     Modality: CT     DOSE REDUCTION: The examination was performed according to departmental dose-optimization program which includes automated exposure control, adjustment of the mA and/or kV according to patient size and/or use of iterative reconstruction technique.    Radiation dose: 993 mGy-cm.       SEDATION: Moderate sedation was administered. 1 milligram of Versed and 0 micrograms of fentanyl IV was used for moderate sedation. Total intra service time of sedation was 19 minutes. The sedation was administered and the patient's vital signs monitored   throughout the procedure and recorded in the patient's medical record by the nurse under my direct supervision.    The patient systolic blood pressure was in 90s. He was started on IV saline infusion  and fentanyl was not given.    Anesthesia: Lidocaine, local infiltration.            Estimated blood loss:  < 5 cc.            Technique:  A thorough discussion of the risks, benefits, and alternatives of the procedure, blood transfusion if needed, and if applicable, moderate sedation was carried out with the patient or the patient's next of kin. Any questions were answered. They verbalized   understanding. A written informed consent was then signed.      A timeout was performed prior to starting the procedure.     The procedure room personnel used personal protective equipment. The operators used sterile gowns and gloves in addition. The surgical site was prepped with chlorhexidine gluconate and draped in the maximal sterile fashion.    The patient was laid on the left lateral decubitus on the CT table. A preliminary limited CT scan was performed through the region of interest, if needed with a marking grid in place to localize the target, and to determine an access site, depth and   angle.    After local anesthesia a dermatotomy was performed if needed. Using CT guidance, an access needle was advanced into the target, gas-containing low-density area in the upper anterior cortex of the left kidney. Blood-tinged liquid was recovered. A   guidewire was positioned in the collection. Over the wire following sequential dilatation of the track, an 8.5 Yoruba Farias-Romero drainage catheter was placed. The catheter was secured to skin with nonabsorbable suture. The catheter was connected to a   fluid recovery system. An aseptic dressing was applied.    A limited post-procedure CT was done.    The patient was transferred to the recovery area and then discharged from the department in stable condition.     Complications: None immediate.    Specimen: The specimen was labeled and sent to the lab in appropriate carriers & containers if such was requested by the ordering provider       Impression:      Impression:                                                                 Successful CT guided percutaneous abscess drainage catheter placement in a left renal abscess. An 8.5 Russian Farias-Romero drainage catheter was placed.    Drain under dry. Flush with 5 cc of normal saline at least once a day. Once the output drops to 0, reimage.    Thank you for the opportunity to assist in the care of your patient.        Electronically Signed: Molina Fields MD    10/30/2023 4:01 PM EDT    Workstation ID: [id number]    CT Abdomen Pelvis Without Contrast [[id number]] Collected: 10/29/23 2001     Updated: 10/29/23 2020    Narrative:      CT ABDOMEN PELVIS WO CONTRAST    Date of Exam: 10/29/2023 7:17 PM EDT    Indication: Flank pain, kidney stone suspected  septic with UTI HO obstruction.    Comparison: May 8, 2023. Renal ultrasound October 17, 2023.    Technique: Axial CT images were obtained of the abdomen and pelvis without the administration of contrast. Reconstructed coronal and sagittal images were also obtained. Automated exposure control and iterative construction methods were used.      Findings:  There is suspected dependent atelectasis in the lower lobes. Status post median sternotomy. There is calcific atherosclerosis of the aorta. No significant pleural or pericardial effusion is seen. Heart size appears within normal limits.    There is excreted contrast in the renal collecting systems, ureters, and bladder from CT angiography performed the same day. Contrast does limit assessment for urinary tract calculi.    There is mild left hydronephrosis and hydroureter which appears similar to the prior exam. The distal left ureter is nonopacified and nondilated. No clear obstructing etiology is identified. There is a hypodense lesion at the anterior upper pole of the   left kidney which was present on the prior exam suggestive of a renal cyst, but on the current exam, the lesion measures up to approximately 3.7 cm, previously 2.9 cm, and there is  now an irregular gaseous lucency within the lesion. There is some   surrounding fat stranding which appears mildly increased. These findings are concerning for superimposed gas forming infection within a renal cyst.    Probable right renal cyst, as before. No definite new right renal abnormality. Right ureter appears patent. No definite right hydronephrosis.    The bladder is distended with contrast. No suspicious focal bladder abnormality is seen. No significant bladder wall thickening. Multiple surgical clips are seen in the pelvis. There has been prostatectomy.    Hyperdensity within the gallbladder suggesting cholelithiasis, as before. No suspicious hepatic abnormality is seen. No definite splenomegaly. No suspicious adrenal lesion. No acute pancreatic abnormality. There is atherosclerosis of the aorta. No   definite lymphadenopathy. No acute gastric abnormality. No small bowel dilatation. There is diverticulosis of the colon. No definite acute colonic or rectal abnormality is seen. No significant pelvic lymphadenopathy. There is midline abdominal scarring.    There are degenerative changes in the bilateral hips and in the thoracolumbar spine.      Impression:      Impression:  1.Left upper pole cyst appears increased in size compared to May 2023 and now contains irregular gaseous lucency with some new mild surrounding fat stranding. Findings are suspicious for infected renal cyst with gas-forming infection. Also recommend   correlation recent surgery as this could also be related to recent intervention.  2.Mild left hydronephrosis and hydroureter appears similar to the prior exam. The distal left ureter is nonopacified and nondilated. No clear obstructing etiology is identified.  3.Excreted contrast from prior CTA in the renal collecting systems, ureters, and bladder. This limits assessment for urinary tract calculi.  4.Cholelithiasis.  5.Calcific atherosclerosis.  6.Postoperative changes in the pelvis with  evidence of prostatectomy.        Electronically Signed: Larry Luis Eduardo    10/29/2023 8:16 PM EDT    Workstation ID: [id number]    MRI Brain Without Contrast [[id number]] Collected: 10/29/23 1605     Updated: 10/29/23 1613    Narrative:      MRI BRAIN WO CONTRAST    Date of Exam: 10/29/2023 3:30 PM EDT    Indication: Stroke, follow up.     Comparison: CT head, CT perfusion, and CTA head and neck performed the same day    Technique:  Routine multiplanar/multisequence sequence images of the brain were obtained without contrast administration.      Findings:  No midline shift. The ventricles and sulci appear within normal limits for patient's age. Basal cisterns appear patent. The pituitary gland appears to have normal morphology. Cerebellar tonsils appear normally positioned.    No definite extra-axial collection and no definite findings of acute or chronic hemorrhage. No mass lesion, mass effect, or edema is identified. Mild T2 and FLAIR hyperintense signal foci are seen in the cerebral white matter. No definite restricted   diffusion is identified at this time.    There is mucosal thickening of the paranasal sinuses. Mastoid air cells appear clear. Globes and orbits appear unremarkable. No definite calvarial abnormality is identified.        Impression:      Impression:  1.No definite findings of acute intracranial abnormality at this time.  2.Mild T2 and FLAIR hyperintense signal foci in the cerebral white matter are nonspecific but likely represent mild chronic small vessel ischemic changes.  3.Mucosal thickening of the paranasal sinuses could indicate acute sinusitis.        Electronically Signed: Larry Luis Eduardo    10/29/2023 4:10 PM EDT    Workstation ID: [id number]    XR Shoulder 2+ View Right [[id number]] Collected: 10/29/23 1414     Updated: 10/29/23 1419    Narrative:        XR SHOULDER 2+ VW RIGHT    Date of Exam: 10/29/2023 1:57 PM EDT    Indication: Trauma    Comparison: None available.    FINDINGS:    No definite  acute fracture or malalignment is seen. Glenohumeral joint is not well evaluated due to positioning. There is suspected osteophyte formation with probable mild to moderate joint space narrowing. There appear to be moderate degenerative   changes at the acromioclavicular joint. Calcifications projecting along the posterior humeral head may represent calcific tendinopathy. No other definite soft tissue abnormality.      Impression:      1.No definite radiographic findings of acute osseous shoulder abnormality.  2.Glenohumeral joint is not well evaluated due to positioning. There is suspected mild to moderate osteoarthritis.  3.Moderate degenerative changes at the acromioclavicular joint.        Electronically Signed: Larry Hoff    10/29/2023 2:16 PM EDT    Workstation ID: [id number]    XR Chest 1 View [[id number]] Collected: 10/29/23 1359     Updated: 10/29/23 1403    Narrative:        XR CHEST 1 VW    Date of Exam: 10/29/2023 1:47 PM EDT    Indication: Acute Stroke Protocol (onset < 12 hrs)    Comparison: None available.    FINDINGS:  There are prominent interstitial markings and mild left basilar opacities. No significant focal consolidation. No pneumothorax or significant pleural effusion. The heart appears mildly enlarged. Status post median sternotomy and CABG. Mediastinal contour   appears within normal limits.      Impression:      1.Prominent interstitial markings and mild left basilar opacities which could represent mild edema, pneumonia, or chronic lung disease.  2.Mild cardiomegaly. Status post median sternotomy and CABG.        Electronically Signed: Larry Hoff    10/29/2023 2:00 PM EDT    Workstation ID: [id number]    CT Angiogram Head w AI Analysis of LVO [[id number]] Collected: 10/29/23 1335     Updated: 10/29/23 1352    Narrative:      CT ANGIOGRAM HEAD W AI ANALYSIS OF LVO, CT ANGIOGRAM NECK    Date of Exam: 10/29/2023 1:13 PM EDT    Indication: Neuro deficit, acute stroke suspected  Neuro deficit,  acute stroke suspected.    Comparison: CT head and CT perfusion performed the same day.    Technique: CTA of the head and neck was performed after the uneventful intravenous administration of iodinated contrast. Reconstructed coronal and sagittal images were also obtained. In addition, a 3-D volume rendered image was created for   interpretation. Automated exposure control and iterative reconstruction methods were used.     Findings:  Head: The vertebral and basilar arteries appear somewhat diminutive. There is fetal origin of the right posterior cerebral artery and partial fetal origin on the left. The posterior cerebral arteries appear grossly symmetric. No definite proximal   occlusion or definite high-grade stenosis.    There is calcific atherosclerosis of the cavernous portions of the internal carotid arteries. No high-grade stenosis is seen. The middle cerebral arteries appear to opacify as expected. There is a small right A1 segment of the anterior cerebral artery,   likely a normal variant. There is a patent anterior communicating artery. The anterior cerebral arteries are patent distally and grossly symmetric. No definite focal occlusion or high-grade stenosis is identified in the bilateral M1 and M2 segments of   the middle cerebral arteries. No other definite acute intracranial vascular abnormality is seen.    Neck: Status post median sternotomy and CABG. The visualized central pulmonary arteries appear to opacify as expected. Visualized aortic arch appears within normal limits. There is bovine type branching anatomy with common origin of the right   brachiocephalic and left common carotid arteries. The left common carotid and external carotid arteries are patent. There is minimal atherosclerosis at the carotid bifurcation. There is tortuosity of the internal carotid artery. No stenosis of greater   than 50% is identified by NASCET criteria.    Right brachiocephalic artery appears patent. Common carotid  and external carotid arteries are patent. There is also tortuosity of the right internal carotid artery without stenosis of greater than 50% by NASCET criteria.    The subclavian arteries appear patent. The vertebral arteries appear patent. The vertebral arteries appear somewhat diminutive, likely normal variant as there appears to be fetal origin of the posterior cerebral arteries.    No other definite acute vascular abnormality is seen.    Nonvascular: No acute intracranial abnormality is identified. There is paranasal sinus mucosal thickening. No acute infiltrate is seen in the lung apices. 7 mm hypodensity is seen in the right lobe of thyroid.    There is mild to moderate multilevel disc and facet joint degeneration in the cervical spine.      Impression:      Impression:  1.No definite findings of acute intracranial large vessel occlusion.  2.No significant stenosis of greater than 50% is seen at the bilateral internal carotid arteries at this time.       Electronically Signed: Larry Hoff    10/29/2023 1:49 PM EDT    Workstation ID: [id number]    CT Angiogram Neck [[id number]] Collected: 10/29/23 1335     Updated: 10/29/23 1352    Narrative:      CT ANGIOGRAM HEAD W AI ANALYSIS OF LVO, CT ANGIOGRAM NECK    Date of Exam: 10/29/2023 1:13 PM EDT    Indication: Neuro deficit, acute stroke suspected  Neuro deficit, acute stroke suspected.    Comparison: CT head and CT perfusion performed the same day.    Technique: CTA of the head and neck was performed after the uneventful intravenous administration of iodinated contrast. Reconstructed coronal and sagittal images were also obtained. In addition, a 3-D volume rendered image was created for   interpretation. Automated exposure control and iterative reconstruction methods were used.     Findings:  Head: The vertebral and basilar arteries appear somewhat diminutive. There is fetal origin of the right posterior cerebral artery and partial fetal origin on the left. The  posterior cerebral arteries appear grossly symmetric. No definite proximal   occlusion or definite high-grade stenosis.    There is calcific atherosclerosis of the cavernous portions of the internal carotid arteries. No high-grade stenosis is seen. The middle cerebral arteries appear to opacify as expected. There is a small right A1 segment of the anterior cerebral artery,   likely a normal variant. There is a patent anterior communicating artery. The anterior cerebral arteries are patent distally and grossly symmetric. No definite focal occlusion or high-grade stenosis is identified in the bilateral M1 and M2 segments of   the middle cerebral arteries. No other definite acute intracranial vascular abnormality is seen.    Neck: Status post median sternotomy and CABG. The visualized central pulmonary arteries appear to opacify as expected. Visualized aortic arch appears within normal limits. There is bovine type branching anatomy with common origin of the right   brachiocephalic and left common carotid arteries. The left common carotid and external carotid arteries are patent. There is minimal atherosclerosis at the carotid bifurcation. There is tortuosity of the internal carotid artery. No stenosis of greater   than 50% is identified by NASCET criteria.    Right brachiocephalic artery appears patent. Common carotid and external carotid arteries are patent. There is also tortuosity of the right internal carotid artery without stenosis of greater than 50% by NASCET criteria.    The subclavian arteries appear patent. The vertebral arteries appear patent. The vertebral arteries appear somewhat diminutive, likely normal variant as there appears to be fetal origin of the posterior cerebral arteries.    No other definite acute vascular abnormality is seen.    Nonvascular: No acute intracranial abnormality is identified. There is paranasal sinus mucosal thickening. No acute infiltrate is seen in the lung apices. 7 mm  hypodensity is seen in the right lobe of thyroid.    There is mild to moderate multilevel disc and facet joint degeneration in the cervical spine.      Impression:      Impression:  1.No definite findings of acute intracranial large vessel occlusion.  2.No significant stenosis of greater than 50% is seen at the bilateral internal carotid arteries at this time.       Electronically Signed: Larry Hoff    10/29/2023 1:49 PM EDT    Workstation ID: [id number]    CT CEREBRAL PERFUSION WITH & WITHOUT CONTRAST [[id number]] Collected: 10/29/23 1332     Updated: 10/29/23 1338    Narrative:      CT CEREBRAL PERFUSION W WO CONTRAST    Date of Exam: 10/29/2023 1:13 PM EDT    Indication: Neuro deficit, acute stroke suspected.     Comparison: CT head performed the same day.    Technique: Axial CT images of the brain were obtained prior to and after the administration of 115 mL Isovue-370. Core blood volume, core blood flow, mean transit time, and Tmax images were obtained utilizing the Rapid software protocol. A limited CT   angiogram of the head was also performed to measure the blood vessel density.    The radiation dose reduction device was turned on for each scan per the ALARA (As Low as Reasonably Achievable) protocol.        FINDINGS:     CT Perfusion:  CBF (<30%) volume: 0 mL  Tmax (>6.0s) volume: 0 mL  Mismatch volume: 0 mL  Mismatch ratio: None    No definite perfusion abnormality is seen in the visualized parenchyma to indicate ischemia or infarct.      Impression:      No CT perfusion evidence of acute infarction or ischemia.        Electronically Signed: Larry Hoff    10/29/2023 1:35 PM EDT    Workstation ID: [id number]    CT Head Without Contrast Stroke Protocol [[id number]] Collected: 10/29/23 1314     Updated: 10/29/23 1321    Narrative:      CT HEAD WO CONTRAST STROKE PROTOCOL    Date of Exam: 10/29/2023 1:03 PM EDT    Indication: Neuro deficit, acute, stroke suspected  Neuro deficit, acute stroke  suspected.    Comparison: None available.    Technique: Axial CT images were obtained of the head without contrast administration.  Reconstructed coronal images were also obtained. Automated exposure control and iterative construction methods were used.    Scan Time: 1:11 p.m.  Results discussed with stroke team navigator at 1:17 p.m      FINDINGS:  No midline shift. Basal cisterns appear patent.  Ventricles and sulci appear within normal limits for patient age.    No extra-axial collection or acute hemorrhage is identified.  No definite mass lesion, edema, or significant mass effect is seen.  There is hypoattenuation in the white matter, which is nonspecific, but is most commonly seen with chronic small vessel   ischemic changes. No definite CT findings of acute infarction.    There is paranasal sinus mucosal thickening. Mastoid air cells appear clear.  No acute calvarial abnormality is identified.      Impression:      1.No definite CT findings of acute intracranial abnormality. Probable mild chronic small vessel ischemic changes.  2.Paranasal sinus mucosal thickening which could indicate sinusitis.        Electronically Signed: Larry Hoff    10/29/2023 1:18 PM EDT    Workstation ID: [id number]              Impression:     --acute gram-negative mavis sepsis/septicemia, urinary source with a gram-negative mavis complicated UTI associated with abnormal CT scan and  left renal abscess status post drain on October 30, further complicated by hydronephrosis/stone and urology following for interventional plans.  IV Merrem ongoing with plans to taper antibiotics once further data. Sepsis parameters improving    --acute E Coli complicated UTI, probable left pyelonephritis with abnormal CT scan/drain c/w  left renal abscess, hydronephrosis and plans for intervention by urology.      --Acute kidney injury per medicine team, baseline unclear; certainly at risk for prerenal etiology with sepsis at admission and risk for ATN.   Monitor to help guide future adjustments in antimicrobials    --history fall with right arm weakness and further workup/management at discretion of medicine team/neurology.  Some concern mention for brachial plexus injury    --elevated CPK, rhabdomyolysis.  Recent fall    PLAN:       --IV Merrem; taper once further data    --Check/review labs cultures and scans     --Partial history per nursing staff     --Discussed with microbiology     --Urology following; Intervention planned     --Discussed with pharmacy     --Highly complex at of issues with high risk for further serious morbidity and other serious sequela    --d/w Dr Ellis    Copied text in this note has been reviewed and is accurate as of 10/31/23.     Joel Ham MD  10/31/2023

## 2023-10-31 NOTE — ANESTHESIA POSTPROCEDURE EVALUATION
Patient: Aldair Narvaez    Procedure Summary       Date: 10/31/23 [facility] / [facility]:  [facility]    Anesthesia Start: 0725 Anesthesia Stop: 0827    Procedure: CYSTOSCOPY RETROGRADE PYELOGRAM STENT PLACEMENT (Left) Diagnosis:       Hydronephrosis of left kidney      Acute pyelonephritis      Sepsis due to urinary tract infection      (Hydronephrosis of left kidney [N13.30])      (Acute pyelonephritis [N10])      (Sepsis due to urinary tract infection [A41.9, N39.0])    Surgeons: Isma Justin MD Provider: Dennis Araujo MD    Anesthesia Type: general ASA Status: 3            Anesthesia Type: general    Vitals  Vitals Value Taken Time   /84 10/31/23 0825   Temp     Pulse 102 10/31/23 0827   Resp     SpO2 94 % 10/31/23 0827   Vitals shown include unfiled device data.        Post Anesthesia Care and Evaluation    Patient location during evaluation: PACU  Patient participation: complete - patient participated  Level of consciousness: sleepy but conscious  Pain score: 0  Pain management: adequate    Airway patency: patent  Anesthetic complications: No anesthetic complications  PONV Status: none  Cardiovascular status: hemodynamically stable and acceptable  Respiratory status: nonlabored ventilation, acceptable, nasal cannula and spontaneous ventilation  Hydration status: acceptable

## 2023-11-01 LAB
ALBUMIN SERPL-MCNC: 2.6 G/DL (ref 3.5–5.2)
ALBUMIN/GLOB SERPL: 0.9 G/DL
ALP SERPL-CCNC: 77 U/L (ref 39–117)
ALT SERPL W P-5'-P-CCNC: 69 U/L (ref 1–41)
ANION GAP SERPL CALCULATED.3IONS-SCNC: 10 MMOL/L (ref 5–15)
AST SERPL-CCNC: 120 U/L (ref 1–40)
BACTERIA FLD CULT: ABNORMAL
BACTERIA SPEC AEROBE CULT: ABNORMAL
BILIRUB SERPL-MCNC: 0.3 MG/DL (ref 0–1.2)
BUN SERPL-MCNC: 21 MG/DL (ref 8–23)
BUN/CREAT SERPL: 36.2 (ref 7–25)
CALCIUM SPEC-SCNC: 7.7 MG/DL (ref 8.6–10.5)
CHLORIDE SERPL-SCNC: 106 MMOL/L (ref 98–107)
CK SERPL-CCNC: 4397 U/L (ref 20–200)
CO2 SERPL-SCNC: 21 MMOL/L (ref 22–29)
CREAT SERPL-MCNC: 0.58 MG/DL (ref 0.76–1.27)
DEPRECATED RDW RBC AUTO: 50.3 FL (ref 37–54)
EGFRCR SERPLBLD CKD-EPI 2021: 104.3 ML/MIN/1.73
ERYTHROCYTE [DISTWIDTH] IN BLOOD BY AUTOMATED COUNT: 15.8 % (ref 12.3–15.4)
GLOBULIN UR ELPH-MCNC: 3 GM/DL
GLUCOSE BLDC GLUCOMTR-MCNC: 186 MG/DL (ref 70–130)
GLUCOSE BLDC GLUCOMTR-MCNC: 202 MG/DL (ref 70–130)
GLUCOSE BLDC GLUCOMTR-MCNC: 214 MG/DL (ref 70–130)
GLUCOSE BLDC GLUCOMTR-MCNC: 225 MG/DL (ref 70–130)
GLUCOSE BLDC GLUCOMTR-MCNC: 262 MG/DL (ref 70–130)
GLUCOSE BLDC GLUCOMTR-MCNC: 287 MG/DL (ref 70–130)
GLUCOSE SERPL-MCNC: 229 MG/DL (ref 65–99)
GRAM STN SPEC: ABNORMAL
HCT VFR BLD AUTO: 37.2 % (ref 37.5–51)
HGB BLD-MCNC: 12.2 G/DL (ref 13–17.7)
ISOLATED FROM: ABNORMAL
MCH RBC QN AUTO: 28.3 PG (ref 26.6–33)
MCHC RBC AUTO-ENTMCNC: 32.8 G/DL (ref 31.5–35.7)
MCV RBC AUTO: 86.3 FL (ref 79–97)
PLATELET # BLD AUTO: 99 10*3/MM3 (ref 140–450)
PMV BLD AUTO: 10.5 FL (ref 6–12)
POTASSIUM SERPL-SCNC: 3.8 MMOL/L (ref 3.5–5.2)
PROT SERPL-MCNC: 5.6 G/DL (ref 6–8.5)
RBC # BLD AUTO: 4.31 10*6/MM3 (ref 4.14–5.8)
SODIUM SERPL-SCNC: 137 MMOL/L (ref 136–145)
WBC NRBC COR # BLD: 10.5 10*3/MM3 (ref 3.4–10.8)

## 2023-11-01 PROCEDURE — 82550 ASSAY OF CK (CPK): CPT | Performed by: INTERNAL MEDICINE

## 2023-11-01 PROCEDURE — 85027 COMPLETE CBC AUTOMATED: CPT | Performed by: STUDENT IN AN ORGANIZED HEALTH CARE EDUCATION/TRAINING PROGRAM

## 2023-11-01 PROCEDURE — 99232 SBSQ HOSP IP/OBS MODERATE 35: CPT | Performed by: STUDENT IN AN ORGANIZED HEALTH CARE EDUCATION/TRAINING PROGRAM

## 2023-11-01 PROCEDURE — 25010000002 ENOXAPARIN PER 10 MG: Performed by: STUDENT IN AN ORGANIZED HEALTH CARE EDUCATION/TRAINING PROGRAM

## 2023-11-01 PROCEDURE — 25010000002 CEFTRIAXONE PER 250 MG: Performed by: INTERNAL MEDICINE

## 2023-11-01 PROCEDURE — 99232 SBSQ HOSP IP/OBS MODERATE 35: CPT

## 2023-11-01 PROCEDURE — 97530 THERAPEUTIC ACTIVITIES: CPT

## 2023-11-01 PROCEDURE — 80053 COMPREHEN METABOLIC PANEL: CPT | Performed by: STUDENT IN AN ORGANIZED HEALTH CARE EDUCATION/TRAINING PROGRAM

## 2023-11-01 PROCEDURE — 97166 OT EVAL MOD COMPLEX 45 MIN: CPT

## 2023-11-01 PROCEDURE — 97162 PT EVAL MOD COMPLEX 30 MIN: CPT

## 2023-11-01 PROCEDURE — 25010000002 MEROPENEM PER 100 MG: Performed by: STUDENT IN AN ORGANIZED HEALTH CARE EDUCATION/TRAINING PROGRAM

## 2023-11-01 PROCEDURE — 82948 REAGENT STRIP/BLOOD GLUCOSE: CPT

## 2023-11-01 PROCEDURE — 63710000001 INSULIN LISPRO (HUMAN) PER 5 UNITS: Performed by: STUDENT IN AN ORGANIZED HEALTH CARE EDUCATION/TRAINING PROGRAM

## 2023-11-01 PROCEDURE — 99233 SBSQ HOSP IP/OBS HIGH 50: CPT | Performed by: NURSE PRACTITIONER

## 2023-11-01 PROCEDURE — 25810000003 SODIUM CHLORIDE 0.9 % SOLUTION: Performed by: INTERNAL MEDICINE

## 2023-11-01 RX ORDER — ENOXAPARIN SODIUM 100 MG/ML
40 INJECTION SUBCUTANEOUS NIGHTLY
Status: DISCONTINUED | OUTPATIENT
Start: 2023-11-01 | End: 2023-11-04

## 2023-11-01 RX ORDER — VALSARTAN 160 MG/1
80 TABLET ORAL
Status: DISCONTINUED | OUTPATIENT
Start: 2023-11-01 | End: 2023-11-05

## 2023-11-01 RX ORDER — ECHINACEA PURPUREA EXTRACT 125 MG
1 TABLET ORAL AS NEEDED
Status: DISCONTINUED | OUTPATIENT
Start: 2023-11-01 | End: 2023-11-07 | Stop reason: HOSPADM

## 2023-11-01 RX ORDER — METOPROLOL SUCCINATE 50 MG/1
50 TABLET, EXTENDED RELEASE ORAL DAILY
Status: DISCONTINUED | OUTPATIENT
Start: 2023-11-01 | End: 2023-11-07 | Stop reason: HOSPADM

## 2023-11-01 RX ORDER — HYDROCHLOROTHIAZIDE 12.5 MG/1
12.5 TABLET ORAL
Status: DISCONTINUED | OUTPATIENT
Start: 2023-11-01 | End: 2023-11-05

## 2023-11-01 RX ADMIN — TAMSULOSIN HYDROCHLORIDE 0.4 MG: 0.4 CAPSULE ORAL at 08:36

## 2023-11-01 RX ADMIN — METOPROLOL SUCCINATE 50 MG: 50 TABLET, EXTENDED RELEASE ORAL at 09:39

## 2023-11-01 RX ADMIN — VALSARTAN 80 MG: 160 TABLET, FILM COATED ORAL at 09:39

## 2023-11-01 RX ADMIN — CEFTRIAXONE SODIUM 2000 MG: 2 INJECTION, POWDER, FOR SOLUTION INTRAMUSCULAR; INTRAVENOUS at 09:39

## 2023-11-01 RX ADMIN — INSULIN LISPRO 4 UNITS: 100 INJECTION, SOLUTION INTRAVENOUS; SUBCUTANEOUS at 20:43

## 2023-11-01 RX ADMIN — INSULIN LISPRO 3 UNITS: 100 INJECTION, SOLUTION INTRAVENOUS; SUBCUTANEOUS at 17:12

## 2023-11-01 RX ADMIN — ENOXAPARIN SODIUM 40 MG: 100 INJECTION SUBCUTANEOUS at 20:43

## 2023-11-01 RX ADMIN — INSULIN LISPRO 2 UNITS: 100 INJECTION, SOLUTION INTRAVENOUS; SUBCUTANEOUS at 05:09

## 2023-11-01 RX ADMIN — Medication 10 ML: at 08:37

## 2023-11-01 RX ADMIN — INSULIN LISPRO 3 UNITS: 100 INJECTION, SOLUTION INTRAVENOUS; SUBCUTANEOUS at 13:22

## 2023-11-01 RX ADMIN — SODIUM CHLORIDE 100 ML/HR: 9 INJECTION, SOLUTION INTRAVENOUS at 01:47

## 2023-11-01 RX ADMIN — INSULIN LISPRO 2 UNITS: 100 INJECTION, SOLUTION INTRAVENOUS; SUBCUTANEOUS at 08:36

## 2023-11-01 RX ADMIN — MEROPENEM 1000 MG: 1 INJECTION, POWDER, FOR SOLUTION INTRAVENOUS at 05:08

## 2023-11-01 RX ADMIN — INSULIN LISPRO 4 UNITS: 100 INJECTION, SOLUTION INTRAVENOUS; SUBCUTANEOUS at 00:16

## 2023-11-01 RX ADMIN — HYDROCHLOROTHIAZIDE 12.5 MG: 12.5 CAPSULE ORAL at 09:39

## 2023-11-01 NOTE — PROGRESS NOTES
"Aldair MUIR Narvaez  1950  [id number]     Evaluating Physician: Joel Ham MD    Chief Complaint: fatigue    Reason for Consultation: pyelonephritis, blood cultures positive    History of present illness:     Patient is a 73 y.o.  Yr old male with history of prostate cancer and renal calculi, admitted in May 2023 requiring left ureteral stent, sepsis presentation with E. Coli pyelonephritis/septicemia and positive urine/blood cultures, transitioned to IV ceftriaxone and finished therapy; he presented to the hospital on October 29 with fall, generalized weakness and noted to have acute UTI/sepsis/acute kidney injury per medicine team. Creatinine 2.36 in the emergency room, significantly elevated lactate and white blood cell count of 28K; respiratory panel positive for rhinovirus, blood culture subsequently with gram-negative mavis and urine with gram-negative mavis; CT scan of the abdomen with left upper pole cyst that had increased in size from May with irregular gaseous lucency suspicious for infected renal cyst per radiology along with mild left hydronephrosis/hydroureter; urology has seen and making arrangements for IR consultation for left renal abscess drain versus aspiration; urology plans for cystoscopy/stent on October 31    10/30/23 IR for percutaneous abscess drain    10/31 neuro has seen regarding right arm weakness/paresis with concern for brachial plexopathy after fall, MRIs done, their note seen    10/31  \"Procedure(s):  CYSTOSCOPY   BILATERAL RETROGRADE PYELOGRAM (modifier 50)  LEFT URETERAL STENT PLACEMENT    11/1/23 Gram stain from abscess drain with gram-negative mavis, culture with E. coli.   generally fatigued, same scrapes on both lower extremities from his fall; fever generally improved since admission with some vague left flank pain that is not severe, dull at present, nonradiating, better since admission and does not attach a numerical severity.  Denies hematuria or overt pyuria.  Has had " intermittent urinary frequency prior to admission.  Denies hesitancy    Stable dry cough, denies dyspnea at this time; no hemoptysis.  No headache photophobia or neck stiffness.  Denies nausea vomiting diarrhea or abdominal pain today.  No new skin rash       Past Medical History:   Diagnosis Date    Acid reflux     Arthritis     Benign hypertension     Colon polyp     Concern about heart attack without diagnosis     Coronary artery disease     Coronary atherosclerosis     Heart disease     Hyperlipidemia     Hypertension     Kidney stone     Mixed hyperlipidemia     Obesity     body mass index 30+-obesity    Prostate cancer     Sleep apnea     Type 2 diabetes mellitus     Type 2 diabetes mellitus        Past Surgical History:   Procedure Laterality Date    CARDIAC CATHETERIZATION      CARDIAC SURGERY N/A     [place]    CARPAL TUNNEL RELEASE      CORONARY ARTERY BYPASS GRAFT  08/30/2022    CYSTOSCOPY BLADDER STONE LITHOTRIPSY      CYSTOSCOPY BLADDER STONE LITHOTRIPSY      CYSTOSCOPY W/ URETERAL STENT PLACEMENT Left 05/09/2023    Procedure: CYSTOSCOPY LEFT RETROGRADE PYELOGRAM AND LEFT URETERAL STENT PLACEMENT;  Surgeon: Isma Justin MD;  Location: [facility];  Service: Urology;  Laterality: Left;    FETAL SURGERY FOR CONGENITAL HERNIA      INGUINAL HERNIA REPAIR      KIDNEY STONE SURGERY      KNEE ARTHROSCOPY      PROSTATECTOMY      TRIGGER FINGER RELEASE      UMBILICAL HERNIA REPAIR      UVULOPALATOPHARYNGOPLASTY         Pediatric History   Patient Parents    Not on file     Other Topics Concern    Not on file   Social History Narrative    Not on file       family history includes Diabetes in his sister; Heart attack in his father; Heart disease in his father; Hyperlipidemia in his sister; Hypertension in his sister.    Allergies   Allergen Reactions    Iodine Other (See Comments)     Closes sinuses    Oxycodone-Acetaminophen Itching    Zofran [Ondansetron] Hives               Review of  "Systems    11/1/23     Constitutional-- No   sweats.  Appetite diminished with fatigue.  Heent-- No new vision, hearing or throat complaints.  No epistaxis or oral sores.  Denies odynophagia or dysphagia.  No flashers, floaters or eye pain. No odynophagia or dysphagia. No headache, photophobia or neck stiffness.  CV-- No chest pain, palpitation or syncope  Resp-- see above  GI- see above.  No hematochezia, melena, or hematemesis. Denies jaundice or chronic liver disease.  -- see above  Lymph- no swollen lymph nodes in neck/axilla or groin.   Heme- No active bruising or bleeding; no Hx of DVT or PE.  MS-- no swelling or pain in the bones or joints of arms/legs.  No new back pain.  Neuro-- right arm weakness associated with fall and some concern by medicine team for brachial plexus injury.  No seizures.    Full 12 point review of systems reviewed and negative otherwise for acute complaints, except for above    Physical Exam:   Vital Signs   /87 (BP Location: Left arm, Patient Position: Lying)   Pulse 91   Temp 98.3 °F (36.8 °C) (Oral)   Resp 18   Ht 177.8 cm (70\")   Wt 103 kg (227 lb 1.2 oz)   SpO2 92%   BMI 32.58 kg/m²     GENERAL: sleepy, in no acute distress. Generally debilitated  HEENT: Normocephalic, atraumatic.  No conjunctival injection. No icterus. Oropharynx clear without evidence of thrush or exudate. No evidence of peridontal disease.    NECK: Supple without nuchal rigidity. No mass.   HEART: RRR; No murmur, rubs, gallops.   LUNGS: diminished at bases but otherwise Clear to auscultation bilaterally without wheezing, rales, rhonchi. Normal respiratory effort. Nonlabored. No dullness.  ABDOMEN: Soft, nontender, nondistended. Positive bowel sounds. No rebound or guarding. NO mass or HSM.  EXT:  multifocal excoriations both lower extremities associated with recent fall.  No redness induration or warmth.  No discrete mass bulge or fluctuance.  No crepitus or bulla    MSK: FROM without joint " effusions noted arms/legs.    SKIN: Warm and dry without cutaneous eruptions on Inspection/palpation.    NEURO: sleepy    No peripheral stigmata/phenomena of endocarditis    IV without obvious redness or drainage    Laboratory Data    Results from last 7 days   Lab Units 11/01/23  0524 10/30/23  0320 10/29/23  1259   WBC 10*3/mm3 10.50 14.56* 28.11*   HEMOGLOBIN g/dL 12.2* 13.0 14.7   HEMATOCRIT % 37.2* 39.7 44.9   PLATELETS 10*3/mm3 99* 88* 150     Results from last 7 days   Lab Units 11/01/23  0524   SODIUM mmol/L 137   POTASSIUM mmol/L 3.8   CHLORIDE mmol/L 106   CO2 mmol/L 21.0*   BUN mg/dL 21   CREATININE mg/dL 0.58*   GLUCOSE mg/dL 229*   CALCIUM mg/dL 7.7*     Results from last 7 days   Lab Units 11/01/23  0524   ALK PHOS U/L 77   BILIRUBIN mg/dL 0.3   ALT (SGPT) U/L 69*   AST (SGOT) U/L 120*               Estimated Creatinine Clearance: 140.4 mL/min (A) (by C-G formula based on SCr of 0.58 mg/dL (L)).      Microbiology:      Radiology:  Imaging Results (Last 72 Hours)       Procedure Component Value Units Date/Time    MRI Cervical Spine Without Contrast [[id number]] Collected: 11/01/23 0001     Updated: 11/01/23 0026    Narrative:        MRI CERVICAL SPINE WO CONTRAST  MRI BRACHIAL PLEXUS WO CONTRAST    Date of Exam: 10/31/2023 11:39 PM EDT    Indication: neck pain.     Comparison: Correlation with CT angiogram neck 10/29/2023.    Technique:  Routine multiplanar/multisequence sequence images of the cervical spine were obtained without contrast administration.        Findings:  Cervical spine: Seven cervical vertebrae are identified. Alignment is anatomic. There is mild diffuse disc space narrowing and diffuse disc desiccation. Vertebral bodies maintain normal height.  There is no focal bone marrow edema. There is no evidence   of a marrow replacing process. No acute osseous abnormalities. Spinal cord signal is normal.  There is no evidence of cervical lymphadenopathy or a neck mass. The craniocervical  junction appears within normal limits. Limited view of the posterior fossa   contents is unremarkable.     C2-C3: There is minimal posterior disc osteophyte complex. There is mild bilateral facet hypertrophy. No neuroforaminal or spinal canal narrowing.    C3-C4: There is no significant posterior disc osteophyte complex. There is mild left uncovertebral hypertrophy. There is mild bilateral facet hypertrophy. No neuroforaminal or spinal canal narrowing.    C4-C5: There is mild posterior disc osteophyte complex. There is moderate bilateral uncovertebral hypertrophy and moderate facet apparency. There is moderate right neuroforaminal narrowing.    C5-C6: There is mild posterior disc osteophyte complex. There is severe left and mild right facet hypertrophy. No neuroforaminal or spinal canal narrowing.    C6-C7: There is mild posterior disc osteophyte complex. Uncovertebral joints appear normal. There is mild bilateral facet apparency without neuroforaminal or spinal canal narrowing.    C7-T1: The posterior disc is unremarkable there is mild bilateral facet apparency. No significant neural foraminal or spinal canal stenosis.    Right brachial plexus: Exam is somewhat limited without IV contrast. The course of the brachial plexus is unremarkable. The fibers of the brachial plexus appear normal size and signal. There is no associated mass or adenopathy. There is no evidence of   brachial plexus inflammation.    There are multiple muscle groups that exhibit infiltrating high-grade edema including the upper half of the subscapularis, the serratus anterior, multiple right-sided posterior lower cervical muscles and the coracobrachialis and pectoralis major muscles.   There is no evidence of a fluid collection or obvious tendon rupture.      Impression:      Impression:  1.Mild to moderate cervical spondylosis with varying degrees of neuroforaminal narrowing, which is most pronounced and moderate on the right at  C4-C5.  2.Multiple muscle groups exhibiting high-grade infiltrating muscular edema. This could be related to infectious or inflammatory myositis. These could result from a brachial plexopathy although the course and caliber of the brachial plexus is   unremarkable.  3.Mild multilevel cervical disc and facet joint degeneration resulting in varying degrees of neural foraminal narrowing without spinal canal stenosis.        Electronically Signed: Jefferson Block MD    11/1/2023 12:23 AM EDT    Workstation ID: [id number]    MRI brachial plexus wo contrast [[id number]] Collected: 11/01/23 0001     Updated: 11/01/23 0026    Narrative:        MRI CERVICAL SPINE WO CONTRAST  MRI BRACHIAL PLEXUS WO CONTRAST    Date of Exam: 10/31/2023 11:39 PM EDT    Indication: neck pain.     Comparison: Correlation with CT angiogram neck 10/29/2023.    Technique:  Routine multiplanar/multisequence sequence images of the cervical spine were obtained without contrast administration.        Findings:  Cervical spine: Seven cervical vertebrae are identified. Alignment is anatomic. There is mild diffuse disc space narrowing and diffuse disc desiccation. Vertebral bodies maintain normal height.  There is no focal bone marrow edema. There is no evidence   of a marrow replacing process. No acute osseous abnormalities. Spinal cord signal is normal.  There is no evidence of cervical lymphadenopathy or a neck mass. The craniocervical junction appears within normal limits. Limited view of the posterior fossa   contents is unremarkable.     C2-C3: There is minimal posterior disc osteophyte complex. There is mild bilateral facet hypertrophy. No neuroforaminal or spinal canal narrowing.    C3-C4: There is no significant posterior disc osteophyte complex. There is mild left uncovertebral hypertrophy. There is mild bilateral facet hypertrophy. No neuroforaminal or spinal canal narrowing.    C4-C5: There is mild posterior disc osteophyte complex. There is  moderate bilateral uncovertebral hypertrophy and moderate facet apparency. There is moderate right neuroforaminal narrowing.    C5-C6: There is mild posterior disc osteophyte complex. There is severe left and mild right facet hypertrophy. No neuroforaminal or spinal canal narrowing.    C6-C7: There is mild posterior disc osteophyte complex. Uncovertebral joints appear normal. There is mild bilateral facet apparency without neuroforaminal or spinal canal narrowing.    C7-T1: The posterior disc is unremarkable there is mild bilateral facet apparency. No significant neural foraminal or spinal canal stenosis.    Right brachial plexus: Exam is somewhat limited without IV contrast. The course of the brachial plexus is unremarkable. The fibers of the brachial plexus appear normal size and signal. There is no associated mass or adenopathy. There is no evidence of   brachial plexus inflammation.    There are multiple muscle groups that exhibit infiltrating high-grade edema including the upper half of the subscapularis, the serratus anterior, multiple right-sided posterior lower cervical muscles and the coracobrachialis and pectoralis major muscles.   There is no evidence of a fluid collection or obvious tendon rupture.      Impression:      Impression:  1.Mild to moderate cervical spondylosis with varying degrees of neuroforaminal narrowing, which is most pronounced and moderate on the right at C4-C5.  2.Multiple muscle groups exhibiting high-grade infiltrating muscular edema. This could be related to infectious or inflammatory myositis. These could result from a brachial plexopathy although the course and caliber of the brachial plexus is   unremarkable.  3.Mild multilevel cervical disc and facet joint degeneration resulting in varying degrees of neural foraminal narrowing without spinal canal stenosis.        Electronically Signed: Jefferson Block MD    11/1/2023 12:23 AM EDT    Workstation ID: [id number]    XR Pyelogram  Retrograde [[id number]] Collected: 10/31/23 1003     Updated: 10/31/23 1007    Impression:      Impression: A fluoroscopic unit was utilized for a procedure performed in the operating room. No interpretation was requested. Please refer to the operative report regarding findings. Please refer to PACS for patient radiation dose information.    Bilateral retrograde ureteral and pyelograms showing stricture distal ureters. Left double-J stent placed in good position. Left renal abscess drain and multiple pelvic surgical staples also seen. No other incidental findings of significance.    Electronically Signed: Molina Fields MD    10/31/2023 10:04 AM EDT    Workstation ID: [id number]    CT Guided Percutaneous Drain Kidney Renal [[id number]] Collected: 10/30/23 1556     Updated: 10/30/23 1604    Narrative:      CT GUIDED PERCUTANEOUS DRAIN KIDNEY RENAL     History: Abscess     : Molina Fields MD.     Modality: CT     DOSE REDUCTION: The examination was performed according to departmental dose-optimization program which includes automated exposure control, adjustment of the mA and/or kV according to patient size and/or use of iterative reconstruction technique.    Radiation dose: 993 mGy-cm.       SEDATION: Moderate sedation was administered. 1 milligram of Versed and 0 micrograms of fentanyl IV was used for moderate sedation. Total intra service time of sedation was 19 minutes. The sedation was administered and the patient's vital signs monitored   throughout the procedure and recorded in the patient's medical record by the nurse under my direct supervision.    The patient systolic blood pressure was in 90s. He was started on IV saline infusion and fentanyl was not given.    Anesthesia: Lidocaine, local infiltration.            Estimated blood loss:  < 5 cc.            Technique:  A thorough discussion of the risks, benefits, and alternatives of the procedure, blood transfusion if needed, and if applicable,  moderate sedation was carried out with the patient or the patient's next of kin. Any questions were answered. They verbalized   understanding. A written informed consent was then signed.      A timeout was performed prior to starting the procedure.     The procedure room personnel used personal protective equipment. The operators used sterile gowns and gloves in addition. The surgical site was prepped with chlorhexidine gluconate and draped in the maximal sterile fashion.    The patient was laid on the left lateral decubitus on the CT table. A preliminary limited CT scan was performed through the region of interest, if needed with a marking grid in place to localize the target, and to determine an access site, depth and   angle.    After local anesthesia a dermatotomy was performed if needed. Using CT guidance, an access needle was advanced into the target, gas-containing low-density area in the upper anterior cortex of the left kidney. Blood-tinged liquid was recovered. A   guidewire was positioned in the collection. Over the wire following sequential dilatation of the track, an 8.5 Georgian Farias-Romero drainage catheter was placed. The catheter was secured to skin with nonabsorbable suture. The catheter was connected to a   fluid recovery system. An aseptic dressing was applied.    A limited post-procedure CT was done.    The patient was transferred to the recovery area and then discharged from the department in stable condition.     Complications: None immediate.    Specimen: The specimen was labeled and sent to the lab in appropriate carriers & containers if such was requested by the ordering provider       Impression:      Impression:                                                                Successful CT guided percutaneous abscess drainage catheter placement in a left renal abscess. An 8.5 Georgian Farias-Romero drainage catheter was placed.    Drain under dry. Flush with 5 cc of normal saline at least  once a day. Once the output drops to 0, reimage.    Thank you for the opportunity to assist in the care of your patient.        Electronically Signed: Molina Fields MD    10/30/2023 4:01 PM EDT    Workstation ID: [id number]    CT Abdomen Pelvis Without Contrast [[id number]] Collected: 10/29/23 2001     Updated: 10/29/23 2020    Narrative:      CT ABDOMEN PELVIS WO CONTRAST    Date of Exam: 10/29/2023 7:17 PM EDT    Indication: Flank pain, kidney stone suspected  septic with UTI HO obstruction.    Comparison: May 8, 2023. Renal ultrasound October 17, 2023.    Technique: Axial CT images were obtained of the abdomen and pelvis without the administration of contrast. Reconstructed coronal and sagittal images were also obtained. Automated exposure control and iterative construction methods were used.      Findings:  There is suspected dependent atelectasis in the lower lobes. Status post median sternotomy. There is calcific atherosclerosis of the aorta. No significant pleural or pericardial effusion is seen. Heart size appears within normal limits.    There is excreted contrast in the renal collecting systems, ureters, and bladder from CT angiography performed the same day. Contrast does limit assessment for urinary tract calculi.    There is mild left hydronephrosis and hydroureter which appears similar to the prior exam. The distal left ureter is nonopacified and nondilated. No clear obstructing etiology is identified. There is a hypodense lesion at the anterior upper pole of the   left kidney which was present on the prior exam suggestive of a renal cyst, but on the current exam, the lesion measures up to approximately 3.7 cm, previously 2.9 cm, and there is now an irregular gaseous lucency within the lesion. There is some   surrounding fat stranding which appears mildly increased. These findings are concerning for superimposed gas forming infection within a renal cyst.    Probable right renal cyst, as before. No  definite new right renal abnormality. Right ureter appears patent. No definite right hydronephrosis.    The bladder is distended with contrast. No suspicious focal bladder abnormality is seen. No significant bladder wall thickening. Multiple surgical clips are seen in the pelvis. There has been prostatectomy.    Hyperdensity within the gallbladder suggesting cholelithiasis, as before. No suspicious hepatic abnormality is seen. No definite splenomegaly. No suspicious adrenal lesion. No acute pancreatic abnormality. There is atherosclerosis of the aorta. No   definite lymphadenopathy. No acute gastric abnormality. No small bowel dilatation. There is diverticulosis of the colon. No definite acute colonic or rectal abnormality is seen. No significant pelvic lymphadenopathy. There is midline abdominal scarring.    There are degenerative changes in the bilateral hips and in the thoracolumbar spine.      Impression:      Impression:  1.Left upper pole cyst appears increased in size compared to May 2023 and now contains irregular gaseous lucency with some new mild surrounding fat stranding. Findings are suspicious for infected renal cyst with gas-forming infection. Also recommend   correlation recent surgery as this could also be related to recent intervention.  2.Mild left hydronephrosis and hydroureter appears similar to the prior exam. The distal left ureter is nonopacified and nondilated. No clear obstructing etiology is identified.  3.Excreted contrast from prior CTA in the renal collecting systems, ureters, and bladder. This limits assessment for urinary tract calculi.  4.Cholelithiasis.  5.Calcific atherosclerosis.  6.Postoperative changes in the pelvis with evidence of prostatectomy.        Electronically Signed: Larry Hoff    10/29/2023 8:16 PM EDT    Workstation ID: [id number]    MRI Brain Without Contrast [[id number]] Collected: 10/29/23 1605     Updated: 10/29/23 1613    Narrative:      MRI BRAIN WO  CONTRAST    Date of Exam: 10/29/2023 3:30 PM EDT    Indication: Stroke, follow up.     Comparison: CT head, CT perfusion, and CTA head and neck performed the same day    Technique:  Routine multiplanar/multisequence sequence images of the brain were obtained without contrast administration.      Findings:  No midline shift. The ventricles and sulci appear within normal limits for patient's age. Basal cisterns appear patent. The pituitary gland appears to have normal morphology. Cerebellar tonsils appear normally positioned.    No definite extra-axial collection and no definite findings of acute or chronic hemorrhage. No mass lesion, mass effect, or edema is identified. Mild T2 and FLAIR hyperintense signal foci are seen in the cerebral white matter. No definite restricted   diffusion is identified at this time.    There is mucosal thickening of the paranasal sinuses. Mastoid air cells appear clear. Globes and orbits appear unremarkable. No definite calvarial abnormality is identified.        Impression:      Impression:  1.No definite findings of acute intracranial abnormality at this time.  2.Mild T2 and FLAIR hyperintense signal foci in the cerebral white matter are nonspecific but likely represent mild chronic small vessel ischemic changes.  3.Mucosal thickening of the paranasal sinuses could indicate acute sinusitis.        Electronically Signed: Larry Hoff    10/29/2023 4:10 PM EDT    Workstation ID: [id number]    XR Shoulder 2+ View Right [[id number]] Collected: 10/29/23 1414     Updated: 10/29/23 1419    Narrative:        XR SHOULDER 2+ VW RIGHT    Date of Exam: 10/29/2023 1:57 PM EDT    Indication: Trauma    Comparison: None available.    FINDINGS:    No definite acute fracture or malalignment is seen. Glenohumeral joint is not well evaluated due to positioning. There is suspected osteophyte formation with probable mild to moderate joint space narrowing. There appear to be moderate degenerative   changes at  the acromioclavicular joint. Calcifications projecting along the posterior humeral head may represent calcific tendinopathy. No other definite soft tissue abnormality.      Impression:      1.No definite radiographic findings of acute osseous shoulder abnormality.  2.Glenohumeral joint is not well evaluated due to positioning. There is suspected mild to moderate osteoarthritis.  3.Moderate degenerative changes at the acromioclavicular joint.        Electronically Signed: Larry Hoff    10/29/2023 2:16 PM EDT    Workstation ID: [id number]    XR Chest 1 View [[id number]] Collected: 10/29/23 1359     Updated: 10/29/23 1403    Narrative:        XR CHEST 1 VW    Date of Exam: 10/29/2023 1:47 PM EDT    Indication: Acute Stroke Protocol (onset < 12 hrs)    Comparison: None available.    FINDINGS:  There are prominent interstitial markings and mild left basilar opacities. No significant focal consolidation. No pneumothorax or significant pleural effusion. The heart appears mildly enlarged. Status post median sternotomy and CABG. Mediastinal contour   appears within normal limits.      Impression:      1.Prominent interstitial markings and mild left basilar opacities which could represent mild edema, pneumonia, or chronic lung disease.  2.Mild cardiomegaly. Status post median sternotomy and CABG.        Electronically Signed: Larry Hoff    10/29/2023 2:00 PM EDT    Workstation ID: [id number]    CT Angiogram Head w AI Analysis of LVO [[id number]] Collected: 10/29/23 1335     Updated: 10/29/23 1352    Narrative:      CT ANGIOGRAM HEAD W AI ANALYSIS OF LVO, CT ANGIOGRAM NECK    Date of Exam: 10/29/2023 1:13 PM EDT    Indication: Neuro deficit, acute stroke suspected  Neuro deficit, acute stroke suspected.    Comparison: CT head and CT perfusion performed the same day.    Technique: CTA of the head and neck was performed after the uneventful intravenous administration of iodinated contrast. Reconstructed coronal and sagittal images  were also obtained. In addition, a 3-D volume rendered image was created for   interpretation. Automated exposure control and iterative reconstruction methods were used.     Findings:  Head: The vertebral and basilar arteries appear somewhat diminutive. There is fetal origin of the right posterior cerebral artery and partial fetal origin on the left. The posterior cerebral arteries appear grossly symmetric. No definite proximal   occlusion or definite high-grade stenosis.    There is calcific atherosclerosis of the cavernous portions of the internal carotid arteries. No high-grade stenosis is seen. The middle cerebral arteries appear to opacify as expected. There is a small right A1 segment of the anterior cerebral artery,   likely a normal variant. There is a patent anterior communicating artery. The anterior cerebral arteries are patent distally and grossly symmetric. No definite focal occlusion or high-grade stenosis is identified in the bilateral M1 and M2 segments of   the middle cerebral arteries. No other definite acute intracranial vascular abnormality is seen.    Neck: Status post median sternotomy and CABG. The visualized central pulmonary arteries appear to opacify as expected. Visualized aortic arch appears within normal limits. There is bovine type branching anatomy with common origin of the right   brachiocephalic and left common carotid arteries. The left common carotid and external carotid arteries are patent. There is minimal atherosclerosis at the carotid bifurcation. There is tortuosity of the internal carotid artery. No stenosis of greater   than 50% is identified by NASCET criteria.    Right brachiocephalic artery appears patent. Common carotid and external carotid arteries are patent. There is also tortuosity of the right internal carotid artery without stenosis of greater than 50% by NASCET criteria.    The subclavian arteries appear patent. The vertebral arteries appear patent. The vertebral  arteries appear somewhat diminutive, likely normal variant as there appears to be fetal origin of the posterior cerebral arteries.    No other definite acute vascular abnormality is seen.    Nonvascular: No acute intracranial abnormality is identified. There is paranasal sinus mucosal thickening. No acute infiltrate is seen in the lung apices. 7 mm hypodensity is seen in the right lobe of thyroid.    There is mild to moderate multilevel disc and facet joint degeneration in the cervical spine.      Impression:      Impression:  1.No definite findings of acute intracranial large vessel occlusion.  2.No significant stenosis of greater than 50% is seen at the bilateral internal carotid arteries at this time.       Electronically Signed: Larry Hoff    10/29/2023 1:49 PM EDT    Workstation ID: [id number]    CT Angiogram Neck [[id number]] Collected: 10/29/23 1335     Updated: 10/29/23 1352    Narrative:      CT ANGIOGRAM HEAD W AI ANALYSIS OF LVO, CT ANGIOGRAM NECK    Date of Exam: 10/29/2023 1:13 PM EDT    Indication: Neuro deficit, acute stroke suspected  Neuro deficit, acute stroke suspected.    Comparison: CT head and CT perfusion performed the same day.    Technique: CTA of the head and neck was performed after the uneventful intravenous administration of iodinated contrast. Reconstructed coronal and sagittal images were also obtained. In addition, a 3-D volume rendered image was created for   interpretation. Automated exposure control and iterative reconstruction methods were used.     Findings:  Head: The vertebral and basilar arteries appear somewhat diminutive. There is fetal origin of the right posterior cerebral artery and partial fetal origin on the left. The posterior cerebral arteries appear grossly symmetric. No definite proximal   occlusion or definite high-grade stenosis.    There is calcific atherosclerosis of the cavernous portions of the internal carotid arteries. No high-grade stenosis is seen. The  middle cerebral arteries appear to opacify as expected. There is a small right A1 segment of the anterior cerebral artery,   likely a normal variant. There is a patent anterior communicating artery. The anterior cerebral arteries are patent distally and grossly symmetric. No definite focal occlusion or high-grade stenosis is identified in the bilateral M1 and M2 segments of   the middle cerebral arteries. No other definite acute intracranial vascular abnormality is seen.    Neck: Status post median sternotomy and CABG. The visualized central pulmonary arteries appear to opacify as expected. Visualized aortic arch appears within normal limits. There is bovine type branching anatomy with common origin of the right   brachiocephalic and left common carotid arteries. The left common carotid and external carotid arteries are patent. There is minimal atherosclerosis at the carotid bifurcation. There is tortuosity of the internal carotid artery. No stenosis of greater   than 50% is identified by NASCET criteria.    Right brachiocephalic artery appears patent. Common carotid and external carotid arteries are patent. There is also tortuosity of the right internal carotid artery without stenosis of greater than 50% by NASCET criteria.    The subclavian arteries appear patent. The vertebral arteries appear patent. The vertebral arteries appear somewhat diminutive, likely normal variant as there appears to be fetal origin of the posterior cerebral arteries.    No other definite acute vascular abnormality is seen.    Nonvascular: No acute intracranial abnormality is identified. There is paranasal sinus mucosal thickening. No acute infiltrate is seen in the lung apices. 7 mm hypodensity is seen in the right lobe of thyroid.    There is mild to moderate multilevel disc and facet joint degeneration in the cervical spine.      Impression:      Impression:  1.No definite findings of acute intracranial large vessel occlusion.  2.No  significant stenosis of greater than 50% is seen at the bilateral internal carotid arteries at this time.       Electronically Signed: Larry Hoff    10/29/2023 1:49 PM EDT    Workstation ID: [id number]    CT CEREBRAL PERFUSION WITH & WITHOUT CONTRAST [[id number]] Collected: 10/29/23 1332     Updated: 10/29/23 1338    Narrative:      CT CEREBRAL PERFUSION W WO CONTRAST    Date of Exam: 10/29/2023 1:13 PM EDT    Indication: Neuro deficit, acute stroke suspected.     Comparison: CT head performed the same day.    Technique: Axial CT images of the brain were obtained prior to and after the administration of 115 mL Isovue-370. Core blood volume, core blood flow, mean transit time, and Tmax images were obtained utilizing the Rapid software protocol. A limited CT   angiogram of the head was also performed to measure the blood vessel density.    The radiation dose reduction device was turned on for each scan per the ALARA (As Low as Reasonably Achievable) protocol.        FINDINGS:     CT Perfusion:  CBF (<30%) volume: 0 mL  Tmax (>6.0s) volume: 0 mL  Mismatch volume: 0 mL  Mismatch ratio: None    No definite perfusion abnormality is seen in the visualized parenchyma to indicate ischemia or infarct.      Impression:      No CT perfusion evidence of acute infarction or ischemia.        Electronically Signed: Larry Hoff    10/29/2023 1:35 PM EDT    Workstation ID: [id number]    CT Head Without Contrast Stroke Protocol [[id number]] Collected: 10/29/23 1314     Updated: 10/29/23 1321    Narrative:      CT HEAD WO CONTRAST STROKE PROTOCOL    Date of Exam: 10/29/2023 1:03 PM EDT    Indication: Neuro deficit, acute, stroke suspected  Neuro deficit, acute stroke suspected.    Comparison: None available.    Technique: Axial CT images were obtained of the head without contrast administration.  Reconstructed coronal images were also obtained. Automated exposure control and iterative construction methods were used.    Scan Time: 1:11  p.m.  Results discussed with stroke team navigator at 1:17 p.m      FINDINGS:  No midline shift. Basal cisterns appear patent.  Ventricles and sulci appear within normal limits for patient age.    No extra-axial collection or acute hemorrhage is identified.  No definite mass lesion, edema, or significant mass effect is seen.  There is hypoattenuation in the white matter, which is nonspecific, but is most commonly seen with chronic small vessel   ischemic changes. No definite CT findings of acute infarction.    There is paranasal sinus mucosal thickening. Mastoid air cells appear clear.  No acute calvarial abnormality is identified.      Impression:      1.No definite CT findings of acute intracranial abnormality. Probable mild chronic small vessel ischemic changes.  2.Paranasal sinus mucosal thickening which could indicate sinusitis.        Electronically Signed: Larry Hoff    10/29/2023 1:18 PM EDT    Workstation ID: [id number]              Impression:     --acute E Coli sepsis/septicemia, urinary source with a E Coli complicated UTI associated with abnormal CT scan and  left renal abscess status post drain on October 30, further complicated by hydronephrosis/stone and urology following for interventional plans.  IV Merrem ongoing with plans to taper antibiotics once further data. Sepsis parameters improving    --acute E Coli complicated UTI, probable left pyelonephritis with abnormal CT scan, s/p drain 10/30 c/w  left renal abscess, hydronephrosis and  intervention by urology 10/31.      --Acute kidney injury per medicine team, baseline unclear; certainly at risk for prerenal etiology with sepsis at admission and risk for ATN.  Monitor to help guide future adjustments in antimicrobials    --history fall with right arm weakness and further workup/management at discretion of medicine team/neurology.  Some concern mention for brachial plexus injury    --elevated CPK, rhabdomyolysis.  Recent fall    PLAN:       --IV  rocephin, potentially 2-4 weeks to depend on clinical course/follow-up radiography etc.  He can come to the office daily for ceftriaxone    --Check/review labs cultures and scans     --Partial history per nursing staff     --Discussed with microbiology     --Urology following; Intervention planned     --Discussed with pharmacy     --Highly complex at of issues with high risk for further serious morbidity and other serious sequela    --d/w Dr Ellis    Copied text in this note has been reviewed and is accurate as of 11/01/23.     Joel Ham MD  11/1/2023

## 2023-11-01 NOTE — THERAPY EVALUATION
Patient Name: Aldair Narvaez  : 1950    MRN: [id number]                              Today's Date: 2023       Admit Date: 10/29/2023    Visit Dx:     ICD-10-CM ICD-9-CM   1. Fall in home, initial encounter  W19.XXXA E888.9    Y92.009 E849.0   2. Traumatic rhabdomyolysis, initial encounter  T79.6XXA 958.6   3. NEETA (acute kidney injury)  N17.9 584.9   4. Sepsis with acute renal failure and septic shock, due to unspecified organism, unspecified acute renal failure type  A41.9 038.9    R65.21 995.92    N17.9 785.52     584.9   5. Weakness of right upper extremity  R29.898 729.89   6. Hyperglycemia due to diabetes mellitus  E11.65 250.02   7. Hydronephrosis of left kidney  N13.30 591   8. Acute pyelonephritis  N10 590.10   9. Sepsis due to urinary tract infection  A41.9 038.9    N39.0 995.91     599.0   10. Right nephrolithiasis  N20.0 592.0     Patient Active Problem List   Diagnosis    Benign hypertension    Mixed hyperlipidemia    Uncontrolled type 2 diabetes mellitus with hyperglycemia    CAD, multiple vessel    Pyelonephritis    Sepsis due to urinary tract infection    Personal history of prostate cancer    Paroxysmal A-fib    Bacteremia    Hydronephrosis of left kidney    UTI (urinary tract infection)    NEETA (acute kidney injury)    Rhabdomyolysis    Right arm weakness     Past Medical History:   Diagnosis Date    Acid reflux     Arthritis     Benign hypertension     Colon polyp     Concern about heart attack without diagnosis     Coronary artery disease     Coronary atherosclerosis     Heart disease     Hyperlipidemia     Hypertension     Kidney stone     Mixed hyperlipidemia     Obesity     body mass index 30+-obesity    Prostate cancer     Sleep apnea     Type 2 diabetes mellitus     Type 2 diabetes mellitus      Past Surgical History:   Procedure Laterality Date    CARDIAC CATHETERIZATION      CARDIAC SURGERY N/A     [place]    CARPAL TUNNEL RELEASE      CORONARY ARTERY BYPASS GRAFT   08/30/2022    CYSTOSCOPY BLADDER STONE LITHOTRIPSY      CYSTOSCOPY BLADDER STONE LITHOTRIPSY      CYSTOSCOPY RETROGRADE PYELOGRAM Left 10/31/2023    Procedure: CYSTOSCOPY RETROGRADE PYELOGRAM STENT PLACEMENT;  Surgeon: Isma Justin MD;  Location:  [facility];  Service: Urology;  Laterality: Left;    CYSTOSCOPY W/ URETERAL STENT PLACEMENT Left 05/09/2023    Procedure: CYSTOSCOPY LEFT RETROGRADE PYELOGRAM AND LEFT URETERAL STENT PLACEMENT;  Surgeon: Isma Justin MD;  Location:  [facility];  Service: Urology;  Laterality: Left;    FETAL SURGERY FOR CONGENITAL HERNIA      INGUINAL HERNIA REPAIR      KIDNEY STONE SURGERY      KNEE ARTHROSCOPY      PROSTATECTOMY      TRIGGER FINGER RELEASE      UMBILICAL HERNIA REPAIR      UVULOPALATOPHARYNGOPLASTY        General Information       [facility] Name 11/01/23 1023          Physical Therapy Time and Intention    Document Type evaluation  -     Mode of Treatment physical therapy  -       Row Name 11/01/23 1023          General Information    Patient Profile Reviewed yes  -     Prior Level of Function independent:;all household mobility;community mobility;gait;bed mobility;transfer;driving  No AD use at baseline  -     Existing Precautions/Restrictions fall;other (see comments)  YG drain  -     Barriers to Rehab medically complex  -       Row Name 11/01/23 1023          Living Environment    People in Home spouse  -       Row Name 11/01/23 1023          Home Main Entrance    Number of Stairs, Main Entrance two  -     Stair Railings, Main Entrance none  -       Row Name 11/01/23 1023          Stairs Within Home, Primary    Stairs, Within Home, Primary All needs met on first floor  -     Number of Stairs, Within Home, Primary other (see comments)  flight of stairs to basement  -       Row Name 11/01/23 1023          Cognition    Orientation Status (Cognition) oriented x 4  -       Row Name 11/01/23 1023          Safety Issues, Functional Mobility     Safety Issues Affecting Function (Mobility) insight into deficits/self-awareness;safety precaution awareness;safety precautions follow-through/compliance;problem-solving  -     Impairments Affecting Function (Mobility) balance;coordination;endurance/activity tolerance;grasp;muscle tone abnormal;sensation/sensory awareness;range of motion (ROM);strength  -               User Key  (r) = Recorded By, (t) = Taken By, (c) = Cosigned By      Initials Name Provider Type     Lisa Purdy, PT Physical Therapist                   Mobility       [facility] Name 11/01/23 1025          Bed Mobility    Bed Mobility supine-sit  -     Supine-Sit [place] (Bed Mobility) minimum assist (75% patient effort);1 person assist;verbal cues  -     Assistive Device (Bed Mobility) bed rails;head of bed elevated  -     Comment, (Bed Mobility) VCs for hand placement and sequencing. Exited bed on R side and required assist to upright trunk and scoot to EOB. Educated pt on holding R UE w/ L UE in order to not roll over on arm  -[facility] Name 11/01/23 1025          Transfers    Comment, (Transfers) VCs for hand placement and sequencing  -[facility] Name 11/01/23 1025          Sit-Stand Transfer    Sit-Stand [place] (Transfers) contact guard;1 person assist;verbal cues  -     Comment, (Sit-Stand Transfer) STS x1 from EOB  -[facility] Name 11/01/23 1025          Gait/Stairs (Locomotion)    [place] Level (Gait) contact guard;verbal cues;1 person to manage equipment  -     Distance in Feet (Gait) 40  -     Deviations/Abnormal Patterns (Gait) base of support, narrow;niru decreased;gait speed decreased;stride length decreased;weight shifting decreased  -     Bilateral Gait Deviations --  -     Comment, (Gait/Stairs) Pt demo step through gait pattern w/ slow, guarded steps. VCs to widen CINDA and to increase step length. 1 LOB noted that pt was able to self correct. Pt reports mild R shldr pain w/ ambulation.  Distance limited by fatigue  -               User Key  (r) = Recorded By, (t) = Taken By, (c) = Cosigned By      Initials Name Provider Type     Lisa Purdy PT Physical Therapist                   Obj/Interventions       [facility] Name 11/01/23 1030          Range of Motion Comprehensive    General Range of Motion bilateral lower extremity ROM WFL  -[facility] Name 11/01/23 1030          Strength Comprehensive (MMT)    General Manual Muscle Testing (MMT) Assessment lower extremity strength deficits identified  -     Comment, General Manual Muscle Testing (MMT) Assessment B hip flexion 4-/5, otherwise B LE grossly 4+/5  -[facility] hospital Name 11/01/23 1030          Balance    Balance Assessment sitting static balance;sitting dynamic balance;sit to stand dynamic balance;standing static balance;standing dynamic balance  -     Static Sitting Balance standby assist  -     Dynamic Sitting Balance contact guard  -     Position, Sitting Balance unsupported;sitting edge of bed  -     Sit to Stand Dynamic Balance contact guard;verbal cues  -     Static Standing Balance standby assist;verbal cues  -     Dynamic Standing Balance contact guard;verbal cues  -     Position/Device Used, Standing Balance unsupported  -     Comment, Balance 1 LOB noted during amb that pt was able to self correct  -Critical access hospital Name 11/01/23 1030          Sensory Assessment (Somatosensory)    Sensory Assessment (Somatosensory) LE sensation intact  -               User Key  (r) = Recorded By, (t) = Taken By, (c) = Cosigned By      Initials Name Provider Type     Lisa Purdy PT Physical Therapist                   Goals/Plan       [facility] Name 11/01/23 1035          Bed Mobility Goal 1 (PT)    Activity/Assistive Device (Bed Mobility Goal 1, PT) sit to supine/supine to sit  -     [place] Level/Cues Needed (Bed Mobility Goal 1, PT) independent  -     Time Frame (Bed Mobility Goal 1, PT) long term goal (LTG);10 days  -        Row Name 11/01/23 1035          Transfer Goal 1 (PT)    Activity/Assistive Device (Transfer Goal 1, PT) sit-to-stand/stand-to-sit;bed-to-chair/chair-to-bed  -     [place] Level/Cues Needed (Transfer Goal 1, PT) independent  -LH     Time Frame (Transfer Goal 1, PT) long term goal (LTG);10 days  -Counts include 234 beds at the [facility] Name 11/01/23 1035          Gait Training Goal 1 (PT)    Activity/Assistive Device (Gait Training Goal 1, PT) gait (walking locomotion);assistive device use  -     [place] Level (Gait Training Goal 1, PT) independent  -     Distance (Gait Training Goal 1, PT) 150  -     Time Frame (Gait Training Goal 1, PT) long term goal (LTG);10 days  -Counts include 234 beds at the [facility] Name 11/01/23 1035          Stairs Goal 1 (PT)    Activity/Assistive Device (Stairs Goal 1, PT) ascending stairs;descending stairs  -     [place] Level/Cues Needed (Stairs Goal 1, PT) standby assist  -     Number of Stairs (Stairs Goal 1, PT) 2  -LH     Time Frame (Stairs Goal 1, PT) long term goal (LTG);10 days  -Counts include 234 beds at the [facility] Name 11/01/23 1035          Therapy Assessment/Plan (PT)    Planned Therapy Interventions (PT) balance training;bed mobility training;gait training;home exercise program;neuromuscular re-education;patient/family education;postural re-education;orthotic fitting/training;ROM (range of motion);stair training;strengthening;stretching;transfer training;motor coordination training  -               User Key  (r) = Recorded By, (t) = Taken By, (c) = Cosigned By      Initials Name Provider Type     Lisa Purdy, PT Physical Therapist                   Clinical Impression       [facility] Name 11/01/23 1031          Pain    Pretreatment Pain Rating 0/10 - no pain  -     Posttreatment Pain Rating 0/10 - no pain  -     Pre/Posttreatment Pain Comment mild R shldr pain during ambulation  -     Pain Intervention(s) Repositioned;Ambulation/increased activity  -       Row Name 11/01/23 1031          Plan of Care Review     Plan of Care Reviewed With patient;spouse  -     Outcome Evaluation PT evaluation completed. Pt presents below baseline function d/t R UE deficits, gait instability, and decreased strength and activity tolerance. Pt required Kendrick for sup>sit and CGA to ambulate 40 ft unsupported. Pt would benefit from skilled IP PT. Recommend IRF at d/c to return to [facility].  -       Row Name 11/01/23 1031          Therapy Assessment/Plan (PT)    Patient/Family Therapy Goals Statement (PT) to leave hospital  -     Rehab Potential (PT) good, to achieve stated therapy goals  [facility]     Criteria for Skilled Interventions Met (PT) yes;meets criteria;skilled treatment is necessary  -     Therapy Frequency (PT) daily  -       Row Name 11/01/23 1031          Vital Signs    Pre Systolic BP Rehab 157  -     Pre Treatment Diastolic   -     Post Systolic BP Rehab 146  -     Post Treatment Diastolic BP 86  -     Pretreatment Heart Rate (beats/min) 84  -     Posttreatment Heart Rate (beats/min) 82  -     Pre SpO2 (%) 95  -     O2 Delivery Pre Treatment room air  -     O2 Delivery Intra Treatment room air  -     Post SpO2 (%) 96  -     O2 Delivery Post Treatment room air  -     Pre Patient Position Supine  -     Intra Patient Position Standing  -     Post Patient Position Sitting  -       Row Name 11/01/23 1031          Positioning and Restraints    Pre-Treatment Position in bed  -     Post Treatment Position chair  -     In Chair reclined;call light within reach;encouraged to call for assist;exit alarm on;RUE elevated;with family/caregiver;waffle cushion;legs elevated;heels elevated;notified [facility]               User Key  (r) = Recorded By, (t) = Taken By, (c) = Cosigned By      Initials Name Provider Type     Lisa Purdy, ANGUS Physical Therapist                   Outcome Measures       Row Name 11/01/23 1036 11/01/23 0846       How much help from another person do you currently need...    Turning  from your back to your side while in flat bed without using bedrails? 3  - 3  -AM    Moving from lying on back to sitting on the side of a flat bed without bedrails? 3  - 3  -AM    Moving to and from a bed to a chair (including a wheelchair)? 3  - 3  -AM    Standing up from a chair using your arms (e.g., wheelchair, bedside chair)? 3  - 3  -AM    Climbing 3-5 steps with a railing? 3  - 3  -AM    To walk in hospital room? 3  - 3  -AM    AM-PAC 6 Clicks Score (PT) 18  - 18  -AM    Highest level of mobility 6 --> Walked 10 steps or more  - 6 --> Walked 10 steps or more  -AM      Row Name 11/01/23 1036          Functional Assessment    Outcome Measure Options AM-PAC 6 Clicks Basic Mobility (PT)  -               User Key  (r) = Recorded By, (t) = Taken By, (c) = Cosigned By      Initials Name Provider Type    Adelso Vences RN Registered Nurse     Lisa Purdy, PT Physical Therapist                                 Physical Therapy Education       Title: PT OT SLP Therapies (In Progress)       Topic: Physical Therapy (In Progress)       Point: Mobility training (Done)       Learning Progress Summary             Patient Acceptance, E, VU,NR by  at 11/1/2023 1036   Significant Other Acceptance, E, VU,NR by  at 11/1/2023 1036                         Point: Home exercise program (Not Started)       Learner Progress:  Not documented in this visit.              Point: Body mechanics (Done)       Learning Progress Summary             Patient Acceptance, E, VU,NR by  at 11/1/2023 1036   Significant Other Acceptance, E, VU,NR by  at 11/1/2023 1036                         Point: Precautions (Done)       Learning Progress Summary             Patient Acceptance, E, VU,NR by  at 11/1/2023 1036   Significant Other Acceptance, E, VU,NR by  at 11/1/2023 1036                                         User Key       Initials Effective Dates Name Provider Type [facility] 09/21/23 -  Lisa Purdy  PT Physical Therapist PT                  PT Recommendation and Plan  Planned Therapy Interventions (PT): balance training, bed mobility training, gait training, home exercise program, neuromuscular re-education, patient/family education, postural re-education, orthotic fitting/training, ROM (range of motion), stair training, strengthening, stretching, transfer training, motor coordination training  Plan of Care Reviewed With: patient, spouse  Outcome Evaluation: PT evaluation completed. Pt presents below baseline function d/t R UE deficits, gait instability, and decreased strength and activity tolerance. Pt required Kendrick for sup>sit and CGA to ambulate 40 ft unsupported. Pt would benefit from skilled IP PT. Recommend IRF at d/c to return to [facility].     Time Calculation:   PT Evaluation Complexity  History, PT Evaluation Complexity: 3 or more personal factors and/or comorbidities  Examination of Body Systems (PT Eval Complexity): total of 4 or more elements  Clinical Presentation (PT Evaluation Complexity): evolving  Clinical Decision Making (PT Evaluation Complexity): moderate complexity  Overall Complexity (PT Evaluation Complexity): moderate complexity     PT Charges       Row Name 11/01/23 1036             Time Calculation    Start Time 0947  -      PT Received On 11/01/23  -      PT Goal Re-Cert Due Date 11/11/23  -         Untimed Charges    PT Eval/Re-eval Minutes 50  -LH         Total Minutes    Untimed Charges Total Minutes 50  -LH       Total Minutes 50  -LH                User Key  (r) = Recorded By, (t) = Taken By, (c) = Cosigned By      Initials Name Provider Type     Lisa Purdy, PT Physical Therapist                  Therapy Charges for Today       Code Description Service Date Service Provider Modifiers Qty    [id number]  PT EVAL MOD COMPLEXITY 4 11/1/2023 Lisa Purdy, PT GP 1            PT G-Codes  Outcome Measure Options: AM-PAC 6 Clicks Basic Mobility (PT)  AM-PAC 6 Clicks Score  (PT): 18  PT Discharge Summary  Anticipated Discharge Disposition (PT): inpatient rehabilitation facility    Lisa Purdy, PT  11/1/2023

## 2023-11-01 NOTE — PLAN OF CARE
Goal Outcome Evaluation:  Plan of Care Reviewed With: patient, spouse           Outcome Evaluation: PT evaluation completed. Pt presents below baseline function d/t R UE deficits, gait instability, and decreased strength and activity tolerance. Pt required Kendrick for sup>sit and CGA to ambulate 40 ft unsupported. Pt would benefit from skilled IP PT. Recommend IRF at d/c to return to [facility].      Anticipated Discharge Disposition (PT): inpatient rehabilitation facility

## 2023-11-01 NOTE — CASE MANAGEMENT/SOCIAL WORK
Continued Stay Note   [facility]     Patient Name: Aldari Narvaez  MRN: [id number]  Today's Date: 11/1/2023    Admit Date: 10/29/2023    Plan: rehab   Discharge Plan       Row Name 11/01/23 1326       Plan    Plan rehab    Patient/Family in Agreement with Plan yes    Plan Comments I spoke with the patient and his wife regarding PT and OT recommendations for rehab. He is in agreement and asked the CM to make referrals to [facility], which I did. January, from [facility], stated that they are not in network with his insurance, but will review for [facility]. [facility] has no available beds. His wife can transport. CM will continue to follow.    Final Discharge Disposition Code 62 - inpatient rehab facility                   Discharge Codes    No documentation.                 Expected Discharge Date and Time       Expected Discharge Date Expected Discharge Time    Nov 7, 2023               Josie Lazaro RN

## 2023-11-01 NOTE — CASE MANAGEMENT/SOCIAL WORK
Continued Stay Note  CORIN Salas     Patient Name: Aldair Narvaez  MRN: [id number]  Today's Date: 11/1/2023    Admit Date: 10/29/2023    Plan: undecided   Discharge Plan       Row Name 11/01/23 0822       Plan    Plan undecided    Patient/Family in Agreement with Plan yes    Plan Comments I spoke with this patient and his wife at the bedside. PT and OT have yet to see patient. They are signed in to work with the patient today. After they make recommendations, the patient wants CM to speak with him regarding discharge plans. CM to continue to follow.    Final Discharge Disposition Code 30 - still a patient                   Discharge Codes    No documentation.                 Expected Discharge Date and Time       Expected Discharge Date Expected Discharge Time    Nov 7, 2023               Josie Lazaro RN

## 2023-11-01 NOTE — THERAPY EVALUATION
Patient Name: Aldair Narvaez  : 1950    MRN: [id number]                              Today's Date: 2023       Admit Date: 10/29/2023    Visit Dx:     ICD-10-CM ICD-9-CM   1. Fall in home, initial encounter  W19.XXXA E888.9    Y92.009 E849.0   2. Traumatic rhabdomyolysis, initial encounter  T79.6XXA 958.6   3. NEETA (acute kidney injury)  N17.9 584.9   4. Sepsis with acute renal failure and septic shock, due to unspecified organism, unspecified acute renal failure type  A41.9 038.9    R65.21 995.92    N17.9 785.52     584.9   5. Weakness of right upper extremity  R29.898 729.89   6. Hyperglycemia due to diabetes mellitus  E11.65 250.02   7. Hydronephrosis of left kidney  N13.30 591   8. Acute pyelonephritis  N10 590.10   9. Sepsis due to urinary tract infection  A41.9 038.9    N39.0 995.91     599.0   10. Right nephrolithiasis  N20.0 592.0     Patient Active Problem List   Diagnosis    Benign hypertension    Mixed hyperlipidemia    Uncontrolled type 2 diabetes mellitus with hyperglycemia    CAD, multiple vessel    Pyelonephritis    Sepsis due to urinary tract infection    Personal history of prostate cancer    Paroxysmal A-fib    Bacteremia    Hydronephrosis of left kidney    UTI (urinary tract infection)    NEETA (acute kidney injury)    Rhabdomyolysis    Right arm weakness     Past Medical History:   Diagnosis Date    Acid reflux     Arthritis     Benign hypertension     Colon polyp     Concern about heart attack without diagnosis     Coronary artery disease     Coronary atherosclerosis     Heart disease     Hyperlipidemia     Hypertension     Kidney stone     Mixed hyperlipidemia     Obesity     body mass index 30+-obesity    Prostate cancer     Sleep apnea     Type 2 diabetes mellitus     Type 2 diabetes mellitus      Past Surgical History:   Procedure Laterality Date    CARDIAC CATHETERIZATION      CARDIAC SURGERY N/A     [place]    CARPAL TUNNEL RELEASE      CORONARY ARTERY BYPASS GRAFT   08/30/2022    CYSTOSCOPY BLADDER STONE LITHOTRIPSY      CYSTOSCOPY BLADDER STONE LITHOTRIPSY      CYSTOSCOPY RETROGRADE PYELOGRAM Left 10/31/2023    Procedure: CYSTOSCOPY RETROGRADE PYELOGRAM STENT PLACEMENT;  Surgeon: Isma Justin MD;  Location:  [facility];  Service: Urology;  Laterality: Left;    CYSTOSCOPY W/ URETERAL STENT PLACEMENT Left 05/09/2023    Procedure: CYSTOSCOPY LEFT RETROGRADE PYELOGRAM AND LEFT URETERAL STENT PLACEMENT;  Surgeon: Isma Justin MD;  Location:  [facility];  Service: Urology;  Laterality: Left;    FETAL SURGERY FOR CONGENITAL HERNIA      INGUINAL HERNIA REPAIR      KIDNEY STONE SURGERY      KNEE ARTHROSCOPY      PROSTATECTOMY      TRIGGER FINGER RELEASE      UMBILICAL HERNIA REPAIR      UVULOPALATOPHARYNGOPLASTY        General Information       Row Name 11/01/23 0949          OT Time and Intention    Document Type evaluation  -     Mode of Treatment occupational therapy  -       Row Name 11/01/23 0949          General Information    Patient Profile Reviewed yes  -     Prior Level of Function independent:;all household mobility;community mobility;ADL's;driving  -     Existing Precautions/Restrictions fall;other (see comments)  YG drain, R UE weakness  -     Barriers to Rehab medically complex  -       Row Name 11/01/23 0949          Living Environment    People in Home spouse  -       Row Name 11/01/23 0949          Home Main Entrance    Number of Stairs, Main Entrance two  -AC     Stair Railings, Main Entrance none  -       Row Name 11/01/23 0949          Stairs Within Home, Primary    Stairs, Within Home, Primary baesement - does nto need to access  -       Row Name 11/01/23 0949          Cognition    Orientation Status (Cognition) oriented x 4  -       Row Name 11/01/23 0949          Safety Issues, Functional Mobility    Safety Issues Affecting Function (Mobility) insight into deficits/self-awareness;safety precaution awareness;sequencing abilities   -     Impairments Affecting Function (Mobility) balance;endurance/activity tolerance;grasp;muscle tone abnormal;sensation/sensory awareness;range of motion (ROM);strength  -               User Key  (r) = Recorded By, (t) = Taken By, (c) = Cosigned By      Initials Name Provider Type     Jenniffer Santiago OT Occupational Therapist                     Mobility/ADL's       Row Name 11/01/23 0949          Bed Mobility    Bed Mobility supine-sit  -     Supine-Sit [place] (Bed Mobility) minimum assist (75% patient effort);1 person assist;verbal cues  -     Assistive Device (Bed Mobility) bed rails;head of bed elevated  -     Comment, (Bed Mobility) VCs for hand placement  -       Row Name 11/01/23 0949          Transfers    Transfers sit-stand transfer  -       Row Name 11/01/23 0949          Sit-Stand Transfer    Sit-Stand [place] (Transfers) contact guard;1 person assist;verbal cues  -     Assistive Device (Sit-Stand Transfers) other (see comments)  No AD  -       Row Name 11/01/23 0949          Functional Mobility    Functional Mobility- Ind. Level contact guard assist  -     Functional Mobility- Device other (see comments)  No AD  -     Functional Mobility-Distance (Feet) 40  -     Functional Mobility- Safety Issues step length decreased  -     Functional Mobility- Comment slow/gaurded pace  -       Row Name 11/01/23 0949          Activities of Daily Living    BADL Assessment/Intervention upper body dressing;lower body dressing;grooming  -       Row Name 11/01/23 0949          Upper Body Dressing Assessment/Training    [place] Level (Upper Body Dressing) don;front opening garment;maximum assist (25% patient effort)  -     Position (Upper Body Dressing) edge of bed sitting  -     Comment, (Upper Body Dressing) educated in one handed tech - limited by IV mgmt  -       Row Name 11/01/23 0949          Lower Body Dressing Assessment/Training    [place] Level (Lower  Body Dressing) don;socks;dependent (less than 25% patient effort)  -AC     Position (Lower Body Dressing) supine  -     Comment, (Lower Body Dressing) educated in one handed tech  -       Row Name 11/01/23 0949          Grooming Assessment/Training    [place] Level (Grooming) hair care, combing/brushing;minimum assist (75% patient effort)  -     Position (Grooming) edge of bed sitting  -AC     Comment, (Grooming) used L hand  -               User Key  (r) = Recorded By, (t) = Taken By, (c) = Cosigned By      Initials Name Provider Type     Jenniffer Santiago, OT Occupational Therapist                   Obj/Interventions       Row Name 11/01/23 1036          Sensory Assessment (Somatosensory)    Sensory Subjective Reports insensate  R ulnar side of hand  -       Row Name 11/01/23 1036          Vision Assessment/Intervention    Visual Impairment/Limitations corrective lenses for reading  -       Row Name 11/01/23 1036          Range of Motion Comprehensive    Comment, General Range of Motion BUE PROM WNL  -       Row Name 11/01/23 1036          Strength Comprehensive (MMT)    Comment, General Manual Muscle Testing (MMT) Assessment R hand 2/5,  R wrist 2-/5, R forearm 2+/5, R elbow 2-/5,  R shld 2+/5,  LUE grossly 4+/5  -       Row Name 11/01/23 1036          Motor Skills    Therapeutic Exercise shoulder;elbow/forearm;wrist;hand  educated pt/spouse on AAROM/PROM R UE to complete 3 x day  -       Row Name 11/01/23 1036          Balance    Balance Assessment sitting static balance;sitting dynamic balance;standing static balance;standing dynamic balance  -AC     Static Sitting Balance standby assist  -AC     Dynamic Sitting Balance contact guard  -AC     Position, Sitting Balance unsupported;sitting edge of bed  -AC     Static Standing Balance standby assist  -AC     Position/Device Used, Standing Balance unsupported  -               User Key  (r) = Recorded By, (t) = Taken By, (c) = Cosigned By       Initials Name Provider Type    AC Jenniffer Santiago, OT Occupational Therapist                   Goals/Plan       Row Name 11/01/23 1047          Transfer Goal 1 (OT)    Activity/Assistive Device (Transfer Goal 1, OT) bed-to-chair/chair-to-bed;toilet  -AC     Riley Level/Cues Needed (Transfer Goal 1, OT) standby assist  -AC     Time Frame (Transfer Goal 1, OT) by discharge  -AC     Progress/Outcome (Transfer Goal 1, OT) new goal;goal ongoing  -       Row Name 11/01/23 1047          Dressing Goal 1 (OT)    Activity/Device (Dressing Goal 1, OT) upper body dressing  -AC     Riley/Cues Needed (Dressing Goal 1, OT) minimum assist (75% or more patient effort)  -AC     Strategies/Barriers (Dressing Goal 1, OT) using on ehanded comp strategy  -AC     Progress/Outcome (Dressing Goal 1, OT) new goal;goal ongoing  -       Row Name 11/01/23 1047          Toileting Goal 1 (OT)    Activity/Device (Toileting Goal 1, OT) adjust/manage clothing;perform perineal hygiene  -AC     Riley Level/Cues Needed (Toileting Goal 1, OT) standby assist  -AC     Time Frame (Toileting Goal 1, OT) by discharge  -AC     Progress/Outcome (Toileting Goal 1, OT) new goal;goal ongoing  -       Row Name 11/01/23 1047          Problem Specific Goal 1 (OT)    Problem Specific Goal 1 (OT) Pt will increase RUE strength 1 MMG as needed to support ADLs  -AC     Time Frame (Problem Specific Goal 1, OT) by discharge  -AC     Progress/Outcome (Problem Specific Goal 1, OT) new goal;goal ongoing  -       Row Name 11/01/23 1047          Therapy Assessment/Plan (OT)    Planned Therapy Interventions (OT) activity tolerance training;BADL retraining;functional balance retraining;neuromuscular control/coordination retraining;occupation/activity based interventions;passive ROM/stretching;patient/caregiver education/training;ROM/therapeutic exercise;strengthening exercise;transfer/mobility retraining  -AC               User Key  (r) = Recorded  By, (t) = Taken By, (c) = Cosigned By      Initials Name Provider Type     Jenniffer Santiago, OT Occupational Therapist                   Clinical Impression       Row Name 11/01/23 1043          Pain Assessment    Pretreatment Pain Rating 0/10 - no pain  -     Posttreatment Pain Rating 0/10 - no pain  -     Pre/Posttreatment Pain Comment mild R shld pain with ambulation  -       Row Name 11/01/23 1043          Plan of Care Review    Plan of Care Reviewed With patient;spouse  -     Outcome Evaluation Pt presents below baseline with self care mobility d/t decreased RUE range/weakness, decreased balance and activity tolerance.  Pt max A LBD/UBD, min A to comb hair,  CGA to ambulate without AD.  OT will follow to advance pt toward PLOF.  Recommend IP rehab upon d/c.  -       Row Name 11/01/23 1043          Therapy Assessment/Plan (OT)    Rehab Potential (OT) good, to achieve stated therapy goals  -     Criteria for Skilled Therapeutic Interventions Met (OT) yes;skilled treatment is necessary  -     Therapy Frequency (OT) daily  -       Row Name 11/01/23 1043          Therapy Plan Review/Discharge Plan (OT)    Anticipated Discharge Disposition (OT) inpatient rehabilitation facility  -       Row Name 11/01/23 1043          Vital Signs    Pre Systolic BP Rehab 157  -AC     Pre Treatment Diastolic   -AC     Post Systolic BP Rehab 146  -AC     Post Treatment Diastolic BP 86  -AC     Pretreatment Heart Rate (beats/min) 84  -AC     Posttreatment Heart Rate (beats/min) 82  -AC     Pre SpO2 (%) 95  -AC     O2 Delivery Pre Treatment room air  -AC     Post SpO2 (%) 96  -AC     O2 Delivery Post Treatment room air  -AC     Pre Patient Position Supine  -AC     Post Patient Position Sitting  -       Row Name 11/01/23 1043          Positioning and Restraints    Pre-Treatment Position in bed  -     In Chair notified nsg;reclined;call light within reach;encouraged to call for assist;exit alarm on;with  family/caregiver;RUE elevated  -               User Key  (r) = Recorded By, (t) = Taken By, (c) = Cosigned By      Initials Name Provider Type    Jenniffer Murphy, OT Occupational Therapist                   Outcome Measures       Row Name 11/01/23 0949          How much help from another is currently needed...    Putting on and taking off regular lower body clothing? 2  -AC     Bathing (including washing, rinsing, and drying) 2  -AC     Toileting (which includes using toilet bed pan or urinal) 2  -AC     Putting on and taking off regular upper body clothing 2  -AC     Taking care of personal grooming (such as brushing teeth) 3  -AC     Eating meals 3  -     AM-PAC 6 Clicks Score (OT) 14  -       Row Name 11/01/23 1036 11/01/23 0846       How much help from another person do you currently need...    Turning from your back to your side while in flat bed without using bedrails? 3  - 3  -AM    Moving from lying on back to sitting on the side of a flat bed without bedrails? 3  - 3  -AM    Moving to and from a bed to a chair (including a wheelchair)? 3  - 3  -AM    Standing up from a chair using your arms (e.g., wheelchair, bedside chair)? 3  - 3  -AM    Climbing 3-5 steps with a railing? 3  - 3  -AM    To walk in hospital room? 3  - 3  -AM    AM-PAC 6 Clicks Score (PT) 18  - 18  -AM    Highest level of mobility 6 --> Walked 10 steps or more  - 6 --> Walked 10 steps or more  -AM      Row Name 11/01/23 1036 11/01/23 0949       Functional Assessment    Outcome Measure Options AM-PAC 6 Clicks Basic Mobility (PT)  - AM-PAC 6 Clicks Daily Activity (OT)  -              User Key  (r) = Recorded By, (t) = Taken By, (c) = Cosigned By      Initials Name Provider Type    Jenniffer Murphy OT Occupational Therapist    Adelso Vences, RN Registered Nurse     Lisa Purdy, PT Physical Therapist                    Occupational Therapy Education       Title: PT OT SLP Therapies (In Progress)        Topic: Occupational Therapy (In Progress)       Point: ADL training (Done)       Description:   Instruct learner(s) on proper safety adaptation and remediation techniques during self care or transfers.   Instruct in proper use of assistive devices.                  Learning Progress Summary             Patient Acceptance, E,TB,D, VU,NR by  at 11/1/2023 1050   Family Acceptance, E,TB,D, VU,NR by  at 11/1/2023 1050                         Point: Home exercise program (Not Started)       Description:   Instruct learner(s) on appropriate technique for monitoring, assisting and/or progressing therapeutic exercises/activities.                  Learner Progress:  Not documented in this visit.              Point: Precautions (Not Started)       Description:   Instruct learner(s) on prescribed precautions during self-care and functional transfers.                  Learner Progress:  Not documented in this visit.              Point: Body mechanics (Not Started)       Description:   Instruct learner(s) on proper positioning and spine alignment during self-care, functional mobility activities and/or exercises.                  Learner Progress:  Not documented in this visit.                              User Key       Initials Effective Dates Name Provider Type Discipline     02/03/23 -  Jenniffer Santiago OT Occupational Therapist OT                  OT Recommendation and Plan  Planned Therapy Interventions (OT): activity tolerance training, BADL retraining, functional balance retraining, neuromuscular control/coordination retraining, occupation/activity based interventions, passive ROM/stretching, patient/caregiver education/training, ROM/therapeutic exercise, strengthening exercise, transfer/mobility retraining  Therapy Frequency (OT): daily  Plan of Care Review  Plan of Care Reviewed With: patient, spouse  Outcome Evaluation: Pt presents below baseline with self care mobility d/t decreased RUE range/weakness, decreased  balance and activity tolerance.  Pt max A LBD/UBD, min A to comb hair,  CGA to ambulate without AD.  OT will follow to advance pt toward PLOF.  Recommend IP rehab upon d/c.     Time Calculation:   Evaluation Complexity (OT)  Review Occupational Profile/Medical/Therapy History Complexity: expanded/moderate complexity  Assessment, Occupational Performance/Identification of Deficit Complexity: 3-5 performance deficits  Clinical Decision Making Complexity (OT): detailed assessment/moderate complexity  Overall Complexity of Evaluation (OT): moderate complexity     Time Calculation- OT       Row Name 11/01/23 0949             Time Calculation- OT    OT Start Time 0949  -AC      OT Received On 11/01/23  -AC      OT Goal Re-Cert Due Date 11/11/23  -AC         Timed Charges    36194 - OT Therapeutic Activity Minutes 10  -AC         Untimed Charges    OT Eval/Re-eval Minutes 50  -AC         Total Minutes    Timed Charges Total Minutes 10  -AC      Untimed Charges Total Minutes 50  -AC       Total Minutes 60  -AC                User Key  (r) = Recorded By, (t) = Taken By, (c) = Cosigned By      Initials Name Provider Type    AC Jenniffer Santiago, OT Occupational Therapist                  Therapy Charges for Today       Code Description Service Date Service Provider Modifiers Qty    [id number]  OT EVAL MOD COMPLEXITY 4 11/1/2023 Jenniffer Santiago OT GO 1                 Jenniffer Santiago OT  11/1/2023

## 2023-11-01 NOTE — PROGRESS NOTES
Psychiatric Neurology    Progress Note    Patient Name: Aldair Narvaez  : 1950  MRN: [id number]  Primary Care Physician:  Miguel Angel Mireles MD  Date of admission: 10/29/2023    Subjective     Chief Complaint: Plexopathy    History of Present Illness   Patient resting comfortably in chair.  Wife at bedside.  No complaints overnight.  Did state he felt some tingling in his right hand.    Review of Systems   General: Negative for fever, nausea, or vomiting.   Neurological: Positive for right upper extremity weakness and tingling    Objective     Physical Exam  Vitals and nursing note reviewed.   Constitutional:       General: He is not in acute distress.     Appearance: He is not ill-appearing.   Eyes:      Extraocular Movements: Extraocular movements intact.      Pupils: Pupils are equal, round, and reactive to light.      Comments: No nystagmus noted   Cardiovascular:      Rate and Rhythm: Normal rate.   Pulmonary:      Effort: Pulmonary effort is normal.   Neurological:      Mental Status: He is alert and oriented to person, place, and time.      Cranial Nerves: Cranial nerves 2-12 are intact.      Sensory: Sensory deficit present.      Motor: Weakness present.      Deep Tendon Reflexes:      Reflex Scores:       Bicep reflexes are 2+ on the right side and 2+ on the left side.       Patellar reflexes are 0 on the right side and 2+ on the left side.     Comments:   Cranial Nerves   CN II: Pupils are equal, round, and reactive to light. Normal visual acuity and visual fields.    CN III IV VI: Extraocular movements are full without nystagmus.  CN V: Normal facial sensation and strength of muscles of mastication.  CN VII: Facial movements are symmetric. No weakness.  CN VIII:  Auditory acuity is normal.  CN IX & X:  Symmetric palatal movement  CN XII: The tongue is midline.  No atrophy or fasciculations.    Motor:  Neck extension strength 5/5    Neck Flexion  5/5   left trapezius strength 5/5    Right Trapezius  strength 5/5    Left tricep strength 5/5   Right tricep strength 2/5  Left bicep strength 5/5   Right bicep strength 2/5  Left finger extension 5/5   Right finger extension 3/5  Left wrist flexion 5/5  Right wrist flexion strength 4/5   Left wrist extension strength  4/5   Right wrist extension 4/5  Bilateral hip extension 5/5   Bilateral hip flexion 5/5   BLE internal rotation 5/5   BLE external rotation 5/5   Bilateral foot extension 5/5   Bilateral foot flexion 5/5      Left  strength 5/5  Right  strength 2/5          Vitals:   Temp:  [97.5 °F (36.4 °C)-98.3 °F (36.8 °C)] 98.3 °F (36.8 °C)  Heart Rate:  [] 91  Resp:  [17-26] 18  BP: (128-152)/(79-87) 140/87  Flow (L/min):  [3] 3    Current Medications    Current Facility-Administered Medications:     acetaminophen (TYLENOL) tablet 650 mg, 650 mg, Oral, Q6H PRN, Isma Justin MD, 650 mg at 10/29/23 2256    dextrose (D50W) (25 g/50 mL) IV injection 25 g, 25 g, Intravenous, Q15 Min PRN, Isma Justin MD    dextrose (GLUTOSE) oral gel 15 g, 15 g, Oral, Q15 Min PRN, Isma Justin MD    gabapentin (NEURONTIN) capsule 100 mg, 100 mg, Oral, TID PRN, Robert Burgos MD    glucagon (GLUCAGEN) injection 1 mg, 1 mg, Intramuscular, Q15 Min PRN, Isma Justin MD    HYDROcodone-acetaminophen (NORCO) 5-325 MG per tablet 1 tablet, 1 tablet, Oral, Q4H PRN, Isma Justin MD, 1 tablet at 10/30/23 2159    Insulin Lispro (humaLOG) injection 2-7 Units, 2-7 Units, Subcutaneous, Q4H, Isma Justin MD, 2 Units at 11/01/23 0509    ipratropium-albuterol (DUO-NEB) nebulizer solution 3 mL, 3 mL, Nebulization, Q6H PRN, Isma Justin MD    meropenem (MERREM) 1,000 mg in sodium chloride 0.9 % 100 mL IVPB, 1,000 mg, Intravenous, Q8H, Isma Justin MD, 1,000 mg at 11/01/23 0508    sodium chloride 0.9 % flush 10 mL, 10 mL, Intravenous, PRN, Isma Justin MD    sodium chloride 0.9 % infusion, 9 mL/hr, Intravenous,  "Continuous PRN, Isma Justin MD, Stopped at 10/31/23 0826    tamsulosin (FLOMAX) 24 hr capsule 0.4 mg, 0.4 mg, Oral, Daily, Isma Justin MD, 0.4 mg at 10/30/23 1017    Laboratory Results:   Lab Results   Component Value Date    GLUCOSE 229 (H) 11/01/2023    CALCIUM 7.7 (L) 11/01/2023     11/01/2023    K 3.8 11/01/2023    CO2 21.0 (L) 11/01/2023     11/01/2023    BUN 21 11/01/2023    CREATININE 0.58 (L) 11/01/2023    EGFRIFNONA 96 02/17/2021    BCR 36.2 (H) 11/01/2023    ANIONGAP 10.0 11/01/2023     Lab Results   Component Value Date    WBC 10.50 11/01/2023    HGB 12.2 (L) 11/01/2023    HCT 37.2 (L) 11/01/2023    MCV 86.3 11/01/2023    PLT 99 (L) 11/01/2023     Lab Results   Component Value Date    CHOL 127 07/25/2023    CHOL 173 05/09/2023    CHOL 188 04/13/2023     Lab Results   Component Value Date    HDL 28 (L) 07/25/2023    HDL 39 (L) 05/09/2023    HDL 37 (L) 04/13/2023     Lab Results   Component Value Date    LDL 60 07/25/2023     (H) 05/09/2023     (H) 04/13/2023     Lab Results   Component Value Date    TRIG 238 (H) 07/25/2023    TRIG 114 05/09/2023    TRIG 213 (H) 04/13/2023     Lab Results   Component Value Date    HGBA1C 9.40 (H) 10/29/2023     Lab Results   Component Value Date    INR 1.44 (H) 10/30/2023    PROTIME 17.7 (H) 10/30/2023     No results found for: \"FOLATE\"  No results found for: \"[id number]\"    MRI Brain Without Contrast    Result Date: 10/29/2023  MRI BRAIN WO CONTRAST Date of Exam: 10/29/2023 3:30 PM EDT Indication: Stroke, follow up.  Comparison: CT head, CT perfusion, and CTA head and neck performed the same day Technique:  Routine multiplanar/multisequence sequence images of the brain were obtained without contrast administration. Findings: No midline shift. The ventricles and sulci appear within normal limits for patient's age. Basal cisterns appear patent. The pituitary gland appears to have normal morphology. Cerebellar tonsils appear " normally positioned. No definite extra-axial collection and no definite findings of acute or chronic hemorrhage. No mass lesion, mass effect, or edema is identified. Mild T2 and FLAIR hyperintense signal foci are seen in the cerebral white matter. No definite restricted diffusion is identified at this time. There is mucosal thickening of the paranasal sinuses. Mastoid air cells appear clear. Globes and orbits appear unremarkable. No definite calvarial abnormality is identified.     Impression: Impression: 1.No definite findings of acute intracranial abnormality at this time. 2.Mild T2 and FLAIR hyperintense signal foci in the cerebral white matter are nonspecific but likely represent mild chronic small vessel ischemic changes. 3.Mucosal thickening of the paranasal sinuses could indicate acute sinusitis. Electronically Signed: Larry Hoff  10/29/2023 4:10 PM EDT  Workstation ID: [id number]    MRI CERVICAL SPINE WO CONTRAST  MRI BRACHIAL PLEXUS WO CONTRAST     Date of Exam: 10/31/2023 11:39 PM EDT     Indication: neck pain.     Comparison: Correlation with CT angiogram neck 10/29/2023.     Technique:  Routine multiplanar/multisequence sequence images of the cervical spine were obtained without contrast administration.          Findings:  Cervical spine: Seven cervical vertebrae are identified. Alignment is anatomic. There is mild diffuse disc space narrowing and diffuse disc desiccation. Vertebral bodies maintain normal height.  There is no focal bone marrow edema. There is no evidence   of a marrow replacing process. No acute osseous abnormalities. Spinal cord signal is normal.  There is no evidence of cervical lymphadenopathy or a neck mass. The craniocervical junction appears within normal limits. Limited view of the posterior fossa   contents is unremarkable.      C2-C3: There is minimal posterior disc osteophyte complex. There is mild bilateral facet hypertrophy. No neuroforaminal or spinal canal narrowing.      C3-C4: There is no significant posterior disc osteophyte complex. There is mild left uncovertebral hypertrophy. There is mild bilateral facet hypertrophy. No neuroforaminal or spinal canal narrowing.     C4-C5: There is mild posterior disc osteophyte complex. There is moderate bilateral uncovertebral hypertrophy and moderate facet apparency. There is moderate right neuroforaminal narrowing.     C5-C6: There is mild posterior disc osteophyte complex. There is severe left and mild right facet hypertrophy. No neuroforaminal or spinal canal narrowing.     C6-C7: There is mild posterior disc osteophyte complex. Uncovertebral joints appear normal. There is mild bilateral facet apparency without neuroforaminal or spinal canal narrowing.     C7-T1: The posterior disc is unremarkable there is mild bilateral facet apparency. No significant neural foraminal or spinal canal stenosis.     Right brachial plexus: Exam is somewhat limited without IV contrast. The course of the brachial plexus is unremarkable. The fibers of the brachial plexus appear normal size and signal. There is no associated mass or adenopathy. There is no evidence of brachial plexus inflammation.     There are multiple muscle groups that exhibit infiltrating high-grade edema including the upper half of the subscapularis, the serratus anterior, multiple right-sided posterior lower cervical muscles and the coracobrachialis and pectoralis major muscles.   There is no evidence of a fluid collection or obvious tendon rupture.     IMPRESSION:  Impression:  1.Mild to moderate cervical spondylosis with varying degrees of neuroforaminal narrowing, which is most pronounced and moderate on the right at C4-C5.  2.Multiple muscle groups exhibiting high-grade infiltrating muscular edema. This could be related to infectious or inflammatory myositis. These could result from a brachial plexopathy although the course and caliber of the brachial plexus is unremarkable.  3.Mild  multilevel cervical disc and facet joint degeneration resulting in varying degrees of neural foraminal narrowing without spinal canal stenosis.       Assessment / Plan     Brief Patient Summary:  Aldair Narvaez is a 73 y.o. male who has a past medical of HTN, HLD CAD, T2DM and GRANT who presented to [facility] with complaint of right arm weakness and numbness.  Patient experienced a fall the night previous to his admission that resulted with him laying on the floor on top of his right arm for approximately 7 hours.  He has had persistent right arm weakness and numbness.  Patient was initially seen by our stroke team with no acute findings.     S/p left ureteral stent placement on 1/31 for possible renal abscess     EMG/nerve conduction study showed median neuropathy right wrist, right ulnar neuropathy at the elbow.  Right arm showed overall reduction of voluntary motor units, mild abnormal but nonspecific finding.     MRI cervical Mild to moderate cervical spondylosis with varying degrees of neuroforaminal narrowing, which is most pronounced and moderate on the right at C4-C5. Mild multilevel cervical disc and facet joint degeneration resulting in varying degrees of neural foraminal narrowing without spinal canal stenosis.    MRI brachial plexus is unremarkable. The fibers of the brachial plexus appear normal size and signal. There is no associated mass or adenopathy. There is no evidence of brachial plexus inflammation.  Exam is limited due to the inability to use contrast R/T kidney function. There are multiple muscle groups that exhibit infiltrating high-grade edema including the upper half of the subscapularis, the serratus anterior, multiple right-sided posterior lower cervical muscles and the coracobrachialis and pectoralis major muscles. There is no evidence of a fluid collection or obvious tendon rupture.    Plan:   Brachial plexopathy  Right arm paresis  Fall at home  Possible need for repeat EMG in 4 to 6 weeks as  1st nerve conduction study was done in process.  Gabapentin 100 mg as needed 3 times daily for nerve pain  Continue work with PT/OT  Once patient's active infection is resolved can consider steroid use.  Continue to monitor creatinine CK  Continue neurochecks  Patient educated on the slow recovery related to brachial plexopathy and the need for physical therapy.  Patient to follow-up with [facility] 3 months posthospitalization  General neurology has no further recommendations.  Please feel free to reach out with any questions.      I have discussed the above with the patient bedside RN Dr. Agudelo  Time spent with patient: 35 minutes in face-to-face evaluation and management of the patient.    Copied text in this note has been reviewed and is accurate as of 11/01/23.     JERRICA Bell

## 2023-11-01 NOTE — PROGRESS NOTES
[facility]   Urology Progress Note    Patient Name: Aldair Narvaez  : 1950  MRN: [id number]  Primary Care Physician:  Miguel Angel Mireles MD  Date of admission: 10/29/2023    Subjective   Subjective     Chief Complaint: Weakness    HPI:  Patient resting in bed. Reports he is feeling better. Villarreal catheter in place with yellow/light pink urine output. He has been afebrile.     Cr 0.58.   WBC 10.50.   Urine and Blood cultures with E.Coli.     Review of Systems   All systems were reviewed and negative except for: villarreal catheter, left renal abscess drain    Objective   Objective     Vitals:   Temp:  [96.9 °F (36.1 °C)-98.3 °F (36.8 °C)] 96.9 °F (36.1 °C)  Heart Rate:  [] 91  Resp:  [18-26] 18  BP: (128-157)/() 157/103  Flow (L/min):  [3] 3  Physical Exam    Constitutional: Awake in bed, alert   Eyes: PERRLA, sclerae anicteric, no conjunctival injection   HENT: Normocephalic, atraumatic, mucous membranes moist   Neck: Supple, trachea midline   Respiratory: Equal chest rise, non-labored respirations    Cardiovascular: RRR   Gastrointestinal: Soft, nontender, non-distended   Genitourinary: 2 way villarreal catheter with yellow/light pink urine output. Left renal abscess drain in place with serosanguineous output   Musculoskeletal: No lower extremity edema bilaterally, no clubbing or cyanosis to extremities   Psychiatric: Appropriate affect, cooperative   Neurologic: Oriented x 3,  Cranial Nerves grossly intact, speech clear   Skin: No rashes     Result Review    Result Review:  I have personally reviewed the results from the time of this admission to 2023 08:43 EDT and agree with these findings:  [x]  Laboratory  [x]  Microbiology  []  Radiology  []  EKG/Telemetry   []  Cardiology/Vascular   []  Pathology  []  Old records  [x]  Other: vital signs, intake and output    Most notable findings include: Cr 0.58. WBC 10.50.     Assessment & Plan   Assessment / Plan     Brief Patient Summary:  Aldair Narvaez is a  73 y.o. male with PMH of prostate cancer, recurrent nephrolithiasis, left ureteral stricture and urinary incontinence who presented to [facility] for worsening weakness found to have hydronephrosis and left renal abscess. He is now s/p Left renal abscess drain placement with IR and s/p Cystoscopy, Left ureteral stent placement with Dr. Justin.     Active Hospital Problems:  Active Hospital Problems    Diagnosis     **UTI (urinary tract infection)     NEETA (acute kidney injury)     Rhabdomyolysis     Right arm weakness     Hydronephrosis of left kidney     Personal history of prostate cancer     Sepsis due to urinary tract infection     CAD, multiple vessel     Uncontrolled type 2 diabetes mellitus with hyperglycemia     Mixed hyperlipidemia        Plan:   -Remove indwelling villarreal catheter, bladder scan PVR to assess emptying.   -Plan for outpatient Urologic follow up in 1-2 weeks once discharged.   -Continue abx.   -Will discuss plan for left renal abscess drain with IR.    will follow, please call if any questions.   D/w Dr. Justin.     DVT prophylaxis:  No DVT prophylaxis order currently exists.    CODE STATUS:   Code Status (Patient has no pulse and is not breathing): CPR (Attempt to Resuscitate)  Medical Interventions (Patient has pulse or is breathing): Full    Disposition:  I expect patient to remain inpatient.    Electronically signed by JERRICA Damon, 11/01/23, 8:43 AM EDT.

## 2023-11-01 NOTE — PROGRESS NOTES
[facility] Medicine Services  PROGRESS NOTE    Patient Name: Aldair Narvaez  : 1950  MRN: [id number]    Date of Admission: 10/29/2023  Primary Care Physician: Miguel Angel Mireles MD    Subjective   Subjective     CC:  fell from bed     HPI: The patient's wife is at bedside.  Farooq removed and he is voiding.  Reports decreased right shoulder pain.  He reports some increased right hand movement.  Overall feels significantly better.  He does report mild epistaxis with blowing his nose and feels like his nares are dry.      Objective   Objective     Vital Signs:   Temp:  [96.9 °F (36.1 °C)-98.3 °F (36.8 °C)] 97 °F (36.1 °C)  Heart Rate:  [84-99] 84  Resp:  [18] 18  BP: (128-157)/() 146/86     Physical Exam:  Gen:  WD/WN, sitting up in bedside chair  Neuro: alert, clear speech, follows commands, decreased right arm movement and  strength  HEENT:  NC/AT PERRL, OP benign  Neck:  Supple, no LAD  Heart RRR no murmur, rub, or gallop  Abd:  Soft, nontender, no rebound or guarding, pos BS  Extrem:  No c/c/e  Flank - bloody drainage from drain    Results Reviewed:  LAB RESULTS:      Lab 23  0524 10/30/23  0320 10/30/23  0047 10/29/23  2255 10/29/23  2023 10/29/23  1614 10/29/23  1259   WBC 10.50 14.56*  --   --   --   --  28.11*   HEMOGLOBIN 12.2* 13.0  --   --   --   --  14.7   HEMATOCRIT 37.2* 39.7  --   --   --   --  44.9   PLATELETS 99* 88*  --   --   --   --  150   NEUTROS ABS  --  14.27*  --   --   --   --  25.05*   IMMATURE GRANS (ABS)  --   --   --   --   --   --  0.87*   LYMPHS ABS  --   --   --   --   --   --  0.81   MONOS ABS  --   --   --   --   --   --  1.20*   EOS ABS  --  0.00  --   --   --   --  0.03   MCV 86.3 88.4  --   --   --   --  88.0   LACTATE  --   --  3.4* 6.8* 5.7* 5.1* 7.1*   PROTIME  --  17.7*  --   --   --   --   --    APTT  --   --   --   --   --   --  30.5         Lab 23  0524 10/30/23  0320 10/29/23  1259   SODIUM 137 136 131*   POTASSIUM 3.8 3.6  3.9   CHLORIDE 106 104 92*   CO2 21.0* 20.0* 18.0*   ANION GAP 10.0 12.0 21.0*   BUN 21 23 30*   CREATININE 0.58* 1.29* 2.36*   EGFR 104.3 59.3* 28.7*   GLUCOSE 229* 140* 438*   CALCIUM 7.7* 7.5* 9.1   HEMOGLOBIN A1C  --   --  9.40*         Lab 11/01/23  0524 10/30/23  0320 10/29/23  1259   TOTAL PROTEIN 5.6* 5.5*  --    ALBUMIN 2.6* 2.7*  --    GLOBULIN 3.0 2.8  --    ALT (SGPT) 69* 83* 61*   AST (SGOT) 120* 252* 141*   BILIRUBIN 0.3 0.4  --    ALK PHOS 77 93  --          Lab 10/30/23  0320 10/29/23  1614 10/29/23  1259   HSTROP T  --  45* 66*   PROTIME 17.7*  --   --    INR 1.44*  --   --                  Brief Urine Lab Results  (Last result in the past 365 days)        Color   Clarity   Blood   Leuk Est   Nitrite   Protein   CREAT   Urine HCG        10/29/23 1445 Yellow   Cloudy   Large (3+)   Small (1+)   Negative   100 mg/dL (2+)                   Microbiology Results Abnormal       Procedure Component Value - Date/Time    Blood Culture - Blood, Arm, Right [[id number]]  (Normal) Collected: 10/29/23 1435    Lab Status: Preliminary result Specimen: Blood from Arm, Right Updated: 10/31/23 1630     Blood Culture No growth at 2 days            MRI Cervical Spine Without Contrast    Result Date: 11/1/2023  MRI CERVICAL SPINE WO CONTRAST MRI BRACHIAL PLEXUS WO CONTRAST Date of Exam: 10/31/2023 11:39 PM EDT Indication: neck pain.  Comparison: Correlation with CT angiogram neck 10/29/2023. Technique:  Routine multiplanar/multisequence sequence images of the cervical spine were obtained without contrast administration.  Findings: Cervical spine: Seven cervical vertebrae are identified. Alignment is anatomic. There is mild diffuse disc space narrowing and diffuse disc desiccation. Vertebral bodies maintain normal height.  There is no focal bone marrow edema. There is no evidence of a marrow replacing process. No acute osseous abnormalities. Spinal cord signal is normal.  There is no evidence of cervical lymphadenopathy or  a neck mass. The craniocervical junction appears within normal limits. Limited view of the posterior fossa contents is unremarkable. C2-C3: There is minimal posterior disc osteophyte complex. There is mild bilateral facet hypertrophy. No neuroforaminal or spinal canal narrowing. C3-C4: There is no significant posterior disc osteophyte complex. There is mild left uncovertebral hypertrophy. There is mild bilateral facet hypertrophy. No neuroforaminal or spinal canal narrowing. C4-C5: There is mild posterior disc osteophyte complex. There is moderate bilateral uncovertebral hypertrophy and moderate facet apparency. There is moderate right neuroforaminal narrowing. C5-C6: There is mild posterior disc osteophyte complex. There is severe left and mild right facet hypertrophy. No neuroforaminal or spinal canal narrowing. C6-C7: There is mild posterior disc osteophyte complex. Uncovertebral joints appear normal. There is mild bilateral facet apparency without neuroforaminal or spinal canal narrowing. C7-T1: The posterior disc is unremarkable there is mild bilateral facet apparency. No significant neural foraminal or spinal canal stenosis. Right brachial plexus: Exam is somewhat limited without IV contrast. The course of the brachial plexus is unremarkable. The fibers of the brachial plexus appear normal size and signal. There is no associated mass or adenopathy. There is no evidence of brachial plexus inflammation. There are multiple muscle groups that exhibit infiltrating high-grade edema including the upper half of the subscapularis, the serratus anterior, multiple right-sided posterior lower cervical muscles and the coracobrachialis and pectoralis major muscles.  There is no evidence of a fluid collection or obvious tendon rupture.     Impression: Impression: 1.Mild to moderate cervical spondylosis with varying degrees of neuroforaminal narrowing, which is most pronounced and moderate on the right at C4-C5. 2.Multiple  muscle groups exhibiting high-grade infiltrating muscular edema. This could be related to infectious or inflammatory myositis. These could result from a brachial plexopathy although the course and caliber of the brachial plexus is unremarkable. 3.Mild multilevel cervical disc and facet joint degeneration resulting in varying degrees of neural foraminal narrowing without spinal canal stenosis. Electronically Signed: Jefferson Block MD  11/1/2023 12:23 AM EDT  Workstation ID: [id number]    MRI brachial plexus wo contrast    Result Date: 11/1/2023  MRI CERVICAL SPINE WO CONTRAST MRI BRACHIAL PLEXUS WO CONTRAST Date of Exam: 10/31/2023 11:39 PM EDT Indication: neck pain.  Comparison: Correlation with CT angiogram neck 10/29/2023. Technique:  Routine multiplanar/multisequence sequence images of the cervical spine were obtained without contrast administration.  Findings: Cervical spine: Seven cervical vertebrae are identified. Alignment is anatomic. There is mild diffuse disc space narrowing and diffuse disc desiccation. Vertebral bodies maintain normal height.  There is no focal bone marrow edema. There is no evidence of a marrow replacing process. No acute osseous abnormalities. Spinal cord signal is normal.  There is no evidence of cervical lymphadenopathy or a neck mass. The craniocervical junction appears within normal limits. Limited view of the posterior fossa contents is unremarkable. C2-C3: There is minimal posterior disc osteophyte complex. There is mild bilateral facet hypertrophy. No neuroforaminal or spinal canal narrowing. C3-C4: There is no significant posterior disc osteophyte complex. There is mild left uncovertebral hypertrophy. There is mild bilateral facet hypertrophy. No neuroforaminal or spinal canal narrowing. C4-C5: There is mild posterior disc osteophyte complex. There is moderate bilateral uncovertebral hypertrophy and moderate facet apparency. There is moderate right neuroforaminal narrowing.  C5-C6: There is mild posterior disc osteophyte complex. There is severe left and mild right facet hypertrophy. No neuroforaminal or spinal canal narrowing. C6-C7: There is mild posterior disc osteophyte complex. Uncovertebral joints appear normal. There is mild bilateral facet apparency without neuroforaminal or spinal canal narrowing. C7-T1: The posterior disc is unremarkable there is mild bilateral facet apparency. No significant neural foraminal or spinal canal stenosis. Right brachial plexus: Exam is somewhat limited without IV contrast. The course of the brachial plexus is unremarkable. The fibers of the brachial plexus appear normal size and signal. There is no associated mass or adenopathy. There is no evidence of brachial plexus inflammation. There are multiple muscle groups that exhibit infiltrating high-grade edema including the upper half of the subscapularis, the serratus anterior, multiple right-sided posterior lower cervical muscles and the coracobrachialis and pectoralis major muscles.  There is no evidence of a fluid collection or obvious tendon rupture.     Impression: Impression: 1.Mild to moderate cervical spondylosis with varying degrees of neuroforaminal narrowing, which is most pronounced and moderate on the right at C4-C5. 2.Multiple muscle groups exhibiting high-grade infiltrating muscular edema. This could be related to infectious or inflammatory myositis. These could result from a brachial plexopathy although the course and caliber of the brachial plexus is unremarkable. 3.Mild multilevel cervical disc and facet joint degeneration resulting in varying degrees of neural foraminal narrowing without spinal canal stenosis. Electronically Signed: Jefferson Block MD  11/1/2023 12:23 AM EDT  Workstation ID: [id number]    XR Pyelogram Retrograde    Impression: Impression: A fluoroscopic unit was utilized for a procedure performed in the operating room. No interpretation was requested. Please refer to  the operative report regarding findings. Please refer to PACS for patient radiation dose information. Bilateral retrograde ureteral and pyelograms showing stricture distal ureters. Left double-J stent placed in good position. Left renal abscess drain and multiple pelvic surgical staples also seen. No other incidental findings of significance. Electronically Signed: Molina Fields MD  10/31/2023 10:04 AM EDT  Workstation ID: [id number]         Current medications:  Scheduled Meds:cefTRIAXone, 2,000 mg, Intravenous, Q24H  enoxaparin, 40 mg, Subcutaneous, Nightly  valsartan, 80 mg, Oral, Q24H   And  hydroCHLOROthiazide, 12.5 mg, Oral, Q24H  insulin lispro, 2-7 Units, Subcutaneous, Q4H  metoprolol succinate XL, 50 mg, Oral, Daily  tamsulosin, 0.4 mg, Oral, Daily      Continuous Infusions:sodium chloride, 9 mL/hr, Last Rate: Stopped (10/31/23 0826)      PRN Meds:.  acetaminophen    dextrose    dextrose    gabapentin    glucagon (human recombinant)    HYDROcodone-acetaminophen    ipratropium-albuterol    sodium chloride    sodium chloride    sodium chloride    Assessment & Plan   Assessment & Plan     Active Hospital Problems    Diagnosis  POA    **UTI (urinary tract infection) [N39.0]  Yes    NEETA (acute kidney injury) [N17.9]  Yes    Rhabdomyolysis [M62.82]  Yes    Right arm weakness [R29.898]  Yes    Hydronephrosis of left kidney [N13.30]  Unknown    Personal history of prostate cancer [Z85.46]  Not Applicable    Sepsis due to urinary tract infection [A41.9, N39.0]  Unknown    CAD, multiple vessel [I25.10]  Yes    Uncontrolled type 2 diabetes mellitus with hyperglycemia [E11.65]  Yes    Mixed hyperlipidemia [E78.2]  Yes      Resolved Hospital Problems   No resolved problems to display.        Brief Hospital Course to date:  Aldair Narvaez is a 73 y.o. male w hydronephrosis,  HTN HL DM CAD  prostate CA, GRANT, who fell out of bed, lay on the floor for a long time, later noticed he could not move his right arm, has a UTI and  NEETA and temp 103.      This patient's problems and plans were partially entered by my partner and updated as appropriate by me 11/01/23.    Severe Sepsis due to UTI/renal abscess, E coli bacteremia   NEETA  Lactic Acidosis  Rhabdo  - CT scan shows 3.7cm infected cyst/abscess at L upper pole  - Cont abx per dr Ham, adjusted to ceftriaxone on 11/1  - IR  10/30 L renal  drain placed   - 10/31 L ureteral stent placement   - last dose Eliquis was 10/28 AM - defer for now --> until at least 11/4 per Urology and will depend on output from his drain, but OK for DVT ppx   - Farooq removed  - creat was 2.4 on admission, now improved  -Will need outpatient follow-up with urology in 1 to 2 weeks once discharged     T2DM with hyperglycemia  - improving slowly  - SSI, glucose checks     HO HTN, CAD, Afib  -resume BP meds; uptitrate dosing slowly    Fall at home, lay wedged under furniture for hours   R arm paresis - suspect nerve palsy from compression x hours  Brachial plexopathy  - PT OT commending rehab, which patient is amenable to  - stroke team originally consulted, MRI neg, suspect this is a local nerve injury .  Slow improvement.    - discussed with neurologist: anticipate good resolution with PT and time   -Possible need for repeat EMG in 4 to 6 weeks, gabapentin 100 mg 3 times daily as needed for nerve pain, once infection has resolved, can consider steroid use, follow-up with [facility] 3 months posthospitalization     Expected Discharge Location and Transportation: tbd  Expected Discharge tbd  Expected Discharge Date: 11/7/2023; Expected Discharge Time:      DVT prophylaxis:  Medical DVT prophylaxis orders are present.     AM-PAC 6 Clicks Score (PT): 18 (11/01/23 1240)    CODE STATUS:   Code Status and Medical Interventions:   Ordered at: 10/29/23 1620     Code Status (Patient has no pulse and is not breathing):    CPR (Attempt to Resuscitate)     Medical Interventions (Patient has pulse or is breathing):     Full       Alicia Agudelo MD  11/01/23

## 2023-11-01 NOTE — PLAN OF CARE
Goal Outcome Evaluation:  Plan of Care Reviewed With: patient, spouse           Outcome Evaluation: Pt presents below baseline with self care mobility d/t decreased RUE range/weakness, decreased balance and activity tolerance.  Pt max A LBD/UBD, min A to comb hair,  CGA to ambulate without AD.  OT will follow to advance pt toward PLOF.  Recommend IP rehab upon d/c.      Anticipated Discharge Disposition (OT): inpatient rehabilitation facility

## 2023-11-02 LAB
ALBUMIN SERPL-MCNC: 2.6 G/DL (ref 3.5–5.2)
ALBUMIN/GLOB SERPL: 0.8 G/DL
ALP SERPL-CCNC: 79 U/L (ref 39–117)
ALT SERPL W P-5'-P-CCNC: 70 U/L (ref 1–41)
ANION GAP SERPL CALCULATED.3IONS-SCNC: 10 MMOL/L (ref 5–15)
AST SERPL-CCNC: 108 U/L (ref 1–40)
BASOPHILS # BLD AUTO: 0.07 10*3/MM3 (ref 0–0.2)
BASOPHILS NFR BLD AUTO: 0.7 % (ref 0–1.5)
BILIRUB SERPL-MCNC: 0.3 MG/DL (ref 0–1.2)
BUN SERPL-MCNC: 21 MG/DL (ref 8–23)
BUN/CREAT SERPL: 31.3 (ref 7–25)
CALCIUM SPEC-SCNC: 8 MG/DL (ref 8.6–10.5)
CHLORIDE SERPL-SCNC: 106 MMOL/L (ref 98–107)
CK SERPL-CCNC: 4147 U/L (ref 20–200)
CO2 SERPL-SCNC: 22 MMOL/L (ref 22–29)
CREAT SERPL-MCNC: 0.67 MG/DL (ref 0.76–1.27)
DEPRECATED RDW RBC AUTO: 52.6 FL (ref 37–54)
EGFRCR SERPLBLD CKD-EPI 2021: 99.8 ML/MIN/1.73
EOSINOPHIL # BLD AUTO: 0.2 10*3/MM3 (ref 0–0.4)
EOSINOPHIL NFR BLD AUTO: 2.1 % (ref 0.3–6.2)
ERYTHROCYTE [DISTWIDTH] IN BLOOD BY AUTOMATED COUNT: 16.2 % (ref 12.3–15.4)
GLOBULIN UR ELPH-MCNC: 3.1 GM/DL
GLUCOSE BLDC GLUCOMTR-MCNC: 153 MG/DL (ref 70–130)
GLUCOSE BLDC GLUCOMTR-MCNC: 155 MG/DL (ref 70–130)
GLUCOSE BLDC GLUCOMTR-MCNC: 207 MG/DL (ref 70–130)
GLUCOSE BLDC GLUCOMTR-MCNC: 241 MG/DL (ref 70–130)
GLUCOSE BLDC GLUCOMTR-MCNC: 260 MG/DL (ref 70–130)
GLUCOSE BLDC GLUCOMTR-MCNC: 281 MG/DL (ref 70–130)
GLUCOSE SERPL-MCNC: 196 MG/DL (ref 65–99)
HCT VFR BLD AUTO: 39 % (ref 37.5–51)
HGB BLD-MCNC: 12.9 G/DL (ref 13–17.7)
IMM GRANULOCYTES # BLD AUTO: 0.28 10*3/MM3 (ref 0–0.05)
IMM GRANULOCYTES NFR BLD AUTO: 3 % (ref 0–0.5)
LYMPHOCYTES # BLD AUTO: 2.05 10*3/MM3 (ref 0.7–3.1)
LYMPHOCYTES NFR BLD AUTO: 21.7 % (ref 19.6–45.3)
MCH RBC QN AUTO: 29.1 PG (ref 26.6–33)
MCHC RBC AUTO-ENTMCNC: 33.1 G/DL (ref 31.5–35.7)
MCV RBC AUTO: 87.8 FL (ref 79–97)
MONOCYTES # BLD AUTO: 0.7 10*3/MM3 (ref 0.1–0.9)
MONOCYTES NFR BLD AUTO: 7.4 % (ref 5–12)
NEUTROPHILS NFR BLD AUTO: 6.16 10*3/MM3 (ref 1.7–7)
NEUTROPHILS NFR BLD AUTO: 65.1 % (ref 42.7–76)
NRBC BLD AUTO-RTO: 0 /100 WBC (ref 0–0.2)
PLATELET # BLD AUTO: 145 10*3/MM3 (ref 140–450)
PMV BLD AUTO: 9.9 FL (ref 6–12)
POTASSIUM SERPL-SCNC: 3.4 MMOL/L (ref 3.5–5.2)
PROT SERPL-MCNC: 5.7 G/DL (ref 6–8.5)
RBC # BLD AUTO: 4.44 10*6/MM3 (ref 4.14–5.8)
SODIUM SERPL-SCNC: 138 MMOL/L (ref 136–145)
WBC NRBC COR # BLD: 9.46 10*3/MM3 (ref 3.4–10.8)

## 2023-11-02 PROCEDURE — 82948 REAGENT STRIP/BLOOD GLUCOSE: CPT

## 2023-11-02 PROCEDURE — 99232 SBSQ HOSP IP/OBS MODERATE 35: CPT | Performed by: STUDENT IN AN ORGANIZED HEALTH CARE EDUCATION/TRAINING PROGRAM

## 2023-11-02 PROCEDURE — 82550 ASSAY OF CK (CPK): CPT | Performed by: INTERNAL MEDICINE

## 2023-11-02 PROCEDURE — 80053 COMPREHEN METABOLIC PANEL: CPT | Performed by: STUDENT IN AN ORGANIZED HEALTH CARE EDUCATION/TRAINING PROGRAM

## 2023-11-02 PROCEDURE — 25810000003 LACTATED RINGERS PER 1000 ML: Performed by: STUDENT IN AN ORGANIZED HEALTH CARE EDUCATION/TRAINING PROGRAM

## 2023-11-02 PROCEDURE — 25010000002 ENOXAPARIN PER 10 MG: Performed by: STUDENT IN AN ORGANIZED HEALTH CARE EDUCATION/TRAINING PROGRAM

## 2023-11-02 PROCEDURE — 94799 UNLISTED PULMONARY SVC/PX: CPT

## 2023-11-02 PROCEDURE — 99233 SBSQ HOSP IP/OBS HIGH 50: CPT | Performed by: NURSE PRACTITIONER

## 2023-11-02 PROCEDURE — 25010000002 CEFTRIAXONE PER 250 MG: Performed by: INTERNAL MEDICINE

## 2023-11-02 PROCEDURE — 85025 COMPLETE CBC W/AUTO DIFF WBC: CPT | Performed by: STUDENT IN AN ORGANIZED HEALTH CARE EDUCATION/TRAINING PROGRAM

## 2023-11-02 PROCEDURE — 63710000001 INSULIN LISPRO (HUMAN) PER 5 UNITS: Performed by: STUDENT IN AN ORGANIZED HEALTH CARE EDUCATION/TRAINING PROGRAM

## 2023-11-02 RX ORDER — HYDROCORTISONE ACETATE 25 MG/1
25 SUPPOSITORY RECTAL 2 TIMES DAILY
Status: DISCONTINUED | OUTPATIENT
Start: 2023-11-02 | End: 2023-11-07 | Stop reason: HOSPADM

## 2023-11-02 RX ORDER — SODIUM CHLORIDE, SODIUM LACTATE, POTASSIUM CHLORIDE, CALCIUM CHLORIDE 600; 310; 30; 20 MG/100ML; MG/100ML; MG/100ML; MG/100ML
75 INJECTION, SOLUTION INTRAVENOUS CONTINUOUS
Status: ACTIVE | OUTPATIENT
Start: 2023-11-02 | End: 2023-11-03

## 2023-11-02 RX ADMIN — INSULIN LISPRO 4 UNITS: 100 INJECTION, SOLUTION INTRAVENOUS; SUBCUTANEOUS at 21:28

## 2023-11-02 RX ADMIN — TAMSULOSIN HYDROCHLORIDE 0.4 MG: 0.4 CAPSULE ORAL at 08:38

## 2023-11-02 RX ADMIN — ENOXAPARIN SODIUM 40 MG: 100 INJECTION SUBCUTANEOUS at 21:29

## 2023-11-02 RX ADMIN — INSULIN LISPRO 2 UNITS: 100 INJECTION, SOLUTION INTRAVENOUS; SUBCUTANEOUS at 08:42

## 2023-11-02 RX ADMIN — INSULIN LISPRO 3 UNITS: 100 INJECTION, SOLUTION INTRAVENOUS; SUBCUTANEOUS at 12:01

## 2023-11-02 RX ADMIN — INSULIN LISPRO 4 UNITS: 100 INJECTION, SOLUTION INTRAVENOUS; SUBCUTANEOUS at 17:19

## 2023-11-02 RX ADMIN — HYDROCHLOROTHIAZIDE 12.5 MG: 12.5 CAPSULE ORAL at 08:38

## 2023-11-02 RX ADMIN — GABAPENTIN 100 MG: 100 CAPSULE ORAL at 21:36

## 2023-11-02 RX ADMIN — METOPROLOL SUCCINATE 50 MG: 50 TABLET, EXTENDED RELEASE ORAL at 08:38

## 2023-11-02 RX ADMIN — HYDROCORTISONE ACETATE 25 MG: 25 SUPPOSITORY RECTAL at 21:29

## 2023-11-02 RX ADMIN — CEFTRIAXONE SODIUM 2000 MG: 2 INJECTION, POWDER, FOR SOLUTION INTRAMUSCULAR; INTRAVENOUS at 12:00

## 2023-11-02 RX ADMIN — INSULIN LISPRO 3 UNITS: 100 INJECTION, SOLUTION INTRAVENOUS; SUBCUTANEOUS at 01:02

## 2023-11-02 RX ADMIN — VALSARTAN 80 MG: 160 TABLET, FILM COATED ORAL at 08:38

## 2023-11-02 RX ADMIN — SODIUM CHLORIDE, POTASSIUM CHLORIDE, SODIUM LACTATE AND CALCIUM CHLORIDE 75 ML/HR: 600; 310; 30; 20 INJECTION, SOLUTION INTRAVENOUS at 08:43

## 2023-11-02 RX ADMIN — INSULIN LISPRO 2 UNITS: 100 INJECTION, SOLUTION INTRAVENOUS; SUBCUTANEOUS at 04:51

## 2023-11-02 NOTE — PROGRESS NOTES
"Aldair MUIR Narvaez  1950  [id number]     Evaluating Physician: Joel Ham MD    Chief Complaint: fatigue    Reason for Consultation: pyelonephritis, blood cultures positive    History of present illness:     Patient is a 73 y.o.  Yr old male with history of prostate cancer and renal calculi, admitted in May 2023 requiring left ureteral stent, sepsis presentation with E. Coli pyelonephritis/septicemia and positive urine/blood cultures, transitioned to IV ceftriaxone and finished therapy; he presented to the hospital on October 29 with fall, generalized weakness and noted to have acute UTI/sepsis/acute kidney injury per medicine team. Creatinine 2.36 in the emergency room, significantly elevated lactate and white blood cell count of 28K; respiratory panel positive for rhinovirus, blood culture subsequently with gram-negative maivs and urine with gram-negative mavis; CT scan of the abdomen with left upper pole cyst that had increased in size from May with irregular gaseous lucency suspicious for infected renal cyst per radiology along with mild left hydronephrosis/hydroureter; urology has seen and making arrangements for IR consultation for left renal abscess drain versus aspiration; urology plans for cystoscopy/stent on October 31    10/30/23 IR for percutaneous abscess drain    10/31 neuro has seen regarding right arm weakness/paresis with concern for brachial plexopathy after fall, MRIs done, their note seen    10/31  \"Procedure(s):  CYSTOSCOPY   BILATERAL RETROGRADE PYELOGRAM (modifier 50)  LEFT URETERAL STENT PLACEMENT    11/2/23  abscess culture with E. coli.   generally fatigued, same scrapes on both lower extremities from his fall; fever generally improved since admission with some vague left flank pain that is not severe, dull at present, nonradiating, better since admission and does not attach a numerical severity.  Denies hematuria or overt pyuria.  Has had intermittent urinary frequency prior to " admission.  Denies hesitancy    Stable dry cough, denies dyspnea at this time; no hemoptysis.  No headache photophobia or neck stiffness.  Denies nausea vomiting diarrhea or abdominal pain today.  No new skin rash       Past Medical History:   Diagnosis Date    Acid reflux     Arthritis     Benign hypertension     Colon polyp     Concern about heart attack without diagnosis     Coronary artery disease     Coronary atherosclerosis     Heart disease     Hyperlipidemia     Hypertension     Kidney stone     Mixed hyperlipidemia     Obesity     body mass index 30+-obesity    Prostate cancer     Sleep apnea     Type 2 diabetes mellitus     Type 2 diabetes mellitus        Past Surgical History:   Procedure Laterality Date    CARDIAC CATHETERIZATION      CARDIAC SURGERY N/A     [place]    CARPAL TUNNEL RELEASE      CORONARY ARTERY BYPASS GRAFT  08/30/2022    CYSTOSCOPY BLADDER STONE LITHOTRIPSY      CYSTOSCOPY BLADDER STONE LITHOTRIPSY      CYSTOSCOPY RETROGRADE PYELOGRAM Left 10/31/2023    Procedure: CYSTOSCOPY RETROGRADE PYELOGRAM STENT PLACEMENT;  Surgeon: Isma Justin MD;  Location: [facility] OR;  Service: Urology;  Laterality: Left;    CYSTOSCOPY W/ URETERAL STENT PLACEMENT Left 05/09/2023    Procedure: CYSTOSCOPY LEFT RETROGRADE PYELOGRAM AND LEFT URETERAL STENT PLACEMENT;  Surgeon: Isma Justin MD;  Location: [facility] OR;  Service: Urology;  Laterality: Left;    FETAL SURGERY FOR CONGENITAL HERNIA      INGUINAL HERNIA REPAIR      KIDNEY STONE SURGERY      KNEE ARTHROSCOPY      PROSTATECTOMY      TRIGGER FINGER RELEASE      UMBILICAL HERNIA REPAIR      UVULOPALATOPHARYNGOPLASTY         Pediatric History   Patient Parents    Not on file     Other Topics Concern    Not on file   Social History Narrative    Not on file       family history includes Diabetes in his sister; Heart attack in his father; Heart disease in his father; Hyperlipidemia in his sister; Hypertension in his sister.    Allergies  "  Allergen Reactions    Iodine Other (See Comments)     Closes sinuses    Oxycodone-Acetaminophen Itching    Zofran [Ondansetron] Hives               Review of Systems    11/2/23     Constitutional-- No   sweats.  Appetite diminished with fatigue.  Heent-- No new vision, hearing or throat complaints.  No epistaxis or oral sores.  Denies odynophagia or dysphagia.  No flashers, floaters or eye pain. No odynophagia or dysphagia. No headache, photophobia or neck stiffness.  CV-- No chest pain, palpitation or syncope  Resp-- see above  GI- see above.  No hematochezia, melena, or hematemesis. Denies jaundice or chronic liver disease.  -- see above  Lymph- no swollen lymph nodes in neck/axilla or groin.   Heme- No active bruising or bleeding; no Hx of DVT or PE.  MS-- no swelling or pain in the bones or joints of arms/legs.  No new back pain.  Neuro-- right arm weakness associated with fall and some concern by medicine team for brachial plexus injury.  No seizures.    Full 12 point review of systems reviewed and negative otherwise for acute complaints, except for above    Physical Exam:   Vital Signs   /72 (BP Location: Left arm, Patient Position: Lying)   Pulse 77   Temp 98.3 °F (36.8 °C) (Oral)   Resp 18   Ht 177.8 cm (70\")   Wt 103 kg (227 lb 1.2 oz)   SpO2 95%   BMI 32.58 kg/m²     GENERAL: sleepy, in no acute distress. Generally debilitated  HEENT: Normocephalic, atraumatic.  No conjunctival injection. No icterus. Oropharynx clear without evidence of thrush or exudate. No evidence of peridontal disease.    NECK: Supple without nuchal rigidity. No mass.   HEART: RRR; No murmur, rubs, gallops.   LUNGS: diminished at bases but otherwise Clear to auscultation bilaterally without wheezing, rales, rhonchi. Normal respiratory effort. Nonlabored. No dullness.  ABDOMEN: Soft, nontender, nondistended. Positive bowel sounds. No rebound or guarding. NO mass or HSM.  EXT:  multifocal excoriations both lower " extremities associated with recent fall.  No redness induration or warmth.  No discrete mass bulge or fluctuance.  No crepitus or bulla    MSK: FROM without joint effusions noted arms/legs.    SKIN: Warm and dry without cutaneous eruptions on Inspection/palpation.    NEURO: sleepy    No peripheral stigmata/phenomena of endocarditis    IV without obvious redness or drainage    Laboratory Data    Results from last 7 days   Lab Units 11/02/23  0458 11/01/23  0524 10/30/23  0320   WBC 10*3/mm3 9.46 10.50 14.56*   HEMOGLOBIN g/dL 12.9* 12.2* 13.0   HEMATOCRIT % 39.0 37.2* 39.7   PLATELETS 10*3/mm3 145 99* 88*     Results from last 7 days   Lab Units 11/02/23  0458   SODIUM mmol/L 138   POTASSIUM mmol/L 3.4*   CHLORIDE mmol/L 106   CO2 mmol/L 22.0   BUN mg/dL 21   CREATININE mg/dL 0.67*   GLUCOSE mg/dL 196*   CALCIUM mg/dL 8.0*     Results from last 7 days   Lab Units 11/02/23 0458   ALK PHOS U/L 79   BILIRUBIN mg/dL 0.3   ALT (SGPT) U/L 70*   AST (SGOT) U/L 108*               Estimated Creatinine Clearance: 121.6 mL/min (A) (by C-G formula based on SCr of 0.67 mg/dL (L)).      Microbiology:      Radiology:  Imaging Results (Last 72 Hours)       Procedure Component Value Units Date/Time    MRI Cervical Spine Without Contrast [[id number]] Collected: 11/01/23 0001     Updated: 11/01/23 0026    Narrative:        MRI CERVICAL SPINE WO CONTRAST  MRI BRACHIAL PLEXUS WO CONTRAST    Date of Exam: 10/31/2023 11:39 PM EDT    Indication: neck pain.     Comparison: Correlation with CT angiogram neck 10/29/2023.    Technique:  Routine multiplanar/multisequence sequence images of the cervical spine were obtained without contrast administration.        Findings:  Cervical spine: Seven cervical vertebrae are identified. Alignment is anatomic. There is mild diffuse disc space narrowing and diffuse disc desiccation. Vertebral bodies maintain normal height.  There is no focal bone marrow edema. There is no evidence   of a marrow replacing  process. No acute osseous abnormalities. Spinal cord signal is normal.  There is no evidence of cervical lymphadenopathy or a neck mass. The craniocervical junction appears within normal limits. Limited view of the posterior fossa   contents is unremarkable.     C2-C3: There is minimal posterior disc osteophyte complex. There is mild bilateral facet hypertrophy. No neuroforaminal or spinal canal narrowing.    C3-C4: There is no significant posterior disc osteophyte complex. There is mild left uncovertebral hypertrophy. There is mild bilateral facet hypertrophy. No neuroforaminal or spinal canal narrowing.    C4-C5: There is mild posterior disc osteophyte complex. There is moderate bilateral uncovertebral hypertrophy and moderate facet apparency. There is moderate right neuroforaminal narrowing.    C5-C6: There is mild posterior disc osteophyte complex. There is severe left and mild right facet hypertrophy. No neuroforaminal or spinal canal narrowing.    C6-C7: There is mild posterior disc osteophyte complex. Uncovertebral joints appear normal. There is mild bilateral facet apparency without neuroforaminal or spinal canal narrowing.    C7-T1: The posterior disc is unremarkable there is mild bilateral facet apparency. No significant neural foraminal or spinal canal stenosis.    Right brachial plexus: Exam is somewhat limited without IV contrast. The course of the brachial plexus is unremarkable. The fibers of the brachial plexus appear normal size and signal. There is no associated mass or adenopathy. There is no evidence of   brachial plexus inflammation.    There are multiple muscle groups that exhibit infiltrating high-grade edema including the upper half of the subscapularis, the serratus anterior, multiple right-sided posterior lower cervical muscles and the coracobrachialis and pectoralis major muscles.   There is no evidence of a fluid collection or obvious tendon rupture.      Impression:       Impression:  1.Mild to moderate cervical spondylosis with varying degrees of neuroforaminal narrowing, which is most pronounced and moderate on the right at C4-C5.  2.Multiple muscle groups exhibiting high-grade infiltrating muscular edema. This could be related to infectious or inflammatory myositis. These could result from a brachial plexopathy although the course and caliber of the brachial plexus is   unremarkable.  3.Mild multilevel cervical disc and facet joint degeneration resulting in varying degrees of neural foraminal narrowing without spinal canal stenosis.        Electronically Signed: Jefferson Block MD    11/1/2023 12:23 AM EDT    Workstation ID: [id number]    MRI brachial plexus wo contrast [[id number]] Collected: 11/01/23 0001     Updated: 11/01/23 0026    Narrative:        MRI CERVICAL SPINE WO CONTRAST  MRI BRACHIAL PLEXUS WO CONTRAST    Date of Exam: 10/31/2023 11:39 PM EDT    Indication: neck pain.     Comparison: Correlation with CT angiogram neck 10/29/2023.    Technique:  Routine multiplanar/multisequence sequence images of the cervical spine were obtained without contrast administration.        Findings:  Cervical spine: Seven cervical vertebrae are identified. Alignment is anatomic. There is mild diffuse disc space narrowing and diffuse disc desiccation. Vertebral bodies maintain normal height.  There is no focal bone marrow edema. There is no evidence   of a marrow replacing process. No acute osseous abnormalities. Spinal cord signal is normal.  There is no evidence of cervical lymphadenopathy or a neck mass. The craniocervical junction appears within normal limits. Limited view of the posterior fossa   contents is unremarkable.     C2-C3: There is minimal posterior disc osteophyte complex. There is mild bilateral facet hypertrophy. No neuroforaminal or spinal canal narrowing.    C3-C4: There is no significant posterior disc osteophyte complex. There is mild left uncovertebral hypertrophy.  There is mild bilateral facet hypertrophy. No neuroforaminal or spinal canal narrowing.    C4-C5: There is mild posterior disc osteophyte complex. There is moderate bilateral uncovertebral hypertrophy and moderate facet apparency. There is moderate right neuroforaminal narrowing.    C5-C6: There is mild posterior disc osteophyte complex. There is severe left and mild right facet hypertrophy. No neuroforaminal or spinal canal narrowing.    C6-C7: There is mild posterior disc osteophyte complex. Uncovertebral joints appear normal. There is mild bilateral facet apparency without neuroforaminal or spinal canal narrowing.    C7-T1: The posterior disc is unremarkable there is mild bilateral facet apparency. No significant neural foraminal or spinal canal stenosis.    Right brachial plexus: Exam is somewhat limited without IV contrast. The course of the brachial plexus is unremarkable. The fibers of the brachial plexus appear normal size and signal. There is no associated mass or adenopathy. There is no evidence of   brachial plexus inflammation.    There are multiple muscle groups that exhibit infiltrating high-grade edema including the upper half of the subscapularis, the serratus anterior, multiple right-sided posterior lower cervical muscles and the coracobrachialis and pectoralis major muscles.   There is no evidence of a fluid collection or obvious tendon rupture.      Impression:      Impression:  1.Mild to moderate cervical spondylosis with varying degrees of neuroforaminal narrowing, which is most pronounced and moderate on the right at C4-C5.  2.Multiple muscle groups exhibiting high-grade infiltrating muscular edema. This could be related to infectious or inflammatory myositis. These could result from a brachial plexopathy although the course and caliber of the brachial plexus is   unremarkable.  3.Mild multilevel cervical disc and facet joint degeneration resulting in varying degrees of neural foraminal  narrowing without spinal canal stenosis.        Electronically Signed: Jefferson Block MD    11/1/2023 12:23 AM EDT    Workstation ID: [id number]    XR Pyelogram Retrograde [[id number]] Collected: 10/31/23 1003     Updated: 10/31/23 1007    Impression:      Impression: A fluoroscopic unit was utilized for a procedure performed in the operating room. No interpretation was requested. Please refer to the operative report regarding findings. Please refer to PACS for patient radiation dose information.    Bilateral retrograde ureteral and pyelograms showing stricture distal ureters. Left double-J stent placed in good position. Left renal abscess drain and multiple pelvic surgical staples also seen. No other incidental findings of significance.    Electronically Signed: Molina Fields MD    10/31/2023 10:04 AM EDT    Workstation ID: [id number]    CT Guided Percutaneous Drain Kidney Renal [[id number]] Collected: 10/30/23 1556     Updated: 10/30/23 1604    Narrative:      CT GUIDED PERCUTANEOUS DRAIN KIDNEY RENAL     History: Abscess     : Molina Fields MD.     Modality: CT     DOSE REDUCTION: The examination was performed according to departmental dose-optimization program which includes automated exposure control, adjustment of the mA and/or kV according to patient size and/or use of iterative reconstruction technique.    Radiation dose: 993 mGy-cm.       SEDATION: Moderate sedation was administered. 1 milligram of Versed and 0 micrograms of fentanyl IV was used for moderate sedation. Total intra service time of sedation was 19 minutes. The sedation was administered and the patient's vital signs monitored   throughout the procedure and recorded in the patient's medical record by the nurse under my direct supervision.    The patient systolic blood pressure was in 90s. He was started on IV saline infusion and fentanyl was not given.    Anesthesia: Lidocaine, local infiltration.            Estimated blood loss:  <  5 cc.            Technique:  A thorough discussion of the risks, benefits, and alternatives of the procedure, blood transfusion if needed, and if applicable, moderate sedation was carried out with the patient or the patient's next of kin. Any questions were answered. They verbalized   understanding. A written informed consent was then signed.      A timeout was performed prior to starting the procedure.     The procedure room personnel used personal protective equipment. The operators used sterile gowns and gloves in addition. The surgical site was prepped with chlorhexidine gluconate and draped in the maximal sterile fashion.    The patient was laid on the left lateral decubitus on the CT table. A preliminary limited CT scan was performed through the region of interest, if needed with a marking grid in place to localize the target, and to determine an access site, depth and   angle.    After local anesthesia a dermatotomy was performed if needed. Using CT guidance, an access needle was advanced into the target, gas-containing low-density area in the upper anterior cortex of the left kidney. Blood-tinged liquid was recovered. A   guidewire was positioned in the collection. Over the wire following sequential dilatation of the track, an 8.5 [place] Farias-Romero drainage catheter was placed. The catheter was secured to skin with nonabsorbable suture. The catheter was connected to a   fluid recovery system. An aseptic dressing was applied.    A limited post-procedure CT was done.    The patient was transferred to the recovery area and then discharged from the department in stable condition.     Complications: None immediate.    Specimen: The specimen was labeled and sent to the lab in appropriate carriers & containers if such was requested by the ordering provider       Impression:      Impression:                                                                Successful CT guided percutaneous abscess drainage catheter  placement in a left renal abscess. An 8.5 Pashto Farias-Romero drainage catheter was placed.    Drain under dry. Flush with 5 cc of normal saline at least once a day. Once the output drops to 0, reimage.    Thank you for the opportunity to assist in the care of your patient.        Electronically Signed: Molina Fields MD    10/30/2023 4:01 PM EDT    Workstation ID: [id number]              Impression:     --acute E Coli sepsis/septicemia, urinary source with a E Coli complicated UTI associated with abnormal CT scan and  left renal abscess status post drain on October 30, further complicated by hydronephrosis/stone and urology following for interventional plans.  IV Merrem ongoing with plans to taper antibiotics once further data. Sepsis parameters improving    --acute E Coli complicated UTI, probable left pyelonephritis with abnormal CT scan, s/p drain 10/30 c/w  left renal abscess, hydronephrosis and  intervention by urology 10/31.      --Acute kidney injury per medicine team, improved; Monitor to help guide future adjustments in antimicrobials    --history fall with right arm weakness and further workup/management at discretion of medicine team/neurology.  Some concern mention for brachial plexus injury    --elevated CPK, rhabdomyolysis.  Recent fall    PLAN:       --IV rocephin, anticipate 2-4 weeks to depend on clinical course/follow-up radiography etc.  He can come to the office daily for ceftriaxone (if he does not go to a rehab facility) or get ceftriaxone at rehab and f/u with me next week in office    --Check/review labs cultures and scans     --Partial history per nursing staff     --Discussed with microbiology     --Urology following; Intervention planned     --Discussed with pharmacy     --Highly complex at of issues with high risk for further serious morbidity and other serious sequela    --I am out of town until Sunday.  Call my partners if needed sooner    Copied text in this note has been reviewed and  is accurate as of 11/02/23.     Joel Ham MD  11/2/2023

## 2023-11-02 NOTE — PLAN OF CARE
Problem: Adult Inpatient Plan of Care  Goal: Absence of Hospital-Acquired Illness or Injury  Intervention: Identify and Manage Fall Risk  Recent Flowsheet Documentation  Taken 11/2/2023 1600 by Kristyn Joy RN  Safety Promotion/Fall Prevention:   activity supervised   assistive device/personal items within reach   clutter free environment maintained   toileting scheduled   safety round/check completed   room organization consistent   nonskid shoes/slippers when out of bed  Taken 11/2/2023 1400 by Kristyn Joy RN  Safety Promotion/Fall Prevention:   activity supervised   assistive device/personal items within reach   clutter free environment maintained   fall prevention program maintained   toileting scheduled   safety round/check completed   room organization consistent   nonskid shoes/slippers when out of bed  Taken 11/2/2023 1200 by Kristyn Joy RN  Safety Promotion/Fall Prevention:   activity supervised   assistive device/personal items within reach   clutter free environment maintained   fall prevention program maintained   toileting scheduled   safety round/check completed   room organization consistent   nonskid shoes/slippers when out of bed  Taken 11/2/2023 0800 by Kristyn Joy RN  Safety Promotion/Fall Prevention:   activity supervised   assistive device/personal items within reach   clutter free environment maintained   fall prevention program maintained   toileting scheduled   safety round/check completed   room organization consistent   nonskid shoes/slippers when out of bed  Intervention: Prevent Skin Injury  Recent Flowsheet Documentation  Taken 11/2/2023 1600 by Kristyn Joy RN  Body Position: legs elevated  Skin Protection: adhesive use limited  Taken 11/2/2023 1400 by Kristyn Joy RN  Body Position:   position changed independently   legs elevated  Skin Protection:   adhesive use limited   transparent dressing maintained   skin-to-skin areas padded  Taken  11/2/2023 1200 by Kristyn Joy RN  Body Position: legs elevated  Skin Protection: adhesive use limited  Taken 11/2/2023 0800 by Kristyn Joy RN  Body Position: position changed independently  Skin Protection: adhesive use limited  Intervention: Prevent and Manage VTE (Venous Thromboembolism) Risk  Recent Flowsheet Documentation  Taken 11/2/2023 1600 by Kristyn Joy RN  Activity Management: activity encouraged  Taken 11/2/2023 1400 by Kristyn Joy RN  Activity Management: activity encouraged  Taken 11/2/2023 1200 by Kristyn Joy RN  Activity Management: up in chair  Taken 11/2/2023 0800 by Kristyn Joy RN  Activity Management: activity encouraged  Range of Motion: active ROM (range of motion) encouraged  Intervention: Prevent Infection  Recent Flowsheet Documentation  Taken 11/2/2023 1400 by Kristyn Joy RN  Infection Prevention: environmental surveillance performed  Taken 11/2/2023 1200 by Kristyn Joy RN  Infection Prevention: environmental surveillance performed  Taken 11/2/2023 0800 by Kristyn Joy RN  Infection Prevention: environmental surveillance performed     Problem: Skin Injury Risk Increased  Goal: Skin Health and Integrity  Intervention: Optimize Skin Protection  Recent Flowsheet Documentation  Taken 11/2/2023 1600 by Kristyn Joy RN  Pressure Reduction Techniques:   frequent weight shift encouraged   weight shift assistance provided  Head of Bed (HOB) Positioning: [facility] elevated  Pressure Reduction Devices: positioning supports utilized  Skin Protection: adhesive use limited  Taken 11/2/2023 1400 by Kristyn Joy RN  Pressure Reduction Techniques: frequent weight shift encouraged  Head of Bed (HOB) Positioning: HOB elevated  Pressure Reduction Devices: positioning supports utilized  Skin Protection:   adhesive use limited   transparent dressing maintained   skin-to-skin areas padded  Taken 11/2/2023 1200 by Kristyn Joy RN  Pressure  Reduction Techniques:   frequent weight shift encouraged   weight shift assistance provided  Pressure Reduction Devices:   pressure-redistributing mattress utilized   positioning supports utilized  Skin Protection: adhesive use limited  Taken 11/2/2023 0800 by Kristyn Joy RN  Pressure Reduction Techniques:   frequent weight shift encouraged   weight shift assistance provided  Head of Bed (HOB) Positioning: HOB elevated  Pressure Reduction Devices:   pressure-redistributing mattress utilized   positioning supports utilized  Skin Protection: adhesive use limited     Problem: Fall Injury Risk  Goal: Absence of Fall and Fall-Related Injury  Intervention: Identify and Manage Contributors  Recent Flowsheet Documentation  Taken 11/2/2023 1600 by Kristyn Joy RN  Medication Review/Management: medications reviewed  Self-Care Promotion:   independence encouraged   BADL personal objects within reach   BADL personal routines maintained  Taken 11/2/2023 1400 by Kristyn Joy RN  Medication Review/Management: medications reviewed  Self-Care Promotion:   independence encouraged   BADL personal objects within reach   BADL personal routines maintained  Taken 11/2/2023 1200 by Kristyn Joy RN  Medication Review/Management: medications reviewed  Self-Care Promotion:   independence encouraged   BADL personal objects within reach   BADL personal routines maintained  Taken 11/2/2023 0800 by Kristyn Joy RN  Medication Review/Management: medications reviewed  Self-Care Promotion:   independence encouraged   BADL personal objects within reach   BADL personal routines maintained  Intervention: Promote Injury-Free Environment  Recent Flowsheet Documentation  Taken 11/2/2023 1600 by Kristyn Joy RN  Safety Promotion/Fall Prevention:   activity supervised   assistive device/personal items within reach   clutter free environment maintained   toileting scheduled   safety round/check completed   room  organization consistent   nonskid shoes/slippers when out of bed  Taken 11/2/2023 1400 by Kristyn Joy, RN  Safety Promotion/Fall Prevention:   activity supervised   assistive device/personal items within reach   clutter free environment maintained   fall prevention program maintained   toileting scheduled   safety round/check completed   room organization consistent   nonskid shoes/slippers when out of bed  Taken 11/2/2023 1200 by Kristyn Joy, RN  Safety Promotion/Fall Prevention:   activity supervised   assistive device/personal items within reach   clutter free environment maintained   fall prevention program maintained   toileting scheduled   safety round/check completed   room organization consistent   nonskid shoes/slippers when out of bed  Taken 11/2/2023 0800 by Kristyn Joy, RN  Safety Promotion/Fall Prevention:   activity supervised   assistive device/personal items within reach   clutter free environment maintained   fall prevention program maintained   toileting scheduled   safety round/check completed   room organization consistent   nonskid shoes/slippers when out of bed   Goal Outcome Evaluation:

## 2023-11-02 NOTE — CASE MANAGEMENT/SOCIAL WORK
Discharge Planning Assessment  [facility]     Patient Name: Aldair Narvaez  MRN: [id number]  Today's Date: 11/2/2023    Admit Date: 10/29/2023    Plan: SNF   Discharge Needs Assessment    No documentation.                  Discharge Plan       Row Name 11/02/23 1451       Plan    Plan SNF    Patient/Family in Agreement with Plan yes    Plan Comments Spoke with Georgie at [facility].  They have offered a bed which pt has accepted and will initiate insurance today.  CM will cont to follow.    Final Discharge Disposition Code 03 - skilled nursing facility (SNF)                  Continued Care and Services - Admitted Since 10/29/2023       Destination Coordination complete.      Service Provider Request Status Selected Services Address Phone Fax Patient Preferred    [facility]  [facility] [address] [facility] [address] [phone] --    [facility] Pending - No Request Sent N/A [address] [facility] [id number] [phone] --    [facility] Declined N/A [address] [place] [id number] [phone] --                  Expected Discharge Date and Time       Expected Discharge Date Expected Discharge Time    Nov 7, 2023            Demographic Summary    No documentation.                  Functional Status    No documentation.                  Psychosocial    No documentation.                  Abuse/Neglect    No documentation.                  Legal    No documentation.                  Substance Abuse    No documentation.                  Patient Forms    No documentation.                     Morena Beaulieu RN

## 2023-11-02 NOTE — PROGRESS NOTES
[facility] Medicine Services  PROGRESS NOTE    Patient Name: Aldair Narvaez  : 1950  MRN: [id number]    Date of Admission: 10/29/2023  Primary Care Physician: Miguel Angel Mireles MD    Subjective   Subjective     CC:  fell from bed     HPI: YG with 5mL out of it. Afebrile. On RA. Had 3 bowel movements. The patient's wife is at bedside. Denied concerns other than hemorrhoids.       Objective   Objective     Vital Signs:   Temp:  [96.9 °F (36.1 °C)-98.8 °F (37.1 °C)] 98.3 °F (36.8 °C)  Heart Rate:  [77-84] 77  Resp:  [18] 18  BP: (125-157)/() 125/72     Physical Exam:  Gen:  WD/WN, sitting up   Neuro: alert, clear speech, follows commands, decreased right arm movement and  strength  HEENT:  NC/AT, PERRL  Neck:  Supple, no LAD  Heart RRR no murmur, rub, or gallop  Abd:  Soft, nontender, no rebound or guarding, pos BS  Extrem:  No c/c/e  Flank - bloody drainage from drain    Results Reviewed:  LAB RESULTS:      Lab 23  0458 23  0524 10/30/23  0320 10/30/23  0047 10/29/23  2255 10/29/23  2023 10/29/23  1614 10/29/23  1259   WBC 9.46 10.50 14.56*  --   --   --   --  28.11*   HEMOGLOBIN 12.9* 12.2* 13.0  --   --   --   --  14.7   HEMATOCRIT 39.0 37.2* 39.7  --   --   --   --  44.9   PLATELETS 145 99* 88*  --   --   --   --  150   NEUTROS ABS 6.16  --  14.27*  --   --   --   --  25.05*   IMMATURE GRANS (ABS) 0.28*  --   --   --   --   --   --  0.87*   LYMPHS ABS 2.05  --   --   --   --   --   --  0.81   MONOS ABS 0.70  --   --   --   --   --   --  1.20*   EOS ABS 0.20  --  0.00  --   --   --   --  0.03   MCV 87.8 86.3 88.4  --   --   --   --  88.0   LACTATE  --   --   --  3.4* 6.8* 5.7* 5.1* 7.1*   PROTIME  --   --  17.7*  --   --   --   --   --    APTT  --   --   --   --   --   --   --  30.5         Lab 23  0458 23  0524 10/30/23  0320 10/29/23  1259   SODIUM 138 137 136 131*   POTASSIUM 3.4* 3.8 3.6 3.9   CHLORIDE 106 106 104 92*   CO2 22.0 21.0* 20.0* 18.0*    ANION GAP 10.0 10.0 12.0 21.0*   BUN 21 21 23 30*   CREATININE 0.67* 0.58* 1.29* 2.36*   EGFR 99.8 104.3 59.3* 28.7*   GLUCOSE 196* 229* 140* 438*   CALCIUM 8.0* 7.7* 7.5* 9.1   HEMOGLOBIN A1C  --   --   --  9.40*         Lab 11/02/23  0458 11/01/23  0524 10/30/23  0320 10/29/23  1259   TOTAL PROTEIN 5.7* 5.6* 5.5*  --    ALBUMIN 2.6* 2.6* 2.7*  --    GLOBULIN 3.1 3.0 2.8  --    ALT (SGPT) 70* 69* 83* 61*   AST (SGOT) 108* 120* 252* 141*   BILIRUBIN 0.3 0.3 0.4  --    ALK PHOS 79 77 93  --          Lab 10/30/23  0320 10/29/23  1614 10/29/23  1259   HSTROP T  --  45* 66*   PROTIME 17.7*  --   --    INR 1.44*  --   --                  Brief Urine Lab Results  (Last result in the past 365 days)        Color   Clarity   Blood   Leuk Est   Nitrite   Protein   CREAT   Urine HCG        10/29/23 1445 Yellow   Cloudy   Large (3+)   Small (1+)   Negative   100 mg/dL (2+)                   Microbiology Results Abnormal       Procedure Component Value - Date/Time    Blood Culture - Blood, Arm, Right [[id number]]  (Normal) Collected: 10/29/23 1435    Lab Status: Preliminary result Specimen: Blood from Arm, Right Updated: 11/01/23 1631     Blood Culture No growth at 3 days            MRI Cervical Spine Without Contrast    Result Date: 11/1/2023  MRI CERVICAL SPINE WO CONTRAST MRI BRACHIAL PLEXUS WO CONTRAST Date of Exam: 10/31/2023 11:39 PM EDT Indication: neck pain.  Comparison: Correlation with CT angiogram neck 10/29/2023. Technique:  Routine multiplanar/multisequence sequence images of the cervical spine were obtained without contrast administration.  Findings: Cervical spine: Seven cervical vertebrae are identified. Alignment is anatomic. There is mild diffuse disc space narrowing and diffuse disc desiccation. Vertebral bodies maintain normal height.  There is no focal bone marrow edema. There is no evidence of a marrow replacing process. No acute osseous abnormalities. Spinal cord signal is normal.  There is no evidence of  cervical lymphadenopathy or a neck mass. The craniocervical junction appears within normal limits. Limited view of the posterior fossa contents is unremarkable. C2-C3: There is minimal posterior disc osteophyte complex. There is mild bilateral facet hypertrophy. No neuroforaminal or spinal canal narrowing. C3-C4: There is no significant posterior disc osteophyte complex. There is mild left uncovertebral hypertrophy. There is mild bilateral facet hypertrophy. No neuroforaminal or spinal canal narrowing. C4-C5: There is mild posterior disc osteophyte complex. There is moderate bilateral uncovertebral hypertrophy and moderate facet apparency. There is moderate right neuroforaminal narrowing. C5-C6: There is mild posterior disc osteophyte complex. There is severe left and mild right facet hypertrophy. No neuroforaminal or spinal canal narrowing. C6-C7: There is mild posterior disc osteophyte complex. Uncovertebral joints appear normal. There is mild bilateral facet apparency without neuroforaminal or spinal canal narrowing. C7-T1: The posterior disc is unremarkable there is mild bilateral facet apparency. No significant neural foraminal or spinal canal stenosis. Right brachial plexus: Exam is somewhat limited without IV contrast. The course of the brachial plexus is unremarkable. The fibers of the brachial plexus appear normal size and signal. There is no associated mass or adenopathy. There is no evidence of brachial plexus inflammation. There are multiple muscle groups that exhibit infiltrating high-grade edema including the upper half of the subscapularis, the serratus anterior, multiple right-sided posterior lower cervical muscles and the coracobrachialis and pectoralis major muscles.  There is no evidence of a fluid collection or obvious tendon rupture.     Impression: Impression: 1.Mild to moderate cervical spondylosis with varying degrees of neuroforaminal narrowing, which is most pronounced and moderate on the  right at C4-C5. 2.Multiple muscle groups exhibiting high-grade infiltrating muscular edema. This could be related to infectious or inflammatory myositis. These could result from a brachial plexopathy although the course and caliber of the brachial plexus is unremarkable. 3.Mild multilevel cervical disc and facet joint degeneration resulting in varying degrees of neural foraminal narrowing without spinal canal stenosis. Electronically Signed: Jefferson Block MD  11/1/2023 12:23 AM EDT  Workstation ID: [id number]    MRI brachial plexus wo contrast    Result Date: 11/1/2023  MRI CERVICAL SPINE WO CONTRAST MRI BRACHIAL PLEXUS WO CONTRAST Date of Exam: 10/31/2023 11:39 PM EDT Indication: neck pain.  Comparison: Correlation with CT angiogram neck 10/29/2023. Technique:  Routine multiplanar/multisequence sequence images of the cervical spine were obtained without contrast administration.  Findings: Cervical spine: Seven cervical vertebrae are identified. Alignment is anatomic. There is mild diffuse disc space narrowing and diffuse disc desiccation. Vertebral bodies maintain normal height.  There is no focal bone marrow edema. There is no evidence of a marrow replacing process. No acute osseous abnormalities. Spinal cord signal is normal.  There is no evidence of cervical lymphadenopathy or a neck mass. The craniocervical junction appears within normal limits. Limited view of the posterior fossa contents is unremarkable. C2-C3: There is minimal posterior disc osteophyte complex. There is mild bilateral facet hypertrophy. No neuroforaminal or spinal canal narrowing. C3-C4: There is no significant posterior disc osteophyte complex. There is mild left uncovertebral hypertrophy. There is mild bilateral facet hypertrophy. No neuroforaminal or spinal canal narrowing. C4-C5: There is mild posterior disc osteophyte complex. There is moderate bilateral uncovertebral hypertrophy and moderate facet apparency. There is moderate right  neuroforaminal narrowing. C5-C6: There is mild posterior disc osteophyte complex. There is severe left and mild right facet hypertrophy. No neuroforaminal or spinal canal narrowing. C6-C7: There is mild posterior disc osteophyte complex. Uncovertebral joints appear normal. There is mild bilateral facet apparency without neuroforaminal or spinal canal narrowing. C7-T1: The posterior disc is unremarkable there is mild bilateral facet apparency. No significant neural foraminal or spinal canal stenosis. Right brachial plexus: Exam is somewhat limited without IV contrast. The course of the brachial plexus is unremarkable. The fibers of the brachial plexus appear normal size and signal. There is no associated mass or adenopathy. There is no evidence of brachial plexus inflammation. There are multiple muscle groups that exhibit infiltrating high-grade edema including the upper half of the subscapularis, the serratus anterior, multiple right-sided posterior lower cervical muscles and the coracobrachialis and pectoralis major muscles.  There is no evidence of a fluid collection or obvious tendon rupture.     Impression: Impression: 1.Mild to moderate cervical spondylosis with varying degrees of neuroforaminal narrowing, which is most pronounced and moderate on the right at C4-C5. 2.Multiple muscle groups exhibiting high-grade infiltrating muscular edema. This could be related to infectious or inflammatory myositis. These could result from a brachial plexopathy although the course and caliber of the brachial plexus is unremarkable. 3.Mild multilevel cervical disc and facet joint degeneration resulting in varying degrees of neural foraminal narrowing without spinal canal stenosis. Electronically Signed: Jefferson Block MD  11/1/2023 12:23 AM EDT  Workstation ID: [id number]         Current medications:  Scheduled Meds:cefTRIAXone, 2,000 mg, Intravenous, Q24H  enoxaparin, 40 mg, Subcutaneous, Nightly  valsartan, 80 mg, Oral, Q24H    And  hydroCHLOROthiazide, 12.5 mg, Oral, Q24H  insulin lispro, 2-7 Units, Subcutaneous, Q4H  metoprolol succinate XL, 50 mg, Oral, Daily  tamsulosin, 0.4 mg, Oral, Daily      Continuous Infusions:lactated ringers, 75 mL/hr  sodium chloride, 9 mL/hr, Last Rate: Stopped (10/31/23 0826)      PRN Meds:.  acetaminophen    dextrose    dextrose    gabapentin    glucagon (human recombinant)    HYDROcodone-acetaminophen    ipratropium-albuterol    sodium chloride    sodium chloride    sodium chloride    Assessment & Plan   Assessment & Plan     Active Hospital Problems    Diagnosis  POA    **UTI (urinary tract infection) [N39.0]  Yes    NEETA (acute kidney injury) [N17.9]  Yes    Rhabdomyolysis [M62.82]  Yes    Right arm weakness [R29.898]  Yes    Hydronephrosis of left kidney [N13.30]  Unknown    Personal history of prostate cancer [Z85.46]  Not Applicable    Sepsis due to urinary tract infection [A41.9, N39.0]  Unknown    CAD, multiple vessel [I25.10]  Yes    Uncontrolled type 2 diabetes mellitus with hyperglycemia [E11.65]  Yes    Mixed hyperlipidemia [E78.2]  Yes      Resolved Hospital Problems   No resolved problems to display.        Brief Hospital Course to date:  Aldair Narvaez is a 73 y.o. male w hydronephrosis,  HTN HL DM CAD  prostate CA, GRANT, who fell out of bed, lay on the floor for a long time, later noticed he could not move his right arm, has a UTI and NEETA and temp 103.      This patient's problems and plans were partially entered by my partner and updated as appropriate by me 11/02/23.    Severe Sepsis due to UTI/renal abscess, E coli bacteremia   NEETA  Lactic Acidosis  Rhabdo  - CT scan shows 3.7cm infected cyst/abscess at L upper pole  - Cont abx per dr Ham, adjusted to ceftriaxone on 11/1  - IR  10/30 L renal  drain placed   - 10/31 L ureteral stent placement   - last dose Eliquis was 10/28 AM - defer for now --> until at least 11/4 per Urology and will depend on output from his drain, but OK for DVT ppx    - Farooq removed  - creat was 2.4 on admission, now improved  -Will need outpatient follow-up with urology in 1 week once discharged with repeat CT abd/pelvis     T2DM with hyperglycemia  - improving slowly  - SSI, glucose checks     HO HTN, CAD, Afib  -resumed BP meds; uptitrate dosing slowly    Fall at home, lay wedged under furniture for hours   R arm paresis - suspect nerve palsy from compression x hours  Brachial plexopathy  - PT OT commending rehab, which patient is amenable to  - stroke team originally consulted, MRI neg, suspect this is a local nerve injury .  Slow improvement.    - discussed with neurologist: anticipate good resolution with PT and time   -Possible need for repeat EMG in 4 to 6 weeks, gabapentin 100 mg 3 times daily as needed for nerve pain, once infection has resolved, can consider steroid use, follow-up with [facility] 3 months posthospitalization    Hemorrhoids - anusol      Expected Discharge Location and Transportation: rehab  Expected Discharge tbd  Expected Discharge Date: 11/7/2023; Expected Discharge Time:      DVT prophylaxis:  Medical DVT prophylaxis orders are present.     AM-PAC 6 Clicks Score (PT): 18 (11/01/23 1830)    CODE STATUS:   Code Status and Medical Interventions:   Ordered at: 10/29/23 1620     Code Status (Patient has no pulse and is not breathing):    CPR (Attempt to Resuscitate)     Medical Interventions (Patient has pulse or is breathing):    Full       Alicia Agudelo MD  11/02/23

## 2023-11-03 LAB
ANION GAP SERPL CALCULATED.3IONS-SCNC: 10 MMOL/L (ref 5–15)
BASOPHILS # BLD MANUAL: 0 10*3/MM3 (ref 0–0.2)
BASOPHILS NFR BLD MANUAL: 0 % (ref 0–1.5)
BUN SERPL-MCNC: 16 MG/DL (ref 8–23)
BUN/CREAT SERPL: 27.6 (ref 7–25)
CALCIUM SPEC-SCNC: 8.1 MG/DL (ref 8.6–10.5)
CHLORIDE SERPL-SCNC: 104 MMOL/L (ref 98–107)
CK SERPL-CCNC: 1983 U/L (ref 20–200)
CO2 SERPL-SCNC: 24 MMOL/L (ref 22–29)
CREAT SERPL-MCNC: 0.58 MG/DL (ref 0.76–1.27)
DEPRECATED RDW RBC AUTO: 49.5 FL (ref 37–54)
EGFRCR SERPLBLD CKD-EPI 2021: 104.3 ML/MIN/1.73
EOSINOPHIL # BLD MANUAL: 0.45 10*3/MM3 (ref 0–0.4)
EOSINOPHIL NFR BLD MANUAL: 5 % (ref 0.3–6.2)
ERYTHROCYTE [DISTWIDTH] IN BLOOD BY AUTOMATED COUNT: 15.9 % (ref 12.3–15.4)
GLUCOSE BLDC GLUCOMTR-MCNC: 184 MG/DL (ref 70–130)
GLUCOSE BLDC GLUCOMTR-MCNC: 195 MG/DL (ref 70–130)
GLUCOSE BLDC GLUCOMTR-MCNC: 234 MG/DL (ref 70–130)
GLUCOSE BLDC GLUCOMTR-MCNC: 240 MG/DL (ref 70–130)
GLUCOSE BLDC GLUCOMTR-MCNC: 242 MG/DL (ref 70–130)
GLUCOSE BLDC GLUCOMTR-MCNC: 254 MG/DL (ref 70–130)
GLUCOSE SERPL-MCNC: 211 MG/DL (ref 65–99)
HCT VFR BLD AUTO: 40.1 % (ref 37.5–51)
HGB BLD-MCNC: 13.5 G/DL (ref 13–17.7)
LYMPHOCYTES # BLD MANUAL: 2.95 10*3/MM3 (ref 0.7–3.1)
LYMPHOCYTES NFR BLD MANUAL: 10 % (ref 5–12)
MAGNESIUM SERPL-MCNC: 1.8 MG/DL (ref 1.6–2.4)
MCH RBC QN AUTO: 28.9 PG (ref 26.6–33)
MCHC RBC AUTO-ENTMCNC: 33.7 G/DL (ref 31.5–35.7)
MCV RBC AUTO: 85.9 FL (ref 79–97)
METAMYELOCYTES NFR BLD MANUAL: 4 % (ref 0–0)
MONOCYTES # BLD: 0.89 10*3/MM3 (ref 0.1–0.9)
MYELOCYTES NFR BLD MANUAL: 1 % (ref 0–0)
NEUTROPHILS # BLD AUTO: 4.2 10*3/MM3 (ref 1.7–7)
NEUTROPHILS NFR BLD MANUAL: 44 % (ref 42.7–76)
NEUTS BAND NFR BLD MANUAL: 3 % (ref 0–5)
PLAT MORPH BLD: NORMAL
PLATELET # BLD AUTO: 177 10*3/MM3 (ref 140–450)
PMV BLD AUTO: 9.8 FL (ref 6–12)
POLYCHROMASIA BLD QL SMEAR: ABNORMAL
POTASSIUM SERPL-SCNC: 3.4 MMOL/L (ref 3.5–5.2)
POTASSIUM SERPL-SCNC: 3.9 MMOL/L (ref 3.5–5.2)
RBC # BLD AUTO: 4.67 10*6/MM3 (ref 4.14–5.8)
SMUDGE CELLS BLD QL SMEAR: ABNORMAL
SODIUM SERPL-SCNC: 138 MMOL/L (ref 136–145)
VARIANT LYMPHS NFR BLD MANUAL: 27 % (ref 19.6–45.3)
VARIANT LYMPHS NFR BLD MANUAL: 6 % (ref 0–5)
WBC NRBC COR # BLD: 8.93 10*3/MM3 (ref 3.4–10.8)

## 2023-11-03 PROCEDURE — 85025 COMPLETE CBC W/AUTO DIFF WBC: CPT | Performed by: STUDENT IN AN ORGANIZED HEALTH CARE EDUCATION/TRAINING PROGRAM

## 2023-11-03 PROCEDURE — 99232 SBSQ HOSP IP/OBS MODERATE 35: CPT | Performed by: STUDENT IN AN ORGANIZED HEALTH CARE EDUCATION/TRAINING PROGRAM

## 2023-11-03 PROCEDURE — 82948 REAGENT STRIP/BLOOD GLUCOSE: CPT

## 2023-11-03 PROCEDURE — 80048 BASIC METABOLIC PNL TOTAL CA: CPT | Performed by: STUDENT IN AN ORGANIZED HEALTH CARE EDUCATION/TRAINING PROGRAM

## 2023-11-03 PROCEDURE — 63710000001 INSULIN LISPRO (HUMAN) PER 5 UNITS: Performed by: STUDENT IN AN ORGANIZED HEALTH CARE EDUCATION/TRAINING PROGRAM

## 2023-11-03 PROCEDURE — 97110 THERAPEUTIC EXERCISES: CPT

## 2023-11-03 PROCEDURE — 97530 THERAPEUTIC ACTIVITIES: CPT

## 2023-11-03 PROCEDURE — 97535 SELF CARE MNGMENT TRAINING: CPT

## 2023-11-03 PROCEDURE — 25810000003 LACTATED RINGERS PER 1000 ML: Performed by: STUDENT IN AN ORGANIZED HEALTH CARE EDUCATION/TRAINING PROGRAM

## 2023-11-03 PROCEDURE — 82550 ASSAY OF CK (CPK): CPT | Performed by: INTERNAL MEDICINE

## 2023-11-03 PROCEDURE — 25010000002 ENOXAPARIN PER 10 MG: Performed by: STUDENT IN AN ORGANIZED HEALTH CARE EDUCATION/TRAINING PROGRAM

## 2023-11-03 PROCEDURE — C1894 INTRO/SHEATH, NON-LASER: HCPCS

## 2023-11-03 PROCEDURE — 05HY33Z INSERTION OF INFUSION DEVICE INTO UPPER VEIN, PERCUTANEOUS APPROACH: ICD-10-PCS | Performed by: FAMILY MEDICINE

## 2023-11-03 PROCEDURE — 84132 ASSAY OF SERUM POTASSIUM: CPT | Performed by: STUDENT IN AN ORGANIZED HEALTH CARE EDUCATION/TRAINING PROGRAM

## 2023-11-03 PROCEDURE — 85007 BL SMEAR W/DIFF WBC COUNT: CPT | Performed by: STUDENT IN AN ORGANIZED HEALTH CARE EDUCATION/TRAINING PROGRAM

## 2023-11-03 PROCEDURE — 25010000002 CEFTRIAXONE PER 250 MG: Performed by: INTERNAL MEDICINE

## 2023-11-03 PROCEDURE — C1751 CATH, INF, PER/CENT/MIDLINE: HCPCS

## 2023-11-03 PROCEDURE — 97116 GAIT TRAINING THERAPY: CPT

## 2023-11-03 PROCEDURE — 83735 ASSAY OF MAGNESIUM: CPT | Performed by: STUDENT IN AN ORGANIZED HEALTH CARE EDUCATION/TRAINING PROGRAM

## 2023-11-03 RX ORDER — SODIUM CHLORIDE 0.9 % (FLUSH) 0.9 %
10 SYRINGE (ML) INJECTION EVERY 12 HOURS SCHEDULED
Status: DISCONTINUED | OUTPATIENT
Start: 2023-11-03 | End: 2023-11-07 | Stop reason: HOSPADM

## 2023-11-03 RX ORDER — CHOLECALCIFEROL (VITAMIN D3) 125 MCG
10 CAPSULE ORAL NIGHTLY PRN
Status: DISCONTINUED | OUTPATIENT
Start: 2023-11-03 | End: 2023-11-07 | Stop reason: HOSPADM

## 2023-11-03 RX ORDER — POTASSIUM CHLORIDE 20 MEQ/1
40 TABLET, EXTENDED RELEASE ORAL EVERY 4 HOURS
Status: DISCONTINUED | OUTPATIENT
Start: 2023-11-03 | End: 2023-11-03 | Stop reason: CLARIF

## 2023-11-03 RX ORDER — SODIUM CHLORIDE 0.9 % (FLUSH) 0.9 %
10 SYRINGE (ML) INJECTION AS NEEDED
Status: DISCONTINUED | OUTPATIENT
Start: 2023-11-03 | End: 2023-11-07 | Stop reason: HOSPADM

## 2023-11-03 RX ORDER — POTASSIUM CHLORIDE 1.5 G/1.58G
40 POWDER, FOR SOLUTION ORAL ONCE
Status: COMPLETED | OUTPATIENT
Start: 2023-11-03 | End: 2023-11-03

## 2023-11-03 RX ORDER — L.ACID,PARA/B.BIFIDUM/S.THERM 8B CELL
1 CAPSULE ORAL DAILY
Status: DISCONTINUED | OUTPATIENT
Start: 2023-11-03 | End: 2023-11-07 | Stop reason: HOSPADM

## 2023-11-03 RX ORDER — SODIUM CHLORIDE, SODIUM LACTATE, POTASSIUM CHLORIDE, CALCIUM CHLORIDE 600; 310; 30; 20 MG/100ML; MG/100ML; MG/100ML; MG/100ML
75 INJECTION, SOLUTION INTRAVENOUS CONTINUOUS
Status: ACTIVE | OUTPATIENT
Start: 2023-11-03 | End: 2023-11-04

## 2023-11-03 RX ADMIN — VALSARTAN 80 MG: 160 TABLET, FILM COATED ORAL at 09:11

## 2023-11-03 RX ADMIN — INSULIN LISPRO 2 UNITS: 100 INJECTION, SOLUTION INTRAVENOUS; SUBCUTANEOUS at 00:52

## 2023-11-03 RX ADMIN — INSULIN LISPRO 3 UNITS: 100 INJECTION, SOLUTION INTRAVENOUS; SUBCUTANEOUS at 13:10

## 2023-11-03 RX ADMIN — INSULIN LISPRO 4 UNITS: 100 INJECTION, SOLUTION INTRAVENOUS; SUBCUTANEOUS at 21:56

## 2023-11-03 RX ADMIN — INSULIN LISPRO 3 UNITS: 100 INJECTION, SOLUTION INTRAVENOUS; SUBCUTANEOUS at 09:28

## 2023-11-03 RX ADMIN — TAMSULOSIN HYDROCHLORIDE 0.4 MG: 0.4 CAPSULE ORAL at 09:11

## 2023-11-03 RX ADMIN — CEFTRIAXONE SODIUM 2000 MG: 2 INJECTION, POWDER, FOR SOLUTION INTRAMUSCULAR; INTRAVENOUS at 09:08

## 2023-11-03 RX ADMIN — HYDROCORTISONE ACETATE 25 MG: 25 SUPPOSITORY RECTAL at 21:56

## 2023-11-03 RX ADMIN — Medication 10 ML: at 16:48

## 2023-11-03 RX ADMIN — Medication 10 ML: at 22:02

## 2023-11-03 RX ADMIN — METOPROLOL SUCCINATE 50 MG: 50 TABLET, EXTENDED RELEASE ORAL at 09:11

## 2023-11-03 RX ADMIN — INSULIN LISPRO 2 UNITS: 100 INJECTION, SOLUTION INTRAVENOUS; SUBCUTANEOUS at 04:53

## 2023-11-03 RX ADMIN — INSULIN LISPRO 4 UNITS: 100 INJECTION, SOLUTION INTRAVENOUS; SUBCUTANEOUS at 17:19

## 2023-11-03 RX ADMIN — SODIUM CHLORIDE, POTASSIUM CHLORIDE, SODIUM LACTATE AND CALCIUM CHLORIDE 75 ML/HR: 600; 310; 30; 20 INJECTION, SOLUTION INTRAVENOUS at 14:56

## 2023-11-03 RX ADMIN — POTASSIUM CHLORIDE 40 MEQ: 1.5 FOR SOLUTION ORAL at 13:10

## 2023-11-03 RX ADMIN — POTASSIUM CHLORIDE 40 MEQ: 1500 TABLET, EXTENDED RELEASE ORAL at 09:28

## 2023-11-03 RX ADMIN — Medication 1 CAPSULE: at 16:48

## 2023-11-03 RX ADMIN — HYDROCORTISONE ACETATE 25 MG: 25 SUPPOSITORY RECTAL at 09:11

## 2023-11-03 RX ADMIN — ENOXAPARIN SODIUM 40 MG: 100 INJECTION SUBCUTANEOUS at 21:56

## 2023-11-03 RX ADMIN — Medication 10 MG: at 01:09

## 2023-11-03 RX ADMIN — HYDROCHLOROTHIAZIDE 12.5 MG: 12.5 CAPSULE ORAL at 09:11

## 2023-11-03 NOTE — PLAN OF CARE
Goal Outcome Evaluation:  Plan of Care Reviewed With: patient, spouse        Progress: improving  Outcome Evaluation: Pt with slightly improved R hand and forearm strength.  Pt with good tolerance with UE TE.  Pt dep LBD, setup to comb hair,  CGA to ambulate in room without AD.  Pt continues significantly below baseline with self care d/t decreased RUE  ROM/weakness. Recommend IP rehab if deemed medically appropriate.      Anticipated Discharge Disposition (OT): inpatient rehabilitation facility

## 2023-11-03 NOTE — PROGRESS NOTES
[facility] Medicine Services  PROGRESS NOTE    Patient Name: Aldair Narvaez  : 1950  MRN: [id number]    Date of Admission: 10/29/2023  Primary Care Physician: Miguel Angel Mireles MD    Subjective   Subjective     CC:  fell from bed     HPI: YG with 10mL out of it. No fever. Had a bowel movement today. Developed blistering rash on back under the YG drain bandage that is painful. Still with RUE weakness. The patient's wife is at bedside.     Objective   Objective     Vital Signs:   Temp:  [97.6 °F (36.4 °C)-98.5 °F (36.9 °C)] 97.6 °F (36.4 °C)  Heart Rate:  [] 100  Resp:  [18] 18  BP: (130-153)/(75-92) 147/79     Physical Exam:  Gen:  WD/WN, sitting up   Neuro: alert, clear speech, follows commands, decreased right arm movement and  strength  HEENT:  NC/AT, PERRL  Neck:  Supple, no LAD  Heart RRR no murmur, rub, or gallop  Abd:  Soft, nontender, no rebound or guarding, pos BS  Extrem:  RUE with mild swelling  Flank - bloody drainage from drain  Skin - several blisters on back contained to the area under his YG dressing only    Results Reviewed:  LAB RESULTS:      Lab 23  0425 23  0458 23  0524 10/30/23  0320 10/30/23  0047 10/29/23  2255 10/29/23  2023 10/29/23  1614 10/29/23  1259   WBC 8.93 9.46 10.50 14.56*  --   --   --   --  28.11*   HEMOGLOBIN 13.5 12.9* 12.2* 13.0  --   --   --   --  14.7   HEMATOCRIT 40.1 39.0 37.2* 39.7  --   --   --   --  44.9   PLATELETS 177 145 99* 88*  --   --   --   --  150   NEUTROS ABS 4.20 6.16  --  14.27*  --   --   --   --  25.05*   IMMATURE GRANS (ABS)  --  0.28*  --   --   --   --   --   --  0.87*   LYMPHS ABS  --  2.05  --   --   --   --   --   --  0.81   MONOS ABS  --  0.70  --   --   --   --   --   --  1.20*   EOS ABS 0.45* 0.20  --  0.00  --   --   --   --  0.03   MCV 85.9 87.8 86.3 88.4  --   --   --   --  88.0   LACTATE  --   --   --   --  3.4* 6.8* 5.7* 5.1* 7.1*   PROTIME  --   --   --  17.7*  --   --   --   --   --     APTT  --   --   --   --   --   --   --   --  30.5         Lab 11/03/23  0425 11/02/23 0458 11/01/23  0524 10/30/23  0320 10/29/23  1259   SODIUM 138 138 137 136 131*   POTASSIUM 3.4* 3.4* 3.8 3.6 3.9   CHLORIDE 104 106 106 104 92*   CO2 24.0 22.0 21.0* 20.0* 18.0*   ANION GAP 10.0 10.0 10.0 12.0 21.0*   BUN 16 21 21 23 30*   CREATININE 0.58* 0.67* 0.58* 1.29* 2.36*   EGFR 104.3 99.8 104.3 59.3* 28.7*   GLUCOSE 211* 196* 229* 140* 438*   CALCIUM 8.1* 8.0* 7.7* 7.5* 9.1   MAGNESIUM 1.8  --   --   --   --    HEMOGLOBIN A1C  --   --   --   --  9.40*         Lab 11/02/23  0458 11/01/23  0524 10/30/23  0320 10/29/23  1259   TOTAL PROTEIN 5.7* 5.6* 5.5*  --    ALBUMIN 2.6* 2.6* 2.7*  --    GLOBULIN 3.1 3.0 2.8  --    ALT (SGPT) 70* 69* 83* 61*   AST (SGOT) 108* 120* 252* 141*   BILIRUBIN 0.3 0.3 0.4  --    ALK PHOS 79 77 93  --          Lab 10/30/23  0320 10/29/23  1614 10/29/23  1259   HSTROP T  --  45* 66*   PROTIME 17.7*  --   --    INR 1.44*  --   --                  Brief Urine Lab Results  (Last result in the past 365 days)        Color   Clarity   Blood   Leuk Est   Nitrite   Protein   CREAT   Urine HCG        10/29/23 1445 Yellow   Cloudy   Large (3+)   Small (1+)   Negative   100 mg/dL (2+)                   Microbiology Results Abnormal       None            No radiology results from the last 24 hrs        Current medications:  Scheduled Meds:cefTRIAXone, 2,000 mg, Intravenous, Q24H  enoxaparin, 40 mg, Subcutaneous, Nightly  valsartan, 80 mg, Oral, Q24H   And  hydroCHLOROthiazide, 12.5 mg, Oral, Q24H  hydrocortisone, 25 mg, Rectal, BID  insulin lispro, 2-7 Units, Subcutaneous, Q4H  metoprolol succinate XL, 50 mg, Oral, Daily  tamsulosin, 0.4 mg, Oral, Daily      Continuous Infusions:lactated ringers, 75 mL/hr, Last Rate: 75 mL/hr (11/02/23 0843)  sodium chloride, 9 mL/hr, Last Rate: Stopped (10/31/23 0826)      PRN Meds:.  acetaminophen    dextrose    dextrose    gabapentin    glucagon (human recombinant)     HYDROcodone-acetaminophen    ipratropium-albuterol    melatonin    sodium chloride    sodium chloride    sodium chloride    Assessment & Plan   Assessment & Plan     Active Hospital Problems    Diagnosis  POA    **UTI (urinary tract infection) [N39.0]  Yes    NEETA (acute kidney injury) [N17.9]  Yes    Rhabdomyolysis [M62.82]  Yes    Right arm weakness [R29.898]  Yes    Hydronephrosis of left kidney [N13.30]  Unknown    Personal history of prostate cancer [Z85.46]  Not Applicable    Sepsis due to urinary tract infection [A41.9, N39.0]  Unknown    CAD, multiple vessel [I25.10]  Yes    Uncontrolled type 2 diabetes mellitus with hyperglycemia [E11.65]  Yes    Mixed hyperlipidemia [E78.2]  Yes      Resolved Hospital Problems   No resolved problems to display.        Brief Hospital Course to date:  Aldair Narvaez is a 73 y.o. male w hydronephrosis,  HTN HL DM CAD  prostate CA, GRANT, who fell out of bed, lay on the floor for a long time, later noticed he could not move his right arm, has a UTI and NEETA and temp 103.      This patient's problems and plans were partially entered by my partner and updated as appropriate by me 11/03/23.    Severe Sepsis due to UTI/renal abscess, E coli bacteremia   NEETA  Lactic Acidosis  Rhabdo  - CT scan shows 3.7cm infected cyst/abscess at L upper pole  - Cont abx per dr Ham, adjusted to ceftriaxone on 11/1  - IR  10/30 L renal  drain placed   - 10/31 L ureteral stent placement   - last dose Eliquis was 10/28 AM - defer for now --> until 11/4 per Urology and will depend on output from his drain, but OK for DVT ppx   - Farooq removed  - creat was 2.4 on admission, now improved  -Will need outpatient follow-up with urology on approximately 11/8/2023 with repeat CT abd/pelvis with and without contrast prior to appointment  - IVF, CK downtrending  - PICC placement    Blistering rash  - contained to the area around the YG drain and only under the bandaging; no systemic symptoms, no oral lesions  -  wound consulted  - allergy added to chart     T2DM with hyperglycemia  - SSI, glucose checks     HO HTN, CAD, Afib  -resumed BP meds; uptitrate dosing slowly    Fall at home, lay wedged under furniture for hours   R arm paresis - suspect nerve palsy from compression x hours  Brachial plexopathy  - PT OT commending rehab, which patient is amenable to  - stroke team originally consulted, MRI neg, suspect this is a local nerve injury .  Slow improvement.    - discussed with neurologist: anticipate good resolution with PT and time   -Possible need for repeat EMG in 4 to 6 weeks, gabapentin 100 mg 3 times daily as needed for nerve pain, once infection has resolved, can consider steroid use, follow-up with [facility] 3 months posthospitalization    Hemorrhoids - anusol      Expected Discharge Location and Transportation: rehab  Expected Discharge tbd  Expected Discharge Date: 11/7/2023; Expected Discharge Time:      DVT prophylaxis:  Medical DVT prophylaxis orders are present.     AM-PAC 6 Clicks Score (PT): 18 (11/02/23 0800)    CODE STATUS:   Code Status and Medical Interventions:   Ordered at: 10/29/23 1620     Code Status (Patient has no pulse and is not breathing):    CPR (Attempt to Resuscitate)     Medical Interventions (Patient has pulse or is breathing):    Full       Alicia Agudelo MD  11/03/23

## 2023-11-03 NOTE — THERAPY TREATMENT NOTE
Patient Name: Aldair Narvaez  : 1950    MRN: [id number]                              Today's Date: 11/3/2023       Admit Date: 10/29/2023    Visit Dx:     ICD-10-CM ICD-9-CM   1. Fall in home, initial encounter  W19.XXXA E888.9    Y92.009 E849.0   2. Traumatic rhabdomyolysis, initial encounter  T79.6XXA 958.6   3. NEETA (acute kidney injury)  N17.9 584.9   4. Sepsis with acute renal failure and septic shock, due to unspecified organism, unspecified acute renal failure type  A41.9 038.9    R65.21 995.92    N17.9 785.52     584.9   5. Weakness of right upper extremity  R29.898 729.89   6. Hyperglycemia due to diabetes mellitus  E11.65 250.02   7. Hydronephrosis of left kidney  N13.30 591   8. Acute pyelonephritis  N10 590.10   9. Sepsis due to urinary tract infection  A41.9 038.9    N39.0 995.91     599.0   10. Right nephrolithiasis  N20.0 592.0   11. Renal abscess, left  N15.1 590.2     Patient Active Problem List   Diagnosis    Benign hypertension    Mixed hyperlipidemia    Uncontrolled type 2 diabetes mellitus with hyperglycemia    CAD, multiple vessel    Pyelonephritis    Sepsis due to urinary tract infection    Personal history of prostate cancer    Paroxysmal A-fib    Bacteremia    Hydronephrosis of left kidney    UTI (urinary tract infection)    NEETA (acute kidney injury)    Rhabdomyolysis    Right arm weakness     Past Medical History:   Diagnosis Date    Acid reflux     Arthritis     Benign hypertension     Colon polyp     Concern about heart attack without diagnosis     Coronary artery disease     Coronary atherosclerosis     Heart disease     Hyperlipidemia     Hypertension     Kidney stone     Mixed hyperlipidemia     Obesity     body mass index 30+-obesity    Prostate cancer     Sleep apnea     Type 2 diabetes mellitus     Type 2 diabetes mellitus      Past Surgical History:   Procedure Laterality Date    CARDIAC CATHETERIZATION      CARDIAC SURGERY N/A     [place]    CARPAL TUNNEL RELEASE       CORONARY ARTERY BYPASS GRAFT  08/30/2022    CYSTOSCOPY BLADDER STONE LITHOTRIPSY      CYSTOSCOPY BLADDER STONE LITHOTRIPSY      CYSTOSCOPY RETROGRADE PYELOGRAM Left 10/31/2023    Procedure: CYSTOSCOPY RETROGRADE PYELOGRAM STENT PLACEMENT;  Surgeon: Isma Justin MD;  Location:  [facility];  Service: Urology;  Laterality: Left;    CYSTOSCOPY W/ URETERAL STENT PLACEMENT Left 05/09/2023    Procedure: CYSTOSCOPY LEFT RETROGRADE PYELOGRAM AND LEFT URETERAL STENT PLACEMENT;  Surgeon: Isma Justin MD;  Location:  [facility];  Service: Urology;  Laterality: Left;    FETAL SURGERY FOR CONGENITAL HERNIA      INGUINAL HERNIA REPAIR      KIDNEY STONE SURGERY      KNEE ARTHROSCOPY      PROSTATECTOMY      TRIGGER FINGER RELEASE      UMBILICAL HERNIA REPAIR      UVULOPALATOPHARYNGOPLASTY        General Information       Row Name 11/03/23 1335          OT Time and Intention    Document Type therapy note (daily note)  -     Mode of Treatment occupational therapy  -       Row Name 11/03/23 7675          General Information    Patient Profile Reviewed yes  -AC     Existing Precautions/Restrictions fall  R UE weakness/ limited ROM, YG drain  -     Barriers to Rehab medically complex  -       Row Name 11/03/23 1335          Cognition    Orientation Status (Cognition) oriented x 4  -       Row Name 11/03/23 1336          Safety Issues, Functional Mobility    Impairments Affecting Function (Mobility) balance;grasp;motor control;muscle tone abnormal;range of motion (ROM);strength  -AC               User Key  (r) = Recorded By, (t) = Taken By, (c) = Cosigned By      Initials Name Provider Type    AC Jenniffer Santiago OT Occupational Therapist                     Mobility/ADL's       Row Name 11/03/23 1981          Bed Mobility    Bed Mobility supine-sit;sit-supine  -AC     Supine-Sit [place] (Bed Mobility) verbal cues;contact guard  -     Sit-Supine [place] (Bed Mobility) verbal cues;standby assist  -      Assistive Device (Bed Mobility) bed rails;head of bed elevated  -     Comment, (Bed Mobility) increased time and effort  -       Row Name 11/03/23 1420          Transfers    Transfers sit-stand transfer  -       Row Name 11/03/23 1420          Sit-Stand Transfer    Sit-Stand [place] (Transfers) contact guard  -     Assistive Device (Sit-Stand Transfers) --  No AD  -AC       Row Name 11/03/23 1420          Functional Mobility    Functional Mobility- Ind. Level contact guard assist  -     Functional Mobility- Device other (see comments)  No AD  -AC     Functional Mobility-Distance (Feet) 28  -AC     Functional Mobility- Safety Issues step length decreased  -       Row Name 11/03/23 1420          Activities of Daily Living    BADL Assessment/Intervention lower body dressing;grooming  -       Row Name 11/03/23 1420          Lower Body Dressing Assessment/Training    [place] Level (Lower Body Dressing) don;socks;dependent (less than 25% patient effort)  -     Position (Lower Body Dressing) edge of bed sitting  -     Comment, (Lower Body Dressing) pt unable to lean over far enough or cross his leg to complete one handed - unable to use sock aid at this time d/t R UE weakness  -       Row Name 11/03/23 1420          Grooming Assessment/Training    [place] Level (Grooming) hair care, combing/brushing;set up  -     Position (Grooming) edge of bed sitting  -               User Key  (r) = Recorded By, (t) = Taken By, (c) = Cosigned By      Initials Name Provider Type    Jenniffer Murphy, OT Occupational Therapist                   Obj/Interventions       Row Name 11/03/23 1423          Sensory Assessment (Somatosensory)    Sensory Subjective Reports --  pt with normal sensation on ulnar side of hand today  -       Row Name 11/03/23 1423          Strength Comprehensive (MMT)    Comment, General Manual Muscle Testing (MMT) Assessment R hand/forearm improved to 3-/5  -       Row Name  11/03/23 1423          Shoulder (Therapeutic Exercise)    Shoulder (Therapeutic Exercise) AAROM (active assistive range of motion)  -     Shoulder AAROM (Therapeutic Exercise) right;flexion;extension;10 repetitions  -       Row Name 11/03/23 1423          Elbow/Forearm (Therapeutic Exercise)    Elbow/Forearm (Therapeutic Exercise) AAROM (active assistive range of motion)  -     Elbow/Forearm AAROM (Therapeutic Exercise) bilateral;flexion;extension;10 repetitions  -       Row Name 11/03/23 1423          Wrist (Therapeutic Exercise)    Wrist (Therapeutic Exercise) AAROM (active assistive range of motion)  -     Wrist AAROM (Therapeutic Exercise) bilateral;flexion;extension;10 repetitions  -       Row Name 11/03/23 1423          Hand (Therapeutic Exercise)    Hand (Therapeutic Exercise) AROM (active range of motion)  -     Hand AROM/AAROM (Therapeutic Exercise) right;finger flexion;finger extension;10 repetitions  -       Row Name 11/03/23 1423          Motor Skills    Therapeutic Exercise shoulder;elbow/forearm;wrist;hand  issued blue spong block for hand  -               User Key  (r) = Recorded By, (t) = Taken By, (c) = Cosigned By      Initials Name Provider Type     Jenniffer Santiago OT Occupational Therapist                   Goals/Plan    No documentation.                  Clinical Impression       Row Name 11/03/23 1330          Pain Assessment    Pretreatment Pain Rating 0/10 - no pain  -     Posttreatment Pain Rating 0/10 - no pain  -       Row Name 11/03/23 1333          Plan of Care Review    Plan of Care Reviewed With patient;spouse  -     Progress improving  -     Outcome Evaluation Pt with slightly improved R hand and forearm strength.  Pt with good tolerance with UE TE.  Pt dep LBD, setup to comb hair,  CGA to ambulate in room without AD.  Pt continues significantly below baseline with self care d/t decreased RUE  ROM/weakness. Recommend IP rehab if deemed medically  appropriate.  -AC       Row Name 11/03/23 1335          Positioning and Restraints    Pre-Treatment Position in bed  -AC     Post Treatment Position bed  -AC     In Bed notified nsg;supine;call light within reach;encouraged to call for assist;RUE elevated  -               User Key  (r) = Recorded By, (t) = Taken By, (c) = Cosigned By      Initials Name Provider Type    Jenniffer Murphy, OT Occupational Therapist                   Outcome Measures       Row Name 11/03/23 1432          How much help from another is currently needed...    Putting on and taking off regular lower body clothing? 2  -AC     Bathing (including washing, rinsing, and drying) 2  -AC     Toileting (which includes using toilet bed pan or urinal) 2  -AC     Putting on and taking off regular upper body clothing 2  -AC     Taking care of personal grooming (such as brushing teeth) 3  -AC     Eating meals 3  -AC     AM-PAC 6 Clicks Score (OT) 14  -AC       Row Name 11/03/23 0800          How much help from another person do you currently need...    Turning from your back to your side while in flat bed without using bedrails? 3  -GALILEA     Moving from lying on back to sitting on the side of a flat bed without bedrails? 3  -GALILEA     Moving to and from a bed to a chair (including a wheelchair)? 3  -GALILEA     Standing up from a chair using your arms (e.g., wheelchair, bedside chair)? 3  -GALILEA     Climbing 3-5 steps with a railing? 2  -GALILEA     To walk in hospital room? 3  -GALILEA     AM-PAC 6 Clicks Score (PT) 17  -GALILEA     Highest level of mobility 5 --> Static standing  -GALILEA       Row Name 11/03/23 1432          Functional Assessment    Outcome Measure Options AM-PAC 6 Clicks Daily Activity (OT)  -               User Key  (r) = Recorded By, (t) = Taken By, (c) = Cosigned By      Initials Name Provider Type    Jenniffer Murphy OT Occupational Therapist    Sarah French RN Registered Nurse                    Occupational Therapy Education       Title: PT OT  SLP Therapies (In Progress)       Topic: Occupational Therapy (In Progress)       Point: ADL training (Done)       Description:   Instruct learner(s) on proper safety adaptation and remediation techniques during self care or transfers.   Instruct in proper use of assistive devices.                  Learning Progress Summary             Patient Acceptance, E,TB,D, VU,DU by  at 11/3/2023 1432    Acceptance, E,TB,D, VU,NR by  at 11/1/2023 1050   Family Acceptance, E,TB,D, VU,NR by  at 11/1/2023 1050                         Point: Home exercise program (Not Started)       Description:   Instruct learner(s) on appropriate technique for monitoring, assisting and/or progressing therapeutic exercises/activities.                  Learner Progress:  Not documented in this visit.              Point: Precautions (Not Started)       Description:   Instruct learner(s) on prescribed precautions during self-care and functional transfers.                  Learner Progress:  Not documented in this visit.              Point: Body mechanics (Not Started)       Description:   Instruct learner(s) on proper positioning and spine alignment during self-care, functional mobility activities and/or exercises.                  Learner Progress:  Not documented in this visit.                              User Key       Initials Effective Dates Name Provider Type Discipline     02/03/23 -  Jenniffer Santiago, OT Occupational Therapist OT                  OT Recommendation and Plan  Planned Therapy Interventions (OT): activity tolerance training, BADL retraining, functional balance retraining, neuromuscular control/coordination retraining, occupation/activity based interventions, passive ROM/stretching, patient/caregiver education/training, ROM/therapeutic exercise, strengthening exercise, transfer/mobility retraining  Therapy Frequency (OT): daily  Plan of Care Review  Plan of Care Reviewed With: patient, spouse  Progress: improving  Outcome  Evaluation: Pt with slightly improved R hand and forearm strength.  Pt with good tolerance with UE TE.  Pt dep LBD, setup to comb hair,  CGA to ambulate in room without AD.  Pt continues significantly below baseline with self care d/t decreased RUE  ROM/weakness. Recommend IP rehab if deemed medically appropriate.     Time Calculation:   Evaluation Complexity (OT)  Review Occupational Profile/Medical/Therapy History Complexity: expanded/moderate complexity  Assessment, Occupational Performance/Identification of Deficit Complexity: 3-5 performance deficits  Clinical Decision Making Complexity (OT): detailed assessment/moderate complexity  Overall Complexity of Evaluation (OT): moderate complexity     Time Calculation- OT       Row Name 11/03/23 1335             Time Calculation- OT    OT Start Time 1335  -AC      OT Received On 11/03/23  -AC      OT Goal Re-Cert Due Date 11/11/23  -AC         Timed Charges    47106 - OT Therapeutic Exercise Minutes 15  -AC      76728 - OT Therapeutic Activity Minutes 5  -AC      87876 - OT Self Care/Mgmt Minutes 8  -AC         Total Minutes    Timed Charges Total Minutes 28  -AC       Total Minutes 28  -AC                User Key  (r) = Recorded By, (t) = Taken By, (c) = Cosigned By      Initials Name Provider Type    AC Jenniffer Santiago, OT Occupational Therapist                  Therapy Charges for Today       Code Description Service Date Service Provider Modifiers Qty    99637806435 HC OT THER PROC EA 15 MIN 11/3/2023 Jenniffer Santiago OT GO 1    65558177652 HC OT SELF CARE/MGMT/TRAIN EA 15 MIN 11/3/2023 Jenniffer Santiago OT GO 1                 Jenniffer Santiago OT  11/3/2023

## 2023-11-03 NOTE — CASE MANAGEMENT/SOCIAL WORK
Continued Stay Note   [place]     Patient Name: Aldair Narvaez  MRN: [id number]  Today's Date: 11/3/2023    Admit Date: 10/29/2023    Plan: rehab   Discharge Plan       Row Name 11/03/23 0813       Plan    Plan rehab    Patient/Family in Agreement with Plan yes    Plan Comments I spoke with this patient this morning regarding [facility] offering him a rehab bed. He is in agreement and stated his wife can transport. He will be on IV Rocephin for 2-4 weeks per ID MD. CM will continue to follow.    1200  CM sent email to therapy mgrs for PT and OT to see patient and complete updated notes per request from January with [facility]. CM to follow.    Final Discharge Disposition Code 03 - skilled nursing facility (SNF)                   Discharge Codes    No documentation.                 Expected Discharge Date and Time       Expected Discharge Date Expected Discharge Time    Nov 7, 2023               Josie Lazaro RN

## 2023-11-03 NOTE — THERAPY TREATMENT NOTE
Patient Name: Aldair Narvaez  : 1950    MRN: [id number]                              Today's Date: 11/3/2023       Admit Date: 10/29/2023    Visit Dx:     ICD-10-CM ICD-9-CM   1. Fall in home, initial encounter  W19.XXXA E888.9    Y92.009 E849.0   2. Traumatic rhabdomyolysis, initial encounter  T79.6XXA 958.6   3. NEETA (acute kidney injury)  N17.9 584.9   4. Sepsis with acute renal failure and septic shock, due to unspecified organism, unspecified acute renal failure type  A41.9 038.9    R65.21 995.92    N17.9 785.52     584.9   5. Weakness of right upper extremity  R29.898 729.89   6. Hyperglycemia due to diabetes mellitus  E11.65 250.02   7. Hydronephrosis of left kidney  N13.30 591   8. Acute pyelonephritis  N10 590.10   9. Sepsis due to urinary tract infection  A41.9 038.9    N39.0 995.91     599.0   10. Right nephrolithiasis  N20.0 592.0   11. Renal abscess, left  N15.1 590.2     Patient Active Problem List   Diagnosis    Benign hypertension    Mixed hyperlipidemia    Uncontrolled type 2 diabetes mellitus with hyperglycemia    CAD, multiple vessel    Pyelonephritis    Sepsis due to urinary tract infection    Personal history of prostate cancer    Paroxysmal A-fib    Bacteremia    Hydronephrosis of left kidney    UTI (urinary tract infection)    NEETA (acute kidney injury)    Rhabdomyolysis    Right arm weakness     Past Medical History:   Diagnosis Date    Acid reflux     Arthritis     Benign hypertension     Colon polyp     Concern about heart attack without diagnosis     Coronary artery disease     Coronary atherosclerosis     Heart disease     Hyperlipidemia     Hypertension     Kidney stone     Mixed hyperlipidemia     Obesity     body mass index 30+-obesity    Prostate cancer     Sleep apnea     Type 2 diabetes mellitus     Type 2 diabetes mellitus      Past Surgical History:   Procedure Laterality Date    CARDIAC CATHETERIZATION      CARDIAC SURGERY N/A     [place]    CARPAL TUNNEL RELEASE       "CORONARY ARTERY BYPASS GRAFT  08/30/2022    CYSTOSCOPY BLADDER STONE LITHOTRIPSY      CYSTOSCOPY BLADDER STONE LITHOTRIPSY      CYSTOSCOPY RETROGRADE PYELOGRAM Left 10/31/2023    Procedure: CYSTOSCOPY RETROGRADE PYELOGRAM STENT PLACEMENT;  Surgeon: Isma Justin MD;  Location:  [facility];  Service: Urology;  Laterality: Left;    CYSTOSCOPY W/ URETERAL STENT PLACEMENT Left 05/09/2023    Procedure: CYSTOSCOPY LEFT RETROGRADE PYELOGRAM AND LEFT URETERAL STENT PLACEMENT;  Surgeon: Isma Justin MD;  Location:  [facility];  Service: Urology;  Laterality: Left;    FETAL SURGERY FOR CONGENITAL HERNIA      INGUINAL HERNIA REPAIR      KIDNEY STONE SURGERY      KNEE ARTHROSCOPY      PROSTATECTOMY      TRIGGER FINGER RELEASE      UMBILICAL HERNIA REPAIR      UVULOPALATOPHARYNGOPLASTY        General Information       Row Name 11/03/23 1259          Physical Therapy Time and Intention    Document Type therapy note (daily note)  -DM     Mode of Treatment physical therapy  -DM       Row Name 11/03/23 1259          General Information    Existing Precautions/Restrictions fall;other (see comments)  10-31: CRP & STENT; YG drain; RUE weakness (fell & laid on RUE x 8 hrs); nsg will order RUE shldr sling  -DM               User Key  (r) = Recorded By, (t) = Taken By, (c) = Cosigned By      Initials Name Provider Type    DM Rebecca Sam PT Physical Therapist                   Mobility       Row Name 11/03/23 1259          Bed Mobility    Bed Mobility supine-sit;rolling left;sit-supine;scooting/bridging  -DM     Rolling Left [place] (Bed Mobility) verbal cues;contact guard  rolled to L, d/t RUE weakness(\"weak from laying on it 8 hrs after I fell PTA\"); unable to reach R hand to L bedrail  -DM     Scooting/Bridging [place] (Bed Mobility) verbal cues;contact guard  PT held pads & draw sheet taut while pt bridged hips L to achieve midline position s/p sit to sup. transf.  -DM     Supine-Sit [place] (Bed " Mobility) verbal cues;contact guard  PT offered to supp RUE but pt decl. (pt cradling via supporting at R wrist w/ L hand)  -DM     Sit-Supine [place] (Bed Mobility) verbal cues;minimum assist (75% patient effort)  transf into R side of bed (pt reaching L Hand across to L bedrail); PT supp RUE, & asst.w/ lifting LE's to supp.  -DM     Assistive Device (Bed Mobility) bed rails;head of bed elevated  -DM     Comment, (Bed Mobility) pt just had PICC inserted LUE; was already UIC this AM & now fatigued; nsg gave meds; PT offered to obtain loaner SPC for pt (declined), & discussed RUE sling, but pt also decl.(stated he would supp RUE w/ L hand); urinal output shown to nsg & recorded by PCT;PT emptied & sanitized;did LAQ,mini marches, AP;  port pulse oxim placed;BP taken RUE( pillow under, for supp.); instructed in PLB exer  -DM       Row Name 11/03/23 1259          Transfers    Comment, (Transfers) cues for HP, seq (WB through LUE, using L bedrail); min A of PT to supp. RUE  -DM       Row Name 11/03/23 1259          Sit-Stand Transfer    Sit-Stand [place] (Transfers) verbal cues;minimum assist (75% patient effort)  PT supp. RUE (pt decl. trying sling)  -DM     Comment, (Sit-Stand Transfer) 2nd STS from EOB to sidestep closer to HOB s/p gt  -DM       Row Name 11/03/23 1259          Gait/Stairs (Locomotion)    [place] Level (Gait) verbal cues;minimum assist (75% patient effort)  Kendrick of PT to supp. RUE; decl. SPC LUE;1 Stand.rest midway, then again once pt ret. to EOB;debating UIC vs BTB, then urge to void;amb to BR & stood to void in urinal (extended toileting/hygiene); output reported;Req. return to bed for nap;Sidestep to HOB  -DM     Assistive Device (Gait) other (see comments)  RUE supp.by PT (HHA w/o AD)  -DM     Distance in Feet (Gait) 200  -DM     Deviations/Abnormal Patterns (Gait) bilateral deviations;base of support, narrow;niru decreased;stride length decreased;weight shifting decreased  decr  step length; loud pop from R Ankle crepitus during turn at midpoint of gt; no C/O pain & decl. sit rest  -DM     Bilateral Gait Deviations heel strike decreased  -DM     Comment, (Gait/Stairs) cues for incr step length & CINDA, trunk ext/focusing ahead, & PLB; ; desat 94% on RA  -DM               User Key  (r) = Recorded By, (t) = Taken By, (c) = Cosigned By      Initials Name Provider Type    DM Rebecca Sam, PT Physical Therapist                   Obj/Interventions       Row Name 11/03/23 1259          Motor Skills    Therapeutic Exercise hip;ankle;knee;other (see comments)  also did LUE exer sitting EOB (sh. F/E, abd/add, horiz abd/add, biceps curls x 10); def. RUE Exer  -DM       Row Name 11/03/23 1259          Hip (Therapeutic Exercise)    Hip (Therapeutic Exercise) AROM (active range of motion);isometric exercises  -DM     Hip AROM (Therapeutic Exercise) bilateral;flexion;extension;aBduction;aDduction;external rotation;internal rotation;sitting;supine;10 repetitions;other (see comments)  did 10 reps modif hip rot & Abd/add in sitting, mini marches, supine bridging x 3  -DM     Hip Isometrics (Therapeutic Exercise) bilateral;gluteal sets;supine;5 repetitions  -DM       Row Name 11/03/23 1250          Knee (Therapeutic Exercise)    Knee (Therapeutic Exercise) AROM (active range of motion);PROM (passive range of motion);isometric exercises  -DM     Knee AROM (Therapeutic Exercise) bilateral;flexion;extension;LAQ (long arc quad);heel slides;sitting;supine;10 repetitions  -DM     Knee Isometrics (Therapeutic Exercise) bilateral;hamstring sets;quad sets;sitting;10 repetitions  modif QS in sitting w/ calves supp on pillow, & modif HS w/ PT providing resistance w/ pillow behind calves  -DM       Row Name 11/03/23 1258          Ankle (Therapeutic Exercise)    Ankle (Therapeutic Exercise) AROM (active range of motion)  -DM     Ankle AROM (Therapeutic Exercise) bilateral;dorsiflexion;plantarflexion;supine;10  repetitions;2 sets  AC  -DM       Row Name 11/03/23 1259          Balance    Static Sitting Balance supervision  -DM     Dynamic Sitting Balance standby assist;verbal cues  recip scooting; PT supp RUE (pt init supp via grasping R wrist w/ L hand)  -DM     Position, Sitting Balance unsupported;sitting edge of bed  -DM     Static Standing Balance standby assist  -DM     Dynamic Standing Balance verbal cues;contact guard  WS for toileting in standing w/ urinal, then hygiene  -DM     Position/Device Used, Standing Balance unsupported  -DM     Balance Interventions sitting;standing;static;dynamic;weight shifting activity  -DM               User Key  (r) = Recorded By, (t) = Taken By, (c) = Cosigned By      Initials Name Provider Type    Rebecca Lucas, PT Physical Therapist                   Goals/Plan    No documentation.                  Clinical Impression       Row Name 11/03/23 1251          Pain    Pretreatment Pain Rating 0/10 - no pain  -DM     Posttreatment Pain Rating 0/10 - no pain  -DM     Additional Documentation Pain Scale: Numbers Pre/Post-Treatment (Group)  -DM       Row Name 11/03/23 1252          Plan of Care Review    Plan of Care Reviewed With patient;spouse  -DM     Progress improving  -DM     Outcome Evaluation Pt. performed transf OOB w/ min A to supp. RUE d/t weakness (s/p fall PTA & prolonged period laying on arm), amb 170 ft w/ Min A of PT to supp RUE (1 Stand.rest midway,then again @ EOB), 15 ft x 2 to/from BR for toileting in standing & hygiene, transf to sup. w/ min A to supp RUE & lift LE's into bed, + ther exer LUE & BLE@ EOB & sup. Pt decl. SPC L hand for gt, or use of RUE sling, but agreeable to try next session. Noted , desat 94% on RA, & elev /89.  -DM       Row Name 11/03/23 1259          Vital Signs    Pre Systolic BP Rehab 145  -DM     Pre Treatment Diastolic BP 82  -DM     Post Systolic BP Rehab 144  -DM     Post Treatment Diastolic BP 89  -DM     Pretreatment Heart  Rate (beats/min) 99  -DM     Intratreatment Heart Rate (beats/min) 106  -DM     Posttreatment Heart Rate (beats/min) 99  -DM     Pre SpO2 (%) 95  -DM     O2 Delivery Pre Treatment room air  -DM     Intra SpO2 (%) 94  -DM     O2 Delivery Intra Treatment room air  -DM     Post SpO2 (%) 95  -DM     O2 Delivery Post Treatment room air  -DM     Pre Patient Position Supine  -DM     Intra Patient Position Standing  -DM     Post Patient Position Supine  -DM     Rest Breaks  3  3 Standing  -DM       Row Name 11/03/23 1259          Positioning and Restraints    In Bed notified nsg;fowlers;call light within reach;encouraged to call for assist;exit alarm on;with family/caregiver;heels elevated  -DM               User Key  (r) = Recorded By, (t) = Taken By, (c) = Cosigned By      Initials Name Provider Type    Rebecca Lucas, PT Physical Therapist                   Outcome Measures       Row Name 11/03/23 1259 11/03/23 0800       How much help from another person do you currently need...    Turning from your back to your side while in flat bed without using bedrails? 3  -DM 3  -GALILEA    Moving from lying on back to sitting on the side of a flat bed without bedrails? 3  -DM 3  -GALILEA    Moving to and from a bed to a chair (including a wheelchair)? 3  -DM 3  -GALILEA    Standing up from a chair using your arms (e.g., wheelchair, bedside chair)? 3  -DM 3  -GALILEA    Climbing 3-5 steps with a railing? 2  -DM 2  -GALILEA    To walk in hospital room? 3  -DM 3  -GALILEA    AM-PAC 6 Clicks Score (PT) 17  -DM 17  -GALILEA    Highest level of mobility 5 --> Static standing  -DM 5 --> Static standing  -GALILEA      Row Name 11/03/23 1432 11/03/23 1259       Functional Assessment    Outcome Measure Options AM-PAC 6 Clicks Daily Activity (OT)  -AC AM-PAC 6 Clicks Basic Mobility (PT)  -DM              User Key  (r) = Recorded By, (t) = Taken By, (c) = Cosigned By      Initials Name Provider Type    Jenniffer Murphy, OT Occupational Therapist    Rebecca Lucas, PT  Physical Therapist    Sarah French RN Registered Nurse                                 Physical Therapy Education       Title: PT OT SLP Therapies (In Progress)       Topic: Physical Therapy (In Progress)       Point: Mobility training (In Progress)       Learning Progress Summary             Patient Eager, E,D, NR by DM at 11/3/2023 1735    Acceptance, E, VU,NR by  at 11/1/2023 1036   Significant Other Eager, E,D, NR by DM at 11/3/2023 1735    Acceptance, E, VU,NR by  at 11/1/2023 1036                         Point: Home exercise program (In Progress)       Learning Progress Summary             Patient Eager, E,D, NR by DM at 11/3/2023 1735   Significant Other Eager, E,D, NR by DM at 11/3/2023 1735                         Point: Body mechanics (In Progress)       Learning Progress Summary             Patient Eager, E,D, NR by DM at 11/3/2023 1735    Acceptance, E, VU,NR by  at 11/1/2023 1036   Significant Other Eager, E,D, NR by DM at 11/3/2023 1735    Acceptance, E, VU,NR by  at 11/1/2023 1036                         Point: Precautions (In Progress)       Learning Progress Summary             Patient Eager, E,D, NR by DM at 11/3/2023 1735    Acceptance, E, VU,NR by  at 11/1/2023 1036   Significant Other Eager, E,D, NR by DM at 11/3/2023 1735    Acceptance, E, VU,NR by  at 11/1/2023 1036                                         User Key       Initials Effective Dates Name Provider Type Discipline     02/03/23 -  Rebecca Sam, PT Physical Therapist PT     09/21/23 -  Lisa Purdy PT Physical Therapist PT                  PT Recommendation and Plan     Plan of Care Reviewed With: patient, spouse  Progress: improving  Outcome Evaluation: Pt. performed transf OOB w/ min A to supp. RUE d/t weakness (s/p fall PTA & prolonged period laying on arm), amb 170 ft w/ Min A of PT to supp RUE (1 Stand.rest midway,then again @ EOB), 15 ft x 2 to/from BR for toileting in standing & hygiene, transf  to sup. w/ min A to supp RUE & lift LE's into bed, + ther exer LUE & BLE@ EOB & sup. Pt decl. SPC L hand for gt, or use of RUE sling, but agreeable to try next session. Noted , desat 94% on RA, & elev /89.     Time Calculation:         PT Charges       Row Name 11/03/23 1337             Time Calculation    Start Time 1259  -DM      PT Received On 11/03/23  -DM      PT Goal Re-Cert Due Date 11/11/23  -DM         Time Calculation- PT    Total Timed Code Minutes- PT 38 minute(s)  -DM         Timed Charges    43072 - PT Therapeutic Exercise Minutes 12  -DM      93768 - Gait Training Minutes  16  -DM      42308 - PT Therapeutic Activity Minutes 10  -DM         Total Minutes    Timed Charges Total Minutes 38  -DM       Total Minutes 38  -DM                User Key  (r) = Recorded By, (t) = Taken By, (c) = Cosigned By      Initials Name Provider Type    DM Rebecca Sam, PT Physical Therapist                  Therapy Charges for Today       Code Description Service Date Service Provider Modifiers Qty    18689830904 HC PT THER PROC EA 15 MIN 11/3/2023 Rebecca Sam, PT GP 1    54148792824 HC GAIT TRAINING EA 15 MIN 11/3/2023 Rebecca Sam, PT GP 1    06721946834 HC PT THERAPEUTIC ACT EA 15 MIN 11/3/2023 Rebecca Sam, PT GP 1            PT G-Codes  Outcome Measure Options: AM-PAC 6 Clicks Daily Activity (OT)  AM-PAC 6 Clicks Score (PT): 17  AM-PAC 6 Clicks Score (OT): 14       Rebecca Sam, PT  11/3/2023

## 2023-11-03 NOTE — CONSULTS
PICC placed by CARL John RN without complication in LUE due to RUE weakness.  Tip verified using 3CG technology.

## 2023-11-03 NOTE — PLAN OF CARE
Goal Outcome Evaluation:  Plan of Care Reviewed With: patient, spouse        Progress: improving  Outcome Evaluation: Pt. performed transf OOB w/ min A to supp. RUE d/t weakness (s/p fall PTA & prolonged period laying on arm), amb 170 ft w/ Min A of PT to supp RUE (1 Stand.rest midway,then again @ EOB), 15 ft x 2 to/from BR for toileting in standing & hygiene, transf to sup. w/ min A to supp RUE & lift LE's into bed, + ther exer LUE & BLE@ EOB & sup. Pt decl. SPC L hand for gt, or use of RUE sling, but agreeable to try next session. Noted , desat 94% on RA, & elev /89.

## 2023-11-03 NOTE — CASE MANAGEMENT/SOCIAL WORK
Continued Stay Note  [facility]     Patient Name: Aldair Narvaez  MRN: [id number]  Today's Date: 11/3/2023    Admit Date: 10/29/2023    Plan: rehab   Discharge Plan       Row Name 11/03/23 1454       Plan    Plan rehab    Patient/Family in Agreement with Plan other (see comments)    Plan Comments CM received a message from the hospitalist who asked if the [facility] would allow this patient to come back to the hospital on 11/8 for repeat CT of abdomin and pelvis and a urology follow up. CM had to leave a message with January from [facility], but MD stated that if they cannot accommodate that, then he can remain in the hospital until after those things are completed. CM will follow.    Final Discharge Disposition Code 03 - skilled nursing facility (SNF)                   Discharge Codes    No documentation.                 Expected Discharge Date and Time       Expected Discharge Date Expected Discharge Time    Nov 7, 2023               Jsoie Lazaro RN

## 2023-11-03 NOTE — NURSING NOTE
Nurse given the Coloplast nonocclusive hydrocolloid.  It is like DuoDERM but it is nonocclusive and breathable.  Discussed with RN how to use this.  She is familiar with this type of dressing.    Consult Woc if more skin integrity issues arise.

## 2023-11-03 NOTE — PLAN OF CARE
Problem: Adult Inpatient Plan of Care  Goal: Absence of Hospital-Acquired Illness or Injury  Intervention: Prevent Skin Injury  Description: Perform a screening for skin injury risk, such as pressure or moisture associated skin damage on admission and at regular intervals throughout hospital stay.  Keep all areas of skin (especially folds) clean and dry.  Maintain adequate skin hydration.  Relieve and redistribute pressure and protect bony prominences; implement measures based on patient-specific risk factors.  Match turning and repositioning schedule to clinical condition.  Encourage weight shift frequently; assist with reposition if unable to complete independently.  Float heels off bed; avoid pressure on the Achilles tendon.  Keep skin free from extended contact with medical devices.  Encourage functional activity and mobility, as early as tolerated.  Use aids (e.g., slide boards, mechanical lift) during transfer.  Recent Flowsheet Documentation  Taken 11/3/2023 1808 by Sarah Madden RN  Skin Protection:   adhesive use limited   protective footwear used   pulse oximeter probe site changed  Taken 11/3/2023 0800 by Sarah Madden RN  Skin Protection: incontinence pads utilized  Intervention: Prevent and Manage VTE (Venous Thromboembolism) Risk  Description: Assess for VTE (venous thromboembolism) risk.  Encourage and assist with early ambulation.  Initiate and maintain compression or other therapy, as indicated, based on identified risk in accordance with organizational protocol and provider order.  Encourage both active and passive leg exercises while in bed, if unable to ambulate.  Recent Flowsheet Documentation  Taken 11/3/2023 1200 by Sarah Madden, RN  Activity Management: up in chair  Goal: Readiness for Transition of Care  Outcome: Ongoing, Progressing     Problem: Skin Injury Risk Increased  Goal: Skin Health and Integrity  Intervention: Promote and Optimize Oral Intake  Description: Perform  a nutrition assessment; include a nutrition-focused physical exam.  Determine calorie, protein, vitamin, mineral and fluid requirements.  Assess for micronutrient deficiencies; supplement if depleted.  Assess need and assist with meal set-up and feeding.  Adjust diet or meal schedule based on preferences and tolerance.  Offer oral supplemental food or drinks to enhance calorie and protein intake.  Establish bowel elimination program to increase comfort and appetite.  Minimize unnecessary dietary restrictions to increase oral intake.  Provide and encourage oral hygiene to enhance desire to eat.  Consider enteral nutrition support if oral intake remains inadequate; provide parenteral nutrition if enteral is contraindicated.  Flowsheets (Taken 11/3/2023 1808)  Oral Nutrition Promotion:   rest periods promoted   safe use of adaptive equipment encouraged  Intervention: Optimize Skin Protection  Description: Perform a full pressure injury risk assessment, as indicated by screening, upon admission to care unit.  Reassess skin (injury risk, full inspection) frequently (e.g., scheduled interval, with change in condition) to provide optimal early detection and prevention.  Maintain adequate tissue perfusion (e.g., encourage fluid balance; avoid crossing legs, constrictive clothing or devices) to promote tissue oxygenation.  Maintain head of bed at lowest degree of elevation tolerated, considering medical condition and other restrictions.  Avoid positioning onto an area that remains reddened.  Minimize incontinence and moisture (e.g., toileting schedule; moisture-wicking pad, diaper or incontinence collection device; skin moisture barrier).  Cleanse skin promptly and gently when soiled utilizing a pH-balanced cleanser.  Relieve and redistribute pressure (e.g., scheduled position changes, weight shifts, use of support surface, medical device repositioning, protective dressing application, use of positioning device, microclimate  control, use of pressure-injury-monitor  Encourage increased activity, such as sitting in a chair at the bedside or early mobilization, when able to tolerate.  Flowsheets  Taken 11/3/2023 1808  Pressure Reduction Techniques: rest period provided between sit times  Head of Bed (HOB) Positioning: HOB at 30 degrees  Skin Protection:   adhesive use limited   protective footwear used   pulse oximeter probe site changed  Taken 11/3/2023 0800  Skin Protection: incontinence pads utilized   Goal Outcome Evaluation:

## 2023-11-04 LAB
ANION GAP SERPL CALCULATED.3IONS-SCNC: 9 MMOL/L (ref 5–15)
BACTERIA SPEC AEROBE CULT: ABNORMAL
BUN SERPL-MCNC: 12 MG/DL (ref 8–23)
BUN/CREAT SERPL: 25 (ref 7–25)
CALCIUM SPEC-SCNC: 8.2 MG/DL (ref 8.6–10.5)
CHLORIDE SERPL-SCNC: 104 MMOL/L (ref 98–107)
CK SERPL-CCNC: 883 U/L (ref 20–200)
CO2 SERPL-SCNC: 25 MMOL/L (ref 22–29)
CREAT SERPL-MCNC: 0.48 MG/DL (ref 0.76–1.27)
EGFRCR SERPLBLD CKD-EPI 2021: 110.4 ML/MIN/1.73
GLUCOSE BLDC GLUCOMTR-MCNC: 158 MG/DL (ref 70–130)
GLUCOSE BLDC GLUCOMTR-MCNC: 180 MG/DL (ref 70–130)
GLUCOSE BLDC GLUCOMTR-MCNC: 193 MG/DL (ref 70–130)
GLUCOSE BLDC GLUCOMTR-MCNC: 200 MG/DL (ref 70–130)
GLUCOSE BLDC GLUCOMTR-MCNC: 213 MG/DL (ref 70–130)
GLUCOSE BLDC GLUCOMTR-MCNC: 236 MG/DL (ref 70–130)
GLUCOSE BLDC GLUCOMTR-MCNC: 258 MG/DL (ref 70–130)
GLUCOSE SERPL-MCNC: 203 MG/DL (ref 65–99)
GRAM STN SPEC: ABNORMAL
ISOLATED FROM: ABNORMAL
MAGNESIUM SERPL-MCNC: 1.6 MG/DL (ref 1.6–2.4)
POTASSIUM SERPL-SCNC: 3.7 MMOL/L (ref 3.5–5.2)
SODIUM SERPL-SCNC: 138 MMOL/L (ref 136–145)

## 2023-11-04 PROCEDURE — 63710000001 INSULIN LISPRO (HUMAN) PER 5 UNITS: Performed by: STUDENT IN AN ORGANIZED HEALTH CARE EDUCATION/TRAINING PROGRAM

## 2023-11-04 PROCEDURE — 25010000002 CEFTRIAXONE PER 250 MG: Performed by: INTERNAL MEDICINE

## 2023-11-04 PROCEDURE — 63710000001 INSULIN DETEMIR PER 5 UNITS: Performed by: STUDENT IN AN ORGANIZED HEALTH CARE EDUCATION/TRAINING PROGRAM

## 2023-11-04 PROCEDURE — 99232 SBSQ HOSP IP/OBS MODERATE 35: CPT | Performed by: STUDENT IN AN ORGANIZED HEALTH CARE EDUCATION/TRAINING PROGRAM

## 2023-11-04 PROCEDURE — 25810000003 LACTATED RINGERS PER 1000 ML: Performed by: STUDENT IN AN ORGANIZED HEALTH CARE EDUCATION/TRAINING PROGRAM

## 2023-11-04 PROCEDURE — 80048 BASIC METABOLIC PNL TOTAL CA: CPT | Performed by: STUDENT IN AN ORGANIZED HEALTH CARE EDUCATION/TRAINING PROGRAM

## 2023-11-04 PROCEDURE — 99232 SBSQ HOSP IP/OBS MODERATE 35: CPT | Performed by: UROLOGY

## 2023-11-04 PROCEDURE — 82948 REAGENT STRIP/BLOOD GLUCOSE: CPT

## 2023-11-04 PROCEDURE — 83735 ASSAY OF MAGNESIUM: CPT | Performed by: STUDENT IN AN ORGANIZED HEALTH CARE EDUCATION/TRAINING PROGRAM

## 2023-11-04 PROCEDURE — 82550 ASSAY OF CK (CPK): CPT | Performed by: INTERNAL MEDICINE

## 2023-11-04 RX ORDER — SODIUM CHLORIDE, SODIUM LACTATE, POTASSIUM CHLORIDE, CALCIUM CHLORIDE 600; 310; 30; 20 MG/100ML; MG/100ML; MG/100ML; MG/100ML
75 INJECTION, SOLUTION INTRAVENOUS CONTINUOUS
Status: ACTIVE | OUTPATIENT
Start: 2023-11-04 | End: 2023-11-05

## 2023-11-04 RX ORDER — INSULIN LISPRO 100 [IU]/ML
3 INJECTION, SOLUTION INTRAVENOUS; SUBCUTANEOUS
Status: DISCONTINUED | OUTPATIENT
Start: 2023-11-04 | End: 2023-11-06

## 2023-11-04 RX ADMIN — SODIUM CHLORIDE, POTASSIUM CHLORIDE, SODIUM LACTATE AND CALCIUM CHLORIDE 75 ML/HR: 600; 310; 30; 20 INJECTION, SOLUTION INTRAVENOUS at 17:16

## 2023-11-04 RX ADMIN — Medication 10 ML: at 08:28

## 2023-11-04 RX ADMIN — METOPROLOL SUCCINATE 50 MG: 50 TABLET, EXTENDED RELEASE ORAL at 08:25

## 2023-11-04 RX ADMIN — INSULIN LISPRO 2 UNITS: 100 INJECTION, SOLUTION INTRAVENOUS; SUBCUTANEOUS at 04:55

## 2023-11-04 RX ADMIN — VALSARTAN 80 MG: 160 TABLET, FILM COATED ORAL at 08:26

## 2023-11-04 RX ADMIN — INSULIN LISPRO 3 UNITS: 100 INJECTION, SOLUTION INTRAVENOUS; SUBCUTANEOUS at 17:16

## 2023-11-04 RX ADMIN — SODIUM CHLORIDE, POTASSIUM CHLORIDE, SODIUM LACTATE AND CALCIUM CHLORIDE 75 ML/HR: 600; 310; 30; 20 INJECTION, SOLUTION INTRAVENOUS at 05:00

## 2023-11-04 RX ADMIN — INSULIN LISPRO 2 UNITS: 100 INJECTION, SOLUTION INTRAVENOUS; SUBCUTANEOUS at 00:28

## 2023-11-04 RX ADMIN — HYDROCORTISONE ACETATE 25 MG: 25 SUPPOSITORY RECTAL at 08:32

## 2023-11-04 RX ADMIN — CEFTRIAXONE SODIUM 2000 MG: 2 INJECTION, POWDER, FOR SOLUTION INTRAMUSCULAR; INTRAVENOUS at 10:57

## 2023-11-04 RX ADMIN — INSULIN DETEMIR 5 UNITS: 100 INJECTION, SOLUTION SUBCUTANEOUS at 15:16

## 2023-11-04 RX ADMIN — HYDROCHLOROTHIAZIDE 12.5 MG: 12.5 CAPSULE ORAL at 08:25

## 2023-11-04 RX ADMIN — INSULIN LISPRO 3 UNITS: 100 INJECTION, SOLUTION INTRAVENOUS; SUBCUTANEOUS at 12:48

## 2023-11-04 RX ADMIN — Medication 1 CAPSULE: at 08:25

## 2023-11-04 RX ADMIN — Medication 10 ML: at 20:07

## 2023-11-04 RX ADMIN — APIXABAN 5 MG: 5 TABLET, FILM COATED ORAL at 12:48

## 2023-11-04 RX ADMIN — INSULIN LISPRO 2 UNITS: 100 INJECTION, SOLUTION INTRAVENOUS; SUBCUTANEOUS at 08:24

## 2023-11-04 RX ADMIN — INSULIN LISPRO 4 UNITS: 100 INJECTION, SOLUTION INTRAVENOUS; SUBCUTANEOUS at 19:49

## 2023-11-04 RX ADMIN — INSULIN DETEMIR 5 UNITS: 100 INJECTION, SOLUTION SUBCUTANEOUS at 20:03

## 2023-11-04 RX ADMIN — APIXABAN 5 MG: 5 TABLET, FILM COATED ORAL at 20:03

## 2023-11-04 RX ADMIN — HYDROCORTISONE ACETATE 25 MG: 25 SUPPOSITORY RECTAL at 20:03

## 2023-11-04 NOTE — CASE MANAGEMENT/SOCIAL WORK
Continued Stay Note   [facility]     Patient Name: Aldair Narvaez  MRN: [id number]  Today's Date: 11/4/2023    Admit Date: 10/29/2023    Plan: [facility]   Discharge Plan       Row Name 11/04/23 0925       Plan    Plan [facility]    Patient/Family in Agreement with Plan other (see comments)    Plan Comments  spoke with Jaison about discharge plan. Pt currently has a YG drain and is scheduled to follow up with Urology and have a CT of Abd/pelvis on 11/8/23. Jaison messaged her Director of Nursing. [facility] cannot accept pt this weekend. January stated it is likely pt will need to remain in hospital until the follow and scans are complete. They prefer pt finishes rehab before having any follow up appointments.  will need to follow up on Monday with the Attending doctor and Jaison for discharge plan. I updated Dr. Agudelo through messaging.    Final Discharge Disposition Code 03 - skilled nursing facility (SNF)                   Discharge Codes    No documentation.                 Expected Discharge Date and Time       Expected Discharge Date Expected Discharge Time    Nov 7, 2023               Ximena Beavers RN

## 2023-11-04 NOTE — PLAN OF CARE
Problem: Adult Inpatient Plan of Care  Goal: Plan of Care Review  Outcome: Ongoing, Progressing  Goal: Patient-Specific Goal (Individualized)  Outcome: Ongoing, Progressing  Goal: Absence of Hospital-Acquired Illness or Injury  Outcome: Ongoing, Progressing  Intervention: Identify and Manage Fall Risk  Description: Perform standard risk assessment on admission using a validated tool or comprehensive approach appropriate to the patient; reassess fall risk frequently, with change in status or transfer to another level of care.  Communicate fall injury risk to interprofessional healthcare team.  Determine need for increased observation, equipment and environmental modification, such as low bed, signage and supportive, nonskid footwear.  Adjust safety measures to individual developmental age, stage and identified risk factors.  Reinforce the importance of safety and physical activity with patient and family.  Perform regular intentional rounding to assess need for position change, pain assessment and personal needs, including assistance with toileting.  Recent Flowsheet Documentation  Taken 11/3/2023 2000 by Amor Wallace RN  Safety Promotion/Fall Prevention:   activity supervised   safety round/check completed   nonskid shoes/slippers when out of bed   room organization consistent   fall prevention program maintained   lighting adjusted   clutter free environment maintained  Intervention: Prevent Skin Injury  Description: Perform a screening for skin injury risk, such as pressure or moisture associated skin damage on admission and at regular intervals throughout hospital stay.  Keep all areas of skin (especially folds) clean and dry.  Maintain adequate skin hydration.  Relieve and redistribute pressure and protect bony prominences; implement measures based on patient-specific risk factors.  Match turning and repositioning schedule to clinical condition.  Encourage weight shift frequently; assist with reposition if  unable to complete independently.  Float heels off bed; avoid pressure on the Achilles tendon.  Keep skin free from extended contact with medical devices.  Encourage functional activity and mobility, as early as tolerated.  Use aids (e.g., slide boards, mechanical lift) during transfer.  Recent Flowsheet Documentation  Taken 11/3/2023 2000 by Amor Wallace RN  Body Position: position changed independently  Skin Protection:   adhesive use limited   incontinence pads utilized   tubing/devices free from skin contact  Intervention: Prevent and Manage VTE (Venous Thromboembolism) Risk  Description: Assess for VTE (venous thromboembolism) risk.  Encourage and assist with early ambulation.  Initiate and maintain compression or other therapy, as indicated, based on identified risk in accordance with organizational protocol and provider order.  Encourage both active and passive leg exercises while in bed, if unable to ambulate.  Recent Flowsheet Documentation  Taken 11/3/2023 2000 by Amor Wallace RN  Activity Management: activity encouraged  VTE Prevention/Management: (See MAR) other (see comments)  Range of Motion: active ROM (range of motion) encouraged  Intervention: Prevent Infection  Description: Maintain skin and mucous membrane integrity; promote hand, oral and pulmonary hygiene.  Optimize fluid balance, nutrition, sleep and glycemic control to maximize infection resistance.  Identify potential sources of infection early to prevent or mitigate progression of infection (e.g., wound, lines, devices).  Evaluate ongoing need for invasive devices; remove promptly when no longer indicated.  Recent Flowsheet Documentation  Taken 11/3/2023 2000 by Amor Wallace RN  Infection Prevention:   equipment surfaces disinfected   hand hygiene promoted   rest/sleep promoted   single patient room provided   environmental surveillance performed  Goal: Optimal Comfort and Wellbeing  Outcome: Ongoing, Progressing  Intervention:  Provide Person-Centered Care  Description: Use a family-focused approach to care.  Develop trust and rapport by proactively providing information, encouraging questions, addressing concerns and offering reassurance.  Acknowledge emotional response to hospitalization.  Recognize and utilize personal coping strategies.  Honor spiritual and cultural preferences.  Recent Flowsheet Documentation  Taken 11/3/2023 2000 by Amor Wallace RN  Trust Relationship/Rapport:   thoughts/feelings acknowledged   reassurance provided   questions answered   choices provided   care explained  Goal: Readiness for Transition of Care  Outcome: Ongoing, Progressing     Problem: Skin Injury Risk Increased  Goal: Skin Health and Integrity  Outcome: Ongoing, Progressing  Intervention: Optimize Skin Protection  Description: Perform a full pressure injury risk assessment, as indicated by screening, upon admission to care unit.  Reassess skin (injury risk, full inspection) frequently (e.g., scheduled interval, with change in condition) to provide optimal early detection and prevention.  Maintain adequate tissue perfusion (e.g., encourage fluid balance; avoid crossing legs, constrictive clothing or devices) to promote tissue oxygenation.  Maintain head of bed at lowest degree of elevation tolerated, considering medical condition and other restrictions.  Avoid positioning onto an area that remains reddened.  Minimize incontinence and moisture (e.g., toileting schedule; moisture-wicking pad, diaper or incontinence collection device; skin moisture barrier).  Cleanse skin promptly and gently when soiled utilizing a pH-balanced cleanser.  Relieve and redistribute pressure (e.g., scheduled position changes, weight shifts, use of support surface, medical device repositioning, protective dressing application, use of positioning device, microclimate control, use of pressure-injury-monitor  Encourage increased activity, such as sitting in a chair at the  bedside or early mobilization, when able to tolerate.  Recent Flowsheet Documentation  Taken 11/3/2023 2000 by Amor Wallace RN  Pressure Reduction Techniques:   rest period provided between sit times   frequent weight shift encouraged  Head of Bed (HOB) Positioning: HOB at 30 degrees  Skin Protection:   adhesive use limited   incontinence pads utilized   tubing/devices free from skin contact     Problem: Fall Injury Risk  Goal: Absence of Fall and Fall-Related Injury  Outcome: Ongoing, Progressing  Intervention: Identify and Manage Contributors  Description: Develop a fall prevention plan with the patient and caregiver/family.  Provide reorientation, appropriate sensory stimulation and routines with changes in mental status to decrease risk of fall.  Promote use of personal vision and auditory aids.  Assess assistance level required for safe and effective self-care; provide support as needed, such as toileting, mobilization. For age 65 and older, implement timed toileting with assistance.  Encourage physical activity, such as performance of mobility and self-care at highest level of patient ability, multicomponent exercise program and provision of appropriate assistive devices.  If fall occurs, assess the severity of injury; implement fall injury protocol. Determine the cause and revise fall injury prevention plan.  Regularly review medication contribution to fall risk; adjust medication administration times to minimize risk of falling.  Consider risk related to polypharmacy and age.  Balance adequate pain management with potential for oversedation.  Recent Flowsheet Documentation  Taken 11/3/2023 2000 by Amor Wallace RN  Medication Review/Management: medications reviewed  Intervention: Promote Injury-Free Environment  Description: Provide a safe, barrier-free environment that encourages independent activity.  Keep care area uncluttered and well-lighted.  Determine need for increased observation or  monitoring.  Avoid use of devices that minimize mobility, such as restraints or indwelling urinary catheter.  Recent Flowsheet Documentation  Taken 11/3/2023 2000 by Amor Wallace RN  Safety Promotion/Fall Prevention:   activity supervised   safety round/check completed   nonskid shoes/slippers when out of bed   room organization consistent   fall prevention program maintained   lighting adjusted   clutter free environment maintained   Goal Outcome Evaluation:

## 2023-11-04 NOTE — PROGRESS NOTES
[facility]   Urology Progress Note    Patient Name: Aldair Narvaez  : 1950  MRN: [id number]  Primary Care Physician:  Miguel Angel Mireles MD  Date of admission: 10/29/2023    Subjective   Subjective     Chief Complaint: Weakness    HPI:  Patient resting in bed. Reports he has been voiding without hematuria. Mild dysuria. Bladder scan PVR 0cc.     Renal function stable. He has been afebrile. Abscess drain with thin red output. Nursing is flushing this drain BID.     Review of Systems   All systems were reviewed and negative except for: Renal abscess drain    Objective   Objective     Vitals:   Temp:  [98.1 °F (36.7 °C)-98.8 °F (37.1 °C)] 98.1 °F (36.7 °C)  Heart Rate:  [87-89] 89  Resp:  [16-18] 18  BP: (139-153)/(75-87) 153/87  Physical Exam    Constitutional: Awake in bed, alert   Eyes: PERRLA, sclerae anicteric, no conjunctival injection   HENT: Normocephalic, atraumatic, mucous membranes moist   Neck: Supple, trachea midline   Respiratory: Equal chest rise, non-labored respirations    Cardiovascular: RRR, palpable radial pulses bilaterally   Gastrointestinal: Soft   Genitourinary: Voiding. Renal abscess drain in place with minimal SS output.    Musculoskeletal: No lower extremity edema bilaterally, no clubbing or cyanosis to extremities   Psychiatric: Appropriate affect, cooperative   Neurologic: Oriented x 3,  Cranial Nerves grossly intact, speech clear   Skin: No rashes     Result Review    Result Review:  I have personally reviewed the results from the time of this admission to 2023 10:09 EDT and agree with these findings:  [x]  Laboratory  []  Microbiology  []  Radiology  []  EKG/Telemetry   []  Cardiology/Vascular   []  Pathology  []  Old records  [x]  Other: intake and output, vital signs    Most notable findings include: Cr 0.67, WBC 9.46.     Assessment & Plan   Assessment / Plan     Brief Patient Summary:  Aldair Narvaez is a 73 y.o. male with PMH of prostate cancer, recurrent nephrolithiasis,  left ureteral stricture and urinary incontinence who presented to [facility] for worsening weakness found to have hydronephrosis and left renal abscess. He is now s/p Left renal abscess drain placement with IR and s/p Cystoscopy, Left ureteral stent placement with Dr. Justin. Farooq catheter removed 11/01, he has been voiding. Today hospital medicine reported that rehab facility is unwilling to accept patient with abscess drain in place and pending CT. We discussed that it would be appropriate to repeat CT A/P on 11/6/23 to evaluate for potential drain removal with IR.     Active Hospital Problems:  Active Hospital Problems    Diagnosis     **UTI (urinary tract infection)     NEETA (acute kidney injury)     Rhabdomyolysis     Right arm weakness     Hydronephrosis of left kidney     Personal history of prostate cancer     Sepsis due to urinary tract infection     CAD, multiple vessel     Uncontrolled type 2 diabetes mellitus with hyperglycemia     Mixed hyperlipidemia        Plan:   -Continue abx per ID.   -If patient is discharged plan for outpatient Urologic follow up in 1 week. Plan for CT Abd w/wo prior to assess left renal abscess. Our office will call to schedule.   -If patient remains through the weekend CT A/P on 11/6/23 to evaluate for renal abscess drain with IR would be reasonable. D/W Dr. Hercules   -Urology is available for questions/concerns if needed          DVT prophylaxis:  Medical DVT prophylaxis orders are present.    CODE STATUS:   Code Status (Patient has no pulse and is not breathing): CPR (Attempt to Resuscitate)  Medical Interventions (Patient has pulse or is breathing): Full    Disposition:  I expect patient to discharge within 48 hours.    Electronically signed by JERRICA Damon, 11/02/23, 8:36 AM EDT.

## 2023-11-04 NOTE — PROGRESS NOTES
[facility] Medicine Services  PROGRESS NOTE    Patient Name: Aldair Narvaez  : 1950  MRN: [id number]    Date of Admission: 10/29/2023  Primary Care Physician: Miguel Angel Mireles MD    Subjective   Subjective     CC:  fell from bed     HPI: YG with 21mL out of it. No fever. Had a couple of bowel movements. Skin irritation has improved per patient report.    Objective   Objective     Vital Signs:   Temp:  [97.8 °F (36.6 °C)-98.8 °F (37.1 °C)] 97.8 °F (36.6 °C)  Heart Rate:  [75-89] 75  Resp:  [16-18] 16  BP: (139-153)/(75-87) 146/86     Physical Exam:  Gen:  WD/WN, sitting up   Neuro: alert, clear speech, follows commands, decreased right arm movement and  strength  HEENT:  NC/AT, PERRL  Neck:  Supple, no LAD  Heart RRR no murmur, rub, or gallop  Abd:  Soft, nontender, no rebound or guarding, normoactive bowel sounds  Extrem:  RUE with mild swelling  Flank - bloody drainage from drain  Skin - several blisters on back contained to the area under his YG dressing only, minimally improved from yesterday, but NOT worsening and NO spread    Results Reviewed:  LAB RESULTS:      Lab 23  0425 23  0458 23  0524 10/30/23  0320 10/30/23  0047 10/29/23  2255 10/29/23  2023 10/29/23  1614 10/29/23  1259   WBC 8.93 9.46 10.50 14.56*  --   --   --   --  28.11*   HEMOGLOBIN 13.5 12.9* 12.2* 13.0  --   --   --   --  14.7   HEMATOCRIT 40.1 39.0 37.2* 39.7  --   --   --   --  44.9   PLATELETS 177 145 99* 88*  --   --   --   --  150   NEUTROS ABS 4.20 6.16  --  14.27*  --   --   --   --  25.05*   IMMATURE GRANS (ABS)  --  0.28*  --   --   --   --   --   --  0.87*   LYMPHS ABS  --  2.05  --   --   --   --   --   --  0.81   MONOS ABS  --  0.70  --   --   --   --   --   --  1.20*   EOS ABS 0.45* 0.20  --  0.00  --   --   --   --  0.03   MCV 85.9 87.8 86.3 88.4  --   --   --   --  88.0   LACTATE  --   --   --   --  3.4* 6.8* 5.7* 5.1* 7.1*   PROTIME  --   --   --  17.7*  --   --   --   --   --     APTT  --   --   --   --   --   --   --   --  30.5         Lab 11/04/23  0508 11/03/23  1843 11/03/23  0425 11/02/23  0458 11/01/23  0524 10/30/23  0320 10/29/23  1259   SODIUM 138  --  138 138 137 136 131*   POTASSIUM 3.7 3.9 3.4* 3.4* 3.8 3.6 3.9   CHLORIDE 104  --  104 106 106 104 92*   CO2 25.0  --  24.0 22.0 21.0* 20.0* 18.0*   ANION GAP 9.0  --  10.0 10.0 10.0 12.0 21.0*   BUN 12  --  16 21 21 23 30*   CREATININE 0.48*  --  0.58* 0.67* 0.58* 1.29* 2.36*   EGFR 110.4  --  104.3 99.8 104.3 59.3* 28.7*   GLUCOSE 203*  --  211* 196* 229* 140* 438*   CALCIUM 8.2*  --  8.1* 8.0* 7.7* 7.5* 9.1   MAGNESIUM 1.6  --  1.8  --   --   --   --    HEMOGLOBIN A1C  --   --   --   --   --   --  9.40*         Lab 11/02/23  0458 11/01/23  0524 10/30/23  0320 10/29/23  1259   TOTAL PROTEIN 5.7* 5.6* 5.5*  --    ALBUMIN 2.6* 2.6* 2.7*  --    GLOBULIN 3.1 3.0 2.8  --    ALT (SGPT) 70* 69* 83* 61*   AST (SGOT) 108* 120* 252* 141*   BILIRUBIN 0.3 0.3 0.4  --    ALK PHOS 79 77 93  --          Lab 10/30/23  0320 10/29/23  1614 10/29/23  1259   HSTROP T  --  45* 66*   PROTIME 17.7*  --   --    INR 1.44*  --   --                  Brief Urine Lab Results  (Last result in the past 365 days)        Color   Clarity   Blood   Leuk Est   Nitrite   Protein   CREAT   Urine HCG        10/29/23 1445 Yellow   Cloudy   Large (3+)   Small (1+)   Negative   100 mg/dL (2+)                   Microbiology Results Abnormal       None            No radiology results from the last 24 hrs        Current medications:  Scheduled Meds:cefTRIAXone, 2,000 mg, Intravenous, Q24H  enoxaparin, 40 mg, Subcutaneous, Nightly  valsartan, 80 mg, Oral, Q24H   And  hydroCHLOROthiazide, 12.5 mg, Oral, Q24H  hydrocortisone, 25 mg, Rectal, BID  insulin lispro, 2-7 Units, Subcutaneous, Q4H  lactobacillus acidophilus, 1 capsule, Oral, Daily  metoprolol succinate XL, 50 mg, Oral, Daily  sodium chloride, 10 mL, Intravenous, Q12H  tamsulosin, 0.4 mg, Oral, Daily      Continuous  Infusions:lactated ringers, 75 mL/hr, Last Rate: 75 mL/hr (11/04/23 0500)  sodium chloride, 9 mL/hr, Last Rate: Stopped (10/31/23 0826)      PRN Meds:.  acetaminophen    Calcium Replacement - Follow Nurse / BPA Driven Protocol    dextrose    dextrose    gabapentin    glucagon (human recombinant)    HYDROcodone-acetaminophen    ipratropium-albuterol    Magnesium Standard Dose Replacement - Follow Nurse / BPA Driven Protocol    melatonin    Phosphorus Replacement - Follow Nurse / BPA Driven Protocol    Potassium Replacement - Follow Nurse / BPA Driven Protocol    sodium chloride    sodium chloride    sodium chloride    sodium chloride    Assessment & Plan   Assessment & Plan     Active Hospital Problems    Diagnosis  POA    **UTI (urinary tract infection) [N39.0]  Yes    NEETA (acute kidney injury) [N17.9]  Yes    Rhabdomyolysis [M62.82]  Yes    Right arm weakness [R29.898]  Yes    Hydronephrosis of left kidney [N13.30]  Unknown    Personal history of prostate cancer [Z85.46]  Not Applicable    Sepsis due to urinary tract infection [A41.9, N39.0]  Unknown    CAD, multiple vessel [I25.10]  Yes    Uncontrolled type 2 diabetes mellitus with hyperglycemia [E11.65]  Yes    Mixed hyperlipidemia [E78.2]  Yes      Resolved Hospital Problems   No resolved problems to display.        Brief Hospital Course to date:  Aldair Narvaez is a 73 y.o. male w hydronephrosis,  HTN HL DM CAD  prostate CA, GRANT, who fell out of bed, lay on the floor for a long time, later noticed he could not move his right arm, has a UTI and NEETA and temp 103.      This patient's problems and plans were partially entered by my partner and updated as appropriate by me 11/04/23.    Severe Sepsis due to UTI/renal abscess, E coli bacteremia   NEETA  Lactic Acidosis  Rhabdo  - CT scan shows 3.7cm infected cyst/abscess at L upper pole  - Cont abx per dr Ham, adjusted to ceftriaxone on 11/1  - IR  10/30 L renal  drain placed   - 10/31 L ureteral stent placement   -  last dose Eliquis was 10/28 AM - defer for now --> until 11/4 per Urology --> resumed  - Farooq removed  - creat was 2.4 on admission, now improved  - Repeat CT abd/pelvis with and without contrast on 11/6 given this is keeping patient from going to rehab, will do 2 days earlier per discussion with Urology on 11/4  - IVF, CK downtrending; likely stop IVF tomorrow  - PICC placement    Blistering rash  - contained to the area around the YG drain and only under the bandaging; no systemic symptoms, no oral lesions  - wound consulted  - allergy added to chart     T2DM with hyperglycemia  - SSI, glucose checks     HO HTN, CAD, Afib  -resumed BP meds; uptitrate dosing slowly    Fall at home, lay wedged under furniture for hours   R arm paresis - suspect nerve palsy from compression x hours  Brachial plexopathy  - PT OT commending rehab, which patient is amenable to  - stroke team originally consulted, MRI neg, suspect this is a local nerve injury .  Slow improvement.    - discussed with neurologist: anticipate good resolution with PT and time   -Possible need for repeat EMG in 4 to 6 weeks, gabapentin 100 mg 3 times daily as needed for nerve pain, once infection has resolved, can consider steroid use, follow-up with [facility] 3 months posthospitalization    Hemorrhoids - anusol      Expected Discharge Location and Transportation: rehab  Expected Discharge tbd  Expected Discharge Date: 11/9/2023; Expected Discharge Time:      DVT prophylaxis:  Medical DVT prophylaxis orders are present.     AM-PAC 6 Clicks Score (PT): 17 (11/03/23 2000)    CODE STATUS:   Code Status and Medical Interventions:   Ordered at: 10/29/23 1620     Code Status (Patient has no pulse and is not breathing):    CPR (Attempt to Resuscitate)     Medical Interventions (Patient has pulse or is breathing):    Full       Alicia Agudelo MD  11/04/23

## 2023-11-04 NOTE — PLAN OF CARE
Problem: Adult Inpatient Plan of Care  Goal: Plan of Care Review  Outcome: Ongoing, Progressing  Flowsheets (Taken 11/4/2023 1317)  Progress: improving  Plan of Care Reviewed With: patient  Goal: Patient-Specific Goal (Individualized)  Outcome: Ongoing, Progressing  Goal: Absence of Hospital-Acquired Illness or Injury  Outcome: Ongoing, Progressing  Intervention: Identify and Manage Fall Risk  Recent Flowsheet Documentation  Taken 11/4/2023 1200 by Jefferson Harris RN  Safety Promotion/Fall Prevention:   activity supervised   nonskid shoes/slippers when out of bed   room organization consistent   safety round/check completed  Taken 11/4/2023 1000 by Jefferson Harris RN  Safety Promotion/Fall Prevention:   activity supervised   nonskid shoes/slippers when out of bed   room organization consistent   safety round/check completed  Taken 11/4/2023 0817 by Jefferson Harris RN  Safety Promotion/Fall Prevention:   activity supervised   nonskid shoes/slippers when out of bed   room organization consistent   safety round/check completed  Intervention: Prevent Skin Injury  Recent Flowsheet Documentation  Taken 11/4/2023 1200 by Jefferson Harris RN  Body Position:   position changed independently   weight shifting  Taken 11/4/2023 1000 by Jefferson Harris RN  Body Position: (right upper extremity elevated on pillows)   position changed independently   weight shifting   upper extremity elevated  Taken 11/4/2023 0817 by Jefferson Harirs RN  Body Position:   position changed independently   weight shifting  Skin Protection:   adhesive use limited   tubing/devices free from skin contact  Intervention: Prevent and Manage VTE (Venous Thromboembolism) Risk  Recent Flowsheet Documentation  Taken 11/4/2023 1200 by Jefferson Harris RN  Activity Management: activity encouraged  Taken 11/4/2023 1000 by Jefferson Harris RN  Activity Management: activity encouraged  VTE Prevention/Management:   bilateral   sequential compression devices on  Taken  11/4/2023 0817 by Jefferson Harris RN  Activity Management: activity encouraged  VTE Prevention/Management:   bilateral   sequential compression devices on  Range of Motion:   active ROM (range of motion) encouraged   ROM (range of motion) performed  Intervention: Prevent Infection  Recent Flowsheet Documentation  Taken 11/4/2023 1200 by Jefferson Harris RN  Infection Prevention:   environmental surveillance performed   equipment surfaces disinfected   hand hygiene promoted   personal protective equipment utilized   rest/sleep promoted   single patient room provided  Taken 11/4/2023 1000 by Jefferson Harris RN  Infection Prevention:   environmental surveillance performed   equipment surfaces disinfected   hand hygiene promoted   personal protective equipment utilized   rest/sleep promoted   single patient room provided  Taken 11/4/2023 0817 by Jefferson Harris RN  Infection Prevention:   environmental surveillance performed   equipment surfaces disinfected   hand hygiene promoted   personal protective equipment utilized   rest/sleep promoted   single patient room provided  Goal: Optimal Comfort and Wellbeing  Outcome: Ongoing, Progressing  Intervention: Provide Person-Centered Care  Recent Flowsheet Documentation  Taken 11/4/2023 1000 by Jefferson Harris RN  Trust Relationship/Rapport:   care explained   choices provided   emotional support provided   empathic listening provided   questions answered   questions encouraged   reassurance provided  Taken 11/4/2023 0817 by Jefferson Harris RN  Trust Relationship/Rapport:   care explained   choices provided   emotional support provided   empathic listening provided   questions answered   questions encouraged   reassurance provided  Goal: Readiness for Transition of Care  Outcome: Ongoing, Progressing     Problem: Skin Injury Risk Increased  Goal: Skin Health and Integrity  Outcome: Ongoing, Progressing  Intervention: Promote and Optimize Oral Intake  Recent Flowsheet  Documentation  Taken 11/4/2023 1200 by Jefferson Harris RN  Oral Nutrition Promotion:   rest periods promoted   safe use of adaptive equipment encouraged  Taken 11/4/2023 0817 by Jefferson Harris RN  Oral Nutrition Promotion:   safe use of adaptive equipment encouraged   rest periods promoted  Intervention: Optimize Skin Protection  Recent Flowsheet Documentation  Taken 11/4/2023 1200 by Jefferson Harris RN  Head of Bed (HOB) Positioning: HOB elevated  Taken 11/4/2023 1000 by Jefferson Harris RN  Head of Bed (HOB) Positioning: HOB elevated  Taken 11/4/2023 0817 by Jefferson Harris RN  Pressure Reduction Techniques:   frequent weight shift encouraged   rest period provided between sit times  Head of Bed (HOB) Positioning: HOB elevated  Pressure Reduction Devices: pressure-redistributing mattress utilized  Skin Protection:   adhesive use limited   tubing/devices free from skin contact     Problem: Fall Injury Risk  Goal: Absence of Fall and Fall-Related Injury  Outcome: Ongoing, Progressing  Intervention: Identify and Manage Contributors  Recent Flowsheet Documentation  Taken 11/4/2023 1200 by Jefferson Harris RN  Medication Review/Management: medications reviewed  Self-Care Promotion:   BADL personal objects within reach   BADL personal routines maintained   safe use of adaptive equipment encouraged  Taken 11/4/2023 1000 by Jefferson Harris RN  Medication Review/Management: medications reviewed  Self-Care Promotion:   BADL personal objects within reach   BADL personal routines maintained   safe use of adaptive equipment encouraged  Taken 11/4/2023 0817 by Jefferson Harris RN  Medication Review/Management: medications reviewed  Self-Care Promotion:   BADL personal objects within reach   BADL personal routines maintained   safe use of adaptive equipment encouraged  Intervention: Promote Injury-Free Environment  Recent Flowsheet Documentation  Taken 11/4/2023 1200 by Jefferson Harris RN  Safety Promotion/Fall Prevention:    activity supervised   nonskid shoes/slippers when out of bed   room organization consistent   safety round/check completed  Taken 11/4/2023 1000 by Jefferson Harris, RN  Safety Promotion/Fall Prevention:   activity supervised   nonskid shoes/slippers when out of bed   room organization consistent   safety round/check completed  Taken 11/4/2023 0817 by Jefferson Harris, RN  Safety Promotion/Fall Prevention:   activity supervised   nonskid shoes/slippers when out of bed   room organization consistent   safety round/check completed   Goal Outcome Evaluation:  Plan of Care Reviewed With: patient        Progress: improving

## 2023-11-05 LAB
CK SERPL-CCNC: 420 U/L (ref 20–200)
GLUCOSE BLDC GLUCOMTR-MCNC: 158 MG/DL (ref 70–130)
GLUCOSE BLDC GLUCOMTR-MCNC: 164 MG/DL (ref 70–130)
GLUCOSE BLDC GLUCOMTR-MCNC: 221 MG/DL (ref 70–130)
GLUCOSE BLDC GLUCOMTR-MCNC: 260 MG/DL (ref 70–130)
GLUCOSE BLDC GLUCOMTR-MCNC: 362 MG/DL (ref 70–130)

## 2023-11-05 PROCEDURE — 63710000001 INSULIN LISPRO (HUMAN) PER 5 UNITS: Performed by: STUDENT IN AN ORGANIZED HEALTH CARE EDUCATION/TRAINING PROGRAM

## 2023-11-05 PROCEDURE — 63710000001 INSULIN DETEMIR PER 5 UNITS: Performed by: STUDENT IN AN ORGANIZED HEALTH CARE EDUCATION/TRAINING PROGRAM

## 2023-11-05 PROCEDURE — 82948 REAGENT STRIP/BLOOD GLUCOSE: CPT

## 2023-11-05 PROCEDURE — 99232 SBSQ HOSP IP/OBS MODERATE 35: CPT | Performed by: STUDENT IN AN ORGANIZED HEALTH CARE EDUCATION/TRAINING PROGRAM

## 2023-11-05 PROCEDURE — 25010000002 CEFTRIAXONE PER 250 MG: Performed by: INTERNAL MEDICINE

## 2023-11-05 PROCEDURE — 82550 ASSAY OF CK (CPK): CPT | Performed by: INTERNAL MEDICINE

## 2023-11-05 RX ORDER — VALSARTAN 160 MG/1
160 TABLET ORAL
Status: DISCONTINUED | OUTPATIENT
Start: 2023-11-05 | End: 2023-11-06

## 2023-11-05 RX ORDER — HYDROCHLOROTHIAZIDE 25 MG/1
25 TABLET ORAL
Status: DISCONTINUED | OUTPATIENT
Start: 2023-11-05 | End: 2023-11-06

## 2023-11-05 RX ADMIN — INSULIN LISPRO 3 UNITS: 100 INJECTION, SOLUTION INTRAVENOUS; SUBCUTANEOUS at 11:45

## 2023-11-05 RX ADMIN — APIXABAN 5 MG: 5 TABLET, FILM COATED ORAL at 09:32

## 2023-11-05 RX ADMIN — INSULIN LISPRO 2 UNITS: 100 INJECTION, SOLUTION INTRAVENOUS; SUBCUTANEOUS at 04:09

## 2023-11-05 RX ADMIN — INSULIN LISPRO 4 UNITS: 100 INJECTION, SOLUTION INTRAVENOUS; SUBCUTANEOUS at 17:03

## 2023-11-05 RX ADMIN — HYDROCORTISONE ACETATE 25 MG: 25 SUPPOSITORY RECTAL at 20:20

## 2023-11-05 RX ADMIN — INSULIN DETEMIR 5 UNITS: 100 INJECTION, SOLUTION SUBCUTANEOUS at 09:32

## 2023-11-05 RX ADMIN — INSULIN LISPRO 3 UNITS: 100 INJECTION, SOLUTION INTRAVENOUS; SUBCUTANEOUS at 07:45

## 2023-11-05 RX ADMIN — VALSARTAN 160 MG: 160 TABLET, FILM COATED ORAL at 09:32

## 2023-11-05 RX ADMIN — INSULIN LISPRO 3 UNITS: 100 INJECTION, SOLUTION INTRAVENOUS; SUBCUTANEOUS at 17:03

## 2023-11-05 RX ADMIN — Medication 10 ML: at 20:20

## 2023-11-05 RX ADMIN — INSULIN LISPRO 2 UNITS: 100 INJECTION, SOLUTION INTRAVENOUS; SUBCUTANEOUS at 07:45

## 2023-11-05 RX ADMIN — INSULIN LISPRO 3 UNITS: 100 INJECTION, SOLUTION INTRAVENOUS; SUBCUTANEOUS at 00:23

## 2023-11-05 RX ADMIN — APIXABAN 5 MG: 5 TABLET, FILM COATED ORAL at 20:19

## 2023-11-05 RX ADMIN — Medication 1 CAPSULE: at 09:32

## 2023-11-05 RX ADMIN — HYDROCHLOROTHIAZIDE 25 MG: 25 TABLET ORAL at 09:32

## 2023-11-05 RX ADMIN — METOPROLOL SUCCINATE 50 MG: 50 TABLET, EXTENDED RELEASE ORAL at 09:32

## 2023-11-05 RX ADMIN — INSULIN LISPRO 6 UNITS: 100 INJECTION, SOLUTION INTRAVENOUS; SUBCUTANEOUS at 20:19

## 2023-11-05 RX ADMIN — Medication 10 ML: at 09:33

## 2023-11-05 RX ADMIN — INSULIN DETEMIR 5 UNITS: 100 INJECTION, SOLUTION SUBCUTANEOUS at 20:19

## 2023-11-05 RX ADMIN — CEFTRIAXONE SODIUM 2000 MG: 2 INJECTION, POWDER, FOR SOLUTION INTRAMUSCULAR; INTRAVENOUS at 09:34

## 2023-11-05 NOTE — PROGRESS NOTES
Psychiatric Medicine Services  PROGRESS NOTE    Patient Name: Aldair Narvaez  : 1950  MRN: [id number]    Date of Admission: 10/29/2023  Primary Care Physician: Miguel Angel Mireles MD    Subjective   Subjective     CC:  fell from bed     HPI: YG with 10mL. Afebrile.  Reports his YG drain site discomfort has been improving.  Had 4 bowel movements in the past 24 hours.  Day nausea or vomiting.  No abdominal pain.  No urinary symptoms.  No respiratory symptoms.    Objective   Objective     Vital Signs:   Temp:  [97.8 °F (36.6 °C)-98.7 °F (37.1 °C)] 98.1 °F (36.7 °C)  Heart Rate:  [75-90] 90  Resp:  [16] 16  BP: (146-157)/(75-87) 156/84     Physical Exam:  Gen:  WD/WN, sitting up   Neuro: alert, clear speech, follows commands, decreased right arm movement and  strength  HEENT:  NC/AT, PERRL  Neck:  Supple, no LAD  Heart RRR no murmur, rub, or gallop  Abd:  Soft, nontender, no rebound or guarding, normoactive bowel sounds  Extrem:  RUE with mild swelling  Flank - bloody drainage from drain  Skin - several blisters on back contained to the area under his YG dressing only, slightly improved from yesterday, but NOT worsening and NO spread    Results Reviewed:  LAB RESULTS:      Lab 23  0425 23  0458 23  0524 10/30/23  0320 10/30/23  0047 10/29/23  2255 10/29/23  2023 10/29/23  1614 10/29/23  1259   WBC 8.93 9.46 10.50 14.56*  --   --   --   --  28.11*   HEMOGLOBIN 13.5 12.9* 12.2* 13.0  --   --   --   --  14.7   HEMATOCRIT 40.1 39.0 37.2* 39.7  --   --   --   --  44.9   PLATELETS 177 145 99* 88*  --   --   --   --  150   NEUTROS ABS 4.20 6.16  --  14.27*  --   --   --   --  25.05*   IMMATURE GRANS (ABS)  --  0.28*  --   --   --   --   --   --  0.87*   LYMPHS ABS  --  2.05  --   --   --   --   --   --  0.81   MONOS ABS  --  0.70  --   --   --   --   --   --  1.20*   EOS ABS 0.45* 0.20  --  0.00  --   --   --   --  0.03   MCV 85.9 87.8 86.3 88.4  --   --   --   --  88.0   LACTATE   --   --   --   --  3.4* 6.8* 5.7* 5.1* 7.1*   PROTIME  --   --   --  17.7*  --   --   --   --   --    APTT  --   --   --   --   --   --   --   --  30.5         Lab 11/04/23  0508 11/03/23  1843 11/03/23  0425 11/02/23  0458 11/01/23  0524 10/30/23  0320 10/29/23  1259   SODIUM 138  --  138 138 137 136 131*   POTASSIUM 3.7 3.9 3.4* 3.4* 3.8 3.6 3.9   CHLORIDE 104  --  104 106 106 104 92*   CO2 25.0  --  24.0 22.0 21.0* 20.0* 18.0*   ANION GAP 9.0  --  10.0 10.0 10.0 12.0 21.0*   BUN 12  --  16 21 21 23 30*   CREATININE 0.48*  --  0.58* 0.67* 0.58* 1.29* 2.36*   EGFR 110.4  --  104.3 99.8 104.3 59.3* 28.7*   GLUCOSE 203*  --  211* 196* 229* 140* 438*   CALCIUM 8.2*  --  8.1* 8.0* 7.7* 7.5* 9.1   MAGNESIUM 1.6  --  1.8  --   --   --   --    HEMOGLOBIN A1C  --   --   --   --   --   --  9.40*         Lab 11/02/23  0458 11/01/23  0524 10/30/23  0320 10/29/23  1259   TOTAL PROTEIN 5.7* 5.6* 5.5*  --    ALBUMIN 2.6* 2.6* 2.7*  --    GLOBULIN 3.1 3.0 2.8  --    ALT (SGPT) 70* 69* 83* 61*   AST (SGOT) 108* 120* 252* 141*   BILIRUBIN 0.3 0.3 0.4  --    ALK PHOS 79 77 93  --          Lab 10/30/23  0320 10/29/23  1614 10/29/23  1259   HSTROP T  --  45* 66*   PROTIME 17.7*  --   --    INR 1.44*  --   --                  Brief Urine Lab Results  (Last result in the past 365 days)        Color   Clarity   Blood   Leuk Est   Nitrite   Protein   CREAT   Urine HCG        10/29/23 1445 Yellow   Cloudy   Large (3+)   Small (1+)   Negative   100 mg/dL (2+)                   Microbiology Results Abnormal       None            No radiology results from the last 24 hrs        Current medications:  Scheduled Meds:apixaban, 5 mg, Oral, Q12H  cefTRIAXone, 2,000 mg, Intravenous, Q24H  valsartan, 160 mg, Oral, Q24H   And  hydroCHLOROthiazide, 25 mg, Oral, Q24H  hydrocortisone, 25 mg, Rectal, BID  insulin detemir, 5 Units, Subcutaneous, Q12H  insulin lispro, 2-7 Units, Subcutaneous, Q4H  Insulin Lispro, 3 Units, Subcutaneous, TID With  Meals  lactobacillus acidophilus, 1 capsule, Oral, Daily  metoprolol succinate XL, 50 mg, Oral, Daily  sodium chloride, 10 mL, Intravenous, Q12H  tamsulosin, 0.4 mg, Oral, Daily      Continuous Infusions:sodium chloride, 9 mL/hr, Last Rate: Stopped (10/31/23 0826)      PRN Meds:.  acetaminophen    Calcium Replacement - Follow Nurse / BPA Driven Protocol    dextrose    dextrose    gabapentin    glucagon (human recombinant)    HYDROcodone-acetaminophen    ipratropium-albuterol    Magnesium Standard Dose Replacement - Follow Nurse / BPA Driven Protocol    melatonin    Phosphorus Replacement - Follow Nurse / BPA Driven Protocol    Potassium Replacement - Follow Nurse / BPA Driven Protocol    sodium chloride    sodium chloride    sodium chloride    sodium chloride    Assessment & Plan   Assessment & Plan     Active Hospital Problems    Diagnosis  POA    **UTI (urinary tract infection) [N39.0]  Yes    NEETA (acute kidney injury) [N17.9]  Yes    Rhabdomyolysis [M62.82]  Yes    Right arm weakness [R29.898]  Yes    Hydronephrosis of left kidney [N13.30]  Unknown    Personal history of prostate cancer [Z85.46]  Not Applicable    Sepsis due to urinary tract infection [A41.9, N39.0]  Unknown    CAD, multiple vessel [I25.10]  Yes    Uncontrolled type 2 diabetes mellitus with hyperglycemia [E11.65]  Yes    Mixed hyperlipidemia [E78.2]  Yes      Resolved Hospital Problems   No resolved problems to display.        Brief Hospital Course to date:  Aldair Narvaez is a 73 y.o. male w hydronephrosis,  HTN HL DM CAD  prostate CA, GRANT, who fell out of bed, lay on the floor for a long time, later noticed he could not move his right arm, has a UTI and NEETA and temp 103.      This patient's problems and plans were partially entered by my partner and updated as appropriate by me 11/05/23.    Severe Sepsis due to UTI/renal abscess, E coli bacteremia   NEETA  Lactic Acidosis  Rhabdo  - CT scan shows 3.7cm infected cyst/abscess at L upper pole  - Cont  abx per dr Ham, adjusted to ceftriaxone on 11/1  - IR  10/30 L renal  drain placed   - 10/31 L ureteral stent placement   - Farooq removed  - creat was 2.4 on admission, now improved  - Repeat CT abd/pelvis with and without contrast on 11/6 given this is keeping patient from going to rehab, will do 2 days earlier per discussion with Urology on 11/4  - IVF, CK downtrending; likely stop IVF tomorrow  - PICC placement    Blistering rash  - contained to the area around the YG drain and only under the bandaging; no systemic symptoms, no oral lesions  - wound consulted  - allergy added to chart     T2DM with hyperglycemia  - SSI, glucose checks     HO HTN, CAD, Afib  -resumed BP meds; uptitrate dosing slowly  - eliquis held until 11/4 per Urology --> resumed    Fall at home, lay wedged under furniture for hours   R arm paresis - suspect nerve palsy from compression x hours  Brachial plexopathy  - PT OT commending rehab, which patient is amenable to  - stroke team originally consulted, MRI neg, suspect this is a local nerve injury .  Slow improvement.    - discussed with neurologist: anticipate good resolution with PT and time   -Possible need for repeat EMG in 4 to 6 weeks, gabapentin 100 mg 3 times daily as needed for nerve pain, once infection has resolved, can consider steroid use, follow-up with [facility] 3 months posthospitalization    Hemorrhoids - anusol      Expected Discharge Location and Transportation: rehab  Expected Discharge tbd  Expected Discharge Date: 11/9/2023; Expected Discharge Time:      DVT prophylaxis:  Medical DVT prophylaxis orders are present.     AM-PAC 6 Clicks Score (PT): 17 (11/04/23 1200)    CODE STATUS:   Code Status and Medical Interventions:   Ordered at: 10/29/23 1620     Code Status (Patient has no pulse and is not breathing):    CPR (Attempt to Resuscitate)     Medical Interventions (Patient has pulse or is breathing):    Full       Alicia Agudelo MD  11/05/23

## 2023-11-05 NOTE — PLAN OF CARE
Problem: Adult Inpatient Plan of Care  Goal: Plan of Care Review  Outcome: Ongoing, Progressing  Flowsheets (Taken 11/5/2023 1721)  Progress: improving  Plan of Care Reviewed With: patient  Goal: Patient-Specific Goal (Individualized)  Outcome: Ongoing, Progressing  Goal: Absence of Hospital-Acquired Illness or Injury  Outcome: Ongoing, Progressing  Intervention: Identify and Manage Fall Risk  Recent Flowsheet Documentation  Taken 11/5/2023 1600 by Jefferson Harris RN  Safety Promotion/Fall Prevention:   activity supervised   nonskid shoes/slippers when out of bed   room organization consistent   safety round/check completed  Taken 11/5/2023 1400 by Jefferson Harris RN  Safety Promotion/Fall Prevention:   activity supervised   nonskid shoes/slippers when out of bed   room organization consistent   safety round/check completed  Taken 11/5/2023 1200 by Jefferson Harris RN  Safety Promotion/Fall Prevention:   activity supervised   nonskid shoes/slippers when out of bed   room organization consistent   safety round/check completed  Taken 11/5/2023 1000 by Jefferson Harris RN  Safety Promotion/Fall Prevention:   activity supervised   nonskid shoes/slippers when out of bed   room organization consistent   safety round/check completed  Taken 11/5/2023 0800 by Jefferson Harris RN  Safety Promotion/Fall Prevention:   activity supervised   nonskid shoes/slippers when out of bed   room organization consistent   safety round/check completed  Intervention: Prevent Skin Injury  Recent Flowsheet Documentation  Taken 11/5/2023 1600 by Jefferson Harris RN  Body Position:   position changed independently   weight shifting  Skin Protection:   adhesive use limited   tubing/devices free from skin contact  Taken 11/5/2023 1400 by Jefferson Harris RN  Body Position:   position changed independently   weight shifting  Skin Protection:   adhesive use limited   tubing/devices free from skin contact  Taken 11/5/2023 1200 by Jefferson Harris  RN  Body Position:   position changed independently   weight shifting  Skin Protection:   adhesive use limited   tubing/devices free from skin contact  Taken 11/5/2023 1000 by Jefferson Harris RN  Body Position:   position changed independently   weight shifting  Skin Protection:   adhesive use limited   tubing/devices free from skin contact  Taken 11/5/2023 0800 by Jefferson Harris RN  Body Position:   weight shifting   position changed independently  Skin Protection: adhesive use limited  Intervention: Prevent and Manage VTE (Venous Thromboembolism) Risk  Recent Flowsheet Documentation  Taken 11/5/2023 1600 by Jefferson Harris RN  Activity Management: activity encouraged  Taken 11/5/2023 1400 by Jefferson Harris RN  Activity Management: activity encouraged  Range of Motion:   active ROM (range of motion) encouraged   ROM (range of motion) performed  Taken 11/5/2023 1200 by Jefferson Harris RN  Activity Management: activity encouraged  Range of Motion:   ROM (range of motion) performed   active ROM (range of motion) encouraged  Taken 11/5/2023 1000 by Jefferson Harris RN  Activity Management: activity encouraged  Range of Motion:   ROM (range of motion) performed   active ROM (range of motion) encouraged  Taken 11/5/2023 0901 by Jefferson Harris RN  Activity Management: up in chair  Taken 11/5/2023 0800 by Jefferson Harris RN  Activity Management: activity encouraged  Intervention: Prevent Infection  Recent Flowsheet Documentation  Taken 11/5/2023 1600 by Jefferson Harris RN  Infection Prevention:   environmental surveillance performed   equipment surfaces disinfected   hand hygiene promoted   personal protective equipment utilized   rest/sleep promoted   single patient room provided  Taken 11/5/2023 1400 by Jefferson Harris RN  Infection Prevention:   environmental surveillance performed   equipment surfaces disinfected   hand hygiene promoted   personal protective equipment utilized   rest/sleep promoted   single patient  room provided  Taken 11/5/2023 1200 by Jefferson Harris RN  Infection Prevention:   environmental surveillance performed   equipment surfaces disinfected   hand hygiene promoted   personal protective equipment utilized   rest/sleep promoted   single patient room provided  Taken 11/5/2023 1000 by Jefferson Harris RN  Infection Prevention:   environmental surveillance performed   equipment surfaces disinfected   hand hygiene promoted   personal protective equipment utilized   rest/sleep promoted   single patient room provided  Taken 11/5/2023 0800 by Jefferson Harris RN  Infection Prevention:   environmental surveillance performed   equipment surfaces disinfected   hand hygiene promoted   personal protective equipment utilized   rest/sleep promoted   single patient room provided  Goal: Optimal Comfort and Wellbeing  Outcome: Ongoing, Progressing  Intervention: Provide Person-Centered Care  Recent Flowsheet Documentation  Taken 11/5/2023 0800 by Jefferson Harris RN  Trust Relationship/Rapport:   care explained   choices provided   emotional support provided   empathic listening provided   questions answered   questions encouraged   reassurance provided  Goal: Readiness for Transition of Care  Outcome: Ongoing, Progressing     Problem: Skin Injury Risk Increased  Goal: Skin Health and Integrity  Outcome: Ongoing, Progressing  Intervention: Promote and Optimize Oral Intake  Recent Flowsheet Documentation  Taken 11/5/2023 1600 by Jefferson Harris RN  Oral Nutrition Promotion:   rest periods promoted   safe use of adaptive equipment encouraged  Taken 11/5/2023 1400 by Jefferson Harris RN  Oral Nutrition Promotion:   rest periods promoted   safe use of adaptive equipment encouraged  Taken 11/5/2023 1200 by Jefferson Harris RN  Oral Nutrition Promotion:   rest periods promoted   safe use of adaptive equipment encouraged  Taken 11/5/2023 1000 by Jefferson Harris RN  Oral Nutrition Promotion: safe use of adaptive equipment  encouraged  Taken 11/5/2023 0800 by Jefferson Harris RN  Oral Nutrition Promotion:   safe use of adaptive equipment encouraged   rest periods promoted  Intervention: Optimize Skin Protection  Recent Flowsheet Documentation  Taken 11/5/2023 1600 by Jefferson Harris RN  Pressure Reduction Techniques:   frequent weight shift encouraged   rest period provided between sit times   weight shift assistance provided  Head of Bed (HOB) Positioning: [facility] elevated  Pressure Reduction Devices: pressure-redistributing mattress utilized  Skin Protection:   adhesive use limited   tubing/devices free from skin contact  Taken 11/5/2023 1400 by Jefferson Harris RN  Pressure Reduction Techniques:   frequent weight shift encouraged   rest period provided between sit times   weight shift assistance provided  Head of Bed ([facility]) Positioning: [facility] elevated  Pressure Reduction Devices:   pressure-redistributing mattress utilized   positioning supports utilized  Skin Protection:   adhesive use limited   tubing/devices free from skin contact  Taken 11/5/2023 1200 by Jefferson Harris RN  Pressure Reduction Techniques:   frequent weight shift encouraged   rest period provided between sit times   weight shift assistance provided  Head of Bed ([facility]) Positioning: [facility] elevated  Pressure Reduction Devices:   pressure-redistributing mattress utilized   positioning supports utilized  Skin Protection:   adhesive use limited   tubing/devices free from skin contact  Taken 11/5/2023 1000 by Jefferson Harris RN  Pressure Reduction Techniques:   frequent weight shift encouraged   rest period provided between sit times   weight shift assistance provided  Head of Bed ([facility]) Positioning: [facility] elevated  Pressure Reduction Devices: pressure-redistributing mattress utilized  Skin Protection:   adhesive use limited   tubing/devices free from skin contact  Taken 11/5/2023 0800 by Jefferson Harris RN  Pressure Reduction Techniques:   frequent weight shift encouraged   rest  period provided between sit times   weight shift assistance provided  Head of Bed (HOB) Positioning: HOB elevated  Pressure Reduction Devices: pressure-redistributing mattress utilized  Skin Protection: adhesive use limited     Problem: Fall Injury Risk  Goal: Absence of Fall and Fall-Related Injury  Outcome: Ongoing, Progressing  Intervention: Identify and Manage Contributors  Recent Flowsheet Documentation  Taken 11/5/2023 1600 by Jefferson Harris RN  Medication Review/Management: medications reviewed  Self-Care Promotion:   BADL personal objects within reach   BADL personal routines maintained   safe use of adaptive equipment encouraged  Taken 11/5/2023 1400 by Jefferson Harris RN  Medication Review/Management: medications reviewed  Self-Care Promotion:   BADL personal objects within reach   BADL personal routines maintained   safe use of adaptive equipment encouraged  Taken 11/5/2023 1200 by Jefferson Harris RN  Medication Review/Management: medications reviewed  Self-Care Promotion:   BADL personal objects within reach   BADL personal routines maintained   safe use of adaptive equipment encouraged  Taken 11/5/2023 1000 by Jefferson Harris RN  Medication Review/Management: medications reviewed  Self-Care Promotion:   BADL personal objects within reach   BADL personal routines maintained   safe use of adaptive equipment encouraged  Taken 11/5/2023 0901 by Jefferson Harris RN  Self-Care Promotion:   BADL personal objects within reach   BADL personal routines maintained   safe use of adaptive equipment encouraged  Taken 11/5/2023 0800 by Jefferson Harris RN  Medication Review/Management: medications reviewed  Self-Care Promotion:   BADL personal objects within reach   BADL personal routines maintained   safe use of adaptive equipment encouraged  Intervention: Promote Injury-Free Environment  Recent Flowsheet Documentation  Taken 11/5/2023 1600 by Jefferson Harris RN  Safety Promotion/Fall Prevention:   activity  supervised   nonskid shoes/slippers when out of bed   room organization consistent   safety round/check completed  Taken 11/5/2023 1400 by Jefferson Harris RN  Safety Promotion/Fall Prevention:   activity supervised   nonskid shoes/slippers when out of bed   room organization consistent   safety round/check completed  Taken 11/5/2023 1200 by Jefferson Harris RN  Safety Promotion/Fall Prevention:   activity supervised   nonskid shoes/slippers when out of bed   room organization consistent   safety round/check completed  Taken 11/5/2023 1000 by Jefferson Harris RN  Safety Promotion/Fall Prevention:   activity supervised   nonskid shoes/slippers when out of bed   room organization consistent   safety round/check completed  Taken 11/5/2023 0800 by Jefferson Harris RN  Safety Promotion/Fall Prevention:   activity supervised   nonskid shoes/slippers when out of bed   room organization consistent   safety round/check completed     Problem: Pain Acute  Goal: Acceptable Pain Control and Functional Ability  Outcome: Ongoing, Progressing  Intervention: Prevent or Manage Pain  Recent Flowsheet Documentation  Taken 11/5/2023 1600 by Jefferson Harris RN  Medication Review/Management: medications reviewed  Taken 11/5/2023 1400 by Jefferson Harris RN  Medication Review/Management: medications reviewed  Taken 11/5/2023 1200 by Jefferson Harris RN  Medication Review/Management: medications reviewed  Taken 11/5/2023 1000 by Jefferson Harris RN  Medication Review/Management: medications reviewed  Taken 11/5/2023 0800 by Jefferson Harris RN  Medication Review/Management: medications reviewed  Intervention: Optimize Psychosocial Wellbeing  Recent Flowsheet Documentation  Taken 11/5/2023 0800 by Jefferson Harris RN  Diversional Activities:   television   smartphone     Problem: Infection  Goal: Absence of Infection Signs and Symptoms  Outcome: Ongoing, Progressing   Goal Outcome Evaluation:  Plan of Care Reviewed With: patient        Progress:  improving

## 2023-11-06 ENCOUNTER — APPOINTMENT (OUTPATIENT)
Dept: CT IMAGING | Facility: HOSPITAL | Age: 73
End: 2023-11-06
Payer: MEDICARE

## 2023-11-06 LAB
ANION GAP SERPL CALCULATED.3IONS-SCNC: 8 MMOL/L (ref 5–15)
BUN SERPL-MCNC: 16 MG/DL (ref 8–23)
BUN/CREAT SERPL: 30.8 (ref 7–25)
CALCIUM SPEC-SCNC: 9.1 MG/DL (ref 8.6–10.5)
CHLORIDE SERPL-SCNC: 100 MMOL/L (ref 98–107)
CK SERPL-CCNC: 270 U/L (ref 20–200)
CO2 SERPL-SCNC: 28 MMOL/L (ref 22–29)
CREAT SERPL-MCNC: 0.52 MG/DL (ref 0.76–1.27)
DEPRECATED RDW RBC AUTO: 48.5 FL (ref 37–54)
EGFRCR SERPLBLD CKD-EPI 2021: 107.8 ML/MIN/1.73
ERYTHROCYTE [DISTWIDTH] IN BLOOD BY AUTOMATED COUNT: 15.4 % (ref 12.3–15.4)
GLUCOSE BLDC GLUCOMTR-MCNC: 136 MG/DL (ref 70–130)
GLUCOSE BLDC GLUCOMTR-MCNC: 177 MG/DL (ref 70–130)
GLUCOSE BLDC GLUCOMTR-MCNC: 198 MG/DL (ref 70–130)
GLUCOSE BLDC GLUCOMTR-MCNC: 293 MG/DL (ref 70–130)
GLUCOSE BLDC GLUCOMTR-MCNC: 298 MG/DL (ref 70–130)
GLUCOSE BLDC GLUCOMTR-MCNC: 308 MG/DL (ref 70–130)
GLUCOSE BLDC GLUCOMTR-MCNC: 342 MG/DL (ref 70–130)
GLUCOSE BLDC GLUCOMTR-MCNC: 432 MG/DL (ref 70–130)
GLUCOSE SERPL-MCNC: 208 MG/DL (ref 65–99)
HCT VFR BLD AUTO: 40.1 % (ref 37.5–51)
HGB BLD-MCNC: 13.2 G/DL (ref 13–17.7)
MCH RBC QN AUTO: 28.6 PG (ref 26.6–33)
MCHC RBC AUTO-ENTMCNC: 32.9 G/DL (ref 31.5–35.7)
MCV RBC AUTO: 86.8 FL (ref 79–97)
PLATELET # BLD AUTO: 278 10*3/MM3 (ref 140–450)
PMV BLD AUTO: 9.3 FL (ref 6–12)
POTASSIUM SERPL-SCNC: 3.9 MMOL/L (ref 3.5–5.2)
RBC # BLD AUTO: 4.62 10*6/MM3 (ref 4.14–5.8)
SODIUM SERPL-SCNC: 136 MMOL/L (ref 136–145)
WBC NRBC COR # BLD: 8.68 10*3/MM3 (ref 3.4–10.8)

## 2023-11-06 PROCEDURE — 25510000001 IOPAMIDOL PER 1 ML: Performed by: FAMILY MEDICINE

## 2023-11-06 PROCEDURE — 63710000001 PREDNISONE PER 1 MG: Performed by: FAMILY MEDICINE

## 2023-11-06 PROCEDURE — 63710000001 INSULIN LISPRO (HUMAN) PER 5 UNITS: Performed by: STUDENT IN AN ORGANIZED HEALTH CARE EDUCATION/TRAINING PROGRAM

## 2023-11-06 PROCEDURE — 82948 REAGENT STRIP/BLOOD GLUCOSE: CPT

## 2023-11-06 PROCEDURE — 74178 CT ABD&PLV WO CNTR FLWD CNTR: CPT

## 2023-11-06 PROCEDURE — 80048 BASIC METABOLIC PNL TOTAL CA: CPT | Performed by: STUDENT IN AN ORGANIZED HEALTH CARE EDUCATION/TRAINING PROGRAM

## 2023-11-06 PROCEDURE — 99232 SBSQ HOSP IP/OBS MODERATE 35: CPT | Performed by: NURSE PRACTITIONER

## 2023-11-06 PROCEDURE — 63710000001 INSULIN DETEMIR PER 5 UNITS: Performed by: STUDENT IN AN ORGANIZED HEALTH CARE EDUCATION/TRAINING PROGRAM

## 2023-11-06 PROCEDURE — 63710000001 DIPHENHYDRAMINE PER 50 MG: Performed by: FAMILY MEDICINE

## 2023-11-06 PROCEDURE — 85027 COMPLETE CBC AUTOMATED: CPT | Performed by: STUDENT IN AN ORGANIZED HEALTH CARE EDUCATION/TRAINING PROGRAM

## 2023-11-06 PROCEDURE — 99233 SBSQ HOSP IP/OBS HIGH 50: CPT | Performed by: FAMILY MEDICINE

## 2023-11-06 PROCEDURE — 63710000001 INSULIN LISPRO (HUMAN) PER 5 UNITS: Performed by: FAMILY MEDICINE

## 2023-11-06 PROCEDURE — 25010000002 CEFTRIAXONE PER 250 MG: Performed by: INTERNAL MEDICINE

## 2023-11-06 PROCEDURE — 82550 ASSAY OF CK (CPK): CPT | Performed by: INTERNAL MEDICINE

## 2023-11-06 PROCEDURE — 63710000001 INSULIN DETEMIR PER 5 UNITS: Performed by: FAMILY MEDICINE

## 2023-11-06 RX ORDER — DIPHENHYDRAMINE HCL 50 MG
50 CAPSULE ORAL
Status: COMPLETED | OUTPATIENT
Start: 2023-11-06 | End: 2023-11-06

## 2023-11-06 RX ORDER — PREDNISONE 20 MG/1
40 TABLET ORAL
Status: COMPLETED | OUTPATIENT
Start: 2023-11-06 | End: 2023-11-06

## 2023-11-06 RX ORDER — VALSARTAN 160 MG/1
320 TABLET ORAL
Status: DISCONTINUED | OUTPATIENT
Start: 2023-11-07 | End: 2023-11-07 | Stop reason: HOSPADM

## 2023-11-06 RX ORDER — HYDROCHLOROTHIAZIDE 25 MG/1
25 TABLET ORAL
Status: DISCONTINUED | OUTPATIENT
Start: 2023-11-07 | End: 2023-11-07 | Stop reason: HOSPADM

## 2023-11-06 RX ORDER — INSULIN LISPRO 100 [IU]/ML
5 INJECTION, SOLUTION INTRAVENOUS; SUBCUTANEOUS
Status: DISCONTINUED | OUTPATIENT
Start: 2023-11-06 | End: 2023-11-07 | Stop reason: HOSPADM

## 2023-11-06 RX ADMIN — VALSARTAN 160 MG: 160 TABLET, FILM COATED ORAL at 10:27

## 2023-11-06 RX ADMIN — METOPROLOL SUCCINATE 50 MG: 50 TABLET, EXTENDED RELEASE ORAL at 10:27

## 2023-11-06 RX ADMIN — INSULIN LISPRO 5 UNITS: 100 INJECTION, SOLUTION INTRAVENOUS; SUBCUTANEOUS at 17:19

## 2023-11-06 RX ADMIN — HYDROCHLOROTHIAZIDE 25 MG: 25 TABLET ORAL at 10:27

## 2023-11-06 RX ADMIN — IOPAMIDOL 85 ML: 755 INJECTION, SOLUTION INTRAVENOUS at 12:17

## 2023-11-06 RX ADMIN — INSULIN DETEMIR 5 UNITS: 100 INJECTION, SOLUTION SUBCUTANEOUS at 08:09

## 2023-11-06 RX ADMIN — INSULIN LISPRO 3 UNITS: 100 INJECTION, SOLUTION INTRAVENOUS; SUBCUTANEOUS at 08:09

## 2023-11-06 RX ADMIN — PREDNISONE 40 MG: 20 TABLET ORAL at 10:27

## 2023-11-06 RX ADMIN — Medication 10 ML: at 10:28

## 2023-11-06 RX ADMIN — INSULIN LISPRO 4 UNITS: 100 INJECTION, SOLUTION INTRAVENOUS; SUBCUTANEOUS at 15:07

## 2023-11-06 RX ADMIN — TAMSULOSIN HYDROCHLORIDE 0.4 MG: 0.4 CAPSULE ORAL at 10:27

## 2023-11-06 RX ADMIN — Medication 10 ML: at 20:53

## 2023-11-06 RX ADMIN — Medication 1 CAPSULE: at 10:27

## 2023-11-06 RX ADMIN — HYDROCORTISONE ACETATE 25 MG: 25 SUPPOSITORY RECTAL at 20:51

## 2023-11-06 RX ADMIN — APIXABAN 5 MG: 5 TABLET, FILM COATED ORAL at 10:27

## 2023-11-06 RX ADMIN — INSULIN LISPRO 4 UNITS: 100 INJECTION, SOLUTION INTRAVENOUS; SUBCUTANEOUS at 23:57

## 2023-11-06 RX ADMIN — INSULIN DETEMIR 10 UNITS: 100 INJECTION, SOLUTION SUBCUTANEOUS at 20:52

## 2023-11-06 RX ADMIN — INSULIN LISPRO 5 UNITS: 100 INJECTION, SOLUTION INTRAVENOUS; SUBCUTANEOUS at 20:52

## 2023-11-06 RX ADMIN — CEFTRIAXONE SODIUM 2000 MG: 2 INJECTION, POWDER, FOR SOLUTION INTRAMUSCULAR; INTRAVENOUS at 10:27

## 2023-11-06 RX ADMIN — INSULIN LISPRO 2 UNITS: 100 INJECTION, SOLUTION INTRAVENOUS; SUBCUTANEOUS at 08:08

## 2023-11-06 RX ADMIN — DIPHENHYDRAMINE HYDROCHLORIDE 50 MG: 50 CAPSULE ORAL at 10:27

## 2023-11-06 RX ADMIN — APIXABAN 5 MG: 5 TABLET, FILM COATED ORAL at 20:51

## 2023-11-06 NOTE — PROGRESS NOTES
"Aldair MUIR Narvaez  1950  [id number]     Evaluating Physician: Joel Ham MD    Chief Complaint: fatigue    Reason for Consultation: pyelonephritis, blood cultures positive    History of present illness:     Patient is a 73 y.o.  Yr old male with history of prostate cancer and renal calculi, admitted in May 2023 requiring left ureteral stent, sepsis presentation with E. Coli pyelonephritis/septicemia and positive urine/blood cultures, transitioned to IV ceftriaxone and finished therapy; he presented to the hospital on October 29 with fall, generalized weakness and noted to have acute UTI/sepsis/acute kidney injury per medicine team. Creatinine 2.36 in the emergency room, significantly elevated lactate and white blood cell count of 28K; respiratory panel positive for rhinovirus, blood culture subsequently with gram-negative mavis and urine with gram-negative mavis; CT scan of the abdomen with left upper pole cyst that had increased in size from May with irregular gaseous lucency suspicious for infected renal cyst per radiology along with mild left hydronephrosis/hydroureter; urology has seen and making arrangements for IR consultation for left renal abscess drain versus aspiration; urology plans for cystoscopy/stent on October 31    10/30/23 IR for percutaneous abscess drain    10/31 neuro has seen regarding right arm weakness/paresis with concern for brachial plexopathy after fall, MRIs done, their note seen    10/31  \"Procedure(s):  CYSTOSCOPY   BILATERAL RETROGRADE PYELOGRAM (modifier 50)  LEFT URETERAL STENT PLACEMENT    11/2/23  abscess culture with E. coli.      11/6/23 repeat CT anticipated; no new pain or distress per patient;  generally fatigued, same scrapes on both lower extremities from his fall;  some vague left flank pain that is not severe, dull at present, nonradiating, better since admission and does not attach a numerical severity.  Denies hematuria or overt pyuria.  Has had intermittent " urinary frequency prior to admission.  Denies hesitancy    Stable dry cough, denies dyspnea at this time; no hemoptysis.  No headache photophobia or neck stiffness.  Denies nausea vomiting diarrhea or abdominal pain today.  No new skin rash       Past Medical History:   Diagnosis Date    Acid reflux     Arthritis     Benign hypertension     Colon polyp     Concern about heart attack without diagnosis     Coronary artery disease     Coronary atherosclerosis     Heart disease     Hyperlipidemia     Hypertension     Kidney stone     Mixed hyperlipidemia     Obesity     body mass index 30+-obesity    Prostate cancer     Sleep apnea     Type 2 diabetes mellitus     Type 2 diabetes mellitus        Past Surgical History:   Procedure Laterality Date    CARDIAC CATHETERIZATION      CARDIAC SURGERY N/A     [place]    CARPAL TUNNEL RELEASE      CORONARY ARTERY BYPASS GRAFT  08/30/2022    CYSTOSCOPY BLADDER STONE LITHOTRIPSY      CYSTOSCOPY BLADDER STONE LITHOTRIPSY      CYSTOSCOPY RETROGRADE PYELOGRAM Left 10/31/2023    Procedure: CYSTOSCOPY RETROGRADE PYELOGRAM STENT PLACEMENT;  Surgeon: sIma Justin MD;  [facility];  Service: Urology;  Laterality: Left;    CYSTOSCOPY W/ URETERAL STENT PLACEMENT Left 05/09/2023    Procedure: CYSTOSCOPY LEFT RETROGRADE PYELOGRAM AND LEFT URETERAL STENT PLACEMENT;  Surgeon: Isma Justin MD;  Location: [facility] OR;  Service: Urology;  Laterality: Left;    FETAL SURGERY FOR CONGENITAL HERNIA      INGUINAL HERNIA REPAIR      KIDNEY STONE SURGERY      KNEE ARTHROSCOPY      PROSTATECTOMY      TRIGGER FINGER RELEASE      UMBILICAL HERNIA REPAIR      UVULOPALATOPHARYNGOPLASTY         Pediatric History   Patient Parents    Not on file     Other Topics Concern    Not on file   Social History Narrative    Not on file       family history includes Diabetes in his sister; Heart attack in his father; Heart disease in his father; Hyperlipidemia in his sister; Hypertension in his  "sister.    Allergies   Allergen Reactions    Iodine Other (See Comments)     Closes sinuses    Oxycodone-Acetaminophen Itching    Zofran [Ondansetron] Hives               Review of Systems    11/6/23     Constitutional-- No   sweats.  Appetite diminished with fatigue.  Heent-- No new vision, hearing or throat complaints.  No epistaxis or oral sores.  Denies odynophagia or dysphagia.  No flashers, floaters or eye pain. No odynophagia or dysphagia. No headache, photophobia or neck stiffness.  CV-- No chest pain, palpitation or syncope  Resp-- see above  GI- see above.  No hematochezia, melena, or hematemesis. Denies jaundice or chronic liver disease.  -- see above  Lymph- no swollen lymph nodes in neck/axilla or groin.   Heme- No active bruising or bleeding; no Hx of DVT or PE.  MS-- no swelling or pain in the bones or joints of arms/legs.  No new back pain.  Neuro-- right arm weakness associated with fall and some concern by medicine team for brachial plexus injury.  No seizures.    Full 12 point review of systems reviewed and negative otherwise for acute complaints, except for above    Physical Exam:   Vital Signs   /87 (BP Location: Left arm, Patient Position: Lying)   Pulse 90   Temp 97.8 °F (36.6 °C) (Oral)   Resp 16   Ht 177.8 cm (70\")   Wt 103 kg (227 lb 1.2 oz)   SpO2 97%   BMI 32.58 kg/m²     GENERAL: sleepy, in no acute distress. Generally debilitated  HEENT: Normocephalic, atraumatic.  No conjunctival injection. No icterus. Oropharynx clear without evidence of thrush or exudate. No evidence of peridontal disease.    NECK: Supple without nuchal rigidity. No mass.   HEART: RRR; No murmur, rubs, gallops.   LUNGS: diminished at bases but otherwise Clear to auscultation bilaterally without wheezing, rales, rhonchi. Normal respiratory effort. Nonlabored. No dullness.  ABDOMEN: Soft, nontender, nondistended. Positive bowel sounds. No rebound or guarding. NO mass or HSM.  EXT:  multifocal " excoriations both lower extremities associated with recent fall.  No redness induration or warmth.  No discrete mass bulge or fluctuance.  No crepitus or bulla    MSK: FROM without joint effusions noted arms/legs.    SKIN: Warm and dry without cutaneous eruptions on Inspection/palpation.    NEURO: sleepy    No peripheral stigmata/phenomena of endocarditis    IV without obvious redness or drainage    Laboratory Data    Results from last 7 days   Lab Units 11/06/23  0356 11/03/23  0425 11/02/23  0458   WBC 10*3/mm3 8.68 8.93 9.46   HEMOGLOBIN g/dL 13.2 13.5 12.9*   HEMATOCRIT % 40.1 40.1 39.0   PLATELETS 10*3/mm3 278 177 145     Results from last 7 days   Lab Units 11/06/23 0356   SODIUM mmol/L 136   POTASSIUM mmol/L 3.9   CHLORIDE mmol/L 100   CO2 mmol/L 28.0   BUN mg/dL 16   CREATININE mg/dL 0.52*   GLUCOSE mg/dL 208*   CALCIUM mg/dL 9.1     Results from last 7 days   Lab Units 11/02/23 0458   ALK PHOS U/L 79   BILIRUBIN mg/dL 0.3   ALT (SGPT) U/L 70*   AST (SGOT) U/L 108*               Estimated Creatinine Clearance: 156.7 mL/min (A) (by C-G formula based on SCr of 0.52 mg/dL (L)).      Microbiology:      Radiology:  Imaging Results (Last 72 Hours)       ** No results found for the last 72 hours. **              Impression:     --acute E Coli sepsis/septicemia, urinary source with a E Coli complicated UTI associated with abnormal CT scan and  left renal abscess status post drain on October 30, further complicated by hydronephrosis/stone and urology following for interventional plans.  IV Merrem ongoing with plans to taper antibiotics once further data. Sepsis parameters improving    --acute E Coli complicated UTI, probable left pyelonephritis with abnormal CT scan, s/p drain 10/30 c/w  left renal abscess, hydronephrosis and  intervention by urology 10/31.      --Acute kidney injury per medicine team, improved; Monitor to help guide future adjustments in antimicrobials    --history fall with right arm weakness and  further workup/management at discretion of medicine team/neurology.  Some concern mention for brachial plexus injury    --elevated CPK, rhabdomyolysis.  Recent fall    PLAN:       --IV rocephin, anticipate 2-4 weeks to depend on clinical course/follow-up radiography etc.  He can come to the office daily for ceftriaxone (if he does not go to a rehab facility) or get ceftriaxone at rehab and f/u with me next week in office    --Check/review labs cultures and scans     --Partial history per nursing staff     --Discussed with microbiology     --Urology following; Intervention planned     --Discussed with pharmacy     --Highly complex at of issues with high risk for further serious morbidity and other serious sequela       Copied text in this note has been reviewed and is accurate as of 11/06/23.     Joel Ham MD  11/6/2023

## 2023-11-06 NOTE — PROGRESS NOTES
[facility]   Urology Progress Note    Patient Name: Aldair Narvaez  : 1950  MRN: [id number]  Primary Care Physician:  Miguel Angel Mireles MD  Date of admission: 10/29/2023    Subjective   Subjective     Chief Complaint: Weakness    HPI:  Patient resting in bed. He denies pain, dysuria or hematuria. He reports mild irritation from left renal abscess drain.     Cr and WBC stable.     Review of Systems   All systems were reviewed and negative except for: Left renal abscess drain    Objective   Objective     Vitals:   Temp:  [97.8 °F (36.6 °C)-98.6 °F (37 °C)] 97.8 °F (36.6 °C)  Heart Rate:  [85-93] 90  Resp:  [16-18] 16  BP: (143-166)/(73-87) 166/87  Physical Exam    Constitutional: Awake in bed, alert   Eyes: PERRLA, sclerae anicteric, no conjunctival injection   HENT: Normocephalic, atraumatic, mucous membranes moist   Neck: Supple, trachea midline   Respiratory: Equal chest rise, non-labored respirations    Cardiovascular: RRR   Gastrointestinal: Soft, nontender, non-distended   Genitourinary: Voiding. Left renal abscess drain with bloody output.    Musculoskeletal: No lower extremity edema bilaterally, no clubbing or cyanosis to extremities   Psychiatric: Appropriate affect, cooperative   Neurologic: Oriented x 3,  Cranial Nerves grossly intact, speech clear   Skin: No rashes     Result Review    Result Review:  I have personally reviewed the results from the time of this admission to 2023 08:43 EST and agree with these findings:  [x]  Laboratory  []  Microbiology  []  Radiology  []  EKG/Telemetry   []  Cardiology/Vascular   []  Pathology  []  Old records  [x]  Other: intake and output, vital signs    Most notable findings include: Cr 0.52. WBC 8.68.     Assessment & Plan   Assessment / Plan     Brief Patient Summary:  Aldair Narvaez is a 73 y.o. male with PMH of prostate cancer, recurrent nephrolithiasis, left ureteral stricture and urinary incontinence who presented to [facility] for worsening weakness found to  have hydronephrosis and left renal abscess. He is now s/p Left renal abscess drain placement with IR and s/p Cystoscopy, Left ureteral stent placement with Dr. Justin. Farooq catheter removed 11/01, he has been voiding.     Active Hospital Problems:  Active Hospital Problems    Diagnosis     **UTI (urinary tract infection)     NEETA (acute kidney injury)     Rhabdomyolysis     Right arm weakness     Hydronephrosis of left kidney     Personal history of prostate cancer     Sepsis due to urinary tract infection     CAD, multiple vessel     Uncontrolled type 2 diabetes mellitus with hyperglycemia     Mixed hyperlipidemia        Plan:   -CT Abd w/wo contrast this AM to assess left renal abscess s/p drain placement.   -Plan for rehab at [place] with IV abx.    will follow, please call if any questions.   D/w Dr. Justin.     DVT prophylaxis:  Medical DVT prophylaxis orders are present.    CODE STATUS:   Code Status (Patient has no pulse and is not breathing): CPR (Attempt to Resuscitate)  Medical Interventions (Patient has pulse or is breathing): Full    Disposition:  I expect patient to discharge within 24-48 hours.    Electronically signed by JERRICA Damon, 11/06/23, 8:43 AM EST.

## 2023-11-06 NOTE — CASE MANAGEMENT/SOCIAL WORK
Continued Stay Note  [facility]     Patient Name: Aldair Narvaez  MRN: [id number]  Today's Date: 11/6/2023    Admit Date: 10/29/2023    Plan: [facility]   Discharge Plan       Row Name 11/06/23 1257       Plan    Plan [facility]    Patient/Family in Agreement with Plan other (see comments)    Plan Comments Pt was discussed in Medical Rounds today. Pt is having a CT this morning. Pt's RN states that pt has low output from YG drain. 15ml total out yesterday. Dr. Sandoval states that if CT is okay pt will likely be ready for discharge tomorrow. A [facility] was [facility] to [facility], they offered a bed on Friday. I attempted to call January/Jenny. I left a voicemail. Pt will need an insurance precert started.  will continue to follow.    Final Discharge Disposition Code 03 - skilled nursing facility (SNF)                   Discharge Codes    No documentation.                 Expected Discharge Date and Time       Expected Discharge Date Expected Discharge Time    Nov 9, 2023               Ximena Beavers, RN

## 2023-11-06 NOTE — PLAN OF CARE
Problem: Adult Inpatient Plan of Care  Goal: Plan of Care Review  Outcome: Ongoing, Progressing  Goal: Patient-Specific Goal (Individualized)  Outcome: Ongoing, Progressing  Goal: Absence of Hospital-Acquired Illness or Injury  Outcome: Ongoing, Progressing  Intervention: Identify and Manage Fall Risk  Recent Flowsheet Documentation  Taken 11/6/2023 0000 by Edd Oshea RN  Safety Promotion/Fall Prevention:   assistive device/personal items within reach   clutter free environment maintained   fall prevention program maintained   lighting adjusted   nonskid shoes/slippers when out of bed   room organization consistent   safety round/check completed   toileting scheduled  Taken 11/5/2023 2021 by Edd Oshea RN  Safety Promotion/Fall Prevention:   assistive device/personal items within reach   clutter free environment maintained   fall prevention program maintained   lighting adjusted   nonskid shoes/slippers when out of bed   room organization consistent   safety round/check completed   toileting scheduled  Intervention: Prevent Skin Injury  Recent Flowsheet Documentation  Taken 11/6/2023 0000 by Edd Oshea RN  Body Position: position changed independently  Taken 11/5/2023 2021 by Edd Oshea RN  Body Position: position changed independently  Skin Protection:   adhesive use limited   tubing/devices free from skin contact  Intervention: Prevent and Manage VTE (Venous Thromboembolism) Risk  Recent Flowsheet Documentation  Taken 11/6/2023 0000 by Edd Oshea RN  Activity Management: activity encouraged  Taken 11/5/2023 2021 by Edd Oshea RN  Activity Management: activity encouraged  VTE Prevention/Management: (PO Eliquis) other (see comments)  Range of Motion: active ROM (range of motion) encouraged  Intervention: Prevent Infection  Recent Flowsheet Documentation  Taken 11/6/2023 0000 by Edd Oshea RN  Infection Prevention:    environmental surveillance performed   equipment surfaces disinfected   hand hygiene promoted   rest/sleep promoted   single patient room provided  Taken 11/5/2023 2021 by Edd Oshea RN  Infection Prevention:   environmental surveillance performed   equipment surfaces disinfected   hand hygiene promoted   rest/sleep promoted   single patient room provided  Goal: Optimal Comfort and Wellbeing  Outcome: Ongoing, Progressing  Intervention: Monitor Pain and Promote Comfort  Recent Flowsheet Documentation  Taken 11/5/2023 2021 by Edd Oshea RN  Pain Management Interventions:   medication offered but refused   pillow support provided   position adjusted   quiet environment facilitated  Intervention: Provide Person-Centered Care  Recent Flowsheet Documentation  Taken 11/5/2023 2021 by Edd Oshea RN  Trust Relationship/Rapport:   care explained   questions answered   questions encouraged   thoughts/feelings acknowledged  Goal: Readiness for Transition of Care  Outcome: Ongoing, Progressing     Problem: Skin Injury Risk Increased  Goal: Skin Health and Integrity  Outcome: Ongoing, Progressing  Intervention: Promote and Optimize Oral Intake  Recent Flowsheet Documentation  Taken 11/5/2023 2021 by Edd Oshea RN  Oral Nutrition Promotion: rest periods promoted  Intervention: Optimize Skin Protection  Recent Flowsheet Documentation  Taken 11/6/2023 0000 by Edd Oshea RN  Head of Bed (HOB) Positioning: HOB at 30-45 degrees  Taken 11/5/2023 2021 by Edd Oshea RN  Pressure Reduction Techniques:   frequent weight shift encouraged   weight shift assistance provided  Head of Bed (HOB) Positioning: HOB at 30-45 degrees  Pressure Reduction Devices:   positioning supports utilized   pressure-redistributing mattress utilized  Skin Protection:   adhesive use limited   tubing/devices free from skin contact     Problem: Fall Injury Risk  Goal: Absence of Fall and  Fall-Related Injury  Outcome: Ongoing, Progressing  Intervention: Identify and Manage Contributors  Recent Flowsheet Documentation  Taken 11/6/2023 0000 by Edd Oshea RN  Medication Review/Management: medications reviewed  Taken 11/5/2023 2021 by Edd Oshea RN  Medication Review/Management: medications reviewed  Intervention: Promote Injury-Free Environment  Recent Flowsheet Documentation  Taken 11/6/2023 0000 by Edd Oshea RN  Safety Promotion/Fall Prevention:   assistive device/personal items within reach   clutter free environment maintained   fall prevention program maintained   lighting adjusted   nonskid shoes/slippers when out of bed   room organization consistent   safety round/check completed   toileting scheduled  Taken 11/5/2023 2021 by Edd Oshea RN  Safety Promotion/Fall Prevention:   assistive device/personal items within reach   clutter free environment maintained   fall prevention program maintained   lighting adjusted   nonskid shoes/slippers when out of bed   room organization consistent   safety round/check completed   toileting scheduled     Problem: Pain Acute  Goal: Acceptable Pain Control and Functional Ability  Outcome: Ongoing, Progressing  Intervention: Prevent or Manage Pain  Recent Flowsheet Documentation  Taken 11/6/2023 0000 by Edd Oshea RN  Medication Review/Management: medications reviewed  Taken 11/5/2023 2021 by Edd Oshea RN  Bowel Elimination Promotion: adequate fluid intake promoted  Sleep/Rest Enhancement: awakenings minimized  Medication Review/Management: medications reviewed  Intervention: Develop Pain Management Plan  Recent Flowsheet Documentation  Taken 11/5/2023 2021 by Edd Oshea RN  Pain Management Interventions:   medication offered but refused   pillow support provided   position adjusted   quiet environment facilitated  Intervention: Optimize Psychosocial Wellbeing  Recent  Flowsheet Documentation  Taken 11/5/2023 2021 by Edd Oshea, RN  Supportive Measures: active listening utilized  Diversional Activities:   television   smartphone     Problem: Infection  Goal: Absence of Infection Signs and Symptoms  Outcome: Ongoing, Progressing   Goal Outcome Evaluation:         VSS, RA, patient has rested well, no complications noted/stated

## 2023-11-06 NOTE — PROGRESS NOTES
[facility] Medicine Services  PROGRESS NOTE    Patient Name: Aldair Narvaez  : 1950  MRN: [id number]    Date of Admission: 10/29/2023  Primary Care Physician: Miguel Angel Mireles MD    Subjective   Subjective     CC:    F/U L renal abscess    HPI:   Patient seen and examined. Up in chair. Has some pain around his drain but otherwise ok. Still having difficulty moving his R arm.    Objective   Objective     Vital Signs:   Temp:  [97.8 °F (36.6 °C)-98.6 °F (37 °C)] 98.1 °F (36.7 °C)  Heart Rate:  [85-90] 90  Resp:  [16-18] 18  BP: (135-166)/(73-94) 135/94     Physical Exam:  Constitutional: No acute distress, awake, alert, up in chair, pleasant   HENT: NCAT, mucous membranes moist  Respiratory: Clear to auscultation bilaterally, respiratory effort normal   Cardiovascular: RRR, no murmurs, rubs, or gallops  Gastrointestinal: Positive bowel sounds, soft, nontender, non-distended, +YG drain with bloody output   Musculoskeletal: No bilateral ankle edema  Psychiatric: Appropriate affect, cooperative  Neurologic: Oriented x 3, speech clear, unable to lift R arm against gravity but  strength intact   Skin: No rashes     Results Reviewed:  LAB RESULTS:      Lab 23   WBC 8.68 8.93 9.46 10.50   HEMOGLOBIN 13.2 13.5 12.9* 12.2*   HEMATOCRIT 40.1 40.1 39.0 37.2*   PLATELETS 278 177 145 99*   NEUTROS ABS  --  4.20 6.16  --    IMMATURE GRANS (ABS)  --   --  0.28*  --    LYMPHS ABS  --   --  2.05  --    MONOS ABS  --   --  0.70  --    EOS ABS  --  0.45* 0.20  --    MCV 86.8 85.9 87.8 86.3         Lab 23  0508 23  1843 23   SODIUM 136 138  --  138 138 137   POTASSIUM 3.9 3.7 3.9 3.4* 3.4* 3.8   CHLORIDE 100 104  --  104 106 106   CO2 28.0 25.0  --  24.0 22.0 21.0*   ANION GAP 8.0 9.0  --  10.0 10.0 10.0   BUN 16 12  --  16 21 21   CREATININE 0.52* 0.48*  --  0.58* 0.67* 0.58*    EGFR 107.8 110.4  --  104.3 99.8 104.3   GLUCOSE 208* 203*  --  211* 196* 229*   CALCIUM 9.1 8.2*  --  8.1* 8.0* 7.7*   MAGNESIUM  --  1.6  --  1.8  --   --          Lab 11/02/23  0458 11/01/23  0524   TOTAL PROTEIN 5.7* 5.6*   ALBUMIN 2.6* 2.6*   GLOBULIN 3.1 3.0   ALT (SGPT) 70* 69*   AST (SGOT) 108* 120*   BILIRUBIN 0.3 0.3   ALK PHOS 79 77                       Brief Urine Lab Results  (Last result in the past 365 days)        Color   Clarity   Blood   Leuk Est   Nitrite   Protein   CREAT   Urine HCG        10/29/23 1445 Yellow   Cloudy   Large (3+)   Small (1+)   Negative   100 mg/dL (2+)                   Microbiology Results Abnormal       None            No radiology results from the last 24 hrs        Current medications:  Scheduled Meds:apixaban, 5 mg, Oral, Q12H  cefTRIAXone, 2,000 mg, Intravenous, Q24H  valsartan, 160 mg, Oral, Q24H   And  hydroCHLOROthiazide, 25 mg, Oral, Q24H  hydrocortisone, 25 mg, Rectal, BID  insulin detemir, 5 Units, Subcutaneous, Q12H  insulin lispro, 2-7 Units, Subcutaneous, Q4H  Insulin Lispro, 3 Units, Subcutaneous, TID With Meals  lactobacillus acidophilus, 1 capsule, Oral, Daily  metoprolol succinate XL, 50 mg, Oral, Daily  sodium chloride, 10 mL, Intravenous, Q12H  tamsulosin, 0.4 mg, Oral, Daily      Continuous Infusions:sodium chloride, 9 mL/hr, Last Rate: Stopped (10/31/23 0826)      PRN Meds:.  acetaminophen    Calcium Replacement - Follow Nurse / BPA Driven Protocol    dextrose    dextrose    gabapentin    glucagon (human recombinant)    HYDROcodone-acetaminophen    ipratropium-albuterol    Magnesium Standard Dose Replacement - Follow Nurse / BPA Driven Protocol    melatonin    Phosphorus Replacement - Follow Nurse / BPA Driven Protocol    Potassium Replacement - Follow Nurse / BPA Driven Protocol    sodium chloride    sodium chloride    sodium chloride    sodium chloride    Assessment & Plan   Assessment & Plan     Active Hospital Problems    Diagnosis  POA    **UTI  (urinary tract infection) [N39.0]  Yes    NEETA (acute kidney injury) [N17.9]  Yes    Rhabdomyolysis [M62.82]  Yes    Right arm weakness [R29.898]  Yes    Hydronephrosis of left kidney [N13.30]  Unknown    Personal history of prostate cancer [Z85.46]  Not Applicable    Sepsis due to urinary tract infection [A41.9, N39.0]  Unknown    CAD, multiple vessel [I25.10]  Yes    Uncontrolled type 2 diabetes mellitus with hyperglycemia [E11.65]  Yes    Mixed hyperlipidemia [E78.2]  Yes      Resolved Hospital Problems   No resolved problems to display.        Brief Hospital Course to date:  Aldair Narvaez is a 73 y.o. male w hydronephrosis,  HTN HL DM CAD  prostate CA, GRANT, who fell out of bed, lay on the floor for a long time, later noticed he could not move his right arm, has a UTI and NEETA and temp 103.      This patient's problems and plans were partially entered by my partner and updated as appropriate by me 11/06/23.  All problems are new to me today    Severe Sepsis due to UTI/renal abscess, E coli bacteremia   NEETA  Lactic Acidosis  Rhabdo  - CT scan shows 3.7cm infected cyst/abscess at L upper pole  - Cont abx per dr Ham, adjusted to ceftriaxone on 11/1, anticipate 2-4 weeks pending clinical course   - IR 10/30 L renal  drain placed   - 10/31 L ureteral stent placement   - Farooq removed 11/1 and voiding   - Cr has normalized   - CK trending down   - Repeat CT with and without contrast PENDING. Pt premedicated due to listed allergy     Blistering rash  - contained to the area around the YG drain and only under the bandaging; no systemic symptoms, secondary to bandage   - allergy added to chart     T2DM with hyperglycemia  - Increase Levemir to 10 units BID  - Increase Humalog to 5 units TID meals  - LDSSI      HO HTN, CAD, Afib  -Increase Diovan to home dose, 320mg  -Continue HCTZ 25mg daily  -Continue Metoprolol 50mg daily     Fall at home, lay wedged under furniture for hours   R arm paresis - suspect nerve palsy from  compression x hours  Brachial plexopathy  - PT OT commending rehab, which patient is agreeable to  - stroke team originally consulted, MRI neg, suspect this is a local nerve injury .  Slow improvement.    -Possible need for repeat EMG in 4 to 6 weeks, gabapentin 100 mg 3 times daily as needed for nerve pain, once infection has resolved, can consider steroid use, follow-up with [facility] 3 months posthospitalization    Hemorrhoids - anusol x5 days     Expected Discharge Location and Transportation: rehab  Expected Discharge   Expected Discharge Date: 11/9/2023; Expected Discharge Time:      DVT prophylaxis:  Medical DVT prophylaxis orders are present.     AM-PAC 6 Clicks Score (PT): 24 (11/06/23 0896)    CODE STATUS:   Code Status and Medical Interventions:   Ordered at: 10/29/23 1620     Code Status (Patient has no pulse and is not breathing):    CPR (Attempt to Resuscitate)     Medical Interventions (Patient has pulse or is breathing):    Full       Rafaela Sandoval, DO  11/06/23

## 2023-11-06 NOTE — PROGRESS NOTES
Nutrition Services    Patient Name:  Aldair Narvaez  YOB: 1950  MRN: [id number]  Admit Date:  10/29/2023    Pt identified 2/2 LOS. No nutrition risk noted at this time. Please consult for intake avg <50% or significant weight change.      Electronically signed by:  Lindsey Gonzales RD  11/06/23 08:29 EST

## 2023-11-06 NOTE — PLAN OF CARE
Problem: Adult Inpatient Plan of Care  Goal: Plan of Care Review  Outcome: Ongoing, Progressing  Flowsheets (Taken 11/6/2023 1552)  Progress: improving  Plan of Care Reviewed With: patient  Goal: Patient-Specific Goal (Individualized)  Outcome: Ongoing, Progressing  Goal: Absence of Hospital-Acquired Illness or Injury  Outcome: Ongoing, Progressing  Intervention: Identify and Manage Fall Risk  Recent Flowsheet Documentation  Taken 11/6/2023 1400 by Jefferson Harris RN  Safety Promotion/Fall Prevention:   activity supervised   nonskid shoes/slippers when out of bed   room organization consistent   safety round/check completed  Taken 11/6/2023 1200 by Jefferson Harris RN  Safety Promotion/Fall Prevention: patient off unit  Taken 11/6/2023 1000 by Jefferson Harris RN  Safety Promotion/Fall Prevention:   activity supervised   nonskid shoes/slippers when out of bed   room organization consistent   safety round/check completed  Taken 11/6/2023 0825 by Jefferson Harris RN  Safety Promotion/Fall Prevention:   activity supervised   nonskid shoes/slippers when out of bed   room organization consistent   safety round/check completed  Intervention: Prevent Skin Injury  Recent Flowsheet Documentation  Taken 11/6/2023 1400 by Jefferson Harris RN  Body Position:   position changed independently   weight shifting  Skin Protection:   adhesive use limited   tubing/devices free from skin contact   transparent dressing maintained  Taken 11/6/2023 1000 by Jefferson Harris RN  Body Position:   position changed independently   weight shifting  Taken 11/6/2023 0825 by Jefferson Harris RN  Body Position: position changed independently  Skin Protection:   adhesive use limited   tubing/devices free from skin contact  Intervention: Prevent and Manage VTE (Venous Thromboembolism) Risk  Recent Flowsheet Documentation  Taken 11/6/2023 1400 by Jefferson Harris RN  Activity Management: up in chair  Taken 11/6/2023 1000 by Jefferson Harris RN  Activity  Management: activity encouraged  Taken 11/6/2023 0825 by Jefferson Harris RN  Activity Management:   up in chair   activity encouraged  Intervention: Prevent Infection  Recent Flowsheet Documentation  Taken 11/6/2023 1400 by Jefferson Harris RN  Infection Prevention:   environmental surveillance performed   equipment surfaces disinfected   hand hygiene promoted   personal protective equipment utilized   rest/sleep promoted   single patient room provided  Taken 11/6/2023 1000 by Jefferson Harris RN  Infection Prevention:   environmental surveillance performed   equipment surfaces disinfected   hand hygiene promoted   personal protective equipment utilized   rest/sleep promoted   single patient room provided  Taken 11/6/2023 0825 by Jefferson Harris RN  Infection Prevention:   environmental surveillance performed   equipment surfaces disinfected   hand hygiene promoted   personal protective equipment utilized   rest/sleep promoted   single patient room provided  Goal: Optimal Comfort and Wellbeing  Outcome: Ongoing, Progressing  Intervention: Provide Person-Centered Care  Recent Flowsheet Documentation  Taken 11/6/2023 1000 by Jefferson Harris RN  Trust Relationship/Rapport:   care explained   emotional support provided   empathic listening provided   thoughts/feelings acknowledged  Taken 11/6/2023 0825 by Jefferson Harris RN  Trust Relationship/Rapport:   care explained   choices provided   emotional support provided   questions answered   empathic listening provided   questions encouraged   reassurance provided   thoughts/feelings acknowledged  Goal: Readiness for Transition of Care  Outcome: Ongoing, Progressing     Problem: Skin Injury Risk Increased  Goal: Skin Health and Integrity  Outcome: Ongoing, Progressing  Intervention: Promote and Optimize Oral Intake  Recent Flowsheet Documentation  Taken 11/6/2023 1400 by Jefferson Harris RN  Oral Nutrition Promotion:   rest periods promoted   safe use of adaptive equipment  encouraged  Taken 11/6/2023 1000 by Jefferson Harris RN  Oral Nutrition Promotion:   rest periods promoted   safe use of adaptive equipment encouraged  Taken 11/6/2023 0825 by Jefferson Harris RN  Oral Nutrition Promotion:   rest periods promoted   safe use of adaptive equipment encouraged  Intervention: Optimize Skin Protection  Recent Flowsheet Documentation  Taken 11/6/2023 1400 by Jefferson Harris RN  Pressure Reduction Techniques:   frequent weight shift encouraged   heels elevated off bed   pressure points protected   rest period provided between sit times   weight shift assistance provided  Head of Bed (HOB) Positioning: HOB elevated  Pressure Reduction Devices:   pressure-redistributing mattress utilized   positioning supports utilized  Skin Protection:   adhesive use limited   tubing/devices free from skin contact   transparent dressing maintained  Taken 11/6/2023 1000 by Jefferson Harris RN  Head of Bed (HOB) Positioning: HOB elevated  Taken 11/6/2023 0825 by Jefferson Harris RN  Pressure Reduction Techniques:   frequent weight shift encouraged   rest period provided between sit times   weight shift assistance provided  Head of Bed (HOB) Positioning: [facility] elevated  Pressure Reduction Devices:   pressure-redistributing mattress utilized   positioning supports utilized  Skin Protection:   adhesive use limited   tubing/devices free from skin contact     Problem: Fall Injury Risk  Goal: Absence of Fall and Fall-Related Injury  Outcome: Ongoing, Progressing  Intervention: Identify and Manage Contributors  Recent Flowsheet Documentation  Taken 11/6/2023 1400 by Jefferson Harris RN  Medication Review/Management: medications reviewed  Self-Care Promotion:   BADL personal objects within reach   BADL personal routines maintained   safe use of adaptive equipment encouraged  Taken 11/6/2023 1000 by Jefferson Harris RN  Medication Review/Management: medications reviewed  Self-Care Promotion:   BADL personal objects within  reach   BADL personal routines maintained   safe use of adaptive equipment encouraged  Taken 11/6/2023 0825 by Jefferson Harris RN  Medication Review/Management: medications reviewed  Self-Care Promotion:   BADL personal objects within reach   BADL personal routines maintained   safe use of adaptive equipment encouraged  Intervention: Promote Injury-Free Environment  Recent Flowsheet Documentation  Taken 11/6/2023 1400 by Jefferson Harris RN  Safety Promotion/Fall Prevention:   activity supervised   nonskid shoes/slippers when out of bed   room organization consistent   safety round/check completed  Taken 11/6/2023 1200 by Jefferson Harris RN  Safety Promotion/Fall Prevention: patient off unit  Taken 11/6/2023 1000 by Jefferson Harris RN  Safety Promotion/Fall Prevention:   activity supervised   nonskid shoes/slippers when out of bed   room organization consistent   safety round/check completed  Taken 11/6/2023 0825 by Jefferson Harris RN  Safety Promotion/Fall Prevention:   activity supervised   nonskid shoes/slippers when out of bed   room organization consistent   safety round/check completed     Problem: Pain Acute  Goal: Acceptable Pain Control and Functional Ability  Outcome: Ongoing, Progressing  Intervention: Prevent or Manage Pain  Recent Flowsheet Documentation  Taken 11/6/2023 1400 by Jefferson Harris RN  Sensory Stimulation Regulation:   auditory stimulation minimized   care clustered   quiet environment promoted   tactile stimulation minimized   visual stimulation minimized  Sleep/Rest Enhancement:   awakenings minimized   consistent schedule promoted   regular sleep/rest pattern promoted   relaxation techniques promoted  Medication Review/Management: medications reviewed  Taken 11/6/2023 1000 by Jefferson Harris RN  Sensory Stimulation Regulation:   auditory stimulation minimized   care clustered   quiet environment promoted   tactile stimulation minimized   visual stimulation minimized  Sleep/Rest  Enhancement:   awakenings minimized   natural light exposure provided   relaxation techniques promoted  Medication Review/Management: medications reviewed  Taken 11/6/2023 0825 by Jefferson Harris RN  Sensory Stimulation Regulation:   auditory stimulation minimized   care clustered   quiet environment promoted   tactile stimulation minimized   visual stimulation minimized  Bowel Elimination Promotion:   adequate fluid intake promoted   ambulation promoted  Sleep/Rest Enhancement:   awakenings minimized   natural light exposure provided   regular sleep/rest pattern promoted   relaxation techniques promoted  Medication Review/Management: medications reviewed  Intervention: Optimize Psychosocial Wellbeing  Recent Flowsheet Documentation  Taken 11/6/2023 1400 by Jefferson Harris RN  Supportive Measures:   active listening utilized   relaxation techniques promoted   self-responsibility promoted   verbalization of feelings encouraged  Spiritual Activities Assistance: affirmation provided  Taken 11/6/2023 1000 by Jefferson Harris RN  Supportive Measures:   active listening utilized   self-responsibility promoted  Diversional Activities: television  Taken 11/6/2023 0825 by Jefferson Harris RN  Supportive Measures:   active listening utilized   decision-making supported   verbalization of feelings encouraged  Diversional Activities: television     Problem: Infection  Goal: Absence of Infection Signs and Symptoms  Outcome: Ongoing, Progressing   Goal Outcome Evaluation:  Plan of Care Reviewed With: patient        Progress: improving

## 2023-11-07 VITALS
TEMPERATURE: 97.8 F | BODY MASS INDEX: 32.51 KG/M2 | OXYGEN SATURATION: 93 % | SYSTOLIC BLOOD PRESSURE: 147 MMHG | HEIGHT: 70 IN | HEART RATE: 97 BPM | WEIGHT: 227.07 LBS | RESPIRATION RATE: 18 BRPM | DIASTOLIC BLOOD PRESSURE: 79 MMHG

## 2023-11-07 PROBLEM — N39.0 UTI (URINARY TRACT INFECTION): Status: RESOLVED | Noted: 2023-10-29 | Resolved: 2023-11-07

## 2023-11-07 PROBLEM — N17.9 AKI (ACUTE KIDNEY INJURY): Status: RESOLVED | Noted: 2023-10-29 | Resolved: 2023-11-07

## 2023-11-07 PROBLEM — M62.82 RHABDOMYOLYSIS: Status: RESOLVED | Noted: 2023-10-29 | Resolved: 2023-11-07

## 2023-11-07 PROBLEM — A41.9 SEPSIS DUE TO URINARY TRACT INFECTION: Status: RESOLVED | Noted: 2023-05-08 | Resolved: 2023-11-07

## 2023-11-07 PROBLEM — N39.0 SEPSIS DUE TO URINARY TRACT INFECTION: Status: RESOLVED | Noted: 2023-05-08 | Resolved: 2023-11-07

## 2023-11-07 LAB
ANION GAP SERPL CALCULATED.3IONS-SCNC: 9 MMOL/L (ref 5–15)
BUN SERPL-MCNC: 22 MG/DL (ref 8–23)
BUN/CREAT SERPL: 30.1 (ref 7–25)
CALCIUM SPEC-SCNC: 9.1 MG/DL (ref 8.6–10.5)
CHLORIDE SERPL-SCNC: 101 MMOL/L (ref 98–107)
CO2 SERPL-SCNC: 26 MMOL/L (ref 22–29)
CREAT SERPL-MCNC: 0.73 MG/DL (ref 0.76–1.27)
DEPRECATED RDW RBC AUTO: 49.6 FL (ref 37–54)
EGFRCR SERPLBLD CKD-EPI 2021: 97.3 ML/MIN/1.73
ERYTHROCYTE [DISTWIDTH] IN BLOOD BY AUTOMATED COUNT: 15.7 % (ref 12.3–15.4)
GLUCOSE BLDC GLUCOMTR-MCNC: 176 MG/DL (ref 70–130)
GLUCOSE BLDC GLUCOMTR-MCNC: 224 MG/DL (ref 70–130)
GLUCOSE BLDC GLUCOMTR-MCNC: 244 MG/DL (ref 70–130)
GLUCOSE SERPL-MCNC: 254 MG/DL (ref 65–99)
HCT VFR BLD AUTO: 40.4 % (ref 37.5–51)
HGB BLD-MCNC: 13.3 G/DL (ref 13–17.7)
MCH RBC QN AUTO: 28.7 PG (ref 26.6–33)
MCHC RBC AUTO-ENTMCNC: 32.9 G/DL (ref 31.5–35.7)
MCV RBC AUTO: 87.3 FL (ref 79–97)
PLATELET # BLD AUTO: 294 10*3/MM3 (ref 140–450)
PMV BLD AUTO: 9.2 FL (ref 6–12)
POTASSIUM SERPL-SCNC: 4.1 MMOL/L (ref 3.5–5.2)
RBC # BLD AUTO: 4.63 10*6/MM3 (ref 4.14–5.8)
SODIUM SERPL-SCNC: 136 MMOL/L (ref 136–145)
WBC NRBC COR # BLD: 9.72 10*3/MM3 (ref 3.4–10.8)

## 2023-11-07 PROCEDURE — 63710000001 INSULIN DETEMIR PER 5 UNITS: Performed by: FAMILY MEDICINE

## 2023-11-07 PROCEDURE — 63710000001 INSULIN LISPRO (HUMAN) PER 5 UNITS: Performed by: FAMILY MEDICINE

## 2023-11-07 PROCEDURE — 80048 BASIC METABOLIC PNL TOTAL CA: CPT | Performed by: FAMILY MEDICINE

## 2023-11-07 PROCEDURE — 99239 HOSP IP/OBS DSCHRG MGMT >30: CPT | Performed by: FAMILY MEDICINE

## 2023-11-07 PROCEDURE — 85027 COMPLETE CBC AUTOMATED: CPT | Performed by: FAMILY MEDICINE

## 2023-11-07 PROCEDURE — 25010000002 CEFTRIAXONE PER 250 MG: Performed by: INTERNAL MEDICINE

## 2023-11-07 PROCEDURE — 63710000001 INSULIN LISPRO (HUMAN) PER 5 UNITS: Performed by: STUDENT IN AN ORGANIZED HEALTH CARE EDUCATION/TRAINING PROGRAM

## 2023-11-07 PROCEDURE — 82948 REAGENT STRIP/BLOOD GLUCOSE: CPT

## 2023-11-07 RX ORDER — INSULIN LISPRO 100 [IU]/ML
2-7 INJECTION, SOLUTION INTRAVENOUS; SUBCUTANEOUS
Qty: 10 ML | Refills: 0 | Status: SHIPPED | OUTPATIENT
Start: 2023-11-07

## 2023-11-07 RX ORDER — CHOLECALCIFEROL (VITAMIN D3) 125 MCG
10 CAPSULE ORAL NIGHTLY PRN
Qty: 30 TABLET | Refills: 0 | Status: SHIPPED | OUTPATIENT
Start: 2023-11-07

## 2023-11-07 RX ORDER — HYDROCODONE BITARTRATE AND ACETAMINOPHEN 5; 325 MG/1; MG/1
1 TABLET ORAL EVERY 8 HOURS PRN
Qty: 9 TABLET | Refills: 0 | Status: SHIPPED | OUTPATIENT
Start: 2023-11-07

## 2023-11-07 RX ORDER — INSULIN LISPRO 100 [IU]/ML
5 INJECTION, SOLUTION INTRAVENOUS; SUBCUTANEOUS
Qty: 10 ML | Refills: 0 | Status: SHIPPED | OUTPATIENT
Start: 2023-11-07

## 2023-11-07 RX ORDER — IPRATROPIUM BROMIDE AND ALBUTEROL SULFATE 2.5; .5 MG/3ML; MG/3ML
3 SOLUTION RESPIRATORY (INHALATION) EVERY 6 HOURS PRN
Qty: 360 ML | Refills: 0 | Status: SHIPPED | OUTPATIENT
Start: 2023-11-07

## 2023-11-07 RX ORDER — L.ACID,PARA/B.BIFIDUM/S.THERM 8B CELL
1 CAPSULE ORAL DAILY
Qty: 30 CAPSULE | Refills: 0 | Status: SHIPPED | OUTPATIENT
Start: 2023-11-08 | End: 2023-12-08

## 2023-11-07 RX ORDER — GABAPENTIN 100 MG/1
100 CAPSULE ORAL 3 TIMES DAILY PRN
Qty: 9 CAPSULE | Refills: 0 | Status: SHIPPED | OUTPATIENT
Start: 2023-11-07

## 2023-11-07 RX ADMIN — INSULIN LISPRO 5 UNITS: 100 INJECTION, SOLUTION INTRAVENOUS; SUBCUTANEOUS at 08:46

## 2023-11-07 RX ADMIN — CEFTRIAXONE SODIUM 2000 MG: 2 INJECTION, POWDER, FOR SOLUTION INTRAMUSCULAR; INTRAVENOUS at 10:49

## 2023-11-07 RX ADMIN — INSULIN LISPRO 3 UNITS: 100 INJECTION, SOLUTION INTRAVENOUS; SUBCUTANEOUS at 12:55

## 2023-11-07 RX ADMIN — INSULIN LISPRO 3 UNITS: 100 INJECTION, SOLUTION INTRAVENOUS; SUBCUTANEOUS at 03:06

## 2023-11-07 RX ADMIN — METOPROLOL SUCCINATE 50 MG: 50 TABLET, EXTENDED RELEASE ORAL at 08:46

## 2023-11-07 RX ADMIN — INSULIN LISPRO 5 UNITS: 100 INJECTION, SOLUTION INTRAVENOUS; SUBCUTANEOUS at 12:55

## 2023-11-07 RX ADMIN — INSULIN LISPRO 2 UNITS: 100 INJECTION, SOLUTION INTRAVENOUS; SUBCUTANEOUS at 08:47

## 2023-11-07 RX ADMIN — Medication 1 CAPSULE: at 08:46

## 2023-11-07 RX ADMIN — VALSARTAN 320 MG: 160 TABLET, FILM COATED ORAL at 08:46

## 2023-11-07 RX ADMIN — INSULIN DETEMIR 10 UNITS: 100 INJECTION, SOLUTION SUBCUTANEOUS at 08:47

## 2023-11-07 RX ADMIN — APIXABAN 5 MG: 5 TABLET, FILM COATED ORAL at 08:46

## 2023-11-07 RX ADMIN — HYDROCHLOROTHIAZIDE 25 MG: 25 TABLET ORAL at 08:46

## 2023-11-07 RX ADMIN — HYDROCORTISONE ACETATE 25 MG: 25 SUPPOSITORY RECTAL at 08:55

## 2023-11-07 RX ADMIN — Medication 10 ML: at 08:48

## 2023-11-07 NOTE — PLAN OF CARE
Problem: Adult Inpatient Plan of Care  Goal: Plan of Care Review  Outcome: Ongoing, Progressing  Goal: Patient-Specific Goal (Individualized)  Outcome: Ongoing, Progressing  Goal: Absence of Hospital-Acquired Illness or Injury  Outcome: Ongoing, Progressing  Intervention: Identify and Manage Fall Risk  Recent Flowsheet Documentation  Taken 11/6/2023 2051 by Georgie Hoffman RN  Safety Promotion/Fall Prevention:   assistive device/personal items within reach   activity supervised   fall prevention program maintained   muscle strengthening facilitated   nonskid shoes/slippers when out of bed   safety round/check completed   room organization consistent  Intervention: Prevent Skin Injury  Recent Flowsheet Documentation  Taken 11/6/2023 2051 by Georgie Hoffman RN  Body Position: position changed independently  Skin Protection:   adhesive use limited   incontinence pads utilized   tubing/devices free from skin contact   transparent dressing maintained  Intervention: Prevent and Manage VTE (Venous Thromboembolism) Risk  Recent Flowsheet Documentation  Taken 11/6/2023 2051 by Georgie Hoffman RN  VTE Prevention/Management: patient refused intervention  Intervention: Prevent Infection  Recent Flowsheet Documentation  Taken 11/6/2023 2051 by Georgie Hoffman RN  Infection Prevention:   environmental surveillance performed   cohorting utilized   rest/sleep promoted  Goal: Optimal Comfort and Wellbeing  Outcome: Ongoing, Progressing  Intervention: Provide Person-Centered Care  Recent Flowsheet Documentation  Taken 11/6/2023 2051 by Georgie Hoffman RN  Trust Relationship/Rapport:   choices provided   care explained   emotional support provided   empathic listening provided   questions answered   questions encouraged  Goal: Readiness for Transition of Care  Outcome: Ongoing, Progressing     Problem: Skin Injury Risk Increased  Goal: Skin Health and Integrity  Outcome: Ongoing, Progressing  Intervention: Optimize Skin  Protection  Recent Flowsheet Documentation  Taken 11/6/2023 2051 by Georgie Hoffman, RN  Pressure Reduction Techniques:   frequent weight shift encouraged   positioned off wounds   pressure points protected  Head of Bed (HOB) Positioning: HOB elevated  Pressure Reduction Devices: pressure-redistributing mattress utilized  Skin Protection:   adhesive use limited   incontinence pads utilized   tubing/devices free from skin contact   transparent dressing maintained     Problem: Fall Injury Risk  Goal: Absence of Fall and Fall-Related Injury  Outcome: Ongoing, Progressing  Intervention: Promote Injury-Free Environment  Recent Flowsheet Documentation  Taken 11/6/2023 2051 by Georgie Hoffman RN  Safety Promotion/Fall Prevention:   assistive device/personal items within reach   activity supervised   fall prevention program maintained   muscle strengthening facilitated   nonskid shoes/slippers when out of bed   safety round/check completed   room organization consistent     Problem: Pain Acute  Goal: Acceptable Pain Control and Functional Ability  Outcome: Ongoing, Progressing     Problem: Infection  Goal: Absence of Infection Signs and Symptoms  Outcome: Ongoing, Progressing   Goal Outcome Evaluation:         Plan of care reviewed w pt

## 2023-11-07 NOTE — NURSING NOTE
Brionna Avlarado called to clarify eliquis dosage. Confirmed with MD and home pharmacy bid dosage. Notified Brionna Alvarado.

## 2023-11-07 NOTE — PROGRESS NOTES
"Aldair MUIR Narvaez  1950  [id number]     Evaluating Physician: Joel Ham MD    Chief Complaint: fatigue    Reason for Consultation: pyelonephritis, blood cultures positive    History of present illness:     Patient is a 73 y.o.  Yr old male with history of prostate cancer and renal calculi, admitted in May 2023 requiring left ureteral stent, sepsis presentation with E. Coli pyelonephritis/septicemia and positive urine/blood cultures, transitioned to IV ceftriaxone and finished therapy; he presented to the hospital on October 29 with fall, generalized weakness and noted to have acute UTI/sepsis/acute kidney injury per medicine team. Creatinine 2.36 in the emergency room, significantly elevated lactate and white blood cell count of 28K; respiratory panel positive for rhinovirus, blood culture subsequently with gram-negative mavis and urine with gram-negative mavis; CT scan of the abdomen with left upper pole cyst that had increased in size from May with irregular gaseous lucency suspicious for infected renal cyst per radiology along with mild left hydronephrosis/hydroureter; urology has seen and making arrangements for IR consultation for left renal abscess drain versus aspiration; urology plans for cystoscopy/stent on October 31    10/30/23 IR for percutaneous abscess drain    10/31 neuro has seen regarding right arm weakness/paresis with concern for brachial plexopathy after fall, MRIs done, their note seen    10/31  \"Procedure(s):  CYSTOSCOPY   BILATERAL RETROGRADE PYELOGRAM (modifier 50)  LEFT URETERAL STENT PLACEMENT    11/2/23  abscess culture with E. coli.      11/6/23 repeat CT , drain removed, urology following    11/7/23 sleepy at my visit; no new pain or distress per patient;  generally fatigued, same scrapes on both lower extremities from his fall but no new symptoms there;  some vague left flank pain timproved, dull at present, nonradiating, better since admission and does not attach a numerical " severity.  Denies hematuria or overt pyuria.  Has had intermittent urinary frequency prior to admission.  Denies hesitancy    Improved dry cough, denies dyspnea at this time; no hemoptysis.  No headache photophobia or neck stiffness.  Denies nausea vomiting diarrhea or abdominal pain today.  No new skin rash       Past Medical History:   Diagnosis Date    Acid reflux     Arthritis     Benign hypertension     Colon polyp     Concern about heart attack without diagnosis     Coronary artery disease     Coronary atherosclerosis     Heart disease     Hyperlipidemia     Hypertension     Kidney stone     Mixed hyperlipidemia     Obesity     body mass index 30+-obesity    Prostate cancer     Sleep apnea     Type 2 diabetes mellitus     Type 2 diabetes mellitus        Past Surgical History:   Procedure Laterality Date    CARDIAC CATHETERIZATION      CARDIAC SURGERY N/A     [place]    CARPAL TUNNEL RELEASE      CORONARY ARTERY BYPASS GRAFT  08/30/2022    CYSTOSCOPY BLADDER STONE LITHOTRIPSY      CYSTOSCOPY BLADDER STONE LITHOTRIPSY      CYSTOSCOPY RETROGRADE PYELOGRAM Left 10/31/2023    Procedure: CYSTOSCOPY RETROGRADE PYELOGRAM STENT PLACEMENT;  Surgeon: Isma Justin MD;  Location:  [facility];  Service: Urology;  Laterality: Left;    CYSTOSCOPY W/ URETERAL STENT PLACEMENT Left 05/09/2023    Procedure: CYSTOSCOPY LEFT RETROGRADE PYELOGRAM AND LEFT URETERAL STENT PLACEMENT;  Surgeon: Isma Justin MD;  Location:  [facility];  Service: Urology;  Laterality: Left;    FETAL SURGERY FOR CONGENITAL HERNIA      INGUINAL HERNIA REPAIR      KIDNEY STONE SURGERY      KNEE ARTHROSCOPY      PROSTATECTOMY      TRIGGER FINGER RELEASE      UMBILICAL HERNIA REPAIR      UVULOPALATOPHARYNGOPLASTY         Pediatric History   Patient Parents    Not on file     Other Topics Concern    Not on file   Social History Narrative    Not on file       family history includes Diabetes in his sister; Heart attack in his father; Heart  "disease in his father; Hyperlipidemia in his sister; Hypertension in his sister.    Allergies   Allergen Reactions    Iodine Other (See Comments)     Closes sinuses    Oxycodone-Acetaminophen Itching    Zofran [Ondansetron] Hives               Review of Systems    11/7/23     Constitutional-- No   sweats.  Appetite diminished with fatigue.  Heent-- No new vision, hearing or throat complaints.  No epistaxis or oral sores.  Denies odynophagia or dysphagia.  No flashers, floaters or eye pain. No odynophagia or dysphagia. No headache, photophobia or neck stiffness.  CV-- No chest pain, palpitation or syncope  Resp-- see above  GI- see above.  No hematochezia, melena, or hematemesis. Denies jaundice or chronic liver disease.  -- see above  Lymph- no swollen lymph nodes in neck/axilla or groin.   Heme- No active bruising or bleeding; no Hx of DVT or PE.  MS-- no swelling or pain in the bones or joints of arms/legs.  No new back pain.  Neuro-- right arm weakness associated with fall and some concern by medicine team for brachial plexus injury.  No seizures.    Full 12 point review of systems reviewed and negative otherwise for acute complaints, except for above    Physical Exam:   Vital Signs   /80   Pulse 97   Temp 98 °F (36.7 °C) (Oral)   Resp 18   Ht 177.8 cm (70\")   Wt 103 kg (227 lb 1.2 oz)   SpO2 92%   BMI 32.58 kg/m²     GENERAL: sleepy, in no acute distress. Generally debilitated  HEENT: Normocephalic, atraumatic.  No conjunctival injection. No icterus. Oropharynx clear without evidence of thrush or exudate. No evidence of peridontal disease.    NECK: Supple without nuchal rigidity. No mass.   HEART: RRR; No murmur, rubs, gallops.   LUNGS: diminished at bases but otherwise Clear to auscultation bilaterally without wheezing, rales, rhonchi. Normal respiratory effort. Nonlabored. No dullness.  ABDOMEN: Soft, nontender, nondistended. Positive bowel sounds. No rebound or guarding. NO mass or HSM.  EXT:  " multifocal excoriations both lower extremities associated with recent fall.  No redness induration or warmth.  No discrete mass bulge or fluctuance.  No crepitus or bulla    MSK: FROM without joint effusions noted arms/legs.    SKIN: Warm and dry without cutaneous eruptions on Inspection/palpation.    NEURO: sleepy    No peripheral stigmata/phenomena of endocarditis    IV without obvious redness or drainage    Laboratory Data    Results from last 7 days   Lab Units 11/07/23  0236 11/06/23  0356 11/03/23  0425   WBC 10*3/mm3 9.72 8.68 8.93   HEMOGLOBIN g/dL 13.3 13.2 13.5   HEMATOCRIT % 40.4 40.1 40.1   PLATELETS 10*3/mm3 294 278 177     Results from last 7 days   Lab Units 11/07/23  0236   SODIUM mmol/L 136   POTASSIUM mmol/L 4.1   CHLORIDE mmol/L 101   CO2 mmol/L 26.0   BUN mg/dL 22   CREATININE mg/dL 0.73*   GLUCOSE mg/dL 254*   CALCIUM mg/dL 9.1     Results from last 7 days   Lab Units 11/02/23  0458   ALK PHOS U/L 79   BILIRUBIN mg/dL 0.3   ALT (SGPT) U/L 70*   AST (SGOT) U/L 108*               Estimated Creatinine Clearance: 111.6 mL/min (A) (by C-G formula based on SCr of 0.73 mg/dL (L)).      Microbiology:      Radiology:  Imaging Results (Last 72 Hours)       Procedure Component Value Units Date/Time    CT Abdomen Pelvis With & Without Contrast [[id number]] Collected: 11/06/23 1225     Updated: 11/06/23 1234    Narrative:      CT ABDOMEN PELVIS W WO CONTRAST    Date of Exam: 11/6/2023 11:00 AM CST    Indication: follow-up renal abscess.    Comparison: 10/29/2023    Technique: Axial CT images were obtained of the abdomen and pelvis before and after the uneventful intravenous administration of 85 cc of Isovue-370. Sagittal and coronal reconstructions were performed.  Automated exposure control and iterative   reconstruction methods were used.    Findings:  LUNG BASES:  Unremarkable without mass or infiltrate.    LIVER:  Unremarkable parenchyma without focal lesion.  BILIARY/GALLBLADDER: There is calcified  gallstone. No gallbladder inflammatory changes.  SPLEEN:  Unremarkable  PANCREAS:  Unremarkable  ADRENAL:  Unremarkable  KIDNEYS: Interval placement of a left pigtail drainage catheter along the anterior mid cortex of the left kidney. There is minimal surrounding fluid measuring 1.6 cm in diameter. Correlate with output. There is a new left ureteral stent in good position   without significant hydronephrosis. No calculus identified.  GASTROINTESTINAL/MESENTERY:  No evidence of obstruction nor inflammation.    MESENTERIC VESSELS:  Patent.  AORTA/IVC:  Normal caliber.    RETROPERITONEUM/LYMPH NODES:  Unremarkable    REPRODUCTIVE: Prostatectomy  BLADDER:  Unremarkable    OSSEUS STRUCTURES:  Typical for age with no acute process identified.        Impression:      Impression:  1.Interval placement of a pigtail drainage catheter along the anterior cortex of the left kidney with small residual fluid collection. Correlate with output.  2.Interval placement of a left ureteral stent. No hydronephrosis currently.  3.Other stable findings.      Electronically Signed: Titus Gunn MD    11/6/2023 11:31 AM CST    Workstation ID: [id number]              Impression:     --acute E Coli sepsis/septicemia, urinary source with a E Coli complicated UTI associated with abnormal CT scan and  left renal abscess status post drain on October 30, further complicated by hydronephrosis/stone and urology following for interventional plans.  IV Merrem ongoing with plans to taper antibiotics once further data. Sepsis parameters improving    --acute E Coli complicated UTI, probable left pyelonephritis with abnormal CT scan, s/p drain 10/30 c/w  left renal abscess, hydronephrosis and  intervention by urology 10/31.      --Acute kidney injury per medicine team, improved; Monitor to help guide future adjustments in antimicrobials    --history fall with right arm weakness and further workup/management at discretion of medicine team/neurology.  Some  concern mention for brachial plexus injury    --elevated CPK, rhabdomyolysis.  Recent fall; trended down    PLAN:       --IV rocephin, anticipate 2-4 weeks to depend on clinical course/follow-up radiography etc.  He can come to the office daily for ceftriaxone (if he does not go to a rehab facility) or get ceftriaxone at rehab and f/u with me next week in office    --Check/review labs cultures and scans     --Partial history per nursing staff     --Discussed with microbiology     --Urology following     --Discussed with pharmacy     --Highly complex at of issues with high risk for further serious morbidity and other serious sequela       Copied text in this note has been reviewed and is accurate as of 11/07/23.     Joel Ham MD  11/7/2023

## 2023-11-07 NOTE — CASE MANAGEMENT/SOCIAL WORK
Case Management Discharge Note      Final Note: Pt is being discharged to the [facility] today. I spoke with Mr. Narvaez, at the bedside. He is agreeable with D/C plan. He states that his son can transport him. I confirmed with Jaison that pt needs to arrive by 1400 today. I updated pt and let him know to ask his son to arrive by 1300 today. RN call report to [phone]. Jaison states she will pull the D/C summary from [facility], no need to fax it.         Selected Continued Care - Admitted Since 10/29/2023       Destination Coordination complete.      Service Provider Selected Services Address Phone Fax Patient Preferred    [facility] [address], [facility] [address] [phone] --              Durable Medical Equipment    No services have been selected for the patient.                Dialysis/Infusion    No services have been selected for the patient.                Home Medical Care    No services have been selected for the patient.                Therapy    No services have been selected for the patient.                Community Resources    No services have been selected for the patient.                Community & DME    No services have been selected for the patient.                         Final Discharge Disposition Code: 03 - skilled nursing facility (SNF)

## 2023-11-07 NOTE — DISCHARGE SUMMARY
[facility]  DISCHARGE SUMMARY    Patient Name: Aldair Narvaez  : 1950  MRN: [id number]    Date of Admission: 10/29/2023 12:53 PM  Date of Discharge:  2023  Primary Care Physician: Miguel Angel Mireles MD    Consults       Date and Time Order Name Status Description    10/30/2023 12:13 AM Inpatient Infectious Diseases Consult Completed     10/29/2023  6:07 PM Inpatient Urology Consult      10/29/2023 12:57 PM Inpatient Neurology Consult Stroke Completed             Hospital Course       Active Hospital Problems    Diagnosis  POA    Right arm weakness [R29.898]  Yes    Hydronephrosis of left kidney [N13.30]  Unknown    Personal history of prostate cancer [Z85.46]  Not Applicable    CAD, multiple vessel [I25.10]  Yes    Uncontrolled type 2 diabetes mellitus with hyperglycemia [E11.65]  Yes    Mixed hyperlipidemia [E78.2]  Yes      Resolved Hospital Problems    Diagnosis Date Resolved POA    **UTI (urinary tract infection) [N39.0] 2023 Yes    NEETA (acute kidney injury) [N17.9] 2023 Yes    Rhabdomyolysis [M62.82] 2023 Yes    Sepsis due to urinary tract infection [A41.9, N39.0] 2023 Unknown          Hospital Course:  Aldair Narvaez is a 73 y.o. male  w hydronephrosis,  HTN HL DM CAD  prostate CA, GRANT, who fell out of bed, lay on the floor for a long time, later noticed he could not move his right arm, has a UTI and NEETA and temp 103.       Severe Sepsis due to UTI/renal abscess, E coli bacteremia   NEETA  Lactic Acidosis  Rhabdo  - CT scan shows 3.7cm infected cyst/abscess at L upper pole  - Cont abx per dr Ham, adjusted to ceftriaxone on , anticipate 2-4 weeks pending clinical course   - IR 10/30 L renal  drain placed, removed  following improving CT results. Dress site with 4x4s and tegaderm, can change every 48 hours until the site seals   - 10/31 L ureteral stent placement   - Farooq removed  and voiding   - Cr has normalized   - CK  trended down   - Recommend to NOT restart Farxiga due to UTI's      Blistering rash  - contained to the area around the YG drain and only under the bandaging; no systemic symptoms, secondary to bandage   - allergy added to chart     T2DM with hyperglycemia  - Levemir to 10 units BID  - Humalog to 5 units TID meals  - LDSSI  - Hold Amaryl  - Recommend NOT to resume Farxiga due to UTI's      HO HTN, CAD, Afib  -Continue Diovan/HCTZ 320-25mg daily  -Continue Metoprolol 50mg daily      Fall at home, lay wedged under furniture for hours   R arm paresis - suspect nerve palsy from compression x hours  Brachial plexopathy  - PT OT commending rehab, which patient is agreeable to  - stroke team originally consulted, MRI neg, suspect this is a local nerve injury .  Slow improvement.    -Possible need for repeat EMG in 4 to 6 weeks, gabapentin 100 mg 3 times daily as needed for nerve pain, once infection has resolved, can consider steroid use, follow-up with [facility] 3 months posthospitalization     Hemorrhoids - s/p anusol x5 days    Discharge Follow Up Recommendations for outpatient labs/diagnostics:  -PCP 1 week  -Dr Ham via [facility] from Rehab facility 11/9  -Dr Ham in office 1 week from ID   -Dr Justin 11/27  -[facility] 3 months     Day of Discharge     HPI:   Patient seen and examined. No new issues overnight. YG drain out.     Review of Systems  Gen- No fevers, chills  CV- No chest pain, palpitations  Resp- No cough, dyspnea  GI- No N/V/D, abd pain    Vital Signs:   Temp:  [97.8 °F (36.6 °C)-98.4 °F (36.9 °C)] 97.8 °F (36.6 °C)  Heart Rate:  [88-97] 97  Resp:  [16-18] 18  BP: (110-144)/() 143/102      Physical Exam:  Constitutional: No acute distress, awake, alert, up in chair, pleasant   HENT: NCAT, mucous membranes moist  Respiratory: Clear to auscultation bilaterally, respiratory effort normal   Cardiovascular: RRR, no murmurs, rubs, or gallops  Gastrointestinal: Positive  bowel sounds, soft, nontender, non-distended, YG has been removed, site covered with 4x4's   Musculoskeletal: No bilateral ankle edema  Psychiatric: Appropriate affect, cooperative  Neurologic: Oriented x 3, speech clear, unable to lift R arm against gravity but  strength intact   Skin: No rashes     Pertinent  and/or Most Recent Results     LAB RESULTS:      Lab 11/07/23  0236 11/06/23  0356 11/03/23  0425 11/02/23  0458 11/01/23  0524   WBC 9.72 8.68 8.93 9.46 10.50   HEMOGLOBIN 13.3 13.2 13.5 12.9* 12.2*   HEMATOCRIT 40.4 40.1 40.1 39.0 37.2*   PLATELETS 294 278 177 145 99*   NEUTROS ABS  --   --  4.20 6.16  --    IMMATURE GRANS (ABS)  --   --   --  0.28*  --    LYMPHS ABS  --   --   --  2.05  --    MONOS ABS  --   --   --  0.70  --    EOS ABS  --   --  0.45* 0.20  --    MCV 87.3 86.8 85.9 87.8 86.3         Lab 11/07/23  0236 11/06/23  0356 11/04/23  0508 11/03/23  1843 11/03/23  0425 11/02/23  0458   SODIUM 136 136 138  --  138 138   POTASSIUM 4.1 3.9 3.7 3.9 3.4* 3.4*   CHLORIDE 101 100 104  --  104 106   CO2 26.0 28.0 25.0  --  24.0 22.0   ANION GAP 9.0 8.0 9.0  --  10.0 10.0   BUN 22 16 12  --  16 21   CREATININE 0.73* 0.52* 0.48*  --  0.58* 0.67*   EGFR 97.3 107.8 110.4  --  104.3 99.8   GLUCOSE 254* 208* 203*  --  211* 196*   CALCIUM 9.1 9.1 8.2*  --  8.1* 8.0*   MAGNESIUM  --   --  1.6  --  1.8  --          Lab 11/02/23 0458 11/01/23  0524   TOTAL PROTEIN 5.7* 5.6*   ALBUMIN 2.6* 2.6*   GLOBULIN 3.1 3.0   ALT (SGPT) 70* 69*   AST (SGOT) 108* 120*   BILIRUBIN 0.3 0.3   ALK PHOS 79 77                     Brief Urine Lab Results  (Last result in the past 365 days)        Color   Clarity   Blood   Leuk Est   Nitrite   Protein   CREAT   Urine HCG        10/29/23 1445 Yellow   Cloudy   Large (3+)   Small (1+)   Negative   100 mg/dL (2+)                 Microbiology Results (last 10 days)       Procedure Component Value - Date/Time    Body Fluid Culture - Body Fluid, Kidney, Left [[id number]]  (Abnormal)   (Susceptibility) Collected: 10/30/23 1501    Lab Status: Final result Specimen: Body Fluid from Kidney, Left Updated: 11/01/23 0827     Body Fluid Culture Heavy growth (4+) Escherichia coli     Gram Stain Many (4+) WBCs seen      Moderate (3+) Gram negative bacilli    Susceptibility        Escherichia coli      SANDRA      Ampicillin Resistant      Ampicillin + Sulbactam Intermediate      Cefepime Susceptible      Ceftazidime Susceptible      Ceftriaxone Susceptible      Gentamicin Susceptible      Levofloxacin Susceptible      Piperacillin + Tazobactam Susceptible      Trimethoprim + Sulfamethoxazole Resistant                       Susceptibility Comments       Escherichia coli    Cefazolin sensitivity will not be reported for Enterobacteriaceae in non-urine isolates. If cefazolin is preferred, please call the microbiology lab to request an E-test.  With the exception of urinary-sourced infections, aminoglycosides should not be used as monotherapy.               Blood Culture - Blood, Hand, Right [[id number]]  (Abnormal)  (Susceptibility) Collected: 10/29/23 1446    Lab Status: Final result Specimen: Blood from Hand, Right Updated: 11/01/23 0617     Blood Culture Escherichia coli     Isolated from Aerobic and Anaerobic Bottles     Gram Stain Anaerobic Bottle Gram negative bacilli      Aerobic Bottle Gram negative bacilli    Susceptibility        Escherichia coli      SANDRA      Ampicillin Resistant      Ampicillin + Sulbactam Resistant      Cefepime Susceptible      Ceftazidime Susceptible      Ceftriaxone Susceptible      Gentamicin Susceptible      Levofloxacin Susceptible      Piperacillin + Tazobactam Susceptible      Trimethoprim + Sulfamethoxazole Resistant                       Susceptibility Comments       Escherichia coli    Cefazolin sensitivity will not be reported for Enterobacteriaceae in non-urine isolates. If cefazolin is preferred, please call the microbiology lab to request an E-test.  With the exception  of urinary-sourced infections, aminoglycosides should not be used as monotherapy.               Blood Culture ID, PCR - Blood, Hand, Right [[id number]]  (Abnormal) Collected: 10/29/23 1446    Lab Status: Final result Specimen: Blood from Hand, Right Updated: 10/30/23 0843     BCID, PCR Escherichia coli. Identification by BCID2 PCR.     BOTTLE TYPE Anaerobic Bottle    Narrative:      No resistance genes detected.    Urine Culture - Urine, Urine, Clean Catch [[id number]]  (Abnormal)  (Susceptibility) Collected: 10/29/23 1445    Lab Status: Final result Specimen: Urine, Clean Catch Updated: 10/31/23 0426     Urine Culture >100,000 CFU/mL Escherichia coli    Narrative:      Colonization of the urinary tract without infection is common. Treatment is discouraged unless the patient is symptomatic, pregnant, or undergoing an invasive urologic procedure.    Susceptibility        Escherichia coli      SANDRA      Ampicillin Resistant      Ampicillin + Sulbactam Intermediate      Cefazolin Susceptible      Cefepime Susceptible      Ceftazidime Susceptible      Ceftriaxone Susceptible      Gentamicin Susceptible      Levofloxacin Susceptible      Nitrofurantoin Susceptible      Piperacillin + Tazobactam Susceptible      Trimethoprim + Sulfamethoxazole Resistant                           Respiratory Panel PCR w/COVID-19(SARS-CoV-2) RAY/LATOSHA/MIRIAN/PAD/COR/MAD/MICHAEL In-House, NP Swab in UTM/VTM, 3-4 HR TAT - Swab, Nasopharynx [[id number]]  (Abnormal) Collected: 10/29/23 1444    Lab Status: Final result Specimen: Swab from Nasopharynx Updated: 10/29/23 1647     ADENOVIRUS, PCR Not Detected     Coronavirus 229E Not Detected     Coronavirus HKU1 Not Detected     Coronavirus NL63 Not Detected     Coronavirus OC43 Not Detected     COVID19 Not Detected     Human Metapneumovirus Not Detected     Human Rhinovirus/Enterovirus Detected     Influenza A PCR Not Detected     Influenza B PCR Not Detected     Parainfluenza Virus 1 Not Detected      Parainfluenza Virus 2 Not Detected     Parainfluenza Virus 3 Not Detected     Parainfluenza Virus 4 Not Detected     RSV, PCR Not Detected     Bordetella pertussis pcr Not Detected     Bordetella parapertussis PCR Not Detected     Chlamydophila pneumoniae PCR Not Detected     Mycoplasma pneumo by PCR Not Detected    Narrative:      In the setting of a positive respiratory panel with a viral infection PLUS a negative procalcitonin without other underlying concern for bacterial infection, consider observing off antibiotics or discontinuation of antibiotics and continue supportive care. If the respiratory panel is positive for atypical bacterial infection (Bordetella pertussis, Chlamydophila pneumoniae, or Mycoplasma pneumoniae), consider antibiotic de-escalation to target atypical bacterial infection.    Blood Culture - Blood, Arm, Right [[id number]]  (Abnormal) Collected: 10/29/23 1435    Lab Status: Final result Specimen: Blood from Arm, Right Updated: 11/04/23 0622     Blood Culture Escherichia coli     Isolated from Anaerobic Bottle     Gram Stain Anaerobic Bottle Gram negative bacilli    Narrative:      Refer to previous blood culture collected on 10/29/2023 1446 for MICs              CT Abdomen Pelvis With & Without Contrast    Result Date: 11/6/2023  CT ABDOMEN PELVIS W WO CONTRAST Date of Exam: 11/6/2023 11:00 AM CST Indication: follow-up renal abscess. Comparison: 10/29/2023 Technique: Axial CT images were obtained of the abdomen and pelvis before and after the uneventful intravenous administration of 85 cc of Isovue-370. Sagittal and coronal reconstructions were performed.  Automated exposure control and iterative reconstruction methods were used. Findings: LUNG BASES:  Unremarkable without mass or infiltrate. LIVER:  Unremarkable parenchyma without focal lesion. BILIARY/GALLBLADDER: There is calcified gallstone. No gallbladder inflammatory changes. SPLEEN:  Unremarkable PANCREAS:  Unremarkable ADRENAL:   Unremarkable KIDNEYS: Interval placement of a left pigtail drainage catheter along the anterior mid cortex of the left kidney. There is minimal surrounding fluid measuring 1.6 cm in diameter. Correlate with output. There is a new left ureteral stent in good position without significant hydronephrosis. No calculus identified. GASTROINTESTINAL/MESENTERY:  No evidence of obstruction nor inflammation.  MESENTERIC VESSELS:  Patent. AORTA/IVC:  Normal caliber. RETROPERITONEUM/LYMPH NODES:  Unremarkable REPRODUCTIVE: Prostatectomy BLADDER:  Unremarkable OSSEUS STRUCTURES:  Typical for age with no acute process identified.     Impression: 1.Interval placement of a pigtail drainage catheter along the anterior cortex of the left kidney with small residual fluid collection. Correlate with output. 2.Interval placement of a left ureteral stent. No hydronephrosis currently. 3.Other stable findings. Electronically Signed: Titus Gunn MD  11/6/2023 11:31 AM CST  Workstation ID: [id number]    MRI Cervical Spine Without Contrast    Result Date: 11/1/2023  MRI CERVICAL SPINE WO CONTRAST MRI BRACHIAL PLEXUS WO CONTRAST Date of Exam: 10/31/2023 11:39 PM EDT Indication: neck pain.  Comparison: Correlation with CT angiogram neck 10/29/2023. Technique:  Routine multiplanar/multisequence sequence images of the cervical spine were obtained without contrast administration.  Findings: Cervical spine: Seven cervical vertebrae are identified. Alignment is anatomic. There is mild diffuse disc space narrowing and diffuse disc desiccation. Vertebral bodies maintain normal height.  There is no focal bone marrow edema. There is no evidence of a marrow replacing process. No acute osseous abnormalities. Spinal cord signal is normal.  There is no evidence of cervical lymphadenopathy or a neck mass. The craniocervical junction appears within normal limits. Limited view of the posterior fossa contents is unremarkable. C2-C3: There is minimal posterior  disc osteophyte complex. There is mild bilateral facet hypertrophy. No neuroforaminal or spinal canal narrowing. C3-C4: There is no significant posterior disc osteophyte complex. There is mild left uncovertebral hypertrophy. There is mild bilateral facet hypertrophy. No neuroforaminal or spinal canal narrowing. C4-C5: There is mild posterior disc osteophyte complex. There is moderate bilateral uncovertebral hypertrophy and moderate facet apparency. There is moderate right neuroforaminal narrowing. C5-C6: There is mild posterior disc osteophyte complex. There is severe left and mild right facet hypertrophy. No neuroforaminal or spinal canal narrowing. C6-C7: There is mild posterior disc osteophyte complex. Uncovertebral joints appear normal. There is mild bilateral facet apparency without neuroforaminal or spinal canal narrowing. C7-T1: The posterior disc is unremarkable there is mild bilateral facet apparency. No significant neural foraminal or spinal canal stenosis. Right brachial plexus: Exam is somewhat limited without IV contrast. The course of the brachial plexus is unremarkable. The fibers of the brachial plexus appear normal size and signal. There is no associated mass or adenopathy. There is no evidence of brachial plexus inflammation. There are multiple muscle groups that exhibit infiltrating high-grade edema including the upper half of the subscapularis, the serratus anterior, multiple right-sided posterior lower cervical muscles and the coracobrachialis and pectoralis major muscles.  There is no evidence of a fluid collection or obvious tendon rupture.     Impression: 1.Mild to moderate cervical spondylosis with varying degrees of neuroforaminal narrowing, which is most pronounced and moderate on the right at C4-C5. 2.Multiple muscle groups exhibiting high-grade infiltrating muscular edema. This could be related to infectious or inflammatory myositis. These could result from a brachial plexopathy although  the course and caliber of the brachial plexus is unremarkable. 3.Mild multilevel cervical disc and facet joint degeneration resulting in varying degrees of neural foraminal narrowing without spinal canal stenosis. Electronically Signed: Jefferson Block MD  11/1/2023 12:23 AM EDT  Workstation ID: [id number]    MRI brachial plexus wo contrast    Result Date: 11/1/2023  MRI CERVICAL SPINE WO CONTRAST MRI BRACHIAL PLEXUS WO CONTRAST Date of Exam: 10/31/2023 11:39 PM EDT Indication: neck pain.  Comparison: Correlation with CT angiogram neck 10/29/2023. Technique:  Routine multiplanar/multisequence sequence images of the cervical spine were obtained without contrast administration.  Findings: Cervical spine: Seven cervical vertebrae are identified. Alignment is anatomic. There is mild diffuse disc space narrowing and diffuse disc desiccation. Vertebral bodies maintain normal height.  There is no focal bone marrow edema. There is no evidence of a marrow replacing process. No acute osseous abnormalities. Spinal cord signal is normal.  There is no evidence of cervical lymphadenopathy or a neck mass. The craniocervical junction appears within normal limits. Limited view of the posterior fossa contents is unremarkable. C2-C3: There is minimal posterior disc osteophyte complex. There is mild bilateral facet hypertrophy. No neuroforaminal or spinal canal narrowing. C3-C4: There is no significant posterior disc osteophyte complex. There is mild left uncovertebral hypertrophy. There is mild bilateral facet hypertrophy. No neuroforaminal or spinal canal narrowing. C4-C5: There is mild posterior disc osteophyte complex. There is moderate bilateral uncovertebral hypertrophy and moderate facet apparency. There is moderate right neuroforaminal narrowing. C5-C6: There is mild posterior disc osteophyte complex. There is severe left and mild right facet hypertrophy. No neuroforaminal or spinal canal narrowing. C6-C7: There is mild posterior  disc osteophyte complex. Uncovertebral joints appear normal. There is mild bilateral facet apparency without neuroforaminal or spinal canal narrowing. C7-T1: The posterior disc is unremarkable there is mild bilateral facet apparency. No significant neural foraminal or spinal canal stenosis. Right brachial plexus: Exam is somewhat limited without IV contrast. The course of the brachial plexus is unremarkable. The fibers of the brachial plexus appear normal size and signal. There is no associated mass or adenopathy. There is no evidence of brachial plexus inflammation. There are multiple muscle groups that exhibit infiltrating high-grade edema including the upper half of the subscapularis, the serratus anterior, multiple right-sided posterior lower cervical muscles and the coracobrachialis and pectoralis major muscles.  There is no evidence of a fluid collection or obvious tendon rupture.     Impression: 1.Mild to moderate cervical spondylosis with varying degrees of neuroforaminal narrowing, which is most pronounced and moderate on the right at C4-C5. 2.Multiple muscle groups exhibiting high-grade infiltrating muscular edema. This could be related to infectious or inflammatory myositis. These could result from a brachial plexopathy although the course and caliber of the brachial plexus is unremarkable. 3.Mild multilevel cervical disc and facet joint degeneration resulting in varying degrees of neural foraminal narrowing without spinal canal stenosis. Electronically Signed: Jefferson Block MD  11/1/2023 12:23 AM EDT  Workstation ID: [id number]    XR Pyelogram Retrograde    Impression: A fluoroscopic unit was utilized for a procedure performed in the operating room. No interpretation was requested. Please refer to the operative report regarding findings. Please refer to PACS for patient radiation dose information. Bilateral retrograde ureteral and pyelograms showing stricture distal ureters. Left double-J stent placed in  good position. Left renal abscess drain and multiple pelvic surgical staples also seen. No other incidental findings of significance. Electronically Signed: Molina Fields MD  10/31/2023 10:04 AM EDT  Workstation ID: [id number]    CT Guided Percutaneous Drain Kidney Renal    Result Date: 10/30/2023  CT GUIDED PERCUTANEOUS DRAIN KIDNEY RENAL  History: Abscess  : Molina Fields MD.  Modality: CT  DOSE REDUCTION: The examination was performed according to departmental dose-optimization program which includes automated exposure control, adjustment of the mA and/or kV according to patient size and/or use of iterative reconstruction technique. Radiation dose: 993 mGy-cm.   SEDATION: Moderate sedation was administered. 1 milligram of Versed and 0 micrograms of fentanyl IV was used for moderate sedation. Total intra service time of sedation was 19 minutes. The sedation was administered and the patient's vital signs monitored  throughout the procedure and recorded in the patient's medical record by the nurse under my direct supervision. The patient systolic blood pressure was in 90s. He was started on IV saline infusion and fentanyl was not given. Anesthesia: Lidocaine, local infiltration.        Estimated blood loss:  < 5 cc.        Technique: A thorough discussion of the risks, benefits, and alternatives of the procedure, blood transfusion if needed, and if applicable, moderate sedation was carried out with the patient or the patient's next of kin. Any questions were answered. They verbalized  understanding. A written informed consent was then signed.  A timeout was performed prior to starting the procedure. The procedure room personnel used personal protective equipment. The operators used sterile gowns and gloves in addition. The surgical site was prepped with chlorhexidine gluconate and draped in the maximal sterile fashion. The patient was laid on the left lateral decubitus on the CT table. A preliminary  limited CT scan was performed through the region of interest, if needed with a marking grid in place to localize the target, and to determine an access site, depth and angle. After local anesthesia a dermatotomy was performed if needed. Using CT guidance, an access needle was advanced into the target, gas-containing low-density area in the upper anterior cortex of the left kidney. Blood-tinged liquid was recovered. A guidewire was positioned in the collection. Over the wire following sequential dilatation of the track, an 8.5 Indonesian Farias-Romero drainage catheter was placed. The catheter was secured to skin with nonabsorbable suture. The catheter was connected to a  fluid recovery system. An aseptic dressing was applied. A limited post-procedure CT was done. The patient was transferred to the recovery area and then discharged from the department in stable condition.  Complications: None immediate. Specimen: The specimen was labeled and sent to the lab in appropriate carriers & containers if such was requested by the ordering provider      Impression:                                                              Successful CT guided percutaneous abscess drainage catheter placement in a left renal abscess. An 8.5 Indonesian Farias-Romero drainage catheter was placed. Drain under dry. Flush with 5 cc of normal saline at least once a day. Once the output drops to 0, reimage. Thank you for the opportunity to assist in the care of your patient. Electronically Signed: Molina Fields MD  10/30/2023 4:01 PM EDT  Workstation ID: [id number]    EMG & Nerve Conduction Test    Result Date: 10/30/2023  Table formatting from the original result was not included. Images from the original result were not included. Indication: Right arm weakness, brachial plexopathy Clinical: 73 y.o.male who fell out of bed sometime Saturday night and laid on his right arm for approximately 7 hours.     His wife found him and he was taken to the ER where  a stroke was ruled out.  His arm continues to be weak although he is beginning to get some movement in the hand and fingers.  On examination he has extremely limited movement of forearm extension and abduction and can only weakly abduct the thumb.  Brachial plexopathy is a clinical consideration.  His CK is noted to be elevated to 16,000. NCS/EMG TECHNICAL DATA: All studies are performed at a skin temperature of 34°C or greater. Distal sensory latencies are calculated to waveform peak.  Sensory amplitudes are measured peak to peak Distal motor latencies are calculated to waveform onset.  Motor amplitudes are calculated baseline to peak Nerve Conduction Studies Anti Sensory Summary Table  Stim Site NR Peak (ms) Norm Peak (ms) P-T Amp (µV) Norm P-T Amp Site1 Site2 Delta-P (ms) Dist (cm) Brandt (m/s) Norm Brandt (m/s) Right Ulnar Anti Sensory (5th Digit) Wrist    3.4 <3.7 14.6 >15.0 Wrist 5th Digit 3.4 14.0 41  B Elbow NR     B Elbow Wrist  26.5  >53 A Elbow NR     A Elbow B Elbow  10.0  >53 Ortho Sensory Summary Table  Stim Site NR Peak (ms) Norm Peak (ms) P-T Amp (µV) Norm P-T Amp Site1 Site2 Delta-P (ms) Dist (cm) Brandt (m/s) Norm Brandt (m/s) Right Median Ortho Sensory (Wrist) 2nd Digit    3.0  5.6  2nd Digit Wrist 3.0 8.0 27  Palm    2.9  4.1  Palm Wrist 2.9 0.0   Right Ulnar Ortho Sensory (Wrist) 5th Digit NR     5th Digit Wrist  8.0   Palm NR     Palm Wrist  0.0   Motor Summary Table  Stim Site NR Onset (ms) Norm Onset (ms) O-P Amp (mV) Norm O-P Amp Site1 Site2 Delta-0 (ms) Dist (cm) Brandt (m/s) Norm Brandt (m/s) Right Median Motor (Abd Poll Brev) Wrist    4.8 <4.2 2.4 >5 Elbow Wrist 4.8 25.5 53 >50 Elbow    9.6  1.6        Right Ulnar Motor (Abd Dig Minimi) Wrist    2.9 <4.2 6.5 >3 B Elbow Wrist 4.4 26.0 59 >53 B Elbow    7.3  0.7  A Elbow B Elbow 2.9 10.0 34 >53 A Elbow    10.2  0.1        F Wave Studies  NR F-Lat (ms) Lat Norm (ms) L-R F-Lat (ms) L-R Lat Norm Right Median (Mrkrs) (Abd Poll Brev) NR  <33  <2.2 Right Ulnar  (Mrkrs) (Abd Dig Min) NR  <36  <2.5 EMG  Side Muscle Nerve Root Ins Act Fibs Psw Amp Dur Poly Recrt Int Pat Comment Right Deltoid Axillary C5-6 Nml Nml Nml Nml Nml 0 Reduced Nml  Right ABD Dig Min Ulnar C8-T1 Nml Nml Nml Nml Nml 0 Nml Nml No voluntary Right Ext Digitorum Radial (Post Int) C7-8 Nml Nml Nml Nml Nml 0 Nml Nml Few voluntary Right Biceps Musculocut C5-6 Nml Nml Nml Nml Nml 0 Nml Nml No voluntary Right Triceps Radial C6-7-8 Nml Nml Nml Nml Nml 0 Reduced Nml  Right C6 Parasp Rami C6 Nml Nml Nml Nml Nml 0 Nml Nml  Waveforms:            FINDINGS: Nerve Conduction Studies: Median sensory latency on the right is prolonged at 2.9 ms with reduced amplitude Ulnar orthodromic response was not seen Right median motor latency is prolonged at 4.8 ms with reduced amplitude Right median and right ulnar motor F wave latencies are absent Velocity of the right ulnar motor nerve is slowed across the elbow at 34 m/s.  There is a drop in amplitude between elbow and wrist although some of this may be for technical reasons. 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 Note-this study is done extremely early following the injury; if brachial plexopathy remains suspect, a repeat study in 4-6 weeks may be considered, at which time the EMG portion of the study would be expected to be more revealing This report is transcribed using the Dragon dictation system.     CT Abdomen Pelvis Without Contrast    Result Date: 10/29/2023  CT ABDOMEN PELVIS WO CONTRAST Date of Exam: 10/29/2023 7:17 PM EDT Indication:  Flank pain, kidney stone suspected septic with UTI HO obstruction. Comparison: May 8, 2023. Renal ultrasound October 17, 2023. Technique: Axial CT images were obtained of the abdomen and pelvis without the administration of contrast. Reconstructed coronal and sagittal images were also obtained. Automated exposure control and iterative construction methods were used. Findings: There is suspected dependent atelectasis in the lower lobes. Status post median sternotomy. There is calcific atherosclerosis of the aorta. No significant pleural or pericardial effusion is seen. Heart size appears within normal limits. There is excreted contrast in the renal collecting systems, ureters, and bladder from CT angiography performed the same day. Contrast does limit assessment for urinary tract calculi. There is mild left hydronephrosis and hydroureter which appears similar to the prior exam. The distal left ureter is nonopacified and nondilated. No clear obstructing etiology is identified. There is a hypodense lesion at the anterior upper pole of the left kidney which was present on the prior exam suggestive of a renal cyst, but on the current exam, the lesion measures up to approximately 3.7 cm, previously 2.9 cm, and there is now an irregular gaseous lucency within the lesion. There is some surrounding fat stranding which appears mildly increased. These findings are concerning for superimposed gas forming infection within a renal cyst. Probable right renal cyst, as before. No definite new right renal abnormality. Right ureter appears patent. No definite right hydronephrosis. The bladder is distended with contrast. No suspicious focal bladder abnormality is seen. No significant bladder wall thickening. Multiple surgical clips are seen in the pelvis. There has been prostatectomy. Hyperdensity within the gallbladder suggesting cholelithiasis, as before. No suspicious hepatic abnormality is seen. No definite splenomegaly. No  suspicious adrenal lesion. No acute pancreatic abnormality. There is atherosclerosis of the aorta. No definite lymphadenopathy. No acute gastric abnormality. No small bowel dilatation. There is diverticulosis of the colon. No definite acute colonic or rectal abnormality is seen. No significant pelvic lymphadenopathy. There is midline abdominal scarring. There are degenerative changes in the bilateral hips and in the thoracolumbar spine.     Impression: 1.Left upper pole cyst appears increased in size compared to May 2023 and now contains irregular gaseous lucency with some new mild surrounding fat stranding. Findings are suspicious for infected renal cyst with gas-forming infection. Also recommend correlation recent surgery as this could also be related to recent intervention. 2.Mild left hydronephrosis and hydroureter appears similar to the prior exam. The distal left ureter is nonopacified and nondilated. No clear obstructing etiology is identified. 3.Excreted contrast from prior CTA in the renal collecting systems, ureters, and bladder. This limits assessment for urinary tract calculi. 4.Cholelithiasis. 5.Calcific atherosclerosis. 6.Postoperative changes in the pelvis with evidence of prostatectomy. Electronically Signed: Larry Hoff  10/29/2023 8:16 PM EDT  Workstation ID: [id number]    MRI Brain Without Contrast    Result Date: 10/29/2023  MRI BRAIN WO CONTRAST Date of Exam: 10/29/2023 3:30 PM EDT Indication: Stroke, follow up.  Comparison: CT head, CT perfusion, and CTA head and neck performed the same day Technique:  Routine multiplanar/multisequence sequence images of the brain were obtained without contrast administration. Findings: No midline shift. The ventricles and sulci appear within normal limits for patient's age. Basal cisterns appear patent. The pituitary gland appears to have normal morphology. Cerebellar tonsils appear normally positioned. No definite extra-axial collection and no definite  findings of acute or chronic hemorrhage. No mass lesion, mass effect, or edema is identified. Mild T2 and FLAIR hyperintense signal foci are seen in the cerebral white matter. No definite restricted diffusion is identified at this time. There is mucosal thickening of the paranasal sinuses. Mastoid air cells appear clear. Globes and orbits appear unremarkable. No definite calvarial abnormality is identified.     Impression: 1.No definite findings of acute intracranial abnormality at this time. 2.Mild T2 and FLAIR hyperintense signal foci in the cerebral white matter are nonspecific but likely represent mild chronic small vessel ischemic changes. 3.Mucosal thickening of the paranasal sinuses could indicate acute sinusitis. Electronically Signed: Laryr Hoff  10/29/2023 4:10 PM EDT  Workstation ID: [id number]    XR Shoulder 2+ View Right    Result Date: 10/29/2023  XR SHOULDER 2+ VW RIGHT Date of Exam: 10/29/2023 1:57 PM EDT Indication: Trauma Comparison: None available. FINDINGS:  No definite acute fracture or malalignment is seen. Glenohumeral joint is not well evaluated due to positioning. There is suspected osteophyte formation with probable mild to moderate joint space narrowing. There appear to be moderate degenerative changes at the acromioclavicular joint. Calcifications projecting along the posterior humeral head may represent calcific tendinopathy. No other definite soft tissue abnormality.     1.No definite radiographic findings of acute osseous shoulder abnormality. 2.Glenohumeral joint is not well evaluated due to positioning. There is suspected mild to moderate osteoarthritis. 3.Moderate degenerative changes at the acromioclavicular joint. Electronically Signed: Larry Hoff  10/29/2023 2:16 PM EDT  Workstation ID: [id number]    XR Chest 1 View    Result Date: 10/29/2023  XR CHEST 1 VW Date of Exam: 10/29/2023 1:47 PM EDT Indication: Acute Stroke Protocol (onset < 12 hrs) Comparison: None available.  FINDINGS: There are prominent interstitial markings and mild left basilar opacities. No significant focal consolidation. No pneumothorax or significant pleural effusion. The heart appears mildly enlarged. Status post median sternotomy and CABG. Mediastinal contour  appears within normal limits.     1.Prominent interstitial markings and mild left basilar opacities which could represent mild edema, pneumonia, or chronic lung disease. 2.Mild cardiomegaly. Status post median sternotomy and CABG. Electronically Signed: Larry Hoff  10/29/2023 2:00 PM EDT  Workstation ID: [id number]    CT Angiogram Head w AI Analysis of LVO    Result Date: 10/29/2023  CT ANGIOGRAM HEAD W AI ANALYSIS OF LVO, CT ANGIOGRAM NECK Date of Exam: 10/29/2023 1:13 PM EDT Indication: Neuro deficit, acute stroke suspected Neuro deficit, acute stroke suspected. Comparison: CT head and CT perfusion performed the same day. Technique: CTA of the head and neck was performed after the uneventful intravenous administration of iodinated contrast. Reconstructed coronal and sagittal images were also obtained. In addition, a 3-D volume rendered image was created for interpretation. Automated exposure control and iterative reconstruction methods were used. Findings: Head: The vertebral and basilar arteries appear somewhat diminutive. There is fetal origin of the right posterior cerebral artery and partial fetal origin on the left. The posterior cerebral arteries appear grossly symmetric. No definite proximal occlusion or definite high-grade stenosis. There is calcific atherosclerosis of the cavernous portions of the internal carotid arteries. No high-grade stenosis is seen. The middle cerebral arteries appear to opacify as expected. There is a small right A1 segment of the anterior cerebral artery, likely a normal variant. There is a patent anterior communicating artery. The anterior cerebral arteries are patent distally and grossly symmetric. No definite focal  occlusion or high-grade stenosis is identified in the bilateral M1 and M2 segments of the middle cerebral arteries. No other definite acute intracranial vascular abnormality is seen. Neck: Status post median sternotomy and CABG. The visualized central pulmonary arteries appear to opacify as expected. Visualized aortic arch appears within normal limits. There is bovine type branching anatomy with common origin of the right brachiocephalic and left common carotid arteries. The left common carotid and external carotid arteries are patent. There is minimal atherosclerosis at the carotid bifurcation. There is tortuosity of the internal carotid artery. No stenosis of greater than 50% is identified by NASCET criteria. Right brachiocephalic artery appears patent. Common carotid and external carotid arteries are patent. There is also tortuosity of the right internal carotid artery without stenosis of greater than 50% by NASCET criteria. The subclavian arteries appear patent. The vertebral arteries appear patent. The vertebral arteries appear somewhat diminutive, likely normal variant as there appears to be fetal origin of the posterior cerebral arteries. No other definite acute vascular abnormality is seen. Nonvascular: No acute intracranial abnormality is identified. There is paranasal sinus mucosal thickening. No acute infiltrate is seen in the lung apices. 7 mm hypodensity is seen in the right lobe of thyroid. There is mild to moderate multilevel disc and facet joint degeneration in the cervical spine.     Impression: 1.No definite findings of acute intracranial large vessel occlusion. 2.No significant stenosis of greater than 50% is seen at the bilateral internal carotid arteries at this time. Electronically Signed: Larry Hoff  10/29/2023 1:49 PM EDT  Workstation ID: [id number]    CT Angiogram Neck    Result Date: 10/29/2023  CT ANGIOGRAM HEAD W AI ANALYSIS OF LVO, CT ANGIOGRAM NECK Date of Exam: 10/29/2023 1:13 PM EDT  Indication: Neuro deficit, acute stroke suspected Neuro deficit, acute stroke suspected. Comparison: CT head and CT perfusion performed the same day. Technique: CTA of the head and neck was performed after the uneventful intravenous administration of iodinated contrast. Reconstructed coronal and sagittal images were also obtained. In addition, a 3-D volume rendered image was created for interpretation. Automated exposure control and iterative reconstruction methods were used. Findings: Head: The vertebral and basilar arteries appear somewhat diminutive. There is fetal origin of the right posterior cerebral artery and partial fetal origin on the left. The posterior cerebral arteries appear grossly symmetric. No definite proximal occlusion or definite high-grade stenosis. There is calcific atherosclerosis of the cavernous portions of the internal carotid arteries. No high-grade stenosis is seen. The middle cerebral arteries appear to opacify as expected. There is a small right A1 segment of the anterior cerebral artery, likely a normal variant. There is a patent anterior communicating artery. The anterior cerebral arteries are patent distally and grossly symmetric. No definite focal occlusion or high-grade stenosis is identified in the bilateral M1 and M2 segments of the middle cerebral arteries. No other definite acute intracranial vascular abnormality is seen. Neck: Status post median sternotomy and CABG. The visualized central pulmonary arteries appear to opacify as expected. Visualized aortic arch appears within normal limits. There is bovine type branching anatomy with common origin of the right brachiocephalic and left common carotid arteries. The left common carotid and external carotid arteries are patent. There is minimal atherosclerosis at the carotid bifurcation. There is tortuosity of the internal carotid artery. No stenosis of greater than 50% is identified by NASCET criteria. Right brachiocephalic artery  appears patent. Common carotid and external carotid arteries are patent. There is also tortuosity of the right internal carotid artery without stenosis of greater than 50% by NASCET criteria. The subclavian arteries appear patent. The vertebral arteries appear patent. The vertebral arteries appear somewhat diminutive, likely normal variant as there appears to be fetal origin of the posterior cerebral arteries. No other definite acute vascular abnormality is seen. Nonvascular: No acute intracranial abnormality is identified. There is paranasal sinus mucosal thickening. No acute infiltrate is seen in the lung apices. 7 mm hypodensity is seen in the right lobe of thyroid. There is mild to moderate multilevel disc and facet joint degeneration in the cervical spine.     Impression: 1.No definite findings of acute intracranial large vessel occlusion. 2.No significant stenosis of greater than 50% is seen at the bilateral internal carotid arteries at this time. Electronically Signed: Larry Hoff  10/29/2023 1:49 PM EDT  Workstation ID: [id number]    CT CEREBRAL PERFUSION WITH & WITHOUT CONTRAST    Result Date: 10/29/2023  CT CEREBRAL PERFUSION W WO CONTRAST Date of Exam: 10/29/2023 1:13 PM EDT Indication: Neuro deficit, acute stroke suspected.  Comparison: CT head performed the same day. Technique: Axial CT images of the brain were obtained prior to and after the administration of 115 mL Isovue-370. Core blood volume, core blood flow, mean transit time, and Tmax images were obtained utilizing the Rapid software protocol. A limited CT angiogram of the head was also performed to measure the blood vessel density. The radiation dose reduction device was turned on for each scan per the ALARA (As Low as Reasonably Achievable) protocol. FINDINGS: CT Perfusion: CBF (<30%) volume: 0 mL Tmax (>6.0s) volume: 0 mL Mismatch volume: 0 mL Mismatch ratio: None No definite perfusion abnormality is seen in the visualized parenchyma to  indicate ischemia or infarct.     No CT perfusion evidence of acute infarction or ischemia. Electronically Signed: Larry Luis Eduardo  10/29/2023 1:35 PM EDT  Workstation ID: [id number]    CT Head Without Contrast Stroke Protocol    Result Date: 10/29/2023  CT HEAD WO CONTRAST STROKE PROTOCOL Date of Exam: 10/29/2023 1:03 PM EDT Indication: Neuro deficit, acute, stroke suspected Neuro deficit, acute stroke suspected. Comparison: None available. Technique: Axial CT images were obtained of the head without contrast administration.  Reconstructed coronal images were also obtained. Automated exposure control and iterative construction methods were used. Scan Time: 1:11 p.m. Results discussed with stroke team navigator at 1:17 p.m FINDINGS: No midline shift. Basal cisterns appear patent.  Ventricles and sulci appear within normal limits for patient age. No extra-axial collection or acute hemorrhage is identified.  No definite mass lesion, edema, or significant mass effect is seen.  There is hypoattenuation in the white matter, which is nonspecific, but is most commonly seen with chronic small vessel ischemic changes. No definite CT findings of acute infarction. There is paranasal sinus mucosal thickening. Mastoid air cells appear clear.  No acute calvarial abnormality is identified.     1.No definite CT findings of acute intracranial abnormality. Probable mild chronic small vessel ischemic changes. 2.Paranasal sinus mucosal thickening which could indicate sinusitis. Electronically Signed: Larry Luis Eduardo  10/29/2023 1:18 PM EDT  Workstation ID: [id number]                 Plan for Follow-up of Pending Labs/Results: INBOX    Discharge Details        Discharge Medications        New Medications        Instructions Start Date   cefTRIAXone 2,000 mg in sodium chloride 0.9 % 100 mL IVPB   2,000 mg, Intravenous, Every 24 Hours      gabapentin 100 MG capsule  Commonly known as: NEURONTIN   100 mg, Oral, 3 Times Daily PRN      insulin detemir  100 UNIT/ML injection  Commonly known as: LEVEMIR   10 Units, Subcutaneous, Every 12 Hours Scheduled      Insulin Lispro 100 UNIT/ML injection  Commonly known as: humaLOG   2-7 Units, Subcutaneous, Every 4 Hours Scheduled      Insulin Lispro 100 UNIT/ML injection  Commonly known as: humaLOG   5 Units, Subcutaneous, 3 Times Daily With Meals      ipratropium-albuterol 0.5-2.5 mg/3 ml nebulizer  Commonly known as: DUO-NEB   3 mL, Nebulization, Every 6 Hours PRN      lactobacillus acidophilus capsule capsule   1 capsule, Oral, Daily   Start Date: November 8, 2023     melatonin 5 MG tablet tablet   10 mg, Oral, Nightly PRN             Continue These Medications        Instructions Start Date   albuterol sulfate  (90 Base) MCG/ACT inhaler  Commonly known as: PROVENTIL HFA;VENTOLIN HFA;PROAIR HFA   As Needed      buPROPion  MG 24 hr tablet  Commonly known as: WELLBUTRIN XL   Daily      coenzyme Q10 100 MG capsule   100 mg, Oral, Daily      Eliquis 5 MG tablet tablet  Generic drug: apixaban   Daily      glucose blood test strip  Commonly known as: Contour Next Test   1 strip 3 times daily dx: e11.65      HYDROcodone-acetaminophen 5-325 MG per tablet  Commonly known as: NORCO   1 tablet, Oral, Every 8 Hours PRN      hyoscyamine sulfate 0.125 MG tablet dispersible disintegrating tablet  Commonly known as: ANASPAZ   0.125 mg, Translingual, Every 4 Hours PRN      metoprolol succinate XL 50 MG 24 hr tablet  Commonly known as: TOPROL-XL   1 tablet, Daily      nitroglycerin 0.4 MG SL tablet  Commonly known as: NITROSTAT   nitroglycerin 0.4 mg sublingual tablet      pantoprazole 40 MG EC tablet  Commonly known as: PROTONIX   Daily      potassium citrate 5 MEQ (540 MG) CR tablet  Commonly known as: UROCIT-K   15 mEq, Oral, 2 Times Daily With Meals      rosuvastatin 20 MG tablet  Commonly known as: CRESTOR   20 mg, Oral, Daily      tamsulosin 0.4 MG capsule 24 hr capsule  Commonly known as: FLOMAX   TAKE 1 CAPSULE BY  MOUTH DAILY.      valsartan-hydrochlorothiazide 320-25 MG per tablet  Commonly known as: DIOVAN-HCT   1 tablet, Daily             Stop These Medications      Farxiga 10 MG tablet  Generic drug: dapagliflozin Propanediol     glimepiride 4 MG tablet  Commonly known as: AMARYL     PHARMACY MEDS TO BED CONSULT     phenazopyridine 200 MG tablet  Commonly known as: PYRIDIUM              Allergies   Allergen Reactions    Iodine Other (See Comments)     Closes sinuses    Oxycodone-Acetaminophen Itching    Zofran [Ondansetron] Hives         Discharge Disposition:  Skilled Nursing Facility (DC - External)    Diet:  Hospital:  Diet Order   Procedures    Diet: Diabetic Diets; Consistent Carbohydrate; Texture: Regular Texture (IDDSI 7); Fluid Consistency: Thin (IDDSI 0)            Activity:      Restrictions or Other Recommendations:       CODE STATUS:    Code Status and Medical Interventions:   Ordered at: 10/29/23 1620     Code Status (Patient has no pulse and is not breathing):    CPR (Attempt to Resuscitate)     Medical Interventions (Patient has pulse or is breathing):    Full       Future Appointments   Date Time Provider Department Center   11/27/2023  3:40 PM Isma Justin MD [facility]   1/15/2024 10:15 AM Steve Cohen MD [facility]   10/24/2024 10:00 AM Isma Justin MD [facility]       Additional Instructions for the Follow-ups that You Need to Schedule       Discharge Follow-up with PCP   As directed       Currently Documented PCP:    Miguel Angel Mireles MD    PCP Phone Number:    [phone]     Follow Up Details: 1 week        Discharge Follow-up with Specialty: [facility]; 3 Months   As directed      Specialty: [facility]   Follow Up: 3 Months   Follow Up Details: Please ensure arranged prior to DC        Discharge Follow-up with Specified Provider: Dr Platt in office; 1 Week   As directed      To: Dr Platt in office   Follow Up: 1 Week   Follow Up  Details: Please ensure arranged prior to DC        Discharge Follow-up with Specified Provider: Dr Platt via TELE-HEALTH visit on Thursday 11/9   As directed      To: Dr Platt via TELE-HEALTH visit on Thursday 11/9   Follow Up Details: Please ensure arranged prior to DC        Discharge Follow-up with Specified Provider: Dr Justin as scheduled 11/27/23   As directed      To: Dr Justin as scheduled 11/27/23                      Rafaela Sandoval DO  11/07/23      Time Spent on Discharge:  I spent  55  minutes on this discharge activity which included: face-to-face encounter with the patient, reviewing the data in the system, coordination of the care with the nursing staff as well as consultants, documentation, and entering orders.

## 2023-11-07 NOTE — PROGRESS NOTES
[facility]   Urology Progress Note    Patient Name: Aldair Narvaez  : 1950  MRN: [id number]  Primary Care Physician:  Miguel Angel Mireles MD  Date of admission: 10/29/2023    Subjective   Subjective     Chief Complaint: left renal abscess    HPI:  Patient Reports no significant issues. Right arm with better movement and sensation. Denies fevers or chills.     Cr 0.52.   WBC 8.     Underwent repeat CT scan today, images reviewed. Left renal abscess appears to have resolved, no obvious gas. Left hydronephrosis resolved with left stent in appropriate position.     Review of Systems   All systems were reviewed and negative except for: None    Objective   Objective     Vitals:   Temp:  [97.8 °F (36.6 °C)-98.5 °F (36.9 °C)] 98.1 °F (36.7 °C)  Heart Rate:  [85-90] 88  Resp:  [16-18] 16  BP: (135-166)/(82-94) 144/82  Physical Exam    Constitutional: Awake in bed, alert   Eyes: PERRLA, sclerae anicteric, no conjunctival injection   HENT: Normocephalic, atraumatic, mucous membranes moist   Neck: Supple, trachea midline   Respiratory: Equal chest rise, non-labored respirations    Cardiovascular: RRR, palpable radial pulses bilaterally   Gastrointestinal: Soft, nontender, non-distended, left renal abscess drain in place with thin serosanguinous output   Musculoskeletal: No lower extremity edema bilaterally, no clubbing or cyanosis to extremities   Psychiatric: Appropriate affect, cooperative   Neurologic: Oriented x 3,  Cranial Nerves grossly intact, speech clear   Skin: No rashes     Result Review    Result Review:  I have personally reviewed the results from the time of this admission to 2023 20:37 EST and agree with these findings:  [x]  Laboratory  [x]  Microbiology  [x]  Radiology  []  EKG/Telemetry   []  Cardiology/Vascular   []  Pathology  []  Old records  []  Other:    Most notable findings include: renal abscess drain, blood, and urine cultures with  E coli, renal function stable, CT reviewed.     Assessment  & Plan   Assessment / Plan     Brief Patient Summary:  Aldair Narvaez is a 73 y.o. male who underwent left stent placement for left hydroureteronephrosis related to left distal ureteral stricture, left renal abscess drain placement per IR. Repeat CT shows resolving abscess. Left drain removed at bedside, skin dressed with gauze and paper tape.     Discussed with patient and family, will see him outpatient to discuss management of left distal ureteral stricture. Options discussed include chronic left stent exchanges vs ureteral reimplantation.     IV abx plan per ID.     Stable for discharge from urologic perspective.     Active Hospital Problems:  Active Hospital Problems    Diagnosis     **UTI (urinary tract infection)     NEETA (acute kidney injury)     Rhabdomyolysis     Right arm weakness     Hydronephrosis of left kidney     Personal history of prostate cancer     Sepsis due to urinary tract infection     CAD, multiple vessel     Uncontrolled type 2 diabetes mellitus with hyperglycemia     Mixed hyperlipidemia        Plan:   - left renal abscess drain removed  - cont left ureteral stent for now  - outpatient follow up to discuss options for management  - abx plan per ID  - stable for dc to rehab from urologic perspective         DVT prophylaxis:  Medical DVT prophylaxis orders are present.    CODE STATUS:   Code Status (Patient has no pulse and is not breathing): CPR (Attempt to Resuscitate)  Medical Interventions (Patient has pulse or is breathing): Full    Disposition:  I expect patient to discharge per primary team.    Electronically signed by Isma Justin MD, 11/06/23, 8:37 PM EST.

## 2023-11-15 ENCOUNTER — TELEPHONE (OUTPATIENT)
Dept: UROLOGY | Facility: CLINIC | Age: 73
End: 2023-11-15
Payer: MEDICARE

## 2023-11-15 NOTE — TELEPHONE ENCOUNTER
Nurse from [facility] call, pt states he is passing small blood clots.  Patient has not issues voiding, pain, fever or chills.  Nurse states pt drinks about 2-3 cups of water a day.  Informed nurse blood clots and blood in urine is normal, ask pt to drink more fluids and call us if sever bleeding, pain or issues voiding.  Blood clots should resolve as time goes by, urine should go back to normal.

## 2023-11-17 ENCOUNTER — TELEPHONE (OUTPATIENT)
Dept: UROLOGY | Facility: CLINIC | Age: 73
End: 2023-11-17
Payer: MEDICARE

## 2023-11-17 NOTE — TELEPHONE ENCOUNTER
Caller: Aldair Narvaez     Relationship: SELF    Best call back number: [phone]    What orders are you requesting (i.e. lab or imaging): CT SCAN    In what timeframe would the patient need to come in: ASAP    Where will you receive your lab/imaging services: [facility]    Additional notes:  PT CALLED STATING THAT HIS CT SCAN HAS NOT BEEN SCHEDULED AND IS NEEDING A ORDER PUT IN HIS CHART.     PLEASE CONTACT PT TO SCHEDULE CT SCAN

## 2023-11-20 ENCOUNTER — TELEPHONE (OUTPATIENT)
Dept: UROLOGY | Facility: CLINIC | Age: 73
End: 2023-11-20
Payer: MEDICARE

## 2023-11-20 NOTE — TELEPHONE ENCOUNTER
Hub staff attempted to follow warm transfer process and was unsuccessful     Caller: Aldair Narvaez    Relationship to patient: Self    Best call back number: [phone]    Patient is needing: PT WOULD LIKE TO KNOW IF CT SCAN IS NEEDED PRIOR TO APPT, IF SO NO ORDER HAS BEEN PUT IN FOR ONE    APPT: 11/27/23 3:40P

## 2023-11-20 NOTE — TELEPHONE ENCOUNTER
Spoke with patient and told him per Linh he had this done while inpatient and wouldn't need to repeat. He voiced understanding.

## 2023-11-21 NOTE — TELEPHONE ENCOUNTER
Informed pt CT scan not necessary for follow up, per Dr. Justin.    Per Dr. Justin, he will talk to pt about it during follow up.

## 2023-11-27 ENCOUNTER — OFFICE VISIT (OUTPATIENT)
Dept: UROLOGY | Facility: CLINIC | Age: 73
End: 2023-11-27
Payer: MEDICARE

## 2023-11-27 VITALS — BODY MASS INDEX: 32.5 KG/M2 | HEIGHT: 70 IN | HEART RATE: 85 BPM | OXYGEN SATURATION: 98 % | WEIGHT: 227 LBS

## 2023-11-27 DIAGNOSIS — N20.0 RECURRENT NEPHROLITHIASIS: ICD-10-CM

## 2023-11-27 DIAGNOSIS — N15.1 RENAL ABSCESS, LEFT: ICD-10-CM

## 2023-11-27 DIAGNOSIS — N39.3 STRESS INCONTINENCE: ICD-10-CM

## 2023-11-27 DIAGNOSIS — R32 URINARY INCONTINENCE, UNSPECIFIED TYPE: ICD-10-CM

## 2023-11-27 DIAGNOSIS — N13.5 URETERAL STRICTURE, LEFT: Primary | ICD-10-CM

## 2023-11-27 DIAGNOSIS — Z91.041 ALLERGY TO INTRAVENOUS CONTRAST: ICD-10-CM

## 2023-11-27 DIAGNOSIS — C61 PROSTATE CANCER: ICD-10-CM

## 2023-11-27 LAB
BILIRUB BLD-MCNC: NEGATIVE MG/DL
CLARITY, POC: CLEAR
COLOR UR: YELLOW
EXPIRATION DATE: ABNORMAL
GLUCOSE UR STRIP-MCNC: ABNORMAL MG/DL
KETONES UR QL: NEGATIVE
LEUKOCYTE EST, POC: ABNORMAL
Lab: ABNORMAL
NITRITE UR-MCNC: NEGATIVE MG/ML
PH UR: 6 [PH] (ref 5–8)
PROT UR STRIP-MCNC: ABNORMAL MG/DL
RBC # UR STRIP: ABNORMAL /UL
SP GR UR: 1.02 (ref 1–1.03)
UROBILINOGEN UR QL: NORMAL

## 2023-11-27 PROCEDURE — 51798 US URINE CAPACITY MEASURE: CPT | Performed by: STUDENT IN AN ORGANIZED HEALTH CARE EDUCATION/TRAINING PROGRAM

## 2023-11-27 PROCEDURE — 1160F RVW MEDS BY RX/DR IN RCRD: CPT | Performed by: STUDENT IN AN ORGANIZED HEALTH CARE EDUCATION/TRAINING PROGRAM

## 2023-11-27 PROCEDURE — 99215 OFFICE O/P EST HI 40 MIN: CPT | Performed by: STUDENT IN AN ORGANIZED HEALTH CARE EDUCATION/TRAINING PROGRAM

## 2023-11-27 PROCEDURE — 1159F MED LIST DOCD IN RCRD: CPT | Performed by: STUDENT IN AN ORGANIZED HEALTH CARE EDUCATION/TRAINING PROGRAM

## 2023-11-27 PROCEDURE — 81003 URINALYSIS AUTO W/O SCOPE: CPT | Performed by: STUDENT IN AN ORGANIZED HEALTH CARE EDUCATION/TRAINING PROGRAM

## 2023-11-27 RX ORDER — DIPHENHYDRAMINE HCL 25 MG
50 TABLET ORAL
OUTPATIENT
Start: 2023-11-27 | End: 2023-11-27

## 2023-11-27 RX ORDER — DIPHENHYDRAMINE HYDROCHLORIDE 50 MG/ML
50 INJECTION INTRAMUSCULAR; INTRAVENOUS
OUTPATIENT
Start: 2023-11-27 | End: 2023-11-28

## 2023-11-27 RX ORDER — PREDNISONE 1 MG/1
50 TABLET ORAL
OUTPATIENT
Start: 2023-11-27 | End: 2023-11-27

## 2023-11-27 RX ORDER — PREDNISONE 50 MG/1
50 TABLET ORAL TAKE AS DIRECTED
Qty: 2 TABLET | Refills: 0 | Status: SHIPPED | OUTPATIENT
Start: 2023-11-27

## 2023-11-27 NOTE — PROGRESS NOTES
Follow Up Office Visit      Patient Name: Aldair Narvaez  : 1950   MRN: [id number]     Chief Complaint:    Chief Complaint   Patient presents with    Recurrent Nephrolithiasis    Urinary Incontinence    Ureteral Stricture, left    Prostate Cancer       Referring Provider: No ref. provider found    History of Present Illness: Aldair Narvaez is a 73 y.o. male who presents today for follow up of left ureteral stricture, left renal abscess, prostate cancer, bladder neck contracture.  He has a very complicated urologic history.  He was admitted to [facility] in October with urosepsis, he has recurrence of his left-sided hydronephrosis in addition to a left renal abscess that required IR drain placement.  I taken to the operating room for left ureteral stent placement at which point we identified a recurrence of his distal left ureteral stricture which has subsequently been previously dilated via balloon at the outpatient surgery center in May.  He is also undergone right ureteroscopy lithotripsy for large right-sided stone burden which has since resolved on repeat CT imaging.  The patient is following with ID for bacteremia related to E. coli sepsis.  He has a PICC line in place.  He is seeing ID soon.    We need to repeat a CT image to assess his left renal abscess status post IR drainage.    He is fatigued, he has lost a little bit of weight.    He denies fevers or chills, otherwise he is struggling with weak stream.  He does have a known bladder neck contracture.  States he has a sensation of delayed emptying.  Stream is intermittently weak.  IPSS 27.  Appears to managing his bladder well, PVR 26 mL.  UA today with trace leukocytes and blood, expected given stent in place.    Subjective      Review of System: Review of Systems   Genitourinary:  Positive for frequency and urgency.      I have reviewed the ROS documented by my clinical staff, I have updated appropriately and I agree. Isma Justin,  MD ORDAZ have reviewed and the following portions of the patient's history were updated as appropriate: past family history, past medical history, past social history, past surgical history and problem list.    Medications:     Current Outpatient Medications:     albuterol sulfate  (90 Base) MCG/ACT inhaler, As Needed., Disp: , Rfl:     buPROPion XL (WELLBUTRIN XL) 150 MG 24 hr tablet, Daily., Disp: , Rfl:     cefTRIAXone 2,000 mg in sodium chloride 0.9 % 100 mL IVPB, Infuse 2,000 mg into a venous catheter Daily for 21 days. Indications: Bacteria in the Blood, Urinary Tract Infection, Disp: , Rfl:     coenzyme Q10 100 MG capsule, Take 1 capsule by mouth Daily., Disp: , Rfl:     Eliquis 5 MG tablet tablet, Take 1 tablet by mouth Every 12 (Twelve) Hours., Disp: , Rfl:     gabapentin (NEURONTIN) 100 MG capsule, Take 1 capsule by mouth 3 (Three) Times a Day As Needed (Nerve pain in right shoulder)., Disp: 9 capsule, Rfl: 0    glucose blood (Contour Next Test) test strip, 1 strip 3 times daily dx: e11.65, Disp: 300 each, Rfl: 3    HYDROcodone-acetaminophen (NORCO) 5-325 MG per tablet, Take 1 tablet by mouth Every 8 (Eight) Hours As Needed for Moderate Pain., Disp: 9 tablet, Rfl: 0    hyoscyamine sulfate (ANASPAZ) 0.125 MG tablet dispersible disintegrating tablet, Place 1 tablet on the tongue Every 4 (Four) Hours As Needed (bladder spasms)., Disp: 30 each, Rfl: 1    insulin detemir (LEVEMIR) 100 UNIT/ML injection, Inject 10 Units under the skin into the appropriate area as directed Every 12 (Twelve) Hours., Disp: 10 mL, Rfl: 0    Insulin Lispro (humaLOG) 100 UNIT/ML injection, Inject 2-7 Units under the skin into the appropriate area as directed Every 4 (Four) Hours., Disp: 10 mL, Rfl: 0    Insulin Lispro (humaLOG) 100 UNIT/ML injection, Inject 5 Units under the skin into the appropriate area as directed 3 (Three) Times a Day With Meals., Disp: 10 mL, Rfl: 0    ipratropium-albuterol (DUO-NEB) 0.5-2.5 mg/3 ml  nebulizer, Take 3 mL by nebulization Every 6 (Six) Hours As Needed for Shortness of Air., Disp: 360 mL, Rfl: 0    lactobacillus acidophilus (RISAQUAD) capsule capsule, Take 1 capsule by mouth Daily for 30 days., Disp: 30 capsule, Rfl: 0    melatonin 5 MG tablet tablet, Take 2 tablets by mouth At Night As Needed (sleep)., Disp: 30 tablet, Rfl: 0    metoprolol succinate XL (TOPROL-XL) 50 MG 24 hr tablet, 1 tablet Daily., Disp: , Rfl:     nitroglycerin (NITROSTAT) 0.4 MG SL tablet, nitroglycerin 0.4 mg sublingual tablet, Disp: , Rfl:     pantoprazole (PROTONIX) 40 MG EC tablet, Daily., Disp: , Rfl:     potassium citrate (UROCIT-K) 5 MEQ (540 MG) CR tablet, Take 3 tablets by mouth 2 (Two) Times a Day With Meals., Disp: 90 tablet, Rfl: 10    rosuvastatin (CRESTOR) 20 MG tablet, Take 1 tablet by mouth Daily., Disp: , Rfl:     tamsulosin (FLOMAX) 0.4 MG capsule 24 hr capsule, TAKE 1 CAPSULE BY MOUTH DAILY., Disp: 15 capsule, Rfl: 0    valsartan-hydrochlorothiazide (DIOVAN-HCT) 320-25 MG per tablet, 1 tablet Daily., Disp: , Rfl:     predniSONE (DELTASONE) 50 MG tablet, Take 1 tablet by mouth Take As Directed for 2 doses. Take 1 tablet (50mg) 13 hours and 7 hours prior to exam, Disp: 2 tablet, Rfl: 0    Allergies:   Allergies   Allergen Reactions    Iodine Other (See Comments)     Closes sinuses    Oxycodone-Acetaminophen Itching    Zofran [Ondansetron] Hives       IPSS Questionnaire (AUA-7):  Over the past month…    1)  Incomplete Emptying:       How often have you had a sensation of not emptying you had the sensation of not emptying your bladder completely after you finished urinating?  2 - Less than half the time   2)  Frequency:       How often have you had the urinate again less than two hours after you finished urinating?  5 - Almost always   3)  Intermittency:       How often have you found you stopped and started again several times when you urinated?   5 - Almost always   4) Urgency:      How often have you found it  difficult to postpone urination?  4 - More than half the time   5) Weak Stream:      How often have you had a weak urinary stream?  2 - Less than half the time   6) Straining:       How often have you had to push or strain to begin urination?  1 - Less than 1 time in 5   7) Nocturia:      How many times did you most typically get up to urinate from the time you went to bed at night until the time you got up in the morning?  3 - 3 times   Total Score:  27   The International Prostate Symptom Score (IPSS) is used to screen, diagnose, track symptoms of benign prostatic hyperplasia (BPH).   0-7 (Mild Symptoms) 8-19 (Moderate) 20-35 (Severe)   Quality of Life (QoL):  If you were to spend the rest of your life with your urinary condition just the way it is now, how would you feel about that? 5-Unhappy   Urine Leakage (Incontinence) 0-No Leakage     Sexual Health Inventory for Men (SONALI)   Over the past 6 months:     1. How do you rate your confidence that you could get and keep an erection?  0 - No sexual activity    2. When you had erections with sexual  stimulation, how often were your erections hard enough for penetration (entering your partner)?  0 - No Sexual Activity    3. During sexual intercourse, how often were you able to maintain your erection after you had penetrated (entered) your partner?  0 - Did not attempt intercourse   4. During sexual intercourse, how difficult was it to maintain your erection to completion of intercourse?  0 - Did not attempt intercourse   5. When you attempted sexual intercourse, how often was it satisfactory for you?  0 - Did not attempt intercourse    Total Score: 0   The Sexual Health Inventory for Men further classifies ED severity with the following breakpoints:   1-7 (Severe ED) 8-11 (Moderate ED) 12-16 (Mild to Moderate ED) 17-21 (Mild ED)      Post void residual bladder scan:   26 mL     Objective     Physical Exam:   Vital Signs:   Vitals:    11/27/23 1602   Pulse: 85   SpO2:  "98%   Weight: 103 kg (227 lb)   Height: 177.8 cm (70\")   PainSc: 0-No pain     Body mass index is 32.57 kg/m².     Physical Exam  Constitutional:       Appearance: Normal appearance.   HENT:      Head: Normocephalic and atraumatic.      Nose: Nose normal.   Eyes:      Extraocular Movements: Extraocular movements intact.      Conjunctiva/sclera: Conjunctivae normal.      Pupils: Pupils are equal, round, and reactive to light.   Musculoskeletal:         General: Normal range of motion.      Cervical back: Normal range of motion and neck supple.   Skin:     General: Skin is warm and dry.      Findings: No lesion or rash.   Neurological:      General: No focal deficit present.      Mental Status: He is alert and oriented to person, place, and time. Mental status is at baseline.   Psychiatric:         Mood and Affect: Mood normal.         Behavior: Behavior normal.         Labs:   Brief Urine Lab Results  (Last result in the past 365 days)        Color   Clarity   Blood   Leuk Est   Nitrite   Protein   CREAT   Urine HCG        11/27/23 1616 Yellow   Clear   2+   Trace   Negative   1+                   Urine Culture          5/24/2023    18:13 10/16/2023    12:37 10/29/2023    14:45   Urine Culture   Urine Culture No growth  25,000 CFU/mL Mixed Yas Isolated  >100,000 CFU/mL Escherichia coli         Lab Results   Component Value Date    GLUCOSE 254 (H) 11/07/2023    CALCIUM 9.1 11/07/2023     11/07/2023    K 4.1 11/07/2023    CO2 26.0 11/07/2023     11/07/2023    BUN 22 11/07/2023    CREATININE 0.73 (L) 11/07/2023    EGFRIFNONA 96 02/17/2021    BCR 30.1 (H) 11/07/2023    ANIONGAP 9.0 11/07/2023       Lab Results   Component Value Date    WBC 9.72 11/07/2023    HGB 13.3 11/07/2023    HCT 40.4 11/07/2023    MCV 87.3 11/07/2023     11/07/2023       Images:   CT Abdomen Pelvis With & Without Contrast    Result Date: 11/6/2023  Impression: 1.Interval placement of a pigtail drainage catheter along the " anterior cortex of the left kidney with small residual fluid collection. Correlate with output. 2.Interval placement of a left ureteral stent. No hydronephrosis currently. 3.Other stable findings. Electronically Signed: Titus Gunn MD  11/6/2023 11:31 AM [facility]  Workstation ID: [id number]    MRI Cervical Spine Without Contrast    Result Date: 11/1/2023  Impression: 1.Mild to moderate cervical spondylosis with varying degrees of neuroforaminal narrowing, which is most pronounced and moderate on the right at C4-C5. 2.Multiple muscle groups exhibiting high-grade infiltrating muscular edema. This could be related to infectious or inflammatory myositis. These could result from a brachial plexopathy although the course and caliber of the brachial plexus is unremarkable. 3.Mild multilevel cervical disc and facet joint degeneration resulting in varying degrees of neural foraminal narrowing without spinal canal stenosis. Electronically Signed: Jefferson Block MD  11/1/2023 12:23 AM EDT  Workstation ID: [id number]    MRI brachial plexus wo contrast    Result Date: 11/1/2023  Impression: 1.Mild to moderate cervical spondylosis with varying degrees of neuroforaminal narrowing, which is most pronounced and moderate on the right at C4-C5. 2.Multiple muscle groups exhibiting high-grade infiltrating muscular edema. This could be related to infectious or inflammatory myositis. These could result from a brachial plexopathy although the course and caliber of the brachial plexus is unremarkable. 3.Mild multilevel cervical disc and facet joint degeneration resulting in varying degrees of neural foraminal narrowing without spinal canal stenosis. Electronically Signed: Jefferson Block MD  11/1/2023 12:23 AM EDT  Workstation ID: [id number]    XR Pyelogram Retrograde    Result Date: 10/31/2023  Impression: A fluoroscopic unit was utilized for a procedure performed in the operating room. No interpretation was requested. Please refer to the operative  report regarding findings. Please refer to PACS for patient radiation dose information. Bilateral retrograde ureteral and pyelograms showing stricture distal ureters. Left double-J stent placed in good position. Left renal abscess drain and multiple pelvic surgical staples also seen. No other incidental findings of significance. Electronically Signed: Molina Fields MD  10/31/2023 10:04 AM EDT  Workstation ID: [id number]    CT Guided Percutaneous Drain Kidney Renal    Result Date: 10/30/2023  Impression:                                                              Successful CT guided percutaneous abscess drainage catheter placement in a left renal abscess. An 8.5 Polish Farias-Romero drainage catheter was placed. Drain under dry. Flush with 5 cc of normal saline at least once a day. Once the output drops to 0, reimage. Thank you for the opportunity to assist in the care of your patient. Electronically Signed: Molina Fields MD  10/30/2023 4:01 PM EDT  Workstation ID: [id number]    EMG & Nerve Conduction Test    Result Date: 10/30/2023  Median neuropathy at the wrist on the right, mild-moderate Ulnar neuropathy (incidental finding) at the elbow on the right, moderate EMG of the right arm showedsoverall reduction in voluntary motor units, a mildly abnormal but nonspecific finding. Note-this study is done extremely early following the injury; if brachial plexopathy remains suspect, a repeat study in 4-6 weeks may be considered, at which time the EMG portion of the study would be expected to be more revealing This report is transcribed using the Dragon dictation system.     CT Abdomen Pelvis Without Contrast    Result Date: 10/29/2023  Impression: 1.Left upper pole cyst appears increased in size compared to May 2023 and now contains irregular gaseous lucency with some new mild surrounding fat stranding. Findings are suspicious for infected renal cyst with gas-forming infection. Also recommend correlation recent surgery as  this could also be related to recent intervention. 2.Mild left hydronephrosis and hydroureter appears similar to the prior exam. The distal left ureter is nonopacified and nondilated. No clear obstructing etiology is identified. 3.Excreted contrast from prior CTA in the renal collecting systems, ureters, and bladder. This limits assessment for urinary tract calculi. 4.Cholelithiasis. 5.Calcific atherosclerosis. 6.Postoperative changes in the pelvis with evidence of prostatectomy. Electronically Signed: Larry Hoff  10/29/2023 8:16 PM EDT  Workstation ID: [id number]    MRI Brain Without Contrast    Result Date: 10/29/2023  Impression: 1.No definite findings of acute intracranial abnormality at this time. 2.Mild T2 and FLAIR hyperintense signal foci in the cerebral white matter are nonspecific but likely represent mild chronic small vessel ischemic changes. 3.Mucosal thickening of the paranasal sinuses could indicate acute sinusitis. Electronically Signed: Larry Hoff  10/29/2023 4:10 PM EDT  Workstation ID: [id number]    XR Shoulder 2+ View Right    Result Date: 10/29/2023  1.No definite radiographic findings of acute osseous shoulder abnormality. 2.Glenohumeral joint is not well evaluated due to positioning. There is suspected mild to moderate osteoarthritis. 3.Moderate degenerative changes at the acromioclavicular joint. Electronically Signed: Larry Hoff  10/29/2023 2:16 PM EDT  Workstation ID: [id number]    XR Chest 1 View    Result Date: 10/29/2023  1.Prominent interstitial markings and mild left basilar opacities which could represent mild edema, pneumonia, or chronic lung disease. 2.Mild cardiomegaly. Status post median sternotomy and CABG. Electronically Signed: Larry Hoff  10/29/2023 2:00 PM EDT  Workstation ID: [id number]    CT Angiogram Head w AI Analysis of LVO    Result Date: 10/29/2023  Impression: 1.No definite findings of acute intracranial large vessel occlusion. 2.No significant stenosis of greater  than 50% is seen at the bilateral internal carotid arteries at this time. Electronically Signed: Larry Coradoizabella  10/29/2023 1:49 PM EDT  Workstation ID: [id number]    CT Angiogram Neck    Result Date: 10/29/2023  Impression: 1.No definite findings of acute intracranial large vessel occlusion. 2.No significant stenosis of greater than 50% is seen at the bilateral internal carotid arteries at this time. Electronically Signed: Larry Luis Eduardo  10/29/2023 1:49 PM EDT  Workstation ID: [id number]    CT CEREBRAL PERFUSION WITH & WITHOUT CONTRAST    Result Date: 10/29/2023  No CT perfusion evidence of acute infarction or ischemia. Electronically Signed: Larry Coradoizabella  10/29/2023 1:35 PM EDT  Workstation ID: [id number]    CT Head Without Contrast Stroke Protocol    Result Date: 10/29/2023  1.No definite CT findings of acute intracranial abnormality. Probable mild chronic small vessel ischemic changes. 2.Paranasal sinus mucosal thickening which could indicate sinusitis. Electronically Signed: Larry Luis Eduardo  10/29/2023 1:18 PM EDT  Workstation ID: [id number]    US Renal Bilateral    Result Date: 10/17/2023  1. Limited study due to body habitus and overlying bowel gas. 2. Minimal hydronephrosis left kidney. 3. Septated cystic structure left kidney incompletely evaluated. Correlation with outside evaluation if performed recommended. Otherwise, a follow-up can always be considered with dedicated CT or MRI utilizing a renal mass protocol. Alternatively, a repeat short-term follow-up ultrasound. 4. Debris within the bladder. Please correlate with urinalysis Electronically Signed: Luan Lopez MD  10/17/2023 1:20 PM EDT  Workstation ID: [id number]      Measures:   Tobacco:   Aldair Narvaez  reports that he has never smoked. He has never used smokeless tobacco.     Assessment / Plan      Assessment/Plan:   73 y.o. male who presented today for follow up of recent admission for urosepsis related to a left renal abscess, recurrent left hydronephrosis  secondary to distal left ureteral stricture.  Complicated history including a remote prostatectomy and right nephrolithiasis status post ureteroscopy and lithotripsy.  He has undergone a previous left distal ureteral balloon dilation but the scarring has recurred promptly.  The recurrence of his left distal ureteral scar has resulted in hydronephrosis and likely infection that likely then resulted in a left renal abscess.  An IR drain was placed but was removed prior to his discharge.  He is still on IV antibiotics.  I will defer to ID regarding timeline for IV antibiotics.    I have ordered a repeat CT scan to reassess the kidneys.    In addition, we discussed definitive treatment options for his left ureteral stricture at this point includes a robotic procedure to perform a left ureteral reimplantation with psoas hitch versus Boari flap.  This is a major procedure and this could be complicated secondary to his likely small bladder capacity after remote prostatectomy and in addition he does have a bladder neck contracture that we need to monitor with another cystoscopy.    Other option is to continue stent exchanges every 6 months for the foreseeable future.  A chronic stent will maintain drainage while avoiding the potential complications of a major robotic surgery.  We discussed the risk of robotic surgery in the setting of previous prostatectomy includes bowel injury, urine leak, etc.    Patient is amenable to continue stent exchanges for now.  I will see him back at some point in late January for for stent exchange.  He is allergic to IV contrast and we will need to pretreat him with prednisone and Benadryl prior to his CT scan to reassess his left renal abscess.  Risks discussed.    I also discussed of his bladder neck contracture has worsened at his next surgery that we may perform a DVIU, laser incision or urethral stricture dilation if necessary.  This could result in worsening incontinence.  The patient  reports understanding.    Diagnoses and all orders for this visit:    1. Ureteral stricture, left (Primary)  -     Case Request; Standing  -     ceFAZolin (ANCEF) 2,000 mg in sodium chloride 0.9 % 100 mL IVPB  -     Case Request    2. Stress incontinence  -     POC Urinalysis Dipstick, Automated    3. Recurrent nephrolithiasis  -     POC Urinalysis Dipstick, Automated    4. Prostate cancer  -     POC Urinalysis Dipstick, Automated    5. Urinary incontinence, unspecified type  -     POC Urinalysis Dipstick, Automated    6. Allergy to intravenous contrast  -     predniSONE (DELTASONE) 50 MG tablet; Take 1 tablet by mouth Take As Directed for 2 doses. Take 1 tablet (50mg) 13 hours and 7 hours prior to exam  Dispense: 2 tablet; Refill: 0  -     predniSONE (DELTASONE) tablet 50 mg  -     diphenhydrAMINE (BENADRYL) tablet 50 mg  -     diphenhydrAMINE (BENADRYL) injection 50 mg  -     diphenhydrAMINE (BENADRYL) injection 50 mg    7. Renal abscess, left  -     CT Abdomen Pelvis With Contrast; Future    Other orders  -     Verify time of home doses of pre-medication for contrast allergy; Standing  -     Verify NPO Status; Standing  -     Verify the Time Patient Completed [place] / G2; Standing  -     Follow Anesthesia Guidelines / Protocol; Standing  -     Obtain Informed Consent; Standing           Follow Up:   No follow-ups on file.    I spent approximately 40 minutes providing clinical care for this patient; including review of patient's chart and provider documentation, face to face time spent with patient in examination room (obtaining history, performing physical exam, discussing diagnosis and management options), placing orders, and completing patient documentation.     Isma Justin MD  [facility] [place]

## 2023-11-28 PROBLEM — N13.5 URETERAL STRICTURE, LEFT: Status: ACTIVE | Noted: 2023-11-27

## 2023-12-04 ENCOUNTER — HOSPITAL ENCOUNTER (OUTPATIENT)
Dept: CT IMAGING | Facility: HOSPITAL | Age: 73
Discharge: HOME | End: 2023-12-04
Admitting: STUDENT IN AN ORGANIZED HEALTH CARE EDUCATION/TRAINING PROGRAM
Payer: MEDICARE

## 2023-12-04 DIAGNOSIS — N15.1 RENAL ABSCESS, LEFT: ICD-10-CM

## 2023-12-04 PROCEDURE — 25510000001 IOPAMIDOL 61 % SOLUTION: Performed by: STUDENT IN AN ORGANIZED HEALTH CARE EDUCATION/TRAINING PROGRAM

## 2023-12-04 PROCEDURE — 74177 CT ABD & PELVIS W/CONTRAST: CPT

## 2023-12-04 RX ADMIN — IOPAMIDOL 85 ML: 612 INJECTION, SOLUTION INTRAVENOUS at 10:15

## 2023-12-04 NOTE — PROGRESS NOTES
Hey guys can one of you call this patient, he has a history of left renal abscess status post IR drain placement.  The IR drain has been removed.  He is seeing infectious disease.  I ordered a repeat CT scan to reassess his left renal abscess.  His left renal abscess has resolved.  He has a small amount of inflammation around that site but no observable abscess.  Great news.  Just let him know.  Thank you    Isma Justin MD

## 2023-12-06 ENCOUNTER — TRANSCRIBE ORDERS (OUTPATIENT)
Dept: LAB | Facility: HOSPITAL | Age: 73
End: 2023-12-06
Payer: MEDICARE

## 2023-12-06 ENCOUNTER — LAB (OUTPATIENT)
Dept: LAB | Facility: HOSPITAL | Age: 73
End: 2023-12-06
Payer: MEDICARE

## 2023-12-06 DIAGNOSIS — E78.00 PURE HYPERCHOLESTEROLEMIA: Primary | ICD-10-CM

## 2023-12-06 DIAGNOSIS — E78.00 PURE HYPERCHOLESTEROLEMIA: ICD-10-CM

## 2023-12-06 LAB
DEPRECATED RDW RBC AUTO: 49.5 FL (ref 37–54)
ERYTHROCYTE [DISTWIDTH] IN BLOOD BY AUTOMATED COUNT: 15.5 % (ref 12.3–15.4)
HCT VFR BLD AUTO: 38.9 % (ref 37.5–51)
HGB BLD-MCNC: 13.1 G/DL (ref 13–17.7)
MCH RBC QN AUTO: 29.6 PG (ref 26.6–33)
MCHC RBC AUTO-ENTMCNC: 33.7 G/DL (ref 31.5–35.7)
MCV RBC AUTO: 88 FL (ref 79–97)
PLATELET # BLD AUTO: 202 10*3/MM3 (ref 140–450)
PMV BLD AUTO: 10.6 FL (ref 6–12)
RBC # BLD AUTO: 4.42 10*6/MM3 (ref 4.14–5.8)
WBC NRBC COR # BLD AUTO: 9.06 10*3/MM3 (ref 3.4–10.8)

## 2023-12-06 PROCEDURE — 85027 COMPLETE CBC AUTOMATED: CPT

## 2023-12-06 PROCEDURE — 80048 BASIC METABOLIC PNL TOTAL CA: CPT

## 2023-12-06 PROCEDURE — 80061 LIPID PANEL: CPT

## 2023-12-06 PROCEDURE — 36415 COLL VENOUS BLD VENIPUNCTURE: CPT

## 2023-12-06 PROCEDURE — 80076 HEPATIC FUNCTION PANEL: CPT

## 2023-12-07 LAB
ALBUMIN SERPL-MCNC: 4.4 G/DL (ref 3.5–5.2)
ALP SERPL-CCNC: 73 U/L (ref 39–117)
ALT SERPL W P-5'-P-CCNC: 35 U/L (ref 1–41)
ANION GAP SERPL CALCULATED.3IONS-SCNC: 9 MMOL/L (ref 5–15)
AST SERPL-CCNC: 36 U/L (ref 1–40)
BILIRUB CONJ SERPL-MCNC: <0.2 MG/DL (ref 0–0.3)
BILIRUB INDIRECT SERPL-MCNC: NORMAL MG/DL
BILIRUB SERPL-MCNC: 0.2 MG/DL (ref 0–1.2)
BUN SERPL-MCNC: 21 MG/DL (ref 8–23)
BUN/CREAT SERPL: 26.3 (ref 7–25)
CALCIUM SPEC-SCNC: 9.7 MG/DL (ref 8.6–10.5)
CHLORIDE SERPL-SCNC: 101 MMOL/L (ref 98–107)
CHOLEST SERPL-MCNC: 209 MG/DL (ref 0–200)
CO2 SERPL-SCNC: 29 MMOL/L (ref 22–29)
CREAT SERPL-MCNC: 0.8 MG/DL (ref 0.76–1.27)
EGFRCR SERPLBLD CKD-EPI 2021: 94.6 ML/MIN/1.73
GLUCOSE SERPL-MCNC: 135 MG/DL (ref 65–99)
HDLC SERPL-MCNC: 32 MG/DL (ref 40–60)
LDLC SERPL CALC-MCNC: 102 MG/DL (ref 0–100)
LDLC/HDLC SERPL: 2.79 {RATIO}
POTASSIUM SERPL-SCNC: 4.1 MMOL/L (ref 3.5–5.2)
PROT SERPL-MCNC: 7.1 G/DL (ref 6–8.5)
SODIUM SERPL-SCNC: 139 MMOL/L (ref 136–145)
TRIGL SERPL-MCNC: 439 MG/DL (ref 0–150)
VLDLC SERPL-MCNC: 75 MG/DL (ref 5–40)

## 2023-12-19 RX ORDER — CARVEDILOL 25 MG/1
1 TABLET, FILM COATED ORAL 3 TIMES DAILY
Qty: 200 EACH | Refills: 3 | Status: SHIPPED | OUTPATIENT
Start: 2023-12-19

## 2023-12-19 NOTE — TELEPHONE ENCOUNTER
Rx Refill Note  Requested Prescriptions     Pending Prescriptions Disp Refills    Contour Test test strip [Pharmacy Med Name: CONTOUR TEST STRIP] 200 each 3     Sig: USE TO TEST THREE TIMES A DAY          Last office visit with prescribing clinician: 9/5/2023     Next office visit with prescribing clinician: 1/15/2024         Nohelia Chang MA  12/19/23, 13:14 EST

## 2024-01-10 ENCOUNTER — LAB (OUTPATIENT)
Dept: LAB | Facility: HOSPITAL | Age: 74
End: 2024-01-10
Payer: MEDICARE

## 2024-01-10 ENCOUNTER — TRANSCRIBE ORDERS (OUTPATIENT)
Dept: LAB | Facility: HOSPITAL | Age: 74
End: 2024-01-10
Payer: MEDICARE

## 2024-01-10 DIAGNOSIS — E13.69 OTHER SPECIFIED DIABETES MELLITUS WITH OTHER SPECIFIED COMPLICATION, UNSPECIFIED WHETHER LONG TERM INSULIN USE: ICD-10-CM

## 2024-01-10 DIAGNOSIS — N10 ACUTE PYELONEPHRITIS WITHOUT LESION OF RENAL MEDULLARY NECROSIS: ICD-10-CM

## 2024-01-10 DIAGNOSIS — N10 ACUTE PYELONEPHRITIS WITHOUT LESION OF RENAL MEDULLARY NECROSIS: Primary | ICD-10-CM

## 2024-01-10 LAB
BACTERIA UR QL AUTO: ABNORMAL /HPF
BILIRUB UR QL STRIP: NEGATIVE
CLARITY UR: ABNORMAL
COLOR UR: YELLOW
GLUCOSE UR STRIP-MCNC: ABNORMAL MG/DL
HGB UR QL STRIP.AUTO: ABNORMAL
HYALINE CASTS UR QL AUTO: ABNORMAL /LPF
KETONES UR QL STRIP: NEGATIVE
LEUKOCYTE ESTERASE UR QL STRIP.AUTO: ABNORMAL
NITRITE UR QL STRIP: NEGATIVE
PH UR STRIP.AUTO: 5.5 [PH] (ref 5–8)
PROT UR QL STRIP: ABNORMAL
RBC # UR STRIP: ABNORMAL /HPF
REF LAB TEST METHOD: ABNORMAL
SP GR UR STRIP: >=1.03 (ref 1–1.03)
SQUAMOUS #/AREA URNS HPF: ABNORMAL /HPF
UROBILINOGEN UR QL STRIP: ABNORMAL
WBC # UR STRIP: ABNORMAL /HPF
YEAST URNS QL MICRO: ABNORMAL /HPF

## 2024-01-10 PROCEDURE — 81001 URINALYSIS AUTO W/SCOPE: CPT

## 2024-01-10 PROCEDURE — 87086 URINE CULTURE/COLONY COUNT: CPT

## 2024-01-11 LAB — BACTERIA SPEC AEROBE CULT: NO GROWTH

## 2024-01-18 ENCOUNTER — ANESTHESIA EVENT (OUTPATIENT)
Dept: PERIOP | Facility: HOSPITAL | Age: 74
End: 2024-01-18
Payer: MEDICARE

## 2024-01-18 RX ORDER — FAMOTIDINE 10 MG/ML
20 INJECTION, SOLUTION INTRAVENOUS ONCE
Status: CANCELLED | OUTPATIENT
Start: 2024-01-18 | End: 2024-01-18

## 2024-01-18 RX ORDER — SODIUM CHLORIDE 9 MG/ML
40 INJECTION, SOLUTION INTRAVENOUS AS NEEDED
Status: CANCELLED | OUTPATIENT
Start: 2024-01-18

## 2024-01-19 ENCOUNTER — ANESTHESIA (OUTPATIENT)
Dept: PERIOP | Facility: HOSPITAL | Age: 74
End: 2024-01-19
Payer: MEDICARE

## 2024-01-19 ENCOUNTER — APPOINTMENT (OUTPATIENT)
Dept: GENERAL RADIOLOGY | Facility: HOSPITAL | Age: 74
End: 2024-01-19
Payer: MEDICARE

## 2024-01-19 ENCOUNTER — HOSPITAL ENCOUNTER (OUTPATIENT)
Facility: HOSPITAL | Age: 74
Setting detail: HOSPITAL OUTPATIENT SURGERY
Discharge: HOME | End: 2024-01-19
Attending: STUDENT IN AN ORGANIZED HEALTH CARE EDUCATION/TRAINING PROGRAM | Admitting: STUDENT IN AN ORGANIZED HEALTH CARE EDUCATION/TRAINING PROGRAM
Payer: MEDICARE

## 2024-01-19 VITALS
DIASTOLIC BLOOD PRESSURE: 77 MMHG | OXYGEN SATURATION: 95 % | TEMPERATURE: 97.4 F | HEIGHT: 62 IN | WEIGHT: 214.51 LBS | BODY MASS INDEX: 39.47 KG/M2 | RESPIRATION RATE: 16 BRPM | SYSTOLIC BLOOD PRESSURE: 126 MMHG | HEART RATE: 80 BPM

## 2024-01-19 DIAGNOSIS — N12 PYELONEPHRITIS: ICD-10-CM

## 2024-01-19 DIAGNOSIS — N13.5 URETERAL STRICTURE, LEFT: ICD-10-CM

## 2024-01-19 LAB
DEPRECATED RDW RBC AUTO: 52.6 FL (ref 37–54)
ERYTHROCYTE [DISTWIDTH] IN BLOOD BY AUTOMATED COUNT: 15.6 % (ref 12.3–15.4)
GLUCOSE BLDC GLUCOMTR-MCNC: 157 MG/DL (ref 70–130)
HCT VFR BLD AUTO: 44.3 % (ref 37.5–51)
HGB BLD-MCNC: 14.6 G/DL (ref 13–17.7)
MCH RBC QN AUTO: 30 PG (ref 26.6–33)
MCHC RBC AUTO-ENTMCNC: 33 G/DL (ref 31.5–35.7)
MCV RBC AUTO: 91.2 FL (ref 79–97)
PLATELET # BLD AUTO: 190 10*3/MM3 (ref 140–450)
PMV BLD AUTO: 9 FL (ref 6–12)
QT INTERVAL: 426 MS
QTC INTERVAL: 479 MS
RBC # BLD AUTO: 4.86 10*6/MM3 (ref 4.14–5.8)
WBC NRBC COR # BLD AUTO: 10.27 10*3/MM3 (ref 3.4–10.8)

## 2024-01-19 PROCEDURE — 74420 UROGRAPHY RTRGR +-KUB: CPT

## 2024-01-19 PROCEDURE — C1758 CATHETER, URETERAL: HCPCS | Performed by: STUDENT IN AN ORGANIZED HEALTH CARE EDUCATION/TRAINING PROGRAM

## 2024-01-19 PROCEDURE — 25010000002 CEFAZOLIN PER 500 MG: Performed by: STUDENT IN AN ORGANIZED HEALTH CARE EDUCATION/TRAINING PROGRAM

## 2024-01-19 PROCEDURE — 74420 UROGRAPHY RTRGR +-KUB: CPT | Performed by: STUDENT IN AN ORGANIZED HEALTH CARE EDUCATION/TRAINING PROGRAM

## 2024-01-19 PROCEDURE — C2617 STENT, NON-COR, TEM W/O DEL: HCPCS | Performed by: STUDENT IN AN ORGANIZED HEALTH CARE EDUCATION/TRAINING PROGRAM

## 2024-01-19 PROCEDURE — 52332 CYSTOSCOPY AND TREATMENT: CPT | Performed by: STUDENT IN AN ORGANIZED HEALTH CARE EDUCATION/TRAINING PROGRAM

## 2024-01-19 PROCEDURE — 82948 REAGENT STRIP/BLOOD GLUCOSE: CPT

## 2024-01-19 PROCEDURE — 25510000001 IOPAMIDOL 61 % SOLUTION: Performed by: STUDENT IN AN ORGANIZED HEALTH CARE EDUCATION/TRAINING PROGRAM

## 2024-01-19 PROCEDURE — 25010000002 PROPOFOL 10 MG/ML EMULSION: Performed by: ANESTHESIOLOGY

## 2024-01-19 PROCEDURE — 25010000002 FENTANYL CITRATE (PF) 100 MCG/2ML SOLUTION: Performed by: ANESTHESIOLOGY

## 2024-01-19 PROCEDURE — 25810000003 LACTATED RINGERS PER 1000 ML: Performed by: ANESTHESIOLOGY

## 2024-01-19 PROCEDURE — 93010 ELECTROCARDIOGRAM REPORT: CPT | Performed by: INTERNAL MEDICINE

## 2024-01-19 PROCEDURE — 93005 ELECTROCARDIOGRAM TRACING: CPT | Performed by: ANESTHESIOLOGY

## 2024-01-19 PROCEDURE — 25010000002 DEXAMETHASONE PER 1 MG: Performed by: ANESTHESIOLOGY

## 2024-01-19 PROCEDURE — 85027 COMPLETE CBC AUTOMATED: CPT | Performed by: ANESTHESIOLOGY

## 2024-01-19 PROCEDURE — 52351 CYSTOURETERO & OR PYELOSCOPE: CPT | Performed by: STUDENT IN AN ORGANIZED HEALTH CARE EDUCATION/TRAINING PROGRAM

## 2024-01-19 DEVICE — URETERAL STENT WITH SIDE HOLES 6FX24CM
Type: IMPLANTABLE DEVICE | Site: URETHRA | Status: FUNCTIONAL
Brand: TRIA™ FIRM

## 2024-01-19 RX ORDER — NITROFURANTOIN 25; 75 MG/1; MG/1
100 CAPSULE ORAL 2 TIMES DAILY
Qty: 10 CAPSULE | Refills: 0 | Status: SHIPPED | OUTPATIENT
Start: 2024-01-19

## 2024-01-19 RX ORDER — FAMOTIDINE 20 MG/1
20 TABLET, FILM COATED ORAL ONCE
Status: COMPLETED | OUTPATIENT
Start: 2024-01-19 | End: 2024-01-19

## 2024-01-19 RX ORDER — EPHEDRINE SULFATE 50 MG/ML
5 INJECTION, SOLUTION INTRAVENOUS ONCE AS NEEDED
Status: DISCONTINUED | OUTPATIENT
Start: 2024-01-19 | End: 2024-01-19 | Stop reason: HOSPADM

## 2024-01-19 RX ORDER — HYDROCODONE BITARTRATE AND ACETAMINOPHEN 5; 325 MG/1; MG/1
1 TABLET ORAL EVERY 8 HOURS PRN
Qty: 6 TABLET | Refills: 0 | Status: SHIPPED | OUTPATIENT
Start: 2024-01-19

## 2024-01-19 RX ORDER — NALOXONE HCL 0.4 MG/ML
0.4 VIAL (ML) INJECTION AS NEEDED
Status: DISCONTINUED | OUTPATIENT
Start: 2024-01-19 | End: 2024-01-19 | Stop reason: HOSPADM

## 2024-01-19 RX ORDER — FENTANYL CITRATE 50 UG/ML
INJECTION, SOLUTION INTRAMUSCULAR; INTRAVENOUS AS NEEDED
Status: DISCONTINUED | OUTPATIENT
Start: 2024-01-19 | End: 2024-01-19 | Stop reason: SURG

## 2024-01-19 RX ORDER — SODIUM CHLORIDE, SODIUM LACTATE, POTASSIUM CHLORIDE, CALCIUM CHLORIDE 600; 310; 30; 20 MG/100ML; MG/100ML; MG/100ML; MG/100ML
100 INJECTION, SOLUTION INTRAVENOUS CONTINUOUS
Status: DISCONTINUED | OUTPATIENT
Start: 2024-01-19 | End: 2024-01-19 | Stop reason: HOSPADM

## 2024-01-19 RX ORDER — LIDOCAINE HYDROCHLORIDE 10 MG/ML
INJECTION, SOLUTION EPIDURAL; INFILTRATION; INTRACAUDAL; PERINEURAL AS NEEDED
Status: DISCONTINUED | OUTPATIENT
Start: 2024-01-19 | End: 2024-01-19 | Stop reason: SURG

## 2024-01-19 RX ORDER — GLIMEPIRIDE 4 MG/1
4 TABLET ORAL
COMMUNITY

## 2024-01-19 RX ORDER — SODIUM CHLORIDE 0.9 % (FLUSH) 0.9 %
10 SYRINGE (ML) INJECTION EVERY 12 HOURS SCHEDULED
Status: DISCONTINUED | OUTPATIENT
Start: 2024-01-19 | End: 2024-01-19 | Stop reason: HOSPADM

## 2024-01-19 RX ORDER — PROPOFOL 10 MG/ML
VIAL (ML) INTRAVENOUS AS NEEDED
Status: DISCONTINUED | OUTPATIENT
Start: 2024-01-19 | End: 2024-01-19 | Stop reason: SURG

## 2024-01-19 RX ORDER — LIDOCAINE HYDROCHLORIDE 10 MG/ML
0.5 INJECTION, SOLUTION EPIDURAL; INFILTRATION; INTRACAUDAL; PERINEURAL ONCE AS NEEDED
Status: COMPLETED | OUTPATIENT
Start: 2024-01-19 | End: 2024-01-19

## 2024-01-19 RX ORDER — SODIUM CHLORIDE 0.9 % (FLUSH) 0.9 %
10 SYRINGE (ML) INJECTION AS NEEDED
Status: DISCONTINUED | OUTPATIENT
Start: 2024-01-19 | End: 2024-01-19 | Stop reason: HOSPADM

## 2024-01-19 RX ORDER — HYOSCYAMINE SULFATE 0.125 MG
125 TABLET,DISINTEGRATING ORAL EVERY 4 HOURS PRN
Qty: 30 EACH | Refills: 1 | Status: SHIPPED | OUTPATIENT
Start: 2024-01-19

## 2024-01-19 RX ORDER — HYDROMORPHONE HYDROCHLORIDE 1 MG/ML
0.5 INJECTION, SOLUTION INTRAMUSCULAR; INTRAVENOUS; SUBCUTANEOUS
Status: DISCONTINUED | OUTPATIENT
Start: 2024-01-19 | End: 2024-01-19 | Stop reason: HOSPADM

## 2024-01-19 RX ORDER — MAGNESIUM HYDROXIDE 1200 MG/15ML
LIQUID ORAL AS NEEDED
Status: DISCONTINUED | OUTPATIENT
Start: 2024-01-19 | End: 2024-01-19 | Stop reason: HOSPADM

## 2024-01-19 RX ORDER — FENTANYL CITRATE 50 UG/ML
50 INJECTION, SOLUTION INTRAMUSCULAR; INTRAVENOUS
Status: DISCONTINUED | OUTPATIENT
Start: 2024-01-19 | End: 2024-01-19 | Stop reason: HOSPADM

## 2024-01-19 RX ORDER — SODIUM CHLORIDE, SODIUM LACTATE, POTASSIUM CHLORIDE, CALCIUM CHLORIDE 600; 310; 30; 20 MG/100ML; MG/100ML; MG/100ML; MG/100ML
9 INJECTION, SOLUTION INTRAVENOUS CONTINUOUS
Status: DISCONTINUED | OUTPATIENT
Start: 2024-01-19 | End: 2024-01-19 | Stop reason: HOSPADM

## 2024-01-19 RX ORDER — LIDOCAINE HYDROCHLORIDE 20 MG/ML
JELLY TOPICAL AS NEEDED
Status: DISCONTINUED | OUTPATIENT
Start: 2024-01-19 | End: 2024-01-19 | Stop reason: HOSPADM

## 2024-01-19 RX ORDER — DEXAMETHASONE SODIUM PHOSPHATE 4 MG/ML
INJECTION, SOLUTION INTRA-ARTICULAR; INTRALESIONAL; INTRAMUSCULAR; INTRAVENOUS; SOFT TISSUE AS NEEDED
Status: DISCONTINUED | OUTPATIENT
Start: 2024-01-19 | End: 2024-01-19 | Stop reason: SURG

## 2024-01-19 RX ORDER — MIDAZOLAM HYDROCHLORIDE 1 MG/ML
0.5 INJECTION INTRAMUSCULAR; INTRAVENOUS
Status: DISCONTINUED | OUTPATIENT
Start: 2024-01-19 | End: 2024-01-19 | Stop reason: HOSPADM

## 2024-01-19 RX ADMIN — FENTANYL CITRATE 25 MCG: 50 INJECTION, SOLUTION INTRAMUSCULAR; INTRAVENOUS at 07:30

## 2024-01-19 RX ADMIN — FENTANYL CITRATE 25 MCG: 50 INJECTION, SOLUTION INTRAMUSCULAR; INTRAVENOUS at 07:33

## 2024-01-19 RX ADMIN — LIDOCAINE HYDROCHLORIDE 0.5 ML: 10 INJECTION, SOLUTION EPIDURAL; INFILTRATION; INTRACAUDAL; PERINEURAL at 06:37

## 2024-01-19 RX ADMIN — FAMOTIDINE 20 MG: 20 TABLET ORAL at 06:37

## 2024-01-19 RX ADMIN — DEXAMETHASONE SODIUM PHOSPHATE 4 MG: 4 INJECTION, SOLUTION INTRA-ARTICULAR; INTRALESIONAL; INTRAMUSCULAR; INTRAVENOUS; SOFT TISSUE at 07:30

## 2024-01-19 RX ADMIN — PROPOFOL 20 MG: 10 INJECTION, EMULSION INTRAVENOUS at 07:40

## 2024-01-19 RX ADMIN — SODIUM CHLORIDE 2000 MG: 900 INJECTION INTRAVENOUS at 07:30

## 2024-01-19 RX ADMIN — FENTANYL CITRATE 25 MCG: 50 INJECTION, SOLUTION INTRAMUSCULAR; INTRAVENOUS at 07:36

## 2024-01-19 RX ADMIN — LIDOCAINE HYDROCHLORIDE 50 MG: 10 INJECTION, SOLUTION EPIDURAL; INFILTRATION; INTRACAUDAL; PERINEURAL at 07:30

## 2024-01-19 RX ADMIN — PROPOFOL 150 MG: 10 INJECTION, EMULSION INTRAVENOUS at 07:30

## 2024-01-19 RX ADMIN — FENTANYL CITRATE 25 MCG: 50 INJECTION, SOLUTION INTRAMUSCULAR; INTRAVENOUS at 07:40

## 2024-01-19 RX ADMIN — SODIUM CHLORIDE, POTASSIUM CHLORIDE, SODIUM LACTATE AND CALCIUM CHLORIDE 9 ML/HR: 600; 310; 30; 20 INJECTION, SOLUTION INTRAVENOUS at 06:37

## 2024-01-19 NOTE — BRIEF OP NOTE
CYSTOSCOPY URETERAL CATHETER/STENT INSERTION  Progress Note    Aldair Narvaez  1/19/2024    Pre-op Diagnosis:   Ureteral stricture, left [N13.5]       Post-Op Diagnosis Codes:     * Ureteral stricture, left [N13.5]           Procedure(s):  CYSTOSCOPY   LEFT RETROGRADE PYELOGRAM   LEFT URETERAL STENT EXCHANGE        Surgeon(s):  Isma Justin MD Teplitsky, Seth L, MD    Anesthesia: General    Staff:   Circulator: Isis Key RN  Radiology Technologist: Zachariah Roberto RT  Scrub Person: Delfina Aleman         Estimated Blood Loss: minimal    Urine Voided: * No values recorded between 1/19/2024  7:27 AM and 1/19/2024  7:57 AM *    Specimens:                None          Drains: * No LDAs found *    Findings: Mild bladder neck contracture able to accommodate 22 Fr cystoscope with gentle resistance, exisiting left stent in good position without encrustation. Uncomplicated left stent exchange, 6 Fr x 24 cm TRIA. Left retrograde demonstrates persistent, significant distal ureteral stenosis >2-3 cm.       Complications: None          Isma Justin MD     Date: 1/19/2024  Time: 07:57 EST

## 2024-01-19 NOTE — ANESTHESIA PROCEDURE NOTES
Airway  Urgency: elective    Date/Time: 1/19/2024 7:31 AM  Airway not difficult    General Information and Staff    Patient location during procedure: OR  SRNA: Norma Hernandez SRNA  Indications and Patient Condition  Indications for airway management: airway protection    Preoxygenated: yes  Mask difficulty assessment: 0 - not attempted    Final Airway Details  Final airway type: supraglottic airway      Successful airway: I-gel  Size 4     Number of attempts at approach: 1  Assessment: lips, teeth, and gum same as pre-op    Additional Comments  LMA placed without difficulty, ventilation with assist, equal breath sounds and symmetric chest rise and fall  Edentulous on top

## 2024-01-19 NOTE — ANESTHESIA POSTPROCEDURE EVALUATION
Patient: Aldair Narvaez    Procedure Summary       Date: 01/19/24 Room / [facility]:  [facility]  [facility]    Anesthesia Start: 0726 Anesthesia Stop: 0759    Procedure: CYSTOSCOPY RETROGRADE PYELOGRAM AND STENT INSERTION (Left: Bladder) Diagnosis:       Ureteral stricture, left      (Ureteral stricture, left [N13.5])    Surgeons: Isma Justin MD Provider: Theron Jaime MD    Anesthesia Type: general ASA Status: 3            Anesthesia Type: general    Vitals  Vitals Value Taken Time   /74 01/19/24 0759   Temp     Pulse 83 01/19/24 0800   Resp 18 01/19/24 0759   SpO2 98 % 01/19/24 0800   Vitals shown include unfiled device data.        Post Anesthesia Care and Evaluation    Patient location during evaluation: PACU  Patient participation: complete - patient participated  Level of consciousness: awake and alert  Pain management: adequate    Airway patency: patent  Anesthetic complications: No anesthetic complications  PONV Status: none  Cardiovascular status: hemodynamically stable and acceptable  Respiratory status: nonlabored ventilation, acceptable, nasal cannula and spontaneous ventilation  Hydration status: acceptable  No anesthesia care post op

## 2024-01-19 NOTE — DISCHARGE INSTRUCTIONS
Post-Operative Care/Expectations    Follow these guidelines after your procedure in order to assist with your recovery.    Anesthesia Precautions and Expectations  - Rest for 24 hours after receiving general anesthesia, make sure you have someone at home with you that can monitor you  - Do not operate a vehicle, drink alcohol, or make 'important decisions'/sign legal documentation during the immediate recovery period if you received sedation for your procedure  - You may experience a sore throat, jaw discomfort, or muscle aches related to anesthesia, these symptoms may last a few days    Activity  - You may resume your normal home activities immediately post-operatively; however, light activity is encouraged for 24 hours to prevent urinary bleeding  - Do not operate a vehicle or drink alcohol if you were prescribed narcotic pain medications     Bathing/Showering  - You may resume normal bathing and showering post-procedure    Pain/Urinary Symptoms  - You may experience burning urinary pain for a few days, and/or increased urinary urgency/frequency post-procedure for a few weeks which is expected (sometimes longer if a ureteral stent was left in place)   - A medication to prevent burning urinary pain (Phenazopyridine) may be prescribed by your doctor, take as directed  - A medication to prevent urinary urgency/frequency (sometimes referred to as “bladder spasms”) (Hyoscyamine, Oxybutynin, Mirabegron, Solifenacin, etc.) may be prescribed by your doctor for up to 1 month, take as directed     Urinary Bleeding (Hematuria)   - Some degree of light urinary bleeding (hematuria) is expected for up to 1-2 weeks (this may be as light as pink lemonade or somewhat darker like clear/pale red Gatorade); a good rule of thumb is that your urine should remain see-through    - If you experience heavy urinary bleeding (like the color and consistency of tomato juice, or red wine), large blood clots, or you are unable to urinate for  more than 8 hours you should contact your doctor and present to the nearest Emergency Department  - Drink plenty of water at home and stay hydrated, as this will help naturally flush out your bladder and urethra    Antibiotics  - Complete the antibiotic course (if) prescribed as directed to prevent urinary infection     When to call your doctor:   - Pain that is not controlled with oral medications  - Signs of significant infection: Fever 101F, shaking chills, profuse sweating, persistent nausea or vomiting, unable to tolerate food or drink   - Severe urinary bleeding or large blood clots in urine  - Inability to urinate for more than 8 hours post-surgery

## 2024-01-19 NOTE — OP NOTE
CYSTOSCOPY URETERAL CATHETER/STENT INSERTION  Procedure Report    Patient Name:  Aldair Narvaez  YOB: 1950    Date of Surgery:  1/19/2024     Indications:  72 yo M with history of left ureteral stricture managed with an indwelling ureteral stent presents for stent exchange.     Pre-op Diagnosis:   Ureteral stricture, left [N13.5]       Post-Op Diagnosis Codes:     * Ureteral stricture, left [N13.5]         Procedure(s):  CYSTOSCOPY  LEFT RETROGRADE PYELOGRAM   LEFT URETERAL STENT INSERTION    Staff:  Surgeon(s):  Isma Justin MD Teplitsky, Seth L, MD         Anesthesia: General    Estimated Blood Loss: none    Implants:    Implant Name Type Inv. Item Serial No.  Lot No. LRB No. Used Action   STNT URETRL TRIA MF FIRM 6F 24CM - [id number] Stent STNT URETRL TRIA MF FIRM 6F 24CM  "[employer] [id number] Left 1 Implanted       Specimen:          None        Findings: Uncomplicated left ureteral stent exchange, mild bladder neck contracture able to be bypassed by 22 Fr cystoscope.     Complications: None    Description of Procedure:   The patient was identified in the preoperative holding area where informed consent was reviewed and signed. The patient was transported the operating room per anesthesia and placed supine on the operating table. Smooth endotracheal intubation was performed without issue after administration of general anesthesia. The patient was then placed in the dorsal lithotomy position where genitals were prepped and draped in the usual sterile fashion. A brief timeout was performed identifying the correct patient procedure and laterality. Perioperative antibiotics were administered. All pressure points were padded.     Procedure began by inserting a 22 [place] cystoscope atraumatically per the patient's urethra, the prostate is absent. There is a mild bladder neck contracture noted which is able to be bypassed by the 22 Fr cystoscope  with gentle resistance. Then, entering into the bladder were pan cystoscopy was performed identifying bilateral orthotopic ureteral orifices and no evidence of bladder masses or other lesions.     Attention was then turned to the left ureteral orifice. There is a left stent emanating from the ureter without encrustration. A wire was advanced to the renal pelvis alongside the stent. The stent was grasped and removed.     Left Retrograde Pyelogram Interpretation  A 5 Fr open ended ureteral catheter was inserted into the distal ureter and contrast dye was injected up the ureter, a retrograde pyelogram was performed identifying distal ureteral stenosis for an estimated 2-3 cm below the iliac vasculature, and the ureter and renal pelvis locations were identified under fluoroscopy. There is no hydronephrosis noted.     A Sensor wire was inserted through the working channel of the scope and advanced up the left ureteral orifice to the level of the renal pelvis on fluoroscopy. A 6 Bengali x 24 cm TRIA double J ureteral stent was then advanced over the wire to the renal pelvis confirmed on fluoroscopy. The Sensor wire was then removed and a good proximal stent curl was visualized within the collecting system on fluoroscopy, and a distal stent curl was observed within the bladder on direct visualization. The patient's bladder was emptied. The cystoscope was then removed.    The patient was awoken from general anesthesia and transported to the PACU in stable condition.    PLAN  - F/u in 3-4 months to discuss next stent exchange, will plan for 5-6 months next exchange  - Can again discuss formal repair which would require robotic ureteral reimplantation if patient desires       Isma Justin MD     Date: 1/19/2024  Time: 14:38 EST

## 2024-01-19 NOTE — H&P
Pre-Op H&P  Aldair Narvaez  [id number]  1950    Chief complaint: left ureteral stricture    HPI:    Patient is a 73 y.o.male who presents with a history of prostate cancer, bladder neck contracture, urosepsis related to a left renal abscess, and recurrent hydronephrosis.  He was admitted to [facility] in October with urosepsis, he has recurrence of his left-sided hydronephrosis in addition to a left renal abscess that required IR drain placement. He presents to the operating room today for surgical management with a cystoscopy, retrograde pyelogram and left ureteral stent placement.     Review of Systems:  General ROS: negative for chills, fever or skin lesions;  No changes since last office visit.  Neg for recent sick exposure  Cardiovascular ROS: no chest pain or dyspnea on exertion  Respiratory ROS: no cough, shortness of breath, or wheezing    Allergies:   Allergies   Allergen Reactions    Iodine Other (See Comments)     Closes sinuses    Oxycodone-Acetaminophen Itching    Zofran [Ondansetron] Hives       Home Meds:    No current facility-administered medications on file prior to encounter.     Current Outpatient Medications on File Prior to Encounter   Medication Sig Dispense Refill    albuterol sulfate  (90 Base) MCG/ACT inhaler As Needed.      buPROPion XL (WELLBUTRIN XL) 150 MG 24 hr tablet Daily.      glimepiride (AMARYL) 4 MG tablet Take 1 tablet by mouth Every Morning Before Breakfast.      HYDROcodone-acetaminophen (NORCO) 5-325 MG per tablet Take 1 tablet by mouth Every 8 (Eight) Hours As Needed for Moderate Pain. 9 tablet 0    metoprolol succinate XL (TOPROL-XL) 50 MG 24 hr tablet 1 tablet Daily.      Misc Natural Products (Magic Mushroom Mix) capsule Take 1,000 mg by mouth Daily.      pantoprazole (PROTONIX) 40 MG EC tablet Daily.      potassium citrate (UROCIT-K) 5 MEQ (540 MG) CR tablet Take 3 tablets by mouth 2 (Two) Times a Day With Meals. 90 tablet 10    rosuvastatin (CRESTOR) 20  MG tablet Take 1 tablet by mouth Daily.      valsartan-hydrochlorothiazide (DIOVAN-HCT) 320-25 MG per tablet 1 tablet Daily.      coenzyme Q10 100 MG capsule Take 1 capsule by mouth Daily.      Eliquis 5 MG tablet tablet Take 1 tablet by mouth Every 12 (Twelve) Hours.      gabapentin (NEURONTIN) 100 MG capsule Take 1 capsule by mouth 3 (Three) Times a Day As Needed (Nerve pain in right shoulder). 9 capsule 0    hyoscyamine sulfate (ANASPAZ) 0.125 MG tablet dispersible disintegrating tablet Place 1 tablet on the tongue Every 4 (Four) Hours As Needed (bladder spasms). 30 each 1    insulin detemir (LEVEMIR) 100 UNIT/ML injection Inject 10 Units under the skin into the appropriate area as directed Every 12 (Twelve) Hours. 10 mL 0    Insulin Lispro (humaLOG) 100 UNIT/ML injection Inject 2-7 Units under the skin into the appropriate area as directed Every 4 (Four) Hours. 10 mL 0    Insulin Lispro (humaLOG) 100 UNIT/ML injection Inject 5 Units under the skin into the appropriate area as directed 3 (Three) Times a Day With Meals. 10 mL 0    ipratropium-albuterol (DUO-NEB) 0.5-2.5 mg/3 ml nebulizer Take 3 mL by nebulization Every 6 (Six) Hours As Needed for Shortness of Air. 360 mL 0    melatonin 5 MG tablet tablet Take 2 tablets by mouth At Night As Needed (sleep). 30 tablet 0    nitroglycerin (NITROSTAT) 0.4 MG SL tablet nitroglycerin 0.4 mg sublingual tablet      predniSONE (DELTASONE) 50 MG tablet Take 1 tablet by mouth Take As Directed for 2 doses. Take 1 tablet (50mg) 13 hours and 7 hours prior to exam 2 tablet 0    tamsulosin (FLOMAX) 0.4 MG capsule 24 hr capsule TAKE 1 CAPSULE BY MOUTH DAILY. 15 capsule 0       PMH:   Past Medical History:   Diagnosis Date    Acid reflux     Arthritis     Benign hypertension     Colon polyp     Concern about heart attack without diagnosis     Coronary artery disease     Coronary atherosclerosis     Heart disease     Hyperlipidemia     Hypertension     Kidney stone     Mixed  "hyperlipidemia     Obesity     body mass index 30+-obesity    Prostate cancer     Sepsis 10/2023    urosepsis  [facility]    Sleep apnea     does not use c-pap since wt loss    Type 2 diabetes mellitus     Type 2 diabetes mellitus     Wears dentures     upper plate only     PSH:    Past Surgical History:   Procedure Laterality Date    CARDIAC CATHETERIZATION      CARDIAC SURGERY N/A     Triple By Pass    CARPAL TUNNEL RELEASE Left     COLONOSCOPY      CORONARY ARTERY BYPASS GRAFT  08/30/2022    CYSTOSCOPY BLADDER STONE LITHOTRIPSY      CYSTOSCOPY BLADDER STONE LITHOTRIPSY      CYSTOSCOPY RETROGRADE PYELOGRAM Left 10/31/2023    Procedure: CYSTOSCOPY RETROGRADE PYELOGRAM STENT PLACEMENT;  Surgeon: Isma Justin MD;  Location:  [facility];  Service: Urology;  Laterality: Left;    CYSTOSCOPY W/ URETERAL STENT PLACEMENT Left 05/09/2023    Procedure: CYSTOSCOPY LEFT RETROGRADE PYELOGRAM AND LEFT URETERAL STENT PLACEMENT;  Surgeon: Isma Justin MD;  Location:  [facility];  Service: Urology;  Laterality: Left;    ENDOSCOPY      INGUINAL HERNIA REPAIR Right     x 2  with mesh  umbilical with mesh    KIDNEY STONE SURGERY      KNEE ARTHROSCOPY Bilateral     PROSTATECTOMY      TONSILLECTOMY      TRIGGER FINGER RELEASE      UMBILICAL HERNIA REPAIR      UVULOPALATOPHARYNGOPLASTY         Immunization History:  Influenza: 2023  Pneumococcal: patient unsure of date   Tetanus: <10 years     Social History:   Tobacco:   Social History     Tobacco Use   Smoking Status Never    Passive exposure: Past   Smokeless Tobacco Never      Alcohol:     Social History     Substance and Sexual Activity   Alcohol Use Yes    Alcohol/week: 1.0 standard drink of alcohol    Types: 1 Drinks containing 0.5 oz of alcohol per week       Vitals:           /80 (BP Location: Right arm, Patient Position: Lying)   Pulse 79   Temp 98.1 °F (36.7 °C) (Temporal)   Resp 16   Ht 157.5 cm (62\")   Wt 97.3 kg (214 lb 8.1 oz)   SpO2 95% "   BMI 39.23 kg/m²     Physical Exam:  General Appearance:    Alert, cooperative, no distress, appears stated age   Head:    Normocephalic, without obvious abnormality, atraumatic   Lungs:     Clear to auscultation bilaterally, respirations unlabored    Heart:   Regular rate and rhythm, S1 and S2 normal, no murmur, rub    or gallop    Abdomen:    Soft, nontender.  +bowel sounds   Breast Exam:    deferred   Genitalia:    deferred   Extremities:   Extremities normal, atraumatic, no cyanosis or edema   Skin:   Skin color, texture, turgor normal, no rashes or lesions   Neurologic:   Grossly intact   Results Review  LABS:  Lab Results   Component Value Date    WBC 10.27 01/19/2024    HGB 14.6 01/19/2024    HCT 44.3 01/19/2024    MCV 91.2 01/19/2024     01/19/2024    NEUTROABS 4.20 11/03/2023    GLUCOSE 135 (H) 12/06/2023    BUN 21 12/06/2023    CREATININE 0.80 12/06/2023    EGFRIFNONA 96 02/17/2021     12/06/2023    K 4.1 12/06/2023     12/06/2023    CO2 29.0 12/06/2023    MG 1.6 11/04/2023    CALCIUM 9.7 12/06/2023    ALBUMIN 4.4 12/06/2023    AST 36 12/06/2023    ALT 35 12/06/2023    BILITOT 0.2 12/06/2023    PTT 30.5 10/29/2023    INR 1.44 (H) 10/30/2023       RADIOLOGY:  No radiology results for the last 3 days       Cancer Staging (if applicable)  Cancer Patient: __ yes _x_no __unknown; If yes, clinical stage T:__ N:__M:__, stage group or __N/A    Impression: left ureteral stricture     Plan: cystoscopy, retrograde pyelogram and stent insertion      Tiago Iraheta PA-C   01/19/24   6:59 AM EST

## 2024-04-24 RX ORDER — DAPAGLIFLOZIN 10 MG/1
1 TABLET, FILM COATED ORAL DAILY
Qty: 90 TABLET | Refills: 3 | Status: SHIPPED | OUTPATIENT
Start: 2024-04-24

## 2024-04-24 NOTE — TELEPHONE ENCOUNTER
Caller: Aldair Narvaez    Relationship: Self    Best call back number: [phone]    Which medication are you concerned about: FARIXGA 10 MG ORAL, DAILY    Who prescribed you this medication: DR AG    What are your concerns: PATIENT NEEDS A NEW SCRIPT SENT TO [facility]. FAX NEW SCRIPT [phone].

## 2024-05-23 ENCOUNTER — OFFICE VISIT (OUTPATIENT)
Dept: UROLOGY | Facility: CLINIC | Age: 74
End: 2024-05-23
Payer: MEDICARE

## 2024-05-23 VITALS
SYSTOLIC BLOOD PRESSURE: 163 MMHG | HEIGHT: 70 IN | WEIGHT: 220 LBS | OXYGEN SATURATION: 96 % | BODY MASS INDEX: 31.5 KG/M2 | DIASTOLIC BLOOD PRESSURE: 93 MMHG | HEART RATE: 73 BPM

## 2024-05-23 DIAGNOSIS — R82.81 PYURIA: ICD-10-CM

## 2024-05-23 DIAGNOSIS — R39.15 URINARY URGENCY: ICD-10-CM

## 2024-05-23 DIAGNOSIS — N32.0 BLADDER NECK CONTRACTURE: ICD-10-CM

## 2024-05-23 DIAGNOSIS — N13.5 URETERAL STRICTURE, LEFT: Primary | ICD-10-CM

## 2024-05-23 DIAGNOSIS — N39.3 STRESS INCONTINENCE: ICD-10-CM

## 2024-05-23 DIAGNOSIS — C61 PROSTATE CANCER: ICD-10-CM

## 2024-05-23 LAB
BILIRUB BLD-MCNC: NEGATIVE MG/DL
CLARITY, POC: ABNORMAL
COLOR UR: YELLOW
EXPIRATION DATE: ABNORMAL
GLUCOSE UR STRIP-MCNC: ABNORMAL MG/DL
KETONES UR QL: NEGATIVE
LEUKOCYTE EST, POC: ABNORMAL
Lab: ABNORMAL
NITRITE UR-MCNC: NEGATIVE MG/ML
PH UR: 6 [PH] (ref 5–8)
PROT UR STRIP-MCNC: ABNORMAL MG/DL
RBC # UR STRIP: ABNORMAL /UL
SP GR UR: 1.01 (ref 1–1.03)
UROBILINOGEN UR QL: NORMAL

## 2024-05-23 PROCEDURE — 87086 URINE CULTURE/COLONY COUNT: CPT | Performed by: STUDENT IN AN ORGANIZED HEALTH CARE EDUCATION/TRAINING PROGRAM

## 2024-05-23 RX ORDER — OXYBUTYNIN CHLORIDE 10 MG/1
10 TABLET, EXTENDED RELEASE ORAL DAILY
Qty: 30 TABLET | Refills: 2 | Status: SHIPPED | OUTPATIENT
Start: 2024-05-23

## 2024-05-23 RX ORDER — CEFUROXIME AXETIL 500 MG/1
500 TABLET ORAL 2 TIMES DAILY
Qty: 14 TABLET | Refills: 0 | Status: SHIPPED | OUTPATIENT
Start: 2024-05-23

## 2024-05-23 NOTE — PROGRESS NOTES
Follow Up Office Visit      Patient Name: Aldair Narvaez  : 1950   MRN: [id number]     Chief Complaint:    Chief Complaint   Patient presents with    Ureteral stricture, left       Referring Provider: No ref. provider found    History of Present Illness: Aldair Narvaez is a 73 y.o. male who presents today for follow up of prostate cancer status post remote prostatectomy, bladder neck contracture, remote left renal abscess.  He has a complex urologic history.  Patient has a ureteral stricture in the left side distally which is of unclear etiology, he underwent previous balloon dilation.  Has also undergone right ureteroscopy and lithotripsy for clearance of right-sided stone burden.  He has a history of recurrent UTI.  He has had urosepsis as well.  He is managing his left ureteral stricture with a left ureteral stent last exchanged in January.  He is due for another stent exchange.  He reports ongoing cloudy urine, dysuria and severe bladder urgency, urinating every hour.  He is not on anticholinergic at this time.  Has not rechecked a PSA after prostatectomy but was undetectable last year.  He has a known moderate bladder neck contracture.    Lab Results   Component Value Date    PSA <0.014 10/16/2023     He denies fevers or chills but does report some fatigue.  He does report some intermittent gross hematuria.    Subjective      Review of System: Review of Systems   Genitourinary:  Positive for difficulty urinating, frequency, hematuria and urgency.      I have reviewed the ROS documented by my clinical staff, I have updated appropriately and I agree. Isma Justin MD    I have reviewed and the following portions of the patient's history were updated as appropriate: past family history, past medical history, past social history, past surgical history and problem list.    Medications:     Current Outpatient Medications:     albuterol sulfate  (90 Base) MCG/ACT inhaler, As Needed., Disp: ,  Rfl:     buPROPion XL (WELLBUTRIN XL) 150 MG 24 hr tablet, Daily., Disp: , Rfl:     Contour Test test strip, USE TO TEST THREE TIMES A DAY, Disp: 200 each, Rfl: 3    dapagliflozin Propanediol (Farxiga) 10 MG tablet, Take 10 mg by mouth Daily., Disp: 90 tablet, Rfl: 3    Eliquis 5 MG tablet tablet, Take 1 tablet by mouth Every 12 (Twelve) Hours., Disp: , Rfl:     gabapentin (NEURONTIN) 100 MG capsule, Take 1 capsule by mouth 3 (Three) Times a Day As Needed (Nerve pain in right shoulder)., Disp: 9 capsule, Rfl: 0    glimepiride (AMARYL) 4 MG tablet, Take 1 tablet by mouth Every Morning Before Breakfast., Disp: , Rfl:     metoprolol succinate XL (TOPROL-XL) 50 MG 24 hr tablet, 1 tablet Daily., Disp: , Rfl:     Misc Natural Products (Magic Mushroom Mix) capsule, Take 1,000 mg by mouth Daily., Disp: , Rfl:     nitroglycerin (NITROSTAT) 0.4 MG SL tablet, nitroglycerin 0.4 mg sublingual tablet, Disp: , Rfl:     pantoprazole (PROTONIX) 40 MG EC tablet, Daily., Disp: , Rfl:     rosuvastatin (CRESTOR) 20 MG tablet, Take 1 tablet by mouth Daily., Disp: , Rfl:     valsartan-hydrochlorothiazide (DIOVAN-HCT) 320-25 MG per tablet, 1 tablet Daily., Disp: , Rfl:     cefuroxime (CEFTIN) 500 MG tablet, Take 1 tablet by mouth 2 (Two) Times a Day., Disp: 14 tablet, Rfl: 0    HYDROcodone-acetaminophen (NORCO) 5-325 MG per tablet, Take 1 tablet by mouth Every 8 (Eight) Hours As Needed for Moderate Pain. (Patient not taking: Reported on 5/23/2024), Disp: 6 tablet, Rfl: 0    ipratropium-albuterol (DUO-NEB) 0.5-2.5 mg/3 ml nebulizer, Take 3 mL by nebulization Every 6 (Six) Hours As Needed for Shortness of Air. (Patient not taking: Reported on 5/23/2024), Disp: 360 mL, Rfl: 0    oxybutynin XL (Ditropan XL) 10 MG 24 hr tablet, Take 1 tablet by mouth Daily., Disp: 30 tablet, Rfl: 2    Allergies:   Allergies   Allergen Reactions    Iodine Other (See Comments)     Closes sinuses    Oxycodone-Acetaminophen Itching    Zofran [Ondansetron] Hives  "      IPSS Questionnaire (AUA-7):  Over the past month…    1)  Incomplete Emptying:       How often have you had a sensation of not emptying you had the sensation of not emptying your bladder completely after you finished urinating?  1 - Less than 1 time in 5   2)  Frequency:       How often have you had the urinate again less than two hours after you finished urinating?  5 - Almost always   3)  Intermittency:       How often have you found you stopped and started again several times when you urinated?   2 - Less than half the time   4) Urgency:      How often have you found it difficult to postpone urination?  4 - More than half the time   5) Weak Stream:      How often have you had a weak urinary stream?  2 - Less than half the time   6) Straining:       How often have you had to push or strain to begin urination?  1 - Less than 1 time in 5   7) Nocturia:      How many times did you most typically get up to urinate from the time you went to bed at night until the time you got up in the morning?  5 - 5+ times   Total Score:  20   The International Prostate Symptom Score (IPSS) is used to screen, diagnose, track symptoms of benign prostatic hyperplasia (BPH).   0-7 (Mild Symptoms) 8-19 (Moderate) 20-35 (Severe)   Quality of Life (QoL):  If you were to spend the rest of your life with your urinary condition just the way it is now, how would you feel about that? 5-Unhappy   Urine Leakage (Incontinence) 3-Moderate (3 or more pads/day)         Post void residual bladder scan:   15 mL    Objective     Physical Exam:   Vital Signs:   Vitals:    05/23/24 1124   BP: 163/93   Pulse: 73   SpO2: 96%   Weight: 99.8 kg (220 lb)   Height: 177.8 cm (70\")     Body mass index is 31.57 kg/m².     Physical Exam  Constitutional:       Appearance: Normal appearance. He is obese.   HENT:      Head: Normocephalic and atraumatic.      Nose: Nose normal.   Eyes:      Extraocular Movements: Extraocular movements intact.      " Conjunctiva/sclera: Conjunctivae normal.      Pupils: Pupils are equal, round, and reactive to light.   Musculoskeletal:         General: Normal range of motion.      Cervical back: Normal range of motion and neck supple.   Skin:     General: Skin is warm and dry.      Findings: No lesion or rash.   Neurological:      General: No focal deficit present.      Mental Status: He is alert and oriented to person, place, and time. Mental status is at baseline.   Psychiatric:         Mood and Affect: Mood normal.         Behavior: Behavior normal.         Labs:   Brief Urine Lab Results  (Last result in the past 365 days)        Color   Clarity   Blood   Leuk Est   Nitrite   Protein   CREAT   Urine HCG        05/23/24 1138 Yellow   Cloudy   1+   Small (1+)   Negative   1+                   Urine Culture          10/16/2023    12:37 10/29/2023    14:45 1/10/2024    13:46   Urine Culture   Urine Culture 25,000 CFU/mL Mixed Ysa Isolated  >100,000 CFU/mL Escherichia coli  No growth         Lab Results   Component Value Date    GLUCOSE 135 (H) 12/06/2023    CALCIUM 9.7 12/06/2023     12/06/2023    K 4.1 12/06/2023    CO2 29.0 12/06/2023     12/06/2023    BUN 21 12/06/2023    CREATININE 0.80 12/06/2023    EGFRIFNONA 96 02/17/2021    BCR 26.3 (H) 12/06/2023    ANIONGAP 9.0 12/06/2023       Lab Results   Component Value Date    WBC 10.27 01/19/2024    HGB 14.6 01/19/2024    HCT 44.3 01/19/2024    MCV 91.2 01/19/2024     01/19/2024       Images:   No Images in the past 120 days found..    Measures:   Tobacco:   Aldair Narvaez  reports that he has never smoked. He has been exposed to tobacco smoke. He has never used smokeless tobacco.    Assessment / Plan      Assessment/Plan:   73 y.o. male who presented today for follow up of complex urologic history, prostate cancer status post remote prostatectomy, unclear etiology but he has developed a distal left ureteral stricture managed with an indwelling stent.  He had  a remote left-sided renal abscess requiring drain placement.  He has a history of previous urosepsis.  Urine looks concerning for infection today.  I will send 1 week of cefuroxime for clearance and follow-up with urine culture.  We will schedule a stent exchange at the surgery center next available.  He is most interested in talking about definitive options to repair his left ureteral stricture as he has severe urinary symptoms and would like to have the stent removed if possible at some point.  He appears to be emptying his bladder well.  I will initiate oxybutynin for his ongoing severe lower urinary tract symptoms including urgency related incontinence.  Risks of dry, dry mouth, constipation, memory changes discussed.    Diagnoses and all orders for this visit:    1. Ureteral stricture, left (Primary)  -     Urine Culture - Urine, Urine, Clean Catch  -     cefuroxime (CEFTIN) 500 MG tablet; Take 1 tablet by mouth 2 (Two) Times a Day.  Dispense: 14 tablet; Refill: 0  -     External Facility Surgical/Procedural Request; Future    2. Prostate cancer  -     PSA Diagnostic; Future    3. Urinary urgency  -     oxybutynin XL (Ditropan XL) 10 MG 24 hr tablet; Take 1 tablet by mouth Daily.  Dispense: 30 tablet; Refill: 2    4. Pyuria  -     Urine Culture - Urine, Urine, Clean Catch  -     cefuroxime (CEFTIN) 500 MG tablet; Take 1 tablet by mouth 2 (Two) Times a Day.  Dispense: 14 tablet; Refill: 0    5. Stress incontinence  -     POC Urinalysis Dipstick, Automated    6. Bladder neck contracture  -     External Facility Surgical/Procedural Request; Future           Follow Up:   No follow-ups on file.    I spent approximately 30 minutes providing clinical care for this patient; including review of patient's chart and provider documentation, face to face time spent with patient in examination room (obtaining history, performing physical exam, discussing diagnosis and management options), placing orders, and completing  patient documentation.     Isma Justin MD  [facility] [place]

## 2024-05-25 LAB — BACTERIA SPEC AEROBE CULT: NO GROWTH

## 2024-05-28 ENCOUNTER — TELEPHONE (OUTPATIENT)
Dept: ENDOCRINOLOGY | Facility: CLINIC | Age: 74
End: 2024-05-28
Payer: MEDICARE

## 2024-05-28 NOTE — TELEPHONE ENCOUNTER
Spoke with patient and advised he has not been seen since September of 2023 and will need to schedule an appointment for continued refills. He thought he had a f/u schedule. Advised I am not in the [place] office but will send the message to them to reach out and schedule an appointment. He voiced understanding and will be at his f/u appt.

## 2024-05-28 NOTE — TELEPHONE ENCOUNTER
Caller: Aldair Narvaez    Relationship to patient: Self    Best call back number:[phone]      Patient is needing: PT IS NEEDING A PRESCRIPTION SENT OVER FOR A BLOOD GLUCOSE MONITOR AND TEST STRIPS.     PHARMACY   [facility] [id number] - [address] [phone] [facility] [phone] [address] [address]   Phone: [phone] Fax: [phone]   Hours: Not open 24 hours

## 2024-05-30 ENCOUNTER — OFFICE VISIT (OUTPATIENT)
Dept: ENDOCRINOLOGY | Facility: CLINIC | Age: 74
End: 2024-05-30
Payer: MEDICARE

## 2024-05-30 VITALS
WEIGHT: 218 LBS | OXYGEN SATURATION: 96 % | BODY MASS INDEX: 31.21 KG/M2 | HEIGHT: 70 IN | HEART RATE: 76 BPM | SYSTOLIC BLOOD PRESSURE: 122 MMHG | DIASTOLIC BLOOD PRESSURE: 74 MMHG

## 2024-05-30 DIAGNOSIS — E11.65 TYPE 2 DIABETES MELLITUS WITH HYPERGLYCEMIA, WITHOUT LONG-TERM CURRENT USE OF INSULIN: Primary | ICD-10-CM

## 2024-05-30 DIAGNOSIS — E78.2 MIXED HYPERLIPIDEMIA: ICD-10-CM

## 2024-05-30 DIAGNOSIS — I25.10 CAD, MULTIPLE VESSEL: ICD-10-CM

## 2024-05-30 DIAGNOSIS — I10 BENIGN HYPERTENSION: ICD-10-CM

## 2024-05-30 LAB
EXPIRATION DATE: ABNORMAL
EXPIRATION DATE: ABNORMAL
GLUCOSE BLDC GLUCOMTR-MCNC: 189 MG/DL (ref 70–130)
HBA1C MFR BLD: 8.4 % (ref 4.5–5.7)
Lab: ABNORMAL
Lab: ABNORMAL

## 2024-05-30 RX ORDER — DULOXETIN HYDROCHLORIDE 30 MG/1
30 CAPSULE, DELAYED RELEASE ORAL
COMMUNITY
Start: 2024-05-30

## 2024-05-30 NOTE — ASSESSMENT & PLAN NOTE
Diabetes is improving with treatment.  A1c much improved but still above goal.  We discussed treatment options.   He didn't tolerate trulicity in the past.  Will try adding januvia.  Diabetes will be reassessed in 6 months.

## 2024-05-30 NOTE — PROGRESS NOTES
"     Office Note      Date: 2024  Patient Name: Aldair Narvaez  MRN: [id number]  : 1950    Chief Complaint   Patient presents with    Diabetes     Uncontrolled type 2 diabetes mellitus with hyperglycemia         History of Present Illness:   Aldair Narvaez is a 73 y.o. male who presents for Diabetes type 2. Diagnosed in: . Treated in past with oral agents. Current treatments: farxiga and glimepiride. Number of insulin shots per day: none. Checks blood sugar intermittently. Has low blood sugar: no. Aspirin use: No - on eliquis. Statin use: Yes. ACE-I/ARB use: Yes. Changes in health since last visit: hospitalized due to fall and ureteral stricture. Last eye exam 2023.     Subjective      Diabetic Complications:  Eyes: No  Kidneys: No  Feet: No  Heart: Yes - stents and CABG    Diet and Exercise:  Meals per day: 2  Minutes of exercise per week: 0 mins.    Review of Systems:   Review of Systems   Constitutional: Negative.    Cardiovascular: Negative.    Gastrointestinal: Negative.    Endocrine: Negative.        The following portions of the patient's history were reviewed and updated as appropriate: allergies, current medications, past family history, past medical history, past social history, past surgical history, and problem list.    Objective     Visit Vitals  /74 (BP Location: Right arm, Patient Position: Sitting, Cuff Size: Adult)   Pulse 76   Ht 177.8 cm (70\")   Wt 98.9 kg (218 lb)   SpO2 96%   BMI 31.28 kg/m²       Physical Exam:  Physical Exam  Constitutional:       Appearance: Normal appearance.   Neurological:      Mental Status: He is alert.         Labs:    HbA1c  Lab Results   Component Value Date    HGBA1C 8.4 (A) 2024       CMP  Lab Results   Component Value Date    GLUCOSE 135 (H) 2023    BUN 21 2023    CREATININE 0.80 2023    EGFRIFNONA 96 2021    BCR 26.3 (H) 2023    K 4.1 2023    CO2 29.0 2023    CALCIUM 9.7 2023    AST 36 " 12/06/2023    ALT 35 12/06/2023        Lipid Panel  Lab Results   Component Value Date    HDL 32 (L) 12/06/2023     (H) 12/06/2023    TRIG 439 (H) 12/06/2023        TSH  Lab Results   Component Value Date    TSH 0.577 05/09/2023        Hemoglobin A1C  Lab Results   Component Value Date    HGBA1C 8.4 (A) 05/30/2024        Microalbumin/Creatinine  Lab Results   Component Value Date    MALBCRERATIO 9.2 04/13/2023    MICROALBUR 1.2 04/13/2023           Assessment / Plan      Assessment & Plan:  Diagnoses and all orders for this visit:    1. Type 2 diabetes mellitus with hyperglycemia, without long-term current use of insulin (Primary)  Assessment & Plan:  Diabetes is improving with treatment.  A1c much improved but still above goal.  We discussed treatment options.   He didn't tolerate trulicity in the past.  Will try adding januvia.  Diabetes will be reassessed in 6 months.    Orders:  -     POC Glycosylated Hemoglobin (Hb A1C)  -     POC Glucose, Blood    2. Benign hypertension  Assessment & Plan:  Hypertension is stable and controlled  Continue current treatment regimen.  Blood pressure will be reassessed in 6 months.      3. Mixed hyperlipidemia  Assessment & Plan:  Continue statin.      4. CAD, multiple vessel  Assessment & Plan:  Continue eliquis, statin and SGLT-2 inhibitor.      Other orders  -     SITagliptin (Januvia) 100 MG tablet; Take 1 tablet by mouth Daily.  Dispense: 90 tablet; Refill: 3      Current Outpatient Medications   Medication Instructions    albuterol sulfate  (90 Base) MCG/ACT inhaler As Needed    Blood Glucose Monitoring Suppl device Use to check blood sugars 3-4 times daily. E11.65    cefuroxime (CEFTIN) 500 mg, Oral, 2 Times Daily    Contour Test test strip 1 each, Other, 3 Times Daily, use to test 3 times a day    dapagliflozin Propanediol (FARXIGA) 10 mg, Oral, Daily    DULoxetine (CYMBALTA) 30 mg    Eliquis 5 mg, Oral, Every 12 Hours Scheduled    gabapentin (NEURONTIN) 100  mg, Oral, 3 Times Daily PRN    glimepiride (AMARYL) 8 mg, Oral, Every Morning Before Breakfast    glucose blood test strip Use to check blood sugars 3-4 times daily. E11.65    HYDROcodone-acetaminophen (NORCO) 5-325 MG per tablet 1 tablet, Oral, Every 8 Hours PRN    ipratropium-albuterol (DUO-NEB) 0.5-2.5 mg/3 ml nebulizer 3 mL, Nebulization, Every 6 Hours PRN    metoprolol succinate XL (TOPROL-XL) 50 MG 24 hr tablet 1 tablet, Daily    Misc Natural Products (Magic Mushroom Mix) capsule 1,000 mg, Oral, Daily    nitroglycerin (NITROSTAT) 0.4 MG SL tablet nitroglycerin 0.4 mg sublingual tablet    oxybutynin XL (DITROPAN XL) 10 mg, Oral, Daily    pantoprazole (PROTONIX) 40 MG EC tablet Daily    rosuvastatin (CRESTOR) 20 mg, Oral, Daily    SITagliptin (JANUVIA) 100 mg, Oral, Daily    valsartan-hydrochlorothiazide (DIOVAN-HCT) 320-25 MG per tablet 1 tablet, Daily      Return in about 6 months (around 11/30/2024) for Recheck with A1c, CMP, lipid, TSH, microalbumin, foot exam.    Electronically signed by: Steve Cohen MD  05/30/2024

## 2024-06-12 ENCOUNTER — TELEPHONE (OUTPATIENT)
Age: 74
End: 2024-06-12
Payer: MEDICARE

## 2024-06-12 ENCOUNTER — OUTSIDE FACILITY SERVICE (OUTPATIENT)
Dept: UROLOGY | Facility: CLINIC | Age: 74
End: 2024-06-12
Payer: MEDICARE

## 2024-06-12 ENCOUNTER — DOCUMENTATION (OUTPATIENT)
Age: 74
End: 2024-06-12
Payer: MEDICARE

## 2024-06-12 ENCOUNTER — LAB REQUISITION (OUTPATIENT)
Dept: LAB | Facility: HOSPITAL | Age: 74
End: 2024-06-12
Payer: MEDICARE

## 2024-06-12 DIAGNOSIS — N12 PYELONEPHRITIS: ICD-10-CM

## 2024-06-12 DIAGNOSIS — N13.5 CROSSING VESSEL AND STRICTURE OF URETER WITHOUT HYDRONEPHROSIS: ICD-10-CM

## 2024-06-12 DIAGNOSIS — N13.5 URETERAL STRICTURE, LEFT: Primary | ICD-10-CM

## 2024-06-12 PROCEDURE — 52332 CYSTOSCOPY AND TREATMENT: CPT | Performed by: STUDENT IN AN ORGANIZED HEALTH CARE EDUCATION/TRAINING PROGRAM

## 2024-06-12 PROCEDURE — 87086 URINE CULTURE/COLONY COUNT: CPT | Performed by: STUDENT IN AN ORGANIZED HEALTH CARE EDUCATION/TRAINING PROGRAM

## 2024-06-12 PROCEDURE — 74420 UROGRAPHY RTRGR +-KUB: CPT | Performed by: STUDENT IN AN ORGANIZED HEALTH CARE EDUCATION/TRAINING PROGRAM

## 2024-06-12 RX ORDER — HYDROCODONE BITARTRATE AND ACETAMINOPHEN 5; 325 MG/1; MG/1
1 TABLET ORAL EVERY 8 HOURS PRN
Qty: 6 TABLET | Refills: 0 | Status: SHIPPED | OUTPATIENT
Start: 2024-06-12

## 2024-06-12 RX ORDER — PHENAZOPYRIDINE HYDROCHLORIDE 200 MG/1
200 TABLET, FILM COATED ORAL 3 TIMES DAILY PRN
Qty: 20 TABLET | Refills: 0 | Status: SHIPPED | OUTPATIENT
Start: 2024-06-12

## 2024-06-12 RX ORDER — NITROFURANTOIN 25; 75 MG/1; MG/1
100 CAPSULE ORAL 2 TIMES DAILY
Qty: 10 CAPSULE | Refills: 0 | Status: SHIPPED | OUTPATIENT
Start: 2024-06-12

## 2024-06-12 NOTE — TELEPHONE ENCOUNTER
S/p Left ureteral stent exchange today.  History of chronic left distal ureteral stricture.  Have him return to my office in 3 to 4 weeks for his next follow-up.  I will discuss possibly considering a definitive robotic repair at that time with him.  Thank you      Isma Justin MD

## 2024-06-12 NOTE — PROGRESS NOTES
[facility]   [address], [address]  [place] [address]      OPERATIVE REPORT     Patient Name:  Aldair Narvaez  YOB: 1950    Patient MRN: [id number]    Date of Surgery:  6/12/24     Indications: 72 yo male with a history of distal left ureteral stricture of unclear etiology, history of prostate cancer status post prostatectomy, mild bladder neck contracture who is managed with an indwelling left-sided ureteral stent.  He presents today for his 6-month stent exchange.  Risks discussed.    Pre-op Diagnosis:   Left ureteral stricture    Post-op Diagnosis:   Left ureteral stricture    Procedures Performed:  CYSTOSCOPY  LEFT RETROGRADE PYELOGRAM  LEFT URETERAL STENT EXCHANGE    Staff:  Isma Justin MD    Anesthesia: General    Estimated Blood Loss: Minimal    Implants: 6 [place]e by 24 cm double-J stent, Tria firm no string    Specimen: Urine culture    Drains: None      Findings: Persistent distal left ureteral stricture, mild to moderate left-sided hydronephrosis, uncomplicated left ureteral stent exchange.  Mild cloudy urine sent for culture    Complications: None    Description of Procedure:    After informed consent, the patient was brought back to the operating suite and moved over to the operating table. General anesthesia was smoothly induced, IV antibiotics were administered, and the patient was placed in the dorsal lithotomy position with careful attention focused on padding all pressure points. The patient was prepped and draped in standard fashion. A timeout was performed to ensure the correct patient and procedure    A 22Fr cystoscope was used to cannulate the urethra. The urethra was of normal course and caliber.  The prostate is absent.  The patient has a moderate bladder neck contracture estimated at 20 Senegalese, able to be bypassed with the 22 Senegalese cystoscope with mild resistance.     Pan cystoscopy was performed.  There is mild cloudy urine in the  bladder basin which was irrigated and sent for culture.  There is some mild global cystitis present on the posterior wall.  A left ureteral stent is emanating in appropriate position without encrustation.  The left ureteral stent was grasped and pulled back to the meatus, a wire was advanced through the stent into the collecting system on the left.  The stent was backed out.    Left retrograde Pyelogram Interpretation  Attention was then turned to the left ureteral orifice which was cannulated with a 5 Fr open ended ureteral catheter. A retrograde pyelogram was performed demonstrating normal course and caliber of the ureter, outlining the location of the renal pelvis and collecting system, with persistent mild to moderate left-sided hydronephrosis, mild proximal hydroureter, persistent distal left ureteral stenosis below the level of the iliac vasculature.     A sensor wire was advanced up the left ureter and into the collecting system on fluoroscopy to serve as a safety wire which was clamped to the surgical drapes.     The rigid cystoscope was readvanced over the remaining wire. A 6 F x 24 cm double J ureteral stent (Tria [employer]) was advanced up the left ureter under direct visualization which confirmed good curl in the bladder. Fluoroscopy confirmed good curl in the kidney. The bladder was drained and this concluded our procedure.      The patient was brought back to the PACU in stable condition. All scopes and instruments were in good working order at the end of the case. There were no complications.    Disposition/Follow Up: 3 to 4 weeks follow-up to discuss possible to robotic ureteral reimplantation per patient's desire    Isma Justin MD     Date: 6/12/2024  Time: 12:46 EDT

## 2024-06-14 LAB — BACTERIA SPEC AEROBE CULT: NORMAL

## 2024-07-03 ENCOUNTER — OFFICE VISIT (OUTPATIENT)
Dept: UROLOGY | Facility: CLINIC | Age: 74
End: 2024-07-03
Payer: MEDICARE

## 2024-07-03 VITALS — OXYGEN SATURATION: 95 % | SYSTOLIC BLOOD PRESSURE: 115 MMHG | DIASTOLIC BLOOD PRESSURE: 72 MMHG | HEART RATE: 73 BPM

## 2024-07-03 DIAGNOSIS — N13.5 URETERAL STRICTURE, LEFT: Primary | ICD-10-CM

## 2024-07-03 DIAGNOSIS — Z85.46 PERSONAL HISTORY OF PROSTATE CANCER: ICD-10-CM

## 2024-07-03 DIAGNOSIS — N32.0 BLADDER NECK CONTRACTURE: ICD-10-CM

## 2024-07-03 DIAGNOSIS — Z90.79 H/O RADICAL PROSTATECTOMY: ICD-10-CM

## 2024-07-03 NOTE — PROGRESS NOTES
Follow Up Office Visit      Patient Name: Aldair Narvaez  : 1950   MRN: [id number]     Chief Complaint:    Chief Complaint   Patient presents with    Ureteral stricture, left       Referring Provider: No ref. provider found    History of Present Illness: Aldair Narvaez is a 73 y.o. male who presents today for follow up of distal left ureteral stricture.  He has a fairly complex urologic history.  He states he has a history of prostate cancer and underwent a remote prostatectomy roughly 18 years ago.  His PSA has been undetectable.  I do not have his prior urologic records or pathology records.  Has previously seen Dr. Brothers of [facility] and the [facility] urology group.  He establish care with me in May 2023, apparently in  Dr. Reeves noted that the patient did have bilateral nephrolithiasis and mild to moderate left-sided hydronephrosis which Dr. Reeves elected to observe.  He then presented to our hospital with sepsis in May 2023 with severe left-sided hydronephrosis.  Patient was taken to the operating room for urgent left ureteral stent, he was found to have a mild bladder neck contracture able to be bypassed with a 22 Telugu cystoscope.  The patient was found to have one third distal ureteral stenosis and severe proximal hydroureteronephrosis.  A stent was placed.      Lab Results   Component Value Date    PSA <0.014 10/16/2023         Imaging from his original retrograde pyelogram demonstrates severe distal ureteral stenosis, there are radiopaque clips in the area, unclear if he has had a ureteral injury related to his prostatectomy.    He then underwent a left ureteral balloon dilation at the surgery center in 2023.  His stent was removed in 2023.    Subsequent imaging demonstrated a fairly large stone burden on the right.  He underwent right ureteroscopy laser lithotripsy for complete stone clearance 2023.    He then presented to the hospital in 2023,  was septic again.  CT imaging at that time demonstrated a 3.7 cm left upper pole renal lesion with internal gas consistent with renal abscess.  He underwent IR guided percutaneous drain placement in October shortly thereafter and he also was taken to the operating room for left ureteral stent re-placement October 31, 2023.    His IR drain was eventually removed after repeat CT imaging demonstrated resolution of his abscess.    He was taken back to the operating room January 2024 for left-sided stent exchange demonstrating persistent left ureteral stenosis.    The patient's last exchange occurred 6/12/2024, 6 x 24 cm Tria firm stent on the left.  Since his stent exchange he has been doing well.  Denies UTI symptoms.    Of note he is on Eliquis for paroxysmal atrial fibrillation.    Subjective      Review of System: Review of Systems   Genitourinary:  Positive for frequency.      I have reviewed the ROS documented by my clinical staff, I have updated appropriately and I agree. Isma Justin MD    I have reviewed and the following portions of the patient's history were updated as appropriate: past family history, past medical history, past social history, past surgical history and problem list.    Medications:     Current Outpatient Medications:     albuterol sulfate  (90 Base) MCG/ACT inhaler, As Needed., Disp: , Rfl:     Blood Glucose Monitoring Suppl device, Use to check blood sugars 3-4 times daily. E11.65, Disp: 1 each, Rfl: 0    Contour Test test strip, USE TO TEST THREE TIMES A DAY, Disp: 200 each, Rfl: 3    dapagliflozin Propanediol (Farxiga) 10 MG tablet, Take 10 mg by mouth Daily., Disp: 90 tablet, Rfl: 3    DULoxetine (CYMBALTA) 30 MG capsule, 1 capsule., Disp: , Rfl:     Eliquis 5 MG tablet tablet, Take 1 tablet by mouth Every 12 (Twelve) Hours., Disp: , Rfl:     gabapentin (NEURONTIN) 100 MG capsule, Take 1 capsule by mouth 3 (Three) Times a Day As Needed (Nerve pain in right shoulder)., Disp:  9 capsule, Rfl: 0    glimepiride (AMARYL) 4 MG tablet, Take 2 tablets by mouth Every Morning Before Breakfast., Disp: , Rfl:     glucose blood test strip, Use to check blood sugars 3-4 times daily. E11.65, Disp: 200 each, Rfl: 5    HYDROcodone-acetaminophen (NORCO) 5-325 MG per tablet, Take 1 tablet by mouth Every 8 (Eight) Hours As Needed for Moderate Pain., Disp: 6 tablet, Rfl: 0    ipratropium-albuterol (DUO-NEB) 0.5-2.5 mg/3 ml nebulizer, Take 3 mL by nebulization Every 6 (Six) Hours As Needed for Shortness of Air., Disp: 360 mL, Rfl: 0    metoprolol succinate XL (TOPROL-XL) 50 MG 24 hr tablet, 1 tablet Daily., Disp: , Rfl:     Misc Natural Products (Magic Mushroom Mix) capsule, Take 1,000 mg by mouth Daily., Disp: , Rfl:     nitrofurantoin, macrocrystal-monohydrate, (Macrobid) 100 MG capsule, Take 1 capsule by mouth 2 (Two) Times a Day., Disp: 10 capsule, Rfl: 0    nitroglycerin (NITROSTAT) 0.4 MG SL tablet, nitroglycerin 0.4 mg sublingual tablet, Disp: , Rfl:     oxybutynin XL (Ditropan XL) 10 MG 24 hr tablet, Take 1 tablet by mouth Daily., Disp: 30 tablet, Rfl: 2    pantoprazole (PROTONIX) 40 MG EC tablet, Daily., Disp: , Rfl:     rosuvastatin (CRESTOR) 20 MG tablet, Take 1 tablet by mouth Daily., Disp: , Rfl:     SITagliptin (Januvia) 100 MG tablet, Take 1 tablet by mouth Daily., Disp: 90 tablet, Rfl: 3    valsartan-hydrochlorothiazide (DIOVAN-HCT) 320-25 MG per tablet, 1 tablet Daily., Disp: , Rfl:     Allergies:   Allergies   Allergen Reactions    Iodine Other (See Comments)     Closes sinuses    Oxycodone-Acetaminophen Itching    Zofran [Ondansetron] Hives       IPSS Questionnaire (AUA-7):  Over the past month…    1)  Incomplete Emptying:       How often have you had a sensation of not emptying you had the sensation of not emptying your bladder completely after you finished urinating?  2 - Less than half the time   2)  Frequency:       How often have you had the urinate again less than two hours after you  finished urinating?  5 - Almost always   3)  Intermittency:       How often have you found you stopped and started again several times when you urinated?   1 - Less than 1 time in 5   4) Urgency:      How often have you found it difficult to postpone urination?  4 - More than half the time   5) Weak Stream:      How often have you had a weak urinary stream?  1 - Less than 1 time in 5   6) Straining:       How often have you had to push or strain to begin urination?  1 - Less than 1 time in 5   7) Nocturia:      How many times did you most typically get up to urinate from the time you went to bed at night until the time you got up in the morning?  3 - 3 times   Total Score:  17   The International Prostate Symptom Score (IPSS) is used to screen, diagnose, track symptoms of benign prostatic hyperplasia (BPH).   0-7 (Mild Symptoms) 8-19 (Moderate) 20-35 (Severe)   Quality of Life (QoL):  If you were to spend the rest of your life with your urinary condition just the way it is now, how would you feel about that? 3-Mixed   Urine Leakage (Incontinence) 1-Mild (A few drops a day, no pad use)         Objective     Physical Exam:   Vital Signs:   Vitals:    07/03/24 1226   BP: 115/72   Pulse: 73   SpO2: 95%     There is no height or weight on file to calculate BMI.     Physical Exam  Constitutional:       Appearance: Normal appearance.   HENT:      Head: Normocephalic and atraumatic.      Nose: Nose normal.   Eyes:      Extraocular Movements: Extraocular movements intact.      Conjunctiva/sclera: Conjunctivae normal.      Pupils: Pupils are equal, round, and reactive to light.   Musculoskeletal:         General: Normal range of motion.      Cervical back: Normal range of motion and neck supple.   Skin:     General: Skin is warm and dry.      Findings: No lesion or rash.   Neurological:      General: No focal deficit present.      Mental Status: He is alert and oriented to person, place, and time. Mental status is at  baseline.   Psychiatric:         Mood and Affect: Mood normal.         Behavior: Behavior normal.         Labs:   Brief Urine Lab Results  (Last result in the past 365 days)        Color   Clarity   Blood   Leuk Est   Nitrite   Protein   CREAT   Urine HCG        05/23/24 1138 Yellow   Cloudy   1+   Small (1+)   Negative   1+                   Urine Culture          1/10/2024    13:46 5/23/2024    14:38 6/12/2024    16:37   Urine Culture   Urine Culture No growth  No growth  >100,000 CFU/mL Mixed Yas Isolated         Lab Results   Component Value Date    GLUCOSE 135 (H) 12/06/2023    CALCIUM 9.7 12/06/2023     12/06/2023    K 4.1 12/06/2023    CO2 29.0 12/06/2023     12/06/2023    BUN 21 12/06/2023    CREATININE 0.80 12/06/2023    EGFRIFNONA 96 02/17/2021    BCR 26.3 (H) 12/06/2023    ANIONGAP 9.0 12/06/2023       Lab Results   Component Value Date    WBC 10.27 01/19/2024    HGB 14.6 01/19/2024    HCT 44.3 01/19/2024    MCV 91.2 01/19/2024     01/19/2024       Images:   No Images in the past 120 days found..    Measures:   Tobacco:   Aldair Narvaez  reports that he has never smoked. He has been exposed to tobacco smoke. He has never used smokeless tobacco.   Assessment / Plan      Assessment/Plan:   73 y.o. male who presented today for follow up of complex urologic history, prostate cancer status post remote prostatectomy.  Left distal ureteral stricture of unclear etiology.  Managing with internal left ureteral stent.  He is interested in definitive repair after discussion regarding continued stent exchanges versus robotic ureteral reimplantation and possible psoas hitch versus Boari flap.  Given his complex history including a mild bladder neck contracture, Eliquis with paroxysmal atrial fibrillation and prior abdominal surgery I would recommend sending him to the [facility] to speak with an Endo urology specialist for further discussion regarding definitive repair.  He is interested in  this approach.  If he elects for continued stent exchanges we will see him back in October to plan for neck stent exchange sometime in November or December.  He is due for repeat PSA, his PSA has been undetectable for years.    Diagnoses and all orders for this visit:    1. Ureteral stricture, left (Primary)  -     Ambulatory Referral to Urology    2. Personal history of prostate cancer  -     PSA Diagnostic; Future    3. Bladder neck contracture    4. H/O radical prostatectomy  -     PSA Diagnostic; Future           Follow Up:   Return in about 3 months (around 10/3/2024).    I spent approximately 30 minutes providing clinical care for this patient; including review of patient's chart and provider documentation, face to face time spent with patient in examination room (obtaining history, performing physical exam, discussing diagnosis and management options), placing orders, and completing patient documentation.     Isma Justin MD  [facility] [place]

## 2024-09-25 RX ORDER — GLIMEPIRIDE 4 MG/1
8 TABLET ORAL
Qty: 180 TABLET | Refills: 3 | Status: SHIPPED | OUTPATIENT
Start: 2024-09-25

## 2024-10-16 ENCOUNTER — TELEPHONE (OUTPATIENT)
Dept: UROLOGY | Facility: CLINIC | Age: 74
End: 2024-10-16
Payer: MEDICARE

## 2024-10-16 NOTE — TELEPHONE ENCOUNTER
Caller: LAURA    Relationship: FINANCIAL DEPARTMENT      What orders are you requesting (i.e. lab or imaging): ULTRASOUND    In what timeframe would the patient need to come in: BEFORE APPT ON 10/19/24    Where will you receive your lab/imaging services: [facility]    Additional notes: PT'S ORDER HAS  AND WILL NEED A NEW ONE PRIOR TO ULTRASOUND APPT

## 2024-10-17 ENCOUNTER — LAB (OUTPATIENT)
Dept: LAB | Facility: HOSPITAL | Age: 74
End: 2024-10-17
Payer: MEDICARE

## 2024-10-17 DIAGNOSIS — Z90.79 H/O RADICAL PROSTATECTOMY: ICD-10-CM

## 2024-10-17 DIAGNOSIS — Z85.46 PERSONAL HISTORY OF PROSTATE CANCER: ICD-10-CM

## 2024-10-17 DIAGNOSIS — N13.5 URETERAL STRICTURE, LEFT: ICD-10-CM

## 2024-10-17 PROCEDURE — 84153 ASSAY OF PSA TOTAL: CPT

## 2024-10-17 PROCEDURE — 80048 BASIC METABOLIC PNL TOTAL CA: CPT

## 2024-10-17 PROCEDURE — 36415 COLL VENOUS BLD VENIPUNCTURE: CPT

## 2024-10-18 LAB
ANION GAP SERPL CALCULATED.3IONS-SCNC: 10.4 MMOL/L (ref 5–15)
BUN SERPL-MCNC: 12 MG/DL (ref 8–23)
BUN/CREAT SERPL: 10.7 (ref 7–25)
CALCIUM SPEC-SCNC: 9.8 MG/DL (ref 8.6–10.5)
CHLORIDE SERPL-SCNC: 100 MMOL/L (ref 98–107)
CO2 SERPL-SCNC: 28.6 MMOL/L (ref 22–29)
CREAT SERPL-MCNC: 1.12 MG/DL (ref 0.76–1.27)
EGFRCR SERPLBLD CKD-EPI 2021: 69.8 ML/MIN/1.73
GLUCOSE SERPL-MCNC: 243 MG/DL (ref 65–99)
POTASSIUM SERPL-SCNC: 4.2 MMOL/L (ref 3.5–5.2)
PSA SERPL-MCNC: <0.014 NG/ML (ref 0–4)
SODIUM SERPL-SCNC: 139 MMOL/L (ref 136–145)

## 2024-10-18 NOTE — TELEPHONE ENCOUNTER
LAURA CALLED BACK FOR AN UPDATE REGARDING IMAGING, I CLARIFIED WITH KELLY AND SHE SAID APPT WITH IMAGING WOULD NEED TO BE CANCELED AND WE COULD SEND A NEW ORDER IF NEEDED

## 2024-10-24 ENCOUNTER — OFFICE VISIT (OUTPATIENT)
Dept: UROLOGY | Facility: CLINIC | Age: 74
End: 2024-10-24
Payer: MEDICARE

## 2024-10-24 DIAGNOSIS — N13.5 URETERAL STRICTURE, LEFT: Primary | ICD-10-CM

## 2024-10-24 RX ORDER — EZETIMIBE 10 MG/1
TABLET ORAL
COMMUNITY
Start: 2024-08-26

## 2024-10-24 RX ORDER — TRAMADOL HYDROCHLORIDE 50 MG/1
TABLET ORAL
COMMUNITY
Start: 2024-10-09

## 2024-10-24 RX ORDER — LISINOPRIL 20 MG/1
TABLET ORAL DAILY
COMMUNITY

## 2024-12-09 RX ORDER — DAPAGLIFLOZIN 10 MG/1
1 TABLET, FILM COATED ORAL DAILY
Qty: 90 TABLET | Refills: 3 | Status: SHIPPED | OUTPATIENT
Start: 2024-12-09

## 2024-12-13 ENCOUNTER — OFFICE VISIT (OUTPATIENT)
Dept: ENDOCRINOLOGY | Facility: CLINIC | Age: 74
End: 2024-12-13
Payer: MEDICARE

## 2024-12-13 VITALS
BODY MASS INDEX: 30.21 KG/M2 | WEIGHT: 211 LBS | SYSTOLIC BLOOD PRESSURE: 118 MMHG | OXYGEN SATURATION: 98 % | HEART RATE: 70 BPM | DIASTOLIC BLOOD PRESSURE: 78 MMHG | HEIGHT: 70 IN

## 2024-12-13 DIAGNOSIS — E78.2 MIXED HYPERLIPIDEMIA: ICD-10-CM

## 2024-12-13 DIAGNOSIS — I10 BENIGN HYPERTENSION: ICD-10-CM

## 2024-12-13 DIAGNOSIS — E11.65 TYPE 2 DIABETES MELLITUS WITH HYPERGLYCEMIA, WITHOUT LONG-TERM CURRENT USE OF INSULIN: Primary | ICD-10-CM

## 2024-12-13 DIAGNOSIS — I25.10 CAD, MULTIPLE VESSEL: ICD-10-CM

## 2024-12-13 LAB
EXPIRATION DATE: ABNORMAL
EXPIRATION DATE: ABNORMAL
GLUCOSE BLDC GLUCOMTR-MCNC: 225 MG/DL (ref 70–130)
HBA1C MFR BLD: 9.1 % (ref 4.5–5.7)
Lab: ABNORMAL
Lab: ABNORMAL
TSH SERPL DL<=0.05 MIU/L-ACNC: 1.79 UIU/ML (ref 0.27–4.2)

## 2024-12-13 PROCEDURE — 82570 ASSAY OF URINE CREATININE: CPT | Performed by: INTERNAL MEDICINE

## 2024-12-13 PROCEDURE — 82043 UR ALBUMIN QUANTITATIVE: CPT | Performed by: INTERNAL MEDICINE

## 2024-12-13 PROCEDURE — 84443 ASSAY THYROID STIM HORMONE: CPT | Performed by: INTERNAL MEDICINE

## 2024-12-13 RX ORDER — GLIMEPIRIDE 4 MG/1
8 TABLET ORAL
Qty: 180 TABLET | Refills: 3 | Status: CANCELLED | OUTPATIENT
Start: 2024-12-13

## 2024-12-13 RX ORDER — INSULIN DEGLUDEC 100 U/ML
INJECTION, SOLUTION SUBCUTANEOUS
Qty: 45 ML | Refills: 3 | Status: SHIPPED | OUTPATIENT
Start: 2024-12-13

## 2024-12-13 RX ORDER — PEN NEEDLE, DIABETIC 30 GX3/16"
1 NEEDLE, DISPOSABLE MISCELLANEOUS DAILY
Qty: 100 EACH | Refills: 3 | Status: SHIPPED | OUTPATIENT
Start: 2024-12-13

## 2024-12-13 RX ORDER — GLIMEPIRIDE 4 MG/1
8 TABLET ORAL
Qty: 180 TABLET | Refills: 3 | Status: SHIPPED | OUTPATIENT
Start: 2024-12-13

## 2024-12-13 RX ORDER — CARVEDILOL 25 MG/1
1 TABLET, FILM COATED ORAL 3 TIMES DAILY
Qty: 200 EACH | Refills: 3 | Status: SHIPPED | OUTPATIENT
Start: 2024-12-13

## 2024-12-13 NOTE — PROGRESS NOTES
"     Office Note      Date: 2024  Patient Name: Aldair Narvaez  MRN: [id number]  : 1950    Chief Complaint   Patient presents with    Diabetes     Type II    Hypertension    Hyperlipidemia       History of Present Illness:   Aldair Narvaez is a 74 y.o. male who presents for Diabetes type 2. Diagnosed in: . Treated in past with oral agents. Current treatments: farxiga and glimepiride. Number of insulin shots per day: none. Checks blood sugar qod. Has low blood sugar: no. Aspirin use: No - on eliquis. Statin use: Yes. ACE-I/ARB use: Yes. Changes in health since last visit: rotator cuff surgery. Last eye exam 2024.     Subjective      Diabetic Complications:  Eyes: No  Kidneys: No  Feet: No  Heart: Yes - stents and CABG    Diet and Exercise:  Meals per day: 2  Minutes of exercise per week: 0 mins.    Review of Systems:   Review of Systems   Constitutional: Negative.    Cardiovascular: Negative.    Gastrointestinal: Negative.    Endocrine: Negative.        The following portions of the patient's history were reviewed and updated as appropriate: allergies, current medications, past family history, past medical history, past social history, past surgical history, and problem list.    Objective     Visit Vitals  /78 (BP Location: Left arm, Patient Position: Sitting, Cuff Size: Adult)   Pulse 70   Ht 177.8 cm (70\")   Wt 95.7 kg (211 lb)   SpO2 98%   BMI 30.28 kg/m²       Physical Exam:  Physical Exam  Constitutional:       Appearance: Normal appearance.   Neurological:      Mental Status: He is alert.         Labs:    HbA1c  Lab Results   Component Value Date    HGBA1C 9.1 (A) 2024       CMP  Lab Results   Component Value Date    GLUCOSE 243 (H) 10/17/2024    BUN 12 10/17/2024    CREATININE 1.12 10/17/2024    EGFRIFNONA 96 2021    BCR 10.7 10/17/2024    K 4.2 10/17/2024    CO2 28.6 10/17/2024    CALCIUM 9.8 10/17/2024    AST 36 2023    ALT 35 2023        Lipid Panel  Lab " "Results   Component Value Date    HDL Cholesterol 32 (L) 12/06/2023    LDL Cholesterol  102 (H) 12/06/2023    LDL/HDL Ratio 2.79 12/06/2023    Triglycerides 439 (H) 12/06/2023        TSH  Lab Results   Component Value Date    TSH 1.790 12/13/2024        Hemoglobin A1C  No components found for: \"HGBA1C\"     Microalbumin/Creatinine  Lab Results   Component Value Date    MALBCRERATIO 416.1 (H) 12/13/2024    MICROALBUR 19.1 12/13/2024           Assessment / Plan      Assessment & Plan:  Diagnoses and all orders for this visit:    1. Type 2 diabetes mellitus with hyperglycemia, without long-term current use of insulin (Primary)  Assessment & Plan:  Diabetes is worsening.   We discussed treatment options.   Start basal insulin and titrate to fasting glucose <120.  Diabetes will be reassessed in 6 months.    Orders:  -     POC Glucose, Blood  -     POC Glycosylated Hemoglobin (Hb A1C)  -     TSH; Future  -     Microalbumin / Creatinine Urine Ratio - Urine, Clean Catch; Future  -     TSH  -     Microalbumin / Creatinine Urine Ratio - Urine, Clean Catch    2. Benign hypertension  Assessment & Plan:  Hypertension is stable and controlled  Continue current treatment regimen.  Blood pressure will be reassessed in 6 months.      3. Mixed hyperlipidemia  Assessment & Plan:  Continue statin and ezetimibe.  Recent lipids not too bad.      4. CAD, multiple vessel  Assessment & Plan:  Continue eliquis, statin, ezetimibe and SGLT-2 inhibitor.      Other orders  -     glimepiride (AMARYL) 4 MG tablet; Take 2 tablets by mouth Every Morning Before Breakfast.  Dispense: 180 tablet; Refill: 3  -     glucose blood (Contour Test) test strip; 1 each by Other route 3 (Three) Times a Day. use to test 3 times a day  Dispense: 200 each; Refill: 3  -     insulin degludec (Tresiba FlexTouch) 100 UNIT/ML solution pen-injector injection; Inject once daily; max daily dose 50u  Dispense: 45 mL; Refill: 3  -     Insulin Pen Needle (Pen Needles) 32G X 4 " MM misc; Use 1 each Daily.  Dispense: 100 each; Refill: 3      Current Outpatient Medications   Medication Instructions    albuterol sulfate  (90 Base) MCG/ACT inhaler As Needed    Blood Glucose Monitoring Suppl device Use to check blood sugars 3-4 times daily. E11.65    dapagliflozin Propanediol (FARXIGA) 10 mg, Oral, Daily    DULoxetine (CYMBALTA) 30 mg    Eliquis 5 mg, Every 12 Hours Scheduled    ezetimibe (ZETIA) 10 MG tablet     glimepiride (AMARYL) 8 mg, Oral, Every Morning Before Breakfast    glucose blood (Contour Test) test strip 1 each, Other, 3 Times Daily, use to test 3 times a day    glucose blood test strip Use to check blood sugars 3-4 times daily. E11.65    HYDROcodone-acetaminophen (NORCO) 5-325 MG per tablet 1 tablet, Oral, Every 8 Hours PRN    insulin degludec (Tresiba FlexTouch) 100 UNIT/ML solution pen-injector injection Inject once daily; max daily dose 50u    Insulin Pen Needle (Pen Needles) 32G X 4 MM misc 1 each, Not Applicable, Daily    ipratropium-albuterol (DUO-NEB) 0.5-2.5 mg/3 ml nebulizer 3 mL, Nebulization, Every 6 Hours PRN    metoprolol succinate XL (TOPROL-XL) 50 MG 24 hr tablet 1 tablet, Daily    Misc Natural Products (Magic Mushroom Mix) capsule 1,000 mg, Daily    nitroglycerin (NITROSTAT) 0.4 MG SL tablet nitroglycerin 0.4 mg sublingual tablet    pantoprazole (PROTONIX) 40 MG EC tablet Daily    rosuvastatin (CRESTOR) 20 mg, Daily    traMADol (ULTRAM) 50 MG tablet     valsartan-hydrochlorothiazide (DIOVAN-HCT) 320-25 MG per tablet 1 tablet, Daily      Return in about 3 months (around 3/13/2025) for Recheck with A1c, microalbumin, foot exam.    Electronically signed by: Steve Cohen MD  12/13/2024

## 2024-12-13 NOTE — ASSESSMENT & PLAN NOTE
Diabetes is worsening.   We discussed treatment options.   Start basal insulin and titrate to fasting glucose <120.  Diabetes will be reassessed in 6 months.

## 2024-12-13 NOTE — ASSESSMENT & PLAN NOTE
Hypertension is stable and controlled  Continue current treatment regimen.  Blood pressure will be reassessed in 6 months.   Patient Seen in: [facility] Emergency Department    History   Patient presents with:  Back Pain (musculoskeletal)    Stated Complaint: back pain    HPI    14-year-old woman who comes to the ED with 2 weeks of worsening back pain.   She is also having URINE 6-10 (*)     Bacteria Urine Rare (*)     Squamous Epi. Cells Moderate (*)     Mucous Urine 2+ (*)     All other components within normal limits   POCT PREGNANCY, URINE         MDM     Given Toradol and [place] for pain relief.          Disposition and P

## 2024-12-14 LAB
ALBUMIN UR-MCNC: 19.1 MG/DL
CREAT UR-MCNC: 45.9 MG/DL
MICROALBUMIN/CREAT UR: 416.1 MG/G (ref 0–29)

## 2024-12-17 ENCOUNTER — PRE-ADMISSION TESTING (OUTPATIENT)
Dept: PREADMISSION TESTING | Facility: HOSPITAL | Age: 74
End: 2024-12-17
Payer: MEDICARE

## 2024-12-17 VITALS — BODY MASS INDEX: 30.19 KG/M2 | WEIGHT: 210.87 LBS | HEIGHT: 70 IN

## 2024-12-17 DIAGNOSIS — N30.00 ACUTE CYSTITIS WITHOUT HEMATURIA: ICD-10-CM

## 2024-12-17 DIAGNOSIS — N13.5 URETERAL STRICTURE, LEFT: ICD-10-CM

## 2024-12-17 LAB
ANION GAP SERPL CALCULATED.3IONS-SCNC: 13 MMOL/L (ref 5–15)
BILIRUB UR QL STRIP: NEGATIVE
BUN SERPL-MCNC: 20 MG/DL (ref 8–23)
BUN/CREAT SERPL: 25.3 (ref 7–25)
CALCIUM SPEC-SCNC: 9.5 MG/DL (ref 8.6–10.5)
CHLORIDE SERPL-SCNC: 99 MMOL/L (ref 98–107)
CLARITY UR: ABNORMAL
CO2 SERPL-SCNC: 26 MMOL/L (ref 22–29)
COLOR UR: ABNORMAL
CREAT SERPL-MCNC: 0.79 MG/DL (ref 0.76–1.27)
DEPRECATED RDW RBC AUTO: 47 FL (ref 37–54)
EGFRCR SERPLBLD CKD-EPI 2021: 94.4 ML/MIN/1.73
ERYTHROCYTE [DISTWIDTH] IN BLOOD BY AUTOMATED COUNT: 14.6 % (ref 12.3–15.4)
GLUCOSE SERPL-MCNC: 205 MG/DL (ref 65–99)
GLUCOSE UR STRIP-MCNC: ABNORMAL MG/DL
HCT VFR BLD AUTO: 46 % (ref 37.5–51)
HGB BLD-MCNC: 14.8 G/DL (ref 13–17.7)
HGB UR QL STRIP.AUTO: ABNORMAL
INR PPP: 1.12 (ref 0.89–1.12)
KETONES UR QL STRIP: NEGATIVE
LEUKOCYTE ESTERASE UR QL STRIP.AUTO: ABNORMAL
MCH RBC QN AUTO: 28.7 PG (ref 26.6–33)
MCHC RBC AUTO-ENTMCNC: 32.2 G/DL (ref 31.5–35.7)
MCV RBC AUTO: 89.1 FL (ref 79–97)
NITRITE UR QL STRIP: NEGATIVE
PH UR STRIP.AUTO: 5.5 [PH] (ref 5–8)
PLATELET # BLD AUTO: 267 10*3/MM3 (ref 140–450)
PMV BLD AUTO: 9 FL (ref 6–12)
POTASSIUM SERPL-SCNC: 4.3 MMOL/L (ref 3.5–5.2)
PROT UR QL STRIP: ABNORMAL
PROTHROMBIN TIME: 14.5 SECONDS (ref 12.2–14.5)
QT INTERVAL: 418 MS
QTC INTERVAL: 460 MS
RBC # BLD AUTO: 5.16 10*6/MM3 (ref 4.14–5.8)
SODIUM SERPL-SCNC: 138 MMOL/L (ref 136–145)
SP GR UR STRIP: >=1.03 (ref 1–1.03)
UROBILINOGEN UR QL STRIP: ABNORMAL
WBC NRBC COR # BLD AUTO: 12.64 10*3/MM3 (ref 3.4–10.8)

## 2024-12-17 PROCEDURE — 80048 BASIC METABOLIC PNL TOTAL CA: CPT

## 2024-12-17 PROCEDURE — 85027 COMPLETE CBC AUTOMATED: CPT

## 2024-12-17 PROCEDURE — 81003 URINALYSIS AUTO W/O SCOPE: CPT

## 2024-12-17 PROCEDURE — 87086 URINE CULTURE/COLONY COUNT: CPT

## 2024-12-17 PROCEDURE — 85610 PROTHROMBIN TIME: CPT

## 2024-12-17 PROCEDURE — 36415 COLL VENOUS BLD VENIPUNCTURE: CPT

## 2024-12-17 PROCEDURE — 93005 ELECTROCARDIOGRAM TRACING: CPT

## 2024-12-17 RX ORDER — HYDROCORTISONE 10 MG/G
1 CREAM TOPICAL AS NEEDED
Status: ON HOLD | COMMUNITY

## 2024-12-17 RX ORDER — LOPERAMIDE HYDROCHLORIDE 2 MG/1
2 CAPSULE ORAL 4 TIMES DAILY PRN
Status: ON HOLD | COMMUNITY

## 2024-12-17 RX ORDER — PERPHENAZINE/AMITRIPTYLINE HCL 4MG-10MG
1 TABLET ORAL DAILY
Status: ON HOLD | COMMUNITY

## 2024-12-17 NOTE — PAT
An arrival time for procedure was not provided during PAT visit. If patient had any questions or concerns about their arrival time, they were instructed to contact their surgeon/physician.  Additionally, if the patient referred to an arrival time that was acquired from their my chart account, patient was encouraged to verify that time with their surgeon/physician. Arrival times are NOT provided in Pre Admission Testing Department.    Per Anesthesia Request, patient instructed not to take their ACE/ARB medications on the AM of surgery.    Patient instructed to drink 20 ounces of Gatorade or Gatorlyte (if diabetic) and it needs to be completed 1 hour (for Main OR patients) or 2 hours (scheduled  section & [facility] patients) before given arrival time for procedure (NO RED Gatorade and NO Gatorade Zero).    Patient verbalized understanding.    A1C in chart from 24.  New EKG obtained in PAT reveals no new changes since last pre-op EKG in January of this year.  Patient denies new symptoms of CP, SOA, or activity intolerance.

## 2024-12-17 NOTE — ANESTHESIA PREPROCEDURE EVALUATION
Anesthesia Evaluation     Patient summary reviewed and Nursing notes reviewed   NPO Solid Status: > 8 hours  NPO Liquid Status: > 2 hours           Airway   Mallampati: I  TM distance: >3 FB  Neck ROM: full  No difficulty expected  Dental    (+) upper dentures    Pulmonary     breath sounds clear to auscultation  (+) ,sleep apnea  Cardiovascular     ECG reviewed  Rhythm: regular  Rate: normal    (+) hypertension, CAD, CABG >6 Months, dysrhythmias Paroxysmal Atrial Fib, hyperlipidemia      Neuro/Psych  GI/Hepatic/Renal/Endo    (+) obesity, renal disease- CRI, diabetes mellitus    Musculoskeletal     Abdominal    Substance History      OB/GYN          Other   arthritis,     ROS/Med Hx Other: #4 LMA used for last similar procedure                Anesthesia Plan    ASA 3     general     intravenous induction     Anesthetic plan, risks, benefits, and alternatives have been provided, discussed and informed consent has been obtained with: patient.    Plan discussed with CRNA.    CODE STATUS:          PT Re-Evaluation     Today's date: 2024  Patient name: Amari Jenkins  : 1968  MRN: [id number]  Referring provider: Belen Tatum P*  Dx:   Encounter Diagnosis     ICD-10-CM    1. Status post rotator cuff repair  Z98.890       2. Tear of right supraspinatus tendon  M75.101           Start Time: 930  Stop Time: 1015  Total time in clinic (min): 45 minutes    Assessment  Impairments: abnormal or restricted ROM, activity intolerance, impaired physical strength, lacks appropriate home exercise program and pain with function  Symptom irritability: low    Assessment details: Patient has attended 27 physical therapy visits to date. He continues to show improvements with both mobility and strength of the R shoulder. He has mild discomfort noted at end range of flexion and ER. There is weakness noted to the R shoulder which is to be expected at this time secondary to protocol guidelines. Therapy interventions continue to focus on progression of strength and mobility to tolerance and per protocol guidelines.   Understanding of Dx/Px/POC: good     Prognosis: good    Goals  ST. Independent with HEP in 2 weeks - MET  2. Pt will have verbal report of improvement in symptoms by >/=25% in 2 weeks - MET    To be achieved by D/C   LT. Pt will improve FOTO score by >/= 5 points in 6 weeks - Not MET  2. Pt will improve FOTO score to >/= 61 by visit # 24 - Not MET  3. Pt will be able to return to work - Not MET  4. Pt will be able to perform ADLs independently - Progressing  5. Pt will be able to perform household chores independently - Not MET  6. Pt will be able to return to recreational activities without restriction - Not MET  7. Pt will be able to drive with no difficulty - MET      Plan  Patient would benefit from: skilled physical therapy    Planned therapy interventions: activity modification, manual therapy, motor coordination training, neuromuscular re-education, patient education, self care,  therapeutic activities, therapeutic exercise, graded activity, home exercise program, graded exercise, functional ROM exercises and strengthening    Frequency: 2x week  Duration in weeks: 5  Plan of Care beginning date: 12/17/2024  Plan of Care expiration date: 1/21/2025  Treatment plan discussed with: patient  Plan details: Patient will continue to benefit from skilled physical therapy to address the functional deficits that were identified during the evaluation today. We will continue to progress the therapy program to address these functional deficits and achieve the established goals.   Patient informed that from this point forward, to ensure adherence to the aforementioned plan of care, all or some of the treatment may be performed and carried out by a Physical Therapy Assistant (PTA) with supervision from a licensed Physical Therapist (PT) in accordance with [facility] Physical Therapy Practice Act.            Subjective Evaluation    History of Present Illness  Mechanism of injury: Pt under went RC repair on 9/11/24 for the second time. His original surgery was May 2023. He had no improvement from the surgery and when he was evaluated by a second opinion the tear was still present.     Since the surgery he is continuing to have pain. He notes compl    Pt is a : he would need to be able to lift 75lbs     Update 10/17/2024:  Patient reports pain levels are well controlled. He continues to report compliance to his sling and HEP. He feels that the shoulder is a little stiff but otherwise he is doing quite well. He denies any cardinal signs of infection.     Update 11/21/2024:  Patient reports that his arm is improving. He notes that he still has some soreness with activity but has been refraining from doing any quick movements and heavier lifting. He is still apprehensive to lay on the R shoulder as this will cause irritation to the shoulder.     Update 12/17/2024:  Patient reports that his  shoulder is doing better and that he continues to see improvements with his daily activities. He notes that when he is laying on his side. His arm will get tired when he is using it for extended periods of time.   Patient Goals  Patient goals for therapy: decreased pain and return to work  Patient goal: be able to play frisbee with his dogs, be able to ride his ATV,  be able to go camping,  Pain  Current pain ratin  At best pain ratin  At worst pain rating: 3  Location: R shoulder  Quality: discomfort and dull ache  Relieving factors: ice, medications, change in position, support and rest    Hand dominance: right          Objective     Active Range of Motion   Cervical/Thoracic Spine       Cervical    Flexion:  WFL  Extension:  Restriction level: moderate  Left lateral flexion:  Restriction level: minimal  Right lateral flexion:  Restriction level minimal  Left rotation:  Restriction level: minimal  Right rotation:  Restriction level: minimal  Left Shoulder   Normal active range of motion    Right Shoulder   Flexion: 139 degrees     Right Wrist   Normal active range of motion    Passive Range of Motion   Left Shoulder   Normal passive range of motion    Right Shoulder   Flexion: 157 degrees   External rotation 90°: 78 degrees   Internal rotation 90°: 60 degrees     Strength/Myotome Testing     Left Shoulder   Normal muscle strength    Right Shoulder     Planes of Motion   Flexion: 4-   Extension: 4-   Abduction: 3+   Adduction: 4-   External rotation at 0°: 3+   Internal rotation at 0°: 4-              Precautions: RC repair DOS 24         Phase 1 (Weeks 0-6)              Immediate Postoperative Period              Goals:                          Diminish Pain and Inflammation  Maintain and Protect Integrity of the Repair                          Gradually Increase Passive ROM (NO Active or Active Assist until Week 6)                          Become Independent with Modified ADLs               Precautions:  Maintain Arm in Abduction Sling, Remove Only for Directed Exercises (may remove Abduction Pillow after Day 21 for comfort)                          Keep Incisions Clean & Dry (okay to shower in 48 hours, band-aids over incisions)  No Immersion (pool) until Wounds Totally Sealed (usually not prior to day #10)  Passive Shoulder Motion ONLY, No Lifting/Holding Objects, Reaching Behind Back  Okay to Type/Write at Desktop with Arm in Sling              Day 0-6                          Elbow, Wrist, Hand AROM Exercises                           Start Cervical AROM and Scapula Isometrics                          Cryotherapy/Ice for Pain and Inflammation                          Instruct in Hygiene, Posture, and Positioning               Day 7-28                          Continue Above  May Start Pendulum Exercises                          May Start Supine, Pain Free, PT assisted PROM  Forward Flexion to 90°, External Rotation to 35° (Elbow at side), Internal Rotation to Body, Abduction to 60°                          Can Introduce light Cardio (Walking, Stationary Bike)                          Aqua Therapy may begin at week 3 (day 21) as long as no wound problems              Day 29-42                          Continue Above  Progress PROM to Goal of full PROM by Week 6.  May add Gentle Mobilizations (GH and Scapulothoracic) to Regain full PROM if Needed.  May add Heat prior to PROM Exercises, Ice after Exercises  May Begin AAROM at Day 29 if ROM is Appropriate in Anticipation of AROM Starting at Week 6              Criteria to Progress to Phase 2                          Reasonable Passive Forward Flexion, Abduction , IR/ER                          Time  Phase 2 (Weeks 6-12)              Protection and Active Motion              Goals of Phase:                          Allow Healing of Soft Tissues                          Decrease Pain and Inflammation  Add ADLs and Regain AROM by End of Phase               Precautions:                          NO STRENGTHENING until Phase 3                          Repair is Most Prone to Failure during this Phase!                          No Lifting Objects > 2 lbs (Coffee Cup OK), no Sudden Motions                          Avoid Upper Extremity Bike and Ergometer              Day 43-56                          Discontinue Sling  Initiate AROM Exercises (forward flexion, ER, IR and abduction), Rotator Cuff Isometrics                          Continue Periscapular Exercises, add Stretching if PROM Lacking   No Strengthening until Week 12 (Minimum Time Needed for Cuff Healing Sufficient to withstand Strengthening)                Phase 3 (Weeks 12-16)              Early Strengthening              Goal of Phase:                          Gain full AROM, Maintain PROM                          Gradual return of Shoulder Strength, Power and Endurance                          Gradual return to Functional Activities              Precautions:                          No Lifting > 10lbs, Sudden Lifting or Pushing activities, Overhead Lifting                          No Upper Extremity Bike or Ergometer              Week 12                          Initiate Strengthening Program (10 lb Maximum until Phase 4)                            ER/IR with Bands (Standing)                            ER in Lateral Decubitius Position                            Lateral Raises                            Full Can in Scapular Plane                            Prone Rowing, Horizontal Abduction, Extension                            Elbow Flexion/Extension              Week 14-16                          Initiate light Functional Activities as Permitted  Progress to Fundamental Shoulder Exercises  Phase 4 (Variable but Weeks 16-24)              Aggressive Rehab  Sport Specific or Activity Specific               Goals of Phase:                          Maintain Full Pain-free AROM                          Advanced  Conditioning Exercises for enhanced Functional use                          Continue regaining Shoulder Strength, Power and Endurance                          Eventual return to full Functional Activities              Precautions:                          None              Week 16                          Continue ROM and stretching if appropriate                          Progress Strengthening, Proprioceptive and Neuromuscular Training                          Light Sports if Progressing Well (Chipping/Putting, easy ground strokes etc.)              Week 20                          Continue Strengthening and Stretching                          Initiate Interval Sports Program as Appropriate

## 2024-12-18 DIAGNOSIS — N30.00 ACUTE CYSTITIS WITHOUT HEMATURIA: Primary | ICD-10-CM

## 2024-12-18 RX ORDER — CIPROFLOXACIN 500 MG/1
500 TABLET, FILM COATED ORAL 2 TIMES DAILY
Qty: 8 TABLET | Refills: 0 | Status: ON HOLD | OUTPATIENT
Start: 2024-12-18

## 2024-12-18 NOTE — PROGRESS NOTES
Please call lab and run urine culture on specimen. Given urine findings I am sending Aldair some antibiotic to start today given upcoming surgery thanks    Isma Justin MD

## 2024-12-19 ENCOUNTER — ANESTHESIA EVENT (OUTPATIENT)
Dept: PERIOP | Facility: HOSPITAL | Age: 74
End: 2024-12-19
Payer: MEDICARE

## 2024-12-19 LAB — BACTERIA SPEC AEROBE CULT: NO GROWTH

## 2024-12-19 RX ORDER — SODIUM CHLORIDE 0.9 % (FLUSH) 0.9 %
10 SYRINGE (ML) INJECTION EVERY 12 HOURS SCHEDULED
Status: CANCELLED | OUTPATIENT
Start: 2024-12-19

## 2024-12-19 RX ORDER — FAMOTIDINE 10 MG/ML
20 INJECTION, SOLUTION INTRAVENOUS ONCE
Status: CANCELLED | OUTPATIENT
Start: 2024-12-19 | End: 2024-12-19

## 2024-12-19 RX ORDER — SODIUM CHLORIDE 0.9 % (FLUSH) 0.9 %
10 SYRINGE (ML) INJECTION AS NEEDED
Status: CANCELLED | OUTPATIENT
Start: 2024-12-19

## 2024-12-20 ENCOUNTER — APPOINTMENT (OUTPATIENT)
Dept: GENERAL RADIOLOGY | Facility: HOSPITAL | Age: 74
DRG: 854 | End: 2024-12-20
Payer: MEDICARE

## 2024-12-20 ENCOUNTER — ANESTHESIA (OUTPATIENT)
Dept: PERIOP | Facility: HOSPITAL | Age: 74
End: 2024-12-20
Payer: MEDICARE

## 2024-12-20 ENCOUNTER — HOSPITAL ENCOUNTER (OUTPATIENT)
Facility: HOSPITAL | Age: 74
Setting detail: HOSPITAL OUTPATIENT SURGERY
Discharge: HOME | DRG: 854 | End: 2024-12-20
Attending: STUDENT IN AN ORGANIZED HEALTH CARE EDUCATION/TRAINING PROGRAM | Admitting: STUDENT IN AN ORGANIZED HEALTH CARE EDUCATION/TRAINING PROGRAM
Payer: MEDICARE

## 2024-12-20 ENCOUNTER — TELEPHONE (OUTPATIENT)
Dept: UROLOGY | Facility: CLINIC | Age: 74
End: 2024-12-20

## 2024-12-20 VITALS
RESPIRATION RATE: 16 BRPM | WEIGHT: 210 LBS | TEMPERATURE: 98.7 F | HEART RATE: 81 BPM | OXYGEN SATURATION: 94 % | DIASTOLIC BLOOD PRESSURE: 81 MMHG | SYSTOLIC BLOOD PRESSURE: 127 MMHG | HEIGHT: 70 IN | BODY MASS INDEX: 30.06 KG/M2

## 2024-12-20 DIAGNOSIS — N13.5 URETERAL STRICTURE, LEFT: ICD-10-CM

## 2024-12-20 LAB — GLUCOSE BLDC GLUCOMTR-MCNC: 179 MG/DL (ref 70–130)

## 2024-12-20 PROCEDURE — 52332 CYSTOSCOPY AND TREATMENT: CPT | Performed by: STUDENT IN AN ORGANIZED HEALTH CARE EDUCATION/TRAINING PROGRAM

## 2024-12-20 PROCEDURE — 25010000002 CEFAZOLIN PER 500 MG: Performed by: STUDENT IN AN ORGANIZED HEALTH CARE EDUCATION/TRAINING PROGRAM

## 2024-12-20 PROCEDURE — 52351 CYSTOURETERO & OR PYELOSCOPE: CPT | Performed by: STUDENT IN AN ORGANIZED HEALTH CARE EDUCATION/TRAINING PROGRAM

## 2024-12-20 PROCEDURE — C1769 GUIDE WIRE: HCPCS | Performed by: STUDENT IN AN ORGANIZED HEALTH CARE EDUCATION/TRAINING PROGRAM

## 2024-12-20 PROCEDURE — 25010000002 FENTANYL CITRATE (PF) 100 MCG/2ML SOLUTION: Performed by: NURSE ANESTHETIST, CERTIFIED REGISTERED

## 2024-12-20 PROCEDURE — 0TP98DZ REMOVAL OF INTRALUMINAL DEVICE FROM URETER, VIA NATURAL OR ARTIFICIAL OPENING ENDOSCOPIC: ICD-10-PCS | Performed by: STUDENT IN AN ORGANIZED HEALTH CARE EDUCATION/TRAINING PROGRAM

## 2024-12-20 PROCEDURE — S0260 H&P FOR SURGERY: HCPCS | Performed by: PHYSICIAN ASSISTANT

## 2024-12-20 PROCEDURE — C2617 STENT, NON-COR, TEM W/O DEL: HCPCS | Performed by: STUDENT IN AN ORGANIZED HEALTH CARE EDUCATION/TRAINING PROGRAM

## 2024-12-20 PROCEDURE — 99024 POSTOP FOLLOW-UP VISIT: CPT | Performed by: STUDENT IN AN ORGANIZED HEALTH CARE EDUCATION/TRAINING PROGRAM

## 2024-12-20 PROCEDURE — 25010000002 LIDOCAINE PF 1% 1 % SOLUTION: Performed by: NURSE ANESTHETIST, CERTIFIED REGISTERED

## 2024-12-20 PROCEDURE — 25010000002 PROPOFOL 10 MG/ML EMULSION: Performed by: NURSE ANESTHETIST, CERTIFIED REGISTERED

## 2024-12-20 PROCEDURE — BT1FZZZ FLUOROSCOPY OF LEFT KIDNEY, URETER AND BLADDER: ICD-10-PCS | Performed by: STUDENT IN AN ORGANIZED HEALTH CARE EDUCATION/TRAINING PROGRAM

## 2024-12-20 PROCEDURE — 74420 UROGRAPHY RTRGR +-KUB: CPT | Performed by: STUDENT IN AN ORGANIZED HEALTH CARE EDUCATION/TRAINING PROGRAM

## 2024-12-20 PROCEDURE — 76000 FLUOROSCOPY <1 HR PHYS/QHP: CPT

## 2024-12-20 PROCEDURE — 82948 REAGENT STRIP/BLOOD GLUCOSE: CPT

## 2024-12-20 PROCEDURE — 87086 URINE CULTURE/COLONY COUNT: CPT | Performed by: STUDENT IN AN ORGANIZED HEALTH CARE EDUCATION/TRAINING PROGRAM

## 2024-12-20 PROCEDURE — 25010000002 ONDANSETRON PER 1 MG: Performed by: NURSE ANESTHETIST, CERTIFIED REGISTERED

## 2024-12-20 PROCEDURE — 0T778DZ DILATION OF LEFT URETER WITH INTRALUMINAL DEVICE, VIA NATURAL OR ARTIFICIAL OPENING ENDOSCOPIC: ICD-10-PCS | Performed by: STUDENT IN AN ORGANIZED HEALTH CARE EDUCATION/TRAINING PROGRAM

## 2024-12-20 PROCEDURE — 25510000001 IOPAMIDOL 61 % SOLUTION: Performed by: STUDENT IN AN ORGANIZED HEALTH CARE EDUCATION/TRAINING PROGRAM

## 2024-12-20 PROCEDURE — 25810000003 LACTATED RINGERS PER 1000 ML: Performed by: ANESTHESIOLOGY

## 2024-12-20 PROCEDURE — C1758 CATHETER, URETERAL: HCPCS | Performed by: STUDENT IN AN ORGANIZED HEALTH CARE EDUCATION/TRAINING PROGRAM

## 2024-12-20 PROCEDURE — C1726 CATH, BAL DIL, NON-VASCULAR: HCPCS | Performed by: STUDENT IN AN ORGANIZED HEALTH CARE EDUCATION/TRAINING PROGRAM

## 2024-12-20 PROCEDURE — 25010000002 LIDOCAINE PF 1% 1 % SOLUTION: Performed by: ANESTHESIOLOGY

## 2024-12-20 PROCEDURE — 0T778ZZ DILATION OF LEFT URETER, VIA NATURAL OR ARTIFICIAL OPENING ENDOSCOPIC: ICD-10-PCS | Performed by: STUDENT IN AN ORGANIZED HEALTH CARE EDUCATION/TRAINING PROGRAM

## 2024-12-20 DEVICE — URETERAL STENT WITH SIDE HOLES 7FX24CM
Type: IMPLANTABLE DEVICE | Site: URETER | Status: FUNCTIONAL
Brand: TRIA™ FIRM

## 2024-12-20 RX ORDER — ONDANSETRON 2 MG/ML
INJECTION INTRAMUSCULAR; INTRAVENOUS AS NEEDED
Status: DISCONTINUED | OUTPATIENT
Start: 2024-12-20 | End: 2024-12-20 | Stop reason: SURG

## 2024-12-20 RX ORDER — FAMOTIDINE 20 MG/1
20 TABLET, FILM COATED ORAL ONCE
Status: COMPLETED | OUTPATIENT
Start: 2024-12-20 | End: 2024-12-20

## 2024-12-20 RX ORDER — MAGNESIUM HYDROXIDE 1200 MG/15ML
LIQUID ORAL AS NEEDED
Status: DISCONTINUED | OUTPATIENT
Start: 2024-12-20 | End: 2024-12-20 | Stop reason: HOSPADM

## 2024-12-20 RX ORDER — LIDOCAINE HYDROCHLORIDE 10 MG/ML
0.5 INJECTION, SOLUTION EPIDURAL; INFILTRATION; INTRACAUDAL; PERINEURAL ONCE AS NEEDED
Status: COMPLETED | OUTPATIENT
Start: 2024-12-20 | End: 2024-12-20

## 2024-12-20 RX ORDER — HYDROMORPHONE HYDROCHLORIDE 1 MG/ML
0.5 INJECTION, SOLUTION INTRAMUSCULAR; INTRAVENOUS; SUBCUTANEOUS
Status: DISCONTINUED | OUTPATIENT
Start: 2024-12-20 | End: 2024-12-20 | Stop reason: HOSPADM

## 2024-12-20 RX ORDER — MIDAZOLAM HYDROCHLORIDE 1 MG/ML
0.5 INJECTION, SOLUTION INTRAMUSCULAR; INTRAVENOUS
Status: DISCONTINUED | OUTPATIENT
Start: 2024-12-20 | End: 2024-12-20 | Stop reason: HOSPADM

## 2024-12-20 RX ORDER — FENTANYL CITRATE 50 UG/ML
INJECTION, SOLUTION INTRAMUSCULAR; INTRAVENOUS AS NEEDED
Status: DISCONTINUED | OUTPATIENT
Start: 2024-12-20 | End: 2024-12-20 | Stop reason: SURG

## 2024-12-20 RX ORDER — FENTANYL CITRATE 50 UG/ML
50 INJECTION, SOLUTION INTRAMUSCULAR; INTRAVENOUS
Status: DISCONTINUED | OUTPATIENT
Start: 2024-12-20 | End: 2024-12-20 | Stop reason: HOSPADM

## 2024-12-20 RX ORDER — PROPOFOL 10 MG/ML
VIAL (ML) INTRAVENOUS AS NEEDED
Status: DISCONTINUED | OUTPATIENT
Start: 2024-12-20 | End: 2024-12-20 | Stop reason: SURG

## 2024-12-20 RX ORDER — LIDOCAINE HYDROCHLORIDE 10 MG/ML
INJECTION, SOLUTION EPIDURAL; INFILTRATION; INTRACAUDAL; PERINEURAL AS NEEDED
Status: DISCONTINUED | OUTPATIENT
Start: 2024-12-20 | End: 2024-12-20 | Stop reason: SURG

## 2024-12-20 RX ORDER — PHENAZOPYRIDINE HYDROCHLORIDE 200 MG/1
200 TABLET, FILM COATED ORAL 3 TIMES DAILY PRN
Qty: 20 TABLET | Refills: 0 | Status: SHIPPED | OUTPATIENT
Start: 2024-12-20

## 2024-12-20 RX ORDER — IOPAMIDOL 612 MG/ML
INJECTION, SOLUTION INTRAVASCULAR AS NEEDED
Status: DISCONTINUED | OUTPATIENT
Start: 2024-12-20 | End: 2024-12-20 | Stop reason: HOSPADM

## 2024-12-20 RX ORDER — ONDANSETRON 2 MG/ML
4 INJECTION INTRAMUSCULAR; INTRAVENOUS ONCE AS NEEDED
Status: CANCELLED | OUTPATIENT
Start: 2024-12-20

## 2024-12-20 RX ORDER — SODIUM CHLORIDE, SODIUM LACTATE, POTASSIUM CHLORIDE, CALCIUM CHLORIDE 600; 310; 30; 20 MG/100ML; MG/100ML; MG/100ML; MG/100ML
9 INJECTION, SOLUTION INTRAVENOUS CONTINUOUS
Status: DISCONTINUED | OUTPATIENT
Start: 2024-12-21 | End: 2024-12-20 | Stop reason: HOSPADM

## 2024-12-20 RX ORDER — OXYBUTYNIN CHLORIDE 5 MG/1
5 TABLET, EXTENDED RELEASE ORAL DAILY
Qty: 30 TABLET | Refills: 0 | Status: SHIPPED | OUTPATIENT
Start: 2024-12-20 | End: 2024-12-25 | Stop reason: HOSPADM

## 2024-12-20 RX ADMIN — LIDOCAINE HYDROCHLORIDE 0.5 ML: 10 INJECTION, SOLUTION EPIDURAL; INFILTRATION; INTRACAUDAL; PERINEURAL at 09:22

## 2024-12-20 RX ADMIN — FENTANYL CITRATE 100 MCG: 50 INJECTION, SOLUTION INTRAMUSCULAR; INTRAVENOUS at 11:15

## 2024-12-20 RX ADMIN — PROPOFOL 160 MG: 10 INJECTION, EMULSION INTRAVENOUS at 11:20

## 2024-12-20 RX ADMIN — SODIUM CHLORIDE, POTASSIUM CHLORIDE, SODIUM LACTATE AND CALCIUM CHLORIDE 9 ML/HR: 600; 310; 30; 20 INJECTION, SOLUTION INTRAVENOUS at 09:22

## 2024-12-20 RX ADMIN — SODIUM CHLORIDE 2 G: 900 INJECTION INTRAVENOUS at 11:15

## 2024-12-20 RX ADMIN — ONDANSETRON 4 MG: 2 INJECTION INTRAMUSCULAR; INTRAVENOUS at 11:27

## 2024-12-20 RX ADMIN — FAMOTIDINE 20 MG: 20 TABLET, FILM COATED ORAL at 09:25

## 2024-12-20 RX ADMIN — LIDOCAINE HYDROCHLORIDE 100 MG: 10 INJECTION, SOLUTION EPIDURAL; INFILTRATION; INTRACAUDAL; PERINEURAL at 11:19

## 2024-12-20 NOTE — ANESTHESIA PREPROCEDURE EVALUATION
Anesthesia Evaluation     Patient summary reviewed and Nursing notes reviewed   no history of anesthetic complications:   NPO Solid Status: > 8 hours  NPO Liquid Status: > 2 hours           Airway   Mallampati: II  TM distance: >3 FB  Neck ROM: full  No difficulty expected  Dental    (+) upper dentures and edentulous    Pulmonary    (+) ,sleep apnea on CPAP  (-) COPD, asthma, not a smoker  Cardiovascular     ECG reviewed  PT is on anticoagulation therapy    (+) hypertension, CABG >6 Months, cardiac stents more than 12 months ago , dysrhythmias Atrial Fib, hyperlipidemia  (-) angina, orthopnea, SAUCEDA    ROS comment: On eliquis, stopped monday    Neuro/Psych  (-) seizures, CVA  GI/Hepatic/Renal/Endo    (+) obesity, GERD, renal disease-, diabetes mellitus    Musculoskeletal     Abdominal    Substance History      OB/GYN          Other   arthritis,                   Anesthesia Plan    ASA 3     general     intravenous induction     Anesthetic plan, risks, benefits, and alternatives have been provided, discussed and informed consent has been obtained with: patient.  Pre-procedure education provided  Plan discussed with CRNA.      CODE STATUS:

## 2024-12-20 NOTE — TELEPHONE ENCOUNTER
----- Message from Isma Justin sent at 12/20/2024 11:59 AM EST -----  Regarding: post op visit  Status post left ureteral stent exchange today.  Follow-up with me in 4-5 weeks in office thanks    Isma Justin MD

## 2024-12-20 NOTE — OP NOTE
STENT INSERTION  Procedure Report    Patient Name:  Aldair Narvaez  YOB: 1950    Date of Surgery:  12/20/2024     Indications: 73 yo male with chronic left ureteral stricture of unclear etiology, managed with an indwelling ureteral stent who presents today for stent exchange with possible ureteral balloon dilation.      Pre-op Diagnosis:   Ureteral stricture, left [N13.5]       Post-Op Diagnosis Codes:     * Ureteral stricture, left [N13.5]      Procedure(s):  CYSTOSCOPY  LEFT RETROGRADE PYELOGRAM  LEFT DIAGNOSTIC URETEROSCOPY  URETERAL BALLOON DILATION  LEFT URETERAL STENT EXCHANGE    Staff:  Surgeon(s):  Isma Justin MD Kim, Joon Kyung, MD         Anesthesia: General    Estimated Blood Loss: minimal    Implants:    Implant Name Type Inv. Item Serial No.  Lot No. LRB No. Used Action   STNT URETRL TRIA MF FIRM 7F 24CM - [id number] Stent STNT URETRL TRIA MF FIRM 7F 24CM  [employer] LATRICIA [id number] Left 1 Implanted       Specimen:          Specimens       ID Source Type Tests Collected By Collected At Frozen?    1 Urinary Bladder Urine URINE CULTURE   Isma Justin MD 12/20/24 1133                 Findings: Left stent removed, diagnostic left ureteroscopy performed.  Persistent albeit improved left distal one third ureteral stricture measuring roughly 2 cm with stenosis and somewhat fibrotic appearing ureteral mucosa.  Given persistent stricture, ureteral balloon dilation performed with a 15 Romansh by 4 cm dilator.  7 Romansh by 26 cm stent placed on left, no strings.    Complications: None    Description of Procedure:     After informed consent, the patient was brought back to the operating suite and moved over to the operating table. General anesthesia was smoothly induced, IV antibiotics were administered, and the patient was placed in the dorsal lithotomy position with careful attention focused on padding all pressure points. The patient was prepped and draped in  standard fashion. A timeout was performed to ensure the correct patient and procedure    A 22Fr cystoscope was used to cannulate the urethra. The urethra was of normal course and caliber.  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  The bladder neck still has a moderate contracture but able to accommodate the 22 Tajik cystoscope.  Upon entering the bladder, pan-cystoscopy revealed no bladder abnormalities.  Urine appears a bit cloudy and this was irrigated from the bladder and sent for culture.  The bilateral ureteral orifices were visualized in their orthotopic positions.  A stent is emanating from the left ureter.  Wire was advanced alongside the stent into the collecting system.  The existing stent was removed with a grasper.    Left retrograde Pyelogram Interpretation  Attention was then turned to the left ureteral orifice which was cannulated with a 5 Fr open ended ureteral catheter. A retrograde pyelogram was performed by injecting isovue contrast, demonstrating distal ureteral stenosis roughly measuring 2 cm at the distal one third ureter below the level of the iliac vasculature.  In order to better delineate the current status of the patient's ureteral stricture we elected to perform a diagnostic ureteroscopy.    The semi-rigid ureteroscope was then advanced into the bladder. The left distal ureter was cannulated demonstrating mild stenosis, able to accommodate the ureteroscope, I had to advance a second wire as the ureter at this location is somewhat fixed but I was able to advance the ureteroscope past the area of stenosis at which point there does appear to be some fibrotic ureteral mucosa and some proximal ureteral dilation above the area.  I backed out the ureteroscope at this point.    Over the wire we advanced a 15 Tajik by 4 cm ureteral balloon dilator and straddled the area of stenosis on fluoroscopy.  The balloon was dilated with contrast to 14 patricia of pressure and held for 2 minutes.  We then let  down the balloon and move this more distally and then reinflated the balloon, this was performed 1 more time at the more distal ureter essentially to dilate all along the course of the distal ureter.    I readvanced the ureteroscope and this area of ureteral stricture now appeared to be more open and accommodating to the ureteroscope.    I readvanced the cystoscope and then advanced a 7 Chinese by 26 cm Tria firm stent in standard fashion into the collecting system, the wire was removed and a good proximal and distal stent curl were noted.  The bladder was emptied.  Contrast is seen draining through the stent.  The bladder was emptied and the case was concluded.    The patient was brought back to the PACU in stable condition. All scopes and instruments were in good working order at the end of the case. There were no complications.      Disposition:  Follow-up in 4 to 6 weeks, we will discuss attempt at stent removal per patient's preference to determine if this renal unit will now drain after multiple ureteral balloon dilation attempts.          Isma Justin MD     Date: 12/20/2024  Time: 16:33 EST

## 2024-12-20 NOTE — DISCHARGE INSTRUCTIONS
STENT EXCHANGE Post-Operative Care/Expectations    Follow these guidelines after your procedure in order to assist with your recovery.    Anesthesia Precautions and Expectations  - Rest for 24 hours after receiving general anesthesia, make sure you have someone at home with you that can monitor you  - Do not operate a vehicle, drink alcohol, or make 'important decisions'/sign legal documentation during the immediate recovery period if you received sedation for your procedure  - You may experience a sore throat, jaw discomfort, or muscle aches related to anesthesia, these symptoms may last a few days    Activity  - You may resume your normal home activities immediately post-operatively; however, light activity is encouraged for 24 hours to prevent urinary bleeding  - Do not operate a vehicle or drink alcohol if you were prescribed narcotic pain medications     Bathing/Showering  - You may resume normal bathing and showering post-procedure    Pain/Urinary Symptoms  - You may experience burning urinary pain for a few days, and/or increased urinary urgency/frequency post-procedure for a few weeks which is expected (sometimes longer if a ureteral stent was left in place)   - A medication to prevent burning urinary pain (Phenazopyridine) may be prescribed by your doctor, take as directed  - A medication to prevent urinary urgency/frequency (sometimes referred to as “bladder spasms”) (Hyoscyamine, Oxybutynin, Mirabegron, Solifenacin, etc.) may be prescribed by your doctor for up to 1 month, take as directed     Urinary Bleeding (Hematuria)   - Some degree of light urinary bleeding (hematuria) is expected for up to 1-2 weeks (this may be as light as pink lemonade or somewhat darker like clear/pale red Gatorade); a good rule of thumb is that your urine should remain see-through    - If you experience heavy urinary bleeding (like the color and consistency of tomato juice, or red wine), large blood clots, or you are unable to  urinate for more than 8 hours you should contact your doctor and present to the nearest Emergency Department  - Drink plenty of water at home and stay hydrated, as this will help naturally flush out your bladder and urethra    Antibiotics  - Complete the antibiotic course (if) prescribed as directed to prevent urinary infection     When to call your doctor:   - Pain that is not controlled with oral medications  - Signs of significant infection: Fever 101F, shaking chills, profuse sweating, persistent nausea or vomiting, unable to tolerate food or drink   - Severe urinary bleeding or large blood clots in urine  - Inability to urinate for more than 8 hours post-surgery         STENT REMOVAL     Dr. Justin will discuss possible stent removal with you within the next month to 6 weeks, urology office will call to arrange follow up

## 2024-12-20 NOTE — H&P
"Pre-Op H&P  Aldair Narvaez  [id number]  1950    Chief complaint: \"I have a stent\"    HPI:    Patient is a 74 y.o.male who presents with status post prostatectomy.  He had left distal urethral restriction postop.  He has had repeated indwelling stents in the left ureter.  He is admitted now for possible left ureteral stent exchange possible urethral ballooning.    Review of Systems:  General ROS: negative for chills, fever or skin lesions;  No changes since last office visit.  Neg for recent sick exposure  Cardiovascular ROS: no chest pain or dyspnea on exertion  Respiratory ROS: no cough, shortness of breath, or wheezing    Allergies:   Allergies   Allergen Reactions    Iodine Other (See Comments)     Closes sinuses    Oxycodone-Acetaminophen Itching    Zofran [Ondansetron] Hives       Home Meds:    No current facility-administered medications on file prior to encounter.     Current Outpatient Medications on File Prior to Encounter   Medication Sig Dispense Refill    albuterol sulfate  (90 Base) MCG/ACT inhaler As Needed.      Blood Glucose Monitoring Suppl device Use to check blood sugars 3-4 times daily. E11.65 1 each 0    DULoxetine (CYMBALTA) 30 MG capsule Take 1 capsule by mouth Daily.      Eliquis 5 MG tablet tablet Take 1 tablet by mouth Every 12 (Twelve) Hours.      ezetimibe (ZETIA) 10 MG tablet Take 1 tablet by mouth Daily.      glucose blood test strip Use to check blood sugars 3-4 times daily. E11.65 200 each 5    HYDROcodone-acetaminophen (NORCO) 5-325 MG per tablet Take 1 tablet by mouth Every 8 (Eight) Hours As Needed for Moderate Pain. 6 tablet 0    ipratropium-albuterol (DUO-NEB) 0.5-2.5 mg/3 ml nebulizer Take 3 mL by nebulization Every 6 (Six) Hours As Needed for Shortness of Air. 360 mL 0    metoprolol succinate XL (TOPROL-XL) 50 MG 24 hr tablet 1 tablet Daily.      Misc Natural Products (Magic Mushroom Mix) capsule Take 1,000 mg by mouth Daily.      nitroglycerin (NITROSTAT) 0.4 MG SL " tablet nitroglycerin 0.4 mg sublingual tablet      pantoprazole (PROTONIX) 40 MG EC tablet Take 1 tablet by mouth Daily.      rosuvastatin (CRESTOR) 20 MG tablet Take 1 tablet by mouth Daily.      traMADol (ULTRAM) 50 MG tablet       valsartan-hydrochlorothiazide (DIOVAN-HCT) 320-25 MG per tablet 1 tablet Daily.         PMH:   Past Medical History:   Diagnosis Date    Acid reflux     Arthritis     Colon polyp     Coronary artery disease     Coronary atherosclerosis     Erectile dysfunction 2005    Heart disease     Hyperlipidemia     Hypertension     Kidney stone     Mixed hyperlipidemia     Obesity     body mass index 30+-obesity    Prostate cancer     Sepsis 10/2023    urosepsis  [facility] then [facility]    Sleep apnea     does not use c-pap since wt loss    Type 2 diabetes mellitus     Urinary incontinence 2005    Getting worse    Urinary tract infection 2003    In hospital twice    Wears dentures     upper plate only     PSH:    Past Surgical History:   Procedure Laterality Date    CARDIAC CATHETERIZATION      CARPAL TUNNEL RELEASE Left     COLONOSCOPY      CORONARY ARTERY BYPASS GRAFT  08/30/2022    3 Bypasses    CYSTOSCOPY BLADDER STONE LITHOTRIPSY      CYSTOSCOPY BLADDER STONE LITHOTRIPSY      CYSTOSCOPY RETROGRADE PYELOGRAM Left 10/31/2023    Procedure: CYSTOSCOPY RETROGRADE PYELOGRAM STENT PLACEMENT;  Surgeon: Isma Justin MD;  Location:  [facility];  Service: Urology;  Laterality: Left;    CYSTOSCOPY W/ URETERAL STENT PLACEMENT Left 05/09/2023    Procedure: CYSTOSCOPY LEFT RETROGRADE PYELOGRAM AND LEFT URETERAL STENT PLACEMENT;  Surgeon: Isma Justin MD;  Location:  [facility];  Service: Urology;  Laterality: Left;    CYSTOSCOPY, URETEROSCOPY, RETROGRADE PYELOGRAM, STONE EXTRACTION, STENT INSERTION Left 01/19/2024    Procedure: CYSTOSCOPY RETROGRADE PYELOGRAM AND STENT INSERTION;  Surgeon: Isma Justin MD;  Location:  [facility];  Service: Urology;  Laterality: Left;    ENDOSCOPY       INGUINAL HERNIA REPAIR Right     x 2  with mesh  umbilical with mesh    KNEE ARTHROSCOPY Bilateral     PROSTATECTOMY      ROTATOR CUFF REPAIR Left     TONSILLECTOMY      TRIGGER FINGER RELEASE      UMBILICAL HERNIA REPAIR      UVULOPALATOPHARYNGOPLASTY       Patient denies allergy to latex  Patient states he is allergic to iodine and contrast dye  Immunization History:  Influenza: Yes  Pneumococcal: Yes  Tetanus: Up-to-date    Social History:   Tobacco:   Social History     Tobacco Use   Smoking Status Never    Passive exposure: Past   Smokeless Tobacco Never      Alcohol:     Social History     Substance and Sexual Activity   Alcohol Use Yes    Alcohol/week: 1.0 standard drink of alcohol    Types: 1 Drinks containing 0.5 oz of alcohol per week       Vitals:           There were no vitals taken for this visit.    Physical Exam:  General Appearance:    Alert, cooperative, no distress, appears stated age   Head:    Normocephalic, without obvious abnormality, atraumatic   Lungs:   Clear to auscultation bilaterally to the bases    Heart: S1-S2 without rubs murmurs or gallops    Abdomen:  Soft, nontender, bowel sounds present throughout.   Breast Exam:    deferred   Genitalia:    deferred   Extremities:   Extremities normal, atraumatic, no cyanosis or edema   Skin:   Skin color, texture, turgor normal, no rashes or lesions   Neurologic:   Grossly intact   Results Review  LABS:  Lab Results   Component Value Date    WBC 12.64 (H) 12/17/2024    HGB 14.8 12/17/2024    HCT 46.0 12/17/2024    MCV 89.1 12/17/2024     12/17/2024    NEUTROABS 4.20 11/03/2023    GLUCOSE 205 (H) 12/17/2024    BUN 20 12/17/2024    CREATININE 0.79 12/17/2024    EGFRIFNONA 96 02/17/2021     12/17/2024    K 4.3 12/17/2024    CL 99 12/17/2024    CO2 26.0 12/17/2024    MG 1.6 11/04/2023    CALCIUM 9.5 12/17/2024    ALBUMIN 4.4 12/06/2023    AST 36 12/06/2023    ALT 35 12/06/2023    BILITOT 0.2 12/06/2023    PTT 30.5 10/29/2023    INR 1.12  12/17/2024       RADIOLOGY:  No radiology results for the last 3 days         Cancer Staging (if applicable)  Cancer Patient: __ yes __no __unknown; If yes, clinical stage T:__ N:__M:__, stage group or __N/A    Impression: Left ureteral stenosis status post prostatectomy  Leukocytosis  Type 2 diabetes mellitus  Hypertension  History of cancer of the prostate  Paroxysmal atrial fibrillation  Hydronephrosis the left kidney    Plan: Cystoscopy, left ureteroscopy, possible urethral balloon, left stent exchange, left      Daniel TEJA Meeks   12/20/24   8:53 AM EST

## 2024-12-20 NOTE — ANESTHESIA POSTPROCEDURE EVALUATION
Patient: Aldair Narvaez    Procedure Summary       Date: 12/20/24 Room / Location:  [facility] /  [facility]    Anesthesia Start: 1113 Anesthesia Stop: 1155    Procedure: CYSTOSCOPY, LEFT URETEROSCOPY, POSSIBLE URETERAL BALLOON , LEFT STENT EXCHANGE (Left) Diagnosis:       Ureteral stricture, left      (Ureteral stricture, left [N13.5])    Surgeons: Isma Justin MD Provider: Dar Richter Jr., MD    Anesthesia Type: general ASA Status: 3            Anesthesia Type: general    Vitals  Vitals Value Taken Time   BP     Temp     Pulse 78 12/20/24 1156   Resp     SpO2 92 % 12/20/24 1156   Vitals shown include unfiled device data.        Post Anesthesia Care and Evaluation    Patient location during evaluation: PACU  Patient participation: complete - patient participated  Level of consciousness: responsive to verbal stimuli  Pain score: 0  Pain management: adequate    Airway patency: patent  Anesthetic complications: No anesthetic complications  PONV Status: none  Cardiovascular status: hemodynamically stable and acceptable  Respiratory status: nonlabored ventilation, acceptable, nasal cannula and spontaneous ventilation  Hydration status: acceptable    Comments: VSS  No anesthesia care post op

## 2024-12-20 NOTE — TELEPHONE ENCOUNTER
"Relay     \"Per Dr. Justin. Status post left ureteral stent exchange today.  Follow-up with me in 4-5 weeks in office. Called and lvm to schedule follow-up. Hub okay to schedule. \"                 "

## 2024-12-20 NOTE — BRIEF OP NOTE
URETEROSCOPY LASER LITHOTRIPSY WITH STENT INSERTION  Progress Note    Aldair Narvaez  12/20/2024    Pre-op Diagnosis:   Ureteral stricture, left [N13.5]       Post-Op Diagnosis Codes:     * Ureteral stricture, left [N13.5]         Procedure(s):  CYSTOSCOPY  LEFT RETROGRADE PYELOGRAM  LEFT DIAGNOSTIC URETEROSCOPY  LEFT URETERAL BALLOON DILATION  LEFT URETERAL STENT EXCHANGE        Surgeon(s):  Isma Justin MD Kim, Joon Kyung, MD    Anesthesia: General    Staff:   Circulator: Elizabeth Contreras RN; Gris Chapman RN  Laser Staff: Delfina Aleman  Scrub Person: Kristan Hernandez         Estimated Blood Loss: minimal    Urine Voided: * No values recorded between 12/20/2024 11:13 AM and 12/20/2024 11:53 AM *    Specimens:                Specimens       ID Source Type Tests Collected By Collected At Frozen?    1 Urinary Bladder Urine URINE CULTURE   Isma Justin MD 12/20/24 1133                   Drains:   Ureteral Drain/Stent Left ureter 7 Fr. (Active)       Findings: Proteinaceous debris within the bladder sent for culture.  Moderate bladder neck contracture able to accommodate the 22 [place] citizen cystoscope.  Existing left stent in appropriate position.  No significant hydronephrosis with existing stent in place.  Retrograde pyelogram demonstrates persistent stenosis of the distal left ureter.  Diagnostic ureteroscopy performed to identify length and severity of stricture, roughly 2 cm stricture noted however able to easily accommodate the ureteroscope.  Left ureteral balloon dilation performed at the distal one third ureter.  Stent upsized to 7 [place] citizen by 24 cm [employer].         Complications: None          Isma Justin MD     Date: 12/20/2024  Time: 11:54 EST

## 2024-12-20 NOTE — ANESTHESIA PROCEDURE NOTES
Airway  Urgency: elective    Date/Time: 12/20/2024 11:22 AM  Airway not difficult    General Information and Staff    Patient location during procedure: OR    Indications and Patient Condition  Indications for airway management: airway protection    Preoxygenated: yes  Mask difficulty assessment: 1 - vent by mask    Final Airway Details  Final airway type: supraglottic airway      Successful airway: I-gel  Size 4     Number of attempts at approach: 1  Assessment: lips, teeth, and gum same as pre-op    Additional Comments  LMA placed without difficulty, ventilation with assist, equal breath sounds and symmetric chest rise and fall

## 2024-12-21 ENCOUNTER — APPOINTMENT (OUTPATIENT)
Dept: GENERAL RADIOLOGY | Facility: HOSPITAL | Age: 74
End: 2024-12-21
Payer: MEDICARE

## 2024-12-21 ENCOUNTER — APPOINTMENT (OUTPATIENT)
Dept: CT IMAGING | Facility: HOSPITAL | Age: 74
End: 2024-12-21
Payer: MEDICARE

## 2024-12-21 ENCOUNTER — HOSPITAL ENCOUNTER (INPATIENT)
Facility: HOSPITAL | Age: 74
LOS: 4 days | Discharge: HOME | End: 2024-12-25
Attending: EMERGENCY MEDICINE | Admitting: FAMILY MEDICINE
Payer: MEDICARE

## 2024-12-21 DIAGNOSIS — Z86.79 HISTORY OF CORONARY ARTERY DISEASE: ICD-10-CM

## 2024-12-21 DIAGNOSIS — Z86.79 HISTORY OF HYPERTENSION: ICD-10-CM

## 2024-12-21 DIAGNOSIS — R50.9 FEVER AND CHILLS: ICD-10-CM

## 2024-12-21 DIAGNOSIS — I95.9 HYPOTENSION, UNSPECIFIED HYPOTENSION TYPE: ICD-10-CM

## 2024-12-21 DIAGNOSIS — E11.65 TYPE 2 DIABETES MELLITUS WITH HYPERGLYCEMIA, WITH LONG-TERM CURRENT USE OF INSULIN: ICD-10-CM

## 2024-12-21 DIAGNOSIS — N99.89: ICD-10-CM

## 2024-12-21 DIAGNOSIS — E83.42 HYPOMAGNESEMIA: ICD-10-CM

## 2024-12-21 DIAGNOSIS — Z79.01 CHRONIC ANTICOAGULATION: ICD-10-CM

## 2024-12-21 DIAGNOSIS — R13.10 DYSPHAGIA, UNSPECIFIED TYPE: Primary | ICD-10-CM

## 2024-12-21 DIAGNOSIS — R65.20 SEVERE SEPSIS: ICD-10-CM

## 2024-12-21 DIAGNOSIS — E87.20 LACTIC ACIDOSIS: ICD-10-CM

## 2024-12-21 DIAGNOSIS — N13.30 HYDRONEPHROSIS OF LEFT KIDNEY: ICD-10-CM

## 2024-12-21 DIAGNOSIS — A41.9 SEVERE SEPSIS: ICD-10-CM

## 2024-12-21 DIAGNOSIS — G47.33 OBSTRUCTIVE SLEEP APNEA: ICD-10-CM

## 2024-12-21 DIAGNOSIS — Z79.4 TYPE 2 DIABETES MELLITUS WITH HYPERGLYCEMIA, WITH LONG-TERM CURRENT USE OF INSULIN: ICD-10-CM

## 2024-12-21 PROBLEM — E11.9 TYPE 2 DIABETES MELLITUS, WITH LONG-TERM CURRENT USE OF INSULIN: Chronic | Status: ACTIVE | Noted: 2024-12-21

## 2024-12-21 LAB
ALBUMIN SERPL-MCNC: 2.9 G/DL (ref 3.5–5.2)
ALBUMIN SERPL-MCNC: 3.3 G/DL (ref 3.5–5.2)
ALBUMIN/GLOB SERPL: 1.1 G/DL
ALBUMIN/GLOB SERPL: 1.2 G/DL
ALP SERPL-CCNC: 56 U/L (ref 39–117)
ALP SERPL-CCNC: 67 U/L (ref 39–117)
ALT SERPL W P-5'-P-CCNC: 14 U/L (ref 1–41)
ALT SERPL W P-5'-P-CCNC: 16 U/L (ref 1–41)
ANION GAP SERPL CALCULATED.3IONS-SCNC: 11 MMOL/L (ref 5–15)
ANION GAP SERPL CALCULATED.3IONS-SCNC: 12 MMOL/L (ref 5–15)
ANION GAP SERPL CALCULATED.3IONS-SCNC: 13 MMOL/L (ref 5–15)
AST SERPL-CCNC: 28 U/L (ref 1–40)
AST SERPL-CCNC: 29 U/L (ref 1–40)
B PARAPERT DNA SPEC QL NAA+PROBE: NOT DETECTED
B PERT DNA SPEC QL NAA+PROBE: NOT DETECTED
BACTERIA SPEC AEROBE CULT: NO GROWTH
BACTERIA UR QL AUTO: ABNORMAL /HPF
BASOPHILS # BLD AUTO: 0.03 10*3/MM3 (ref 0–0.2)
BASOPHILS # BLD AUTO: 0.03 10*3/MM3 (ref 0–0.2)
BASOPHILS NFR BLD AUTO: 0.2 % (ref 0–1.5)
BASOPHILS NFR BLD AUTO: 0.2 % (ref 0–1.5)
BILIRUB SERPL-MCNC: 0.4 MG/DL (ref 0–1.2)
BILIRUB SERPL-MCNC: 0.4 MG/DL (ref 0–1.2)
BILIRUB UR QL STRIP: NEGATIVE
BUN SERPL-MCNC: 16 MG/DL (ref 8–23)
BUN SERPL-MCNC: 17 MG/DL (ref 8–23)
BUN SERPL-MCNC: 17 MG/DL (ref 8–23)
BUN/CREAT SERPL: 17.8 (ref 7–25)
BUN/CREAT SERPL: 18.1 (ref 7–25)
BUN/CREAT SERPL: 20 (ref 7–25)
C PNEUM DNA NPH QL NAA+NON-PROBE: NOT DETECTED
CALCIUM SPEC-SCNC: 7.3 MG/DL (ref 8.6–10.5)
CALCIUM SPEC-SCNC: 7.7 MG/DL (ref 8.6–10.5)
CALCIUM SPEC-SCNC: 8.3 MG/DL (ref 8.6–10.5)
CHLORIDE SERPL-SCNC: 100 MMOL/L (ref 98–107)
CHLORIDE SERPL-SCNC: 101 MMOL/L (ref 98–107)
CHLORIDE SERPL-SCNC: 103 MMOL/L (ref 98–107)
CLARITY UR: ABNORMAL
CO2 SERPL-SCNC: 21 MMOL/L (ref 22–29)
CO2 SERPL-SCNC: 22 MMOL/L (ref 22–29)
CO2 SERPL-SCNC: 22 MMOL/L (ref 22–29)
COLOR UR: ABNORMAL
CREAT SERPL-MCNC: 0.85 MG/DL (ref 0.76–1.27)
CREAT SERPL-MCNC: 0.9 MG/DL (ref 0.76–1.27)
CREAT SERPL-MCNC: 0.94 MG/DL (ref 0.76–1.27)
CRP SERPL-MCNC: 5.45 MG/DL (ref 0–0.5)
D-LACTATE SERPL-SCNC: 2 MMOL/L (ref 0.5–2)
D-LACTATE SERPL-SCNC: 2.7 MMOL/L (ref 0.5–2)
D-LACTATE SERPL-SCNC: 3 MMOL/L (ref 0.5–2)
DEPRECATED RDW RBC AUTO: 47.6 FL (ref 37–54)
DEPRECATED RDW RBC AUTO: 49 FL (ref 37–54)
EGFRCR SERPLBLD CKD-EPI 2021: 86.1 ML/MIN/1.73
EGFRCR SERPLBLD CKD-EPI 2021: 90.7 ML/MIN/1.73
EGFRCR SERPLBLD CKD-EPI 2021: 92.3 ML/MIN/1.73
EOSINOPHIL # BLD AUTO: 0.01 10*3/MM3 (ref 0–0.4)
EOSINOPHIL # BLD AUTO: 0.03 10*3/MM3 (ref 0–0.4)
EOSINOPHIL NFR BLD AUTO: 0.1 % (ref 0.3–6.2)
EOSINOPHIL NFR BLD AUTO: 0.2 % (ref 0.3–6.2)
ERYTHROCYTE [DISTWIDTH] IN BLOOD BY AUTOMATED COUNT: 14.8 % (ref 12.3–15.4)
ERYTHROCYTE [DISTWIDTH] IN BLOOD BY AUTOMATED COUNT: 15 % (ref 12.3–15.4)
FLUAV SUBTYP SPEC NAA+PROBE: NOT DETECTED
FLUBV RNA ISLT QL NAA+PROBE: NOT DETECTED
GEN 5 1HR TROPONIN T REFLEX: 29 NG/L
GLOBULIN UR ELPH-MCNC: 2.4 GM/DL
GLOBULIN UR ELPH-MCNC: 3.1 GM/DL
GLUCOSE BLDC GLUCOMTR-MCNC: 198 MG/DL (ref 70–130)
GLUCOSE BLDC GLUCOMTR-MCNC: 208 MG/DL (ref 70–130)
GLUCOSE BLDC GLUCOMTR-MCNC: 209 MG/DL (ref 70–130)
GLUCOSE BLDC GLUCOMTR-MCNC: 245 MG/DL (ref 70–130)
GLUCOSE BLDC GLUCOMTR-MCNC: 309 MG/DL (ref 70–130)
GLUCOSE SERPL-MCNC: 199 MG/DL (ref 65–99)
GLUCOSE SERPL-MCNC: 232 MG/DL (ref 65–99)
GLUCOSE SERPL-MCNC: 267 MG/DL (ref 65–99)
GLUCOSE UR STRIP-MCNC: ABNORMAL MG/DL
HADV DNA SPEC NAA+PROBE: NOT DETECTED
HBA1C MFR BLD: 9.1 % (ref 4.8–5.6)
HCOV 229E RNA SPEC QL NAA+PROBE: NOT DETECTED
HCOV HKU1 RNA SPEC QL NAA+PROBE: NOT DETECTED
HCOV NL63 RNA SPEC QL NAA+PROBE: NOT DETECTED
HCOV OC43 RNA SPEC QL NAA+PROBE: NOT DETECTED
HCT VFR BLD AUTO: 36.6 % (ref 37.5–51)
HCT VFR BLD AUTO: 40.4 % (ref 37.5–51)
HGB BLD-MCNC: 11.8 G/DL (ref 13–17.7)
HGB BLD-MCNC: 13 G/DL (ref 13–17.7)
HGB UR QL STRIP.AUTO: ABNORMAL
HMPV RNA NPH QL NAA+NON-PROBE: NOT DETECTED
HOLD SPECIMEN: NORMAL
HPIV1 RNA ISLT QL NAA+PROBE: NOT DETECTED
HPIV2 RNA SPEC QL NAA+PROBE: NOT DETECTED
HPIV3 RNA NPH QL NAA+PROBE: NOT DETECTED
HPIV4 P GENE NPH QL NAA+PROBE: NOT DETECTED
IMM GRANULOCYTES # BLD AUTO: 0.09 10*3/MM3 (ref 0–0.05)
IMM GRANULOCYTES # BLD AUTO: 0.12 10*3/MM3 (ref 0–0.05)
IMM GRANULOCYTES NFR BLD AUTO: 0.7 % (ref 0–0.5)
IMM GRANULOCYTES NFR BLD AUTO: 0.9 % (ref 0–0.5)
KETONES UR QL STRIP: NEGATIVE
LEUKOCYTE ESTERASE UR QL STRIP.AUTO: ABNORMAL
LIPASE SERPL-CCNC: 16 U/L (ref 13–60)
LYMPHOCYTES # BLD AUTO: 0.26 10*3/MM3 (ref 0.7–3.1)
LYMPHOCYTES # BLD AUTO: 0.27 10*3/MM3 (ref 0.7–3.1)
LYMPHOCYTES NFR BLD AUTO: 2 % (ref 19.6–45.3)
LYMPHOCYTES NFR BLD AUTO: 2 % (ref 19.6–45.3)
M PNEUMO IGG SER IA-ACNC: NOT DETECTED
MAGNESIUM SERPL-MCNC: 1.3 MG/DL (ref 1.6–2.4)
MAGNESIUM SERPL-MCNC: 1.4 MG/DL (ref 1.6–2.4)
MCH RBC QN AUTO: 28.4 PG (ref 26.6–33)
MCH RBC QN AUTO: 28.9 PG (ref 26.6–33)
MCHC RBC AUTO-ENTMCNC: 32.2 G/DL (ref 31.5–35.7)
MCHC RBC AUTO-ENTMCNC: 32.2 G/DL (ref 31.5–35.7)
MCV RBC AUTO: 88.2 FL (ref 79–97)
MCV RBC AUTO: 89.5 FL (ref 79–97)
MONOCYTES # BLD AUTO: 0.3 10*3/MM3 (ref 0.1–0.9)
MONOCYTES # BLD AUTO: 0.4 10*3/MM3 (ref 0.1–0.9)
MONOCYTES NFR BLD AUTO: 2.2 % (ref 5–12)
MONOCYTES NFR BLD AUTO: 3 % (ref 5–12)
NEUTROPHILS NFR BLD AUTO: 12.44 10*3/MM3 (ref 1.7–7)
NEUTROPHILS NFR BLD AUTO: 12.7 10*3/MM3 (ref 1.7–7)
NEUTROPHILS NFR BLD AUTO: 93.9 % (ref 42.7–76)
NEUTROPHILS NFR BLD AUTO: 94.6 % (ref 42.7–76)
NITRITE UR QL STRIP: NEGATIVE
NRBC BLD AUTO-RTO: 0 /100 WBC (ref 0–0.2)
NRBC BLD AUTO-RTO: 0 /100 WBC (ref 0–0.2)
PH UR STRIP.AUTO: 5.5 [PH] (ref 5–8)
PHOSPHATE SERPL-MCNC: 3.9 MG/DL (ref 2.5–4.5)
PLATELET # BLD AUTO: 144 10*3/MM3 (ref 140–450)
PLATELET # BLD AUTO: 164 10*3/MM3 (ref 140–450)
PMV BLD AUTO: 9.1 FL (ref 6–12)
PMV BLD AUTO: 9.7 FL (ref 6–12)
POTASSIUM SERPL-SCNC: 3.6 MMOL/L (ref 3.5–5.2)
POTASSIUM SERPL-SCNC: 4.1 MMOL/L (ref 3.5–5.2)
POTASSIUM SERPL-SCNC: 4.1 MMOL/L (ref 3.5–5.2)
POTASSIUM SERPL-SCNC: 4.8 MMOL/L (ref 3.5–5.2)
PROCALCITONIN SERPL-MCNC: 1.37 NG/ML (ref 0–0.25)
PROT SERPL-MCNC: 5.3 G/DL (ref 6–8.5)
PROT SERPL-MCNC: 6.4 G/DL (ref 6–8.5)
PROT UR QL STRIP: ABNORMAL
RBC # BLD AUTO: 4.09 10*6/MM3 (ref 4.14–5.8)
RBC # BLD AUTO: 4.58 10*6/MM3 (ref 4.14–5.8)
RBC # UR STRIP: ABNORMAL /HPF
REF LAB TEST METHOD: ABNORMAL
RHINOVIRUS RNA SPEC NAA+PROBE: NOT DETECTED
RSV RNA NPH QL NAA+NON-PROBE: NOT DETECTED
SARS-COV-2 RNA NPH QL NAA+NON-PROBE: NOT DETECTED
SODIUM SERPL-SCNC: 134 MMOL/L (ref 136–145)
SODIUM SERPL-SCNC: 135 MMOL/L (ref 136–145)
SODIUM SERPL-SCNC: 136 MMOL/L (ref 136–145)
SP GR UR STRIP: 1.02 (ref 1–1.03)
SQUAMOUS #/AREA URNS HPF: ABNORMAL /HPF
TROPONIN T % DELTA: 7 %
TROPONIN T NUMERIC DELTA: 2 NG/L
TROPONIN T SERPL HS-MCNC: 27 NG/L
UROBILINOGEN UR QL STRIP: ABNORMAL
WBC # UR STRIP: ABNORMAL /HPF
WBC NRBC COR # BLD AUTO: 13.25 10*3/MM3 (ref 3.4–10.8)
WBC NRBC COR # BLD AUTO: 13.43 10*3/MM3 (ref 3.4–10.8)
WHOLE BLOOD HOLD COAG: NORMAL
WHOLE BLOOD HOLD SPECIMEN: NORMAL

## 2024-12-21 PROCEDURE — 93005 ELECTROCARDIOGRAM TRACING: CPT | Performed by: EMERGENCY MEDICINE

## 2024-12-21 PROCEDURE — 99222 1ST HOSP IP/OBS MODERATE 55: CPT | Performed by: UROLOGY

## 2024-12-21 PROCEDURE — 85025 COMPLETE CBC W/AUTO DIFF WBC: CPT | Performed by: INTERNAL MEDICINE

## 2024-12-21 PROCEDURE — 63710000001 INSULIN LISPRO (HUMAN) PER 5 UNITS: Performed by: INTERNAL MEDICINE

## 2024-12-21 PROCEDURE — 25010000002 MAGNESIUM SULFATE 2 GM/50ML SOLUTION: Performed by: INTERNAL MEDICINE

## 2024-12-21 PROCEDURE — 86140 C-REACTIVE PROTEIN: CPT | Performed by: INTERNAL MEDICINE

## 2024-12-21 PROCEDURE — 87040 BLOOD CULTURE FOR BACTERIA: CPT | Performed by: PHYSICIAN ASSISTANT

## 2024-12-21 PROCEDURE — 87077 CULTURE AEROBIC IDENTIFY: CPT | Performed by: PHYSICIAN ASSISTANT

## 2024-12-21 PROCEDURE — 0202U NFCT DS 22 TRGT SARS-COV-2: CPT | Performed by: PHYSICIAN ASSISTANT

## 2024-12-21 PROCEDURE — 83690 ASSAY OF LIPASE: CPT | Performed by: PHYSICIAN ASSISTANT

## 2024-12-21 PROCEDURE — 87181 SC STD AGAR DILUTION PER AGT: CPT | Performed by: PHYSICIAN ASSISTANT

## 2024-12-21 PROCEDURE — 99223 1ST HOSP IP/OBS HIGH 75: CPT | Performed by: INTERNAL MEDICINE

## 2024-12-21 PROCEDURE — 84132 ASSAY OF SERUM POTASSIUM: CPT | Performed by: INTERNAL MEDICINE

## 2024-12-21 PROCEDURE — 83735 ASSAY OF MAGNESIUM: CPT | Performed by: EMERGENCY MEDICINE

## 2024-12-21 PROCEDURE — 84100 ASSAY OF PHOSPHORUS: CPT | Performed by: INTERNAL MEDICINE

## 2024-12-21 PROCEDURE — 83605 ASSAY OF LACTIC ACID: CPT | Performed by: PHYSICIAN ASSISTANT

## 2024-12-21 PROCEDURE — 36415 COLL VENOUS BLD VENIPUNCTURE: CPT

## 2024-12-21 PROCEDURE — 87154 CUL TYP ID BLD PTHGN 6+ TRGT: CPT | Performed by: PHYSICIAN ASSISTANT

## 2024-12-21 PROCEDURE — 87186 SC STD MICRODIL/AGAR DIL: CPT | Performed by: PHYSICIAN ASSISTANT

## 2024-12-21 PROCEDURE — 87086 URINE CULTURE/COLONY COUNT: CPT | Performed by: INTERNAL MEDICINE

## 2024-12-21 PROCEDURE — 25010000002 PIPERACILLIN SOD-TAZOBACTAM PER 1 G: Performed by: INTERNAL MEDICINE

## 2024-12-21 PROCEDURE — 80053 COMPREHEN METABOLIC PANEL: CPT | Performed by: EMERGENCY MEDICINE

## 2024-12-21 PROCEDURE — 63710000001 INSULIN GLARGINE PER 5 UNITS: Performed by: INTERNAL MEDICINE

## 2024-12-21 PROCEDURE — 81001 URINALYSIS AUTO W/SCOPE: CPT | Performed by: EMERGENCY MEDICINE

## 2024-12-21 PROCEDURE — 97535 SELF CARE MNGMENT TRAINING: CPT

## 2024-12-21 PROCEDURE — 82948 REAGENT STRIP/BLOOD GLUCOSE: CPT

## 2024-12-21 PROCEDURE — 83036 HEMOGLOBIN GLYCOSYLATED A1C: CPT | Performed by: INTERNAL MEDICINE

## 2024-12-21 PROCEDURE — 74176 CT ABD & PELVIS W/O CONTRAST: CPT

## 2024-12-21 PROCEDURE — 25810000003 SEPSIS FLUID NS 0.9 % SOLUTION: Performed by: PHYSICIAN ASSISTANT

## 2024-12-21 PROCEDURE — 71045 X-RAY EXAM CHEST 1 VIEW: CPT

## 2024-12-21 PROCEDURE — 84145 PROCALCITONIN (PCT): CPT | Performed by: PHYSICIAN ASSISTANT

## 2024-12-21 PROCEDURE — 85025 COMPLETE CBC W/AUTO DIFF WBC: CPT | Performed by: EMERGENCY MEDICINE

## 2024-12-21 PROCEDURE — 99291 CRITICAL CARE FIRST HOUR: CPT

## 2024-12-21 PROCEDURE — 25010000002 PIPERACILLIN SOD-TAZOBACTAM PER 1 G: Performed by: PHYSICIAN ASSISTANT

## 2024-12-21 PROCEDURE — 25810000003 SODIUM CHLORIDE 0.9 % SOLUTION: Performed by: INTERNAL MEDICINE

## 2024-12-21 PROCEDURE — 93010 ELECTROCARDIOGRAM REPORT: CPT | Performed by: INTERNAL MEDICINE

## 2024-12-21 PROCEDURE — 97165 OT EVAL LOW COMPLEX 30 MIN: CPT

## 2024-12-21 PROCEDURE — 84484 ASSAY OF TROPONIN QUANT: CPT | Performed by: EMERGENCY MEDICINE

## 2024-12-21 PROCEDURE — 25010000002 HYDROCORTISONE SOD SUC (PF) 100 MG RECONSTITUTED SOLUTION: Performed by: INTERNAL MEDICINE

## 2024-12-21 PROCEDURE — 83735 ASSAY OF MAGNESIUM: CPT | Performed by: INTERNAL MEDICINE

## 2024-12-21 RX ORDER — ALBUTEROL SULFATE 0.83 MG/ML
2.5 SOLUTION RESPIRATORY (INHALATION) EVERY 4 HOURS PRN
Status: DISCONTINUED | OUTPATIENT
Start: 2024-12-21 | End: 2024-12-25 | Stop reason: HOSPADM

## 2024-12-21 RX ORDER — POTASSIUM CHLORIDE 1500 MG/1
40 TABLET, EXTENDED RELEASE ORAL EVERY 4 HOURS
Status: COMPLETED | OUTPATIENT
Start: 2024-12-21 | End: 2024-12-21

## 2024-12-21 RX ORDER — LOPERAMIDE HYDROCHLORIDE 2 MG/1
2 CAPSULE ORAL 4 TIMES DAILY PRN
Status: DISCONTINUED | OUTPATIENT
Start: 2024-12-21 | End: 2024-12-25 | Stop reason: HOSPADM

## 2024-12-21 RX ORDER — ACETAMINOPHEN 650 MG/1
650 SUPPOSITORY RECTAL EVERY 4 HOURS PRN
Status: DISCONTINUED | OUTPATIENT
Start: 2024-12-21 | End: 2024-12-25 | Stop reason: HOSPADM

## 2024-12-21 RX ORDER — HYDROCHLOROTHIAZIDE 25 MG/1
25 TABLET ORAL
Status: DISCONTINUED | OUTPATIENT
Start: 2024-12-21 | End: 2024-12-25 | Stop reason: HOSPADM

## 2024-12-21 RX ORDER — SODIUM CHLORIDE 0.9 % (FLUSH) 0.9 %
10 SYRINGE (ML) INJECTION AS NEEDED
Status: DISCONTINUED | OUTPATIENT
Start: 2024-12-21 | End: 2024-12-25 | Stop reason: HOSPADM

## 2024-12-21 RX ORDER — SODIUM CHLORIDE 9 MG/ML
100 INJECTION, SOLUTION INTRAVENOUS CONTINUOUS
Status: ACTIVE | OUTPATIENT
Start: 2024-12-21 | End: 2024-12-22

## 2024-12-21 RX ORDER — POLYETHYLENE GLYCOL 3350 17 G/17G
17 POWDER, FOR SOLUTION ORAL DAILY PRN
Status: DISCONTINUED | OUTPATIENT
Start: 2024-12-21 | End: 2024-12-25 | Stop reason: HOSPADM

## 2024-12-21 RX ORDER — MAGNESIUM SULFATE HEPTAHYDRATE 40 MG/ML
2 INJECTION, SOLUTION INTRAVENOUS
Status: COMPLETED | OUTPATIENT
Start: 2024-12-21 | End: 2024-12-21

## 2024-12-21 RX ORDER — ACETAMINOPHEN 325 MG/1
650 TABLET ORAL EVERY 4 HOURS PRN
Status: DISCONTINUED | OUTPATIENT
Start: 2024-12-21 | End: 2024-12-25 | Stop reason: HOSPADM

## 2024-12-21 RX ORDER — INSULIN LISPRO 100 [IU]/ML
2-7 INJECTION, SOLUTION INTRAVENOUS; SUBCUTANEOUS
Status: DISCONTINUED | OUTPATIENT
Start: 2024-12-21 | End: 2024-12-25 | Stop reason: HOSPADM

## 2024-12-21 RX ORDER — NITROGLYCERIN 0.4 MG/1
0.4 TABLET SUBLINGUAL
Status: DISCONTINUED | OUTPATIENT
Start: 2024-12-21 | End: 2024-12-25 | Stop reason: HOSPADM

## 2024-12-21 RX ORDER — ROSUVASTATIN CALCIUM 20 MG/1
20 TABLET, COATED ORAL DAILY
Status: DISCONTINUED | OUTPATIENT
Start: 2024-12-21 | End: 2024-12-25 | Stop reason: HOSPADM

## 2024-12-21 RX ORDER — VALSARTAN 160 MG/1
320 TABLET ORAL
Status: DISCONTINUED | OUTPATIENT
Start: 2024-12-21 | End: 2024-12-25 | Stop reason: HOSPADM

## 2024-12-21 RX ORDER — MIDODRINE HYDROCHLORIDE 5 MG/1
5 TABLET ORAL
Status: DISCONTINUED | OUTPATIENT
Start: 2024-12-21 | End: 2024-12-23

## 2024-12-21 RX ORDER — LORAZEPAM 0.5 MG/1
0.5 TABLET ORAL EVERY 8 HOURS PRN
Status: DISCONTINUED | OUTPATIENT
Start: 2024-12-21 | End: 2024-12-25 | Stop reason: HOSPADM

## 2024-12-21 RX ORDER — IBUPROFEN 600 MG/1
1 TABLET ORAL
Status: DISCONTINUED | OUTPATIENT
Start: 2024-12-21 | End: 2024-12-25 | Stop reason: HOSPADM

## 2024-12-21 RX ORDER — INSULIN LISPRO 100 [IU]/ML
3 INJECTION, SOLUTION INTRAVENOUS; SUBCUTANEOUS
Status: DISCONTINUED | OUTPATIENT
Start: 2024-12-21 | End: 2024-12-25 | Stop reason: HOSPADM

## 2024-12-21 RX ORDER — BISACODYL 10 MG
10 SUPPOSITORY, RECTAL RECTAL DAILY PRN
Status: DISCONTINUED | OUTPATIENT
Start: 2024-12-21 | End: 2024-12-25 | Stop reason: HOSPADM

## 2024-12-21 RX ORDER — CALCIUM CARBONATE 500 MG/1
2 TABLET, CHEWABLE ORAL 3 TIMES DAILY PRN
Status: DISCONTINUED | OUTPATIENT
Start: 2024-12-21 | End: 2024-12-25 | Stop reason: HOSPADM

## 2024-12-21 RX ORDER — DULOXETIN HYDROCHLORIDE 30 MG/1
30 CAPSULE, DELAYED RELEASE ORAL DAILY
Status: DISCONTINUED | OUTPATIENT
Start: 2024-12-21 | End: 2024-12-25 | Stop reason: HOSPADM

## 2024-12-21 RX ORDER — AMOXICILLIN 250 MG
2 CAPSULE ORAL 2 TIMES DAILY PRN
Status: DISCONTINUED | OUTPATIENT
Start: 2024-12-21 | End: 2024-12-25 | Stop reason: HOSPADM

## 2024-12-21 RX ORDER — OXYBUTYNIN CHLORIDE 5 MG/1
5 TABLET, EXTENDED RELEASE ORAL DAILY
Status: DISCONTINUED | OUTPATIENT
Start: 2024-12-21 | End: 2024-12-25 | Stop reason: HOSPADM

## 2024-12-21 RX ORDER — BISACODYL 5 MG/1
5 TABLET, DELAYED RELEASE ORAL DAILY PRN
Status: DISCONTINUED | OUTPATIENT
Start: 2024-12-21 | End: 2024-12-25 | Stop reason: HOSPADM

## 2024-12-21 RX ORDER — SODIUM CHLORIDE 9 MG/ML
40 INJECTION, SOLUTION INTRAVENOUS AS NEEDED
Status: DISCONTINUED | OUTPATIENT
Start: 2024-12-21 | End: 2024-12-25 | Stop reason: HOSPADM

## 2024-12-21 RX ORDER — METOPROLOL SUCCINATE 50 MG/1
50 TABLET, EXTENDED RELEASE ORAL DAILY
Status: DISCONTINUED | OUTPATIENT
Start: 2024-12-21 | End: 2024-12-25 | Stop reason: HOSPADM

## 2024-12-21 RX ORDER — SODIUM CHLORIDE 0.9 % (FLUSH) 0.9 %
10 SYRINGE (ML) INJECTION EVERY 12 HOURS SCHEDULED
Status: DISCONTINUED | OUTPATIENT
Start: 2024-12-21 | End: 2024-12-25 | Stop reason: HOSPADM

## 2024-12-21 RX ORDER — HYDROCODONE BITARTRATE AND ACETAMINOPHEN 5; 325 MG/1; MG/1
1 TABLET ORAL EVERY 8 HOURS PRN
Status: DISCONTINUED | OUTPATIENT
Start: 2024-12-21 | End: 2024-12-25 | Stop reason: HOSPADM

## 2024-12-21 RX ORDER — PANTOPRAZOLE SODIUM 40 MG/1
40 TABLET, DELAYED RELEASE ORAL DAILY
Status: DISCONTINUED | OUTPATIENT
Start: 2024-12-21 | End: 2024-12-25 | Stop reason: HOSPADM

## 2024-12-21 RX ORDER — ACETAMINOPHEN 160 MG/5ML
650 SOLUTION ORAL EVERY 4 HOURS PRN
Status: DISCONTINUED | OUTPATIENT
Start: 2024-12-21 | End: 2024-12-25 | Stop reason: HOSPADM

## 2024-12-21 RX ORDER — IBUPROFEN 600 MG/1
600 TABLET, FILM COATED ORAL ONCE
Status: COMPLETED | OUTPATIENT
Start: 2024-12-21 | End: 2024-12-21

## 2024-12-21 RX ORDER — PHENAZOPYRIDINE HYDROCHLORIDE 100 MG/1
200 TABLET, FILM COATED ORAL 3 TIMES DAILY PRN
Status: DISCONTINUED | OUTPATIENT
Start: 2024-12-21 | End: 2024-12-25 | Stop reason: HOSPADM

## 2024-12-21 RX ORDER — DEXTROSE MONOHYDRATE 25 G/50ML
25 INJECTION, SOLUTION INTRAVENOUS
Status: DISCONTINUED | OUTPATIENT
Start: 2024-12-21 | End: 2024-12-25 | Stop reason: HOSPADM

## 2024-12-21 RX ORDER — HYDROCORTISONE SODIUM SUCCINATE 100 MG/2ML
50 INJECTION INTRAMUSCULAR; INTRAVENOUS EVERY 8 HOURS
Status: DISCONTINUED | OUTPATIENT
Start: 2024-12-21 | End: 2024-12-22

## 2024-12-21 RX ORDER — NICOTINE POLACRILEX 4 MG
15 LOZENGE BUCCAL
Status: DISCONTINUED | OUTPATIENT
Start: 2024-12-21 | End: 2024-12-25 | Stop reason: HOSPADM

## 2024-12-21 RX ADMIN — INSULIN LISPRO 5 UNITS: 100 INJECTION, SOLUTION INTRAVENOUS; SUBCUTANEOUS at 11:21

## 2024-12-21 RX ADMIN — SODIUM CHLORIDE 100 ML/HR: 9 INJECTION, SOLUTION INTRAVENOUS at 06:02

## 2024-12-21 RX ADMIN — POTASSIUM CHLORIDE 40 MEQ: 1500 TABLET, EXTENDED RELEASE ORAL at 07:58

## 2024-12-21 RX ADMIN — INSULIN LISPRO 2 UNITS: 100 INJECTION, SOLUTION INTRAVENOUS; SUBCUTANEOUS at 22:06

## 2024-12-21 RX ADMIN — MIDODRINE HYDROCHLORIDE 5 MG: 5 TABLET ORAL at 18:24

## 2024-12-21 RX ADMIN — HYDROCODONE BITARTRATE AND ACETAMINOPHEN 1 TABLET: 5; 325 TABLET ORAL at 18:37

## 2024-12-21 RX ADMIN — INSULIN LISPRO 3 UNITS: 100 INJECTION, SOLUTION INTRAVENOUS; SUBCUTANEOUS at 18:24

## 2024-12-21 RX ADMIN — MIDODRINE HYDROCHLORIDE 5 MG: 5 TABLET ORAL at 07:58

## 2024-12-21 RX ADMIN — HYDROCORTISONE SODIUM SUCCINATE 50 MG: 100 INJECTION, POWDER, FOR SOLUTION INTRAMUSCULAR; INTRAVENOUS at 13:44

## 2024-12-21 RX ADMIN — ROSUVASTATIN 20 MG: 20 TABLET, FILM COATED ORAL at 22:03

## 2024-12-21 RX ADMIN — APIXABAN 5 MG: 5 TABLET, FILM COATED ORAL at 09:31

## 2024-12-21 RX ADMIN — INSULIN LISPRO 3 UNITS: 100 INJECTION, SOLUTION INTRAVENOUS; SUBCUTANEOUS at 13:44

## 2024-12-21 RX ADMIN — POTASSIUM CHLORIDE 40 MEQ: 1500 TABLET, EXTENDED RELEASE ORAL at 05:10

## 2024-12-21 RX ADMIN — PIPERACILLIN AND TAZOBACTAM 3.38 G: 3; .375 INJECTION, POWDER, LYOPHILIZED, FOR SOLUTION INTRAVENOUS at 11:21

## 2024-12-21 RX ADMIN — PIPERACILLIN AND TAZOBACTAM 3.38 G: 3; .375 INJECTION, POWDER, LYOPHILIZED, FOR SOLUTION INTRAVENOUS at 18:24

## 2024-12-21 RX ADMIN — INSULIN LISPRO 3 UNITS: 100 INJECTION, SOLUTION INTRAVENOUS; SUBCUTANEOUS at 07:58

## 2024-12-21 RX ADMIN — IBUPROFEN 600 MG: 600 TABLET, FILM COATED ORAL at 03:33

## 2024-12-21 RX ADMIN — APIXABAN 5 MG: 5 TABLET, FILM COATED ORAL at 22:03

## 2024-12-21 RX ADMIN — MAGNESIUM SULFATE IN WATER FOR 2 G: 40 INJECTION INTRAVENOUS at 05:10

## 2024-12-21 RX ADMIN — HYDROCORTISONE SODIUM SUCCINATE 50 MG: 100 INJECTION, POWDER, FOR SOLUTION INTRAMUSCULAR; INTRAVENOUS at 05:11

## 2024-12-21 RX ADMIN — Medication 10 ML: at 08:09

## 2024-12-21 RX ADMIN — MAGNESIUM SULFATE IN WATER FOR 2 G: 40 INJECTION INTRAVENOUS at 07:55

## 2024-12-21 RX ADMIN — MIDODRINE HYDROCHLORIDE 5 MG: 5 TABLET ORAL at 11:21

## 2024-12-21 RX ADMIN — SODIUM CHLORIDE 100 ML/HR: 9 INJECTION, SOLUTION INTRAVENOUS at 16:56

## 2024-12-21 RX ADMIN — Medication 10 ML: at 22:05

## 2024-12-21 RX ADMIN — PIPERACILLIN AND TAZOBACTAM 3.38 G: 3; .375 INJECTION, POWDER, LYOPHILIZED, FOR SOLUTION INTRAVENOUS at 01:48

## 2024-12-21 RX ADMIN — HYDROCORTISONE SODIUM SUCCINATE 50 MG: 100 INJECTION, POWDER, FOR SOLUTION INTRAMUSCULAR; INTRAVENOUS at 22:04

## 2024-12-21 RX ADMIN — MAGNESIUM SULFATE IN WATER FOR 2 G: 40 INJECTION INTRAVENOUS at 09:32

## 2024-12-21 RX ADMIN — SODIUM CHLORIDE 1000 ML: 9 INJECTION, SOLUTION INTRAVENOUS at 04:51

## 2024-12-21 RX ADMIN — DULOXETINE HYDROCHLORIDE 30 MG: 30 CAPSULE, DELAYED RELEASE ORAL at 09:31

## 2024-12-21 RX ADMIN — SODIUM CHLORIDE 2457 ML: 9 INJECTION, SOLUTION INTRAVENOUS at 01:26

## 2024-12-21 RX ADMIN — Medication 10 MG: at 22:03

## 2024-12-21 RX ADMIN — PANTOPRAZOLE SODIUM 40 MG: 40 TABLET, DELAYED RELEASE ORAL at 09:31

## 2024-12-21 RX ADMIN — INSULIN GLARGINE 10 UNITS: 100 INJECTION, SOLUTION SUBCUTANEOUS at 09:31

## 2024-12-21 NOTE — PROGRESS NOTES
[facility] Services  ADMISSION FOLLOW-UP NOTE          Patient admitted after midnight, H&P by my partner performed earlier on today's date reviewed.  Interim findings, labs, and charting also reviewed.        [facility] Hospital Problem List has been managed and updated to include any new diagnoses:  Active Hospital Problems    Diagnosis  POA    **Sepsis [A41.9]  Yes    Type 2 diabetes mellitus, with long-term current use of insulin [E11.9, Z79.4]  Not Applicable    Pyelonephritis [N12]  Yes      Resolved Hospital Problems   No resolved problems to display.         ADDITIONAL PLAN:  - detailed assessment and plan from admission reviewed  - Patient admitted this AM by Dr. Franco, chart reviewed, patient seen at bedside  - Summary: This is a 75 y/o male w/ hx prostate Ca s/p prostatectomy, chronic LT ureteral stricture, prior renal abscess (E.Coli) w/ bacteremia, who underwent cysto, LT ureteral balloon dilation, and stent exchange 12/20/24 w/ Dr. Justin, at home he developed fevers and rigors, encephalopathy; on arrival he was febrile 102.9F, tachycardic, and hypotensive; WBC was 13.4, and CT a/p showed LT ureteral stent, hydronephrosis, nonspecific perinephric/ureteric stranding; he was started on empiric broad spectrum antibiotics, stress dose steroids, and midodrine    Assessment/Plan    Severe sepsis 2/2 acute complicated UTI/pyelonephritis  Chronic LT ureteral stricture s/p cysto, balloon dilation, stent exchange 12/20/24  -BP remains borderline but stable, no symptoms of hypotension  -cont stress dose hydrocortisone  -cont midodrine  -cont zosyn  -appears to have surgical Cx from procedure, add on Cx to UA obtained in the ED    HTN/HLD/CAD/pAF - home eliquis, statin; holding metoprolol, valsartan, HCTZ  DM2, A1c 9.1%, w/ insulin use - lantus, SSI, add premeal humalog  GERD - ppi  Hx prostate Ca s/p prostatectomy, hx radiotherapy?  GRANT    Jefferson Hwang,  DO  12/21/24

## 2024-12-21 NOTE — H&P
[facility] Medicine Services  HISTORY AND PHYSICAL    Patient Name: Aldair Narvaez  : 1950  MRN: [id number]  Primary Care Physician: Miguel Angel Mireles MD  Date of admission: 2024      Subjective   Subjective     Chief Complaint:  Fever, rigors and confusion    HPI:  Aldair Narvaez is a 74 y.o. male with past medical history of essential hypertension, dyslipidemia, coronary artery disease, type 2 diabetes, GERD, prostate cancer status post prostatectomy, obstructive sleep apnea, previous history of renal abscess and E. coli bacteremia, history of chronic left ureteral stricture of unclear etiology, managed with ureteral stent who underwent cystoscopy, left ureteral balloon dilation and left ureteral stent exchange today by Dr. Justin /urology and was discharged home, presented to the hospital today with fever and confusion    Around 5 PM, patient started having chills/rigors, became feverish and confused per his wife which prompted her to call 911.  He was brought to the hospital by EMS.  At the time of the encounter, patient is fully awake and alert and feeling much better already.  Denied any flank pain, dysuria, hematuria etc.    In ER, he was found to be hypotensive with blood pressure of 93/48, temperature 102.9, lab workup revealed sodium 134, lactic acid of 3.0, procalcitonin of 1.37, white cell count of 13.25.  Urinalysis with no bacteriuria but did show microscopic hematuria with too numerous WBC.  CT abdomen showing left ureteric stent in appropriate position with new left-sided hydronephrosis and perinephric and periureteric stranding concerning for obstructive uropathy and UTI/pyelonephritis. He was given 1 dose of Zosyn and will be admitted to hospital medicine service    Personal History     Past Medical History:   Diagnosis Date    Acid reflux     Arthritis     Colon polyp     Coronary artery disease     Erectile dysfunction 2005    Heart disease     Hyperlipidemia      Hypertension     Kidney stone     Obesity     body mass index 30+-obesity    Prostate cancer     Sepsis 10/2023    [facility]sepsis  [facility]    Sleep apnea     does not use c-pap since wt loss    Type 2 diabetes mellitus     Urinary incontinence 2005    Getting worse    Urinary tract infection 2003    In hospital twice    Wears dentures     upper plate only           Past Surgical History:   Procedure Laterality Date    CARDIAC CATHETERIZATION      CARPAL TUNNEL RELEASE Left     COLONOSCOPY      CORONARY ARTERY BYPASS GRAFT  08/30/2022    3 Bypasses    CYSTOSCOPY BLADDER STONE LITHOTRIPSY      CYSTOSCOPY BLADDER STONE LITHOTRIPSY      CYSTOSCOPY RETROGRADE PYELOGRAM Left 10/31/2023    Procedure: CYSTOSCOPY RETROGRADE PYELOGRAM STENT PLACEMENT;  Surgeon: Isma Justin MD;  Location:  [facility];  Service: Urology;  Laterality: Left;    CYSTOSCOPY W/ URETERAL STENT PLACEMENT Left 05/09/2023    Procedure: CYSTOSCOPY LEFT RETROGRADE PYELOGRAM AND LEFT URETERAL STENT PLACEMENT;  Surgeon: Isma Justin MD;  Location:  [facility];  Service: Urology;  Laterality: Left;    CYSTOSCOPY, URETEROSCOPY, RETROGRADE PYELOGRAM, STONE EXTRACTION, STENT INSERTION Left 01/19/2024    Procedure: CYSTOSCOPY RETROGRADE PYELOGRAM AND STENT INSERTION;  Surgeon: Isma Justin MD;  Location:  [facility];  Service: Urology;  Laterality: Left;    ENDOSCOPY      INGUINAL HERNIA REPAIR Right     x 2  with mesh  umbilical with mesh    KNEE ARTHROSCOPY Bilateral     PROSTATECTOMY      ROTATOR CUFF REPAIR Left     TONSILLECTOMY      TRIGGER FINGER RELEASE Right     UMBILICAL HERNIA REPAIR      UVULOPALATOPHARYNGOPLASTY         Family History: family history includes Diabetes in his sister; Heart attack in his father; Heart disease in his father; Hyperlipidemia in his sister; Hypertension in his sister.     Social History:  reports that he has never smoked. He has been exposed to tobacco smoke. He has never used smokeless  tobacco. He reports current alcohol use of about 1.0 standard drink of alcohol per week. He reports that he does not use drugs.  Social History     Social History Narrative    Not on file       Medications:  Available home medication information reviewed.  Blood Glucose Monitoring Suppl, DULoxetine, HYDROcodone-acetaminophen, Hydrocortisone (Perianal), Krill Oil Ultra Strength, Magic Mushroom Mix, Pen Needles, albuterol sulfate HFA, apixaban, ciprofloxacin, dapagliflozin Propanediol, ezetimibe, glimepiride, glucose blood, hyoscyamine, insulin degludec, ipratropium-albuterol, loperamide, metoprolol succinate XL, nitroglycerin, oxybutynin XL, pantoprazole, phenazopyridine, rosuvastatin, and valsartan-hydrochlorothiazide    Allergies   Allergen Reactions    Iodine Other (See Comments)     Closes sinuses    Oxycodone-Acetaminophen Itching    Zofran [Ondansetron] Hives       Objective   Objective     Vital Signs:   Temp:  [100 °F (37.8 °C)-102.9 °F (39.4 °C)] 100 °F (37.8 °C)  Heart Rate:  [] 99  Resp:  [18-20] 18  BP: (93-96)/(48-57) 96/57  Flow (L/min) (Oxygen Therapy):  [2] 2       Physical Exam   General: Comfortable, acutely ill looking, conversant and cooperative  Head: Atraumatic and normocephalic  Eyes: No Icterus. No pallor  Ears:  Ears appear intact with no abnormalities noted  Throat: No oral lesions, no thrush  Neck: Supple, trachea midline  Lungs: Clear to auscultation bilaterally, equal air entry, no wheezing or crackles  Heart:  Normal S1 and S2, no murmur, no gallop, No JVD, no lower extremity swelling  Abdomen:  Soft, no tenderness, no organomegaly, normal bowel sounds, no organomegaly  Extremities: pulses equal bilaterally  Skin: No bleeding, bruising or rash, normal skin turgor and elasticity  Neurologic: Cranial nerves appear intact with no evidence of facial asymmetry, normal motor and sensory functions in all 4 extremities  Psych: Alert and oriented x 3, normal mood    Result Review:  I have  personally reviewed the results from the time of this admission to 12/21/2024 04:06 EST and agree with these findings:  [x]  Laboratory list / accordion  [x]  Microbiology  [x]  Radiology  [x]  EKG/Telemetry   [x]  Cardiology/Vascular   [x]  Pathology  [x]  Old records      LAB RESULTS:      Lab 12/21/24  0049 12/17/24  0849 12/17/24  0839   WBC 13.25*  --  12.64*   HEMOGLOBIN 13.0  --  14.8   HEMATOCRIT 40.4  --  46.0   PLATELETS 164  --  267   NEUTROS ABS 12.44*  --   --    IMMATURE GRANS (ABS) 0.09*  --   --    LYMPHS ABS 0.26*  --   --    MONOS ABS 0.40  --   --    EOS ABS 0.03  --   --    MCV 88.2  --  89.1   PROCALCITONIN 1.37*  --   --    LACTATE 3.0*  --   --    PROTIME  --  14.5  --    INR  --  1.12  --          Lab 12/21/24  0246 12/21/24  0049 12/17/24  0839   SODIUM 134* 135* 138   POTASSIUM 3.6 4.1 4.3   CHLORIDE 101 100 99   CO2 22.0 22.0 26.0   ANION GAP 11.0 13.0 13.0   BUN 17 16 20   CREATININE 0.85 0.90 0.79   EGFR 92.3 90.7 94.4   GLUCOSE 232* 267* 205*   CALCIUM 7.7* 8.3* 9.5   MAGNESIUM  --  1.3*  --          Lab 12/21/24 0049   TOTAL PROTEIN 6.4   ALBUMIN 3.3*   GLOBULIN 3.1   ALT (SGPT) 14   AST (SGOT) 29   BILIRUBIN 0.4   ALK PHOS 67   LIPASE 16         Lab 12/21/24  0246 12/21/24 0049   HSTROP T 29* 27*                 UA          5/23/2024    11:38 12/17/2024    08:39 12/21/2024    01:58   Urinalysis   Squamous Epithelial Cells, UA   0-2    Specific Gravity, UA  >=1.030  1.024    Ketones, UA Negative  Negative  Negative    Blood, UA  Large (3+)  Large (3+)    Leukocytes, UA Small (1+)  Moderate (2+)  Moderate (2+)    Nitrite, UA  Negative  Negative    RBC, UA   Too Numerous to Count    WBC, UA   Too Numerous to Count    Bacteria, UA   None Seen        Microbiology Results (last 10 days)       Procedure Component Value - Date/Time    COVID PRE-OP / PRE-PROCEDURE SCREENING ORDER (NO ISOLATION) - Swab, Nasopharynx [[id number]]  (Normal) Collected: 12/21/24 0126    Lab Status: Final result  Specimen: Swab from Nasopharynx Updated: 12/21/24 0223    Narrative:      The following orders were created for panel order COVID PRE-OP / PRE-PROCEDURE SCREENING ORDER (NO ISOLATION) - Swab, Nasopharynx.  Procedure                               Abnormality         Status                     ---------                               -----------         ------                     Respiratory Panel PCR w/...[[id number]]  Normal              Final result                 Please view results for these tests on the individual orders.    Respiratory Panel PCR w/COVID-19(SARS-CoV-2) [facility] In-House, NP Swab in UTM/VTM, 2 HR TAT - Swab, Nasopharynx [[id number]]  (Normal) Collected: 12/21/24 0126    Lab Status: Final result Specimen: Swab from Nasopharynx Updated: 12/21/24 0223     ADENOVIRUS, PCR Not Detected     Coronavirus 229E Not Detected     Coronavirus HKU1 Not Detected     Coronavirus NL63 Not Detected     Coronavirus OC43 Not Detected     COVID19 Not Detected     Human Metapneumovirus Not Detected     Human Rhinovirus/Enterovirus Not Detected     Influenza A PCR Not Detected     Influenza B PCR Not Detected     Parainfluenza Virus 1 Not Detected     Parainfluenza Virus 2 Not Detected     Parainfluenza Virus 3 Not Detected     Parainfluenza Virus 4 Not Detected     RSV, PCR Not Detected     Bordetella pertussis pcr Not Detected     Bordetella parapertussis PCR Not Detected     Chlamydophila pneumoniae PCR Not Detected     Mycoplasma pneumo by PCR Not Detected    Narrative:      In the setting of a positive respiratory panel with a viral infection PLUS a negative procalcitonin without other underlying concern for bacterial infection, consider observing off antibiotics or discontinuation of antibiotics and continue supportive care. If the respiratory panel is positive for atypical bacterial infection (Bordetella pertussis, Chlamydophila pneumoniae, or Mycoplasma pneumoniae), consider antibiotic  de-escalation to target atypical bacterial infection.    Urine Culture - Urine, Urine, Clean Catch [[id number]]  (Normal) Collected: 12/17/24 0839    Lab Status: Final result Specimen: Urine, Clean Catch Updated: 12/19/24 1035     Urine Culture No growth            CT Abdomen Pelvis Without Contrast    Result Date: 12/21/2024  CT ABDOMEN PELVIS WO CONTRAST Date of Exam: 12/21/2024 1:37 AM EST Indication: post-operative fever and left ureteral stent exchange. Comparison: 12/4/2023 and retrograde pyelogram 1/19/2024. Technique: Axial CT images were obtained of the abdomen and pelvis without the administration of contrast. Reconstructed coronal and sagittal images were also obtained. Automated exposure control and iterative construction methods were used. Findings: Heart size is normal. There are coronary artery calcifications. Patient appears status post coronary stenting and median sternotomy. There is a normal distal esophagus. There is scarring in the left lung base. Right lung base is clear. There are no acute  findings in the superficial soft tissues. Patient appears status post remote ventral laparotomy. No acute osseous abnormality or destructive bone lesion. There is isolated severe L5-S1 disc degeneration with mild degenerative changes elsewhere in the lumbar spine. Mild productive DJD is present at both hips. The liver is homogeneous. There is cholelithiasis without evidence of acute cholecystitis. The bile ducts, pancreas, stomach, spleen and adrenal glands appear normal. There are small simple appearing right-sided kidney cysts. The left kidney appears homogeneous. There is a left ureteral stent in place. The stent appears appropriately positioned. There is new left-sided hydronephrosis and asymmetric left-sided perinephric and periureteric stranding raising concern for either obstructive uropathy or urinary tract infection. There is no obvious stone along the course of the left ureteral stent, but the  ureter is dilated down to the distal ureter. Patient appears status post prostatectomy. Numerous surgical clips are noted in the prostatectomy bed. Normal appendix. There is distal colonic diverticulosis. There is mild wall thickening at the urinary bladder along with a small amount of urinary bladder gas, which could also be related to urinary tract infection. Small bowel is nondistended. No ascites, pneumoperitoneum or lymphadenopathy.     Impression: Impression: 1.Left ureteral stent appears appropriately positioned. There is new left-sided hydronephrosis and asymmetric left-sided perinephric and periureteric stranding raising concern for either obstructive uropathy or urinary tract infection. No radiodense urolithiasis. There is also urinary bladder wall thickening and gas in the urinary bladder that could also be related to urinary tract infection. 2.Status post prostatectomy. 3.Cholelithiasis without evidence of acute cholecystitis. 4.Colonic diverticulosis. Electronically Signed: Jefferson Block MD  12/21/2024 2:38 AM EST  Workstation ID: [id number]    XR Chest 1 View    Result Date: 12/21/2024  XR CHEST 1 VW Date of Exam: 12/21/2024 12:56 AM EST Indication: Weak/Dizzy/AMS triage protocol Comparison: 10/29/2023. Findings: There is evidence of prior median sternotomy and CABG. There is no pneumothorax, pleural effusion or focal airspace consolidation. Heart size and pulmonary vasculature appear within normal limits. Regional bones appear intact.     Impression: Impression: No acute cardiopulmonary abnormality. Electronically Signed: Jefferson Block MD  12/21/2024 1:46 AM EST  Workstation ID: [id number]    FL C Arm During Surgery    Result Date: 12/20/2024  This procedure was auto-finalized with no dictation required.         Assessment & Plan   Assessment & Plan       Sepsis        Summary:  Aldair Narvaez is a 74 y.o. male with past medical history of essential hypertension, dyslipidemia, coronary artery disease,  type 2 diabetes, GERD, prostate cancer status post prostatectomy, obstructive sleep apnea, previous history of renal abscess and E. coli bacteremia, history of chronic left ureteral stricture of unclear etiology, managed with ureteral stent who underwent cystoscopy, left ureteral balloon dilation and left ureteral stent exchange today by Dr. Justin /urology and was discharged home, presented to the hospital today with fever and confusion    Point-of-care ultrasound in ER with adequate left ventricular systolic function, no evidence of RV strain, unable to visualize IVC    Severe sepsis, POA  Acute complicated urinary tract infection/pyelonephritis, POA  Chronic left ureteral stricture of unclear etiology status post cystoscopy, balloon dilation and ureteral stent exchange 12/20/2024   Patient presented to the hospital with fever of 102.9, rigors, and some degree of acute confusional state that currently improved.  Lactic acid is elevated at 3.  Procalcitonin elevated at 1.37 and white cell count is elevated at 13.2.  He is hypotensive with systolic in the 90s which raises a concern for severe sepsis  Received recent instrumentation with cystoscopy, left ureteral stent exchange today which might have resulted in bacteremia  Urinalysis with no bacteriuria but did show microscopic hematuria with too numerous WBC.    CT abdomen showing left ureteric stent in appropriate position with new left-sided hydronephrosis and perinephric and periureteric stranding concerning for obstructive uropathy and UTI/pyelonephritis  Continue IV Zosyn for broad coverage  Follow urine and blood culture  Stress dose steroids with hydrocortisone 50 mg every 8 hourly  P.o. midodrine  IV fluids   Hold antihypertensive medications till his blood pressure improved  Courtesy consult to urology  Consider ID consultation if blood culture is positive    Essential hypertension  Currently hypotensive secondary to severe sepsis  Hold metoprolol,  valsartan and hydrochlorothiazide.  Restart when blood pressure improves    Type 2 diabetes  Check A1c  Low-dose insulin glargine and SSI      VTE Prophylaxis:  Pharmacologic VTE prophylaxis orders are signed & held.            CODE STATUS:    Code Status and Medical Interventions: CPR (Attempt to Resuscitate); Full Support   Ordered at: 12/21/24 0358     Level Of Support Discussed With:    Patient    Next of Kin (If No Surrogate)     Code Status (Patient has no pulse and is not breathing):    CPR (Attempt to Resuscitate)     Medical Interventions (Patient has pulse or is breathing):    Full Support       Expected Discharge   Expected Discharge Date: 12/23/2024; Expected Discharge Time:      Rama Franco MD  12/21/24

## 2024-12-21 NOTE — CONSULTS
[facility]   HISTORY AND PHYSICAL    Patient Name: Aldair Narvaez  : 1950  MRN: [id number]  Primary Care Physician:  Miguel Angel Mireles MD  Date of admission: 2024    Subjective   Subjective     Chief Complaint: Fever, chills, AMS    HPI:    Aldair Narveaz is a 74 y.o. male who is currently admitted to [facility], presented to [facility] ED evening of 2024 with fever, chills, rigors, AMS.    Patient underwent cystoscopy, left ureteroscopy and balloon dilation with ureteral stent exchange 2024 for management of chronic left ureteral stricture.  Past history includes prostate cancer status post open prostatectomy.  Prior history of renal abscess and E. coli bacteremia.  Chronic left ureteral stone stent for management of chronic left ureteral stricture.    WBC 13.4, lactate 2.7 creatinine 0.94.  Febrile to 102.9 at presentation, has been afebrile since.  Patient has been voiding without difficulty.  Denies pain or discomfort at this time.  Reports improvement in fevers, chills.  Denies nausea or emesis.    Review of Systems   The following systems were reviewed and negative;  constitution, respiratory, cardiovascular, gastrointestinal, genitourinary, integument, musculoskeletal, neurological, and behavioral/psych.    Personal History     Past Medical History:   Diagnosis Date    Acid reflux     Arthritis     Colon polyp     Coronary artery disease     Erectile dysfunction     Heart disease     Hyperlipidemia     Hypertension     Kidney stone     Obesity     body mass index 30+-obesity    Prostate cancer     Sepsis 10/2023    [facility]    Sleep apnea     does not use c-pap since wt loss    Type 2 diabetes mellitus     Urinary incontinence     Getting worse    Urinary tract infection     In hospital twice    Wears dentures     upper plate only       Past Surgical History:   Procedure Laterality Date    CARDIAC CATHETERIZATION      CARPAL TUNNEL RELEASE Left     COLONOSCOPY       CORONARY ARTERY BYPASS GRAFT  08/30/2022    3 Bypasses    CYSTOSCOPY BLADDER STONE LITHOTRIPSY      CYSTOSCOPY BLADDER STONE LITHOTRIPSY      CYSTOSCOPY RETROGRADE PYELOGRAM Left 10/31/2023    Procedure: CYSTOSCOPY RETROGRADE PYELOGRAM STENT PLACEMENT;  Surgeon: Isma Justin MD;  Location: [facility];  Service: Urology;  Laterality: Left;    CYSTOSCOPY W/ URETERAL STENT PLACEMENT Left 05/09/2023    Procedure: CYSTOSCOPY LEFT RETROGRADE PYELOGRAM AND LEFT URETERAL STENT PLACEMENT;  Surgeon: Isma Justin MD;  Location:  [facility];  Service: Urology;  Laterality: Left;    CYSTOSCOPY, URETEROSCOPY, RETROGRADE PYELOGRAM, STONE EXTRACTION, STENT INSERTION Left 01/19/2024    Procedure: CYSTOSCOPY RETROGRADE PYELOGRAM AND STENT INSERTION;  Surgeon: Isma Justin MD;  Location: [facility];  Service: Urology;  Laterality: Left;    ENDOSCOPY      INGUINAL HERNIA REPAIR Right     x 2  with mesh  umbilical with mesh    KNEE ARTHROSCOPY Bilateral     PROSTATECTOMY      ROTATOR CUFF REPAIR Left     TONSILLECTOMY      TRIGGER FINGER RELEASE Right     UMBILICAL HERNIA REPAIR      UVULOPALATOPHARYNGOPLASTY         Family History: family history includes Diabetes in his sister; Heart attack in his father; Heart disease in his father; Hyperlipidemia in his sister; Hypertension in his sister. Otherwise pertinent FHx was reviewed and not pertinent to current issue.    Social History:  reports that he has never smoked. He has been exposed to tobacco smoke. He has never used smokeless tobacco. He reports current alcohol use of about 1.0 standard drink of alcohol per week. He reports that he does not use drugs.    Home Medications:  Blood Glucose Monitoring Suppl, DULoxetine, HYDROcodone-acetaminophen, Hydrocortisone (Perianal), Krill Oil Ultra Strength, Magic Mushroom Mix, Pen Needles, albuterol sulfate HFA, apixaban, ciprofloxacin, dapagliflozin Propanediol, ezetimibe, glimepiride, glucose blood, hyoscyamine,  insulin degludec, ipratropium-albuterol, loperamide, metoprolol succinate XL, nitroglycerin, oxybutynin XL, pantoprazole, phenazopyridine, rosuvastatin, and valsartan-hydrochlorothiazide      Allergies:  Allergies   Allergen Reactions    Iodine Other (See Comments)     Closes sinuses    Oxycodone-Acetaminophen Itching    Zofran [Ondansetron] Hives       Objective   Objective     Vitals:   Temp:  [98.2 °F (36.8 °C)-102.9 °F (39.4 °C)] 98.2 °F (36.8 °C)  Heart Rate:  [] 86  Resp:  [15-20] 20  BP: ()/(48-81) 92/58  Flow (L/min) (Oxygen Therapy):  [2-4] 2  Physical Exam    Constitutional: Awake in bed, alert    Eyes: PERRLA, sclerae anicteric, no conjunctival injection   HENT: Normocephalic, atraumatic, mucous membranes moist   Neck: Supple, trachea midline   Respiratory:Equal chest rise, non-labored respirations    Cardiovascular: Perfused, no edema   Gastrointestinal: Soft, nontender, non-distended   Musculoskeletal: No bilateral ankle edema, no clubbing or cyanosis to extremities   Psychiatric: Appropriate affect, cooperative   Neurologic: Oriented x 3, Cranial Nerves grossly intact, speech clear   Skin: No rashes     Result Review    Result Review:  I have personally reviewed the results from the time of this admission to 12/21/2024 10:39 EST and agree with these findings:  [x]  Laboratory  [x]  Microbiology  [x]  Radiology  []  EKG/Telemetry   []  Cardiology/Vascular   []  Pathology  [x]  Old records  []  Other:    Most notable findings include: WBC 13.4, lactate 2.7 creatinine 0.94.    CT abdomen pelvis reviewed.  Appropriately placed an indwelling left ureteral stent.  Stranding around the left kidney and inflammation of the left proximal ureter.  Mild left hydronephrosis expected with stent.  No right sided hydronephrosis.    Assessment & Plan   Assessment / Plan     Brief Patient Summary:  Aldair Narvaez is a 74 y.o. male who is currently admitted to [facility] secondary to fever, chills, AMS.  Patient  presented with above symptoms after left ureteroscopy, balloon dilation and stent exchange.  He is currently afebrile and hemodynamically stable.  Labs have been reviewed.  Recommend following urine culture and blood culture at this time.  He is on broad-spectrum antibiotic.  Would tailor antibiotics to culture results.  Patient does not require any urologic surgical intervention at this time left ureteral stent is in position.  Will continue to monitor at this time but likely require supportive therapy potential antibiotic therapy pending culture results.    Active Hospital Problems:  Active Hospital Problems    Diagnosis     **Sepsis          VTE Prophylaxis:  Pharmacologic VTE prophylaxis orders are present.        CODE STATUS:    Level Of Support Discussed With: Patient; Next of Kin (If No Surrogate)  Code Status (Patient has no pulse and is not breathing): CPR (Attempt to Resuscitate)  Medical Interventions (Patient has pulse or is breathing): Full Support    Admission Status: Per primary team    Electronically signed by Víctor Gann MD, 12/21/24, 10:39 AM EST.

## 2024-12-21 NOTE — THERAPY EVALUATION
Patient Name: Aldair Narvaez  : 1950    MRN: [id number]                              Today's Date: 2024       Admit Date: 2024    Visit Dx:     ICD-10-CM ICD-9-CM   1. Severe sepsis  A41.9 038.9    R65.20 995.92   2. Urological system complication of procedure  N99.89 997.5   3. Hydronephrosis of left kidney  N13.30 591   4. Fever and chills  R50.9 780.60   5. Lactic acidosis  E87.20 276.2   6. Type 2 diabetes mellitus with hyperglycemia, with long-term current use of insulin  E11.65 250.00    Z79.4 790.29     V58.67   7. Hypomagnesemia  E83.42 275.2   8. Hypotension, unspecified hypotension type  I95.9 458.9   9. History of hypertension  Z86.79 V12.59   10. History of coronary artery disease  Z86.79 V12.59   11. Chronic anticoagulation  Z79.01 V58.61   12. Obstructive sleep apnea  G47.33 327.23     Patient Active Problem List   Diagnosis    Benign hypertension    Mixed hyperlipidemia    Type 2 diabetes mellitus with hyperglycemia, without long-term current use of insulin    CAD, multiple vessel    Pyelonephritis    Personal history of prostate cancer    Paroxysmal A-fib    Bacteremia    Hydronephrosis of left kidney    Right arm weakness    Ureteral stricture, left    Sepsis    Type 2 diabetes mellitus, with long-term current use of insulin     Past Medical History:   Diagnosis Date    Acid reflux     Arthritis     Colon polyp     Coronary artery disease     Erectile dysfunction 2005    Heart disease     Hyperlipidemia     Hypertension     Kidney stone     Obesity     body mass index 30+-obesity    Prostate cancer     Sepsis 10/2023    urosepsis  [facility]    Sleep apnea     does not use c-pap since wt loss    Type 2 diabetes mellitus     Urinary incontinence     Getting worse    Urinary tract infection     In hospital twice    Wears dentures     upper plate only     Past Surgical History:   Procedure Laterality Date    CARDIAC CATHETERIZATION      CARPAL TUNNEL RELEASE Left      COLONOSCOPY      CORONARY ARTERY BYPASS GRAFT  08/30/2022    3 Bypasses    CYSTOSCOPY BLADDER STONE LITHOTRIPSY      CYSTOSCOPY BLADDER STONE LITHOTRIPSY      CYSTOSCOPY RETROGRADE PYELOGRAM Left 10/31/2023    Procedure: CYSTOSCOPY RETROGRADE PYELOGRAM STENT PLACEMENT;  Surgeon: Isma Justin MD;  Location:  [facility];  Service: Urology;  Laterality: Left;    CYSTOSCOPY W/ URETERAL STENT PLACEMENT Left 05/09/2023    Procedure: CYSTOSCOPY LEFT RETROGRADE PYELOGRAM AND LEFT URETERAL STENT PLACEMENT;  Surgeon: Isma Justin MD;  Location:  [facility];  Service: Urology;  Laterality: Left;    CYSTOSCOPY, URETEROSCOPY, RETROGRADE PYELOGRAM, STONE EXTRACTION, STENT INSERTION Left 01/19/2024    Procedure: CYSTOSCOPY RETROGRADE PYELOGRAM AND STENT INSERTION;  Surgeon: Isma Justin MD;  Location:  [facility];  Service: Urology;  Laterality: Left;    ENDOSCOPY      INGUINAL HERNIA REPAIR Right     x 2  with mesh  umbilical with mesh    KNEE ARTHROSCOPY Bilateral     PROSTATECTOMY      ROTATOR CUFF REPAIR Left     TONSILLECTOMY      TRIGGER FINGER RELEASE Right     UMBILICAL HERNIA REPAIR      UVULOPALATOPHARYNGOPLASTY        General Information       Row Name 12/21/24 1347          OT Time and Intention    Document Type evaluation  -MR     Mode of Treatment occupational therapy  -MR       Row Name 12/21/24 7995          General Information    Patient Profile Reviewed yes  -MR     Prior Level of Function independent:;all household mobility;community mobility;gait;transfer;bed mobility;driving;ADL's  PTA pt was I w/ ADLs. Still driving. Denies use of AD or DME, has a RW and cane. Regular tub. 1 recent fall resulting in L rotator cuff repair.  -MR     Existing Precautions/Restrictions fall;other (see comments)  Recent L rotator cuff repair by . Pt reporting he not longer has to wear sling.  -MR     Barriers to Rehab medically complex  -MR       Row Name 12/21/24 3497          Living Environment     People in Home spouse;child(keara), adult  -MR       Row Name 12/21/24 1347          Home Main Entrance    Number of Stairs, Main Entrance two  -MR     Stair Railings, Main Entrance railings on both sides of stairs  -MR       Row Name 12/21/24 1347          Stairs Within Home, Primary    Number of Stairs, Within Home, Primary none  -MR       Row Name 12/21/24 1347          Cognition    Orientation Status (Cognition) oriented x 4  -MR       Row Name 12/21/24 1347          Safety Issues/Impairments Affecting Functional Mobility    Safety Issues Affecting Function (Mobility) insight into deficits/self-awareness;safety precaution awareness;safety precautions follow-through/compliance  -MR     Impairments Affecting Function (Mobility) endurance/activity tolerance;strength;balance  -MR               User Key  (r) = Recorded By, (t) = Taken By, (c) = Cosigned By      Initials Name Provider Type     Mirian Luo OT Occupational Therapist                     Mobility/ADL's       Row Name 12/21/24 1500          Bed Mobility    Bed Mobility sit-supine  -MR     Sit-Supine [place] (Bed Mobility) contact guard  -MR     Assistive Device (Bed Mobility) head of bed elevated;bed rails  -MR       Row Name 12/21/24 1500          Transfers    Transfers sit-stand transfer;bed-chair transfer  -MR     Comment, (Transfers) Pt completed multiple STS transfers for ADLs during transfers.  -MR       Row Name 12/21/24 1500          Bed-Chair Transfer    Bed-Chair [place] (Transfers) contact guard;verbal cues;nonverbal cues (demo/gesture)  -MR       Row Name 12/21/24 1500          Sit-Stand Transfer    Sit-Stand [place] (Transfers) contact guard  -MR       Row Name 12/21/24 1500          Activities of Daily Living    BADL Assessment/Intervention bathing;upper body dressing;lower body dressing;toileting;grooming  -MR       Row Name 12/21/24 1500          Bathing Assessment/Intervention    [place] Level (Bathing) distal lower  extremities/feet;proximal lower extremities;set up  -MR     Position (Bathing) unsupported standing  -MR       Row Name 12/21/24 1500          Upper Body Dressing Assessment/Training    [place] Level (Upper Body Dressing) don;doff;moderate assist (50% patient effort)  -MR     Position (Upper Body Dressing) unsupported sitting  -MR       Row Name 12/21/24 1500          Lower Body Dressing Assessment/Training    [place] Level (Lower Body Dressing) don;doff;maximum assist (25% patient effort);pants/bottoms  -MR     Position (Lower Body Dressing) unsupported sitting  -MR       Row Name 12/21/24 1500          Toileting Assessment/Training    [place] Level (Toileting) adjust/manage clothing;perform perineal hygiene;minimum assist (75% patient effort)  -MR     Assistive Devices (Toileting) urinal  -MR     Position (Toileting) unsupported standing  -MR       Row Name 12/21/24 1500          Grooming Assessment/Training    [place] Level (Grooming) wash face, hands;set up  -MR     Position (Grooming) unsupported sitting  -MR               User Key  (r) = Recorded By, (t) = Taken By, (c) = Cosigned By      Initials Name Provider Type    Mirian Jones, OT Occupational Therapist                   Obj/Interventions       Row Name 12/21/24 1502          Sensory Assessment (Somatosensory)    Sensory Assessment (Somatosensory) UE sensation intact  -MR       Row Name 12/21/24 1502          Vision Assessment/Intervention    Visual Impairment/Limitations WFL  -MR       Row Name 12/21/24 1502          Range of Motion Comprehensive    Comment, General Range of Motion DNT the LUE d/t recent rotator cuff surgery. RUE WFL  -MR       Row Name 12/21/24 1502          Strength Comprehensive (MMT)    Comment, General Manual Muscle Testing (MMT) Assessment DNT LUE; RUE grossly 4/5 in all functional planes  -MR       Row Name 12/21/24 1502          Balance    Balance Assessment sitting static balance;sitting dynamic  balance;standing static balance;standing dynamic balance  -MR     Static Sitting Balance supervision  -MR     Dynamic Sitting Balance contact guard  -MR     Position, Sitting Balance unsupported;sitting edge of bed;sitting in chair  -MR     Static Standing Balance standby assist  -MR     Dynamic Standing Balance contact guard  -MR     Position/Device Used, Standing Balance unsupported  -MR     Balance Interventions sitting;standing;sit to stand;supported;static;dynamic;occupation based/functional task  -MR     Comment, Balance No overt LOB noted w/ ADLs and transfers.  -MR               User Key  (r) = Recorded By, (t) = Taken By, (c) = Cosigned By      Initials Name Provider Type    MR Mirian Luo, OT Occupational Therapist                   Goals/Plan       Row Name 12/21/24 1506          Transfer Goal 1 (OT)    Activity/Assistive Device (Transfer Goal 1, OT) sit-to-stand/stand-to-sit;toilet  -MR     [place] Level/Cues Needed (Transfer Goal 1, OT) supervision required  -MR     Time Frame (Transfer Goal 1, OT) short term goal (STG);3 days  -MR     Progress/Outcome (Transfer Goal 1, OT) new goal  -MR       Row Name 12/21/24 1506          Dressing Goal 1 (OT)    Activity/Device (Dressing Goal 1, OT) lower body dressing  -MR     [place]/Cues Needed (Dressing Goal 1, OT) moderate assist (50-74% patient effort)  -MR     Time Frame (Dressing Goal 1, OT) long term goal (LTG);5 days  -MR     Progress/Outcome (Dressing Goal 1, OT) new goal  -MR       Row Name 12/21/24 1506          Grooming Goal 1 (OT)    Activity/Device (Grooming Goal 1, OT) grooming skills, all;other (see comments)  sink side  -MR     [place] (Grooming Goal 1, OT) supervision required  -MR     Time Frame (Grooming Goal 1, OT) long term goal (LTG);5 days  -MR     Progress/Outcome (Grooming Goal 1, OT) new goal  -MR       Row Name 12/21/24 1506          Therapy Assessment/Plan (OT)    Planned Therapy Interventions (OT) activity tolerance  training;adaptive equipment training;BADL retraining;functional balance retraining;transfer/mobility retraining;strengthening exercise;ROM/therapeutic exercise;patient/caregiver education/training;passive ROM/stretching;IADL retraining;occupation/activity based interventions  -MR               User Key  (r) = Recorded By, (t) = Taken By, (c) = Cosigned By      Initials Name Provider Type    MR Mirian Luo, OT Occupational Therapist                   Clinical Impression       Row Name 12/21/24 1504          Pain Assessment    Pretreatment Pain Rating 0/10 - no pain  -MR     Posttreatment Pain Rating 0/10 - no pain  -MR       Row Name 12/21/24 1504          Plan of Care Review    Plan of Care Reviewed With patient  -MR     Progress no change  Initial Eval  -MR     Outcome Evaluation Patient presenting below his functional baseline w/ mobility, transfers, balance and endurance. Pt requiring assist w/ ADLs this date. Pt's deficits warrant skilled OT services while hospitalized. Recommend home w/ assist when medically appropriate for d/c.  -MR       Row Name 12/21/24 1506          Therapy Assessment/Plan (OT)    Patient/Family Therapy Goal Statement (OT) Return to PLOF  -MR     Rehab Potential (OT) good  -MR     Criteria for Skilled Therapeutic Interventions Met (OT) yes;meets criteria;skilled treatment is necessary  -MR     Therapy Frequency (OT) daily  -MR       Row Name 12/21/24 1507          Therapy Plan Review/Discharge Plan (OT)    Anticipated Discharge Disposition (OT) home with assist  -MR       Row Name 12/21/24 1504          Vital Signs    Pre Systolic BP Rehab 106  -MR     Pre Treatment Diastolic BP 60  -MR     Pretreatment Heart Rate (beats/min) 99  -MR     Pre SpO2 (%) 94  -MR     O2 Delivery Pre Treatment room air  -MR     O2 Delivery Intra Treatment room air  -MR     O2 Delivery Post Treatment room air  -MR     Pre Patient Position Sitting  -MR     Intra Patient Position Standing  -MR     Post Patient  Position Supine  -MR       Row Name 12/21/24 1504          Positioning and Restraints    Pre-Treatment Position sitting in chair/recliner  -MR     Post Treatment Position bed  -MR     In Bed notified nsg;supine;encouraged to call for assist;exit alarm on;call light within reach  -MR               User Key  (r) = Recorded By, (t) = Taken By, (c) = Cosigned By      Initials Name Provider Type    Mirian Jones, OT Occupational Therapist                   Outcome Measures       Row Name 12/21/24 1507          How much help from another is currently needed...    Putting on and taking off regular lower body clothing? 3  -MR     Bathing (including washing, rinsing, and drying) 3  -MR     Toileting (which includes using toilet bed pan or urinal) 3  -MR     Putting on and taking off regular upper body clothing 4  -MR     Taking care of personal grooming (such as brushing teeth) 4  -MR     Eating meals 4  -MR     AM-PAC 6 Clicks Score (OT) 21  -MR       Row Name 12/21/24 0800 12/21/24 0500       How much help from another person do you currently need...    Turning from your back to your side while in flat bed without using bedrails? 4  -HJ 3  -OB    Moving from lying on back to sitting on the side of a flat bed without bedrails? 3  -HJ 3  -OB    Moving to and from a bed to a chair (including a wheelchair)? 3  -HJ 3  -OB    Standing up from a chair using your arms (e.g., wheelchair, bedside chair)? 3  -HJ 3  -OB    Climbing 3-5 steps with a railing? 3  -HJ 3  -OB    To walk in hospital room? 3  -HJ 3  -OB    AM-PAC 6 Clicks Score (PT) 19  -HJ 18  -OB      Row Name 12/21/24 1507          Functional Assessment    Outcome Measure Options AM-PAC 6 Clicks Daily Activity (OT)  -MR               User Key  (r) = Recorded By, (t) = Taken By, (c) = Cosigned By      Initials Name Provider Type    Kristan Lentz RN Registered Nurse    Stephen Meyer RN Registered Nurse    Mirian Jones, OT Occupational Therapist                     Occupational Therapy Education       Title: PT OT SLP Therapies (In Progress)       Topic: Occupational Therapy (In Progress)       Point: ADL training (Done)       Description:   Instruct learner(s) on proper safety adaptation and remediation techniques during self care or transfers.   Instruct in proper use of assistive devices.                  Learning Progress Summary            Patient Acceptance, E, VU by MR at 12/21/2024 1507                      Point: Home exercise program (Not Started)       Description:   Instruct learner(s) on appropriate technique for monitoring, assisting and/or progressing therapeutic exercises/activities.                  Learner Progress:  Not documented in this visit.              Point: Precautions (Done)       Description:   Instruct learner(s) on prescribed precautions during self-care and functional transfers.                  Learning Progress Summary            Patient Acceptance, E, VU by MR at 12/21/2024 1507                      Point: Body mechanics (Done)       Description:   Instruct learner(s) on proper positioning and spine alignment during self-care, functional mobility activities and/or exercises.                  Learning Progress Summary            Patient Acceptance, E, VU by MR at 12/21/2024 1507                                      User Key       Initials Effective Dates Name Provider Type Discipline    MR 09/22/22 -  Mirian Luo OT Occupational Therapist OT                  OT Recommendation and Plan  Planned Therapy Interventions (OT): activity tolerance training, adaptive equipment training, BADL retraining, functional balance retraining, transfer/mobility retraining, strengthening exercise, ROM/therapeutic exercise, patient/caregiver education/training, passive ROM/stretching, IADL retraining, occupation/activity based interventions  Therapy Frequency (OT): daily  Plan of Care Review  Plan of Care Reviewed With: patient  Progress: no change  (Initial Eval)  Outcome Evaluation: Patient presenting below his functional baseline w/ mobility, transfers, balance and endurance. Pt requiring assist w/ ADLs this date. Pt's deficits warrant skilled OT services while hospitalized. Recommend home w/ assist when medically appropriate for d/c.     Time Calculation:   Evaluation Complexity (OT)  Review Occupational Profile/Medical/Therapy History Complexity: brief/low complexity  Assessment, Occupational Performance/Identification of Deficit Complexity: 1-3 performance deficits  Clinical Decision Making Complexity (OT): problem focused assessment/low complexity  Overall Complexity of Evaluation (OT): low complexity     Time Calculation- OT       Row Name 12/21/24 1507             Time Calculation- OT    OT Start Time 1330  -MR      OT Received On 12/21/24  -MR      OT Goal Re-Cert Due Date 12/31/24  -MR         Timed Charges    62912 - OT Self Care/Mgmt Minutes 10  -MR         Untimed Charges    OT Eval/Re-eval Minutes 46  -MR         Total Minutes    Timed Charges Total Minutes 10  -MR      Untimed Charges Total Minutes 46  -MR       Total Minutes 56  -MR                User Key  (r) = Recorded By, (t) = Taken By, (c) = Cosigned By      Initials Name Provider Type    MR Mirian Luo OT Occupational Therapist                  Therapy Charges for Today       Code Description Service Date Service Provider Modifiers Qty    [id number]  OT SELF CARE/MGMT/TRAIN EA 15 MIN 12/21/2024 Mirian Luo OT GO 1    [id number] HC OT EVAL LOW COMPLEXITY 4 12/21/2024 Mirian Luo OT GO 1                 Mirian Luo OT  12/21/2024

## 2024-12-21 NOTE — PLAN OF CARE
Goal Outcome Evaluation:  Plan of Care Reviewed With: patient        Progress: no change (Initial Eval)  Outcome Evaluation: Patient presenting below his functional baseline w/ mobility, transfers, balance and endurance. Pt requiring assist w/ ADLs this date. Pt's deficits warrant skilled OT services while hospitalized. Recommend home w/ assist when medically appropriate for d/c.    Anticipated Discharge Disposition (OT): home with assist

## 2024-12-21 NOTE — ED PROVIDER NOTES
Subjective   History of Present Illness  This is a pleasant 74-year-old male that presents to the ER per EMS with fever after left ureteral stent exchange earlier on 12/20/2024 per Dr. Justin.  After reviewing that hospital note, patient presented with past medical history significant for prostate cancer, status post prostatectomy, and left distal ureteral restriction postoperatively.  He has had repeated indwelling stents in the left ureter.  He was admitted 12/20/2024 for possible left ureteral stent exchange and possible urethral ballooning.  After patient taken to the OR, patient underwent cystoscopy with left ureteroscopy and urethral balloon with left stent exchange.  Patient was discharged in stable condition and says he got home from the hospital around 1500.  At around 1800, he developed sudden onset of fever with chills.  He reported anorexia but denied any nausea or vomiting.  He did report some urinary urgency but denied any significant burning.  He denies abdominal pain or left flank or left CVA pain.  He has not had a bowel movement since his procedure, but he had a normal bowel movement yesterday.  Past medical history is also significant for GERD, prostate cancer status post prostatectomy, coronary artery disease, type 2 diabetes mellitus, hypertension, hyperlipidemia, GRANT, but patient is noncompliant with CPAP, osteoarthritis, history of kidney stones, and history of left ureteral stricture.  Patient denies any chest pain or shortness of breath and he denies any recent rhinorrhea, nasal congestion, or cough.    History provided by:  Patient  Fever  Max temp prior to arrival:  102.9  Temp source:  Oral  Onset quality:  Sudden  Duration:  6 hours  Timing:  Constant  Progression:  Unchanged  Chronicity:  New  Relieved by:  Nothing  Worsened by:  Nothing  Ineffective treatments:  None tried  Associated symptoms: chills    Associated symptoms: no chest pain, no confusion, no congestion, no cough, no  diarrhea (Normal BM yesterday), no dysuria, no ear pain, no headaches, no nausea, no rash, no rhinorrhea, no sore throat and no vomiting    Risk factors comment:  Pt had left ureteral stent exchange with balloon per Dr. Justin earlier today due to stricture. Discharged at 1500.      Review of Systems   Constitutional:  Positive for activity change, appetite change, chills, fatigue and fever.   HENT: Negative.  Negative for congestion, ear pain, postnasal drip, rhinorrhea, sinus pressure, sinus pain, sneezing and sore throat.    Respiratory: Negative.  Negative for cough, chest tightness and shortness of breath.         History of GRANT with medical noncompliance using CPAP   Cardiovascular: Negative.  Negative for chest pain, palpitations and leg swelling.   Gastrointestinal: Negative.  Negative for abdominal pain, diarrhea (Normal BM yesterday), nausea and vomiting.   Genitourinary:  Positive for urgency. Negative for dysuria, flank pain (Pt denies any flank pain) and frequency.        History of left ureteral stricture status post prostatectomy for prostate cancer.  Patient has had recurrent indwelling left ureteral stent.  Dr. Justin took him to the OR yesterday and perform cystoscopy and left ureteroscopy with stent exchange and urethral balloon.  Patient tolerated procedure well.   Musculoskeletal: Negative.  Negative for back pain.   Skin: Negative.  Negative for rash.   Neurological:  Positive for weakness. Negative for dizziness, syncope, light-headedness and headaches.   Psychiatric/Behavioral:  Negative for confusion.    All other systems reviewed and are negative.      Past Medical History:   Diagnosis Date    Acid reflux     Arthritis     Colon polyp     Coronary artery disease     Erectile dysfunction 2005    Heart disease     Hyperlipidemia     Hypertension     Kidney stone     Obesity     body mass index 30+-obesity    Prostate cancer     Sepsis 10/2023    urosepsis  BHLEX then the [facility]     Sleep apnea     does not use c-pap since wt loss    Type 2 diabetes mellitus     Urinary incontinence 2005    Getting worse    Urinary tract infection 2003    In hospital twice    Wears dentures     upper plate only       Allergies   Allergen Reactions    Iodine Other (See Comments)     Closes sinuses    Oxycodone-Acetaminophen Itching    Zofran [Ondansetron] Hives       Past Surgical History:   Procedure Laterality Date    CARDIAC CATHETERIZATION      CARPAL TUNNEL RELEASE Left     COLONOSCOPY      CORONARY ARTERY BYPASS GRAFT  08/30/2022    3 Bypasses    CYSTOSCOPY BLADDER STONE LITHOTRIPSY      CYSTOSCOPY BLADDER STONE LITHOTRIPSY      CYSTOSCOPY RETROGRADE PYELOGRAM Left 10/31/2023    Procedure: CYSTOSCOPY RETROGRADE PYELOGRAM STENT PLACEMENT;  Surgeon: Isma Justin MD;  Location:  [facility];  Service: Urology;  Laterality: Left;    CYSTOSCOPY W/ URETERAL STENT PLACEMENT Left 05/09/2023    Procedure: CYSTOSCOPY LEFT RETROGRADE PYELOGRAM AND LEFT URETERAL STENT PLACEMENT;  Surgeon: Isma Justin MD;  Location:  [facility];  Service: Urology;  Laterality: Left;    CYSTOSCOPY, URETEROSCOPY, RETROGRADE PYELOGRAM, STONE EXTRACTION, STENT INSERTION Left 01/19/2024    Procedure: CYSTOSCOPY RETROGRADE PYELOGRAM AND STENT INSERTION;  Surgeon: Isma Justin MD;  Location:  [facility];  Service: Urology;  Laterality: Left;    ENDOSCOPY      INGUINAL HERNIA REPAIR Right     x 2  with mesh  umbilical with mesh    KNEE ARTHROSCOPY Bilateral     PROSTATECTOMY      ROTATOR CUFF REPAIR Left     TONSILLECTOMY      TRIGGER FINGER RELEASE Right     UMBILICAL HERNIA REPAIR      UVULOPALATOPHARYNGOPLASTY         Family History   Problem Relation Age of Onset    Heart attack Father     Heart disease Father         That out of a heart attack at age 39    Diabetes Sister     Hyperlipidemia Sister     Hypertension Sister        Social History     Socioeconomic History    Marital status:    Tobacco Use    Smoking  status: Never     Passive exposure: Past    Smokeless tobacco: Never   Vaping Use    Vaping status: Never Used   Substance and Sexual Activity    Alcohol use: Yes     Alcohol/week: 1.0 standard drink of alcohol     Types: 1 Drinks containing 0.5 oz of alcohol per week    Drug use: Never    Sexual activity: Not Currently     Comment: Impotent since prostatectomy           Objective   Physical Exam  Vitals and nursing note reviewed.   Constitutional:       General: He is not in acute distress.     Appearance: Normal appearance. He is ill-appearing. He is not toxic-appearing or diaphoretic.      Comments: Patient appears moderately ill.  No acute distress.   HENT:      Head: Normocephalic and atraumatic.      Nose: Nose normal. No congestion or rhinorrhea.      Comments: No nasal congestion or rhinorrhea     Mouth/Throat:      Mouth: Mucous membranes are moist. Mucous membranes are dry.      Pharynx: Oropharynx is clear. No pharyngeal swelling, oropharyngeal exudate, posterior oropharyngeal erythema or postnasal drip.      Comments: Oral mucous membranes appear dry.  Posterior pharynx is not erythematous  Eyes:      Extraocular Movements: Extraocular movements intact.      Conjunctiva/sclera: Conjunctivae normal.      Pupils: Pupils are equal, round, and reactive to light.   Neck:      Comments: No C-spine tenderness.  Full range of motion.  No meningeal signs  Cardiovascular:      Rate and Rhythm: Regular rhythm. Tachycardia present. No extrasystoles are present.     Pulses: Normal pulses.      Heart sounds: Normal heart sounds.      Comments: Tachycardic.  No ectopy.  No pedal edema to lower extremities  Pulmonary:      Effort: Pulmonary effort is normal. No tachypnea, accessory muscle usage or retractions.      Breath sounds: Normal breath sounds. No decreased breath sounds, wheezing, rhonchi or rales.      Comments: Regular respiratory effort.  O2 was placed per nasal cannula due to O2 sat of 89%.  No retractions  or accessory muscle use.  Good air exchange to bilateral lung fields.  No decreased breath sounds concerning for consolidation and no wheezes, rhonchi, or rales.  Abdominal:      General: Bowel sounds are normal. There is no distension.      Palpations: Abdomen is soft.      Tenderness: There is no abdominal tenderness. There is no right CVA tenderness, left CVA tenderness, guarding or rebound. Negative signs include Javed's sign, Rovsing's sign, McBurney's sign, psoas sign and obturator sign.      Comments: Abdomen soft without distention.  Active bowel sounds in all 4 quadrants.  Nontender to palpation.  No flank or CVA tenderness.  Abdominal exam is benign.   Musculoskeletal:         General: Normal range of motion.      Cervical back: Normal range of motion and neck supple. No pain with movement, spinous process tenderness or muscular tenderness.      Right lower leg: No edema.      Left lower leg: No edema.   Skin:     General: Skin is warm and dry.      Coloration: Skin is not pale.      Findings: No bruising or ecchymosis.   Neurological:      General: No focal deficit present.      Mental Status: He is alert and oriented to person, place, and time.      Cranial Nerves: Cranial nerves 2-12 are intact.      Sensory: Sensation is intact.      Motor: Motor function is intact. Weakness present.      Coordination: Coordination is intact.      Comments: Alert and oriented x 3.  No confusion or lethargy.  Neuro intact and nonfocal.  Overall generally weak.   Psychiatric:         Mood and Affect: Mood and affect normal.         Speech: Speech normal.         Behavior: Behavior normal. Behavior is cooperative.         Thought Content: Thought content normal.         Cognition and Memory: Cognition normal.         Judgment: Judgment normal.      Comments: Normal mood and affect.         Critical Care    Performed by: Nadya Hoover PA-C  Authorized by: Reji Currie MD    Critical care provider statement:      Critical care time (minutes):  45    Critical care time was exclusive of:  Separately billable procedures and treating other patients    Critical care was necessary to treat or prevent imminent or life-threatening deterioration of the following conditions:  Sepsis (Left ureteral stent exchange 12/20/2024 with subsequent chills, fever, and rigors and severe sepsis)    Critical care was time spent personally by me on the following activities:  Blood draw for specimens, development of treatment plan with patient or surrogate, evaluation of patient's response to treatment, examination of patient, obtaining history from patient or surrogate, review of old charts, re-evaluation of patient's condition, pulse oximetry, ordering and review of radiographic studies, ordering and review of laboratory studies and ordering and performing treatments and interventions             ED Course  ED Course as of 12/21/24 0349   Sat Dec 21, 2024   0258 Temp upon arrival was 102.9 and heart rate was 111.  O2 sat was 89% on room air and patient was hypotensive at 93/48.  CBC shows white blood cell count of 13,000 with 93% neutrophils on the differential.  Lactic acid was 3.0.  Chemistries reveal serum Glucose 267.  BUN and creatinine were 16 and 0.90.  LFTs were normal.  I personally interpreted EKG which showed sinus tachycardia.  There was no evidence of ectopy, arrhythmia, or ischemia.  I also personally interpreted chest x-ray which showed no acute cardiopulmonary process.  Respiratory PCR panel was completely negative including testing negative for COVID-19 and influenza.  Procalcitonin was elevated at 1.37.  Magnesium was 1.3 and I initiated electrolyte replacement protocol.  Urinalysis reveals moderate 2+ leukocytes, negative nitrite, too numerous to count red blood cells and too numerous to count white blood cells and no bacteria.  Urine culture is in process.  I reviewed previous urine cultures in epic and patient has grown out  essentially pansensitive E. coli in the past with resistance to ampicillin and Bactrim.  We initiated sepsis fluid bolus per protocol, Zosyn 3.375 g IV.  Last blood pressure was 96/57.  CT of the abdomen/pelvis without contrast reveals left ureteral stent in appropriate position with new left-sided hydronephrosis and asymmetric left-sided perinephric and periureteral EXTR ending raising concerns for either obstructive uropathy or urinary tract infection.  No radiodense urolithiasis.  There is also urinary bladder wall thickening and gas in the urinary bladder that could be related to urinary tract infection, but patient did have ureteral stent placed today.  Gallstones without evidence of cholecystitis.  Status post prostatectomy.  Colonic diverticulosis. [FC]   0301 Reviewed all diagnostic workup with Dr. Currie, ER attending physician.  We will admit patient for sepsis and we initiated sepsis fluid bolus per protocol and IV Zosyn.  I updated patient and family on all results and need for admission and I paged Dr. Franco, to discuss admission on telemetry.  Recommend urology consult in the morning with Dr. Justin. [FC]   0344 Bladder scan was 170 ml.  Patient took metoprolol prior to surgery but he did not take his other antihypertensive medications when he got home on the afternoon of 12/20/2024.  Dr. Franco also saw and evaluated the patient.  He is agreeable to admission on telemetry.  He does agree with severe sepsis diagnosis and will consult urology first thing this morning.  Patient and family agreeable with admission. [FC]      ED Course User Index  [FC] Nadya Hoover PA-C                                           Recent Results (from the past 24 hours)   POC Glucose Once    Collection Time: 12/20/24  9:20 AM    Specimen: Blood   Result Value Ref Range    Glucose 179 (H) 70 - 130 mg/dL   POC Glucose Once    Collection Time: 12/21/24 12:46 AM    Specimen: Blood   Result Value Ref Range    Glucose 245  (H) 70 - 130 mg/dL   Comprehensive Metabolic Panel    Collection Time: 12/21/24 12:49 AM    Specimen: Blood   Result Value Ref Range    Glucose 267 (H) 65 - 99 mg/dL    BUN 16 8 - 23 mg/dL    Creatinine 0.90 0.76 - 1.27 mg/dL    Sodium 135 (L) 136 - 145 mmol/L    Potassium 4.1 3.5 - 5.2 mmol/L    Chloride 100 98 - 107 mmol/L    CO2 22.0 22.0 - 29.0 mmol/L    Calcium 8.3 (L) 8.6 - 10.5 mg/dL    Total Protein 6.4 6.0 - 8.5 g/dL    Albumin 3.3 (L) 3.5 - 5.2 g/dL    ALT (SGPT) 14 1 - 41 U/L    AST (SGOT) 29 1 - 40 U/L    Alkaline Phosphatase 67 39 - 117 U/L    Total Bilirubin 0.4 0.0 - 1.2 mg/dL    Globulin 3.1 gm/dL    A/G Ratio 1.1 g/dL    BUN/Creatinine Ratio 17.8 7.0 - 25.0    Anion Gap 13.0 5.0 - 15.0 mmol/L    eGFR 90.7 >60.0 mL/min/1.73   High Sensitivity Troponin T    Collection Time: 12/21/24 12:49 AM    Specimen: Blood   Result Value Ref Range    HS Troponin T 27 (H) <22 ng/L   Magnesium    Collection Time: 12/21/24 12:49 AM    Specimen: Blood   Result Value Ref Range    Magnesium 1.3 (L) 1.6 - 2.4 mg/dL   Green Top (Gel)    Collection Time: 12/21/24 12:49 AM   Result Value Ref Range    Extra Tube Hold for add-ons.    Lavender Top    Collection Time: 12/21/24 12:49 AM   Result Value Ref Range    Extra Tube hold for add-on    Gold Top - SST    Collection Time: 12/21/24 12:49 AM   Result Value Ref Range    Extra Tube Hold for add-ons.    Gray Top    Collection Time: 12/21/24 12:49 AM   Result Value Ref Range    Extra Tube Hold for add-ons.    Light Blue Top    Collection Time: 12/21/24 12:49 AM   Result Value Ref Range    Extra Tube Hold for add-ons.    CBC Auto Differential    Collection Time: 12/21/24 12:49 AM    Specimen: Blood   Result Value Ref Range    WBC 13.25 (H) 3.40 - 10.80 10*3/mm3    RBC 4.58 4.14 - 5.80 10*6/mm3    Hemoglobin 13.0 13.0 - 17.7 g/dL    Hematocrit 40.4 37.5 - 51.0 %    MCV 88.2 79.0 - 97.0 fL    MCH 28.4 26.6 - 33.0 pg    MCHC 32.2 31.5 - 35.7 g/dL    RDW 14.8 12.3 - 15.4 %    RDW-SD  47.6 37.0 - 54.0 fl    MPV 9.1 6.0 - 12.0 fL    Platelets 164 140 - 450 10*3/mm3    Neutrophil % 93.9 (H) 42.7 - 76.0 %    Lymphocyte % 2.0 (L) 19.6 - 45.3 %    Monocyte % 3.0 (L) 5.0 - 12.0 %    Eosinophil % 0.2 (L) 0.3 - 6.2 %    Basophil % 0.2 0.0 - 1.5 %    Immature Grans % 0.7 (H) 0.0 - 0.5 %    Neutrophils, Absolute 12.44 (H) 1.70 - 7.00 10*3/mm3    Lymphocytes, Absolute 0.26 (L) 0.70 - 3.10 10*3/mm3    Monocytes, Absolute 0.40 0.10 - 0.90 10*3/mm3    Eosinophils, Absolute 0.03 0.00 - 0.40 10*3/mm3    Basophils, Absolute 0.03 0.00 - 0.20 10*3/mm3    Immature Grans, Absolute 0.09 (H) 0.00 - 0.05 10*3/mm3    nRBC 0.0 0.0 - 0.2 /100 WBC   Lipase    Collection Time: 12/21/24 12:49 AM    Specimen: Blood   Result Value Ref Range    Lipase 16 13 - 60 U/L   Lactic Acid, Plasma    Collection Time: 12/21/24 12:49 AM    Specimen: Blood   Result Value Ref Range    Lactate 3.0 (C) 0.5 - 2.0 mmol/L   Procalcitonin    Collection Time: 12/21/24 12:49 AM    Specimen: Blood   Result Value Ref Range    Procalcitonin 1.37 (H) 0.00 - 0.25 ng/mL   ECG 12 Lead ED Triage Standing Order; Weak / Dizzy / AMS    Collection Time: 12/21/24 12:51 AM   Result Value Ref Range    QT Interval 352 ms    QTC Interval 476 ms   Respiratory Panel PCR w/COVID-19(SARS-CoV-2) [facility] In-House, NP Swab in [place], 2 HR TAT - Swab, Nasopharynx    Collection Time: 12/21/24  1:26 AM    Specimen: Nasopharynx; Swab   Result Value Ref Range    ADENOVIRUS, PCR Not Detected Not Detected    Coronavirus 229E Not Detected Not Detected    Coronavirus HKU1 Not Detected Not Detected    Coronavirus NL63 Not Detected Not Detected    Coronavirus OC43 Not Detected Not Detected    COVID19 Not Detected Not Detected - Ref. Range    Human Metapneumovirus Not Detected Not Detected    Human Rhinovirus/Enterovirus Not Detected Not Detected    Influenza A PCR Not Detected Not Detected    Influenza B PCR Not Detected Not Detected    Parainfluenza Virus 1 Not  Detected Not Detected    Parainfluenza Virus 2 Not Detected Not Detected    Parainfluenza Virus 3 Not Detected Not Detected    Parainfluenza Virus 4 Not Detected Not Detected    RSV, PCR Not Detected Not Detected    Bordetella pertussis pcr Not Detected Not Detected    Bordetella parapertussis PCR Not Detected Not Detected    Chlamydophila pneumoniae PCR Not Detected Not Detected    Mycoplasma pneumo by PCR Not Detected Not Detected   Urinalysis With Microscopic If Indicated (No Culture) - Urine, Clean Catch    Collection Time: 12/21/24  1:58 AM    Specimen: Urine, Clean Catch   Result Value Ref Range    Color, UA Orange (A) Yellow, Straw    Appearance, UA Turbid (A) Clear    pH, UA 5.5 5.0 - 8.0    Specific Gravity, UA 1.024 1.001 - 1.030    Glucose, UA >=1000 mg/dL (3+) (A) Negative    Ketones, UA Negative Negative    Bilirubin, UA Negative Negative    Blood, UA Large (3+) (A) Negative    Protein, UA >=300 mg/dL (3+) (A) Negative    Leuk Esterase, UA Moderate (2+) (A) Negative    Nitrite, UA Negative Negative    Urobilinogen, UA 0.2 E.U./dL 0.2 - 1.0 E.U./dL   Urinalysis, Microscopic Only - Urine, Clean Catch    Collection Time: 12/21/24  1:58 AM    Specimen: Urine, Clean Catch   Result Value Ref Range    RBC, UA Too Numerous to Count (A) None Seen, 0-2 /HPF    WBC, UA Too Numerous to Count (A) None Seen, 0-2 /HPF    Bacteria, UA None Seen None Seen, Trace /HPF    Squamous Epithelial Cells, UA 0-2 None Seen, 0-2 /HPF    Methodology Manual Light Microscopy    High Sensitivity Troponin T 1Hr    Collection Time: 12/21/24  2:46 AM    Specimen: Arm, Left; Blood   Result Value Ref Range    HS Troponin T 29 (H) <22 ng/L    Troponin T Numeric Delta 2 ng/L    Troponin T % Delta 7 Abnormal if >/= 20% %   Basic Metabolic Panel    Collection Time: 12/21/24  2:46 AM    Specimen: Arm, Left; Blood   Result Value Ref Range    Glucose 232 (H) 65 - 99 mg/dL    BUN 17 8 - 23 mg/dL    Creatinine 0.85 0.76 - 1.27 mg/dL    Sodium 134  "(L) 136 - 145 mmol/L    Potassium 3.6 3.5 - 5.2 mmol/L    Chloride 101 98 - 107 mmol/L    CO2 22.0 22.0 - 29.0 mmol/L    Calcium 7.7 (L) 8.6 - 10.5 mg/dL    BUN/Creatinine Ratio 20.0 7.0 - 25.0    Anion Gap 11.0 5.0 - 15.0 mmol/L    eGFR 92.3 >60.0 mL/min/1.73     Note: In addition to lab results from this visit, the labs listed above may include labs taken at another facility or during a different encounter within the last 24 hours. Please correlate lab times with ED admission and discharge times for further clarification of the services performed during this visit.    CT Abdomen Pelvis Without Contrast   Final Result   Impression:   1.Left ureteral stent appears appropriately positioned. There is new left-sided hydronephrosis and asymmetric left-sided perinephric and periureteric stranding raising concern for either obstructive uropathy or urinary tract infection. No radiodense    urolithiasis. There is also urinary bladder wall thickening and gas in the urinary bladder that could also be related to urinary tract infection.   2.Status post prostatectomy.   3.Cholelithiasis without evidence of acute cholecystitis.   4.Colonic diverticulosis.                        Electronically Signed: Jefferson Block MD     12/21/2024 2:38 AM EST     Workstation ID: [id number]      XR Chest 1 View   Final Result   Impression:   No acute cardiopulmonary abnormality.               Electronically Signed: Jefferson Block MD     12/21/2024 1:46 AM EST     Workstation ID: [id number]        Vitals:    12/21/24 0040 12/21/24 0041 12/21/24 0230   BP: 93/48  96/57   BP Location: Right arm  Right arm   Patient Position: Lying  Lying   Pulse: 111  99   Resp: 20  18   Temp: (!) 102.9 °F (39.4 °C)  100 °F (37.8 °C)   TempSrc: Oral  Oral   SpO2: (!) 89% 92% 99%   Weight: 95.3 kg (210 lb)     Height: 177.8 cm (70\")       Medications   sodium chloride 0.9 % flush 10 mL (has no administration in time range)   Potassium Replacement - Follow Nurse / BPA " Driven Protocol (has no administration in time range)   Magnesium Standard Dose Replacement - Follow Nurse / BPA Driven Protocol (has no administration in time range)   Phosphorus Replacement - Follow Nurse / BPA Driven Protocol (has no administration in time range)   Calcium Replacement - Follow Nurse / BPA Driven Protocol (has no administration in time range)   sepsis fluid NS 0.9 % bolus 2,457 mL ( Intravenous Currently Infusing 12/21/24 0146)   piperacillin-tazobactam (ZOSYN) 3.375 g IVPB in 100 mL NS MBP (CD) (0 g Intravenous Stopped 12/21/24 0311)   ibuprofen (ADVIL,MOTRIN) tablet 600 mg (600 mg Oral Given 12/21/24 0333)     ECG/EMG Results (last 24 hours)       Procedure Component Value Units Date/Time    ECG 12 Lead ED Triage Standing Order; Weak / Dizzy / AMS [[id number]] Collected: 12/21/24 0051     Updated: 12/21/24 0051     QT Interval 352 ms      QTC Interval 476 ms     Narrative:      Test Reason : ams  Blood Pressure :   */*   mmHG  Vent. Rate : 110 BPM     Atrial Rate : 110 BPM     P-R Int : 144 ms          QRS Dur :  92 ms      QT Int : 352 ms       P-R-T Axes :  16  -7  63 degrees    QTcB Int : 476 ms    Sinus tachycardia  Inferior infarct (cited on or before 29-Oct-2023)  Abnormal ECG  When compared with ECG of 17-Dec-2024 09:06,  Vent. rate has increased by  37 bpm  Nonspecific T wave abnormality now evident in Lateral leads    Referred By: er md           Confirmed By:           ECG 12 Lead ED Triage Standing Order; Weak / Dizzy / AMS   Preliminary Result   Test Reason : ams   Blood Pressure :   */*   mmHG   Vent. Rate : 110 BPM     Atrial Rate : 110 BPM      P-R Int : 144 ms          QRS Dur :  92 ms       QT Int : 352 ms       P-R-T Axes :  16  -7  63 degrees     QTcB Int : 476 ms      Sinus tachycardia   Inferior infarct (cited on or before 29-Oct-2023)   Abnormal ECG   When compared with ECG of 17-Dec-2024 09:06,   Vent. rate has increased by  37 bpm   Nonspecific T wave abnormality now  evident in Lateral leads      Referred By: er md           Confirmed By:                         Medical Decision Making      Final diagnoses:   Severe sepsis   Urological system complication of procedure   Hydronephrosis of left kidney   Fever and chills   Lactic acidosis   Type 2 diabetes mellitus with hyperglycemia, with long-term current use of insulin   Hypomagnesemia   Hypotension, unspecified hypotension type   History of hypertension   History of coronary artery disease   Chronic anticoagulation   Obstructive sleep apnea       ED Disposition  ED Disposition       ED Disposition   Intended Admit    Condition   --    Comment   --               No follow-up provider specified.       Medication List      No changes were made to your prescriptions during this visit.            Nadya Hoover PA-C  12/21/24 0349

## 2024-12-22 LAB
ALBUMIN SERPL-MCNC: 2.6 G/DL (ref 3.5–5.2)
ALBUMIN/GLOB SERPL: 1 G/DL
ALP SERPL-CCNC: 59 U/L (ref 39–117)
ALT SERPL W P-5'-P-CCNC: 23 U/L (ref 1–41)
ANION GAP SERPL CALCULATED.3IONS-SCNC: 10 MMOL/L (ref 5–15)
AST SERPL-CCNC: 42 U/L (ref 1–40)
BACTERIA SPEC AEROBE CULT: NO GROWTH
BASOPHILS # BLD AUTO: 0.02 10*3/MM3 (ref 0–0.2)
BASOPHILS NFR BLD AUTO: 0.2 % (ref 0–1.5)
BILIRUB SERPL-MCNC: 0.2 MG/DL (ref 0–1.2)
BUN SERPL-MCNC: 17 MG/DL (ref 8–23)
BUN/CREAT SERPL: 25.4 (ref 7–25)
CALCIUM SPEC-SCNC: 7.2 MG/DL (ref 8.6–10.5)
CHLORIDE SERPL-SCNC: 103 MMOL/L (ref 98–107)
CO2 SERPL-SCNC: 20 MMOL/L (ref 22–29)
CREAT SERPL-MCNC: 0.67 MG/DL (ref 0.76–1.27)
DEPRECATED RDW RBC AUTO: 53.3 FL (ref 37–54)
EGFRCR SERPLBLD CKD-EPI 2021: 99.2 ML/MIN/1.73
EOSINOPHIL # BLD AUTO: 0.02 10*3/MM3 (ref 0–0.4)
EOSINOPHIL NFR BLD AUTO: 0.2 % (ref 0.3–6.2)
ERYTHROCYTE [DISTWIDTH] IN BLOOD BY AUTOMATED COUNT: 15.7 % (ref 12.3–15.4)
GLOBULIN UR ELPH-MCNC: 2.6 GM/DL
GLUCOSE BLDC GLUCOMTR-MCNC: 181 MG/DL (ref 70–130)
GLUCOSE BLDC GLUCOMTR-MCNC: 222 MG/DL (ref 70–130)
GLUCOSE BLDC GLUCOMTR-MCNC: 243 MG/DL (ref 70–130)
GLUCOSE BLDC GLUCOMTR-MCNC: 244 MG/DL (ref 70–130)
GLUCOSE SERPL-MCNC: 161 MG/DL (ref 65–99)
HCT VFR BLD AUTO: 34.3 % (ref 37.5–51)
HGB BLD-MCNC: 10.6 G/DL (ref 13–17.7)
IMM GRANULOCYTES # BLD AUTO: 0.1 10*3/MM3 (ref 0–0.05)
IMM GRANULOCYTES NFR BLD AUTO: 1.1 % (ref 0–0.5)
LYMPHOCYTES # BLD AUTO: 0.9 10*3/MM3 (ref 0.7–3.1)
LYMPHOCYTES NFR BLD AUTO: 9.6 % (ref 19.6–45.3)
MCH RBC QN AUTO: 28.7 PG (ref 26.6–33)
MCHC RBC AUTO-ENTMCNC: 30.9 G/DL (ref 31.5–35.7)
MCV RBC AUTO: 93 FL (ref 79–97)
MONOCYTES # BLD AUTO: 0.48 10*3/MM3 (ref 0.1–0.9)
MONOCYTES NFR BLD AUTO: 5.1 % (ref 5–12)
NEUTROPHILS NFR BLD AUTO: 7.89 10*3/MM3 (ref 1.7–7)
NEUTROPHILS NFR BLD AUTO: 83.8 % (ref 42.7–76)
NRBC BLD AUTO-RTO: 0 /100 WBC (ref 0–0.2)
PLATELET # BLD AUTO: 129 10*3/MM3 (ref 140–450)
PMV BLD AUTO: 9.7 FL (ref 6–12)
POTASSIUM SERPL-SCNC: 4.2 MMOL/L (ref 3.5–5.2)
PROT SERPL-MCNC: 5.2 G/DL (ref 6–8.5)
RBC # BLD AUTO: 3.69 10*6/MM3 (ref 4.14–5.8)
SODIUM SERPL-SCNC: 133 MMOL/L (ref 136–145)
WBC NRBC COR # BLD AUTO: 9.41 10*3/MM3 (ref 3.4–10.8)

## 2024-12-22 PROCEDURE — 99232 SBSQ HOSP IP/OBS MODERATE 35: CPT | Performed by: INTERNAL MEDICINE

## 2024-12-22 PROCEDURE — 85025 COMPLETE CBC W/AUTO DIFF WBC: CPT | Performed by: INTERNAL MEDICINE

## 2024-12-22 PROCEDURE — 25010000002 PIPERACILLIN SOD-TAZOBACTAM PER 1 G: Performed by: INTERNAL MEDICINE

## 2024-12-22 PROCEDURE — 97161 PT EVAL LOW COMPLEX 20 MIN: CPT

## 2024-12-22 PROCEDURE — 25010000002 HYDROCORTISONE SOD SUC (PF) 100 MG RECONSTITUTED SOLUTION: Performed by: INTERNAL MEDICINE

## 2024-12-22 PROCEDURE — 92610 EVALUATE SWALLOWING FUNCTION: CPT

## 2024-12-22 PROCEDURE — 63710000001 INSULIN LISPRO (HUMAN) PER 5 UNITS: Performed by: INTERNAL MEDICINE

## 2024-12-22 PROCEDURE — 63710000001 INSULIN GLARGINE PER 5 UNITS: Performed by: INTERNAL MEDICINE

## 2024-12-22 PROCEDURE — 82948 REAGENT STRIP/BLOOD GLUCOSE: CPT

## 2024-12-22 PROCEDURE — 97116 GAIT TRAINING THERAPY: CPT

## 2024-12-22 PROCEDURE — 80053 COMPREHEN METABOLIC PANEL: CPT | Performed by: INTERNAL MEDICINE

## 2024-12-22 RX ORDER — HYDROCORTISONE SODIUM SUCCINATE 100 MG/2ML
25 INJECTION INTRAMUSCULAR; INTRAVENOUS EVERY 8 HOURS
Status: DISCONTINUED | OUTPATIENT
Start: 2024-12-22 | End: 2024-12-23

## 2024-12-22 RX ADMIN — PIPERACILLIN AND TAZOBACTAM 3.38 G: 3; .375 INJECTION, POWDER, LYOPHILIZED, FOR SOLUTION INTRAVENOUS at 02:48

## 2024-12-22 RX ADMIN — HYDROCORTISONE SODIUM SUCCINATE 50 MG: 100 INJECTION, POWDER, FOR SOLUTION INTRAMUSCULAR; INTRAVENOUS at 12:18

## 2024-12-22 RX ADMIN — MIDODRINE HYDROCHLORIDE 5 MG: 5 TABLET ORAL at 08:17

## 2024-12-22 RX ADMIN — HYDROCORTISONE SODIUM SUCCINATE 25 MG: 100 INJECTION, POWDER, FOR SOLUTION INTRAMUSCULAR; INTRAVENOUS at 20:55

## 2024-12-22 RX ADMIN — Medication 10 ML: at 08:18

## 2024-12-22 RX ADMIN — PANTOPRAZOLE SODIUM 40 MG: 40 TABLET, DELAYED RELEASE ORAL at 08:17

## 2024-12-22 RX ADMIN — MIDODRINE HYDROCHLORIDE 5 MG: 5 TABLET ORAL at 12:18

## 2024-12-22 RX ADMIN — APIXABAN 5 MG: 5 TABLET, FILM COATED ORAL at 20:55

## 2024-12-22 RX ADMIN — HYDROCORTISONE SODIUM SUCCINATE 50 MG: 100 INJECTION, POWDER, FOR SOLUTION INTRAMUSCULAR; INTRAVENOUS at 06:10

## 2024-12-22 RX ADMIN — INSULIN LISPRO 3 UNITS: 100 INJECTION, SOLUTION INTRAVENOUS; SUBCUTANEOUS at 08:18

## 2024-12-22 RX ADMIN — INSULIN LISPRO 3 UNITS: 100 INJECTION, SOLUTION INTRAVENOUS; SUBCUTANEOUS at 17:08

## 2024-12-22 RX ADMIN — DULOXETINE HYDROCHLORIDE 30 MG: 30 CAPSULE, DELAYED RELEASE ORAL at 08:17

## 2024-12-22 RX ADMIN — INSULIN LISPRO 3 UNITS: 100 INJECTION, SOLUTION INTRAVENOUS; SUBCUTANEOUS at 12:17

## 2024-12-22 RX ADMIN — Medication 10 MG: at 20:55

## 2024-12-22 RX ADMIN — INSULIN LISPRO 3 UNITS: 100 INJECTION, SOLUTION INTRAVENOUS; SUBCUTANEOUS at 12:18

## 2024-12-22 RX ADMIN — MIDODRINE HYDROCHLORIDE 5 MG: 5 TABLET ORAL at 17:10

## 2024-12-22 RX ADMIN — ROSUVASTATIN 20 MG: 20 TABLET, FILM COATED ORAL at 20:55

## 2024-12-22 RX ADMIN — INSULIN LISPRO 3 UNITS: 100 INJECTION, SOLUTION INTRAVENOUS; SUBCUTANEOUS at 20:55

## 2024-12-22 RX ADMIN — PIPERACILLIN AND TAZOBACTAM 3.38 G: 3; .375 INJECTION, POWDER, LYOPHILIZED, FOR SOLUTION INTRAVENOUS at 10:57

## 2024-12-22 RX ADMIN — PIPERACILLIN AND TAZOBACTAM 3.38 G: 3; .375 INJECTION, POWDER, LYOPHILIZED, FOR SOLUTION INTRAVENOUS at 18:33

## 2024-12-22 RX ADMIN — INSULIN LISPRO 2 UNITS: 100 INJECTION, SOLUTION INTRAVENOUS; SUBCUTANEOUS at 08:17

## 2024-12-22 RX ADMIN — APIXABAN 5 MG: 5 TABLET, FILM COATED ORAL at 08:17

## 2024-12-22 RX ADMIN — Medication 10 ML: at 20:55

## 2024-12-22 RX ADMIN — INSULIN GLARGINE 10 UNITS: 100 INJECTION, SOLUTION SUBCUTANEOUS at 08:18

## 2024-12-22 NOTE — PLAN OF CARE
Goal Outcome Evaluation:  Plan of Care Reviewed With: patient, spouse        Progress: improving  Outcome Evaluation: Eval completed gait 325 ft with no AD. IND transfers sitting UIC after PT.  Appears safe for household ambulation.    Anticipated Discharge Disposition (PT): home

## 2024-12-22 NOTE — PLAN OF CARE
Goal Outcome Evaluation:  Plan of Care Reviewed With: patient        Progress: improving       Anticipated Discharge Disposition (SLP): home          SLP Swallowing Diagnosis: functional oral phase, other (see comments) (possible pharyngoesophageal dysphagia vs extubation edema) (12/22/24 7484)

## 2024-12-22 NOTE — PROGRESS NOTES
[facility] Medicine Services  PROGRESS NOTE    Patient Name: Aldair Narvaez  : 1950  MRN: [id number]    Date of Admission: 2024  Primary Care Physician: Miguel Angel Mireles MD    Subjective   Subjective     CC:  F/u sepsis    HPI:  Feeling overall a little better today. Blood pressures improving some.      Objective   Objective     Vital Signs:   Temp:  [98.1 °F (36.7 °C)-99.4 °F (37.4 °C)] 98.1 °F (36.7 °C)  Heart Rate:  [] 88  Resp:  [18-20] 18  BP: (104-117)/(50-67) 115/63     Physical Exam:  Constitutional: Awake, alert, laying in bed in NAD  Respiratory: Clear to auscultation bilaterally, respiratory effort normal   Cardiovascular: RRR, palpable radial pulses  Gastrointestinal: Positive bowel sounds, soft, nontender, nondistended  Musculoskeletal: Cool BL feet  Psychiatric: Appropriate affect, cooperative  Neurologic: Speech clear and fluent    Results Reviewed:  LAB RESULTS:      Lab 24  0306 24  0739 24  0456 24  0246 24  0049 24  0849 24  0839   WBC 9.41  --  13.43*  --  13.25*  --  12.64*   HEMOGLOBIN 10.6*  --  11.8*  --  13.0  --  14.8   HEMATOCRIT 34.3*  --  36.6*  --  40.4  --  46.0   PLATELETS 129*  --  144  --  164  --  267   NEUTROS ABS 7.89*  --  12.70*  --  12.44*  --   --    IMMATURE GRANS (ABS) 0.10*  --  0.12*  --  0.09*  --   --    LYMPHS ABS 0.90  --  0.27*  --  0.26*  --   --    MONOS ABS 0.48  --  0.30  --  0.40  --   --    EOS ABS 0.02  --  0.01  --  0.03  --   --    MCV 93.0  --  89.5  --  88.2  --  89.1   CRP  --   --   --  5.45*  --   --   --    PROCALCITONIN  --   --   --   --  1.37*  --   --    LACTATE  --  2.0 2.7*  --  3.0*  --   --    PROTIME  --   --   --   --   --  14.5  --    HSTROP T  --   --   --  29* 27*  --   --          Lab 24  0306 24  1335 24  0456 24  0246 24  0049 24  0839   SODIUM 133*  --  136 134* 135* 138   POTASSIUM 4.2 4.8 4.1 3.6 4.1 4.3   CHLORIDE  103  --  103 101 100 99   CO2 20.0*  --  21.0* 22.0 22.0 26.0   ANION GAP 10.0  --  12.0 11.0 13.0 13.0   BUN 17  --  17 17 16 20   CREATININE 0.67*  --  0.94 0.85 0.90 0.79   EGFR 99.2  --  86.1 92.3 90.7 94.4   GLUCOSE 161*  --  199* 232* 267* 205*   CALCIUM 7.2*  --  7.3* 7.7* 8.3* 9.5   MAGNESIUM  --   --  1.4*  --  1.3*  --    PHOSPHORUS  --   --  3.9  --   --   --    HEMOGLOBIN A1C  --   --   --   --  9.10*  --          Lab 12/22/24  0306 12/21/24  0456 12/21/24  0049   TOTAL PROTEIN 5.2* 5.3* 6.4   ALBUMIN 2.6* 2.9* 3.3*   GLOBULIN 2.6 2.4 3.1   ALT (SGPT) 23 16 14   AST (SGOT) 42* 28 29   BILIRUBIN 0.2 0.4 0.4   ALK PHOS 59 56 67   LIPASE  --   --  16         Lab 12/21/24  0246 12/21/24  0049 12/17/24  0849   HSTROP T 29* 27*  --    PROTIME  --   --  14.5   INR  --   --  1.12                 Brief Urine Lab Results  (Last result in the past 365 days)        Color   Clarity   Blood   Leuk Est   Nitrite   Protein   CREAT   Urine HCG        12/21/24 0158 Orange   Turbid   Large (3+)   Moderate (2+)   Negative   >=300 mg/dL (3+)                   Microbiology Results Abnormal       None            CT Abdomen Pelvis Without Contrast    Result Date: 12/21/2024  CT ABDOMEN PELVIS WO CONTRAST Date of Exam: 12/21/2024 1:37 AM EST Indication: post-operative fever and left ureteral stent exchange. Comparison: 12/4/2023 and retrograde pyelogram 1/19/2024. Technique: Axial CT images were obtained of the abdomen and pelvis without the administration of contrast. Reconstructed coronal and sagittal images were also obtained. Automated exposure control and iterative construction methods were used. Findings: Heart size is normal. There are coronary artery calcifications. Patient appears status post coronary stenting and median sternotomy. There is a normal distal esophagus. There is scarring in the left lung base. Right lung base is clear. There are no acute  findings in the superficial soft tissues. Patient appears status  post remote ventral laparotomy. No acute osseous abnormality or destructive bone lesion. There is isolated severe L5-S1 disc degeneration with mild degenerative changes elsewhere in the lumbar spine. Mild productive DJD is present at both hips. The liver is homogeneous. There is cholelithiasis without evidence of acute cholecystitis. The bile ducts, pancreas, stomach, spleen and adrenal glands appear normal. There are small simple appearing right-sided kidney cysts. The left kidney appears homogeneous. There is a left ureteral stent in place. The stent appears appropriately positioned. There is new left-sided hydronephrosis and asymmetric left-sided perinephric and periureteric stranding raising concern for either obstructive uropathy or urinary tract infection. There is no obvious stone along the course of the left ureteral stent, but the ureter is dilated down to the distal ureter. Patient appears status post prostatectomy. Numerous surgical clips are noted in the prostatectomy bed. Normal appendix. There is distal colonic diverticulosis. There is mild wall thickening at the urinary bladder along with a small amount of urinary bladder gas, which could also be related to urinary tract infection. Small bowel is nondistended. No ascites, pneumoperitoneum or lymphadenopathy.     Impression: Impression: 1.Left ureteral stent appears appropriately positioned. There is new left-sided hydronephrosis and asymmetric left-sided perinephric and periureteric stranding raising concern for either obstructive uropathy or urinary tract infection. No radiodense urolithiasis. There is also urinary bladder wall thickening and gas in the urinary bladder that could also be related to urinary tract infection. 2.Status post prostatectomy. 3.Cholelithiasis without evidence of acute cholecystitis. 4.Colonic diverticulosis. Electronically Signed: Jefferson Block MD  12/21/2024 2:38 AM EST  Workstation ID: [id number]    XR Chest 1  View    Result Date: 12/21/2024  XR CHEST 1 VW Date of Exam: 12/21/2024 12:56 AM EST Indication: Weak/Dizzy/AMS triage protocol Comparison: 10/29/2023. Findings: There is evidence of prior median sternotomy and CABG. There is no pneumothorax, pleural effusion or focal airspace consolidation. Heart size and pulmonary vasculature appear within normal limits. Regional bones appear intact.     Impression: Impression: No acute cardiopulmonary abnormality. Electronically Signed: Jefferson Block MD  12/21/2024 1:46 AM EST  Workstation ID: [id number]         Current medications:  Scheduled Meds:apixaban, 5 mg, Oral, Q12H  DULoxetine, 30 mg, Oral, Daily  [Held by provider] valsartan, 320 mg, Oral, Q24H   And  [Held by provider] hydroCHLOROthiazide, 25 mg, Oral, Q24H  hydrocortisone sodium succinate, 25 mg, Intravenous, Q8H  insulin glargine, 10 Units, Subcutaneous, Daily  insulin lispro, 2-7 Units, Subcutaneous, 4x Daily AC & at Bedtime  Insulin Lispro, 3 Units, Subcutaneous, TID With Meals  [Held by provider] metoprolol succinate XL, 50 mg, Oral, Daily  midodrine, 5 mg, Oral, TID AC  [Held by provider] oxybutynin XL, 5 mg, Oral, Daily  pantoprazole, 40 mg, Oral, Daily  piperacillin-tazobactam, 3.375 g, Intravenous, Q8H  rosuvastatin, 20 mg, Oral, Daily  sodium chloride, 10 mL, Intravenous, Q12H      Continuous Infusions:   PRN Meds:.  acetaminophen **OR** acetaminophen **OR** acetaminophen    albuterol    senna-docusate sodium **AND** polyethylene glycol **AND** bisacodyl **AND** bisacodyl    calcium carbonate    Calcium Replacement - Follow Nurse / BPA Driven Protocol    dextrose    dextrose    glucagon (human recombinant)    HYDROcodone-acetaminophen    hyoscyamine    loperamide    LORazepam    Magnesium Standard Dose Replacement - Follow Nurse / BPA Driven Protocol    melatonin    nitroglycerin    phenazopyridine    Phosphorus Replacement - Follow Nurse / BPA Driven Protocol    Potassium Replacement - Follow Nurse / BPA  Driven Protocol    promethazine    sodium chloride    sodium chloride    sodium chloride    Assessment & Plan   Assessment & Plan     Active Hospital Problems    Diagnosis  POA    **Sepsis [A41.9]  Yes    Type 2 diabetes mellitus, with long-term current use of insulin [E11.9, Z79.4]  Not Applicable    Pyelonephritis [N12]  Yes      Resolved Hospital Problems   No resolved problems to display.        Brief Hospital Course to date:  Aldair Narvaez is a 74 y.o. male w/ hx prostate Ca s/p prostatectomy, chronic LT ureteral stricture, prior renal abscess (E.Coli) w/ bacteremia, who underwent cysto, LT ureteral balloon dilation, and stent exchange 12/20/24 w/ Dr. Justin, at home he developed fevers and rigors, encephalopathy; on arrival he was febrile 102.9F, tachycardic, and hypotensive; WBC was 13.4, and CT a/p showed LT ureteral stent, hydronephrosis, nonspecific perinephric/ureteric stranding; he was started on empiric broad spectrum antibiotics, stress dose steroids, and midodrine     Assessment/Plan     Severe sepsis 2/2 acute complicated UTI/pyelonephritis  Chronic LT ureteral stricture s/p cysto, balloon dilation, stent exchange 12/20/24  -UrCx from arrival pending, surgical Cx from recent procedure w/o growth  -cont midodrine  -cont zosyn  -cont IV hydrocortisone, BP improving, if stable to the afternoon will dec dose     HTN/HLD/CAD/pAF - home eliquis, statin; holding metoprolol, valsartan, HCTZ  DM2, A1c 9.1%, w/ insulin use - lantus, premeal humalog, SSI  GERD - ppi  Hx prostate Ca s/p prostatectomy, hx radiotherapy?  GRANT    Expected Discharge Location and Transportation: home  Expected Discharge   Expected Discharge Date: 12/24/2024; Expected Discharge Time:      VTE Prophylaxis:  Pharmacologic VTE prophylaxis orders are present.         AM-PAC 6 Clicks Score (PT): 19 (12/22/24 0800)    CODE STATUS:   Code Status and Medical Interventions: CPR (Attempt to Resuscitate); Full Support   Ordered at: 12/21/24  0358     Level Of Support Discussed With:    Patient    Next of Kin (If No Surrogate)     Code Status (Patient has no pulse and is not breathing):    CPR (Attempt to Resuscitate)     Medical Interventions (Patient has pulse or is breathing):    Full Support       Jefferson Hwang, DO  12/22/24

## 2024-12-22 NOTE — THERAPY EVALUATION
Patient Name: Aldair Narvaez  : 1950    MRN: [id number]                              Today's Date: 2024       Admit Date: 2024    Visit Dx:     ICD-10-CM ICD-9-CM   1. Dysphagia, unspecified type  R13.10 787.20   2. Severe sepsis  A41.9 038.9    R65.20 995.92   3. Urological system complication of procedure  N99.89 997.5   4. Hydronephrosis of left kidney  N13.30 591   5. Fever and chills  R50.9 780.60   6. Lactic acidosis  E87.20 276.2   7. Type 2 diabetes mellitus with hyperglycemia, with long-term current use of insulin  E11.65 250.00    Z79.4 790.29     V58.67   8. Hypomagnesemia  E83.42 275.2   9. Hypotension, unspecified hypotension type  I95.9 458.9   10. History of hypertension  Z86.79 V12.59   11. History of coronary artery disease  Z86.79 V12.59   12. Chronic anticoagulation  Z79.01 V58.61   13. Obstructive sleep apnea  G47.33 327.23     Patient Active Problem List   Diagnosis    Benign hypertension    Mixed hyperlipidemia    Type 2 diabetes mellitus with hyperglycemia, without long-term current use of insulin    CAD, multiple vessel    Pyelonephritis    Personal history of prostate cancer    Paroxysmal A-fib    Bacteremia    Hydronephrosis of left kidney    Right arm weakness    Ureteral stricture, left    Sepsis    Type 2 diabetes mellitus, with long-term current use of insulin     Past Medical History:   Diagnosis Date    Acid reflux     Arthritis     Colon polyp     Coronary artery disease     Erectile dysfunction 2005    Heart disease     Hyperlipidemia     Hypertension     Kidney stone     Obesity     body mass index 30+-obesity    Prostate cancer     Sepsis 10/2023    urosepsis  [facility]    Sleep apnea     does not use c-pap since wt loss    Type 2 diabetes mellitus     Urinary incontinence 2005    Getting worse    Urinary tract infection 2003    In hospital twice    Wears dentures     upper plate only     Past Surgical History:   Procedure Laterality Date    CARDIAC  CATHETERIZATION      CARPAL TUNNEL RELEASE Left     COLONOSCOPY      CORONARY ARTERY BYPASS GRAFT  08/30/2022    3 Bypasses    CYSTOSCOPY BLADDER STONE LITHOTRIPSY      CYSTOSCOPY BLADDER STONE LITHOTRIPSY      CYSTOSCOPY RETROGRADE PYELOGRAM Left 10/31/2023    Procedure: CYSTOSCOPY RETROGRADE PYELOGRAM STENT PLACEMENT;  Surgeon: Isma Justin MD;  Location:  [facility];  Service: Urology;  Laterality: Left;    CYSTOSCOPY W/ URETERAL STENT PLACEMENT Left 05/09/2023    Procedure: CYSTOSCOPY LEFT RETROGRADE PYELOGRAM AND LEFT URETERAL STENT PLACEMENT;  Surgeon: Isma Justin MD;  Location:  [facility];  Service: Urology;  Laterality: Left;    CYSTOSCOPY, URETEROSCOPY, RETROGRADE PYELOGRAM, STONE EXTRACTION, STENT INSERTION Left 01/19/2024    Procedure: CYSTOSCOPY RETROGRADE PYELOGRAM AND STENT INSERTION;  Surgeon: Isma Justin MD;  Location:  [facility];  Service: Urology;  Laterality: Left;    ENDOSCOPY      INGUINAL HERNIA REPAIR Right     x 2  with mesh  umbilical with mesh    KNEE ARTHROSCOPY Bilateral     PROSTATECTOMY      ROTATOR CUFF REPAIR Left     TONSILLECTOMY      TRIGGER FINGER RELEASE Right     UMBILICAL HERNIA REPAIR      UVULOPALATOPHARYNGOPLASTY        General Information       Row Name 12/22/24 1521          Physical Therapy Time and Intention    Document Type evaluation  -CT     Mode of Treatment physical therapy  -CT       Row Name 12/22/24 1521          General Information    Patient Profile Reviewed yes  -CT     Prior Level of Function all household mobility;community mobility;gait;transfer;bed mobility;ADL's  -CT     Existing Precautions/Restrictions fall  -CT     Barriers to Rehab medically complex  -CT       Row Name 12/22/24 1521          Living Environment    People in Home spouse;child(keara), adult  -CT       Row Name 12/22/24 1521          Home Main Entrance    Number of Stairs, Main Entrance two  -CT     Stair Railings, Main Entrance railings safe and in good condition  -CT        Row Name 12/22/24 1521          Stairs Within Home, Primary    Number of Stairs, Within Home, Primary none  -CT       Row Name 12/22/24 1521          Cognition    Orientation Status (Cognition) oriented x 4  -CT       Row Name 12/22/24 1521          Safety Issues/Impairments Affecting Functional Mobility    Safety Issues Affecting Function (Mobility) safety precautions follow-through/compliance;insight into deficits/self-awareness  -CT     Impairments Affecting Function (Mobility) endurance/activity tolerance;strength;balance  -CT               User Key  (r) = Recorded By, (t) = Taken By, (c) = Cosigned By      Initials Name Provider Type    CT Balta Garcia, ANGUS Physical Therapist                   Mobility       Row Name 12/22/24 1523          Bed Mobility    Bed Mobility sit-supine  -CT     Sit-Supine [place] (Bed Mobility) supervision  -CT       Row Name 12/22/24 1523          Bed-Chair Transfer    Bed-Chair [place] (Transfers) independent  -CT       Row Name 12/22/24 1523          Sit-Stand Transfer    Sit-Stand [place] (Transfers) independent  -CT       Row Name 12/22/24 1523          Gait/Stairs (Locomotion)    [place] Level (Gait) independent  -CT     Patient was able to Ambulate yes  -CT     Distance in Feet (Gait) 325  -CT               User Key  (r) = Recorded By, (t) = Taken By, (c) = Cosigned By      Initials Name Provider Type    CT Balta Garcia, ANGUS Physical Therapist                   Obj/Interventions       Row Name 12/22/24 1523          Range of Motion Comprehensive    General Range of Motion no range of motion deficits identified  -CT     Comment, General Range of Motion see OT note for LUE recent RTC repair  -CT       Row Name 12/22/24 1523          Strength Comprehensive (MMT)    Comment, General Manual Muscle Testing (MMT) Assessment L MMT deferred R 4+ or better  -CT       Row Name 12/22/24 1523          Motor Skills    Motor Skills functional  endurance  -CT       Row Name 12/22/24 1523          Balance    Balance Assessment sitting static balance;sitting dynamic balance;standing static balance;standing dynamic balance  -CT     Static Sitting Balance independent  -CT     Dynamic Sitting Balance independent  -CT     Static Standing Balance independent  -CT     Dynamic Standing Balance independent  -CT     Position/Device Used, Standing Balance supported;unsupported  -CT     Balance Interventions sitting;standing;sit to stand;weight shifting activity  -CT               User Key  (r) = Recorded By, (t) = Taken By, (c) = Cosigned By      Initials Name Provider Type    CT Balta Garcia, PT Physical Therapist                   Goals/Plan       Row Name 12/22/24 1526          Bed Mobility Goal 1 (PT)    Activity/Assistive Device (Bed Mobility Goal 1, PT) bed mobility activities, all  -CT     [place] Level/Cues Needed (Bed Mobility Goal 1, PT) independent  -CT     Time Frame (Bed Mobility Goal 1, PT) short term goal (STG);1 day  -CT     Progress/Outcomes (Bed Mobility Goal 1, PT) goal met  -CT       Row Name 12/22/24 1526          Transfer Goal 1 (PT)    Activity/Assistive Device (Transfer Goal 1, PT) transfers, all  -CT     [place] Level/Cues Needed (Transfer Goal 1, PT) independent  -CT     Time Frame (Transfer Goal 1, PT) short term goal (STG);1 day  -CT     Progress/Outcome (Transfer Goal 1, PT) goal met  -CT       Row Name 12/22/24 1526          Gait Training Goal 1 (PT)    Activity/Assistive Device (Gait Training Goal 1, PT) gait (walking locomotion)  -CT     [place] Level (Gait Training Goal 1, PT) independent  -CT     Time Frame (Gait Training Goal 1, PT) long term goal (LTG);10 days  -CT     Strategies/Barriers (Gait Training Goal 1, PT) 1500  -CT       Row Name 12/22/24 1526          Therapy Assessment/Plan (PT)    Planned Therapy Interventions (PT) balance training;bed mobility training;gait training;home exercise  program;strengthening;stretching;ROM (range of motion);transfer training  -CT               User Key  (r) = Recorded By, (t) = Taken By, (c) = Cosigned By      Initials Name Provider Type    CT Balta Garcia, PT Physical Therapist                   Clinical Impression       Row Name 12/22/24 1525          Pain    Pretreatment Pain Rating 0/10 - no pain  -CT     Posttreatment Pain Rating 0/10 - no pain  -CT       Row Name 12/22/24 1525          Plan of Care Review    Plan of Care Reviewed With patient;spouse  -CT     Progress improving  -CT     Outcome Evaluation Eval completed gait 325 ft with no AD. IND transfers sitting UIC after PT.  Appears safe for household ambulation.  -CT       Row Name 12/22/24 1525          Therapy Assessment/Plan (PT)    Rehab Potential (PT) good  -CT     Criteria for Skilled Interventions Met (PT) yes  -CT     Therapy Frequency (PT) daily  -CT       Row Name 12/22/24 1525          Positioning and Restraints    Pre-Treatment Position bathroom  -CT     Post Treatment Position chair  -CT     In Chair notified nsg;reclined;sitting;call light within reach;exit alarm on;with family/caregiver;legs elevated  -CT               User Key  (r) = Recorded By, (t) = Taken By, (c) = Cosigned By      Initials Name Provider Type    CT Balta Garcia, PT Physical Therapist                   Outcome Measures       Row Name 12/22/24 1527 12/22/24 0800       How much help from another person do you currently need...    Turning from your back to your side while in flat bed without using bedrails? 4  -CT 4  -HK    Moving from lying on back to sitting on the side of a flat bed without bedrails? 4  -CT 3  -HK    Moving to and from a bed to a chair (including a wheelchair)? 4  -CT 3  -HK    Standing up from a chair using your arms (e.g., wheelchair, bedside chair)? 4  -CT 3  -HK    Climbing 3-5 steps with a railing? 4  -CT 3  -HK    To walk in hospital room? 4  -CT 3  -HK    AM-PAC 6 Clicks  Score (PT) 24  -CT 19  -HK    Highest Level of Mobility Goal 8 --> Walked 250 feet or more  -CT 6 --> Walk 10 steps or more  -HK      Row Name 12/22/24 1527          Functional Assessment    Outcome Measure Options AM-PAC 6 Clicks Basic Mobility (PT)  -CT               User Key  (r) = Recorded By, (t) = Taken By, (c) = Cosigned By      Initials Name Provider Type    CT Balta Garcia, PT Physical Therapist    Estrella Warner, RN Registered Nurse                                 Physical Therapy Education       Title: PT OT SLP Therapies (In Progress)       Topic: Physical Therapy (In Progress)       Point: Mobility training (In Progress)       Learning Progress Summary            Patient Acceptance, E,D, NR by CT at 12/22/2024 1528   Family Acceptance, E,D, NR by CT at 12/22/2024 1528                      Point: Home exercise program (In Progress)       Learning Progress Summary            Patient Acceptance, E,D, NR by CT at 12/22/2024 1528   Family Acceptance, E,D, NR by CT at 12/22/2024 1528                      Point: Body mechanics (In Progress)       Learning Progress Summary            Patient Acceptance, E,D, NR by CT at 12/22/2024 1528   Family Acceptance, E,D, NR by CT at 12/22/2024 1528                      Point: Precautions (In Progress)       Learning Progress Summary            Patient Acceptance, E,D, NR by CT at 12/22/2024 1528   Family Acceptance, E,D, NR by CT at 12/22/2024 1528                                      User Key       Initials Effective Dates Name Provider Type Discipline    CT 07/07/23 -  Balta Garcia, PT Physical Therapist PT                  PT Recommendation and Plan  Planned Therapy Interventions (PT): balance training, bed mobility training, gait training, home exercise program, strengthening, stretching, ROM (range of motion), transfer training  Progress: improving  Outcome Evaluation: Eval completed gait 325 ft with no AD. IND transfers sitting UIC after  PT.  Appears safe for household ambulation.     Time Calculation:         PT Charges       Row Name 12/22/24 1528             Time Calculation    Start Time 1550  -CT      PT Received On 12/22/24  -CT      PT Goal Re-Cert Due Date 01/02/25  -CT         Timed Charges    56696 - Gait Training Minutes  25  -CT         Total Minutes    Timed Charges Total Minutes 25  -CT       Total Minutes 25  -CT                User Key  (r) = Recorded By, (t) = Taken By, (c) = Cosigned By      Initials Name Provider Type    CT Balta Garcia, PT Physical Therapist                  Therapy Charges for Today       Code Description Service Date Service Provider Modifiers Qty    33279966098 HC GAIT TRAINING EA 15 MIN 12/22/2024 Balta Garcia, PT GP 2    54393821687 HC PT EVAL LOW COMPLEXITY 2 12/22/2024 Balta Garcia, PT GP 1            PT G-Codes  Outcome Measure Options: AM-PAC 6 Clicks Basic Mobility (PT)  AM-PAC 6 Clicks Score (PT): 24  AM-PAC 6 Clicks Score (OT): 21  PT Discharge Summary  Anticipated Discharge Disposition (PT): home    Balta Garcia PT  12/22/2024

## 2024-12-22 NOTE — THERAPY EVALUATION
Acute Care - Speech Language Pathology   Swallow Initial Evaluation [facility]     Patient Name: Aldair Narvaez  : 1950  MRN: [id number]  Today's Date: 2024               Admit Date: 2024    Visit Dx:     ICD-10-CM ICD-9-CM   1. Dysphagia, unspecified type  R13.10 787.20   2. Severe sepsis  A41.9 038.9    R65.20 995.92   3. Urological system complication of procedure  N99.89 997.5   4. Hydronephrosis of left kidney  N13.30 591   5. Fever and chills  R50.9 780.60   6. Lactic acidosis  E87.20 276.2   7. Type 2 diabetes mellitus with hyperglycemia, with long-term current use of insulin  E11.65 250.00    Z79.4 790.29     V58.67   8. Hypomagnesemia  E83.42 275.2   9. Hypotension, unspecified hypotension type  I95.9 458.9   10. History of hypertension  Z86.79 V12.59   11. History of coronary artery disease  Z86.79 V12.59   12. Chronic anticoagulation  Z79.01 V58.61   13. Obstructive sleep apnea  G47.33 327.23     Patient Active Problem List   Diagnosis    Benign hypertension    Mixed hyperlipidemia    Type 2 diabetes mellitus with hyperglycemia, without long-term current use of insulin    CAD, multiple vessel    Pyelonephritis    Personal history of prostate cancer    Paroxysmal A-fib    Bacteremia    Hydronephrosis of left kidney    Right arm weakness    Ureteral stricture, left    Sepsis    Type 2 diabetes mellitus, with long-term current use of insulin     Past Medical History:   Diagnosis Date    Acid reflux     Arthritis     Colon polyp     Coronary artery disease     Erectile dysfunction 2005    Heart disease     Hyperlipidemia     Hypertension     Kidney stone     Obesity     body mass index 30+-obesity    Prostate cancer     Sepsis 10/2023    urosepsis  [facility]    Sleep apnea     does not use c-pap since wt loss    Type 2 diabetes mellitus     Urinary incontinence 2005    Getting worse    Urinary tract infection     In hospital twice    Wears dentures     upper plate only      Past Surgical History:   Procedure Laterality Date    CARDIAC CATHETERIZATION      CARPAL TUNNEL RELEASE Left     COLONOSCOPY      CORONARY ARTERY BYPASS GRAFT  08/30/2022    3 Bypasses    CYSTOSCOPY BLADDER STONE LITHOTRIPSY      CYSTOSCOPY BLADDER STONE LITHOTRIPSY      CYSTOSCOPY RETROGRADE PYELOGRAM Left 10/31/2023    Procedure: CYSTOSCOPY RETROGRADE PYELOGRAM STENT PLACEMENT;  Surgeon: Isma Justin MD;  Location:  [facility];  Service: Urology;  Laterality: Left;    CYSTOSCOPY W/ URETERAL STENT PLACEMENT Left 05/09/2023    Procedure: CYSTOSCOPY LEFT RETROGRADE PYELOGRAM AND LEFT URETERAL STENT PLACEMENT;  Surgeon: Isma Justin MD;  Location:  [facility];  Service: Urology;  Laterality: Left;    CYSTOSCOPY, URETEROSCOPY, RETROGRADE PYELOGRAM, STONE EXTRACTION, STENT INSERTION Left 01/19/2024    Procedure: CYSTOSCOPY RETROGRADE PYELOGRAM AND STENT INSERTION;  Surgeon: Isma Justin MD;  Location:  [facility];  Service: Urology;  Laterality: Left;    ENDOSCOPY      INGUINAL HERNIA REPAIR Right     x 2  with mesh  umbilical with mesh    KNEE ARTHROSCOPY Bilateral     PROSTATECTOMY      ROTATOR CUFF REPAIR Left     TONSILLECTOMY      TRIGGER FINGER RELEASE Right     UMBILICAL HERNIA REPAIR      UVULOPALATOPHARYNGOPLASTY         SLP Recommendation and Plan  SLP Swallowing Diagnosis: functional oral phase, other (see comments) (possible pharyngoesophageal dysphagia vs extubation edema) (12/22/24 1345)  SLP Diet Recommendation: regular textures, thin liquids (12/22/24 1345)  Recommended Precautions and Strategies: upright posture during/after eating, alternate between small bites of food and sips of liquid (12/22/24 1345)  SLP Rec. for Method of Medication Administration: meds whole, with thin liquids (12/22/24 1345)     Monitor for Signs of Aspiration: yes, notify SLP if any concerns (12/22/24 1345)  Recommended Diagnostics: reassess via clinical swallow evaluation (12/22/24 1345)  Swallow  Criteria for Skilled Therapeutic Interventions Met: demonstrates skilled criteria (12/22/24 1345)  Anticipated Discharge Disposition (SLP): home (12/22/24 1345)  Rehab Potential/Prognosis, Swallowing: good, to achieve stated therapy goals (12/22/24 1345)  Therapy Frequency (Swallow): 5 days per week (12/22/24 1345)  Predicted Duration Therapy Intervention (Days): 1 week (12/22/24 1345)  Oral Care Recommendations: Oral Care BID/PRN (12/22/24 1345)                                        Progress: improving      SWALLOW EVALUATION (Last 72 Hours)       SLP Adult Swallow Evaluation       Row Name 12/22/24 1345                   Rehab Evaluation    Document Type evaluation  -HG        Subjective Information no complaints  -HG        Patient Observations alert;cooperative;agree to therapy  -HG        Patient/Family/Caregiver Comments/Observations no family present  -HG        Patient Effort excellent  -HG        Symptoms Noted During/After Treatment none  -HG           General Information    Patient Profile Reviewed yes  -HG        Pertinent History Of Current Problem Pt is a 74 year old male past medical history of essential hypertension, dyslipidemia, coronary artery disease, type 2 diabetes, GERD, prostate cancer status post prostatectomy, obstructive sleep apnea, previous history of renal abscess and E. coli bacteremia, history of chronic left ureteral stricture of unclear etiology, managed with ureteral stent who underwent cystoscopy, left ureteral balloon dilation and left ureteral stent exchange today by Dr. Justin /urology and was discharged home, presented to the hospital today with fever and confusion. Pt with reported swallowing difficulty.  -HG        Current Method of Nutrition regular textures;thin liquids  -HG        Precautions/Limitations, Vision WFL;for purposes of eval  -HG        Precautions/Limitations, Hearing WFL;for purposes of eval  -HG        Prior Level of Function-Communication WFL  -HG      "   Prior Level of Function-Swallowing no diet consistency restrictions  -HG        Plans/Goals Discussed with patient  -HG        Barriers to Rehab none identified  -HG           Pain    Pretreatment Pain Rating 0/10 - no pain  -HG           Oral Motor Structure and Function    Dentition Assessment upper dentures/partial in place;other (see comments)  pt does not wear lower denture.  -HG        Secretion Management WNL/WFL  -HG        Mucosal Quality moist, healthy  -HG        Volitional Swallow WFL  -HG        Volitional Cough WFL  -HG           Oral Musculature and Cranial Nerve Assessment    Oral Motor General Assessment WFL  -HG           General Eating/Swallowing Observations    Respiratory Support Currently in Use room air  -HG        Eating/Swallowing Skills self-fed  -HG        Positioning During Eating upright in bed  -HG        Utensils Used spoon;straw  -HG        Consistencies Trialed regular textures;mixed consistency;thin liquids  -HG           Respiratory    Respiratory Status WFL;room air  -HG           Clinical Swallow Eval    Oral Prep Phase WFL  -HG        Oral Transit WFL  -HG        Oral Residue WFL  -HG        Pharyngeal Phase suspected pharyngeal impairment  -HG        Esophageal Phase unremarkable  -HG        Clinical Swallow Evaluation Summary CSE completed this date. Pt reports that since his procedure on 12/20/24, he has noted difficulty swallowing characterized by food getting \"hung up\" or \"in a pocket in my throat.\" Pt reports this difficulty with liquids only. Pt stated that when he had this same procedure in the past,he reacted the same way.  Pt noted improvement after eating oatmeal this date. Pt tolerated tea via straw with throat clear present. Pt tolerated fruit cup bites x 3 with one throat clear present. Pt tolerated promise cracker initially and then exhibited a cough during the swallow and cleared with a liquid wash. SLP RECs continuing with current diet at this time with plans " to re-assess for re-eval and possible MBS if symptoms persist.  -HG           Pharyngeal Phase Concerns    Cough regular consistencies  -HG        Throat Clear thin  -HG           SLP Evaluation Clinical Impression    SLP Swallowing Diagnosis functional oral phase;other (see comments)  possible pharyngoesophageal dysphagia vs extubation edema  -HG        Rehab Potential/Prognosis, Swallowing good, to achieve stated therapy goals  -        Swallow Criteria for Skilled Therapeutic Interventions Met demonstrates skilled criteria  -           SLP Treatment Clinical Impressions    Care Plan Review evaluation/treatment results reviewed  -           Recommendations    Therapy Frequency (Swallow) 5 days per week  -        Predicted Duration Therapy Intervention (Days) 1 week  -        SLP Diet Recommendation regular textures;thin liquids  -        Recommended Diagnostics reassess via clinical swallow evaluation  -        Recommended Precautions and Strategies upright posture during/after eating;alternate between small bites of food and sips of liquid  -        Oral Care Recommendations Oral Care BID/PRN  -        SLP Rec. for Method of Medication Administration meds whole;with thin liquids  -        Monitor for Signs of Aspiration yes;notify SLP if any concerns  -        Anticipated Discharge Disposition (SLP) home  -           (LTG) Patient will demonstrate functional swallow for    Diet Texture (Demonstrate functional swallow) regular textures  -        Liquid viscosity (Demonstrate functional swallow) thin liquids  -        [place] (Demonstrate functional swallow) independently (over 90% accuracy)  -        Time Frame (Demonstrate functional swallow) 1 week  -HG           (STG) Swallow 1    (STG) Swallow 1 Pt will tolerate recommended diet and liquids with no s/sx of aspiration.  -HG        [place] (Swallow Short Term Goal 1) independently (over 90% accuracy)  -        Time Frame  (Swallow Short Term Goal 1) 1 week  -HG                  User Key  (r) = Recorded By, (t) = Taken By, (c) = Cosigned By      Initials Name Effective Dates    Ally Butler MS CCC-SLP 06/16/21 -                     EDUCATION  The patient has been educated in the following areas:   Dysphagia (Swallowing Impairment).        SLP GOALS       Row Name 12/22/24 1345             (LTG) Patient will demonstrate functional swallow for    Diet Texture (Demonstrate functional swallow) regular textures  -HG      Liquid viscosity (Demonstrate functional swallow) thin liquids  -HG      [place] (Demonstrate functional swallow) independently (over 90% accuracy)  -HG      Time Frame (Demonstrate functional swallow) 1 week  -HG         (STG) Swallow 1    (STG) Swallow 1 Pt will tolerate recommended diet and liquids with no s/sx of aspiration.  -HG      [place] (Swallow Short Term Goal 1) independently (over 90% accuracy)  -HG      Time Frame (Swallow Short Term Goal 1) 1 week  -HG                User Key  (r) = Recorded By, (t) = Taken By, (c) = Cosigned By      Initials Name Provider Type    Ally Butler MS CCC-SLP Speech and Language Pathologist                         Time Calculation:    Time Calculation- SLP       Row Name 12/22/24 1431             Time Calculation- SLP    SLP Start Time 1345  -HG      SLP Received On 12/22/24  -HG         Untimed Charges    81386-DH Treatment Swallow Minutes 30  -HG         Total Minutes    Untimed Charges Total Minutes 30  -HG       Total Minutes 30  -HG                User Key  (r) = Recorded By, (t) = Taken By, (c) = Cosigned By      Initials Name Provider Type    Ally Butler MS CCC-SLP Speech and Language Pathologist                             Ally Mcallister MS CCC-SLP  12/22/2024

## 2024-12-23 LAB
GLUCOSE BLDC GLUCOMTR-MCNC: 163 MG/DL (ref 70–130)
GLUCOSE BLDC GLUCOMTR-MCNC: 169 MG/DL (ref 70–130)
GLUCOSE BLDC GLUCOMTR-MCNC: 172 MG/DL (ref 70–130)
GLUCOSE BLDC GLUCOMTR-MCNC: 173 MG/DL (ref 70–130)
QT INTERVAL: 352 MS
QTC INTERVAL: 476 MS

## 2024-12-23 PROCEDURE — 63710000001 INSULIN LISPRO (HUMAN) PER 5 UNITS: Performed by: INTERNAL MEDICINE

## 2024-12-23 PROCEDURE — 25010000002 PIPERACILLIN SOD-TAZOBACTAM PER 1 G: Performed by: INTERNAL MEDICINE

## 2024-12-23 PROCEDURE — 99232 SBSQ HOSP IP/OBS MODERATE 35: CPT | Performed by: INTERNAL MEDICINE

## 2024-12-23 PROCEDURE — 63710000001 INSULIN GLARGINE PER 5 UNITS: Performed by: INTERNAL MEDICINE

## 2024-12-23 PROCEDURE — 25010000002 HYDROCORTISONE SOD SUC (PF) 100 MG RECONSTITUTED SOLUTION: Performed by: INTERNAL MEDICINE

## 2024-12-23 PROCEDURE — 82948 REAGENT STRIP/BLOOD GLUCOSE: CPT

## 2024-12-23 PROCEDURE — 92526 ORAL FUNCTION THERAPY: CPT

## 2024-12-23 PROCEDURE — 99232 SBSQ HOSP IP/OBS MODERATE 35: CPT | Performed by: STUDENT IN AN ORGANIZED HEALTH CARE EDUCATION/TRAINING PROGRAM

## 2024-12-23 RX ORDER — OXYMETAZOLINE HYDROCHLORIDE 0.05 G/100ML
2 SPRAY NASAL 2 TIMES DAILY
Status: DISCONTINUED | OUTPATIENT
Start: 2024-12-23 | End: 2024-12-25 | Stop reason: HOSPADM

## 2024-12-23 RX ORDER — MIDODRINE HYDROCHLORIDE 5 MG/1
2.5 TABLET ORAL
Status: DISCONTINUED | OUTPATIENT
Start: 2024-12-23 | End: 2024-12-23

## 2024-12-23 RX ORDER — ECHINACEA PURPUREA EXTRACT 125 MG
2 TABLET ORAL AS NEEDED
Status: DISCONTINUED | OUTPATIENT
Start: 2024-12-23 | End: 2024-12-25 | Stop reason: HOSPADM

## 2024-12-23 RX ADMIN — MIDODRINE HYDROCHLORIDE 2.5 MG: 5 TABLET ORAL at 11:56

## 2024-12-23 RX ADMIN — Medication 10 ML: at 08:11

## 2024-12-23 RX ADMIN — Medication 2 SPRAY: at 20:54

## 2024-12-23 RX ADMIN — INSULIN LISPRO 3 UNITS: 100 INJECTION, SOLUTION INTRAVENOUS; SUBCUTANEOUS at 17:39

## 2024-12-23 RX ADMIN — APIXABAN 5 MG: 5 TABLET, FILM COATED ORAL at 20:48

## 2024-12-23 RX ADMIN — MIDODRINE HYDROCHLORIDE 2.5 MG: 5 TABLET ORAL at 07:50

## 2024-12-23 RX ADMIN — Medication 2 SPRAY: at 10:43

## 2024-12-23 RX ADMIN — INSULIN LISPRO 3 UNITS: 100 INJECTION, SOLUTION INTRAVENOUS; SUBCUTANEOUS at 07:50

## 2024-12-23 RX ADMIN — PIPERACILLIN AND TAZOBACTAM 3.38 G: 3; .375 INJECTION, POWDER, LYOPHILIZED, FOR SOLUTION INTRAVENOUS at 01:45

## 2024-12-23 RX ADMIN — INSULIN LISPRO 2 UNITS: 100 INJECTION, SOLUTION INTRAVENOUS; SUBCUTANEOUS at 07:50

## 2024-12-23 RX ADMIN — ROSUVASTATIN 20 MG: 20 TABLET, FILM COATED ORAL at 20:48

## 2024-12-23 RX ADMIN — SALINE NASAL SPRAY 2 SPRAY: 1.5 SOLUTION NASAL at 17:42

## 2024-12-23 RX ADMIN — INSULIN LISPRO 2 UNITS: 100 INJECTION, SOLUTION INTRAVENOUS; SUBCUTANEOUS at 20:54

## 2024-12-23 RX ADMIN — Medication 10 ML: at 20:54

## 2024-12-23 RX ADMIN — INSULIN LISPRO 2 UNITS: 100 INJECTION, SOLUTION INTRAVENOUS; SUBCUTANEOUS at 11:55

## 2024-12-23 RX ADMIN — DULOXETINE HYDROCHLORIDE 30 MG: 30 CAPSULE, DELAYED RELEASE ORAL at 08:11

## 2024-12-23 RX ADMIN — APIXABAN 5 MG: 5 TABLET, FILM COATED ORAL at 08:11

## 2024-12-23 RX ADMIN — ACETAMINOPHEN 650 MG: 325 TABLET ORAL at 10:41

## 2024-12-23 RX ADMIN — INSULIN LISPRO 2 UNITS: 100 INJECTION, SOLUTION INTRAVENOUS; SUBCUTANEOUS at 17:39

## 2024-12-23 RX ADMIN — PANTOPRAZOLE SODIUM 40 MG: 40 TABLET, DELAYED RELEASE ORAL at 08:11

## 2024-12-23 RX ADMIN — INSULIN LISPRO 3 UNITS: 100 INJECTION, SOLUTION INTRAVENOUS; SUBCUTANEOUS at 11:55

## 2024-12-23 RX ADMIN — HYDROCORTISONE SODIUM SUCCINATE 25 MG: 100 INJECTION, POWDER, FOR SOLUTION INTRAMUSCULAR; INTRAVENOUS at 05:34

## 2024-12-23 RX ADMIN — PIPERACILLIN AND TAZOBACTAM 3.38 G: 3; .375 INJECTION, POWDER, LYOPHILIZED, FOR SOLUTION INTRAVENOUS at 10:43

## 2024-12-23 RX ADMIN — PIPERACILLIN AND TAZOBACTAM 3.38 G: 3; .375 INJECTION, POWDER, LYOPHILIZED, FOR SOLUTION INTRAVENOUS at 17:40

## 2024-12-23 RX ADMIN — INSULIN GLARGINE 10 UNITS: 100 INJECTION, SOLUTION SUBCUTANEOUS at 08:11

## 2024-12-23 NOTE — PAYOR COMM NOTE
"Yumiko Bernstein (74 y.o. Male)       Date of Birth   1950    Social Security Number       Address   [address] [place] [address]    Home Phone   [phone]    MRN   [id number]       Mu-ism   Mormon    Marital Status                               Admission Date   24    Admission Type   Emergency    Admitting Provider   Jefferson Hwang DO    Attending Provider   Jefferson Hwang DO    Department, Room/Bed   [facility] 3E, S333/1       Discharge Date       Discharge Disposition       Discharge Destination                                 Attending Provider: Jefferson Hwang DO    Allergies: Iodine, Oxycodone-acetaminophen, Zofran [Ondansetron]    Isolation: None   Infection: None   Code Status: CPR    Ht: 177.8 cm (70\")   Wt: 96.2 kg (212 lb)    Admission Cmt: None   Principal Problem: Sepsis [A41.9]                   Active Insurance as of 2024       Primary Coverage       Payor Plan Insurance Group Employer/Plan Group    ANTHEM MEDICARE REPLACEMENT ANTHEM MED ADV PPO [id number]       Payor Plan Address Payor Plan Phone Number Payor Plan Fax Number Effective Dates    [address] [phone]  2024 - None Entered    [facility] [id number]         Subscriber Name Subscriber Birth Date Member ID       YUMIKO BERNSTEIN 1950 [id number]                     Emergency Contacts        (Rel.) Home Phone Work Phone Mobile Phone    GORAN BERNSTEIN (Spouse) [phone] -- --    Adelaide Joseph (Daughter) [phone] -- --    BernsteinUlises landis (Son) -- -- [phone]                 History & Physical        Rama Franco MD at 24 0341              [facility] Medicine Services  HISTORY AND PHYSICAL    Patient Name: Yumiko Bernstein  : 1950  MRN: [id number]  Primary Care Physician: Miguel Angel Mireles MD  Date of admission: 2024      Subjective   Subjective     Chief Complaint:  Fever, rigors and " confusion    HPI:  Aldair Narvaez is a 74 y.o. male with past medical history of essential hypertension, dyslipidemia, coronary artery disease, type 2 diabetes, GERD, prostate cancer status post prostatectomy, obstructive sleep apnea, previous history of renal abscess and E. coli bacteremia, history of chronic left ureteral stricture of unclear etiology, managed with ureteral stent who underwent cystoscopy, left ureteral balloon dilation and left ureteral stent exchange today by Dr. Justin /urology and was discharged home, presented to the hospital today with fever and confusion    Around 5 PM, patient started having chills/rigors, became feverish and confused per his wife which prompted her to call 911.  He was brought to the hospital by EMS.  At the time of the encounter, patient is fully awake and alert and feeling much better already.  Denied any flank pain, dysuria, hematuria etc.    In ER, he was found to be hypotensive with blood pressure of 93/48, temperature 102.9, lab workup revealed sodium 134, lactic acid of 3.0, procalcitonin of 1.37, white cell count of 13.25.  Urinalysis with no bacteriuria but did show microscopic hematuria with too numerous WBC.  CT abdomen showing left ureteric stent in appropriate position with new left-sided hydronephrosis and perinephric and periureteric stranding concerning for obstructive uropathy and UTI/pyelonephritis. He was given 1 dose of Zosyn and will be admitted to hospital medicine service    Personal History     Past Medical History:   Diagnosis Date    Acid reflux     Arthritis     Colon polyp     Coronary artery disease     Erectile dysfunction 2005    Heart disease     Hyperlipidemia     Hypertension     Kidney stone     Obesity     body mass index 30+-obesity    Prostate cancer     Sepsis 10/2023    urosepsis  [facility]    Sleep apnea     does not use c-pap since wt loss    Type 2 diabetes mellitus     Urinary incontinence 2005    Getting worse     Urinary tract infection 2003    In hospital twice    Wears dentures     upper plate only           Past Surgical History:   Procedure Laterality Date    CARDIAC CATHETERIZATION      CARPAL TUNNEL RELEASE Left     COLONOSCOPY      CORONARY ARTERY BYPASS GRAFT  08/30/2022    3 Bypasses    CYSTOSCOPY BLADDER STONE LITHOTRIPSY      CYSTOSCOPY BLADDER STONE LITHOTRIPSY      CYSTOSCOPY RETROGRADE PYELOGRAM Left 10/31/2023    Procedure: CYSTOSCOPY RETROGRADE PYELOGRAM STENT PLACEMENT;  Surgeon: Isma Justin MD;  Location:  [facility];  Service: Urology;  Laterality: Left;    CYSTOSCOPY W/ URETERAL STENT PLACEMENT Left 05/09/2023    Procedure: CYSTOSCOPY LEFT RETROGRADE PYELOGRAM AND LEFT URETERAL STENT PLACEMENT;  Surgeon: Isma Justin MD;  Location:  [facility];  Service: Urology;  Laterality: Left;    CYSTOSCOPY, URETEROSCOPY, RETROGRADE PYELOGRAM, STONE EXTRACTION, STENT INSERTION Left 01/19/2024    Procedure: CYSTOSCOPY RETROGRADE PYELOGRAM AND STENT INSERTION;  Surgeon: Isma Justin MD;  Location:  [facility];  Service: Urology;  Laterality: Left;    ENDOSCOPY      INGUINAL HERNIA REPAIR Right     x 2  with mesh  umbilical with mesh    KNEE ARTHROSCOPY Bilateral     PROSTATECTOMY      ROTATOR CUFF REPAIR Left     TONSILLECTOMY      TRIGGER FINGER RELEASE Right     UMBILICAL HERNIA REPAIR      UVULOPALATOPHARYNGOPLASTY         Family History: family history includes Diabetes in his sister; Heart attack in his father; Heart disease in his father; Hyperlipidemia in his sister; Hypertension in his sister.     Social History:  reports that he has never smoked. He has been exposed to tobacco smoke. He has never used smokeless tobacco. He reports current alcohol use of about 1.0 standard drink of alcohol per week. He reports that he does not use drugs.  Social History     Social History Narrative    Not on file       Medications:  Available home medication information reviewed.  Blood Glucose Monitoring  Suppl, DULoxetine, HYDROcodone-acetaminophen, Hydrocortisone (Perianal), Krill Oil Ultra Strength, Magic Mushroom Mix, Pen Needles, albuterol sulfate HFA, apixaban, ciprofloxacin, dapagliflozin Propanediol, ezetimibe, glimepiride, glucose blood, hyoscyamine, insulin degludec, ipratropium-albuterol, loperamide, metoprolol succinate XL, nitroglycerin, oxybutynin XL, pantoprazole, phenazopyridine, rosuvastatin, and valsartan-hydrochlorothiazide    Allergies   Allergen Reactions    Iodine Other (See Comments)     Closes sinuses    Oxycodone-Acetaminophen Itching    Zofran [Ondansetron] Hives       Objective   Objective     Vital Signs:   Temp:  [100 °F (37.8 °C)-102.9 °F (39.4 °C)] 100 °F (37.8 °C)  Heart Rate:  [] 99  Resp:  [18-20] 18  BP: (93-96)/(48-57) 96/57  Flow (L/min) (Oxygen Therapy):  [2] 2       Physical Exam   General: Comfortable, acutely ill looking, conversant and cooperative  Head: Atraumatic and normocephalic  Eyes: No Icterus. No pallor  Ears:  Ears appear intact with no abnormalities noted  Throat: No oral lesions, no thrush  Neck: Supple, trachea midline  Lungs: Clear to auscultation bilaterally, equal air entry, no wheezing or crackles  Heart:  Normal S1 and S2, no murmur, no gallop, No JVD, no lower extremity swelling  Abdomen:  Soft, no tenderness, no organomegaly, normal bowel sounds, no organomegaly  Extremities: pulses equal bilaterally  Skin: No bleeding, bruising or rash, normal skin turgor and elasticity  Neurologic: Cranial nerves appear intact with no evidence of facial asymmetry, normal motor and sensory functions in all 4 extremities  Psych: Alert and oriented x 3, normal mood    Result Review:  I have personally reviewed the results from the time of this admission to 12/21/2024 04:06 EST and agree with these findings:  [x]  Laboratory list / accordion  [x]  Microbiology  [x]  Radiology  [x]  EKG/Telemetry   [x]  Cardiology/Vascular   [x]  Pathology  [x]  Old records      LAB  RESULTS:      Lab 12/21/24  0049 12/17/24  0849 12/17/24  0839   WBC 13.25*  --  12.64*   HEMOGLOBIN 13.0  --  14.8   HEMATOCRIT 40.4  --  46.0   PLATELETS 164  --  267   NEUTROS ABS 12.44*  --   --    IMMATURE GRANS (ABS) 0.09*  --   --    LYMPHS ABS 0.26*  --   --    MONOS ABS 0.40  --   --    EOS ABS 0.03  --   --    MCV 88.2  --  89.1   PROCALCITONIN 1.37*  --   --    LACTATE 3.0*  --   --    PROTIME  --  14.5  --    INR  --  1.12  --          Lab 12/21/24  0246 12/21/24  0049 12/17/24  0839   SODIUM 134* 135* 138   POTASSIUM 3.6 4.1 4.3   CHLORIDE 101 100 99   CO2 22.0 22.0 26.0   ANION GAP 11.0 13.0 13.0   BUN 17 16 20   CREATININE 0.85 0.90 0.79   EGFR 92.3 90.7 94.4   GLUCOSE 232* 267* 205*   CALCIUM 7.7* 8.3* 9.5   MAGNESIUM  --  1.3*  --          Lab 12/21/24 0049   TOTAL PROTEIN 6.4   ALBUMIN 3.3*   GLOBULIN 3.1   ALT (SGPT) 14   AST (SGOT) 29   BILIRUBIN 0.4   ALK PHOS 67   LIPASE 16         Lab 12/21/24  0246 12/21/24 0049   HSTROP T 29* 27*                 UA          5/23/2024    11:38 12/17/2024    08:39 12/21/2024    01:58   Urinalysis   Squamous Epithelial Cells, UA   0-2    Specific Gravity, UA  >=1.030  1.024    Ketones, UA Negative  Negative  Negative    Blood, UA  Large (3+)  Large (3+)    Leukocytes, UA Small (1+)  Moderate (2+)  Moderate (2+)    Nitrite, UA  Negative  Negative    RBC, UA   Too Numerous to Count    WBC, UA   Too Numerous to Count    Bacteria, UA   None Seen        Microbiology Results (last 10 days)       Procedure Component Value - Date/Time    COVID PRE-OP / PRE-PROCEDURE SCREENING ORDER (NO ISOLATION) - Swab, Nasopharynx [[id number]]  (Normal) Collected: 12/21/24 0126    Lab Status: Final result Specimen: Swab from Nasopharynx Updated: 12/21/24 0223    Narrative:      The following orders were created for panel order COVID PRE-OP / PRE-PROCEDURE SCREENING ORDER (NO ISOLATION) - Swab, Nasopharynx.  Procedure                               Abnormality         Status                      ---------                               -----------         ------                     Respiratory Panel PCR w/...[[id number]]  Normal              Final result                 Please view results for these tests on the individual orders.    Respiratory Panel PCR w/COVID-19(SARS-CoV-2) [facility] In-House, NP Swab in UTM/VTM, 2 HR TAT - Swab, Nasopharynx [[id number]]  (Normal) Collected: 12/21/24 0126    Lab Status: Final result Specimen: Swab from Nasopharynx Updated: 12/21/24 0223     ADENOVIRUS, PCR Not Detected     Coronavirus 229E Not Detected     Coronavirus HKU1 Not Detected     Coronavirus NL63 Not Detected     Coronavirus OC43 Not Detected     COVID19 Not Detected     Human Metapneumovirus Not Detected     Human Rhinovirus/Enterovirus Not Detected     Influenza A PCR Not Detected     Influenza B PCR Not Detected     Parainfluenza Virus 1 Not Detected     Parainfluenza Virus 2 Not Detected     Parainfluenza Virus 3 Not Detected     Parainfluenza Virus 4 Not Detected     RSV, PCR Not Detected     Bordetella pertussis pcr Not Detected     Bordetella parapertussis PCR Not Detected     Chlamydophila pneumoniae PCR Not Detected     Mycoplasma pneumo by PCR Not Detected    Narrative:      In the setting of a positive respiratory panel with a viral infection PLUS a negative procalcitonin without other underlying concern for bacterial infection, consider observing off antibiotics or discontinuation of antibiotics and continue supportive care. If the respiratory panel is positive for atypical bacterial infection (Bordetella pertussis, Chlamydophila pneumoniae, or Mycoplasma pneumoniae), consider antibiotic de-escalation to target atypical bacterial infection.    Urine Culture - Urine, Urine, Clean Catch [[id number]]  (Normal) Collected: 12/17/24 0839    Lab Status: Final result Specimen: Urine, Clean Catch Updated: 12/19/24 1035     Urine Culture No growth            CT Abdomen Pelvis Without  Contrast    Result Date: 12/21/2024  CT ABDOMEN PELVIS WO CONTRAST Date of Exam: 12/21/2024 1:37 AM EST Indication: post-operative fever and left ureteral stent exchange. Comparison: 12/4/2023 and retrograde pyelogram 1/19/2024. Technique: Axial CT images were obtained of the abdomen and pelvis without the administration of contrast. Reconstructed coronal and sagittal images were also obtained. Automated exposure control and iterative construction methods were used. Findings: Heart size is normal. There are coronary artery calcifications. Patient appears status post coronary stenting and median sternotomy. There is a normal distal esophagus. There is scarring in the left lung base. Right lung base is clear. There are no acute  findings in the superficial soft tissues. Patient appears status post remote ventral laparotomy. No acute osseous abnormality or destructive bone lesion. There is isolated severe L5-S1 disc degeneration with mild degenerative changes elsewhere in the lumbar spine. Mild productive DJD is present at both hips. The liver is homogeneous. There is cholelithiasis without evidence of acute cholecystitis. The bile ducts, pancreas, stomach, spleen and adrenal glands appear normal. There are small simple appearing right-sided kidney cysts. The left kidney appears homogeneous. There is a left ureteral stent in place. The stent appears appropriately positioned. There is new left-sided hydronephrosis and asymmetric left-sided perinephric and periureteric stranding raising concern for either obstructive uropathy or urinary tract infection. There is no obvious stone along the course of the left ureteral stent, but the ureter is dilated down to the distal ureter. Patient appears status post prostatectomy. Numerous surgical clips are noted in the prostatectomy bed. Normal appendix. There is distal colonic diverticulosis. There is mild wall thickening at the urinary bladder along with a small amount of urinary  bladder gas, which could also be related to urinary tract infection. Small bowel is nondistended. No ascites, pneumoperitoneum or lymphadenopathy.     Impression: Impression: 1.Left ureteral stent appears appropriately positioned. There is new left-sided hydronephrosis and asymmetric left-sided perinephric and periureteric stranding raising concern for either obstructive uropathy or urinary tract infection. No radiodense urolithiasis. There is also urinary bladder wall thickening and gas in the urinary bladder that could also be related to urinary tract infection. 2.Status post prostatectomy. 3.Cholelithiasis without evidence of acute cholecystitis. 4.Colonic diverticulosis. Electronically Signed: Jefferson Block MD  12/21/2024 2:38 AM EST  Workstation ID: [id number]    XR Chest 1 View    Result Date: 12/21/2024  XR CHEST 1 VW Date of Exam: 12/21/2024 12:56 AM EST Indication: Weak/Dizzy/AMS triage protocol Comparison: 10/29/2023. Findings: There is evidence of prior median sternotomy and CABG. There is no pneumothorax, pleural effusion or focal airspace consolidation. Heart size and pulmonary vasculature appear within normal limits. Regional bones appear intact.     Impression: Impression: No acute cardiopulmonary abnormality. Electronically Signed: Jefferson Block MD  12/21/2024 1:46 AM EST  Workstation ID: [id number]    FL C Arm During Surgery    Result Date: 12/20/2024  This procedure was auto-finalized with no dictation required.         Assessment & Plan   Assessment & Plan       Sepsis        Summary:  Aldair Narvaez is a 74 y.o. male with past medical history of essential hypertension, dyslipidemia, coronary artery disease, type 2 diabetes, GERD, prostate cancer status post prostatectomy, obstructive sleep apnea, previous history of renal abscess and E. coli bacteremia, history of chronic left ureteral stricture of unclear etiology, managed with ureteral stent who underwent cystoscopy, left ureteral balloon dilation  and left ureteral stent exchange today by Dr. Justin /urology and was discharged home, presented to the hospital today with fever and confusion    Point-of-care ultrasound in ER with adequate left ventricular systolic function, no evidence of RV strain, unable to visualize IVC    Severe sepsis, POA  Acute complicated urinary tract infection/pyelonephritis, POA  Chronic left ureteral stricture of unclear etiology status post cystoscopy, balloon dilation and ureteral stent exchange 12/20/2024   Patient presented to the hospital with fever of 102.9, rigors, and some degree of acute confusional state that currently improved.  Lactic acid is elevated at 3.  Procalcitonin elevated at 1.37 and white cell count is elevated at 13.2.  He is hypotensive with systolic in the 90s which raises a concern for severe sepsis  Received recent instrumentation with cystoscopy, left ureteral stent exchange today which might have resulted in bacteremia  Urinalysis with no bacteriuria but did show microscopic hematuria with too numerous WBC.    CT abdomen showing left ureteric stent in appropriate position with new left-sided hydronephrosis and perinephric and periureteric stranding concerning for obstructive uropathy and UTI/pyelonephritis  Continue IV Zosyn for broad coverage  Follow urine and blood culture  Stress dose steroids with hydrocortisone 50 mg every 8 hourly  P.o. midodrine  IV fluids   Hold antihypertensive medications till his blood pressure improved  Courtesy consult to urology  Consider ID consultation if blood culture is positive    Essential hypertension  Currently hypotensive secondary to severe sepsis  Hold metoprolol, valsartan and hydrochlorothiazide.  Restart when blood pressure improves    Type 2 diabetes  Check A1c  Low-dose insulin glargine and SSI      VTE Prophylaxis:  Pharmacologic VTE prophylaxis orders are signed & held.            CODE STATUS:    Code Status and Medical Interventions: CPR (Attempt to  Resuscitate); Full Support   Ordered at: 24 0358     Level Of Support Discussed With:    Patient    Next of Kin (If No Surrogate)     Code Status (Patient has no pulse and is not breathing):    CPR (Attempt to Resuscitate)     Medical Interventions (Patient has pulse or is breathing):    Full Support       Expected Discharge   Expected Discharge Date: 2024; Expected Discharge Time:      Rama Franco MD  24     Electronically signed by Rama Franco MD at 24 0414          Physician Progress Notes (all)        Jefferson Hwang DO at 24 0846              [facility] Medicine Services  PROGRESS NOTE    Patient Name: Aldair Narvaez  : 1950  MRN: [id number]    Date of Admission: 2024  Primary Care Physician: Miguel Angel Mireles MD    Subjective   Subjective     CC:  F/u sepsis    HPI:  Feeling overall a little better today. Blood pressures improving some.      Objective   Objective     Vital Signs:   Temp:  [98.1 °F (36.7 °C)-99.4 °F (37.4 °C)] 98.1 °F (36.7 °C)  Heart Rate:  [] 88  Resp:  [18-20] 18  BP: (104-117)/(50-67) 115/63     Physical Exam:  Constitutional: Awake, alert, laying in bed in NAD  Respiratory: Clear to auscultation bilaterally, respiratory effort normal   Cardiovascular: RRR, palpable radial pulses  Gastrointestinal: Positive bowel sounds, soft, nontender, nondistended  Musculoskeletal: Cool BL feet  Psychiatric: Appropriate affect, cooperative  Neurologic: Speech clear and fluent    Results Reviewed:  LAB RESULTS:      Lab 24  0306 24  0739 24  0456 24  0246 24  0049 24  0849 24  0839   WBC 9.41  --  13.43*  --  13.25*  --  12.64*   HEMOGLOBIN 10.6*  --  11.8*  --  13.0  --  14.8   HEMATOCRIT 34.3*  --  36.6*  --  40.4  --  46.0   PLATELETS 129*  --  144  --  164  --  267   NEUTROS ABS 7.89*  --  12.70*  --  12.44*  --   --    IMMATURE GRANS (ABS) 0.10*  --  0.12*  --  0.09*   --   --    LYMPHS ABS 0.90  --  0.27*  --  0.26*  --   --    MONOS ABS 0.48  --  0.30  --  0.40  --   --    EOS ABS 0.02  --  0.01  --  0.03  --   --    MCV 93.0  --  89.5  --  88.2  --  89.1   CRP  --   --   --  5.45*  --   --   --    PROCALCITONIN  --   --   --   --  1.37*  --   --    LACTATE  --  2.0 2.7*  --  3.0*  --   --    PROTIME  --   --   --   --   --  14.5  --    HSTROP T  --   --   --  29* 27*  --   --          Lab 12/22/24  0306 12/21/24  1335 12/21/24  0456 12/21/24  0246 12/21/24  0049 12/17/24  0839   SODIUM 133*  --  136 134* 135* 138   POTASSIUM 4.2 4.8 4.1 3.6 4.1 4.3   CHLORIDE 103  --  103 101 100 99   CO2 20.0*  --  21.0* 22.0 22.0 26.0   ANION GAP 10.0  --  12.0 11.0 13.0 13.0   BUN 17  --  17 17 16 20   CREATININE 0.67*  --  0.94 0.85 0.90 0.79   EGFR 99.2  --  86.1 92.3 90.7 94.4   GLUCOSE 161*  --  199* 232* 267* 205*   CALCIUM 7.2*  --  7.3* 7.7* 8.3* 9.5   MAGNESIUM  --   --  1.4*  --  1.3*  --    PHOSPHORUS  --   --  3.9  --   --   --    HEMOGLOBIN A1C  --   --   --   --  9.10*  --          Lab 12/22/24  0306 12/21/24  0456 12/21/24  0049   TOTAL PROTEIN 5.2* 5.3* 6.4   ALBUMIN 2.6* 2.9* 3.3*   GLOBULIN 2.6 2.4 3.1   ALT (SGPT) 23 16 14   AST (SGOT) 42* 28 29   BILIRUBIN 0.2 0.4 0.4   ALK PHOS 59 56 67   LIPASE  --   --  16         Lab 12/21/24  0246 12/21/24  0049 12/17/24  0849   HSTROP T 29* 27*  --    PROTIME  --   --  14.5   INR  --   --  1.12                 Brief Urine Lab Results  (Last result in the past 365 days)        Color   Clarity   Blood   Leuk Est   Nitrite   Protein   CREAT   Urine HCG        12/21/24 0158 Orange   Turbid   Large (3+)   Moderate (2+)   Negative   >=300 mg/dL (3+)                   Microbiology Results Abnormal       None            CT Abdomen Pelvis Without Contrast    Result Date: 12/21/2024  CT ABDOMEN PELVIS WO CONTRAST Date of Exam: 12/21/2024 1:37 AM EST Indication: post-operative fever and left ureteral stent exchange. Comparison: 12/4/2023 and  retrograde pyelogram 1/19/2024. Technique: Axial CT images were obtained of the abdomen and pelvis without the administration of contrast. Reconstructed coronal and sagittal images were also obtained. Automated exposure control and iterative construction methods were used. Findings: Heart size is normal. There are coronary artery calcifications. Patient appears status post coronary stenting and median sternotomy. There is a normal distal esophagus. There is scarring in the left lung base. Right lung base is clear. There are no acute  findings in the superficial soft tissues. Patient appears status post remote ventral laparotomy. No acute osseous abnormality or destructive bone lesion. There is isolated severe L5-S1 disc degeneration with mild degenerative changes elsewhere in the lumbar spine. Mild productive DJD is present at both hips. The liver is homogeneous. There is cholelithiasis without evidence of acute cholecystitis. The bile ducts, pancreas, stomach, spleen and adrenal glands appear normal. There are small simple appearing right-sided kidney cysts. The left kidney appears homogeneous. There is a left ureteral stent in place. The stent appears appropriately positioned. There is new left-sided hydronephrosis and asymmetric left-sided perinephric and periureteric stranding raising concern for either obstructive uropathy or urinary tract infection. There is no obvious stone along the course of the left ureteral stent, but the ureter is dilated down to the distal ureter. Patient appears status post prostatectomy. Numerous surgical clips are noted in the prostatectomy bed. Normal appendix. There is distal colonic diverticulosis. There is mild wall thickening at the urinary bladder along with a small amount of urinary bladder gas, which could also be related to urinary tract infection. Small bowel is nondistended. No ascites, pneumoperitoneum or lymphadenopathy.     Impression: Impression: 1.Left ureteral stent  appears appropriately positioned. There is new left-sided hydronephrosis and asymmetric left-sided perinephric and periureteric stranding raising concern for either obstructive uropathy or urinary tract infection. No radiodense urolithiasis. There is also urinary bladder wall thickening and gas in the urinary bladder that could also be related to urinary tract infection. 2.Status post prostatectomy. 3.Cholelithiasis without evidence of acute cholecystitis. 4.Colonic diverticulosis. Electronically Signed: Jefferson Block MD  12/21/2024 2:38 AM EST  Workstation ID: [id number]    XR Chest 1 View    Result Date: 12/21/2024  XR CHEST 1 VW Date of Exam: 12/21/2024 12:56 AM EST Indication: Weak/Dizzy/AMS triage protocol Comparison: 10/29/2023. Findings: There is evidence of prior median sternotomy and CABG. There is no pneumothorax, pleural effusion or focal airspace consolidation. Heart size and pulmonary vasculature appear within normal limits. Regional bones appear intact.     Impression: Impression: No acute cardiopulmonary abnormality. Electronically Signed: Jefferson Block MD  12/21/2024 1:46 AM EST  Workstation ID: [id number]         Current medications:  Scheduled Meds:apixaban, 5 mg, Oral, Q12H  DULoxetine, 30 mg, Oral, Daily  [Held by provider] valsartan, 320 mg, Oral, Q24H   And  [Held by provider] hydroCHLOROthiazide, 25 mg, Oral, Q24H  hydrocortisone sodium succinate, 25 mg, Intravenous, Q8H  insulin glargine, 10 Units, Subcutaneous, Daily  insulin lispro, 2-7 Units, Subcutaneous, 4x Daily AC & at Bedtime  Insulin Lispro, 3 Units, Subcutaneous, TID With Meals  [Held by provider] metoprolol succinate XL, 50 mg, Oral, Daily  midodrine, 5 mg, Oral, TID AC  [Held by provider] oxybutynin XL, 5 mg, Oral, Daily  pantoprazole, 40 mg, Oral, Daily  piperacillin-tazobactam, 3.375 g, Intravenous, Q8H  rosuvastatin, 20 mg, Oral, Daily  sodium chloride, 10 mL, Intravenous, Q12H      Continuous Infusions:   PRN Meds:.   acetaminophen **OR** acetaminophen **OR** acetaminophen    albuterol    senna-docusate sodium **AND** polyethylene glycol **AND** bisacodyl **AND** bisacodyl    calcium carbonate    Calcium Replacement - Follow Nurse / BPA Driven Protocol    dextrose    dextrose    glucagon (human recombinant)    HYDROcodone-acetaminophen    hyoscyamine    loperamide    LORazepam    Magnesium Standard Dose Replacement - Follow Nurse / BPA Driven Protocol    melatonin    nitroglycerin    phenazopyridine    Phosphorus Replacement - Follow Nurse / BPA Driven Protocol    Potassium Replacement - Follow Nurse / BPA Driven Protocol    promethazine    sodium chloride    sodium chloride    sodium chloride    Assessment & Plan   Assessment & Plan     Active Hospital Problems    Diagnosis  POA    **Sepsis [A41.9]  Yes    Type 2 diabetes mellitus, with long-term current use of insulin [E11.9, Z79.4]  Not Applicable    Pyelonephritis [N12]  Yes      Resolved Hospital Problems   No resolved problems to display.        Brief Hospital Course to date:  Aldair Narvaez is a 74 y.o. male w/ hx prostate Ca s/p prostatectomy, chronic LT ureteral stricture, prior renal abscess (E.Coli) w/ bacteremia, who underwent cysto, LT ureteral balloon dilation, and stent exchange 12/20/24 w/ Dr. Justin, at home he developed fevers and rigors, encephalopathy; on arrival he was febrile 102.9F, tachycardic, and hypotensive; WBC was 13.4, and CT a/p showed LT ureteral stent, hydronephrosis, nonspecific perinephric/ureteric stranding; he was started on empiric broad spectrum antibiotics, stress dose steroids, and midodrine     Assessment/Plan     Severe sepsis 2/2 acute complicated UTI/pyelonephritis  Chronic LT ureteral stricture s/p cysto, balloon dilation, stent exchange 12/20/24  -UrCx from arrival pending, surgical Cx from recent procedure w/o growth  -cont midodrine  -cont zosyn  -cont IV hydrocortisone, BP improving, if stable to the afternoon will dec dose      HTN/HLD/CAD/pAF - home eliquis, statin; holding metoprolol, valsartan, HCTZ  DM2, A1c 9.1%, w/ insulin use - lantus, premeal humalog, SSI  GERD - ppi  Hx prostate Ca s/p prostatectomy, hx radiotherapy?  GRANT    Expected Discharge Location and Transportation: home  Expected Discharge   Expected Discharge Date: 12/24/2024; Expected Discharge Time:      VTE Prophylaxis:  Pharmacologic VTE prophylaxis orders are present.         AM-PAC 6 Clicks Score (PT): 19 (12/22/24 0800)    CODE STATUS:   Code Status and Medical Interventions: CPR (Attempt to Resuscitate); Full Support   Ordered at: 12/21/24 0358     Level Of Support Discussed With:    Patient    Next of Kin (If No Surrogate)     Code Status (Patient has no pulse and is not breathing):    CPR (Attempt to Resuscitate)     Medical Interventions (Patient has pulse or is breathing):    Full Support       Jefferson Hwang DO  12/22/24        Electronically signed by Jefferson Hwang DO at 12/22/24 1329       Jefferson Hwang DO at 12/21/24 1041                [facility] Medicine Services  ADMISSION FOLLOW-UP NOTE          Patient admitted after midnight, H&P by my partner performed earlier on today's date reviewed.  Interim findings, labs, and charting also reviewed.        [facility] Hospital Problem List has been managed and updated to include any new diagnoses:  Active Hospital Problems    Diagnosis  POA    **Sepsis [A41.9]  Yes    Type 2 diabetes mellitus, with long-term current use of insulin [E11.9, Z79.4]  Not Applicable    Pyelonephritis [N12]  Yes      Resolved Hospital Problems   No resolved problems to display.         ADDITIONAL PLAN:  - detailed assessment and plan from admission reviewed  - Patient admitted this AM by Dr. Franco, chart reviewed, patient seen at bedside  - Summary: This is a 75 y/o male w/ hx prostate Ca s/p prostatectomy, chronic LT ureteral stricture, prior renal abscess (E.Coli) w/ bacteremia, who  underwent cysto, LT ureteral balloon dilation, and stent exchange 12/20/24 w/ Dr. Justin, at home he developed fevers and rigors, encephalopathy; on arrival he was febrile 102.9F, tachycardic, and hypotensive; WBC was 13.4, and CT a/p showed LT ureteral stent, hydronephrosis, nonspecific perinephric/ureteric stranding; he was started on empiric broad spectrum antibiotics, stress dose steroids, and midodrine    Assessment/Plan    Severe sepsis 2/2 acute complicated UTI/pyelonephritis  Chronic LT ureteral stricture s/p cysto, balloon dilation, stent exchange 12/20/24  -BP remains borderline but stable, no symptoms of hypotension  -cont stress dose hydrocortisone  -cont midodrine  -cont zosyn  -appears to have surgical Cx from procedure, add on Cx to UA obtained in the ED    HTN/HLD/CAD/pAF - home eliquis, statin; holding metoprolol, valsartan, HCTZ  DM2, A1c 9.1%, w/ insulin use - lantus, SSI, add premeal humalog  GERD - ppi  Hx prostate Ca s/p prostatectomy, hx radiotherapy?  GRANT    Jefferson Hwang DO  12/21/24        Electronically signed by Jefferson Hwang DO at 12/21/24 5041

## 2024-12-23 NOTE — PROGRESS NOTES
"Nutrition Services    Patient Name:  Aldair Narvaez  YOB: 1950  MRN: [id number]  Admit Date:  12/21/2024    Patient identified to be at nutrition risk per nurse admission screen based on indicator of \"difficulty chewing/swallowing\". At this time patient has regular textures/thin liquids PO diet ordered and does not currently meet screen criteria for nutrition risk. RD will continue to follow per protocol.     Electronically signed by:  Martha Greogry MS,RD,LD  12/23/24 09:49 EST  "

## 2024-12-23 NOTE — PLAN OF CARE
Goal Outcome Evaluation:                   Anticipated Discharge Disposition (SLP): home

## 2024-12-23 NOTE — THERAPY TREATMENT NOTE
Acute Care - Speech Language Pathology   Swallow Treatment Note [facility]     Patient Name: Aldair Narvaez  : 1950  MRN: [id number]  Today's Date: 2024               Admit Date: 2024    Visit Dx:     ICD-10-CM ICD-9-CM   1. Dysphagia, unspecified type  R13.10 787.20   2. Severe sepsis  A41.9 038.9    R65.20 995.92   3. Urological system complication of procedure  N99.89 997.5   4. Hydronephrosis of left kidney  N13.30 591   5. Fever and chills  R50.9 780.60   6. Lactic acidosis  E87.20 276.2   7. Type 2 diabetes mellitus with hyperglycemia, with long-term current use of insulin  E11.65 250.00    Z79.4 790.29     V58.67   8. Hypomagnesemia  E83.42 275.2   9. Hypotension, unspecified hypotension type  I95.9 458.9   10. History of hypertension  Z86.79 V12.59   11. History of coronary artery disease  Z86.79 V12.59   12. Chronic anticoagulation  Z79.01 V58.61   13. Obstructive sleep apnea  G47.33 327.23     Patient Active Problem List   Diagnosis    Benign hypertension    Mixed hyperlipidemia    Type 2 diabetes mellitus with hyperglycemia, without long-term current use of insulin    CAD, multiple vessel    Pyelonephritis    Personal history of prostate cancer    Paroxysmal A-fib    Bacteremia    Hydronephrosis of left kidney    Right arm weakness    Ureteral stricture, left    Sepsis    Type 2 diabetes mellitus, with long-term current use of insulin     Past Medical History:   Diagnosis Date    Acid reflux     Arthritis     Colon polyp     Coronary artery disease     Erectile dysfunction 2005    Heart disease     Hyperlipidemia     Hypertension     Kidney stone     Obesity     body mass index 30+-obesity    Prostate cancer     Sepsis 10/2023    urosepsis  [facility]    Sleep apnea     does not use c-pap since wt loss    Type 2 diabetes mellitus     Urinary incontinence 2005    Getting worse    Urinary tract infection     In hospital twice    Wears dentures     upper plate only      Past Surgical History:   Procedure Laterality Date    CARDIAC CATHETERIZATION      CARPAL TUNNEL RELEASE Left     COLONOSCOPY      CORONARY ARTERY BYPASS GRAFT  08/30/2022    3 Bypasses    CYSTOSCOPY BLADDER STONE LITHOTRIPSY      CYSTOSCOPY BLADDER STONE LITHOTRIPSY      CYSTOSCOPY RETROGRADE PYELOGRAM Left 10/31/2023    Procedure: CYSTOSCOPY RETROGRADE PYELOGRAM STENT PLACEMENT;  Surgeon: Isma Justin MD;  Location:  [facility];  Service: Urology;  Laterality: Left;    CYSTOSCOPY W/ URETERAL STENT PLACEMENT Left 05/09/2023    Procedure: CYSTOSCOPY LEFT RETROGRADE PYELOGRAM AND LEFT URETERAL STENT PLACEMENT;  Surgeon: Isma Justin MD;  Location:  [facility];  Service: Urology;  Laterality: Left;    CYSTOSCOPY, URETEROSCOPY, RETROGRADE PYELOGRAM, STONE EXTRACTION, STENT INSERTION Left 01/19/2024    Procedure: CYSTOSCOPY RETROGRADE PYELOGRAM AND STENT INSERTION;  Surgeon: Isma Justin MD;  Location:  [facility];  Service: Urology;  Laterality: Left;    ENDOSCOPY      INGUINAL HERNIA REPAIR Right     x 2  with mesh  umbilical with mesh    KNEE ARTHROSCOPY Bilateral     PROSTATECTOMY      ROTATOR CUFF REPAIR Left     TONSILLECTOMY      TRIGGER FINGER RELEASE Right     UMBILICAL HERNIA REPAIR      URETEROSCOPY LASER LITHOTRIPSY WITH STENT INSERTION Left 12/20/2024    Procedure: CYSTOSCOPY, LEFT URETEROSCOPY, URETERAL BALLOON, LEFT STENT EXCHANGE;  Surgeon: Isma Justin MD;  Location:  [facility];  Service: Urology;  Laterality: Left;    UVULOPALATOPHARYNGOPLASTY         SLP Recommendation and Plan     SLP Diet Recommendation: regular textures, thin liquids (12/23/24 1320)  Recommended Precautions and Strategies: alternate between small bites of food and sips of liquid, small bites of food and sips of liquid, upright posture during/after eating, general aspiration precautions, other (see comments) (small/single bites & sips) (12/23/24 1320)  SLP Rec. for Method of Medication Administration: as  "tolerated (12/23/24 1320)     Monitor for Signs of Aspiration: yes, notify SLP if any concerns (12/23/24 1320)  Recommended Diagnostics: other (see comments) (DT) (12/23/24 1320)     Anticipated Discharge Disposition (SLP): home (12/23/24 1320)     Therapy Frequency (Swallow): PRN (12/23/24 1320)  Predicted Duration Therapy Intervention (Days): 1 week (12/23/24 1320)  Oral Care Recommendations: Oral Care BID/PRN, Toothbrush (12/23/24 1320)        Daily Summary of Progress (SLP): progress toward functional goals as expected (12/23/24 1320)               Treatment Assessment (SLP):  (r/o pharyngeal dysphagia) (12/23/24 1320)  Treatment Assessment Comments (SLP): Pt w/ intermittent throat clear t/o eval & prior to PO trials. ? Delayed throat clear x1 following large sequential sips of thin. U/a to replicate accross multiple additional trials. No additional s/s of aspiration observed. Pt reports swallowing is \"much better today\" & does not feel that instrumental assessment is warranted @ this time. RN to reach out if further concerns arise. Ok to cont regular diet w/ thin liquids, small/single sips. SLP will f/u for diet tolerance to ensure no additonal concerns (12/23/24 1320)  Plan for Continued Treatment (SLP): continue treatment per plan of care (12/23/24 1320)                SWALLOW EVALUATION (Last 72 Hours)       SLP Adult Swallow Evaluation       Row Name 12/23/24 1320 12/22/24 1345                Rehab Evaluation    Document Type therapy note (daily note)  - evaluation  -       Subjective Information no complaints  - no complaints  -       Patient Observations alert;cooperative  - alert;cooperative;agree to therapy  -       Patient/Family/Caregiver Comments/Observations No family present  - no family present  -       Patient Effort good  -MH excellent  -       Symptoms Noted During/After Treatment none  - none  -          General Information    Patient Profile Reviewed -- yes  -       " Pertinent History Of Current Problem -- Pt is a 74 year old male past medical history of essential hypertension, dyslipidemia, coronary artery disease, type 2 diabetes, GERD, prostate cancer status post prostatectomy, obstructive sleep apnea, previous history of renal abscess and E. coli bacteremia, history of chronic left ureteral stricture of unclear etiology, managed with ureteral stent who underwent cystoscopy, left ureteral balloon dilation and left ureteral stent exchange today by Dr. Justin /urology and was discharged home, presented to the hospital today with fever and confusion. Pt with reported swallowing difficulty.  -HG       Current Method of Nutrition -- regular textures;thin liquids  -HG       Precautions/Limitations, Vision -- WFL;for purposes of eval  -HG       Precautions/Limitations, Hearing -- WFL;for purposes of eval  -HG       Prior Level of Function-Communication -- WFL  -HG       Prior Level of Function-Swallowing -- no diet consistency restrictions  -HG       Plans/Goals Discussed with -- patient  -HG       Barriers to Rehab -- none identified  -HG          Pain    Pretreatment Pain Rating -- 0/10 - no pain  -HG       Additional Documentation Pain Scale: FACES Pre/Post-Treatment (Group)  - --          Pain Scale: FACES Pre/Post-Treatment    Pain: FACES Scale, Pretreatment 0-->no hurt  - --       Posttreatment Pain Rating 0-->no hurt  - --          Oral Motor Structure and Function    Dentition Assessment -- upper dentures/partial in place;other (see comments)  pt does not wear lower denture.  -HG       Secretion Management -- WNL/WFL  -HG       Mucosal Quality -- moist, healthy  -HG       Volitional Swallow -- WFL  -HG       Volitional Cough -- WFL  -HG          Oral Musculature and Cranial Nerve Assessment    Oral Motor General Assessment -- WFL  -HG          General Eating/Swallowing Observations    Respiratory Support Currently in Use -- room air  -HG       Eating/Swallowing  "Skills -- self-fed  -HG       Positioning During Eating -- upright in bed  -HG       Utensils Used -- spoon;straw  -       Consistencies Trialed -- regular textures;mixed consistency;thin liquids  -          Respiratory    Respiratory Status -- WFL;room air  -HG          Clinical Swallow Eval    Oral Prep Phase -- WFL  -HG       Oral Transit -- WFL  -HG       Oral Residue -- WFL  -HG       Pharyngeal Phase -- suspected pharyngeal impairment  -HG       Esophageal Phase -- unremarkable  -       Clinical Swallow Evaluation Summary -- CSE completed this date. Pt reports that since his procedure on 12/20/24, he has noted difficulty swallowing characterized by food getting \"hung up\" or \"in a pocket in my throat.\" Pt reports this difficulty with liquids only. Pt stated that when he had this same procedure in the past,he reacted the same way.  Pt noted improvement after eating oatmeal this date. Pt tolerated tea via straw with throat clear present. Pt tolerated fruit cup bites x 3 with one throat clear present. Pt tolerated promise cracker initially and then exhibited a cough during the swallow and cleared with a liquid wash. SLP RECs continuing with current diet at this time with plans to re-assess for re-eval and possible MBS if symptoms persist.  -HG          Pharyngeal Phase Concerns    Cough -- regular consistencies  -       Throat Clear -- thin  -          SLP Evaluation Clinical Impression    SLP Swallowing Diagnosis -- functional oral phase;other (see comments)  possible pharyngoesophageal dysphagia vs extubation edema  -       Rehab Potential/Prognosis, Swallowing -- good, to achieve stated therapy goals  -       Swallow Criteria for Skilled Therapeutic Interventions Met -- demonstrates skilled criteria  -          SLP Treatment Clinical Impressions    Treatment Assessment (SLP) --  r/o pharyngeal dysphagia  - --       Treatment Assessment Comments (SLP) Pt w/ intermittent throat clear t/o eval & " "prior to PO trials. ? Delayed throat clear x1 following large sequential sips of thin. U/a to replicate accross multiple additional trials. No additional s/s of aspiration observed. Pt reports swallowing is \"much better today\" & does not feel that instrumental assessment is warranted @ this time. RN to reach out if further concerns arise. Ok to cont regular diet w/ thin liquids, small/single sips. SLP will f/u for diet tolerance to ensure no additonal concerns  - --       Daily Summary of Progress (SLP) progress toward functional goals as expected  - --       Plan for Continued Treatment (SLP) continue treatment per plan of care  - --       Care Plan Review care plan/treatment goals reviewed  - evaluation/treatment results reviewed  -          Recommendations    Therapy Frequency (Swallow) PRN  - 5 days per week  -       Predicted Duration Therapy Intervention (Days) 1 week  - 1 week  -       SLP Diet Recommendation regular textures;thin liquids  - regular textures;thin liquids  -       Recommended Diagnostics other (see comments)    - reassess via clinical swallow evaluation  -       Recommended Precautions and Strategies alternate between small bites of food and sips of liquid;small bites of food and sips of liquid;upright posture during/after eating;general aspiration precautions;other (see comments)  small/single bites & sips  - upright posture during/after eating;alternate between small bites of food and sips of liquid  -       Oral Care Recommendations Oral Care BID/PRN;Toothbrush  - Oral Care BID/PRN  -       SLP Rec. for Method of Medication Administration as tolerated  - meds whole;with thin liquids  -       Monitor for Signs of Aspiration yes;notify SLP if any concerns  - yes;notify SLP if any concerns  -       Anticipated Discharge Disposition (SLP) home  - home  -          (LTG) Patient will demonstrate functional swallow for    Diet Texture (Demonstrate " functional swallow) -- regular textures  -       Liquid viscosity (Demonstrate functional swallow) -- thin liquids  -       [place] (Demonstrate functional swallow) -- independently (over 90% accuracy)  -       Time Frame (Demonstrate functional swallow) -- 1 week  -          (STG) Swallow 1    (STG) Swallow 1 -- Pt will tolerate recommended diet and liquids with no s/sx of aspiration.  -       [place] (Swallow Short Term Goal 1) -- independently (over 90% accuracy)  -       Time Frame (Swallow Short Term Goal 1) -- 1 week  -                 User Key  (r) = Recorded By, (t) = Taken By, (c) = Cosigned By      Initials Name Effective Dates     Ally Mcallister, MS CCC-SLP 06/16/21 -      Promise Lee MS CCC-SLP 05/12/23 -                     EDUCATION  The patient has been educated in the following areas:   Dysphagia (Swallowing Impairment) Oral Care/Hydration.        SLP GOALS       Row Name 12/23/24 1320 12/22/24 1345          (LTG) Patient will demonstrate functional swallow for    Diet Texture (Demonstrate functional swallow) regular textures  - regular textures  -     Liquid viscosity (Demonstrate functional swallow) thin liquids  - thin liquids  -     [place] (Demonstrate functional swallow) independently (over 90% accuracy)  - independently (over 90% accuracy)  -     Time Frame (Demonstrate functional swallow) 1 week  - 1 week  -     Progress/Outcomes (Demonstrate functional swallow) continuing progress toward goal  - --     Comment (Demonstrate functional swallow) Pt w/ intermittent throat clear prior to PO trials & t/o eval. ? Delayed throat x1 following large, consecutive sips of thin. U/a to replicate accross multiple additional trials. No additional overt s/s of aspiration  - --        (STG) Patient will tolerate trials of    Consistencies Trialed (Tolerate trials) regular textures;thin liquids  - --     Desired Outcome (Tolerate trials) without  signs/symptoms of aspiration  - --     [place] (Tolerate trials) independently (over 90% accuracy)  - --     Time Frame (Tolerate trials) 1 week  - --     Progress/Outcomes (Tolerate trials) continuing progress toward goal  - --        (STG) Swallow 1    (STG) Swallow 1 -- Pt will tolerate recommended diet and liquids with no s/sx of aspiration.  -     [place] (Swallow Short Term Goal 1) -- independently (over 90% accuracy)  -     Time Frame (Swallow Short Term Goal 1) -- 1 week  -               User Key  (r) = Recorded By, (t) = Taken By, (c) = Cosigned By      Initials Name Provider Type     Ally Mcallister, MS CCC-SLP Speech and Language Pathologist    Promise Kang MS CCC-SLP Speech and Language Pathologist                         Time Calculation:    Time Calculation- SLP       Row Name 12/23/24 1501             Time Calculation- [facility]    SLP Start Time 1320  -      SLP Received On 12/23/24  -         Untimed Charges    25120-SZ Treatment Swallow Minutes 41  -MH         Total Minutes    Untimed Charges Total Minutes 41  -       Total Minutes 41  -MH                User Key  (r) = Recorded By, (t) = Taken By, (c) = Cosigned By      Initials Name Provider Type     Promise Lee, MS CCC-SLP Speech and Language Pathologist                    Therapy Charges for Today       Code Description Service Date Service Provider Modifiers Qty    [id number]  ST TREATMENT SWALLOW 3 12/23/2024 Promise Lee MS CCC-SLP GN 1                 MS OSCAR Fernandez  12/23/2024

## 2024-12-23 NOTE — PROGRESS NOTES
[facility] Medicine Services  PROGRESS NOTE    Patient Name: Aldair Narvaez  : 1950  MRN: [id number]    Date of Admission: 2024  Primary Care Physician: Miguel Angel Mireles MD    Subjective   Subjective     CC:  F/u sepsis    HPI:  BP is improving. Becoming borderline hypertensive. Has some low back pain. Sinuses were draining this AM. Temp 101.2 shortly after shift change this AM    Objective   Objective     Vital Signs:   Temp:  [98.7 °F (37.1 °C)-101.2 °F (38.4 °C)] 98.8 °F (37.1 °C)  Heart Rate:  [] 92  Resp:  [16-18] 18  BP: (120-145)/(70-79) 136/78     Physical Exam:  Constitutional: Awake, alert, NAD laying in bed  Respiratory: Clear to auscultation bilaterally, respiratory effort normal   Cardiovascular: RRR, palpable radial pulses  Gastrointestinal: Positive bowel sounds, soft, nontender, nondistended  Musculoskeletal: Cool BL feet  Psychiatric: Appropriate affect, cooperative  Neurologic: Speech clear and fluent    Results Reviewed:  LAB RESULTS:      Lab 24  0306 24  0739 24  0456 24  0246 24  0049 24  0849 24  0839   WBC 9.41  --  13.43*  --  13.25*  --  12.64*   HEMOGLOBIN 10.6*  --  11.8*  --  13.0  --  14.8   HEMATOCRIT 34.3*  --  36.6*  --  40.4  --  46.0   PLATELETS 129*  --  144  --  164  --  267   NEUTROS ABS 7.89*  --  12.70*  --  12.44*  --   --    IMMATURE GRANS (ABS) 0.10*  --  0.12*  --  0.09*  --   --    LYMPHS ABS 0.90  --  0.27*  --  0.26*  --   --    MONOS ABS 0.48  --  0.30  --  0.40  --   --    EOS ABS 0.02  --  0.01  --  0.03  --   --    MCV 93.0  --  89.5  --  88.2  --  89.1   CRP  --   --   --  5.45*  --   --   --    PROCALCITONIN  --   --   --   --  1.37*  --   --    LACTATE  --  2.0 2.7*  --  3.0*  --   --    PROTIME  --   --   --   --   --  14.5  --    HSTROP T  --   --   --  29* 27*  --   --          Lab 24  0306 24  1335 24  0456 24  0246 24  0049 24  0839    SODIUM 133*  --  136 134* 135* 138   POTASSIUM 4.2 4.8 4.1 3.6 4.1 4.3   CHLORIDE 103  --  103 101 100 99   CO2 20.0*  --  21.0* 22.0 22.0 26.0   ANION GAP 10.0  --  12.0 11.0 13.0 13.0   BUN 17  --  17 17 16 20   CREATININE 0.67*  --  0.94 0.85 0.90 0.79   EGFR 99.2  --  86.1 92.3 90.7 94.4   GLUCOSE 161*  --  199* 232* 267* 205*   CALCIUM 7.2*  --  7.3* 7.7* 8.3* 9.5   MAGNESIUM  --   --  1.4*  --  1.3*  --    PHOSPHORUS  --   --  3.9  --   --   --    HEMOGLOBIN A1C  --   --   --   --  9.10*  --          Lab 12/22/24  0306 12/21/24  0456 12/21/24  0049   TOTAL PROTEIN 5.2* 5.3* 6.4   ALBUMIN 2.6* 2.9* 3.3*   GLOBULIN 2.6 2.4 3.1   ALT (SGPT) 23 16 14   AST (SGOT) 42* 28 29   BILIRUBIN 0.2 0.4 0.4   ALK PHOS 59 56 67   LIPASE  --   --  16         Lab 12/21/24  0246 12/21/24  0049 12/17/24  0849   HSTROP T 29* 27*  --    PROTIME  --   --  14.5   INR  --   --  1.12                 Brief Urine Lab Results  (Last result in the past 365 days)        Color   Clarity   Blood   Leuk Est   Nitrite   Protein   CREAT   Urine HCG        12/21/24 0158 Orange   Turbid   Large (3+)   Moderate (2+)   Negative   >=300 mg/dL (3+)                   Microbiology Results Abnormal       None            No radiology results from the last 24 hrs        Current medications:  Scheduled Meds:apixaban, 5 mg, Oral, Q12H  DULoxetine, 30 mg, Oral, Daily  [Held by provider] valsartan, 320 mg, Oral, Q24H   And  [Held by provider] hydroCHLOROthiazide, 25 mg, Oral, Q24H  insulin glargine, 10 Units, Subcutaneous, Daily  insulin lispro, 2-7 Units, Subcutaneous, 4x Daily AC & at Bedtime  Insulin Lispro, 3 Units, Subcutaneous, TID With Meals  [Held by provider] metoprolol succinate XL, 50 mg, Oral, Daily  midodrine, 2.5 mg, Oral, TID AC  [Held by provider] oxybutynin XL, 5 mg, Oral, Daily  oxymetazoline, 2 spray, Each Nare, BID  pantoprazole, 40 mg, Oral, Daily  piperacillin-tazobactam, 3.375 g, Intravenous, Q8H  rosuvastatin, 20 mg, Oral,  Daily  sodium chloride, 10 mL, Intravenous, Q12H      Continuous Infusions:   PRN Meds:.  acetaminophen **OR** acetaminophen **OR** acetaminophen    albuterol    senna-docusate sodium **AND** polyethylene glycol **AND** bisacodyl **AND** bisacodyl    calcium carbonate    Calcium Replacement - Follow Nurse / BPA Driven Protocol    dextrose    dextrose    glucagon (human recombinant)    HYDROcodone-acetaminophen    hyoscyamine    loperamide    LORazepam    Magnesium Standard Dose Replacement - Follow Nurse / BPA Driven Protocol    melatonin    nitroglycerin    phenazopyridine    Phosphorus Replacement - Follow Nurse / BPA Driven Protocol    Potassium Replacement - Follow Nurse / BPA Driven Protocol    promethazine    sodium chloride    sodium chloride    sodium chloride    Assessment & Plan   Assessment & Plan     Active Hospital Problems    Diagnosis  POA    **Sepsis [A41.9]  Yes    Type 2 diabetes mellitus, with long-term current use of insulin [E11.9, Z79.4]  Not Applicable    Pyelonephritis [N12]  Yes      Resolved Hospital Problems   No resolved problems to display.        Brief Hospital Course to date:  Aldair Narvaez is a 74 y.o. male w/ hx prostate Ca s/p prostatectomy, chronic LT ureteral stricture, prior renal abscess (E.Coli) w/ bacteremia, who underwent cysto, LT ureteral balloon dilation, and stent exchange 12/20/24 w/ Dr. Justin, at home he developed fevers and rigors, encephalopathy; on arrival he was febrile 102.9F, tachycardic, and hypotensive; WBC was 13.4, and CT a/p showed LT ureteral stent, hydronephrosis, nonspecific perinephric/ureteric stranding; he was started on empiric broad spectrum antibiotics, stress dose steroids, and midodrine; UrCx returned w/o growth, overall he continues to improve w/ empiric abx     Assessment/Plan     Severe sepsis 2/2 acute complicated UTI/pyelonephritis  Chronic LT ureteral stricture s/p cysto, balloon dilation, stent exchange 12/20/24  -surgical Cx from recent  procedure w/o growth; UrCx w/o growth  -BP improving, stop IV hydrocortisone, dec midodrine from 5mg to 2.5mg, if BP stable into afternoon may DC altogether  -cont zosyn, poss home soon on oral Abx covering pyelo     HTN/HLD/CAD/pAF - home eliquis, statin; holding metoprolol, valsartan, HCTZ  DM2, A1c 9.1%, w/ insulin use - lantus, premeal humalog, SSI  GERD - ppi  Hx prostate Ca s/p prostatectomy, hx radiotherapy?  GRANT    Expected Discharge Location and Transportation: home  Expected Discharge   Expected Discharge Date: 12/24/2024; Expected Discharge Time:      VTE Prophylaxis:  Pharmacologic VTE prophylaxis orders are present.         AM-PAC 6 Clicks Score (PT): 19 (12/23/24 0800)    CODE STATUS:   Code Status and Medical Interventions: CPR (Attempt to Resuscitate); Full Support   Ordered at: 12/21/24 0358     Level Of Support Discussed With:    Patient    Next of Kin (If No Surrogate)     Code Status (Patient has no pulse and is not breathing):    CPR (Attempt to Resuscitate)     Medical Interventions (Patient has pulse or is breathing):    Full Support       Jefferson Hwang, DO  12/23/24

## 2024-12-23 NOTE — PLAN OF CARE
Goal Outcome Evaluation:  Plan of Care Reviewed With: patient        Progress: improving        VSS on room air. IV abx infusing per orders. IV steroids given. No c/o pain/nausea. Pt resting in bed at this time.

## 2024-12-23 NOTE — PROGRESS NOTES
[facility]   Urology Progress Note    Patient Name: Aldair Narvaez  : 1950  MRN: [id number]  Primary Care Physician:  Miguel Angel Mireles MD  Date of admission: 2024    Subjective   Subjective     Chief Complaint: Pyelonephritis picture after procedure    HPI:  Patient seen by Dr. Gann on rounds 2024.  He was readmitted after undergoing a left diagnostic ureteroscopy, left ureteral balloon dilation and left ureteral stent exchange.  Intraoperatively, urine culture was sent and resulted negative for bacterial or fungal growth.  He was readmitted on  with fevers and encephalopathy with urosepsis type picture.  Repeat urine and blood cultures are negative.    Patient stable in terms of vitals but did have a fever this morning.  He has been slightly hypotensive but on afternoon rounds his blood pressure is 130/80.  He has been receiving IV antibiotic in the form of Zosyn.    He reports some mild throat discomfort which the patient thought was may be related to his breathing tube placed during procedure and some sinus drainage since his procedure which may also represent a respiratory virus.    He did undergo a CT scan on admission which demonstrates perinephric stranding but a stent in the appropriate position and no other concerning findings.    Review of Systems   All systems were reviewed and negative except for: None    Objective   Objective     Vitals:   Temp:  [98.7 °F (37.1 °C)-101.2 °F (38.4 °C)] 98.8 °F (37.1 °C)  Heart Rate:  [] 90  Resp:  [16-18] 16  BP: (119-145)/(68-79) 119/68  Physical Exam    Constitutional: Awake in bed, alert   Eyes: PERRLA, sclerae anicteric, no conjunctival injection   HENT: Normocephalic, atraumatic, mucous membranes moist   Neck: Supple, trachea midline   Respiratory: Equal chest rise, non-labored respirations    Cardiovascular: RRR, palpable radial pulses bilaterally   Gastrointestinal: Soft, nontender, non-distended   Musculoskeletal: No lower  extremity edema bilaterally, no clubbing or cyanosis to extremities   Psychiatric: Appropriate affect, cooperative   Neurologic: Oriented x 3,  Cranial Nerves grossly intact, speech clear   Skin: No rashes     Result Review    Result Review:  I have personally reviewed the results from the time of this admission to 12/23/2024 17:44 EST and agree with these findings:  [x]  Laboratory  [x]  Microbiology  [x]  Radiology  []  EKG/Telemetry   []  Cardiology/Vascular   []  Pathology  []  Old records  []  Other:    Most notable findings include: CT scan reviewed.  Creatinine 0.67 normal.  WBC 9.41, left neutrophilic shift noted.  Urine and blood cultures negative.    Assessment & Plan   Assessment / Plan     Brief Patient Summary:  Aldair Narvaez is a 74 y.o. male who is readmitted with sepsis picture after undergoing a left diagnostic ureteroscopy, left ureteral balloon dilation, left ureteral stent exchange for a chronic left distal ureteral stricture.  CT reviewed.  Mild perinephric stranding noted.  No obvious renal abscess.  Stable vitals however having some intermittent fevers and hypotension.  I agree with continued broad-spectrum antibiotics.  Given negative cultures reasonable to send the patient with 10 to 14 days of p.o. antibiotic with pyelonephritis coverage, fluoroquinolone or Bactrim recommended.    Active Hospital Problems:  Active Hospital Problems    Diagnosis     **Sepsis     Type 2 diabetes mellitus, with long-term current use of insulin     Pyelonephritis        Plan:   -Given chronicity of stent we also have to consider a fungal infection, please consider antifungal medication if patient's vitals do not improve over the coming days  -Recommend p.o. antibiotic on discharge for 10 to 14 days to complete his course of presumed pyelonephritis infection  -Urology office will call the patient to arrange follow-up within the next 6 to 8 weeks  - signing off, page with concerns       VTE  Prophylaxis:  Pharmacologic VTE prophylaxis orders are present.        CODE STATUS:   Level Of Support Discussed With: Patient; Next of Kin (If No Surrogate)  Code Status (Patient has no pulse and is not breathing): CPR (Attempt to Resuscitate)  Medical Interventions (Patient has pulse or is breathing): Full Support    Disposition:  I expect patient to discharge per primary team dispo.    Electronically signed by Isma Justin MD, 12/23/24, 5:44 PM EST.

## 2024-12-23 NOTE — NURSING NOTE
"WO consult for \"   Linear areas dried, flaky, cracked. Redness blanchable & redness nonblanchable.   Question Answer Comment   Reason for Consult? Other    Indicate Reason for Consult: Dry, flaky, cracked skin on bilateral feet       \"    Visited patient and did note dry cracked fissures on bilateral heels.    Patient uses appropriate moisturizer daily. Also has some fissures on fingers. Recommended cleansing open sores with betadine if they appear pink or have drainage. We did this to areas on heels and replaced clean allevyn dressing. Patient asked us to apply light moisturizer to feet and legs. Supplied patient with bandaids for fingers per his request.     Recommend painting open fissures with betadine daily while in hospital. Will sign off.      "

## 2024-12-23 NOTE — CASE MANAGEMENT/SOCIAL WORK
Discharge Planning Assessment  [facility]     Patient Name: Aldair Narvaez  MRN: [id number]  Today's Date: 12/23/2024    Admit Date: 12/21/2024    Plan: Home   Discharge Needs Assessment       Row Name 12/23/24 0851       Living Environment    People in Home spouse;child(keara), adult    Current Living Arrangements home    Potentially Unsafe Housing Conditions none    Primary Care Provided by self    Provides Primary Care For no one    Family Caregiver if Needed spouse    Quality of Family Relationships unable to assess    Able to Return to Prior Arrangements yes       Resource/Environmental Concerns    Resource/Environmental Concerns none    Transportation Concerns none       Transition Planning    Patient/Family Anticipates Transition to home with family    Patient/Family Anticipated Services at Transition ;rehabilitation services    Transportation Anticipated family or friend will provide       Discharge Needs Assessment    Readmission Within the Last 30 Days no previous admission in last 30 days    Equipment Currently Used at Home cpap    Concerns to be Addressed discharge planning    Anticipated Changes Related to Illness none    Equipment Needed After Discharge none                   Discharge Plan       Row Name 12/23/24 0852       Plan    Plan Home    Patient/Family in Agreement with Plan yes    Plan Comments Spoke with patient in room to initiate discharge planning.  He lives with his wife and son in [place].  He is independent with ADL's.  He has a CPAP at home and is not current with home health.  His PCP is Miguel Angel Mireles.  He has an advanced directive.  Verified that he has [place] Medicare Replacement.  Mr. Narvaez has RX coverage and has his script sfilled at [facility].  His plan is to return home at discharge.  No needs noted at this time.  CM will continue to follow.    Final Discharge Disposition Code 01 - home or self-care                  Continued Care and Services - Admitted Since  12/21/2024    No active coordination exists for this encounter.       Expected Discharge Date and Time       Expected Discharge Date Expected Discharge Time    Dec 24, 2024            Demographic Summary       Row Name 12/23/24 0851       General Information    Admission Type inpatient    Arrived From emergency department    Referral Source admission list    Reason for Consult discharge planning    Preferred Language English                   Functional Status       Row Name 12/23/24 0851       Functional Status    Usual Activity Tolerance good    Current Activity Tolerance good       Functional Status, IADL    Medications independent    Meal Preparation independent    Housekeeping independent    Laundry independent    Shopping independent                   Psychosocial    No documentation.                  Abuse/Neglect    No documentation.                  Legal    No documentation.                  Substance Abuse    No documentation.                  Patient Forms    No documentation.                     Alejandra Diaz RN

## 2024-12-24 LAB
ANION GAP SERPL CALCULATED.3IONS-SCNC: 14 MMOL/L (ref 5–15)
BACTERIA BLD CULT: ABNORMAL
BOTTLE TYPE: ABNORMAL
BUN SERPL-MCNC: 9 MG/DL (ref 8–23)
BUN/CREAT SERPL: 14.8 (ref 7–25)
CALCIUM SPEC-SCNC: 7.7 MG/DL (ref 8.6–10.5)
CHLORIDE SERPL-SCNC: 101 MMOL/L (ref 98–107)
CO2 SERPL-SCNC: 22 MMOL/L (ref 22–29)
CREAT SERPL-MCNC: 0.61 MG/DL (ref 0.76–1.27)
DEPRECATED RDW RBC AUTO: 49.4 FL (ref 37–54)
EGFRCR SERPLBLD CKD-EPI 2021: 102.1 ML/MIN/1.73
ERYTHROCYTE [DISTWIDTH] IN BLOOD BY AUTOMATED COUNT: 15.3 % (ref 12.3–15.4)
GLUCOSE BLDC GLUCOMTR-MCNC: 149 MG/DL (ref 70–130)
GLUCOSE BLDC GLUCOMTR-MCNC: 191 MG/DL (ref 70–130)
GLUCOSE BLDC GLUCOMTR-MCNC: 193 MG/DL (ref 70–130)
GLUCOSE BLDC GLUCOMTR-MCNC: 270 MG/DL (ref 70–130)
GLUCOSE SERPL-MCNC: 147 MG/DL (ref 65–99)
HCT VFR BLD AUTO: 33.9 % (ref 37.5–51)
HGB BLD-MCNC: 10.9 G/DL (ref 13–17.7)
MCH RBC QN AUTO: 28.5 PG (ref 26.6–33)
MCHC RBC AUTO-ENTMCNC: 32.2 G/DL (ref 31.5–35.7)
MCV RBC AUTO: 88.5 FL (ref 79–97)
PLATELET # BLD AUTO: 147 10*3/MM3 (ref 140–450)
PMV BLD AUTO: 9.3 FL (ref 6–12)
POTASSIUM SERPL-SCNC: 3.6 MMOL/L (ref 3.5–5.2)
POTASSIUM SERPL-SCNC: 4.3 MMOL/L (ref 3.5–5.2)
RBC # BLD AUTO: 3.83 10*6/MM3 (ref 4.14–5.8)
SODIUM SERPL-SCNC: 137 MMOL/L (ref 136–145)
WBC NRBC COR # BLD AUTO: 8.15 10*3/MM3 (ref 3.4–10.8)

## 2024-12-24 PROCEDURE — 63710000001 INSULIN LISPRO (HUMAN) PER 5 UNITS: Performed by: INTERNAL MEDICINE

## 2024-12-24 PROCEDURE — 80048 BASIC METABOLIC PNL TOTAL CA: CPT | Performed by: INTERNAL MEDICINE

## 2024-12-24 PROCEDURE — 97110 THERAPEUTIC EXERCISES: CPT

## 2024-12-24 PROCEDURE — 25010000002 PIPERACILLIN SOD-TAZOBACTAM PER 1 G: Performed by: INTERNAL MEDICINE

## 2024-12-24 PROCEDURE — 93010 ELECTROCARDIOGRAM REPORT: CPT | Performed by: STUDENT IN AN ORGANIZED HEALTH CARE EDUCATION/TRAINING PROGRAM

## 2024-12-24 PROCEDURE — 85027 COMPLETE CBC AUTOMATED: CPT | Performed by: INTERNAL MEDICINE

## 2024-12-24 PROCEDURE — 82948 REAGENT STRIP/BLOOD GLUCOSE: CPT

## 2024-12-24 PROCEDURE — 93005 ELECTROCARDIOGRAM TRACING: CPT | Performed by: FAMILY MEDICINE

## 2024-12-24 PROCEDURE — 97535 SELF CARE MNGMENT TRAINING: CPT

## 2024-12-24 PROCEDURE — 63710000001 INSULIN GLARGINE PER 5 UNITS: Performed by: INTERNAL MEDICINE

## 2024-12-24 PROCEDURE — 97116 GAIT TRAINING THERAPY: CPT

## 2024-12-24 PROCEDURE — 99232 SBSQ HOSP IP/OBS MODERATE 35: CPT | Performed by: FAMILY MEDICINE

## 2024-12-24 PROCEDURE — 25010000002 MICAFUNGIN SODIUM 100 MG RECONSTITUTED SOLUTION 1 EACH VIAL: Performed by: FAMILY MEDICINE

## 2024-12-24 PROCEDURE — 84132 ASSAY OF SERUM POTASSIUM: CPT | Performed by: FAMILY MEDICINE

## 2024-12-24 RX ORDER — POTASSIUM CHLORIDE 1.5 G/1.58G
40 POWDER, FOR SOLUTION ORAL EVERY 4 HOURS
Status: COMPLETED | OUTPATIENT
Start: 2024-12-24 | End: 2024-12-24

## 2024-12-24 RX ADMIN — APIXABAN 5 MG: 5 TABLET, FILM COATED ORAL at 21:23

## 2024-12-24 RX ADMIN — INSULIN LISPRO 3 UNITS: 100 INJECTION, SOLUTION INTRAVENOUS; SUBCUTANEOUS at 18:17

## 2024-12-24 RX ADMIN — APIXABAN 5 MG: 5 TABLET, FILM COATED ORAL at 08:17

## 2024-12-24 RX ADMIN — PIPERACILLIN AND TAZOBACTAM 3.38 G: 3; .375 INJECTION, POWDER, LYOPHILIZED, FOR SOLUTION INTRAVENOUS at 18:18

## 2024-12-24 RX ADMIN — INSULIN LISPRO 4 UNITS: 100 INJECTION, SOLUTION INTRAVENOUS; SUBCUTANEOUS at 21:23

## 2024-12-24 RX ADMIN — PIPERACILLIN AND TAZOBACTAM 3.38 G: 3; .375 INJECTION, POWDER, LYOPHILIZED, FOR SOLUTION INTRAVENOUS at 02:22

## 2024-12-24 RX ADMIN — INSULIN LISPRO 2 UNITS: 100 INJECTION, SOLUTION INTRAVENOUS; SUBCUTANEOUS at 18:17

## 2024-12-24 RX ADMIN — INSULIN LISPRO 3 UNITS: 100 INJECTION, SOLUTION INTRAVENOUS; SUBCUTANEOUS at 12:18

## 2024-12-24 RX ADMIN — PANTOPRAZOLE SODIUM 40 MG: 40 TABLET, DELAYED RELEASE ORAL at 08:17

## 2024-12-24 RX ADMIN — DULOXETINE HYDROCHLORIDE 30 MG: 30 CAPSULE, DELAYED RELEASE ORAL at 08:18

## 2024-12-24 RX ADMIN — Medication 2 SPRAY: at 08:37

## 2024-12-24 RX ADMIN — INSULIN LISPRO 2 UNITS: 100 INJECTION, SOLUTION INTRAVENOUS; SUBCUTANEOUS at 12:17

## 2024-12-24 RX ADMIN — INSULIN LISPRO 3 UNITS: 100 INJECTION, SOLUTION INTRAVENOUS; SUBCUTANEOUS at 08:17

## 2024-12-24 RX ADMIN — MICAFUNGIN 100 MG: 20 INJECTION, POWDER, LYOPHILIZED, FOR SOLUTION INTRAVENOUS at 14:46

## 2024-12-24 RX ADMIN — POTASSIUM CHLORIDE 40 MEQ: 1.5 POWDER, FOR SOLUTION ORAL at 12:17

## 2024-12-24 RX ADMIN — Medication 10 ML: at 08:18

## 2024-12-24 RX ADMIN — PIPERACILLIN AND TAZOBACTAM 3.38 G: 3; .375 INJECTION, POWDER, LYOPHILIZED, FOR SOLUTION INTRAVENOUS at 10:02

## 2024-12-24 RX ADMIN — POTASSIUM CHLORIDE 40 MEQ: 1.5 POWDER, FOR SOLUTION ORAL at 14:25

## 2024-12-24 RX ADMIN — INSULIN GLARGINE 10 UNITS: 100 INJECTION, SOLUTION SUBCUTANEOUS at 08:17

## 2024-12-24 RX ADMIN — Medication 2 SPRAY: at 21:24

## 2024-12-24 RX ADMIN — ROSUVASTATIN 20 MG: 20 TABLET, FILM COATED ORAL at 21:23

## 2024-12-24 RX ADMIN — Medication 10 ML: at 21:26

## 2024-12-24 NOTE — THERAPY TREATMENT NOTE
Patient Name: Aldair Narvaez  : 1950    MRN: [id number]                              Today's Date: 2024       Admit Date: 2024    Visit Dx:     ICD-10-CM ICD-9-CM   1. Dysphagia, unspecified type  R13.10 787.20   2. Severe sepsis  A41.9 038.9    R65.20 995.92   3. Urological system complication of procedure  N99.89 997.5   4. Hydronephrosis of left kidney  N13.30 591   5. Fever and chills  R50.9 780.60   6. Lactic acidosis  E87.20 276.2   7. Type 2 diabetes mellitus with hyperglycemia, with long-term current use of insulin  E11.65 250.00    Z79.4 790.29     V58.67   8. Hypomagnesemia  E83.42 275.2   9. Hypotension, unspecified hypotension type  I95.9 458.9   10. History of hypertension  Z86.79 V12.59   11. History of coronary artery disease  Z86.79 V12.59   12. Chronic anticoagulation  Z79.01 V58.61   13. Obstructive sleep apnea  G47.33 327.23     Patient Active Problem List   Diagnosis    Benign hypertension    Mixed hyperlipidemia    Type 2 diabetes mellitus with hyperglycemia, without long-term current use of insulin    CAD, multiple vessel    Pyelonephritis    Personal history of prostate cancer    Paroxysmal A-fib    Bacteremia    Hydronephrosis of left kidney    Right arm weakness    Ureteral stricture, left    Sepsis    Type 2 diabetes mellitus, with long-term current use of insulin     Past Medical History:   Diagnosis Date    Acid reflux     Arthritis     Colon polyp     Coronary artery disease     Erectile dysfunction 2005    Heart disease     Hyperlipidemia     Hypertension     Kidney stone     Obesity     body mass index 30+-obesity    Prostate cancer     Sepsis 10/2023    urosepsis  [facility]    Sleep apnea     does not use c-pap since wt loss    Type 2 diabetes mellitus     Urinary incontinence 2005    Getting worse    Urinary tract infection 2003    In hospital twice    Wears dentures     upper plate only     Past Surgical History:   Procedure Laterality Date    CARDIAC  CATHETERIZATION      CARPAL TUNNEL RELEASE Left     COLONOSCOPY      CORONARY ARTERY BYPASS GRAFT  08/30/2022    3 Bypasses    CYSTOSCOPY BLADDER STONE LITHOTRIPSY      CYSTOSCOPY BLADDER STONE LITHOTRIPSY      CYSTOSCOPY RETROGRADE PYELOGRAM Left 10/31/2023    Procedure: CYSTOSCOPY RETROGRADE PYELOGRAM STENT PLACEMENT;  Surgeon: Isma Justin MD;  Location:  [facility];  Service: Urology;  Laterality: Left;    CYSTOSCOPY W/ URETERAL STENT PLACEMENT Left 05/09/2023    Procedure: CYSTOSCOPY LEFT RETROGRADE PYELOGRAM AND LEFT URETERAL STENT PLACEMENT;  Surgeon: Isma Justin MD;  Location:  [facility];  Service: Urology;  Laterality: Left;    CYSTOSCOPY, URETEROSCOPY, RETROGRADE PYELOGRAM, STONE EXTRACTION, STENT INSERTION Left 01/19/2024    Procedure: CYSTOSCOPY RETROGRADE PYELOGRAM AND STENT INSERTION;  Surgeon: Isma Justin MD;  Location:  [facility];  Service: Urology;  Laterality: Left;    ENDOSCOPY      INGUINAL HERNIA REPAIR Right     x 2  with mesh  umbilical with mesh    KNEE ARTHROSCOPY Bilateral     PROSTATECTOMY      ROTATOR CUFF REPAIR Left     TONSILLECTOMY      TRIGGER FINGER RELEASE Right     UMBILICAL HERNIA REPAIR      URETEROSCOPY LASER LITHOTRIPSY WITH STENT INSERTION Left 12/20/2024    Procedure: CYSTOSCOPY, LEFT URETEROSCOPY, URETERAL BALLOON, LEFT STENT EXCHANGE;  Surgeon: Isma Justin MD;  Location:  [facility];  Service: Urology;  Laterality: Left;    UVULOPALATOPHARYNGOPLASTY        General Information       Row Name 12/24/24 1632          OT Time and Intention    Subjective Information no complaints  -TB     Document Type therapy note (daily note)  -TB     Mode of Treatment occupational therapy;individual therapy  -TB     Patient Effort good  -TB     Symptoms Noted During/After Treatment none  -TB       Row Name 12/24/24 1632          General Information    Patient Profile Reviewed yes  -TB     Existing Precautions/Restrictions fall  -TB     Barriers to Rehab medically  complex  -TB       Row Name 12/24/24 1632          Occupational Profile    Reason for Services/Referral (Occupational Profile) Occupational decline  -TB       Row Name 12/24/24 1632          Cognition    Orientation Status (Cognition) oriented x 4  -TB       Row Name 12/24/24 1632          Safety Issues/Impairments Affecting Functional Mobility    Safety Issues Affecting Function (Mobility) awareness of need for assistance  -TB     Impairments Affecting Function (Mobility) balance;endurance/activity tolerance  -TB     Comment, Safety Issues/Impairments (Mobility) Pt is up in room and hallway with Supervision  -TB               User Key  (r) = Recorded By, (t) = Taken By, (c) = Cosigned By      Initials Name Provider Type    TB Debra Velez, OT Occupational Therapist                     Mobility/ADL's       Row Name 12/24/24 1633          Bed Mobility    Bed Mobility supine-sit;sit-supine;scooting/bridging  -TB     Scooting/Bridging [place] (Bed Mobility) independent  -TB     Supine-Sit [place] (Bed Mobility) modified independence  -TB     Sit-Supine [place] (Bed Mobility) modified independence  -TB     Assistive Device (Bed Mobility) bed rails  -TB       Row Name 12/24/24 1633          Transfers    Transfers sit-stand transfer;stand-sit transfer  -TB     Comment, (Transfers) Good sequencing and safety  -TB       Row Name 12/24/24 1633          Sit-Stand Transfer    Sit-Stand [place] (Transfers) independent  -TB       Row Name 12/24/24 1633          Stand-Sit Transfer    Stand-Sit [place] (Transfers) independent  -TB       Row Name 12/24/24 1633          Functional Mobility    Functional Mobility- Ind. Level supervision required  -TB     Functional Mobility-Distance (Feet) 200  -TB     Functional Mobility- Comment Pt is up in room and hallway with Supervision. No dizziness or LOB.  -TB     Patient was able to Ambulate yes  -TB       Row Name 12/24/24 1633          Activities of  Daily Living    BADL Assessment/Intervention upper body dressing;lower body dressing;feeding  -TB       Row Name 12/24/24 1633          Upper Body Dressing Assessment/Training    [place] Level (Upper Body Dressing) don;pajama/robe;set up  -TB     Position (Upper Body Dressing) unsupported standing  -TB       Row Name 12/24/24 1633          Lower Body Dressing Assessment/Training    [place] Level (Lower Body Dressing) don;socks;minimum assist (75% patient effort)  -TB     Position (Lower Body Dressing) edge of bed sitting  -TB       Row Name 12/24/24 1633          Self-Feeding Assessment/Training    [place] Level (Feeding) independent;liquids to mouth  -TB     Position (Feeding) sitting up in bed  -TB               User Key  (r) = Recorded By, (t) = Taken By, (c) = Cosigned By      Initials Name Provider Type    TB Debra Velez OT Occupational Therapist                   Obj/Interventions       Row Name 12/24/24 1635          Motor Skills    Therapeutic Exercise --  BUE/LE ther ex completed to support self-care, followed by hallway ambulation.  -TB       Row Name 12/24/24 1635          Balance    Balance Assessment sitting dynamic balance;sit to stand dynamic balance;standing dynamic balance  -TB     Dynamic Sitting Balance independent  -TB     Position, Sitting Balance unsupported;sitting edge of bed  -TB     Sit to Stand Dynamic Balance independent  -TB     Dynamic Standing Balance supervision  -TB     Position/Device Used, Standing Balance unsupported  -TB     Balance Interventions sitting;standing;sit to stand;static;dynamic;dynamic reaching;occupation based/functional task;UE activity with balance activity  -TB     Comment, Balance No LOB  -TB               User Key  (r) = Recorded By, (t) = Taken By, (c) = Cosigned By      Initials Name Provider Type    TB Debra Velez OT Occupational Therapist                   Goals/Plan       Row Name 12/24/24 1637          Transfer Goal  1 (OT)    Progress/Outcome (Transfer Goal 1, OT) goal partially met  -TB       Row Name 12/24/24 1637          Dressing Goal 1 (OT)    Progress/Outcome (Dressing Goal 1, OT) goal partially met  -TB               User Key  (r) = Recorded By, (t) = Taken By, (c) = Cosigned By      Initials Name Provider Type    TB Debra Velez OT Occupational Therapist                   Clinical Impression       Row Name 12/24/24 1635          Pain Assessment    Pretreatment Pain Rating 0/10 - no pain  -TB     Posttreatment Pain Rating 0/10 - no pain  -TB     Pre/Posttreatment Pain Comment Pt denies pain this session  -TB       Row Name 12/24/24 1635          Plan of Care Review    Plan of Care Reviewed With patient  -TB     Progress improving  -TB     Outcome Evaluation Pt is A/Ox4 and motivated to work with therapy. Pt moves in/out of bed without assist. Set-up to don robe. Min A to don socks. Able to stand Independently from EOB. Pt is up in room and hallway with Supervision and no AD needed. No dizziness or LOB. OT will continue to follow IP to support return to PLOF. Recommend home with spouse and son to assist when pt is medically ready.  -TB       Row Name 12/24/24 1635          Therapy Plan Review/Discharge Plan (OT)    Anticipated Discharge Disposition (OT) home with assist  -TB       Row Name 12/24/24 1635          Vital Signs    Pre Systolic BP Rehab --  RN cleared OT  -TB     O2 Delivery Pre Treatment room air  -TB     Pre Patient Position Supine  -TB     Intra Patient Position Standing  -TB     Post Patient Position Supine  -TB       Row Name 12/24/24 1635          Positioning and Restraints    Pre-Treatment Position in bed  -TB     Post Treatment Position bed  -TB     In Bed notified nsg;fowlers;call light within reach;encouraged to call for assist;exit alarm on  -TB               User Key  (r) = Recorded By, (t) = Taken By, (c) = Cosigned By      Initials Name Provider Type    Debra Adkins OT  Occupational Therapist                   Outcome Measures       Row Name 12/24/24 1640          How much help from another is currently needed...    Putting on and taking off regular lower body clothing? 3  -TB     Bathing (including washing, rinsing, and drying) 3  -TB     Toileting (which includes using toilet bed pan or urinal) 3  -TB     Putting on and taking off regular upper body clothing 4  -TB     Taking care of personal grooming (such as brushing teeth) 4  -TB     Eating meals 4  -TB     AM-PAC 6 Clicks Score (OT) 21  -TB       Row Name 12/24/24 1446 12/24/24 0800       How much help from another person do you currently need...    Turning from your back to your side while in flat bed without using bedrails? 4  -KE 4  -HK    Moving from lying on back to sitting on the side of a flat bed without bedrails? 4  -KE 3  -HK    Moving to and from a bed to a chair (including a wheelchair)? 4  -KE 3  -HK    Standing up from a chair using your arms (e.g., wheelchair, bedside chair)? 4  -KE 3  -HK    Climbing 3-5 steps with a railing? 3  -KE 3  -HK    To walk in hospital room? 4  -KE 3  -HK    AM-PAC 6 Clicks Score (PT) 23  -KE 19  -HK    Highest Level of Mobility Goal 7 --> Walk 25 feet or more  -KE 6 --> Walk 10 steps or more  -HK      Row Name 12/24/24 1640 12/24/24 1446       Functional Assessment    Outcome Measure Options AM-PAC 6 Clicks Daily Activity (OT)  -TB AM-PAC 6 Clicks Basic Mobility (PT)  -KE              User Key  (r) = Recorded By, (t) = Taken By, (c) = Cosigned By      Initials Name Provider Type    TB Debra Velez, OT Occupational Therapist    Estrella Warner RN Registered Nurse    Samantha Mohr, PT Physical Therapist                    Occupational Therapy Education       Title: PT OT SLP Therapies (In Progress)       Topic: Occupational Therapy (In Progress)       Point: ADL training (Done)       Description:   Instruct learner(s) on proper safety adaptation and remediation  techniques during self care or transfers.   Instruct in proper use of assistive devices.                  Learning Progress Summary            Patient Acceptance, E,D, VU,DU,NR by TB at 12/24/2024 1640    Acceptance, E, VU by MR at 12/21/2024 1507                      Point: Home exercise program (Not Started)       Description:   Instruct learner(s) on appropriate technique for monitoring, assisting and/or progressing therapeutic exercises/activities.                  Learner Progress:  Not documented in this visit.              Point: Precautions (Done)       Description:   Instruct learner(s) on prescribed precautions during self-care and functional transfers.                  Learning Progress Summary            Patient Acceptance, E, VU by MR at 12/21/2024 1507                      Point: Body mechanics (Done)       Description:   Instruct learner(s) on proper positioning and spine alignment during self-care, functional mobility activities and/or exercises.                  Learning Progress Summary            Patient Acceptance, E, VU by MR at 12/21/2024 1507                                      User Key       Initials Effective Dates Name Provider Type Discipline     07/11/23 -  Debra Velez, OT Occupational Therapist OT    MR 09/22/22 -  Mirian Luo OT Occupational Therapist OT                  OT Recommendation and Plan     Plan of Care Review  Plan of Care Reviewed With: patient  Progress: improving  Outcome Evaluation: Pt is A/Ox4 and motivated to work with therapy. Pt moves in/out of bed without assist. Set-up to don robe. Min A to don socks. Able to stand Independently from EOB. Pt is up in room and hallway with Supervision and no AD needed. No dizziness or LOB. OT will continue to follow IP to support return to PLOF. Recommend home with spouse and son to assist when pt is medically ready.     Time Calculation:         Time Calculation- OT       Row Name 12/24/24 5259 12/24/24 4279           Time Calculation- OT    OT Start Time 1607  -TB --     OT Received On 12/24/24  -TB --     OT Goal Re-Cert Due Date 12/31/24  -TB --        Timed Charges    21144 - OT Therapeutic Exercise Minutes 12 -TB --     64718 - Gait Training Minutes  -- 10  -KE     11710 - OT Self Care/Mgmt Minutes 12 -TB --        Total Minutes    Timed Charges Total Minutes 24 -TB 10  -KE      Total Minutes 24 -TB 10  -KE               User Key  (r) = Recorded By, (t) = Taken By, (c) = Cosigned By      Initials Name Provider Type    TB Debra Velez OT Occupational Therapist    KE Samantha Fernandes, PT Physical Therapist                  Therapy Charges for Today       Code Description Service Date Service Provider Modifiers Qty    33959327354 HC OT THER PROC EA 15 MIN 12/24/2024 Debra Velez OT GO 1    60605960628 HC OT SELF CARE/MGMT/TRAIN EA 15 MIN 12/24/2024 Debra Velez OT GO 1                 Debra Velez OT  12/24/2024

## 2024-12-24 NOTE — PROGRESS NOTES
[facility] Medicine Services  PROGRESS NOTE    Patient Name: Aldair Narvaez  : 1950  MRN: [id number]    Date of Admission: 2024  Primary Care Physician: Miguel Angel Mireles MD    Subjective   Subjective     CC:  Fevers and rigors     HPI:  Patient denies further fevers chills nausea vomiting.  He is hopeful to be discharged soon.  Blood pressures have been stable.      Objective   Objective     Vital Signs:   Temp:  [98.3 °F (36.8 °C)-99.5 °F (37.5 °C)] 98.6 °F (37 °C)  Heart Rate:  [81-91] 81  Resp:  [16-18] 18  BP: (111-148)/(56-92) 146/86     Physical Exam:  Constitutional: No acute distress, awake, alert  HENT: NCAT, mucous membranes moist  Respiratory: Clear to auscultation bilaterally, respiratory effort normal   Cardiovascular: RRR, no murmurs, rubs, or gallops  Gastrointestinal: Positive bowel sounds, soft, nontender, nondistended  Musculoskeletal: No bilateral ankle edema  Psychiatric: Appropriate affect, cooperative  Neurologic: Oriented x 3,speech clear  Skin: No rashes    Results Reviewed:  LAB RESULTS:      Lab 24  0748 24  0306 24  0739 24  0456 24  0246 24  0049   WBC 8.15 9.41  --  13.43*  --  13.25*   HEMOGLOBIN 10.9* 10.6*  --  11.8*  --  13.0   HEMATOCRIT 33.9* 34.3*  --  36.6*  --  40.4   PLATELETS 147 129*  --  144  --  164   NEUTROS ABS  --  7.89*  --  12.70*  --  12.44*   IMMATURE GRANS (ABS)  --  0.10*  --  0.12*  --  0.09*   LYMPHS ABS  --  0.90  --  0.27*  --  0.26*   MONOS ABS  --  0.48  --  0.30  --  0.40   EOS ABS  --  0.02  --  0.01  --  0.03   MCV 88.5 93.0  --  89.5  --  88.2   CRP  --   --   --   --  5.45*  --    PROCALCITONIN  --   --   --   --   --  1.37*   LACTATE  --   --  2.0 2.7*  --  3.0*         Lab 24  0748 24  0306 24  1335 24  0456 24  0246 24  0049   SODIUM 137 133*  --  136 134* 135*   POTASSIUM 3.6 4.2 4.8 4.1 3.6 4.1   CHLORIDE 101 103  --  103 101 100   CO2 22.0  20.0*  --  21.0* 22.0 22.0   ANION GAP 14.0 10.0  --  12.0 11.0 13.0   BUN 9 17  --  17 17 16   CREATININE 0.61* 0.67*  --  0.94 0.85 0.90   EGFR 102.1 99.2  --  86.1 92.3 90.7   GLUCOSE 147* 161*  --  199* 232* 267*   CALCIUM 7.7* 7.2*  --  7.3* 7.7* 8.3*   MAGNESIUM  --   --   --  1.4*  --  1.3*   PHOSPHORUS  --   --   --  3.9  --   --    HEMOGLOBIN A1C  --   --   --   --   --  9.10*         Lab 12/22/24  0306 12/21/24  0456 12/21/24  0049   TOTAL PROTEIN 5.2* 5.3* 6.4   ALBUMIN 2.6* 2.9* 3.3*   GLOBULIN 2.6 2.4 3.1   ALT (SGPT) 23 16 14   AST (SGOT) 42* 28 29   BILIRUBIN 0.2 0.4 0.4   ALK PHOS 59 56 67   LIPASE  --   --  16         Lab 12/21/24  0246 12/21/24  0049   HSTROP T 29* 27*                 Brief Urine Lab Results  (Last result in the past 365 days)        Color   Clarity   Blood   Leuk Est   Nitrite   Protein   CREAT   Urine HCG        12/21/24 0158 Orange   Turbid   Large (3+)   Moderate (2+)   Negative   >=300 mg/dL (3+)                   Cultures:  Blood Culture   Date Value Ref Range Status   12/21/2024 No growth at 3 days  Preliminary   12/21/2024 Abnormal Stain (C)  Preliminary     Urine Culture   Date Value Ref Range Status   12/21/2024 No growth  Final   12/20/2024 No growth  Final       Microbiology Results Abnormal       Procedure Component Value - Date/Time    Blood Culture ID, PCR - Blood, Arm, Left [[id number]]  (Abnormal) Collected: 12/21/24 0126    Lab Status: Final result Specimen: Blood from Arm, Left Updated: 12/24/24 1254     BCID, PCR Candida glabrata. Identification by BCID2 PCR.     BOTTLE TYPE Aerobic Bottle    Narrative:      Infectious disease consultation is highly recommended to rule out distant foci of infection.    Blood Culture - Blood, Arm, Left [[id number]]  (Abnormal) Collected: 12/21/24 0126    Lab Status: Preliminary result Specimen: Blood from Arm, Left Updated: 12/24/24 1143     Blood Culture Abnormal Stain     Gram Stain Aerobic Bottle Budding yeast            No  radiology results from the last 24 hrs        Current medications:  Scheduled Meds:apixaban, 5 mg, Oral, Q12H  DULoxetine, 30 mg, Oral, Daily  [Held by provider] valsartan, 320 mg, Oral, Q24H   And  [Held by provider] hydroCHLOROthiazide, 25 mg, Oral, Q24H  insulin glargine, 10 Units, Subcutaneous, Daily  insulin lispro, 2-7 Units, Subcutaneous, 4x Daily AC & at Bedtime  Insulin Lispro, 3 Units, Subcutaneous, TID With Meals  [Held by provider] metoprolol succinate XL, 50 mg, Oral, Daily  micafungin (MYCAMINE) IV, 100 mg, Intravenous, Q24H  [Held by provider] oxybutynin XL, 5 mg, Oral, Daily  oxymetazoline, 2 spray, Each Nare, BID  pantoprazole, 40 mg, Oral, Daily  piperacillin-tazobactam, 3.375 g, Intravenous, Q8H  potassium chloride, 40 mEq, Oral, Q4H  rosuvastatin, 20 mg, Oral, Daily  sodium chloride, 10 mL, Intravenous, Q12H      Continuous Infusions:   PRN Meds:.  acetaminophen **OR** acetaminophen **OR** acetaminophen    albuterol    senna-docusate sodium **AND** polyethylene glycol **AND** bisacodyl **AND** bisacodyl    calcium carbonate    Calcium Replacement - Follow Nurse / BPA Driven Protocol    dextrose    dextrose    glucagon (human recombinant)    HYDROcodone-acetaminophen    hyoscyamine    loperamide    LORazepam    Magnesium Standard Dose Replacement - Follow Nurse / BPA Driven Protocol    melatonin    nitroglycerin    phenazopyridine    Phosphorus Replacement - Follow Nurse / BPA Driven Protocol    Potassium Replacement - Follow Nurse / BPA Driven Protocol    promethazine    sodium chloride    sodium chloride    sodium chloride    sodium chloride    Assessment & Plan   Assessment & Plan     Active Hospital Problems    Diagnosis  POA    **Sepsis [A41.9]  Yes    Type 2 diabetes mellitus, with long-term current use of insulin [E11.9, Z79.4]  Not Applicable    Pyelonephritis [N12]  Yes      Resolved Hospital Problems   No resolved problems to display.        Brief Hospital Course to date:  Aldair Narvaez  is a 74 y.o. male w/ hx prostate Ca s/p prostatectomy, chronic LT ureteral stricture, prior renal abscess (E.Coli) w/ bacteremia, who underwent cysto, LT ureteral balloon dilation, and stent exchange 12/20/24 w/ Dr. Justin, at home he developed fevers and rigors, encephalopathy; on arrival he was febrile 102.9F, tachycardic, and hypotensive; WBC was 13.4, and CT a/p showed LT ureteral stent, hydronephrosis, nonspecific perinephric/ureteric stranding; he was started on empiric broad spectrum antibiotics, stress dose steroids, and midodrine; UrCx returned w/o growth, overall he continues to improve w/ empiric abx     Assessment/Plan     Severe sepsis 2/2 acute complicated UTI/pyelonephritis  Chronic LT ureteral stricture s/p cysto, balloon dilation, stent exchange 12/20/24  -surgical Cx from recent procedure w/o growth; UrCx w/o growth  -BP improving, stop IV hydrocortisone, dec midodrine from 5mg to 2.5mg, if BP stable into afternoon may DC altogether  -cont zosyn, poss home soon on oral Abx covering pyelo    Candida glabrata in aerobic bottle 12/24  -spoke with pharmacy, will start Micafungin 100 q24h   -consult ID     HTN/HLD/CAD/pAF - home eliquis, statin; holding metoprolol, valsartan, HCTZ  DM2, A1c 9.1%, w/ insulin use - lantus, premeal humalog, SSI  GERD - ppi  Hx prostate Ca s/p prostatectomy, hx radiotherapy?  GRANT    Expected Discharge Location and Transportation: home   Expected Discharge 12/26/2024  Expected Discharge Date: 12/24/2024; Expected Discharge Time:      VTE Prophylaxis:  Pharmacologic VTE prophylaxis orders are present.         AM-PAC 6 Clicks Score (PT): 19 (12/24/24 0800)    CODE STATUS:   Code Status and Medical Interventions: CPR (Attempt to Resuscitate); Full Support   Ordered at: 12/21/24 0352     Level Of Support Discussed With:    Patient    Next of Kin (If No Surrogate)     Code Status (Patient has no pulse and is not breathing):    CPR (Attempt to Resuscitate)     Medical  Interventions (Patient has pulse or is breathing):    Full Support       Virgie Rodriguez MD  12/24/24

## 2024-12-24 NOTE — PLAN OF CARE
Problem: Adult Inpatient Plan of Care  Goal: Plan of Care Review  Recent Flowsheet Documentation  Taken 12/24/2024 1635 by Debra Vleez OT  Progress: improving  Outcome Evaluation: Pt is A/Ox4 and motivated to work with therapy. Pt moves in/out of bed without assist. Set-up to don robe. Min A to don socks. Able to stand Independently from EOB. Pt is up in room and hallway with Supervision and no AD needed. No dizziness or LOB. OT will continue to follow IP to support return to PLOF. Recommend home with spouse and son to assist when pt is medically ready.

## 2024-12-24 NOTE — CONSULTS
INFECTIOUS DISEASE CONSULT/INITIAL HOSPITAL VISIT    Aldair Narvaez  1950  [id number]    Date of Consult: 12/24/2024    Admission Date: 12/21/2024      Requesting Provider: Jefferson Hwang DO  Evaluating Physician: Lindsey Cramer MD    Reason for Consultation: fungemia    History of present illness:    Patient is a 74 y.o. male with uncontrolled DM,CAD,GRANT, prior NEETA, and nephrolithiasis known to Dr Ham who treated him for renal abscess in setting of ureteral stone and hydronephrosis  November 2023. He underwent outpatient  cystoscopy, left ureteroscopy and balloon dilation with ureteral stent exchange 12/20/2024 for management of chronic left ureteral stricture.  Urine culture on 12/20 was negative. He presented to the ED 12/21 with rigors and confusion. In the ED he was found to be hypotensive with blood pressure of 93/48, temperature 102, lactic acid  3.0, procalcitonin of 1.37, WBC 13.25. Urinalysis with TNTC WBC.  CT abdomen showed the left ureteric stent in appropriate position with new left-sided hydronephrosis and perinephric and periureteric stranding concerning for obstructive uropathy and UTI/pyelonephritis. Febrile to 101.2 yesterday.  Urine culture negative but today one of 2 blood cultures is growing C glabrata. He has been started on zosyn and micafungin was added today      Past Medical History:   Diagnosis Date    Acid reflux     Arthritis     Colon polyp     Coronary artery disease     Erectile dysfunction 2005    Heart disease     Hyperlipidemia     Hypertension     Kidney stone     Obesity     body mass index 30+-obesity    Prostate cancer     Sepsis 10/2023    urosepsis  [facility] then [facility]    Sleep apnea     does not use c-pap since wt loss    Type 2 diabetes mellitus     Urinary incontinence 2005    Getting worse    Urinary tract infection 2003    In hospital twice    Wears dentures     upper plate only       Past Surgical History:   Procedure Laterality Date     CARDIAC CATHETERIZATION      CARPAL TUNNEL RELEASE Left     COLONOSCOPY      CORONARY ARTERY BYPASS GRAFT  08/30/2022    3 Bypasses    CYSTOSCOPY BLADDER STONE LITHOTRIPSY      CYSTOSCOPY BLADDER STONE LITHOTRIPSY      CYSTOSCOPY RETROGRADE PYELOGRAM Left 10/31/2023    Procedure: CYSTOSCOPY RETROGRADE PYELOGRAM STENT PLACEMENT;  Surgeon: Isma Justin MD;  Location:  [facility];  Service: Urology;  Laterality: Left;    CYSTOSCOPY W/ URETERAL STENT PLACEMENT Left 05/09/2023    Procedure: CYSTOSCOPY LEFT RETROGRADE PYELOGRAM AND LEFT URETERAL STENT PLACEMENT;  Surgeon: Isma Justin MD;  Location:  [facility];  Service: Urology;  Laterality: Left;    CYSTOSCOPY, URETEROSCOPY, RETROGRADE PYELOGRAM, STONE EXTRACTION, STENT INSERTION Left 01/19/2024    Procedure: CYSTOSCOPY RETROGRADE PYELOGRAM AND STENT INSERTION;  Surgeon: Isma Justin MD;  Location:  [facility];  Service: Urology;  Laterality: Left;    ENDOSCOPY      INGUINAL HERNIA REPAIR Right     x 2  with mesh  umbilical with mesh    KNEE ARTHROSCOPY Bilateral     PROSTATECTOMY      ROTATOR CUFF REPAIR Left     TONSILLECTOMY      TRIGGER FINGER RELEASE Right     UMBILICAL HERNIA REPAIR      URETEROSCOPY LASER LITHOTRIPSY WITH STENT INSERTION Left 12/20/2024    Procedure: CYSTOSCOPY, LEFT URETEROSCOPY, URETERAL BALLOON, LEFT STENT EXCHANGE;  Surgeon: Isma Justin MD;  Location:  [facility];  Service: Urology;  Laterality: Left;    UVULOPALATOPHARYNGOPLASTY         Family History   Problem Relation Age of Onset    Heart attack Father     Heart disease Father         That out of a heart attack at age 39    Diabetes Sister     Hyperlipidemia Sister     Hypertension Sister        Social History     Socioeconomic History    Marital status:    Tobacco Use    Smoking status: Never     Passive exposure: Past    Smokeless tobacco: Never   Vaping Use    Vaping status: Never Used   Substance and Sexual Activity    Alcohol use: Yes      Alcohol/week: 1.0 standard drink of alcohol     Types: 1 Drinks containing 0.5 oz of alcohol per week    Drug use: Never    Sexual activity: Not Currently     Comment: Impotent since prostatectomy       Allergies   Allergen Reactions    Iodine Other (See Comments)     Closes sinuses    Oxycodone-Acetaminophen Itching    Zofran [Ondansetron] Hives         Medication:    Current Facility-Administered Medications:     acetaminophen (TYLENOL) tablet 650 mg, 650 mg, Oral, Q4H PRN, 650 mg at 12/23/24 1041 **OR** acetaminophen (TYLENOL) 160 MG/5ML oral solution 650 mg, 650 mg, Oral, Q4H PRN **OR** acetaminophen (TYLENOL) suppository 650 mg, 650 mg, Rectal, Q4H PRN, Rama Franco MD    albuterol (PROVENTIL) nebulizer solution 0.083% 2.5 mg/3mL, 2.5 mg, Nebulization, Q4H PRN, Rama Franco MD    apixaban (ELIQUIS) tablet 5 mg, 5 mg, Oral, Q12H, Rama Franco MD, 5 mg at 12/24/24 0817    sennosides-docusate (PERICOLACE) 8.6-50 MG per tablet 2 tablet, 2 tablet, Oral, BID PRN **AND** polyethylene glycol (MIRALAX) packet 17 g, 17 g, Oral, Daily PRN **AND** bisacodyl (DULCOLAX) EC tablet 5 mg, 5 mg, Oral, Daily PRN **AND** bisacodyl (DULCOLAX) suppository 10 mg, 10 mg, Rectal, Daily PRN, Rama Franco MD    calcium carbonate (TUMS) chewable tablet 500 mg (200 mg elemental), 2 tablet, Oral, TID PRN, Rama Franco MD    Calcium Replacement - Follow Nurse / BPA Driven Protocol, , Not Applicable, PRN, Rama Franco MD    dextrose (D50W) (25 g/50 mL) IV injection 25 g, 25 g, Intravenous, Q15 Min PRN, Rama Franco MD    dextrose (GLUTOSE) oral gel 15 g, 15 g, Oral, Q15 Min PRN, Rama Franco MD    DULoxetine (CYMBALTA) DR capsule 30 mg, 30 mg, Oral, Daily, Rama Franco MD, 30 mg at 12/24/24 0818    glucagon (GLUCAGEN) injection 1 mg, 1 mg, Intramuscular, Q15 Min PRN, Rama Franco MD    [Held by provider] valsartan (DIOVAN) tablet 320 mg, 320  mg, Oral, Q24H **AND** [Held by provider] hydroCHLOROthiazide tablet 25 mg, 25 mg, Oral, Q24H, Jefferson Hwang DO    HYDROcodone-acetaminophen (NORCO) 5-325 MG per tablet 1 tablet, 1 tablet, Oral, Q8H PRN, Rama Franco MD, 1 tablet at 12/21/24 1837    hyoscyamine (LEVSIN) SL tablet 125 mcg, 125 mcg, Sublingual, Q4H PRN, Rama Franco MD    insulin glargine (LANTUS, SEMGLEE) injection 10 Units, 10 Units, Subcutaneous, Daily, Rama Franco MD, 10 Units at 12/24/24 0817    Insulin Lispro (humaLOG) injection 2-7 Units, 2-7 Units, Subcutaneous, 4x Daily AC & at Bedtime, Rama Franco MD, 2 Units at 12/24/24 1817    Insulin Lispro (humaLOG) injection 3 Units, 3 Units, Subcutaneous, TID With Meals, Jefferson Hwang DO, 3 Units at 12/24/24 1817    loperamide (IMODIUM) capsule 2 mg, 2 mg, Oral, 4x Daily PRN, Rama Franco MD    LORazepam (ATIVAN) tablet 0.5 mg, 0.5 mg, Oral, Q8H PRN, Rama Franco MD    Magnesium Standard Dose Replacement - Follow Nurse / BPA Driven Protocol, , Not Applicable, PRN, Rama Franco MD    melatonin tablet 10 mg, 10 mg, Oral, Nightly PRN, Rama Franco MD, 10 mg at 12/22/24 2055    [Held by provider] metoprolol succinate XL (TOPROL-XL) 24 hr tablet 50 mg, 50 mg, Oral, Daily, Jefferson Hwang DO    micafungin sodium (MYCAMINE) 100 mg in sodium chloride 0.9 % 100 mL MBP, 100 mg, Intravenous, Q24H, Virgie Rodriguez MD, 100 mg at 12/24/24 1446    nitroglycerin (NITROSTAT) SL tablet 0.4 mg, 0.4 mg, Sublingual, Q5 Min PRN, Rama Franco MD    [Held by provider] oxybutynin XL (DITROPAN-XL) 24 hr tablet 5 mg, 5 mg, Oral, Daily, Rama Franco MD    oxymetazoline (AFRIN) nasal spray 2 spray, 2 spray, Each Nare, BID, Jefferson Hwang DO, 2 spray at 12/24/24 0837    pantoprazole (PROTONIX) EC tablet 40 mg, 40 mg, Oral, Daily, Rama Franco MD, 40 mg at 12/24/24 0817    phenazopyridine  (PYRIDIUM) tablet 200 mg, 200 mg, Oral, TID PRN, Rama Franco MD    Phosphorus Replacement - Follow Nurse / BPA Driven Protocol, , Not Applicable, Joan RICARDO Mohamed Ahmed, MD    piperacillin-tazobactam (ZOSYN) 3.375 g IVPB in 100 mL NS MBP (CD), 3.375 g, Intravenous, Q8H, Rama Franco MD, 3.375 g at 12/24/24 1818    Potassium Replacement - Follow Nurse / BPA Driven Protocol, , Not Applicable, PRN, Rama Franco MD    promethazine (PHENERGAN) 12.5 mg in sodium chloride 0.9 % 50 mL, 12.5 mg, Intravenous, Q4H PRN, Rama Franco MD    rosuvastatin (CRESTOR) tablet 20 mg, 20 mg, Oral, Daily, Jefferson Hwang DO, 20 mg at 12/23/24 2048    sodium chloride 0.9 % flush 10 mL, 10 mL, Intravenous, PRN, Rama Franco MD    sodium chloride 0.9 % flush 10 mL, 10 mL, Intravenous, Q12H, Rama Franco MD, 10 mL at 12/24/24 0818    sodium chloride 0.9 % flush 10 mL, 10 mL, Intravenous, PRN, Rama Franco MD    sodium chloride 0.9 % infusion 40 mL, 40 mL, Intravenous, PRN, Rama Franco MD    sodium chloride nasal spray 2 spray, 2 spray, Each Nare, PRN, Jefferson Hwang DO, 2 spray at 12/23/24 1742    Antibiotics:  Anti-Infectives (From admission, onward)      Ordered     Dose/Rate Route Frequency Start Stop    12/24/24 1308  micafungin sodium (MYCAMINE) 100 mg in sodium chloride 0.9 % 100 mL MBP        Ordering Provider: Virgie Rodriguez MD    100 mg  over 60 Minutes Intravenous Every 24 Hours 12/24/24 1400 01/07/25 1359    12/21/24 0440  piperacillin-tazobactam (ZOSYN) 3.375 g IVPB in 100 mL NS MBP (CD)        Ordering Provider: Rama Franco MD    3.375 g  over 4 Hours Intravenous Every 8 Hours 12/21/24 1000 12/28/24 0959    12/21/24 0440  piperacillin-tazobactam (ZOSYN) 3.375 g IVPB in 100 mL NS MBP (CD)  Status:  Discontinued        Ordering Provider: Rama Franco MD    3.375 g  over 30 Minutes Intravenous Once 12/21/24 0530  24 0445    24 0110  piperacillin-tazobactam (ZOSYN) 3.375 g IVPB in 100 mL NS MBP (CD)        Ordering Provider: Nadya Hoover PA-C    3.375 g  over 30 Minutes Intravenous Once 24 0126 24 0311              Review of Systems:  Constitutional-- + Fever, chills & sweats.  Appetite good, and no malaise. No fatigue.  HEENT-- No new vision, hearing or throat complaints.  No epistaxis or oral sores.  Denies odynophagia or dysphagia. No headache, photophobia or neck stiffness.  CV-- No chest pain, palpitation or syncope  Resp-- No SOB/cough/Hemoptysis  GI- No nausea, vomiting, or diarrhea.  No hematochezia, melena, or hematemesis. Denies jaundice or chronic liver disease.  -- No dysuria, hematuria, or flank pain.  Denies hesitancy, urgency or flank pain.    Heme- No active bruising or bleeding; no Hx of DVT or PE.  MS-- no swelling or pain in the bones or joints of arms/legs.  No new back pain.  Neuro-- + confusion, No acute focal weakness or numbness in the arms or legs.  No seizures.  Skin--No rashes or lesions      Physical Exam:   Vital Signs  Temp (24hrs), Av.8 °F (37.1 °C), Min:98.3 °F (36.8 °C), Max:99.5 °F (37.5 °C)    Temp  Min: 98.3 °F (36.8 °C)  Max: 99.5 °F (37.5 °C)  BP  Min: 111/56  Max: 167/96  Pulse  Min: 77  Max: 91  Resp  Min: 18  Max: 18  SpO2  Min: 92 %  Max: 98 %    GENERAL: Awake and alert, in no acute distress.   HEENT: Normocephalic, atraumatic.  EOMI. No conjunctival injection. No icterus. No orolabial lesions   NECK: Supple without nuchal rigidity. No mass.    HEART: RRR; No murmur, rubs, gallops.   LUNGS: Clear to auscultation bilaterally without wheezing, rales, rhonchi. Normal respiratory effort. Nonlabored. No dullness.  ABDOMEN: Soft, nontender, nondistended. Positive bowel sounds. No rebound or guarding. NO mass or HSM.  EXT:  No cyanosis, clubbing or edema. No cords.  :  Without Farooq catheter.  MSK: No joint effusions or erythema  SKIN: Warm and dry without  "cutaneous eruptions on Inspection/palpation.    NEURO: Oriented to PPT.  Motor 5/5 strength  PSYCHIATRIC:  Cooperative with PE    Laboratory Data    Results from last 7 days   Lab Units 12/24/24  0748 12/22/24  0306 12/21/24  0456   WBC 10*3/mm3 8.15 9.41 13.43*   HEMOGLOBIN g/dL 10.9* 10.6* 11.8*   HEMATOCRIT % 33.9* 34.3* 36.6*   PLATELETS 10*3/mm3 147 129* 144     Results from last 7 days   Lab Units 12/24/24  1835 12/24/24  0748   SODIUM mmol/L  --  137   POTASSIUM mmol/L 4.3 3.6   CHLORIDE mmol/L  --  101   CO2 mmol/L  --  22.0   BUN mg/dL  --  9   CREATININE mg/dL  --  0.61*   GLUCOSE mg/dL  --  147*   CALCIUM mg/dL  --  7.7*     Results from last 7 days   Lab Units 12/22/24  0306   ALK PHOS U/L 59   BILIRUBIN mg/dL 0.2   ALT (SGPT) U/L 23   AST (SGOT) U/L 42*         Results from last 7 days   Lab Units 12/21/24  0246   CRP mg/dL 5.45*     Results from last 7 days   Lab Units 12/21/24  0739   LACTATE mmol/L 2.0             Estimated Creatinine Clearance: 127.4 mL/min (A) (by C-G formula based on SCr of 0.61 mg/dL (L)).      Microbiology:  Blood Culture   Date Value Ref Range Status   12/21/2024 No growth at 3 days  Preliminary   12/21/2024 Abnormal Stain (C)  Preliminary     BCID, PCR   Date Value Ref Range Status   12/21/2024 Candida glabrata. Identification by BCID2 PCR. (A) Negative by BCID PCR. Culture to Follow. Final     No results found for: \"CULTURES\", \"HSVCX\", \"URCX\"  No results found for: \"EYECULTURE\", \"GCCX\", \"HSVCULTURE\", \"LABHSV\"  No results found for: \"LEGIONELLA\", \"MRSACX\", \"MUMPSCX\", \"MYCOPLASCX\"  No results found for: \"NOCARDIACX\", \"STOOLCX\"  Urine Culture   Date Value Ref Range Status   12/21/2024 No growth  Final     No results found for: \"VIRALCULTU\", \"WOUNDCX\"        Radiology:  Imaging Results (Last 72 Hours)       ** No results found for the last 72 hours. 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 He is unsure of prior antibiotic usage but he likely received multiple courses of antibiotics and in the setting of uncontrolled DM the likelihood of  invasive candida infection would be high    --  Cystoscopy, left ureteroscopy and balloon dilation with ureteral stent exchange 12/20/2024 for management of chronic left ureteral stricture. Urine culture obtained at the time of the procedure was negative but Candida can be missed on the micro protocol of discarding the specimen at 2 days incubation    -- Uncontrolled DM with most recent A1c 9.1 on 12/13    -- History of Ecoli renal abscess 11/2023    -- CAD    -- History of prostate cancer s/p surgery        PLAN/RECOMMENDATIONS:   Thank you for asking us to see Adlair Narvaez, I recommend the following:      -- Agree with micafungin 100mg IV daily    -- stop zosyn    -- If the patient is judged to be stable for discharge 12/25 he could be discharged after the am dose of micafungin and he could receive further doses at my office starting 12/26    -- If he is discharged I will plan to see him 12/27 in the office    -- I discussed outpatient ophthalmology f/u to r/o endophthalmitis    I discussed with him the importance of controlling his DM    He is at risk for endocarditis, endophthalmitis and recurring infections involving his ureteral stent.  Unfortunately C glabrata is typically not sensitive to fluconazole.  He may be a candidate for weekly therapy with a long acting echinocandin until his current stent can be changed out.     Complex MDM       Lindsey Cramer MD  12/24/2024  19:01 EST

## 2024-12-24 NOTE — PLAN OF CARE
Goal Outcome Evaluation:  Plan of Care Reviewed With: patient        Progress: improving  Outcome Evaluation: Pt demo good independence w/ functional mobility. No LOB noted w/ unsupported ambulation, SBA for safety and management of IV pole. Pt having met 2/3 goals at IE, no further IPPT services warranted. Educated on continued mobility during admission, pt verbalized understanding. PT signing off, please reconsult if further therapy services are warranted. Rec home w/ assist at d/c.    Anticipated Discharge Disposition (PT): home with assist

## 2024-12-24 NOTE — THERAPY DISCHARGE NOTE
Patient Name: Aldair Narvaez  : 1950    MRN: [id number]                              Today's Date: 2024       Admit Date: 2024    Visit Dx:     ICD-10-CM ICD-9-CM   1. Dysphagia, unspecified type  R13.10 787.20   2. Severe sepsis  A41.9 038.9    R65.20 995.92   3. Urological system complication of procedure  N99.89 997.5   4. Hydronephrosis of left kidney  N13.30 591   5. Fever and chills  R50.9 780.60   6. Lactic acidosis  E87.20 276.2   7. Type 2 diabetes mellitus with hyperglycemia, with long-term current use of insulin  E11.65 250.00    Z79.4 790.29     V58.67   8. Hypomagnesemia  E83.42 275.2   9. Hypotension, unspecified hypotension type  I95.9 458.9   10. History of hypertension  Z86.79 V12.59   11. History of coronary artery disease  Z86.79 V12.59   12. Chronic anticoagulation  Z79.01 V58.61   13. Obstructive sleep apnea  G47.33 327.23     Patient Active Problem List   Diagnosis    Benign hypertension    Mixed hyperlipidemia    Type 2 diabetes mellitus with hyperglycemia, without long-term current use of insulin    CAD, multiple vessel    Pyelonephritis    Personal history of prostate cancer    Paroxysmal A-fib    Bacteremia    Hydronephrosis of left kidney    Right arm weakness    Ureteral stricture, left    Sepsis    Type 2 diabetes mellitus, with long-term current use of insulin     Past Medical History:   Diagnosis Date    Acid reflux     Arthritis     Colon polyp     Coronary artery disease     Erectile dysfunction 2005    Heart disease     Hyperlipidemia     Hypertension     Kidney stone     Obesity     body mass index 30+-obesity    Prostate cancer     Sepsis 10/2023    urosepsis  [facility]    Sleep apnea     does not use c-pap since wt loss    Type 2 diabetes mellitus     Urinary incontinence 2005    Getting worse    Urinary tract infection 2003    In hospital twice    Wears dentures     upper plate only     Past Surgical History:   Procedure Laterality Date    CARDIAC  CATHETERIZATION      CARPAL TUNNEL RELEASE Left     COLONOSCOPY      CORONARY ARTERY BYPASS GRAFT  08/30/2022    3 Bypasses    CYSTOSCOPY BLADDER STONE LITHOTRIPSY      CYSTOSCOPY BLADDER STONE LITHOTRIPSY      CYSTOSCOPY RETROGRADE PYELOGRAM Left 10/31/2023    Procedure: CYSTOSCOPY RETROGRADE PYELOGRAM STENT PLACEMENT;  Surgeon: Isma Justin MD;  Location:  [facility];  Service: Urology;  Laterality: Left;    CYSTOSCOPY W/ URETERAL STENT PLACEMENT Left 05/09/2023    Procedure: CYSTOSCOPY LEFT RETROGRADE PYELOGRAM AND LEFT URETERAL STENT PLACEMENT;  Surgeon: Isma Justin MD;  Location:  [facility];  Service: Urology;  Laterality: Left;    CYSTOSCOPY, URETEROSCOPY, RETROGRADE PYELOGRAM, STONE EXTRACTION, STENT INSERTION Left 01/19/2024    Procedure: CYSTOSCOPY RETROGRADE PYELOGRAM AND STENT INSERTION;  Surgeon: Isma Justin MD;  Location:  [facility];  Service: Urology;  Laterality: Left;    ENDOSCOPY      INGUINAL HERNIA REPAIR Right     x 2  with mesh  umbilical with mesh    KNEE ARTHROSCOPY Bilateral     PROSTATECTOMY      ROTATOR CUFF REPAIR Left     TONSILLECTOMY      TRIGGER FINGER RELEASE Right     UMBILICAL HERNIA REPAIR      URETEROSCOPY LASER LITHOTRIPSY WITH STENT INSERTION Left 12/20/2024    Procedure: CYSTOSCOPY, LEFT URETEROSCOPY, URETERAL BALLOON, LEFT STENT EXCHANGE;  Surgeon: Isma Justin MD;  Location:  [facility];  Service: Urology;  Laterality: Left;    UVULOPALATOPHARYNGOPLASTY        General Information       Row Name 12/24/24 135          Physical Therapy Time and Intention    Document Type discharge evaluation/summary  -KE     Mode of Treatment physical therapy  -KE       Row Name 12/24/24 1352          General Information    Patient Profile Reviewed yes  -KE     Existing Precautions/Restrictions fall  -KE     Barriers to Rehab medically complex  -KE       Row Name 12/24/24 1354          Safety Issues/Impairments Affecting Functional Mobility    Safety Issues Affecting  Function (Mobility) safety precaution awareness  -KE     Impairments Affecting Function (Mobility) balance;endurance/activity tolerance  -KE               User Key  (r) = Recorded By, (t) = Taken By, (c) = Cosigned By      Initials Name Provider Type    Samantha Mohr, ANGUS Physical Therapist                   Mobility       Row Name 12/24/24 1401          Bed Mobility    Comment, (Bed Mobility) [facility] pre/post tx  -KE       Row Name 12/24/24 1401          Sit-Stand Transfer    Sit-Stand [place] (Transfers) standby assist  -KE       Row Name 12/24/24 1401          Gait/Stairs (Locomotion)    [place] Level (Gait) independent  -KE     Assistive Device (Gait) --  no AD  -KE     Patient was able to Ambulate yes  -KE     Distance in Feet (Gait) 350  -KE     Deviations/Abnormal Patterns (Gait) bilateral deviations;base of support, narrow;stride length decreased  -BRIAN     Comment, (Gait/Stairs) Demo step through gait pattern with decr step length and narrow CINDA. Pt participating in dynamic head turns throughout gait w/ no overt LOB noted. Pt declining further mobility requesting to go to restroom  -KE               User Key  (r) = Recorded By, (t) = Taken By, (c) = Cosigned By      Initials Name Provider Type    Samantha Mohr PT Physical Therapist                   Obj/Interventions       Row Name 12/24/24 1440          Balance    Balance Assessment sitting static balance;sitting dynamic balance;standing static balance;standing dynamic balance  -KE     Static Sitting Balance independent  -KE     Dynamic Sitting Balance independent  -KE     Position, Sitting Balance sitting in chair  -KE     Static Standing Balance independent  -KE     Dynamic Standing Balance standby assist  -BRIAN     Position/Device Used, Standing Balance unsupported  -KE     Balance Interventions sitting;sit to stand;static;supported;dynamic;standing  -KE     Comment, Balance No LOB noted  -KE               User Key  (r) = Recorded By, (t)  = Taken By, (c) = Cosigned By      Initials Name Provider Type    Samantha Mohr, PT Physical Therapist                   Goals/Plan       Row Name 12/24/24 1445          Bed Mobility Goal 1 (PT)    Activity/Assistive Device (Bed Mobility Goal 1, PT) bed mobility activities, all  -KE     [place] Level/Cues Needed (Bed Mobility Goal 1, PT) independent  -KE     Time Frame (Bed Mobility Goal 1, PT) short term goal (STG);1 day  -KE     Progress/Outcomes (Bed Mobility Goal 1, PT) goal met  -KE       Row Name 12/24/24 1445          Transfer Goal 1 (PT)    Activity/Assistive Device (Transfer Goal 1, PT) transfers, all  -KE     [place] Level/Cues Needed (Transfer Goal 1, PT) independent  -KE     Time Frame (Transfer Goal 1, PT) short term goal (STG);1 day  -KE     Progress/Outcome (Transfer Goal 1, PT) goal met  -KE       Row Name 12/24/24 1445          Gait Training Goal 1 (PT)    Activity/Assistive Device (Gait Training Goal 1, PT) gait (walking locomotion)  -KE     [place] Level (Gait Training Goal 1, PT) independent  -KE     Distance (Gait Training Goal 1, PT) 350  -KE     Time Frame (Gait Training Goal 1, PT) long term goal (LTG);10 days  -KE     Progress/Outcome (Gait Training Goal 1, PT) goal no longer appropriate;goal revised this date;goal met  -KE       Row Name 12/24/24 1446          Therapy Assessment/Plan (PT)    Planned Therapy Interventions (PT) balance training;bed mobility training;gait training;home exercise program;neuromuscular re-education;patient/family education;postural re-education;transfer training;stretching;strengthening;stair training;ROM (range of motion)  -KE               User Key  (r) = Recorded By, (t) = Taken By, (c) = Cosigned By      Initials Name Provider Type    Samantha Mohr, PT Physical Therapist                   Clinical Impression       Row Name 12/24/24 1441          Pain    Pretreatment Pain Rating 0/10 - no pain  -KE     Posttreatment Pain Rating 0/10  - no pain  -KE       Row Name 12/24/24 1441          Pain Scale: FACES Pre/Post-Treatment    Pain: FACES Scale, Pretreatment 0-->no hurt  -KE     Posttreatment Pain Rating 0-->no hurt  -KE       Row Name 12/24/24 1441          Plan of Care Review    Plan of Care Reviewed With patient  -KE     Progress improving  -KE     Outcome Evaluation Pt demo good independence w/ functional mobility. No LOB noted w/ unsupported ambulation, SBA for safety and management of IV pole. Pt having met 2/3 goals at , no further IPPT services warranted. Educated on continued mobility during admission, pt verbalized understanding. PT signing off, please reconsult if further therapy services are warranted. Rec home w/ assist at d/c.  -KE       Row Name 12/24/24 1441          Vital Signs    O2 Delivery Pre Treatment room air  -KE     O2 Delivery Intra Treatment room air  -KE     O2 Delivery Post Treatment room air  -KE     Pre Patient Position Supine  -KE     Intra Patient Position Standing  -KE     Post Patient Position Sitting  -KE       Row Name 12/24/24 1441          Positioning and Restraints    Pre-Treatment Position in bed  -KE     Post Treatment Position bathroom  -KE     Bathroom notified nsg;sitting;call light within reach;encouraged to call for assist  -               User Key  (r) = Recorded By, (t) = Taken By, (c) = Cosigned By      Initials Name Provider Type    Samantha Mohr, PT Physical Therapist                   Outcome Measures       Row Name 12/24/24 1446 12/24/24 0800       How much help from another person do you currently need...    Turning from your back to your side while in flat bed without using bedrails? 4  -KE 4  -HK    Moving from lying on back to sitting on the side of a flat bed without bedrails? 4  -KE 3  -HK    Moving to and from a bed to a chair (including a wheelchair)? 4  -KE 3  -HK    Standing up from a chair using your arms (e.g., wheelchair, bedside chair)? 4  -KE 3  -HK    Climbing 3-5  steps with a railing? 3  -KE 3  -HK    To walk in hospital room? 4  -KE 3  -HK    AM-PAC 6 Clicks Score (PT) 23  -KE 19  -HK    Highest Level of Mobility Goal 7 --> Walk 25 feet or more  -KE 6 --> Walk 10 steps or more  -HK      Row Name 12/24/24 1446          Functional Assessment    Outcome Measure Options AM-PAC 6 Clicks Basic Mobility (PT)  -KE               User Key  (r) = Recorded By, (t) = Taken By, (c) = Cosigned By      Initials Name Provider Type    Estrella Warner, RN Registered Nurse    Samantha Mohr, ANGUS Physical Therapist                  Physical Therapy Education       Title: PT OT SLP Therapies (In Progress)       Topic: Physical Therapy (In Progress)       Point: Mobility training (In Progress)       Learning Progress Summary            Patient Acceptance, E, VU by BRIAN at 12/24/2024 1447    Acceptance, E, VU by KE at 12/24/2024 1447    Acceptance, E,D, NR by CT at 12/22/2024 1528   Family Acceptance, E,D, NR by CT at 12/22/2024 1528                      Point: Home exercise program (In Progress)       Learning Progress Summary            Patient Acceptance, E, VU by KE at 12/24/2024 1447    Acceptance, E, VU by KE at 12/24/2024 1447    Acceptance, E,D, NR by CT at 12/22/2024 1528   Family Acceptance, E,D, NR by CT at 12/22/2024 1528                      Point: Body mechanics (In Progress)       Learning Progress Summary            Patient Acceptance, E, VU by KE at 12/24/2024 1447    Acceptance, E, VU by KE at 12/24/2024 1447    Acceptance, E,D, NR by CT at 12/22/2024 1528   Family Acceptance, E,D, NR by CT at 12/22/2024 1528                      Point: Precautions (In Progress)       Learning Progress Summary            Patient Acceptance, E, VU by KE at 12/24/2024 1447    Acceptance, E, VU by KE at 12/24/2024 1447    Acceptance, E,D, NR by CT at 12/22/2024 1528   Family Acceptance, E,D, NR by CT at 12/22/2024 1528                                      User Key       Initials Effective  Dates Name Provider Type Discipline    CT 07/07/23 -  Balta Garcia, PT Physical Therapist PT     11/16/23 -  Samantha Fernandes PT Physical Therapist PT                  PT Recommendation and Plan  Planned Therapy Interventions (PT): balance training, bed mobility training, gait training, home exercise program, neuromuscular re-education, patient/family education, postural re-education, transfer training, stretching, strengthening, stair training, ROM (range of motion)  Progress: improving  Outcome Evaluation: Pt demo good independence w/ functional mobility. No LOB noted w/ unsupported ambulation, SBA for safety and management of IV pole. Pt having met 2/3 goals at IE, no further IPPT services warranted. Educated on continued mobility during admission, pt verbalized understanding. PT signing off, please reconsult if further therapy services are warranted. Rec home w/ assist at d/c.     Time Calculation:         PT Charges       Row Name 12/24/24 1448             Time Calculation    Start Time 1343  -KE      PT Received On 12/24/24  -KE         Timed Charges    32589 - Gait Training Minutes  10  -KE         Total Minutes    Timed Charges Total Minutes 10  -KE       Total Minutes 10  -KE                User Key  (r) = Recorded By, (t) = Taken By, (c) = Cosigned By      Initials Name Provider Type    Samantha Mohr PT Physical Therapist                  Therapy Charges for Today       Code Description Service Date Service Provider Modifiers Qty    84783012948 HC GAIT TRAINING EA 15 MIN 12/24/2024 Samantha Fernandes PT GP 1            PT G-Codes  Outcome Measure Options: AM-PAC 6 Clicks Basic Mobility (PT)  AM-PAC 6 Clicks Score (PT): 23  AM-PAC 6 Clicks Score (OT): 21    PT Discharge Summary  Anticipated Discharge Disposition (PT): home with assist    Samantha Fernandes PT  12/24/2024

## 2024-12-24 NOTE — PLAN OF CARE
Problem: Adult Inpatient Plan of Care  Goal: Plan of Care Review  Outcome: Progressing  Goal: Patient-Specific Goal (Individualized)  Outcome: Progressing  Goal: Absence of Hospital-Acquired Illness or Injury  Outcome: Progressing  Intervention: Identify and Manage Fall Risk  Recent Flowsheet Documentation  Taken 12/24/2024 0200 by Rosalba Liang RN  Safety Promotion/Fall Prevention:   activity supervised   assistive device/personal items within reach   clutter free environment maintained   fall prevention program maintained   lighting adjusted   nonskid shoes/slippers when out of bed   room organization consistent   safety round/check completed  Taken 12/24/2024 0000 by Rosalba Liang RN  Safety Promotion/Fall Prevention:   activity supervised   assistive device/personal items within reach   clutter free environment maintained   fall prevention program maintained   lighting adjusted   nonskid shoes/slippers when out of bed   room organization consistent   safety round/check completed  Taken 12/23/2024 2200 by Rosalba Liang RN  Safety Promotion/Fall Prevention:   activity supervised   assistive device/personal items within reach   clutter free environment maintained   fall prevention program maintained   lighting adjusted   nonskid shoes/slippers when out of bed   room organization consistent   safety round/check completed  Taken 12/23/2024 2000 by Rosalba Liang RN  Safety Promotion/Fall Prevention:   activity supervised   assistive device/personal items within reach   clutter free environment maintained   fall prevention program maintained   lighting adjusted   nonskid shoes/slippers when out of bed   room organization consistent   safety round/check completed  Intervention: Prevent Skin Injury  Recent Flowsheet Documentation  Taken 12/24/2024 0200 by Rosalba Liang RN  Body Position: position changed independently  Skin Protection:   incontinence pads utilized   transparent  dressing maintained  Taken 12/24/2024 0000 by Rosalba Liang RN  Body Position:   left   position changed independently  Skin Protection:   incontinence pads utilized   transparent dressing maintained  Taken 12/23/2024 2200 by Rosalba Liang RN  Body Position:   left   position changed independently  Skin Protection:   incontinence pads utilized   transparent dressing maintained  Taken 12/23/2024 2000 by Rosalba Liang RN  Body Position: left  Skin Protection:   incontinence pads utilized   skin sealant/moisture barrier applied   transparent dressing maintained  Intervention: Prevent and Manage VTE (Venous Thromboembolism) Risk  Recent Flowsheet Documentation  Taken 12/23/2024 2000 by Rosalba Liang RN  VTE Prevention/Management: (see MAR)   bilateral   SCDs (sequential compression devices) off   other (see comments)  Intervention: Prevent Infection  Recent Flowsheet Documentation  Taken 12/24/2024 0200 by Rosalba Liang RN  Infection Prevention:   environmental surveillance performed   equipment surfaces disinfected   hand hygiene promoted   personal protective equipment utilized   rest/sleep promoted  Taken 12/24/2024 0000 by Rosalba Liang RN  Infection Prevention:   environmental surveillance performed   equipment surfaces disinfected   hand hygiene promoted   personal protective equipment utilized   rest/sleep promoted  Taken 12/23/2024 2200 by Rosalba Liang RN  Infection Prevention:   environmental surveillance performed   equipment surfaces disinfected   hand hygiene promoted   personal protective equipment utilized   rest/sleep promoted  Taken 12/23/2024 2000 by Rosalba Liang RN  Infection Prevention:   environmental surveillance performed   equipment surfaces disinfected   hand hygiene promoted   personal protective equipment utilized   rest/sleep promoted  Goal: Optimal Comfort and Wellbeing  Outcome: Progressing  Intervention: Provide  Person-Centered Care  Recent Flowsheet Documentation  Taken 12/23/2024 2000 by Rosalba Liang RN  Trust Relationship/Rapport: care explained  Goal: Readiness for Transition of Care  Outcome: Progressing     Problem: Fall Injury Risk  Goal: Absence of Fall and Fall-Related Injury  Outcome: Progressing  Intervention: Identify and Manage Contributors  Recent Flowsheet Documentation  Taken 12/24/2024 0200 by Rosalba Liang RN  Medication Review/Management: medications reviewed  Self-Care Promotion:   independence encouraged   BADL personal objects within reach  Taken 12/24/2024 0000 by Rosalba Liang RN  Medication Review/Management: medications reviewed  Self-Care Promotion:   independence encouraged   BADL personal objects within reach  Taken 12/23/2024 2200 by Rosalba Liang RN  Medication Review/Management: medications reviewed  Self-Care Promotion:   independence encouraged   BADL personal objects within reach  Taken 12/23/2024 2000 by Rosalba Liang RN  Medication Review/Management: medications reviewed  Self-Care Promotion:   independence encouraged   BADL personal objects within reach  Intervention: Promote Injury-Free Environment  Recent Flowsheet Documentation  Taken 12/24/2024 0200 by Rosalba Liang RN  Safety Promotion/Fall Prevention:   activity supervised   assistive device/personal items within reach   clutter free environment maintained   fall prevention program maintained   lighting adjusted   nonskid shoes/slippers when out of bed   room organization consistent   safety round/check completed  Taken 12/24/2024 0000 by Rosalba Liang RN  Safety Promotion/Fall Prevention:   activity supervised   assistive device/personal items within reach   clutter free environment maintained   fall prevention program maintained   lighting adjusted   nonskid shoes/slippers when out of bed   room organization consistent   safety round/check completed  Taken 12/23/2024 2200  by Rosalba Liang RN  Safety Promotion/Fall Prevention:   activity supervised   assistive device/personal items within reach   clutter free environment maintained   fall prevention program maintained   lighting adjusted   nonskid shoes/slippers when out of bed   room organization consistent   safety round/check completed  Taken 12/23/2024 2000 by Rosalba Liang RN  Safety Promotion/Fall Prevention:   activity supervised   assistive device/personal items within reach   clutter free environment maintained   fall prevention program maintained   lighting adjusted   nonskid shoes/slippers when out of bed   room organization consistent   safety round/check completed     Problem: Skin Injury Risk Increased  Goal: Skin Health and Integrity  Outcome: Progressing  Intervention: Optimize Skin Protection  Recent Flowsheet Documentation  Taken 12/24/2024 0200 by Rosalba Liang RN  Activity Management: activity encouraged  Pressure Reduction Techniques:   frequent weight shift encouraged   pressure points protected   weight shift assistance provided  Head of Bed (HOB) Positioning: [facility] elevated  Pressure Reduction Devices:   positioning supports utilized   pressure-redistributing mattress utilized  Skin Protection:   incontinence pads utilized   transparent dressing maintained  Taken 12/24/2024 0000 by Rosalba Liang RN  Activity Management: activity encouraged  Pressure Reduction Techniques:   frequent weight shift encouraged   pressure points protected   weight shift assistance provided  Head of Bed (HOB) Positioning: HOB elevated  Pressure Reduction Devices:   positioning supports utilized   pressure-redistributing mattress utilized  Skin Protection:   incontinence pads utilized   transparent dressing maintained  Taken 12/23/2024 2200 by Rosalba Liang, RN  Activity Management: activity encouraged  Pressure Reduction Techniques:   frequent weight shift encouraged   pressure points protected    weight shift assistance provided  Head of Bed (HOB) Positioning: [facility] elevated  Pressure Reduction Devices:   positioning supports utilized   pressure-redistributing mattress utilized  Skin Protection:   incontinence pads utilized   transparent dressing maintained  Taken 12/23/2024 2000 by Rosalba Liang RN  Activity Management: activity encouraged  Pressure Reduction Techniques:   frequent weight shift encouraged   pressure points protected   weight shift assistance provided  Head of Bed (HOB) Positioning: [facility] elevated  Pressure Reduction Devices:   heel offloading device utilized   positioning supports utilized   pressure-redistributing mattress utilized  Skin Protection:   incontinence pads utilized   skin sealant/moisture barrier applied   transparent dressing maintained   Goal Outcome Evaluation:

## 2024-12-25 ENCOUNTER — READMISSION MANAGEMENT (OUTPATIENT)
Dept: CALL CENTER | Facility: HOSPITAL | Age: 74
End: 2024-12-25
Payer: MEDICARE

## 2024-12-25 VITALS
RESPIRATION RATE: 18 BRPM | TEMPERATURE: 98.8 F | BODY MASS INDEX: 30.35 KG/M2 | HEART RATE: 77 BPM | DIASTOLIC BLOOD PRESSURE: 85 MMHG | HEIGHT: 70 IN | WEIGHT: 212 LBS | SYSTOLIC BLOOD PRESSURE: 150 MMHG | OXYGEN SATURATION: 93 %

## 2024-12-25 PROBLEM — A41.9 SEPSIS: Status: RESOLVED | Noted: 2024-12-21 | Resolved: 2024-12-25

## 2024-12-25 PROBLEM — N12 PYELONEPHRITIS: Status: RESOLVED | Noted: 2023-05-08 | Resolved: 2024-12-25

## 2024-12-25 LAB
GLUCOSE BLDC GLUCOMTR-MCNC: 145 MG/DL (ref 70–130)
GLUCOSE BLDC GLUCOMTR-MCNC: 252 MG/DL (ref 70–130)
QT INTERVAL: 406 MS
QTC INTERVAL: 488 MS

## 2024-12-25 PROCEDURE — 25010000002 MICAFUNGIN SODIUM 100 MG RECONSTITUTED SOLUTION 1 EACH VIAL: Performed by: INTERNAL MEDICINE

## 2024-12-25 PROCEDURE — 99239 HOSP IP/OBS DSCHRG MGMT >30: CPT | Performed by: NURSE PRACTITIONER

## 2024-12-25 PROCEDURE — 25010000002 PIPERACILLIN SOD-TAZOBACTAM PER 1 G: Performed by: INTERNAL MEDICINE

## 2024-12-25 PROCEDURE — 63710000001 INSULIN LISPRO (HUMAN) PER 5 UNITS: Performed by: INTERNAL MEDICINE

## 2024-12-25 PROCEDURE — 63710000001 INSULIN GLARGINE PER 5 UNITS: Performed by: INTERNAL MEDICINE

## 2024-12-25 PROCEDURE — 82948 REAGENT STRIP/BLOOD GLUCOSE: CPT

## 2024-12-25 RX ORDER — ECHINACEA PURPUREA EXTRACT 125 MG
2 TABLET ORAL AS NEEDED
Start: 2024-12-25

## 2024-12-25 RX ORDER — ACETAMINOPHEN 325 MG/1
650 TABLET ORAL EVERY 6 HOURS PRN
Start: 2024-12-25

## 2024-12-25 RX ADMIN — INSULIN LISPRO 3 UNITS: 100 INJECTION, SOLUTION INTRAVENOUS; SUBCUTANEOUS at 11:54

## 2024-12-25 RX ADMIN — PIPERACILLIN AND TAZOBACTAM 3.38 G: 3; .375 INJECTION, POWDER, LYOPHILIZED, FOR SOLUTION INTRAVENOUS at 10:09

## 2024-12-25 RX ADMIN — PANTOPRAZOLE SODIUM 40 MG: 40 TABLET, DELAYED RELEASE ORAL at 08:37

## 2024-12-25 RX ADMIN — Medication 10 ML: at 08:36

## 2024-12-25 RX ADMIN — APIXABAN 5 MG: 5 TABLET, FILM COATED ORAL at 08:37

## 2024-12-25 RX ADMIN — PIPERACILLIN AND TAZOBACTAM 3.38 G: 3; .375 INJECTION, POWDER, LYOPHILIZED, FOR SOLUTION INTRAVENOUS at 02:30

## 2024-12-25 RX ADMIN — INSULIN LISPRO 3 UNITS: 100 INJECTION, SOLUTION INTRAVENOUS; SUBCUTANEOUS at 08:35

## 2024-12-25 RX ADMIN — DULOXETINE HYDROCHLORIDE 30 MG: 30 CAPSULE, DELAYED RELEASE ORAL at 08:37

## 2024-12-25 RX ADMIN — INSULIN LISPRO 4 UNITS: 100 INJECTION, SOLUTION INTRAVENOUS; SUBCUTANEOUS at 11:54

## 2024-12-25 RX ADMIN — Medication 2 SPRAY: at 08:37

## 2024-12-25 RX ADMIN — INSULIN GLARGINE 10 UNITS: 100 INJECTION, SOLUTION SUBCUTANEOUS at 08:36

## 2024-12-25 RX ADMIN — MICAFUNGIN 100 MG: 20 INJECTION, POWDER, LYOPHILIZED, FOR SOLUTION INTRAVENOUS at 08:37

## 2024-12-25 NOTE — OUTREACH NOTE
Prep Survey      Flowsheet Row Responses   [facility] facility patient discharged from? [place]   Is LACE score < 7 ? No   Eligibility Readm Mgmt   Discharge diagnosis Sepsis   Does the patient have one of the following disease processes/diagnoses(primary or secondary)? Sepsis   Prep survey completed? Yes            Brittnee JACK - Registered Nurse

## 2024-12-25 NOTE — PLAN OF CARE
Goal Outcome Evaluation:  Plan of Care Reviewed With: patient        Progress: improving  Outcome Evaluation: patient discharged home with spouse

## 2024-12-25 NOTE — DISCHARGE SUMMARY
[facility] Medicine Services  DISCHARGE SUMMARY    Patient Name: Aldair Narvaez  : 1950  MRN: [id number]    Date of Admission: 2024  Date of Discharge:  2024  Primary Care Physician: Miguel Angel Mireles MD    Consults       Date and Time Order Name Status Description    2024 12:41 PM Inpatient Infectious Diseases Consult Completed     2024  4:16 AM Inpatient Urology Consult Completed             Hospital Course     Presenting Problem:   Sepsis [A41.9]    Active Hospital Problems    Diagnosis  POA    Type 2 diabetes mellitus, with long-term current use of insulin [E11.9, Z79.4]  Not Applicable      Resolved Hospital Problems    Diagnosis Date Resolved POA    **Sepsis [A41.9] 2024 Yes    Pyelonephritis [N12] 2024 Yes      Hospital Course:  Aldair Narvaez is a 74 y.o. male PMH of prostate cancer with s/p prostatectomy, chronic left ureteral stricture, prior renal abscess (E. coli) bacteremia who underwent cystoscopy, LT urethral balloon dilatation and stent exchange 2024 with Dr. Justin and was at home when he developed fevers, rigors, and encephalopathy.  On arrival he was febrile with temperature 102.9, tachycardic and hypotensive.  White blood cell was 13.4 and CT A/P showed LT urethral stent, hydronephrosis, nonspecific perinephric/ureteric stranding.  He was started on broad-spectrum antibiotics, stress dose steroids and midodrine.  Urine culture returned no growth; however, he continues to improve with empiric antibiotics.     Assessment/Plan  Severe sepsis due to acute complicated UTI/pyelonephritis  Chronic LT ureteral stricture s/p cysto, balloon dilation, stent exchange 2024  - surgical culture from recent procedure without growth; UrCx without growth  - BP improved and was able to stop IV hydrocortisone, decrease midodrine from 5 mg to 2.5 mg, then stopped it completely  - ID consulted. Pt received micafungin and zosyn. On ,  zosyn discontinued per ID and patient was continued on micafungin.     Candida glabrata in aerobic bottle 12/24  - pt receiving micafungin 100 mg q 24 hours  - ID consulted    HTN/HLD/PAF- eliquis, statin, holding metoprolol, valsartan, HCTZ  T2DM: A1C 9.1  GERD: PPI  Hx prostate cancer s/p prostatectomy, ? History of radiation therapy.    Addendum: Spoke with Dr. Cramer, ID regarding both blood cx growing yeast. She confirmed he does not need to return to the hospital and will address with patient as OP. I forwarded the results to Dr. Cramer    Discharge Follow Up Recommendations for labs/diagnostics:  Follow up at [facility] 12/26 for IV antibiotics  Follow up with Dr Cramer 12/27/24  Follow up with ophthalmology to r/o endophthalmitis  Follow up with PCP in 1 week  Follow up with Dr Justin in 6 weeks      Day of Discharge     HPI:   Pt sitting up in bed, denies chills, fever, headache, nausea, vomiting. He reports he is ready to return home.     Review of Systems  Otherwise ROS is negative except as mentioned in the HPI.    Vital Signs:   Temp:  [97.7 °F (36.5 °C)-99 °F (37.2 °C)] 97.7 °F (36.5 °C)  Heart Rate:  [] 88  Resp:  [16-18] 16  BP: (137-167)/(80-96) 160/89     Physical Exam:  Constitutional: Awake, alert, NAD  HENT: NCAT, mucous membranes moist  Respiratory: Clear to auscultation bilaterally, nonlabored   Cardiovascular: RRR, no murmurs, rubs, or gallops  Gastrointestinal: Positive bowel sounds, soft, nontender, nondistended  Musculoskeletal: No bilateral ankle edema  Psychiatric: Appropriate affect, cooperative  Neurologic: Oriented x 3, ESCAMILLA, speech clear  Skin: No rashes      Pertinent  and/or Most Recent Results     Results from last 7 days   Lab Units 12/24/24  1835 12/24/24  0748 12/22/24  0306 12/21/24  1335 12/21/24  0456 12/21/24  0246 12/21/24  0049   WBC 10*3/mm3  --  8.15 9.41  --  13.43*  --  13.25*   HEMOGLOBIN g/dL  --  10.9* 10.6*  --  11.8*  --  13.0   HEMATOCRIT %  --  33.9* 34.3*   "--  36.6*  --  40.4   PLATELETS 10*3/mm3  --  147 129*  --  144  --  164   SODIUM mmol/L  --  137 133*  --  136 134* 135*   POTASSIUM mmol/L 4.3 3.6 4.2 4.8 4.1 3.6 4.1   CHLORIDE mmol/L  --  101 103  --  103 101 100   CO2 mmol/L  --  22.0 20.0*  --  21.0* 22.0 22.0   BUN mg/dL  --  9 17  --  17 17 16   CREATININE mg/dL  --  0.61* 0.67*  --  0.94 0.85 0.90   GLUCOSE mg/dL  --  147* 161*  --  199* 232* 267*   CALCIUM mg/dL  --  7.7* 7.2*  --  7.3* 7.7* 8.3*     Results from last 7 days   Lab Units 12/22/24  0306 12/21/24  0456 12/21/24  0049   BILIRUBIN mg/dL 0.2 0.4 0.4   ALK PHOS U/L 59 56 67   ALT (SGPT) U/L 23 16 14   AST (SGOT) U/L 42* 28 29           Invalid input(s): \"TG\", \"LDLCALC\", \"LDLREALC\"  Results from last 7 days   Lab Units 12/21/24  0739 12/21/24  0456 12/21/24  0246 12/21/24  0049   HEMOGLOBIN A1C %  --   --   --  9.10*   HSTROP T ng/L  --   --  29* 27*   PROCALCITONIN ng/mL  --   --   --  1.37*   LACTATE mmol/L 2.0 2.7*  --  3.0*       Brief Urine Lab Results  (Last result in the past 365 days)        Color   Clarity   Blood   Leuk Est   Nitrite   Protein   CREAT   Urine HCG        12/21/24 0158 Orange   Turbid   Large (3+)   Moderate (2+)   Negative   >=300 mg/dL (3+)                   Microbiology Results Abnormal       Procedure Component Value - Date/Time    Blood Culture - Blood, Arm, Right [[id number]]  (Abnormal) Collected: 12/21/24 0126    Lab Status: Preliminary result Specimen: Blood from Arm, Right Updated: 12/25/24 1029     Blood Culture Abnormal Stain     Gram Stain Aerobic Bottle Budding yeast    Blood Culture - Blood, Arm, Left [[id number]]  (Abnormal) Collected: 12/21/24 0126    Lab Status: Preliminary result Specimen: Blood from Arm, Left Updated: 12/25/24 0635     Blood Culture Growth present, too young to evaluate     Gram Stain Aerobic Bottle Budding yeast    Blood Culture ID, PCR - Blood, Arm, Left [[id number]]  (Abnormal) Collected: 12/21/24 0126    Lab Status: Final result " Specimen: Blood from Arm, Left Updated: 12/24/24 1254     BCID, PCR Candida glabrata. Identification by BCID2 PCR.     BOTTLE TYPE Aerobic Bottle    Narrative:      Infectious disease consultation is highly recommended to rule out distant foci of infection.            Imaging Results (All)       Procedure Component Value Units Date/Time    CT Abdomen Pelvis Without Contrast [[id number]] Collected: 12/21/24 0234     Updated: 12/21/24 0241    Narrative:      CT ABDOMEN PELVIS WO CONTRAST    Date of Exam: 12/21/2024 1:37 AM EST    Indication: post-operative fever and left ureteral stent exchange.    Comparison: 12/4/2023 and retrograde pyelogram 1/19/2024.    Technique: Axial CT images were obtained of the abdomen and pelvis without the administration of contrast. Reconstructed coronal and sagittal images were also obtained. Automated exposure control and iterative construction methods were used.      Findings:  Heart size is normal. There are coronary artery calcifications. Patient appears status post coronary stenting and median sternotomy. There is a normal distal esophagus. There is scarring in the left lung base. Right lung base is clear. There are no acute   findings in the superficial soft tissues. Patient appears status post remote ventral laparotomy. No acute osseous abnormality or destructive bone lesion. There is isolated severe L5-S1 disc degeneration with mild degenerative changes elsewhere in the   lumbar spine. Mild productive DJD is present at both hips.    The liver is homogeneous. There is cholelithiasis without evidence of acute cholecystitis. The bile ducts, pancreas, stomach, spleen and adrenal glands appear normal. There are small simple appearing right-sided kidney cysts. The left kidney appears   homogeneous. There is a left ureteral stent in place. The stent appears appropriately positioned. There is new left-sided hydronephrosis and asymmetric left-sided perinephric and periureteric stranding  raising concern for either obstructive uropathy or   urinary tract infection. There is no obvious stone along the course of the left ureteral stent, but the ureter is dilated down to the distal ureter. Patient appears status post prostatectomy. Numerous surgical clips are noted in the prostatectomy bed.   Normal appendix. There is distal colonic diverticulosis. There is mild wall thickening at the urinary bladder along with a small amount of urinary bladder gas, which could also be related to urinary tract infection. Small bowel is nondistended. No   ascites, pneumoperitoneum or lymphadenopathy.      Impression:      Impression:  1.Left ureteral stent appears appropriately positioned. There is new left-sided hydronephrosis and asymmetric left-sided perinephric and periureteric stranding raising concern for either obstructive uropathy or urinary tract infection. No radiodense   urolithiasis. There is also urinary bladder wall thickening and gas in the urinary bladder that could also be related to urinary tract infection.  2.Status post prostatectomy.  3.Cholelithiasis without evidence of acute cholecystitis.  4.Colonic diverticulosis.                Electronically Signed: Jefferson Block MD    12/21/2024 2:38 AM EST    Workstation ID: [id number]    XR Chest 1 View [[id number]] Collected: 12/21/24 0146     Updated: 12/21/24 0149    Narrative:      XR CHEST 1 VW    Date of Exam: 12/21/2024 12:56 AM EST    Indication: Weak/Dizzy/AMS triage protocol    Comparison: 10/29/2023.    Findings:  There is evidence of prior median sternotomy and CABG. There is no pneumothorax, pleural effusion or focal airspace consolidation. Heart size and pulmonary vasculature appear within normal limits. Regional bones appear intact.      Impression:      Impression:  No acute cardiopulmonary abnormality.          Electronically Signed: Jefferson Block MD    12/21/2024 1:46 AM EST    Workstation ID: [id number]                        Pending Labs        Order Current Status    Blood Culture - Blood, Arm, Left Preliminary result    Blood Culture - Blood, Arm, Right Preliminary result          Discharge Details        Discharge Medications        New Medications        Instructions Start Date   acetaminophen 325 MG tablet  Commonly known as: TYLENOL   650 mg, Oral, Every 6 Hours PRN      micafungin sodium 100 mg in sodium chloride 0.9 % 100 mL IVPB   100 mg, Intravenous, Every 24 Hours   Start Date: December 26, 2024     sodium chloride 0.65 % nasal spray   2 sprays, Nasal, As Needed             Continue These Medications        Instructions Start Date   albuterol sulfate  (90 Base) MCG/ACT inhaler  Commonly known as: PROVENTIL HFA;VENTOLIN HFA;PROAIR HFA   As Needed      Blood Glucose Monitoring Suppl device   Use to check blood sugars 3-4 times daily. E11.65      dapagliflozin Propanediol 10 MG tablet  Commonly known as: Farxiga   10 mg, Oral, Daily      DULoxetine 30 MG capsule  Commonly known as: CYMBALTA   30 mg, Daily      Eliquis 5 MG tablet tablet  Generic drug: apixaban   5 mg, Every 12 Hours Scheduled      ezetimibe 10 MG tablet  Commonly known as: ZETIA   Take 1 tablet by mouth Daily.      glimepiride 4 MG tablet  Commonly known as: AMARYL   8 mg, Oral, Every Morning Before Breakfast      HYDROcodone-acetaminophen 5-325 MG per tablet  Commonly known as: NORCO   1 tablet, Oral, Every 8 Hours PRN      Hydrocortisone (Perianal) 1 % cream rectal cream  Commonly known as: PROCTOCORT   1 Application, Rectal, As Needed      hyoscyamine 0.125 MG SL tablet  Commonly known as: Levsin/SL   0.125 mg, Sublingual, Every 4 Hours PRN      ipratropium-albuterol 0.5-2.5 mg/3 ml nebulizer  Commonly known as: DUO-NEB   3 mL, Nebulization, Every 6 Hours PRN      Krill Oil Ultra Strength 1500 MG capsule   1 capsule, Daily      loperamide 2 MG capsule  Commonly known as: IMODIUM   2 mg, Oral, 4 Times Daily PRN      Magic Mushroom Mix capsule   1,000 mg, Daily       nitroglycerin 0.4 MG SL tablet  Commonly known as: NITROSTAT   nitroglycerin 0.4 mg sublingual tablet      pantoprazole 40 MG EC tablet  Commonly known as: PROTONIX   40 mg, Daily      Pen Needles 32G X 4 MM misc   1 each, Not Applicable, Daily      phenazopyridine 200 MG tablet  Commonly known as: Pyridium   200 mg, Oral, 3 Times Daily PRN      rosuvastatin 20 MG tablet  Commonly known as: CRESTOR   20 mg, Daily      Tresiba FlexTouch 100 UNIT/ML solution pen-injector injection  Generic drug: insulin degludec   Inject once daily; max daily dose 50u             Stop These Medications      ciprofloxacin 500 MG tablet  Commonly known as: Cipro     Contour Test test strip  Generic drug: glucose blood     glucose blood test strip     metoprolol succinate XL 50 MG 24 hr tablet  Commonly known as: TOPROL-XL     oxybutynin XL 5 MG 24 hr tablet  Commonly known as: DITROPAN-XL     valsartan-hydrochlorothiazide 320-25 MG per tablet  Commonly known as: DIOVAN-HCT              Allergies   Allergen Reactions    Iodine Other (See Comments)     Closes sinuses    Oxycodone-Acetaminophen Itching    Zofran [Ondansetron] Hives         Discharge Disposition:  Home or Self Care    Discharge Diet:  Diet Order   Procedures    Diet: Cardiac, Diabetic; Healthy Heart (2-3 Na+); Consistent Carbohydrate; Fluid Consistency: Thin (IDDSI 0)         Discharge Activity:   Activity Instructions       Activity as Tolerated      Up WIth Assist                CODE STATUS:    Code Status and Medical Interventions: CPR (Attempt to Resuscitate); Full Support   Ordered at: 12/21/24 0351     Level Of Support Discussed With:    Patient    Next of Kin (If No Surrogate)     Code Status (Patient has no pulse and is not breathing):    CPR (Attempt to Resuscitate)     Medical Interventions (Patient has pulse or is breathing):    Full Support         Future Appointments   Date Time Provider Department Center   1/20/2025  1:40 PM Isma Justin MD [facility]   6/13/2025  3:00 PM Steve Cohen MD [facility]       Additional Instructions for the Follow-ups that You Need to Schedule       Discharge Follow-up with PCP   As directed       Currently Documented PCP:    Miguel Angel Mireles MD    PCP Phone Number:    [phone]     Follow Up Details: Follow up with PCP in 1 week        Discharge Follow-up with Specified Provider: Dr Justin; 6 Weeks   As directed      To: Dr Justin   Follow Up: 6 Weeks        Discharge Follow-up with Specified Provider: Dr Cramer; 2 Days   As directed      To: Dr Cramer   Follow Up: 2 Days        Discharge Follow-up with Specified Provider: YANIQUE; 1 Day   As directed      To: [facility]   Follow Up: 1 Day                Time Spent on Discharge:  58 minutes    Electronically signed by JERRICA Silva, 12/25/24, 10:38 AM EST.

## 2024-12-25 NOTE — PLAN OF CARE
Problem: Adult Inpatient Plan of Care  Goal: Plan of Care Review  Outcome: Progressing  Goal: Patient-Specific Goal (Individualized)  Outcome: Progressing  Goal: Absence of Hospital-Acquired Illness or Injury  Outcome: Progressing  Intervention: Identify and Manage Fall Risk  Recent Flowsheet Documentation  Taken 12/25/2024 0400 by Rosalba Liang RN  Safety Promotion/Fall Prevention:   activity supervised   assistive device/personal items within reach   clutter free environment maintained   fall prevention program maintained   lighting adjusted   nonskid shoes/slippers when out of bed   room organization consistent   safety round/check completed  Taken 12/25/2024 0200 by Rosalba Liang RN  Safety Promotion/Fall Prevention:   activity supervised   assistive device/personal items within reach   clutter free environment maintained   fall prevention program maintained   lighting adjusted   nonskid shoes/slippers when out of bed   room organization consistent   safety round/check completed  Taken 12/25/2024 0000 by Rosalba Liang RN  Safety Promotion/Fall Prevention:   activity supervised   assistive device/personal items within reach   clutter free environment maintained   fall prevention program maintained   lighting adjusted   nonskid shoes/slippers when out of bed   room organization consistent   safety round/check completed  Taken 12/24/2024 2200 by Rosalba Liang RN  Safety Promotion/Fall Prevention:   activity supervised   assistive device/personal items within reach   clutter free environment maintained   fall prevention program maintained   lighting adjusted   nonskid shoes/slippers when out of bed   room organization consistent   safety round/check completed  Taken 12/24/2024 2000 by Rosalba Liang RN  Safety Promotion/Fall Prevention:   activity supervised   assistive device/personal items within reach   clutter free environment maintained   fall prevention program  maintained   lighting adjusted   nonskid shoes/slippers when out of bed   room organization consistent   safety round/check completed  Intervention: Prevent Skin Injury  Recent Flowsheet Documentation  Taken 12/25/2024 0400 by Rosalba Liang RN  Body Position:   left   position changed independently  Skin Protection:   incontinence pads utilized   transparent dressing maintained  Taken 12/25/2024 0200 by Rosalba Liang RN  Body Position: position changed independently  Skin Protection:   incontinence pads utilized   transparent dressing maintained  Taken 12/25/2024 0000 by Rosalba Liang RN  Body Position: position changed independently  Skin Protection:   incontinence pads utilized   transparent dressing maintained  Taken 12/24/2024 2200 by Rosalba Liang RN  Body Position: position changed independently  Skin Protection:   incontinence pads utilized   transparent dressing maintained  Taken 12/24/2024 2000 by Rosalba Liang RN  Body Position: position changed independently  Skin Protection:   incontinence pads utilized   transparent dressing maintained   silicone foam dressing in place  Intervention: Prevent and Manage VTE (Venous Thromboembolism) Risk  Recent Flowsheet Documentation  Taken 12/24/2024 2000 by Rosalba Liang RN  VTE Prevention/Management: (See MAR) other (see comments)  Intervention: Prevent Infection  Recent Flowsheet Documentation  Taken 12/25/2024 0400 by Rosalba Liang RN  Infection Prevention:   environmental surveillance performed   equipment surfaces disinfected   hand hygiene promoted   personal protective equipment utilized   rest/sleep promoted  Taken 12/25/2024 0200 by Rosalba Liang RN  Infection Prevention:   environmental surveillance performed   equipment surfaces disinfected   hand hygiene promoted   personal protective equipment utilized   rest/sleep promoted  Taken 12/25/2024 0000 by Rosalba Liang RN  Infection  Prevention:   environmental surveillance performed   equipment surfaces disinfected   hand hygiene promoted   personal protective equipment utilized   rest/sleep promoted  Taken 12/24/2024 2200 by Rosalba Liang RN  Infection Prevention:   environmental surveillance performed   equipment surfaces disinfected   hand hygiene promoted   personal protective equipment utilized   rest/sleep promoted  Taken 12/24/2024 2000 by Rosalba Liang RN  Infection Prevention:   environmental surveillance performed   equipment surfaces disinfected   hand hygiene promoted   personal protective equipment utilized   rest/sleep promoted  Goal: Optimal Comfort and Wellbeing  Outcome: Progressing  Intervention: Provide Person-Centered Care  Recent Flowsheet Documentation  Taken 12/24/2024 2000 by Rosalba Liang RN  Trust Relationship/Rapport: care explained  Goal: Readiness for Transition of Care  Outcome: Progressing     Problem: Fall Injury Risk  Goal: Absence of Fall and Fall-Related Injury  Outcome: Progressing  Intervention: Identify and Manage Contributors  Recent Flowsheet Documentation  Taken 12/25/2024 0400 by Rosalba Liang RN  Medication Review/Management: medications reviewed  Self-Care Promotion:   independence encouraged   BADL personal objects within reach  Taken 12/25/2024 0200 by Rosalba Liang RN  Medication Review/Management: medications reviewed  Self-Care Promotion:   independence encouraged   BADL personal objects within reach  Taken 12/25/2024 0000 by Rosalba Liang RN  Medication Review/Management: medications reviewed  Self-Care Promotion:   independence encouraged   BADL personal objects within reach  Taken 12/24/2024 2200 by Rosalba Liang RN  Medication Review/Management: medications reviewed  Self-Care Promotion:   independence encouraged   BADL personal objects within reach  Taken 12/24/2024 2000 by Rosalba Liang RN  Medication Review/Management:  medications reviewed  Self-Care Promotion:   independence encouraged   BADL personal objects within reach  Intervention: Promote Injury-Free Environment  Recent Flowsheet Documentation  Taken 12/25/2024 0400 by Rosalba Liang RN  Safety Promotion/Fall Prevention:   activity supervised   assistive device/personal items within reach   clutter free environment maintained   fall prevention program maintained   lighting adjusted   nonskid shoes/slippers when out of bed   room organization consistent   safety round/check completed  Taken 12/25/2024 0200 by Rosalba Liang RN  Safety Promotion/Fall Prevention:   activity supervised   assistive device/personal items within reach   clutter free environment maintained   fall prevention program maintained   lighting adjusted   nonskid shoes/slippers when out of bed   room organization consistent   safety round/check completed  Taken 12/25/2024 0000 by Rosalba Liang RN  Safety Promotion/Fall Prevention:   activity supervised   assistive device/personal items within reach   clutter free environment maintained   fall prevention program maintained   lighting adjusted   nonskid shoes/slippers when out of bed   room organization consistent   safety round/check completed  Taken 12/24/2024 2200 by Rosalba Liang RN  Safety Promotion/Fall Prevention:   activity supervised   assistive device/personal items within reach   clutter free environment maintained   fall prevention program maintained   lighting adjusted   nonskid shoes/slippers when out of bed   room organization consistent   safety round/check completed  Taken 12/24/2024 2000 by Rosalba Liang RN  Safety Promotion/Fall Prevention:   activity supervised   assistive device/personal items within reach   clutter free environment maintained   fall prevention program maintained   lighting adjusted   nonskid shoes/slippers when out of bed   room organization consistent   safety round/check  completed     Problem: Skin Injury Risk Increased  Goal: Skin Health and Integrity  Outcome: Progressing  Intervention: Optimize Skin Protection  Recent Flowsheet Documentation  Taken 12/25/2024 0400 by Rosalba Liang RN  Activity Management: activity encouraged  Pressure Reduction Techniques:   frequent weight shift encouraged   pressure points protected   weight shift assistance provided  Head of Bed (HOB) Positioning: [facility] elevated  Pressure Reduction Devices:   positioning supports utilized   pressure-redistributing mattress utilized  Skin Protection:   incontinence pads utilized   transparent dressing maintained  Taken 12/25/2024 0200 by Rosalba Liang RN  Activity Management: activity encouraged  Pressure Reduction Techniques:   frequent weight shift encouraged   pressure points protected   weight shift assistance provided  Head of Bed (HOB) Positioning: HOB elevated  Pressure Reduction Devices:   positioning supports utilized   pressure-redistributing mattress utilized  Skin Protection:   incontinence pads utilized   transparent dressing maintained  Taken 12/25/2024 0000 by Rosalba Liang RN  Activity Management: activity encouraged  Pressure Reduction Techniques:   frequent weight shift encouraged   pressure points protected   weight shift assistance provided  Head of Bed (HOB) Positioning: [facility] elevated  Pressure Reduction Devices:   positioning supports utilized   pressure-redistributing mattress utilized  Skin Protection:   incontinence pads utilized   transparent dressing maintained  Taken 12/24/2024 2200 by Rosalba Liang RN  Activity Management: activity encouraged  Pressure Reduction Techniques:   frequent weight shift encouraged   pressure points protected   weight shift assistance provided  Head of Bed (HOB) Positioning: [facility] elevated  Pressure Reduction Devices:   positioning supports utilized   pressure-redistributing mattress utilized  Skin Protection:   incontinence pads  utilized   transparent dressing maintained  Taken 12/24/2024 2000 by Rosalba Liang RN  Activity Management: activity encouraged  Pressure Reduction Techniques:   frequent weight shift encouraged   pressure points protected   weight shift assistance provided  Head of Bed (HOB) Positioning: HOB elevated  Pressure Reduction Devices:   positioning supports utilized   pressure-redistributing mattress utilized  Skin Protection:   incontinence pads utilized   transparent dressing maintained   silicone foam dressing in place     Problem: Sepsis/Septic Shock  Goal: Optimal Coping  Outcome: Progressing  Goal: Absence of Bleeding  Outcome: Progressing  Goal: Blood Glucose Level Within Target Range  Outcome: Progressing  Intervention: Optimize Glycemic Control  Recent Flowsheet Documentation  Taken 12/24/2024 2000 by Rosalba Liang RN  Hyperglycemia Management: blood glucose monitored  Hypoglycemia Management: blood glucose monitored  Goal: Absence of Infection Signs and Symptoms  Outcome: Progressing  Intervention: Initiate Sepsis Management  Recent Flowsheet Documentation  Taken 12/25/2024 0400 by Rosalba Liang RN  Infection Prevention:   environmental surveillance performed   equipment surfaces disinfected   hand hygiene promoted   personal protective equipment utilized   rest/sleep promoted  Taken 12/25/2024 0200 by Rosalba Liang RN  Infection Prevention:   environmental surveillance performed   equipment surfaces disinfected   hand hygiene promoted   personal protective equipment utilized   rest/sleep promoted  Taken 12/25/2024 0000 by Rosalba Liang RN  Infection Prevention:   environmental surveillance performed   equipment surfaces disinfected   hand hygiene promoted   personal protective equipment utilized   rest/sleep promoted  Taken 12/24/2024 2200 by Rosalba Liang RN  Infection Prevention:   environmental surveillance performed   equipment surfaces disinfected   hand  hygiene promoted   personal protective equipment utilized   rest/sleep promoted  Taken 12/24/2024 2000 by Rosalba Liang, RN  Infection Prevention:   environmental surveillance performed   equipment surfaces disinfected   hand hygiene promoted   personal protective equipment utilized   rest/sleep promoted  Intervention: Promote Recovery  Recent Flowsheet Documentation  Taken 12/25/2024 0400 by Rosalba Liang, RN  Activity Management: activity encouraged  Taken 12/25/2024 0200 by Rosalba Liang RN  Activity Management: activity encouraged  Taken 12/25/2024 0000 by Rosalba Liang, RN  Activity Management: activity encouraged  Taken 12/24/2024 2200 by Rosalba Liang RN  Activity Management: activity encouraged  Taken 12/24/2024 2000 by Rosalba Liang, RN  Activity Management: activity encouraged  Goal: Optimal Nutrition Delivery  Outcome: Progressing   Goal Outcome Evaluation:

## 2024-12-27 ENCOUNTER — TELEPHONE (OUTPATIENT)
Dept: UROLOGY | Facility: CLINIC | Age: 74
End: 2024-12-27
Payer: MEDICARE

## 2024-12-27 LAB
BACTERIA SPEC AEROBE CULT: ABNORMAL
BACTERIA SPEC AEROBE CULT: ABNORMAL
GRAM STN SPEC: ABNORMAL
GRAM STN SPEC: ABNORMAL
ISOLATED FROM: ABNORMAL
ISOLATED FROM: ABNORMAL

## 2024-12-27 NOTE — TELEPHONE ENCOUNTER
Hub staff attempted to follow warm transfer process and was unsuccessful     Caller: Aldair Narvaez    Relationship to patient: Self    Best call back number: [phone]    Patient is needing: PT CALLED TO SCHEDULE AN APPT FOR A STENT REMOVAL. HE HAS ONE SCHEDULED ON 01.20.2025 BUT PT STATES HE NEEDS A SOONER APPT. HE WAS JUST RELEASED [facility]OS[facility].PLEASE CALL PT. THANK YOU

## 2024-12-30 NOTE — TELEPHONE ENCOUNTER
Called PT, relayed Dr. Nuñez message. Informed him that if anything changed via his ID doctor, they would contact Dr. Justin and we would notify the patient of any change.

## 2024-12-31 ENCOUNTER — LAB (OUTPATIENT)
Dept: LAB | Facility: HOSPITAL | Age: 74
End: 2024-12-31
Payer: MEDICARE

## 2024-12-31 ENCOUNTER — TRANSCRIBE ORDERS (OUTPATIENT)
Dept: LAB | Facility: HOSPITAL | Age: 74
End: 2024-12-31
Payer: MEDICARE

## 2024-12-31 DIAGNOSIS — B37.7 DISSEMINATED CANDIDIASIS: Primary | ICD-10-CM

## 2024-12-31 DIAGNOSIS — B37.7 DISSEMINATED CANDIDIASIS: ICD-10-CM

## 2024-12-31 LAB
ALBUMIN SERPL-MCNC: 4 G/DL (ref 3.5–5.2)
ALBUMIN/GLOB SERPL: 1 G/DL
ALP SERPL-CCNC: 97 U/L (ref 39–117)
ALT SERPL W P-5'-P-CCNC: 18 U/L (ref 1–41)
ANION GAP SERPL CALCULATED.3IONS-SCNC: 11 MMOL/L (ref 5–15)
AST SERPL-CCNC: 21 U/L (ref 1–40)
BASOPHILS # BLD AUTO: 0.06 10*3/MM3 (ref 0–0.2)
BASOPHILS NFR BLD AUTO: 0.5 % (ref 0–1.5)
BILIRUB SERPL-MCNC: 0.2 MG/DL (ref 0–1.2)
BUN SERPL-MCNC: 25 MG/DL (ref 8–23)
BUN/CREAT SERPL: 32.1 (ref 7–25)
CALCIUM SPEC-SCNC: 10.3 MG/DL (ref 8.6–10.5)
CHLORIDE SERPL-SCNC: 100 MMOL/L (ref 98–107)
CO2 SERPL-SCNC: 26 MMOL/L (ref 22–29)
CREAT SERPL-MCNC: 0.78 MG/DL (ref 0.76–1.27)
CRP SERPL-MCNC: 1.91 MG/DL (ref 0–0.5)
DEPRECATED RDW RBC AUTO: 48.7 FL (ref 37–54)
EGFRCR SERPLBLD CKD-EPI 2021: 94.8 ML/MIN/1.73
EOSINOPHIL # BLD AUTO: 0.33 10*3/MM3 (ref 0–0.4)
EOSINOPHIL NFR BLD AUTO: 2.6 % (ref 0.3–6.2)
ERYTHROCYTE [DISTWIDTH] IN BLOOD BY AUTOMATED COUNT: 15.2 % (ref 12.3–15.4)
ERYTHROCYTE [SEDIMENTATION RATE] IN BLOOD: 77 MM/HR (ref 0–20)
GLOBULIN UR ELPH-MCNC: 4.2 GM/DL
GLUCOSE SERPL-MCNC: 156 MG/DL (ref 65–99)
HCT VFR BLD AUTO: 41.4 % (ref 37.5–51)
HGB BLD-MCNC: 13.1 G/DL (ref 13–17.7)
IMM GRANULOCYTES # BLD AUTO: 0.18 10*3/MM3 (ref 0–0.05)
IMM GRANULOCYTES NFR BLD AUTO: 1.4 % (ref 0–0.5)
LYMPHOCYTES # BLD AUTO: 2.65 10*3/MM3 (ref 0.7–3.1)
LYMPHOCYTES NFR BLD AUTO: 20.9 % (ref 19.6–45.3)
MCH RBC QN AUTO: 27.4 PG (ref 26.6–33)
MCHC RBC AUTO-ENTMCNC: 31.6 G/DL (ref 31.5–35.7)
MCV RBC AUTO: 86.6 FL (ref 79–97)
MONOCYTES # BLD AUTO: 0.99 10*3/MM3 (ref 0.1–0.9)
MONOCYTES NFR BLD AUTO: 7.8 % (ref 5–12)
NEUTROPHILS NFR BLD AUTO: 66.8 % (ref 42.7–76)
NEUTROPHILS NFR BLD AUTO: 8.44 10*3/MM3 (ref 1.7–7)
NRBC BLD AUTO-RTO: 0 /100 WBC (ref 0–0.2)
PLATELET # BLD AUTO: 382 10*3/MM3 (ref 140–450)
PMV BLD AUTO: 8.6 FL (ref 6–12)
POTASSIUM SERPL-SCNC: 4.9 MMOL/L (ref 3.5–5.2)
PROT SERPL-MCNC: 8.2 G/DL (ref 6–8.5)
RBC # BLD AUTO: 4.78 10*6/MM3 (ref 4.14–5.8)
SODIUM SERPL-SCNC: 137 MMOL/L (ref 136–145)
WBC NRBC COR # BLD AUTO: 12.65 10*3/MM3 (ref 3.4–10.8)

## 2024-12-31 PROCEDURE — 80053 COMPREHEN METABOLIC PANEL: CPT

## 2024-12-31 PROCEDURE — 85652 RBC SED RATE AUTOMATED: CPT

## 2024-12-31 PROCEDURE — 36415 COLL VENOUS BLD VENIPUNCTURE: CPT

## 2024-12-31 PROCEDURE — 86140 C-REACTIVE PROTEIN: CPT

## 2024-12-31 PROCEDURE — 85025 COMPLETE CBC W/AUTO DIFF WBC: CPT

## 2025-01-02 ENCOUNTER — READMISSION MANAGEMENT (OUTPATIENT)
Dept: CALL CENTER | Facility: HOSPITAL | Age: 75
End: 2025-01-02
Payer: MEDICARE

## 2025-01-02 NOTE — OUTREACH NOTE
Sepsis Week 1 Survey      Flowsheet Row Responses   [facility] facility patient discharged from? [place]   Does the patient have one of the following disease processes/diagnoses(primary or secondary)? Sepsis   Week 1 attempt successful? No   Unsuccessful attempts Attempt 1            JEFF OLIVIA - Registered Nurse

## 2025-01-05 NOTE — PROGRESS NOTES
"Enter Query Response Below      Query Response: unable to determine              If applicable, please update the problem list.       Patient: Aldair Narvaez        : 1950  Account: [id number]           Admit Date: 2024        How to Respond to this query:       a. Click New Note     b. Answer query within the yellow box.                c. Update the Problem List, if applicable.      If you have any questions about this query contact me at: [email or web address]     Dr. Rodriguez:     Patient presents to emergency room with fevers, chills and urgency.  On arrival, temperature 102.9, heart rate 111, blood pressure 93/48, wbc 13.25, lactate 3.0.  Emergency room provider note states \" left ureteral stent exchange earlier on 24.\" History and physical states \"severe sepsis, POA, acute complicated urinary tract infection/pyelonephritis, chronic left ureteral stricture of unclear etiology status post cystoscopy, balloon dilation and ureteral stent exchange 2024, continue iv zosyn.\" Treated with zosyn iv q8 2024 - 2024, micafungin iv qd 2024 - 2024. Discharge summary states \"severe sepsis due to acute complicated uti/pyelonephritis, continued on micafungin.\"    Please clarify the etiology of the UTI:    UTI due to left ureteral stent  UTI not due to left ureteral stent  Other- specify_______    By submitting this query, we are merely seeking further clarification of documentation to accurately reflect all conditions that you are monitoring, evaluating, treating or that extend the hospitalization or utilize additional resources of care. Please utilize your independent clinical judgment when addressing the question(s) above.     This query and your response, once completed, will be entered into the legal medical record.    Sincerely,  Nestor AMIN RN BSN   Clinical Documentation Integrity Program   [email or web address]  "

## 2025-01-07 ENCOUNTER — READMISSION MANAGEMENT (OUTPATIENT)
Dept: CALL CENTER | Facility: HOSPITAL | Age: 75
End: 2025-01-07
Payer: MEDICARE

## 2025-01-07 PROBLEM — N39.0 URINARY TRACT INFECTION: Status: ACTIVE | Noted: 2025-01-07

## 2025-01-07 PROBLEM — B37.7: Status: ACTIVE | Noted: 2025-01-07

## 2025-01-07 NOTE — OUTREACH NOTE
Sepsis Week 1 Survey      Flowsheet Row Responses   [facility] facility patient discharged from? [place]   Does the patient have one of the following disease processes/diagnoses(primary or secondary)? Sepsis   Week 1 attempt successful? No   Unsuccessful attempts Attempt 2            LEENA TAMAYO - Registered Nurse

## 2025-01-13 ENCOUNTER — READMISSION MANAGEMENT (OUTPATIENT)
Dept: CALL CENTER | Facility: HOSPITAL | Age: 75
End: 2025-01-13
Payer: MEDICARE

## 2025-01-13 NOTE — OUTREACH NOTE
Sepsis Week 1 Survey      Flowsheet Row Responses   [facility] facility patient discharged from? [place]   Does the patient have one of the following disease processes/diagnoses(primary or secondary)? Sepsis   Week 1 attempt successful? No   Unsuccessful attempts Attempt 3            JEFF OLIVIA - Registered Nurse

## 2025-01-20 ENCOUNTER — OFFICE VISIT (OUTPATIENT)
Age: 75
End: 2025-01-20
Payer: MEDICARE

## 2025-01-20 DIAGNOSIS — Z87.440 HISTORY OF UTI: ICD-10-CM

## 2025-01-20 DIAGNOSIS — N13.5 URETERAL STRICTURE, LEFT: ICD-10-CM

## 2025-01-20 DIAGNOSIS — B49 FUNGAL INFECTION: Primary | ICD-10-CM

## 2025-01-20 DIAGNOSIS — R82.81 PYURIA: ICD-10-CM

## 2025-01-20 PROCEDURE — 1159F MED LIST DOCD IN RCRD: CPT | Performed by: STUDENT IN AN ORGANIZED HEALTH CARE EDUCATION/TRAINING PROGRAM

## 2025-01-20 PROCEDURE — 1160F RVW MEDS BY RX/DR IN RCRD: CPT | Performed by: STUDENT IN AN ORGANIZED HEALTH CARE EDUCATION/TRAINING PROGRAM

## 2025-01-20 PROCEDURE — 99214 OFFICE O/P EST MOD 30 MIN: CPT | Performed by: STUDENT IN AN ORGANIZED HEALTH CARE EDUCATION/TRAINING PROGRAM

## 2025-01-20 PROCEDURE — 87086 URINE CULTURE/COLONY COUNT: CPT | Performed by: STUDENT IN AN ORGANIZED HEALTH CARE EDUCATION/TRAINING PROGRAM

## 2025-01-20 RX ORDER — FLUCONAZOLE 100 MG/1
100 TABLET ORAL DAILY
Qty: 3 TABLET | Refills: 0 | Status: SHIPPED | OUTPATIENT
Start: 2025-01-26 | End: 2025-01-29

## 2025-01-20 RX ORDER — CEFUROXIME AXETIL 500 MG/1
500 TABLET ORAL 2 TIMES DAILY
Qty: 10 TABLET | Refills: 0 | Status: SHIPPED | OUTPATIENT
Start: 2025-01-24

## 2025-01-20 NOTE — PROGRESS NOTES
Follow Up Office Visit      Patient Name: Aldair Narvaze  : 1950   MRN: [id number]     Chief Complaint:    Chief Complaint   Patient presents with    Ureteral stricture, left       Referring Provider: No ref. provider found    History of Present Illness: Aldair Narvaez is a 74 y.o. male who presents today for follow up of left ureteral stricture.  He has a complex history.  He has a history of prostate cancer status post remote prostatectomy.  He has a chronic left ureteral stricture either related to a remote kidney stone procedure by an outside urologist or as a result of his prostatectomy, it is unclear.  He has had a complex course including need for chronic indwelling left ureteral stent, he has undergone multiple interventions to dilate the ureter, he has had a history of left-sided renal abscess requiring IR drain.  His most recent intervention was a planned left ureteral stent exchange with diagnostic ureteroscopy and left ureteral balloon dilation which was performed on 2024.  A urine culture preop and in the operating room was negative for growth.  Unfortunately he developed urosepsis and was readmitted to the hospital the following day and eventually urine culture grew out complex fungal infection.  He has been seeing infectious disease and has completed antifungal treatment and IV antibiotic therapy.  We have discussed at multiple points potentially getting his stent out to determine if his kidney will drain.  He is at high risk for recurrence and infectious complications however the patient is motivated to remove the stent to determine if this left renal unit will drain.  He does have chronic bladder spasms and some left flank pain related to his indwelling stent.  The patient's original stent was placed in May 2023.  He has been stented for some time.     Subjective      Review of System: Review of Systems   Genitourinary:  Positive for frequency and urgency.      I have reviewed the  ROS documented by my clinical staff, I have updated appropriately and I agree. Isma Justin MD    I have reviewed and the following portions of the patient's history were updated as appropriate: past family history, past medical history, past social history, past surgical history and problem list.    Medications:     Current Outpatient Medications:     acetaminophen (TYLENOL) 325 MG tablet, Take 2 tablets by mouth Every 6 (Six) Hours As Needed for Mild Pain., Disp: , Rfl:     albuterol sulfate  (90 Base) MCG/ACT inhaler, As Needed., Disp: , Rfl:     Blood Glucose Monitoring Suppl device, Use to check blood sugars 3-4 times daily. E11.65, Disp: 1 each, Rfl: 0    dapagliflozin Propanediol (Farxiga) 10 MG tablet, Take 10 mg by mouth Daily., Disp: 90 tablet, Rfl: 3    DULoxetine (CYMBALTA) 30 MG capsule, Take 1 capsule by mouth Daily., Disp: , Rfl:     Eliquis 5 MG tablet tablet, Take 1 tablet by mouth Every 12 (Twelve) Hours., Disp: , Rfl:     ezetimibe (ZETIA) 10 MG tablet, Take 1 tablet by mouth Daily., Disp: , Rfl:     glimepiride (AMARYL) 4 MG tablet, Take 2 tablets by mouth Every Morning Before Breakfast., Disp: 180 tablet, Rfl: 3    Hydrocortisone, Perianal, (PROCTOCORT) 1 % cream rectal cream, Insert 1 Application into the rectum As Needed., Disp: , Rfl:     insulin degludec (Tresiba FlexTouch) 100 UNIT/ML solution pen-injector injection, Inject once daily; max daily dose 50u, Disp: 45 mL, Rfl: 3    Insulin Pen Needle (Pen Needles) 32G X 4 MM misc, Use 1 each Daily., Disp: 100 each, Rfl: 3    ipratropium-albuterol (DUO-NEB) 0.5-2.5 mg/3 ml nebulizer, Take 3 mL by nebulization Every 6 (Six) Hours As Needed for Shortness of Air., Disp: 360 mL, Rfl: 0    Krill Oil Ultra Strength 1500 MG capsule, Take 1 capsule by mouth Daily., Disp: , Rfl:     loperamide (IMODIUM) 2 MG capsule, Take 1 capsule by mouth 4 (Four) Times a Day As Needed for Diarrhea., Disp: , Rfl:     Misc Natural Products (Magic Mushroom  Mix) capsule, Take 1,000 mg by mouth Daily., Disp: , Rfl:     nitroglycerin (NITROSTAT) 0.4 MG SL tablet, nitroglycerin 0.4 mg sublingual tablet, Disp: , Rfl:     pantoprazole (PROTONIX) 40 MG EC tablet, Take 1 tablet by mouth Daily., Disp: , Rfl:     rosuvastatin (CRESTOR) 20 MG tablet, Take 1 tablet by mouth Daily., Disp: , Rfl:     sodium chloride 0.65 % nasal spray, Administer 2 sprays into the nostril(s) as directed by provider As Needed for Congestion., Disp: , Rfl:     [START ON 1/24/2025] cefuroxime (CEFTIN) 500 MG tablet, Take 1 tablet by mouth 2 (Two) Times a Day., Disp: 10 tablet, Rfl: 0    [START ON 1/26/2025] fluconazole (Diflucan) 100 MG tablet, Take 1 tablet by mouth Daily for 3 days., Disp: 3 tablet, Rfl: 0    HYDROcodone-acetaminophen (NORCO) 5-325 MG per tablet, Take 1 tablet by mouth Every 8 (Eight) Hours As Needed for Moderate Pain. (Patient not taking: Reported on 1/20/2025), Disp: 6 tablet, Rfl: 0    hyoscyamine (Levsin/SL) 0.125 MG SL tablet, Place 1 tablet under the tongue Every 4 (Four) Hours As Needed (Breakthrough bladder spasms). (Patient not taking: Reported on 1/20/2025), Disp: 30 tablet, Rfl: 1    phenazopyridine (Pyridium) 200 MG tablet, Take 1 tablet by mouth 3 (Three) Times a Day As Needed for Bladder Spasms. (Patient not taking: Reported on 1/20/2025), Disp: 20 tablet, Rfl: 0    Allergies:   Allergies   Allergen Reactions    Iodine Other (See Comments)     Closes sinuses    Oxycodone-Acetaminophen Itching    Zofran [Ondansetron] Hives       IPSS Questionnaire (AUA-7):  Over the past month…    1)  Incomplete Emptying:       How often have you had a sensation of not emptying you had the sensation of not emptying your bladder completely after you finished urinating?  1 - Less than 1 time in 5   2)  Frequency:       How often have you had the urinate again less than two hours after you finished urinating?  4 - More than half the time   3)  Intermittency:       How often have you found  you stopped and started again several times when you urinated?   2 - Less than half the time   4) Urgency:      How often have you found it difficult to postpone urination?  4 - More than half the time   5) Weak Stream:      How often have you had a weak urinary stream?  1 - Less than 1 time in 5   6) Straining:       How often have you had to push or strain to begin urination?  1 - Less than 1 time in 5   7) Nocturia:      How many times did you most typically get up to urinate from the time you went to bed at night until the time you got up in the morning?  3 - 3 times   Total Score:  16   The International Prostate Symptom Score (IPSS) is used to screen, diagnose, track symptoms of benign prostatic hyperplasia (BPH).   0-7 (Mild Symptoms) 8-19 (Moderate) 20-35 (Severe)   Quality of Life (QoL):  If you were to spend the rest of your life with your urinary condition just the way it is now, how would you feel about that? 3-Mixed   Urine Leakage (Incontinence) 3-Moderate (3 or more pads/day)       Objective     Physical Exam:   Vital Signs: There were no vitals filed for this visit.  There is no height or weight on file to calculate BMI.     Physical Exam    Labs:   Brief Urine Lab Results  (Last result in the past 365 days)        Color   Clarity   Blood   Leuk Est   Nitrite   Protein   CREAT   Urine HCG        12/21/24 0158 Orange   Turbid   Large (3+)   Moderate (2+)   Negative   >=300 mg/dL (3+)                   Urine Culture          12/17/2024    08:39 12/20/2024    11:33 12/21/2024    01:58   Urine Culture   Urine Culture No growth  No growth  No growth         Lab Results   Component Value Date    GLUCOSE 156 (H) 12/31/2024    CALCIUM 10.3 12/31/2024     12/31/2024    K 4.9 12/31/2024    CO2 26.0 12/31/2024     12/31/2024    BUN 25 (H) 12/31/2024    CREATININE 0.78 12/31/2024    EGFRIFNONA 96 02/17/2021    BCR 32.1 (H) 12/31/2024    ANIONGAP 11.0 12/31/2024       Lab Results   Component Value  Date    WBC 12.65 (H) 12/31/2024    HGB 13.1 12/31/2024    HCT 41.4 12/31/2024    MCV 86.6 12/31/2024     12/31/2024       Images:   CT Abdomen Pelvis Without Contrast    Result Date: 12/21/2024  Impression: 1.Left ureteral stent appears appropriately positioned. There is new left-sided hydronephrosis and asymmetric left-sided perinephric and periureteric stranding raising concern for either obstructive uropathy or urinary tract infection. No radiodense urolithiasis. There is also urinary bladder wall thickening and gas in the urinary bladder that could also be related to urinary tract infection. 2.Status post prostatectomy. 3.Cholelithiasis without evidence of acute cholecystitis. 4.Colonic diverticulosis. Electronically Signed: Jefferson Block MD  12/21/2024 2:38 AM EST  Workstation ID: [id number]    XR Chest 1 View    Result Date: 12/21/2024  Impression: No acute cardiopulmonary abnormality. Electronically Signed: Jefferson Block MD  12/21/2024 1:46 AM EST  Workstation ID: [id number]      Measures:   Tobacco:   Aldair Narvaez  reports that he has never smoked. He has been exposed to tobacco smoke. He has never used smokeless tobacco.      Assessment / Plan      Assessment/Plan:   74 y.o. male who presented today for follow up of left ureteral stricture of unclear etiology, prostate cancer status post remote prostatectomy.  Multiple interventions for left-sided ureteral stricture and is currently managed with an indwelling stent.  Most recently underwent a left ureteroscopy which demonstrates a fairly patent ureter however with some mild residual stenosis he underwent a left ureteral balloon dilation resulting in funguria and fungal blood infection requiring ID consult and prolonged course of antifungal medication.  The patient is motivated to remove the stent to determine if the kidney will drain.  We discussed unless we remove his stent we will not be able to determine if this left renal unit will drain.  The  patient is at significant risk of infectious or renal function decline concerns if we remove his stent and he is willing to accept these risks.  I will pretreat him with cefuroxime and Diflucan leading up to his left ureteral stent removal next week in the office.  We will likely follow him up within 1 or 2 weeks with a CT scan to reassess his left kidney, if he has persistent hydronephrosis after stent removal he will require stent replacement.  He has also seen [facility] for consultation regarding definitive left robotic ureteral reimplantation and he has deferred major surgical intervention for his left ureteral stricture in favor of continued indwelling stent.    We will attempt to remove his stent with close follow-up to repeat imaging, I will send a urine culture today and call him with his results    Diagnoses and all orders for this visit:    1. Fungal infection (Primary)  -     cefuroxime (CEFTIN) 500 MG tablet; Take 1 tablet by mouth 2 (Two) Times a Day.  Dispense: 10 tablet; Refill: 0  -     fluconazole (Diflucan) 100 MG tablet; Take 1 tablet by mouth Daily for 3 days.  Dispense: 3 tablet; Refill: 0  -     Urine Culture - Urine, Urine, Clean Catch    2. History of UTI  -     cefuroxime (CEFTIN) 500 MG tablet; Take 1 tablet by mouth 2 (Two) Times a Day.  Dispense: 10 tablet; Refill: 0  -     fluconazole (Diflucan) 100 MG tablet; Take 1 tablet by mouth Daily for 3 days.  Dispense: 3 tablet; Refill: 0  -     Urine Culture - Urine, Urine, Clean Catch    3. Pyuria  -     Urine Culture - Urine, Urine, Clean Catch    4. Ureteral stricture, left  -     Urine Culture - Urine, Urine, Clean Catch           Follow Up:   Return in about 8 days (around 1/28/2025) for Cysto and stent removal ISIDRO MARIA LUISA Herring    I spent approximately 30 minutes providing clinical care for this patient; including review of patient's chart and provider documentation, face to face time spent with patient in examination room (obtaining history, performing  physical exam, discussing diagnosis and management options), placing orders, and completing patient documentation.     Isma Justin MD  [facility]

## 2025-01-22 ENCOUNTER — TRANSCRIBE ORDERS (OUTPATIENT)
Dept: LAB | Facility: HOSPITAL | Age: 75
End: 2025-01-22
Payer: MEDICARE

## 2025-01-22 ENCOUNTER — LAB (OUTPATIENT)
Dept: LAB | Facility: HOSPITAL | Age: 75
End: 2025-01-22
Payer: MEDICARE

## 2025-01-22 DIAGNOSIS — E11.69 TYPE 2 DIABETES MELLITUS WITH OTHER SPECIFIED COMPLICATION, UNSPECIFIED WHETHER LONG TERM INSULIN USE: Primary | ICD-10-CM

## 2025-01-22 DIAGNOSIS — E11.69 TYPE 2 DIABETES MELLITUS WITH OTHER SPECIFIED COMPLICATION, UNSPECIFIED WHETHER LONG TERM INSULIN USE: ICD-10-CM

## 2025-01-22 LAB
BACTERIA SPEC AEROBE CULT: NO GROWTH
BACTERIA UR QL AUTO: ABNORMAL /HPF
BILIRUB UR QL STRIP: NEGATIVE
CLARITY UR: CLEAR
COLOR UR: YELLOW
GLUCOSE UR STRIP-MCNC: ABNORMAL MG/DL
HGB UR QL STRIP.AUTO: NEGATIVE
HYALINE CASTS UR QL AUTO: ABNORMAL /LPF
KETONES UR QL STRIP: NEGATIVE
LEUKOCYTE ESTERASE UR QL STRIP.AUTO: NEGATIVE
NITRITE UR QL STRIP: NEGATIVE
PH UR STRIP.AUTO: 5.5 [PH] (ref 5–8)
PROT UR QL STRIP: ABNORMAL
RBC # UR STRIP: ABNORMAL /HPF
REF LAB TEST METHOD: ABNORMAL
SP GR UR STRIP: 1.03 (ref 1–1.03)
SQUAMOUS #/AREA URNS HPF: ABNORMAL /HPF
UROBILINOGEN UR QL STRIP: ABNORMAL
WBC # UR STRIP: ABNORMAL /HPF

## 2025-01-22 PROCEDURE — 81001 URINALYSIS AUTO W/SCOPE: CPT

## 2025-01-22 PROCEDURE — 87086 URINE CULTURE/COLONY COUNT: CPT

## 2025-01-24 LAB — BACTERIA SPEC AEROBE CULT: NO GROWTH

## 2025-01-28 ENCOUNTER — PROCEDURE VISIT (OUTPATIENT)
Dept: UROLOGY | Facility: CLINIC | Age: 75
End: 2025-01-28
Payer: MEDICARE

## 2025-01-28 DIAGNOSIS — N20.0 RIGHT NEPHROLITHIASIS: ICD-10-CM

## 2025-01-28 DIAGNOSIS — N13.30 HYDRONEPHROSIS OF LEFT KIDNEY: ICD-10-CM

## 2025-01-28 DIAGNOSIS — N13.5 URETERAL STRICTURE, LEFT: Primary | ICD-10-CM

## 2025-01-28 LAB
BILIRUB BLD-MCNC: NEGATIVE MG/DL
CLARITY, POC: ABNORMAL
COLOR UR: YELLOW
EXPIRATION DATE: ABNORMAL
GLUCOSE UR STRIP-MCNC: ABNORMAL MG/DL
KETONES UR QL: NEGATIVE
LEUKOCYTE EST, POC: NEGATIVE
Lab: ABNORMAL
NITRITE UR-MCNC: NEGATIVE MG/ML
PH UR: 6 [PH] (ref 5–8)
PROT UR STRIP-MCNC: ABNORMAL MG/DL
RBC # UR STRIP: NEGATIVE /UL
SP GR UR: 1.01 (ref 1–1.03)
UROBILINOGEN UR QL: NORMAL

## 2025-01-28 NOTE — PROGRESS NOTES
Procedure: Flexible Cystoscopy with Stent Removal     Preoperative Diagnosis: Left ureteral stricture    Postoperative Diagnosis: Left ureteral stricture    Procedure performed: Cystoscopy with left ureteral stent removal     Surgeon: Isma Justin MD    Anesthesia: 2% Lidocaine Jelly    Indications: Aldair Narvaez is a 74 y.o. year old male with a history of chronic left ureteral stricture which has been managed by an indwelling stent, he has undergone multiple interventions of the distal ureter stricture including a recent balloon dilation.  The patient does not desire to live with a an indwelling stent and would like to trial off of the stent.  We discussed there is no guarantee that his ureteral stricture will remain open or patent or that his renal unit will drain without a stent and therefore there is a significant risk of infection or obstruction requiring urgent ureteral stent replacement.  Despite these risks the patient would like to try without a stent and presents today for stent removal.    Procedure: Patient was taken to the urology procedure suite and prepped and draped in sterile fashion. A pre-procedural identification was performed. 10 cc of 2% lidocaine jelly was injected into the urethra. After adequate anesthesia, a lubricated flexible cystoscope was inserted into the urethra. The urethra appeared normal. The urinary sphincter was intact. The bladder was entered and 360 degree panendoscopy was performed revealing normal bladder anatomy, bilateral orthotopic ureteral orifices, and no concern for bladder stones, trabeculations, mucosal lesions/tumors, or divetrticuli. The left stent was in good position emanating from the ureteral orifice.      The left stent was grasped with a pair of grasping forceps and was removed without difficulty.     PLAN    1) Discharge home    2) Follow up: 2 to 3 weeks with a CT abdomen pelvis without contrast prior to reassess for stricture or hydronephrosis    3)  Antibiotic prophylaxis: He has been on cefuroxime and antifungal medications per recent cultures, strict return precautions discussed at length    Isma Justin MD

## 2025-02-19 ENCOUNTER — HOSPITAL ENCOUNTER (OUTPATIENT)
Dept: CT IMAGING | Facility: HOSPITAL | Age: 75
Discharge: HOME | End: 2025-02-19
Admitting: STUDENT IN AN ORGANIZED HEALTH CARE EDUCATION/TRAINING PROGRAM
Payer: MEDICARE

## 2025-02-19 DIAGNOSIS — N13.5 URETERAL STRICTURE, LEFT: ICD-10-CM

## 2025-02-19 DIAGNOSIS — N13.30 HYDRONEPHROSIS OF LEFT KIDNEY: ICD-10-CM

## 2025-02-19 PROCEDURE — 74176 CT ABD & PELVIS W/O CONTRAST: CPT

## 2025-02-20 ENCOUNTER — OFFICE VISIT (OUTPATIENT)
Dept: UROLOGY | Facility: CLINIC | Age: 75
End: 2025-02-20
Payer: MEDICARE

## 2025-02-20 VITALS — OXYGEN SATURATION: 97 % | HEART RATE: 87 BPM | DIASTOLIC BLOOD PRESSURE: 73 MMHG | SYSTOLIC BLOOD PRESSURE: 128 MMHG

## 2025-02-20 DIAGNOSIS — N13.5 URETERAL STRICTURE, LEFT: ICD-10-CM

## 2025-02-20 DIAGNOSIS — N13.30 HYDRONEPHROSIS OF LEFT KIDNEY: Primary | ICD-10-CM

## 2025-02-20 DIAGNOSIS — N13.4 HYDROURETER, LEFT: ICD-10-CM

## 2025-02-20 PROCEDURE — 87086 URINE CULTURE/COLONY COUNT: CPT | Performed by: STUDENT IN AN ORGANIZED HEALTH CARE EDUCATION/TRAINING PROGRAM

## 2025-02-20 PROCEDURE — 87088 URINE BACTERIA CULTURE: CPT | Performed by: STUDENT IN AN ORGANIZED HEALTH CARE EDUCATION/TRAINING PROGRAM

## 2025-02-20 PROCEDURE — 87186 SC STD MICRODIL/AGAR DIL: CPT | Performed by: STUDENT IN AN ORGANIZED HEALTH CARE EDUCATION/TRAINING PROGRAM

## 2025-02-20 RX ORDER — EZETIMIBE 10 MG/1
10 TABLET ORAL DAILY
Qty: 90 TABLET | Refills: 1 | Status: SHIPPED | OUTPATIENT
Start: 2025-02-20

## 2025-02-20 NOTE — PROGRESS NOTES
Follow Up Office Visit      Patient Name: Aldair Narvaez  : 1950   MRN: [id number]     Chief Complaint:    Chief Complaint   Patient presents with    Ureteral stricture, left       Referring Provider: No ref. provider found    History of Present Illness: Aldair Narvaez is a 74 y.o. male who presents today for follow up of left ureteral stricture.  Complex history including prior remote prostatectomy, history of left nephrolithiasis status post treatment, possible left ureteral injury secondary to clip placement near the left ureter during his prostatectomy or scar formation during previous left-sided kidney stone treatment, history of left-sided renal abscess requiring IR drain, he has had 3 separate episodes of urosepsis related to left hydroureteronephrosis.  He has been managed with an indwelling stent for a number of years at this point.  He was taken the operating room on 2024 and a left ureteral balloon dilation was performed.  This is his second left ureteral balloon dilation with left stent placement.  Patient was admitted shortly thereafter with encephalopathy and was found to have fungal urinary infection requiring IV antifungal treatment.  On , the patient underwent left ureteral stent removal in my office.  Patient desired left ureteral stent removal given ongoing discomfort and a longstanding history of needing a ureteral stent.  He returns today for repeat imaging to determine if his kidney is draining. I reviewed his imaging with him dating back to his original CT scans.      A repeat CT scan performed without contrast yesterday demonstrates persistent left hydroureteronephrosis to the level of the distal left ureter at the site of his stricture.  Despite balloon dilation x 2 the patient has recurrent obstruction.  He has mild ongoing abdominal pain but no obvious flank pain.  Denies hematuria.    Subjective      Review of System: Review of Systems   Genitourinary:  Positive  for frequency and urgency.      I have reviewed the ROS documented by my clinical staff, I have updated appropriately and I agree. Isma Justin MD    I have reviewed and the following portions of the patient's history were updated as appropriate: past family history, past medical history, past social history, past surgical history and problem list.    Medications:     Current Outpatient Medications:     acetaminophen (TYLENOL) 325 MG tablet, Take 2 tablets by mouth Every 6 (Six) Hours As Needed for Mild Pain., Disp: , Rfl:     albuterol sulfate  (90 Base) MCG/ACT inhaler, As Needed., Disp: , Rfl:     Blood Glucose Monitoring Suppl device, Use to check blood sugars 3-4 times daily. E11.65, Disp: 1 each, Rfl: 0    dapagliflozin Propanediol (Farxiga) 10 MG tablet, Take 10 mg by mouth Daily., Disp: 90 tablet, Rfl: 3    DULoxetine (CYMBALTA) 30 MG capsule, Take 1 capsule by mouth Daily., Disp: , Rfl:     Eliquis 5 MG tablet tablet, Take 1 tablet by mouth Every 12 (Twelve) Hours., Disp: , Rfl:     ezetimibe (ZETIA) 10 MG tablet, Take 1 tablet by mouth Daily., Disp: 90 tablet, Rfl: 1    glimepiride (AMARYL) 4 MG tablet, Take 2 tablets by mouth Every Morning Before Breakfast., Disp: 180 tablet, Rfl: 3    Hydrocortisone, Perianal, (PROCTOCORT) 1 % cream rectal cream, Insert 1 Application into the rectum As Needed., Disp: , Rfl:     insulin degludec (Tresiba FlexTouch) 100 UNIT/ML solution pen-injector injection, Inject once daily; max daily dose 50u, Disp: 45 mL, Rfl: 3    Insulin Pen Needle (Pen Needles) 32G X 4 MM misc, Use 1 each Daily., Disp: 100 each, Rfl: 3    ipratropium-albuterol (DUO-NEB) 0.5-2.5 mg/3 ml nebulizer, Take 3 mL by nebulization Every 6 (Six) Hours As Needed for Shortness of Air., Disp: 360 mL, Rfl: 0    Krill Oil Ultra Strength 1500 MG capsule, Take 1 capsule by mouth Daily., Disp: , Rfl:     loperamide (IMODIUM) 2 MG capsule, Take 1 capsule by mouth 4 (Four) Times a Day As Needed for  Diarrhea., Disp: , Rfl:     Misc Natural Products (Magic Mushroom Mix) capsule, Take 1,000 mg by mouth Daily., Disp: , Rfl:     pantoprazole (PROTONIX) 40 MG EC tablet, Take 1 tablet by mouth Daily., Disp: , Rfl:     rosuvastatin (CRESTOR) 20 MG tablet, Take 1 tablet by mouth Daily., Disp: , Rfl:     sodium chloride 0.65 % nasal spray, Administer 2 sprays into the nostril(s) as directed by provider As Needed for Congestion., Disp: , Rfl:     nitroglycerin (NITROSTAT) 0.4 MG SL tablet, nitroglycerin 0.4 mg sublingual tablet (Patient not taking: Reported on 2/20/2025), Disp: , Rfl:     Allergies:   Allergies   Allergen Reactions    Iodine Other (See Comments)     Closes sinuses    Oxycodone-Acetaminophen Itching    Zofran [Ondansetron] Hives       IPSS Questionnaire (AUA-7):  Over the past month…    1)  Incomplete Emptying:       How often have you had a sensation of not emptying you had the sensation of not emptying your bladder completely after you finished urinating?  2 - Less than half the time   2)  Frequency:       How often have you had the urinate again less than two hours after you finished urinating?  5 - Almost always   3)  Intermittency:       How often have you found you stopped and started again several times when you urinated?   3 - About half the time   4) Urgency:      How often have you found it difficult to postpone urination?  4 - More than half the time   5) Weak Stream:      How often have you had a weak urinary stream?  2 - Less than half the time   6) Straining:       How often have you had to push or strain to begin urination?  0 - Not at all   7) Nocturia:      How many times did you most typically get up to urinate from the time you went to bed at night until the time you got up in the morning?  3 - 3 times   Total Score:  19   The International Prostate Symptom Score (IPSS) is used to screen, diagnose, track symptoms of benign prostatic hyperplasia (BPH).   0-7 (Mild Symptoms) 8-19  (Moderate) 20-35 (Severe)   Quality of Life (QoL):  If you were to spend the rest of your life with your urinary condition just the way it is now, how would you feel about that? 3-Mixed   Urine Leakage (Incontinence) 4-Severe Leakage Problems           Objective     Physical Exam:   Vital Signs:   Vitals:    02/20/25 1412   BP: 128/73   Pulse: 87   SpO2: 97%     There is no height or weight on file to calculate BMI.     Physical Exam  Constitutional:       Appearance: Normal appearance.   HENT:      Head: Normocephalic and atraumatic.      Nose: Nose normal.   Eyes:      Extraocular Movements: Extraocular movements intact.      Conjunctiva/sclera: Conjunctivae normal.      Pupils: Pupils are equal, round, and reactive to light.   Musculoskeletal:         General: Normal range of motion.      Cervical back: Normal range of motion and neck supple.   Skin:     General: Skin is warm and dry.      Findings: No lesion or rash.   Neurological:      General: No focal deficit present.      Mental Status: He is alert and oriented to person, place, and time. Mental status is at baseline.   Psychiatric:         Mood and Affect: Mood normal.         Behavior: Behavior normal.         Labs:   Brief Urine Lab Results  (Last result in the past 365 days)        Color   Clarity   Blood   Leuk Est   Nitrite   Protein   CREAT   Urine HCG        01/28/25 1332 Yellow   Slightly Cloudy   Negative   Negative   Negative   Trace                   Urine Culture          12/21/2024    01:58 1/20/2025    16:05 1/22/2025    14:23   Urine Culture   Urine Culture No growth  No growth  No growth         Lab Results   Component Value Date    GLUCOSE 156 (H) 12/31/2024    CALCIUM 10.3 12/31/2024     12/31/2024    K 4.9 12/31/2024    CO2 26.0 12/31/2024     12/31/2024    BUN 25 (H) 12/31/2024    CREATININE 0.78 12/31/2024    EGFRIFNONA 96 02/17/2021    BCR 32.1 (H) 12/31/2024    ANIONGAP 11.0 12/31/2024       Lab Results   Component Value  Date    WBC 12.65 (H) 12/31/2024    HGB 13.1 12/31/2024    HCT 41.4 12/31/2024    MCV 86.6 12/31/2024     12/31/2024       Images:   CT Abdomen Pelvis Without Contrast    Result Date: 2/19/2025  1.Left-sided hydroureteronephrosis. No stone is appreciated. No stent is appreciated. 2.1 mm nonobstructing left upper pole renal calculus. 3.Cholelithiasis without evidence of cholecystitis. 4.Colonic diverticulosis without evidence of diverticulitis. Electronically Signed: Luis Ragland MD  2/19/2025 10:55 PM EST  Workstation ID: [id number]    CT Abdomen Pelvis Without Contrast    Result Date: 12/21/2024  Impression: 1.Left ureteral stent appears appropriately positioned. There is new left-sided hydronephrosis and asymmetric left-sided perinephric and periureteric stranding raising concern for either obstructive uropathy or urinary tract infection. No radiodense urolithiasis. There is also urinary bladder wall thickening and gas in the urinary bladder that could also be related to urinary tract infection. 2.Status post prostatectomy. 3.Cholelithiasis without evidence of acute cholecystitis. 4.Colonic diverticulosis. Electronically Signed: Jefferson Block MD  12/21/2024 2:38 AM EST  Workstation ID: [id number]    XR Chest 1 View    Result Date: 12/21/2024  Impression: No acute cardiopulmonary abnormality. Electronically Signed: Jefferson Block MD  12/21/2024 1:46 AM EST  Workstation ID: [id number]      Measures:   Tobacco:   Aldair Narvaez  reports that he has never smoked. He has been exposed to tobacco smoke. He has never used smokeless tobacco.    Assessment / Plan      Assessment/Plan:   74 y.o. male who presented today for follow up of left ureteral stricture, left hydroureteronephrosis.  Patient has recurrent left hydroureteronephrosis after stent removal.  This risks another episode of urinary tract infection or urosepsis.  I recommend replacement of his left ureteral stent.  We have previously sent him to [facility] to discuss  definitive left ureteral reimplantation which has been deferred by both [facility] physicians and the patient due to risks involved.  At this juncture the only real option is to continue his left ureteral stent and we will need to replace this at the surgery center next available.  The risks are discussed.  Urine culture today.  Can stay on his anticoagulation through the procedure.  Patient is amenable to stent replacement to reduce the chance of another infection episode or renal abscess etc.    Diagnoses and all orders for this visit:    1. Hydronephrosis of left kidney (Primary)  -     Urine Culture - Urine, Urine, Clean Catch  -     External Facility Surgical/Procedural Request; Future    2. Hydroureter, left  -     Urine Culture - Urine, Urine, Clean Catch  -     External Facility Surgical/Procedural Request; Future    3. Ureteral stricture, left  -     Urine Culture - Urine, Urine, Clean Catch  -     External Facility Surgical/Procedural Request; Future           Follow Up:   No follow-ups on file.    I spent approximately 30 minutes providing clinical care for this patient; including review of patient's chart and provider documentation, face to face time spent with patient in examination room (obtaining history, performing physical exam, discussing diagnosis and management options), placing orders, and completing patient documentation.     Isma Justin MD  [facility]

## 2025-02-20 NOTE — TELEPHONE ENCOUNTER
Rx Refill Note  Requested Prescriptions     Pending Prescriptions Disp Refills    ezetimibe (ZETIA) 10 MG tablet 90 tablet 1     Sig: Take 1 tablet by mouth Daily.      Last office visit with prescribing clinician: 1/22/25  Last telemedicine visit with prescribing clinician: Visit date not found   Next office visit with prescribing clinician: Visit date not found                        Would you like a call back once the refill request has been completed: [] Yes [x] No [] N/A    If the office needs to give you a call back, can they leave a voicemail: [] Yes [x] No [] N/A    Pharmacy Info    Last Fill Date: 11/21/24  Rx Written Date: NA  Prescribed Qty: 90  Additional Details from Pharmacy: NA    PATIENT PASSED PROTOCOLS PER ARIELLE Pope MA  02/20/25, 08:54 EST

## 2025-02-21 DIAGNOSIS — N30.00 ACUTE CYSTITIS WITHOUT HEMATURIA: Primary | ICD-10-CM

## 2025-02-21 RX ORDER — VALSARTAN AND HYDROCHLOROTHIAZIDE 320; 12.5 MG/1; MG/1
1 TABLET, FILM COATED ORAL DAILY
COMMUNITY
End: 2025-02-21 | Stop reason: SDUPTHER

## 2025-02-21 RX ORDER — VALSARTAN AND HYDROCHLOROTHIAZIDE 320; 12.5 MG/1; MG/1
1 TABLET, FILM COATED ORAL DAILY
Qty: 90 TABLET | Refills: 1 | Status: SHIPPED | OUTPATIENT
Start: 2025-02-21

## 2025-02-21 RX ORDER — LEVOFLOXACIN 500 MG/1
500 TABLET, FILM COATED ORAL DAILY
Qty: 5 TABLET | Refills: 0 | Status: SHIPPED | OUTPATIENT
Start: 2025-02-21 | End: 2025-02-26

## 2025-02-21 NOTE — PROGRESS NOTES
Please let Aldair know he has an E. coli UTI.  I sent in 5 days of Levaquin to clear the urine.  Thanks

## 2025-02-21 NOTE — TELEPHONE ENCOUNTER
Rx Refill Note  Requested Prescriptions     Pending Prescriptions Disp Refills    valsartan-hydrochlorothiazide (Diovan HCT) 320-12.5 MG per tablet 90 tablet 1     Sig: Take 1 tablet by mouth Daily.      Last office visit with prescribing clinician: 01/22/2025   Last telemedicine visit with prescribing clinician: Visit date not found   Next office visit with prescribing clinician: Visit date not found                        Would you like a call back once the refill request has been completed: [] Yes [] No [] N/A    If the office needs to give you a call back, can they leave a voicemail: [] Yes [] No [] N/A    Pharmacy Info    Last Fill Date:   Rx Written Date:   Prescribed Qty:   Additional Details from Pharmacy:       Lisa Reyes MA  02/21/25, 12:08 EST

## 2025-02-22 LAB — BACTERIA SPEC AEROBE CULT: ABNORMAL

## 2025-02-24 ENCOUNTER — TELEPHONE (OUTPATIENT)
Age: 75
End: 2025-02-24
Payer: MEDICARE

## 2025-02-24 NOTE — TELEPHONE ENCOUNTER
Pt needs surgical clearance for cystoscopy left retrograde pyelogram, left ureteral stent scheduled for 2/26/25

## 2025-02-26 ENCOUNTER — TELEPHONE (OUTPATIENT)
Dept: UROLOGY | Facility: CLINIC | Age: 75
End: 2025-02-26

## 2025-02-26 ENCOUNTER — DOCUMENTATION (OUTPATIENT)
Dept: UROLOGY | Facility: CLINIC | Age: 75
End: 2025-02-26

## 2025-02-26 ENCOUNTER — OUTSIDE FACILITY SERVICE (OUTPATIENT)
Dept: UROLOGY | Facility: CLINIC | Age: 75
End: 2025-02-26
Payer: MEDICARE

## 2025-02-26 DIAGNOSIS — N13.5 URETERAL STRICTURE, LEFT: Primary | ICD-10-CM

## 2025-02-26 PROCEDURE — 52332 CYSTOSCOPY AND TREATMENT: CPT | Performed by: STUDENT IN AN ORGANIZED HEALTH CARE EDUCATION/TRAINING PROGRAM

## 2025-02-26 PROCEDURE — 52351 CYSTOURETERO & OR PYELOSCOPE: CPT | Performed by: STUDENT IN AN ORGANIZED HEALTH CARE EDUCATION/TRAINING PROGRAM

## 2025-02-26 PROCEDURE — 74420 UROGRAPHY RTRGR +-KUB: CPT | Performed by: STUDENT IN AN ORGANIZED HEALTH CARE EDUCATION/TRAINING PROGRAM

## 2025-02-26 RX ORDER — NITROFURANTOIN 25; 75 MG/1; MG/1
100 CAPSULE ORAL 2 TIMES DAILY
Qty: 14 CAPSULE | Refills: 0 | Status: SHIPPED | OUTPATIENT
Start: 2025-02-26

## 2025-02-26 RX ORDER — HYOSCYAMINE SULFATE 0.12 MG/1
0.12 TABLET SUBLINGUAL EVERY 4 HOURS PRN
Qty: 30 TABLET | Refills: 1 | Status: SHIPPED | OUTPATIENT
Start: 2025-02-26

## 2025-02-26 RX ORDER — PHENAZOPYRIDINE HYDROCHLORIDE 200 MG/1
200 TABLET, FILM COATED ORAL 3 TIMES DAILY PRN
Qty: 20 TABLET | Refills: 1 | Status: SHIPPED | OUTPATIENT
Start: 2025-02-26

## 2025-02-26 NOTE — TELEPHONE ENCOUNTER
Status post left ureteral stent placement for recurrent left ureteral stricture.  Follow-up with me in 3 months for his next visit in the office.  Thank you    Isma Justin MD

## 2025-02-26 NOTE — PROGRESS NOTES
[facility]   [address], [address]  [place] [address]      OPERATIVE REPORT     Patient Name:  Aldair Narvaez  YOB: 1950    Patient MRN: [id number]    Date of Surgery: 2-     Indications: 74-year-old gentleman with a history of chronic left ureteral stricture previously managed with an indwelling stent.  Recently his left ureteral stent was removed in my office to determine if the kidney would now drain.  Repeat imaging demonstrates persistent hydroureteronephrosis.  He presents today for ureteral stent placement on the left.    Pre-op Diagnosis:   Left ureteral stricture  Left hydronephrosis    Post-op Diagnosis:   Left ureteral stricture  Left hydronephrosis    Procedures Performed:  CYSTOSCOPY  Left RETROGRADE PYELOGRAM  LEFT STENT PLACEMENT    Staff:  Isma Justin MD    Anesthesia: General    Estimated Blood Loss: Minimal    Implants: 7 Swedish by 24 cm double-J stent    Specimen: None    Drains: None      Findings: Persistent severe distal left ureteral stricture with proximal hydroureteronephrosis    Complications: None    Description of Procedure:    After informed consent, the patient was brought back to the operating suite and moved over to the operating table. General anesthesia was smoothly induced, IV antibiotics were administered, and the patient was placed in the dorsal lithotomy position with careful attention focused on padding all pressure points. The patient was prepped and draped in standard fashion. A timeout was performed to ensure the correct patient and procedure    A 22Fr cystoscope was used to cannulate the urethra. The urethra was of normal course and caliber.  Bladder neck appears widely patent with mild fibrotic scarring tissue.  This is improved since last case. Upon entering the bladder, pan-cystoscopy revealed no bladder abnormalities.  The bilateral ureteral orifices were visualized in their orthotopic positions.     Left  retrograde Pyelogram Interpretation  Attention was then turned to the left ureteral orifice which was cannulated with a 5 Fr open ended ureteral catheter. A retrograde pyelogram was performed demonstrating severe left distal ureteral stricture with minimal contrast draining beyond this, the stricture is located roughly 1 or 2 cm proximal to the ureterovesical junction.    A sensor wire was advanced up the left ureter and into the collecting system on fluoroscopy to serve as a safety wire which was clamped to the surgical drapes.     We switched back to the rigid cystoscope which was reinserted over the existing guidewire. A 7 Lithuanian by 24 cm double J ureteral stent was advanced up the left ureter under direct visualization which confirmed good curl in the bladder. Fluoroscopy confirmed good curl in the kidney. The bladder was drained and this concluded our procedure.  Clear urine is seen draining through the stent.     The patient was brought back to the PACU in stable condition. All scopes and instruments were in good working order at the end of the case. There were no complications.    Disposition/Follow Up: 3 to 4-month postop visit    Isma Justin MD     Date: 2/26/2025  Time: 10:23 EST

## 2025-03-24 ENCOUNTER — TELEPHONE (OUTPATIENT)
Age: 75
End: 2025-03-24
Payer: MEDICARE

## 2025-03-24 DIAGNOSIS — I73.9 CLAUDICATION: Primary | ICD-10-CM

## 2025-03-24 NOTE — TELEPHONE ENCOUNTER
PT CALLED RE: TESTING.  DR MAXWELL ORDERED ARTERIAL DUPLEX LOWER EXTREMITY ON [facility] H&P 1/22/25.  PLEASE ENTER NEW TEST ORDER INTO [facility].  
Negative

## 2025-05-01 ENCOUNTER — HOSPITAL ENCOUNTER (OUTPATIENT)
Facility: HOSPITAL | Age: 75
Discharge: HOME | End: 2025-05-01
Payer: MEDICARE

## 2025-05-01 DIAGNOSIS — I73.9 CLAUDICATION: ICD-10-CM

## 2025-05-01 LAB
BH CV GRAFT BRACHIAL PRESSURE LEFT: 133 MMHG
BH CV GRAFT BRACHIAL PRESSURE RIGHT: 130 MMHG
BH CV LEA LEFT ANT TIBIAL A DISTAL PSV: 57.5 CM/S
BH CV LEA LEFT CFA DISTAL PSV: 131 CM/S
BH CV LEA LEFT DFA PROX PSV: 81.3 CM/S
BH CV LEA LEFT DPA PRESSURE: 140 MMHG
BH CV LEA LEFT PERONEAL  MID PSV: 57 CM/S
BH CV LEA LEFT POPITEAL A  DISTAL PSV: 59 CM/S
BH CV LEA LEFT POPITEAL A  PROX PSV: 84.1 CM/S
BH CV LEA LEFT PTA DISTAL PSV: 98 CM/S
BH CV LEA LEFT PTA PRESSURE: 150 MMHG
BH CV LEA LEFT SFA DISTAL PSV: 78.5 CM/S
BH CV LEA LEFT SFA MID PSV: 53.1 CM/S
BH CV LEA LEFT SFA PROX PSV: 139 CM/S
BH CV LEA LEFT TIBEOPERONEAL PSV: 80 CM/S
BH CV LEA RIGHT ANT TIBIAL A DISTAL PSV: 88.3 CM/S
BH CV LEA RIGHT CFA DISTAL PSV: 149 CM/S
BH CV LEA RIGHT DFA PROX PSV: 82.3 CM/S
BH CV LEA RIGHT DPA PRESSURE: 150 MMHG
BH CV LEA RIGHT PERONEAL  MID PSV: 45.2 CM/S
BH CV LEA RIGHT POPITEAL A  DISTAL PSV: 55.9 CM/S
BH CV LEA RIGHT POPITEAL A  PROX PSV: 86.4 CM/S
BH CV LEA RIGHT PTA DISTAL PSV: 88.3 CM/S
BH CV LEA RIGHT PTA PRESSURE: 150 MMHG
BH CV LEA RIGHT SFA DISTAL PSV: 127 CM/S
BH CV LEA RIGHT SFA MID PSV: 73.6 CM/S
BH CV LEA RIGHT SFA PROX PSV: 82 CM/S
BH CV LEA RIGHT TIBEOPERONEAL PSV: 89 CM/S
BH CV LOWER ARTERIAL LEFT ABI RATIO: 1.12
BH CV LOWER ARTERIAL RIGHT ABI RATIO: 1.12
LEFT GROIN CFA SYS: 131 CM/SEC
RIGHT GROIN CFA SYS: 149 CM/SEC

## 2025-05-01 PROCEDURE — 93925 LOWER EXTREMITY STUDY: CPT

## 2025-05-06 ENCOUNTER — LAB (OUTPATIENT)
Dept: LAB | Facility: HOSPITAL | Age: 75
End: 2025-05-06
Payer: MEDICARE

## 2025-05-06 ENCOUNTER — TRANSCRIBE ORDERS (OUTPATIENT)
Dept: LAB | Facility: HOSPITAL | Age: 75
End: 2025-05-06
Payer: MEDICARE

## 2025-05-06 DIAGNOSIS — I25.10 CORONARY ARTERY DISEASE, UNSPECIFIED VESSEL OR LESION TYPE, UNSPECIFIED WHETHER ANGINA PRESENT, UNSPECIFIED WHETHER NATIVE OR TRANSPLANTED HEART: Primary | ICD-10-CM

## 2025-05-06 DIAGNOSIS — I25.10 CORONARY ARTERY DISEASE, UNSPECIFIED VESSEL OR LESION TYPE, UNSPECIFIED WHETHER ANGINA PRESENT, UNSPECIFIED WHETHER NATIVE OR TRANSPLANTED HEART: ICD-10-CM

## 2025-05-06 LAB
ALBUMIN SERPL-MCNC: 4.3 G/DL (ref 3.5–5.2)
ALP SERPL-CCNC: 80 U/L (ref 39–117)
ALT SERPL W P-5'-P-CCNC: 29 U/L (ref 1–41)
ANION GAP SERPL CALCULATED.3IONS-SCNC: 12.1 MMOL/L (ref 5–15)
AST SERPL-CCNC: 22 U/L (ref 1–40)
BILIRUB CONJ SERPL-MCNC: 0.1 MG/DL (ref 0–0.3)
BILIRUB INDIRECT SERPL-MCNC: 0.3 MG/DL
BILIRUB SERPL-MCNC: 0.4 MG/DL (ref 0–1.2)
BUN SERPL-MCNC: 22 MG/DL (ref 8–23)
BUN/CREAT SERPL: 28.2 (ref 7–25)
CALCIUM SPEC-SCNC: 9.6 MG/DL (ref 8.6–10.5)
CHLORIDE SERPL-SCNC: 102 MMOL/L (ref 98–107)
CHOLEST SERPL-MCNC: 157 MG/DL (ref 0–200)
CO2 SERPL-SCNC: 25.9 MMOL/L (ref 22–29)
CREAT SERPL-MCNC: 0.78 MG/DL (ref 0.76–1.27)
EGFRCR SERPLBLD CKD-EPI 2021: 94.8 ML/MIN/1.73
GLUCOSE SERPL-MCNC: 78 MG/DL (ref 65–99)
HDLC SERPL-MCNC: 40 MG/DL (ref 40–60)
LDLC SERPL CALC-MCNC: 91 MG/DL (ref 0–100)
LDLC/HDLC SERPL: 2.18 {RATIO}
POTASSIUM SERPL-SCNC: 4.2 MMOL/L (ref 3.5–5.2)
PROT SERPL-MCNC: 7.2 G/DL (ref 6–8.5)
SODIUM SERPL-SCNC: 140 MMOL/L (ref 136–145)
TRIGL SERPL-MCNC: 149 MG/DL (ref 0–150)
VLDLC SERPL-MCNC: 26 MG/DL (ref 5–40)

## 2025-05-06 PROCEDURE — 80076 HEPATIC FUNCTION PANEL: CPT

## 2025-05-06 PROCEDURE — 80061 LIPID PANEL: CPT | Performed by: INTERNAL MEDICINE

## 2025-05-06 PROCEDURE — 36415 COLL VENOUS BLD VENIPUNCTURE: CPT

## 2025-05-06 PROCEDURE — 85027 COMPLETE CBC AUTOMATED: CPT

## 2025-05-06 PROCEDURE — 80048 BASIC METABOLIC PNL TOTAL CA: CPT

## 2025-05-07 LAB
DEPRECATED RDW RBC AUTO: 47.1 FL (ref 37–54)
ERYTHROCYTE [DISTWIDTH] IN BLOOD BY AUTOMATED COUNT: 15.6 % (ref 12.3–15.4)
HCT VFR BLD AUTO: 50.1 % (ref 37.5–51)
HGB BLD-MCNC: 15.6 G/DL (ref 13–17.7)
MCH RBC QN AUTO: 26.8 PG (ref 26.6–33)
MCHC RBC AUTO-ENTMCNC: 31.1 G/DL (ref 31.5–35.7)
MCV RBC AUTO: 85.9 FL (ref 79–97)
PLATELET # BLD AUTO: 328 10*3/MM3 (ref 140–450)
PMV BLD AUTO: 9.8 FL (ref 6–12)
RBC # BLD AUTO: 5.83 10*6/MM3 (ref 4.14–5.8)
WBC NRBC COR # BLD AUTO: 17.49 10*3/MM3 (ref 3.4–10.8)

## 2025-05-07 NOTE — ASSESSMENT & PLAN NOTE
The patient [id number] score is 3..  Continue Eliquis 5 mg twice a day  Orders:    ELIAS; Future    rosuvastatin (CRESTOR) 20 MG tablet; Take 2 tablets by mouth Daily.    CBC & Differential; Future    Lipid Panel; Future    Basic Metabolic Panel; Future

## 2025-05-07 NOTE — PROGRESS NOTES
Cardiology Follow-Up Note     Name: Aldair Narvaez  :   1950  PCP: Miguel Angel Mireles MD  Date:   2025  Department: [facility] [place] [facility] CARDIOLOGY  [address]  [facility] [address]  Fax [phone]  Phone [phone]    Chief Complaint   Patient presents with    Hypertension    Hyperlipidemia    Atrial Fibrillation     Problem list:  CAD  Mild ischemic cardiomyopathy  History CABG x3   Cath 3/21/2024 for abnormal stress test, EF 50%, multivessel CAD, 3 out of 3 bypass grafts patent, mild ischemic cardiomyopathy  Echo 2024 EF 60%, mild LVH, mild MR, mild TR  AAA repair  Paroxysmal A-fib  Hypertension  Hyperlipidemia  Carotid ultrasound 2022 normal exam  Diabetes mellitus type 2  2025 arterial Doppler examination lower extremity revealed normal MADISON with no significant disease.Duplex Lower Extremity Art / Grafts - Bilateral CAR (2025 09:29)   Chronic venous insufficiency  Venous duplex 10/7/2022 right GSV remains occluded following closure      Subjective     History of Present Illness  Aldair Narvaez is a 74 y.o. male who presents today for follow-up arterial duplex.  The patient has blue discoloration of both the feet, the left is greater than the right, gets better if it gets warm.    Past Medical History:   Diagnosis Date    Acid reflux     Arthritis     BPH (benign prostatic hyperplasia)     Cancer     Chronic venous insufficiency     Colon polyp     Coronary artery disease     Coronary atherosclerosis of native coronary vessel     Erectile dysfunction     Gastroesophageal reflux     Gout     Heart disease     Hyperlipidemia     Hypertension     Kidney stone     Left ventricular hypertrophy     Medical non-compliance     Mitral regurgitation     Obesity     body mass index 30+-obesity    Prostate cancer     Sepsis 10/2023    urosepsis  [facility] then the [facility]    Sleep apnea     does not use c-pap since wt loss     Tricuspid regurgitation     Type 2 diabetes mellitus     Urinary incontinence 2005    Getting worse    Urinary tract infection 2003    In hospital twice    Wears dentures     upper plate only      Past Surgical History:   Procedure Laterality Date    ABDOMINAL AORTIC ANEURYSM REPAIR      CARDIAC CATHETERIZATION  03/21/2024    [facility] ALISSA/IOP: Known CAD, angina/ABN stress study/EF 50%.  Multvessel CAD. 3/3 bypass grafts patent. Mild ischemic CM.  Small vessel disease.    CARDIAC CATHETERIZATION  08/30/2022    [facility] ALISSA/IOP: KNOWN CAD, ABN STRESS STUDY/EF 45%.  Multivessell CAD. Ischemic CM    CARDIAC CATHETERIZATION  10/26/2006    [place] to distal RCA - Taxus to posterior descending coronary  artery - Dr Mueller    CARDIAC CATHETERIZATION  10/12/2005    [place] to LCX - [place] to distal RCA - [place] to mid RCA - EF  60% - Dr Mueller    CARPAL TUNNEL RELEASE Left     COLONOSCOPY      CORONARY ARTERY BYPASS GRAFT  08/30/2022    3 Bypasses    CYSTOSCOPY BLADDER STONE LITHOTRIPSY      CYSTOSCOPY BLADDER STONE LITHOTRIPSY      CYSTOSCOPY RETROGRADE PYELOGRAM Left 10/31/2023    Procedure: CYSTOSCOPY RETROGRADE PYELOGRAM STENT PLACEMENT;  Surgeon: Isma Justin MD;  Location:  [facility];  Service: Urology;  Laterality: Left;    CYSTOSCOPY W/ URETERAL STENT PLACEMENT Left 05/09/2023    Procedure: CYSTOSCOPY LEFT RETROGRADE PYELOGRAM AND LEFT URETERAL STENT PLACEMENT;  Surgeon: Isma Justin MD;  Location:  [facility];  Service: Urology;  Laterality: Left;    CYSTOSCOPY, URETEROSCOPY, RETROGRADE PYELOGRAM, STONE EXTRACTION, STENT INSERTION Left 01/19/2024    Procedure: CYSTOSCOPY RETROGRADE PYELOGRAM AND STENT INSERTION;  Surgeon: Isma Justin MD;  Location:  [facility];  Service: Urology;  Laterality: Left;    ENDOSCOPY      ENDOVENOUS ABLATION SAPHENOUS VEIN W/ LASER  09/22/2017    Right greater saphenous vein RF ablation was completed without  difficulty.    INGUINAL HERNIA REPAIR Right     x 2  with mesh   umbilical with mesh    KNEE ARTHROSCOPY Bilateral     NASAL SEPTUM SURGERY      Repair of deviated [id number]    PROSTATECTOMY      ROTATOR CUFF REPAIR Left     SINUS SURGERY      MARNI  08/31/2022    FELIPE BEHZAD Intra-op MARNI/ EF 45-50%. Trace to mild MR.    THROAT SURGERY  1998    TONSILLECTOMY      TRIGGER FINGER RELEASE Right     UMBILICAL HERNIA REPAIR      URETEROSCOPY LASER LITHOTRIPSY WITH STENT INSERTION Left 12/20/2024    Procedure: CYSTOSCOPY, LEFT URETEROSCOPY, URETERAL BALLOON, LEFT STENT EXCHANGE;  Surgeon: Isma Justin MD;  Location: [facility];  Service: Urology;  Laterality: Left;    UVULOPALATOPHARYNGOPLASTY         Current Outpatient Medications:     HYDROcodone-acetaminophen (NORCO) 5-325 MG per tablet, Take 1 tablet by mouth As Needed., Disp: , Rfl:     metoprolol succinate XL (TOPROL-XL) 50 MG 24 hr tablet, Take 1 tablet by mouth Daily., Disp: , Rfl:     acetaminophen (TYLENOL) 325 MG tablet, Take 2 tablets by mouth Every 6 (Six) Hours As Needed for Mild Pain., Disp: , Rfl:     albuterol sulfate  (90 Base) MCG/ACT inhaler, As Needed., Disp: , Rfl:     Blood Glucose Monitoring Suppl device, Use to check blood sugars 3-4 times daily. E11.65, Disp: 1 each, Rfl: 0    dapagliflozin Propanediol (Farxiga) 10 MG tablet, Take 10 mg by mouth Daily., Disp: 90 tablet, Rfl: 3    DULoxetine (CYMBALTA) 30 MG capsule, Take 1 capsule by mouth Daily., Disp: , Rfl:     Eliquis 5 MG tablet tablet, Take 1 tablet by mouth Every 12 (Twelve) Hours., Disp: , Rfl:     ezetimibe (ZETIA) 10 MG tablet, Take 1 tablet by mouth Daily., Disp: 90 tablet, Rfl: 1    glimepiride (AMARYL) 4 MG tablet, Take 2 tablets by mouth Every Morning Before Breakfast., Disp: 180 tablet, Rfl: 3    Hydrocortisone, Perianal, (PROCTOCORT) 1 % cream rectal cream, Insert 1 Application into the rectum As Needed., Disp: , Rfl:     hyoscyamine (Levsin/SL) 0.125 MG SL tablet, Place 1 tablet under the tongue Every 4 (Four) Hours As Needed  "(Breakthrough bladder spasms)., Disp: 30 tablet, Rfl: 1    insulin degludec (Tresiba FlexTouch) 100 UNIT/ML solution pen-injector injection, Inject once daily; max daily dose 50u, Disp: 45 mL, Rfl: 3    Insulin Pen Needle (Pen Needles) 32G X 4 MM misc, Use 1 each Daily., Disp: 100 each, Rfl: 3    ipratropium-albuterol (DUO-NEB) 0.5-2.5 mg/3 ml nebulizer, Take 3 mL by nebulization Every 6 (Six) Hours As Needed for Shortness of Air., Disp: 360 mL, Rfl: 0    Krill Oil Ultra Strength 1500 MG capsule, Take 1 capsule by mouth Daily., Disp: , Rfl:     loperamide (IMODIUM) 2 MG capsule, Take 1 capsule by mouth 4 (Four) Times a Day As Needed for Diarrhea., Disp: , Rfl:     Misc Natural Products (Magic Mushroom Mix) capsule, Take 1,000 mg by mouth Daily., Disp: , Rfl:     nitrofurantoin, macrocrystal-monohydrate, (Macrobid) 100 MG capsule, Take 1 capsule by mouth 2 (Two) Times a Day., Disp: 14 capsule, Rfl: 0    nitroglycerin (NITROSTAT) 0.4 MG SL tablet, nitroglycerin 0.4 mg sublingual tablet (Patient not taking: Reported on 2/20/2025), Disp: , Rfl:     pantoprazole (PROTONIX) 40 MG EC tablet, Take 1 tablet by mouth Daily., Disp: , Rfl:     phenazopyridine (Pyridium) 200 MG tablet, Take 1 tablet by mouth 3 (Three) Times a Day As Needed for Bladder Spasms or Dysuria., Disp: 20 tablet, Rfl: 1    rosuvastatin (CRESTOR) 20 MG tablet, Take 1 tablet by mouth Daily., Disp: , Rfl:     sodium chloride 0.65 % nasal spray, Administer 2 sprays into the nostril(s) as directed by provider As Needed for Congestion., Disp: , Rfl:     valsartan-hydrochlorothiazide (Diovan HCT) 320-12.5 MG per tablet, Take 1 tablet by mouth Daily., Disp: 90 tablet, Rfl: 1    Objective     Vital Signs:  /69 (BP Location: Left arm, Patient Position: Sitting)   Pulse 76   Ht 177.8 cm (70\")   Wt 96.5 kg (212 lb 12.8 oz)   BMI 30.53 kg/m²   Estimated body mass index is 30.53 kg/m² as calculated from the following:    Height as of this encounter: 177.8 cm " "(70\").    Weight as of this encounter: 96.5 kg (212 lb 12.8 oz).       BMI is >= 30 and <35. (Class 1 Obesity). The following options were offered after discussion;: exercise counseling/recommendations      Vitals reviewed.   Constitutional:       Appearance: Normal and healthy appearance.   Eyes:      Pupils: Pupils are equal, round, and reactive to light.   Pulmonary:      Effort: Pulmonary effort is normal.   Chest:      Chest wall: Not tender to palpatation.   Cardiovascular:      PMI at left midclavicular line. Normal rate. Regular rhythm.      No gallop.    Pulses:     Intact distal pulses.   Edema:     Peripheral edema absent.   Skin:     General: Skin is warm.   Psychiatric:         Behavior: Behavior is cooperative.              Data Review:   Lab Results   Component Value Date    GLUCOSE 78 05/06/2025    BUN 22 05/06/2025    CREATININE 0.78 05/06/2025    EGFRIFNONA 96 02/17/2021    BCR 28.2 (H) 05/06/2025    K 4.2 05/06/2025    CO2 25.9 05/06/2025    CALCIUM 9.6 05/06/2025    ALBUMIN 4.3 05/06/2025    AST 22 05/06/2025    ALT 29 05/06/2025     Lab Results   Component Value Date    CHOL 157 05/06/2025    TRIG 149 05/06/2025    HDL 40 05/06/2025    LDL 91 05/06/2025      Lab Results   Component Value Date    WBC 17.49 (H) 05/06/2025    RBC 5.83 (H) 05/06/2025    HGB 15.6 05/06/2025    HCT 50.1 05/06/2025    MCV 85.9 05/06/2025     05/06/2025     Lab Results   Component Value Date    TSH 1.790 12/13/2024     Lab Results   Component Value Date    HGBA1C 9.10 (H) 12/21/2024     Lab Results   Component Value Date    INR 1.12 12/17/2024    INR 1.44 (H) 10/30/2023    PROTIME 14.5 12/17/2024    PROTIME 17.7 (H) 10/30/2023       I reviewed the EKG from 12/24/2024 revealing sinus rhythm with inferior wall MI nonspecific ST-T wave changes    Assessment and Plan     Assessment & Plan  CAD, multiple vessel  Coronary Artery Disease (OPTIONAL): Coronary artery disease is stable.  Continue current treatment regimen. " Dietary sodium restriction. Weight loss.  Cardiac status will be reassessed in 3 months.  The patient is on Eliquis.  Will repeat EKG.  Will avoid using nonsteroidals risk of bleeding.  Orders:    ELIAS; Future    rosuvastatin (CRESTOR) 20 MG tablet; Take 2 tablets by mouth Daily.    CBC & Differential; Future    Lipid Panel; Future    Basic Metabolic Panel; Future    Mixed hyperlipidemia   Lipid abnormalities are stable    Plan:  Continue same medication/s without change.      Discussed medication dosage, use, side effects, and goals of treatment in detail.    Counseled patient on lifestyle modifications to help control hyperlipidemia.   Advised patient to exercise for 150 minutes weekly. (30 minute brisk walk, 5 days a week for example)    Patient Treatment Goals:   LDL goal is less than 70    Followup in 3 months.  Will increase rosuvastatin 40 mg once a day and Zetia 10 mg once a day  Orders:    ELIAS; Future    rosuvastatin (CRESTOR) 20 MG tablet; Take 2 tablets by mouth Daily.    CBC & Differential; Future    Lipid Panel; Future    Basic Metabolic Panel; Future    Benign hypertension  Hypertension is stable and controlled  Continue current treatment regimen.  Dietary sodium restriction.  Ambulatory blood pressure monitoring.  Blood pressure will be reassessed in 3 months.  Continue valsartan hydrochlorothiazide 320-12.5 mg once a day.  Orders:    ELIAS; Future    rosuvastatin (CRESTOR) 20 MG tablet; Take 2 tablets by mouth Daily.    CBC & Differential; Future    Lipid Panel; Future    Basic Metabolic Panel; Future    Paroxysmal A-fib  The patient [id number] score is 3..  Continue Eliquis 5 mg twice a day  Orders:    ELIAS; Future    rosuvastatin (CRESTOR) 20 MG tablet; Take 2 tablets by mouth Daily.    CBC & Differential; Future    Lipid Panel; Future    Basic Metabolic Panel; Future    Chilblains, initial encounter  The patient has no prior history of lupus, there are improved discoloration of both the feet,  discussed with patient about precautions including cold weather, protect the feet from getting older and keeping it warm.  The patient will not be able to tolerate nifedipine as it will drop his blood pressure too low and is not the best medication to help with this condition.  Patient can also see podiatry for further evaluation.Patient wants to wait on seeing Podiatry.Keep A1C less then 7.0  Orders:    ELIAS; Future    rosuvastatin (CRESTOR) 20 MG tablet; Take 2 tablets by mouth Daily.    CBC & Differential; Future    Lipid Panel; Future    Basic Metabolic Panel; Future      Advised to continue current cardiac medications. Please notify of any issues. Discussed with the patient compliance with medical management and follow-up.     Follow Up  Return in about 3 months (around 8/8/2025).    Call if you have any significant symptoms or go to the [facility] room if possible.     Dru Mueller MD, FACC,[facility].  [facility]    Part of this note may be an electronic transcription/translation of spoken language to printed text using the Dragon Dictation System.

## 2025-05-07 NOTE — ASSESSMENT & PLAN NOTE
Hypertension is stable and controlled  Continue current treatment regimen.  Dietary sodium restriction.  Ambulatory blood pressure monitoring.  Blood pressure will be reassessed in 3 months.  Continue valsartan hydrochlorothiazide 320-12.5 mg once a day.  Orders:    ELIAS; Future    rosuvastatin (CRESTOR) 20 MG tablet; Take 2 tablets by mouth Daily.    CBC & Differential; Future    Lipid Panel; Future    Basic Metabolic Panel; Future

## 2025-05-07 NOTE — ASSESSMENT & PLAN NOTE
Lipid abnormalities are stable    Plan:  Continue same medication/s without change.      Discussed medication dosage, use, side effects, and goals of treatment in detail.    Counseled patient on lifestyle modifications to help control hyperlipidemia.   Advised patient to exercise for 150 minutes weekly. (30 minute brisk walk, 5 days a week for example)    Patient Treatment Goals:   LDL goal is less than 70    Followup in 3 months.  Will increase rosuvastatin 40 mg once a day and Zetia 10 mg once a day  Orders:    ELIAS; Future    rosuvastatin (CRESTOR) 20 MG tablet; Take 2 tablets by mouth Daily.    CBC & Differential; Future    Lipid Panel; Future    Basic Metabolic Panel; Future

## 2025-05-07 NOTE — ASSESSMENT & PLAN NOTE
Coronary Artery Disease (OPTIONAL): Coronary artery disease is stable.  Continue current treatment regimen. Dietary sodium restriction. Weight loss.  Cardiac status will be reassessed in 3 months.  The patient is on Eliquis.  Will repeat EKG.  Will avoid using nonsteroidals risk of bleeding.  Orders:    ELIAS; Future    rosuvastatin (CRESTOR) 20 MG tablet; Take 2 tablets by mouth Daily.    CBC & Differential; Future    Lipid Panel; Future    Basic Metabolic Panel; Future

## 2025-05-08 ENCOUNTER — OFFICE VISIT (OUTPATIENT)
Age: 75
End: 2025-05-08
Payer: MEDICARE

## 2025-05-08 VITALS
BODY MASS INDEX: 30.46 KG/M2 | WEIGHT: 212.8 LBS | DIASTOLIC BLOOD PRESSURE: 69 MMHG | SYSTOLIC BLOOD PRESSURE: 127 MMHG | HEIGHT: 70 IN | HEART RATE: 76 BPM

## 2025-05-08 DIAGNOSIS — I25.10 CAD, MULTIPLE VESSEL: Primary | ICD-10-CM

## 2025-05-08 DIAGNOSIS — I48.0 PAROXYSMAL A-FIB: ICD-10-CM

## 2025-05-08 DIAGNOSIS — I10 BENIGN HYPERTENSION: ICD-10-CM

## 2025-05-08 DIAGNOSIS — T69.1XXA CHILBLAINS, INITIAL ENCOUNTER: ICD-10-CM

## 2025-05-08 DIAGNOSIS — E78.2 MIXED HYPERLIPIDEMIA: ICD-10-CM

## 2025-05-08 RX ORDER — METOPROLOL SUCCINATE 50 MG/1
50 TABLET, EXTENDED RELEASE ORAL DAILY
COMMUNITY
Start: 2025-04-02

## 2025-05-08 RX ORDER — HYDROCODONE BITARTRATE AND ACETAMINOPHEN 5; 325 MG/1; MG/1
1 TABLET ORAL AS NEEDED
COMMUNITY
Start: 2025-04-02

## 2025-05-08 RX ORDER — ROSUVASTATIN CALCIUM 20 MG/1
40 TABLET, COATED ORAL DAILY
Qty: 90 TABLET | Refills: 1 | Status: SHIPPED | OUTPATIENT
Start: 2025-05-08

## 2025-05-08 NOTE — ASSESSMENT & PLAN NOTE
The patient has no prior history of lupus, there are improved discoloration of both the feet, discussed with patient about precautions including cold weather, protect the feet from getting older and keeping it warm.  The patient will not be able to tolerate nifedipine as it will drop his blood pressure too low and is not the best medication to help with this condition.  Patient can also see podiatry for further evaluation.Patient wants to wait on seeing Podiatry.Keep A1C less then 7.0  Orders:    ELIAS; Future    rosuvastatin (CRESTOR) 20 MG tablet; Take 2 tablets by mouth Daily.    CBC & Differential; Future    Lipid Panel; Future    Basic Metabolic Panel; Future

## 2025-06-02 ENCOUNTER — OFFICE VISIT (OUTPATIENT)
Age: 75
End: 2025-06-02
Payer: MEDICARE

## 2025-06-02 VITALS — DIASTOLIC BLOOD PRESSURE: 82 MMHG | OXYGEN SATURATION: 95 % | HEART RATE: 78 BPM | SYSTOLIC BLOOD PRESSURE: 160 MMHG

## 2025-06-02 DIAGNOSIS — N13.5 URETERAL STRICTURE, LEFT: ICD-10-CM

## 2025-06-02 DIAGNOSIS — N13.30 HYDRONEPHROSIS OF LEFT KIDNEY: Primary | ICD-10-CM

## 2025-06-02 LAB
BILIRUB BLD-MCNC: NEGATIVE MG/DL
CLARITY, POC: CLEAR
COLOR UR: YELLOW
EXPIRATION DATE: ABNORMAL
GLUCOSE UR STRIP-MCNC: ABNORMAL MG/DL
KETONES UR QL: NEGATIVE
LEUKOCYTE EST, POC: NEGATIVE
Lab: ABNORMAL
NITRITE UR-MCNC: NEGATIVE MG/ML
PH UR: 6 [PH] (ref 5–8)
PROT UR STRIP-MCNC: ABNORMAL MG/DL
RBC # UR STRIP: ABNORMAL /UL
SP GR UR: 1.01 (ref 1–1.03)
UROBILINOGEN UR QL: NORMAL

## 2025-06-02 PROCEDURE — 1159F MED LIST DOCD IN RCRD: CPT | Performed by: STUDENT IN AN ORGANIZED HEALTH CARE EDUCATION/TRAINING PROGRAM

## 2025-06-02 PROCEDURE — 1160F RVW MEDS BY RX/DR IN RCRD: CPT | Performed by: STUDENT IN AN ORGANIZED HEALTH CARE EDUCATION/TRAINING PROGRAM

## 2025-06-02 PROCEDURE — 3077F SYST BP >= 140 MM HG: CPT | Performed by: STUDENT IN AN ORGANIZED HEALTH CARE EDUCATION/TRAINING PROGRAM

## 2025-06-02 PROCEDURE — 3079F DIAST BP 80-89 MM HG: CPT | Performed by: STUDENT IN AN ORGANIZED HEALTH CARE EDUCATION/TRAINING PROGRAM

## 2025-06-02 PROCEDURE — 51798 US URINE CAPACITY MEASURE: CPT | Performed by: STUDENT IN AN ORGANIZED HEALTH CARE EDUCATION/TRAINING PROGRAM

## 2025-06-02 PROCEDURE — 81003 URINALYSIS AUTO W/O SCOPE: CPT | Performed by: STUDENT IN AN ORGANIZED HEALTH CARE EDUCATION/TRAINING PROGRAM

## 2025-06-02 PROCEDURE — 99214 OFFICE O/P EST MOD 30 MIN: CPT | Performed by: STUDENT IN AN ORGANIZED HEALTH CARE EDUCATION/TRAINING PROGRAM

## 2025-06-02 NOTE — PROGRESS NOTES
Follow Up Office Visit      Patient Name: Aldair Narvaez  : 1950   MRN: [id number]     Chief Complaint:    Chief Complaint   Patient presents with    Ureteral stricture, left    Hydronephrosis of left kidney       Referring Provider: Miguel Angel Mireles MD    History of Present Illness: Aldair Narvaez is a 74 y.o. male who presents today for follow up of chronic left ureteral stricture managed with an indwelling ureteral stent.  His left ureteral stent was last exchanged on .  Since this time he has had no significant UTI concerns.  He underwent labs with his primary care or cardiologist last month demonstrating persistent leukocytosis of unclear etiology.  He denies fevers chills nausea vomiting.  He denies hematuria.  He has history of prostatectomy with undetectable PSA.  PSA undetectable as of 2024.  Due for recheck this .  The patient's left ureteral stricture is of unclear etiology.  Probably related to previous kidney stone procedures by different urologist or an injury sustained during his radical prostatectomy remotely.  He does have multiple medical hips near the left distal ureter at the site of his obstruction.  We have performed balloon dilation x 2 of his left ureter and have attempted left ureteral stent removal and he has failed each attempt with recurrent hydronephrosis and infection picture.  For this reason we have elected to continue indwelling ureteral stent with chronic exchanges.  BMP performed May 6 demonstrates creatinine 0.78 with a GFR 94.8.    Subjective      Review of System: Review of Systems   Genitourinary:  Positive for enuresis, frequency and urgency.      I have reviewed the ROS documented by my clinical staff, I have updated appropriately and I agree. Isma Justin MD    I have reviewed and the following portions of the patient's history were updated as appropriate: past family history, past medical history, past social history, past surgical  history and problem list.    Medications:     Current Outpatient Medications:     acetaminophen (TYLENOL) 325 MG tablet, Take 2 tablets by mouth Every 6 (Six) Hours As Needed for Mild Pain., Disp: , Rfl:     albuterol sulfate  (90 Base) MCG/ACT inhaler, As Needed., Disp: , Rfl:     Blood Glucose Monitoring Suppl device, Use to check blood sugars 3-4 times daily. E11.65, Disp: 1 each, Rfl: 0    dapagliflozin Propanediol (Farxiga) 10 MG tablet, Take 10 mg by mouth Daily., Disp: 90 tablet, Rfl: 3    DULoxetine (CYMBALTA) 30 MG capsule, Take 1 capsule by mouth Daily., Disp: , Rfl:     Eliquis 5 MG tablet tablet, Take 1 tablet by mouth Every 12 (Twelve) Hours., Disp: , Rfl:     ezetimibe (ZETIA) 10 MG tablet, Take 1 tablet by mouth Daily., Disp: 90 tablet, Rfl: 1    glimepiride (AMARYL) 4 MG tablet, Take 2 tablets by mouth Every Morning Before Breakfast., Disp: 180 tablet, Rfl: 3    HYDROcodone-acetaminophen (NORCO) 5-325 MG per tablet, Take 1 tablet by mouth As Needed., Disp: , Rfl:     Hydrocortisone, Perianal, (PROCTOCORT) 1 % cream rectal cream, Insert 1 Application into the rectum As Needed., Disp: , Rfl:     hyoscyamine (Levsin/SL) 0.125 MG SL tablet, Place 1 tablet under the tongue Every 4 (Four) Hours As Needed (Breakthrough bladder spasms)., Disp: 30 tablet, Rfl: 1    insulin degludec (Tresiba FlexTouch) 100 UNIT/ML solution pen-injector injection, Inject once daily; max daily dose 50u, Disp: 45 mL, Rfl: 3    Insulin Pen Needle (Pen Needles) 32G X 4 MM misc, Use 1 each Daily., Disp: 100 each, Rfl: 3    ipratropium-albuterol (DUO-NEB) 0.5-2.5 mg/3 ml nebulizer, Take 3 mL by nebulization Every 6 (Six) Hours As Needed for Shortness of Air., Disp: 360 mL, Rfl: 0    Krill Oil Ultra Strength 1500 MG capsule, Take 1 capsule by mouth Daily., Disp: , Rfl:     loperamide (IMODIUM) 2 MG capsule, Take 1 capsule by mouth 4 (Four) Times a Day As Needed for Diarrhea., Disp: , Rfl:     metoprolol succinate XL (TOPROL-XL)  50 MG 24 hr tablet, Take 1 tablet by mouth Daily., Disp: , Rfl:     Misc Natural Products (Magic Mushroom Mix) capsule, Take 1,000 mg by mouth Daily., Disp: , Rfl:     pantoprazole (PROTONIX) 40 MG EC tablet, Take 1 tablet by mouth Daily., Disp: , Rfl:     phenazopyridine (Pyridium) 200 MG tablet, Take 1 tablet by mouth 3 (Three) Times a Day As Needed for Bladder Spasms or Dysuria., Disp: 20 tablet, Rfl: 1    rosuvastatin (CRESTOR) 20 MG tablet, Take 2 tablets by mouth Daily., Disp: 90 tablet, Rfl: 1    sodium chloride 0.65 % nasal spray, Administer 2 sprays into the nostril(s) as directed by provider As Needed for Congestion., Disp: , Rfl:     valsartan-hydrochlorothiazide (Diovan HCT) 320-12.5 MG per tablet, Take 1 tablet by mouth Daily., Disp: 90 tablet, Rfl: 1    nitroglycerin (NITROSTAT) 0.4 MG SL tablet, nitroglycerin 0.4 mg sublingual tablet (Patient not taking: Reported on 2/20/2025), Disp: , Rfl:     Allergies:   Allergies   Allergen Reactions    Contrast Dye (Echo Or Unknown Ct/Mr) Shortness Of Breath     Nasal congestion    Lisinopril Cough    Niacin Hives and Swelling    Other Hives and Swelling     Simcor    Iodine Other (See Comments) and Unknown - High Severity     Closes sinuses    Oxycodone-Acetaminophen Itching    Zofran [Ondansetron] Hives       IPSS Questionnaire (AUA-7):  Over the past month…    1)  Incomplete Emptying:       How often have you had a sensation of not emptying you had the sensation of not emptying your bladder completely after you finished urinating?  1 - Less than 1 time in 5   2)  Frequency:       How often have you had the urinate again less than two hours after you finished urinating?  4 - More than half the time   3)  Intermittency:       How often have you found you stopped and started again several times when you urinated?   1 - Less than 1 time in 5   4) Urgency:      How often have you found it difficult to postpone urination?  4 - More than half the time   5) Weak  Stream:      How often have you had a weak urinary stream?  1 - Less than 1 time in 5   6) Straining:       How often have you had to push or strain to begin urination?  1 - Less than 1 time in 5   7) Nocturia:      How many times did you most typically get up to urinate from the time you went to bed at night until the time you got up in the morning?  2 - 2 times   Total Score:  14   The International Prostate Symptom Score (IPSS) is used to screen, diagnose, track symptoms of benign prostatic hyperplasia (BPH).   0-7 (Mild Symptoms) 8-19 (Moderate) 20-35 (Severe)   Quality of Life (QoL):  If you were to spend the rest of your life with your urinary condition just the way it is now, how would you feel about that? 3-Mixed   Urine Leakage (Incontinence) 4-Severe Leakage Problems       Post void residual bladder scan:   0 mL     Objective     Physical Exam:   Vital Signs:   Vitals:    06/02/25 0854   BP: 160/82   Pulse: 78   SpO2: 95%     There is no height or weight on file to calculate BMI.     Physical Exam  Constitutional:       Appearance: Normal appearance.   HENT:      Head: Normocephalic and atraumatic.      Nose: Nose normal.   Eyes:      Extraocular Movements: Extraocular movements intact.      Conjunctiva/sclera: Conjunctivae normal.      Pupils: Pupils are equal, round, and reactive to light.   Musculoskeletal:         General: Normal range of motion.      Cervical back: Normal range of motion and neck supple.   Skin:     General: Skin is warm and dry.      Findings: No lesion or rash.   Neurological:      General: No focal deficit present.      Mental Status: He is alert and oriented to person, place, and time. Mental status is at baseline.   Psychiatric:         Mood and Affect: Mood normal.         Behavior: Behavior normal.         Labs:   Brief Urine Lab Results  (Last result in the past 365 days)        Color   Clarity   Blood   Leuk Est   Nitrite   Protein   CREAT   Urine HCG        06/02/25 0853  Yellow   Clear   3+   Negative   Negative   1+                   Urine Culture          1/20/2025    16:05 1/22/2025    14:23 2/20/2025    14:47   Urine Culture   Urine Culture No growth  No growth  >100,000 CFU/mL Escherichia coli         Lab Results   Component Value Date    GLUCOSE 78 05/06/2025    CALCIUM 9.6 05/06/2025     05/06/2025    K 4.2 05/06/2025    CO2 25.9 05/06/2025     05/06/2025    BUN 22 05/06/2025    CREATININE 0.78 05/06/2025    EGFRIFNONA 96 02/17/2021    BCR 28.2 (H) 05/06/2025    ANIONGAP 12.1 05/06/2025       Lab Results   Component Value Date    WBC 17.49 (H) 05/06/2025    HGB 15.6 05/06/2025    HCT 50.1 05/06/2025    MCV 85.9 05/06/2025     05/06/2025       Images:   CT Abdomen Pelvis Without Contrast  Result Date: 2/19/2025  1.Left-sided hydroureteronephrosis. No stone is appreciated. No stent is appreciated. 2.1 mm nonobstructing left upper pole renal calculus. 3.Cholelithiasis without evidence of cholecystitis. 4.Colonic diverticulosis without evidence of diverticulitis. Electronically Signed: Luis Ragland MD  2/19/2025 10:55 PM EST  Workstation ID: [id number]      Measures:   Tobacco:   Aldair Narvaez  reports that he has never smoked. He has been exposed to tobacco smoke. He has never used smokeless tobacco.      Assessment / Plan      Assessment/Plan:   74 y.o. male who presented today for follow up of chronic left ureteral stricture managed with an indwelling ureteral stent which has failed multiple balloon dilation attempts.  He has a history of prostate cancer status post prostatectomy with undetectable PSA.  Patient will be scheduled for left ureteral stent exchange, risks benefits and alternatives discussed at length.  We will schedule this likely in late August versus early September.  This will be performed at the surgery center.  He can stay on his anticoagulation for the procedure given low risk of bleeding.    Diagnoses and all orders for this visit:    1.  Hydronephrosis of left kidney (Primary)  -     POC Urinalysis Dipstick, Automated    2. Ureteral stricture, left  -     POC Urinalysis Dipstick, Automated  -     External Facility Surgical/Procedural Request; Future           Follow Up:   Return for Scheduling stent exchange procedure in August.    I spent approximately 20 minutes providing clinical care for this patient; including review of patient's chart and provider documentation, face to face time spent with patient in examination room (obtaining history, performing physical exam, discussing diagnosis and management options), placing orders, and completing patient documentation.     Isma Justin MD  [facility] [place]

## 2025-06-09 RX ORDER — APIXABAN 5 MG/1
5 TABLET, FILM COATED ORAL EVERY 12 HOURS SCHEDULED
Qty: 180 TABLET | Refills: 1 | Status: SHIPPED | OUTPATIENT
Start: 2025-06-09

## 2025-06-09 NOTE — TELEPHONE ENCOUNTER
Rx Refill Note  Requested Prescriptions     Pending Prescriptions Disp Refills    Eliquis 5 MG tablet tablet 180 tablet 1     Sig: Take 1 tablet by mouth Every 12 (Twelve) Hours.      Last office visit with prescribing clinician: 5/8/2025   Last telemedicine visit with prescribing clinician: Visit date not found   Next office visit with prescribing clinician: 8/11/2025                        Pharmacy Info    Last Fill Date:  Rx Written Date:   Prescribed Qty:   Additional Details from Pharmacy:    Patient passed protocols per hu.         Alba Pope MA  06/09/25, 16:02 EDT

## 2025-06-13 ENCOUNTER — OFFICE VISIT (OUTPATIENT)
Dept: ENDOCRINOLOGY | Facility: CLINIC | Age: 75
End: 2025-06-13
Payer: MEDICARE

## 2025-06-13 VITALS
SYSTOLIC BLOOD PRESSURE: 140 MMHG | WEIGHT: 223 LBS | DIASTOLIC BLOOD PRESSURE: 68 MMHG | HEIGHT: 70 IN | OXYGEN SATURATION: 95 % | HEART RATE: 90 BPM | BODY MASS INDEX: 31.92 KG/M2

## 2025-06-13 DIAGNOSIS — Z79.4 TYPE 2 DIABETES MELLITUS WITH HYPERGLYCEMIA, WITH LONG-TERM CURRENT USE OF INSULIN: Primary | ICD-10-CM

## 2025-06-13 DIAGNOSIS — E11.65 TYPE 2 DIABETES MELLITUS WITH HYPERGLYCEMIA, WITH LONG-TERM CURRENT USE OF INSULIN: Primary | ICD-10-CM

## 2025-06-13 DIAGNOSIS — E78.2 MIXED HYPERLIPIDEMIA: ICD-10-CM

## 2025-06-13 DIAGNOSIS — I10 BENIGN HYPERTENSION: ICD-10-CM

## 2025-06-13 DIAGNOSIS — I25.10 CAD, MULTIPLE VESSEL: ICD-10-CM

## 2025-06-13 LAB
EXPIRATION DATE: ABNORMAL
EXPIRATION DATE: ABNORMAL
GLUCOSE BLDC GLUCOMTR-MCNC: 134 MG/DL (ref 70–130)
HBA1C MFR BLD: 8.2 % (ref 4.5–5.7)
Lab: ABNORMAL
Lab: ABNORMAL

## 2025-06-13 PROCEDURE — 82570 ASSAY OF URINE CREATININE: CPT | Performed by: INTERNAL MEDICINE

## 2025-06-13 PROCEDURE — 82043 UR ALBUMIN QUANTITATIVE: CPT | Performed by: INTERNAL MEDICINE

## 2025-06-13 RX ORDER — AVOBENZONE, HOMOSALATE, OCTISALATE, OCTOCRYLENE 30; 40; 45; 26 MG/ML; MG/ML; MG/ML; MG/ML
1 CREAM TOPICAL 3 TIMES DAILY
Qty: 300 EACH | Refills: 3 | Status: SHIPPED | OUTPATIENT
Start: 2025-06-13

## 2025-06-13 RX ORDER — BLOOD-GLUCOSE METER
1 KIT MISCELLANEOUS DAILY
Qty: 1 EACH | Refills: 0 | Status: SHIPPED | OUTPATIENT
Start: 2025-06-13

## 2025-06-13 NOTE — ASSESSMENT & PLAN NOTE
Diabetes is improving with treatment.  He reports recent fasting FSBS have been <120.  Continue current treatment regimen.  Adjust insulin as needed.  Work on diet.  We discussed CGM but he didn't want to wear one at this time.  Diabetes will be reassessed in 6 months.

## 2025-06-13 NOTE — PROGRESS NOTES
"     Office Note      Date: 2025  Patient Name: Aldair Narvaez  MRN: [id number]  : 1950    Chief Complaint   Patient presents with    Diabetes     Type 2 diabetes mellitus with hyperglycemia, without long-term current use of insulin    Hypertension    Hyperlipidemia       History of Present Illness:   Aldair Narvaez is a 74 y.o. male who presents for Diabetes type 2. Diagnosed in: . Treated in past with oral agents. Current treatments: basal insulin, farxiga and glimepiride. Number of insulin shots per day: one. Checks blood sugar 3x per day. Has low blood sugar: no. Aspirin use: No - on eliquis. Statin use: Yes. ACE-I/ARB use: Yes. Changes in health since last visit: rotator cuff injury; cystoscopy. Last eye exam 2024.     Subjective      Diabetic Complications:  Eyes: No  Kidneys: No  Feet: No  Heart: Yes - stents and CABG    Diet and Exercise:  Meals per day: 2  Minutes of exercise per week: 0 mins.    Review of Systems:   Review of Systems   Constitutional: Negative.    Cardiovascular: Negative.    Gastrointestinal: Negative.    Endocrine: Negative.        The following portions of the patient's history were reviewed and updated as appropriate: allergies, current medications, past family history, past medical history, past social history, past surgical history, and problem list.    Objective     Visit Vitals  /68 (BP Location: Left arm, Patient Position: Sitting, Cuff Size: Adult)   Pulse 90   Ht 177.8 cm (70\")   Wt 101 kg (223 lb)   SpO2 95%   BMI 32.00 kg/m²       Physical Exam:  Physical Exam  Constitutional:       Appearance: Normal appearance.   Cardiovascular:      Pulses:           Dorsalis pedis pulses are 1+ on the right side and 1+ on the left side.        Posterior tibial pulses are 1+ on the right side and 1+ on the left side.   Feet:      Right foot:      Protective Sensation: 5 sites tested.  5 sites sensed.      Skin integrity: Skin integrity normal.      Toenail Condition: " "Right toenails are abnormally thick. Fungal disease present.     Left foot:      Protective Sensation: 5 sites tested.  5 sites sensed.      Skin integrity: Skin integrity normal.      Toenail Condition: Left toenails are abnormally thick. Fungal disease present.  Neurological:      Mental Status: He is alert.         Labs:    HbA1c  Lab Results   Component Value Date    HGBA1C 8.2 (A) 06/13/2025       CMP  Lab Results   Component Value Date    GLUCOSE 78 05/06/2025    BUN 22 05/06/2025    CREATININE 0.78 05/06/2025    EGFRIFNONA 96 02/17/2021    BCR 28.2 (H) 05/06/2025    K 4.2 05/06/2025    CO2 25.9 05/06/2025    CALCIUM 9.6 05/06/2025    AST 22 05/06/2025    ALT 29 05/06/2025        Lipid Panel  Lab Results   Component Value Date    HDL Cholesterol 40 05/06/2025    LDL Cholesterol  91 05/06/2025    LDL/HDL Ratio 2.18 05/06/2025    Triglycerides 149 05/06/2025        TSH  Lab Results   Component Value Date    TSH 1.790 12/13/2024        Hemoglobin A1C  No components found for: \"HGBA1C\"     Microalbumin/Creatinine  Lab Results   Component Value Date    MALBCRERATIO 216.0 (H) 06/13/2025    MICROALBUR 49.6 06/13/2025           Assessment / Plan      Assessment & Plan:  Diagnoses and all orders for this visit:    1. Type 2 diabetes mellitus with hyperglycemia, with long-term current use of insulin (Primary)  Assessment & Plan:  Diabetes is improving with treatment.  He reports recent fasting FSBS have been <120.  Continue current treatment regimen.  Adjust insulin as needed.  Work on diet.  We discussed CGM but he didn't want to wear one at this time.  Diabetes will be reassessed in 6 months.    Orders:  -     POC Glucose, Blood  -     POC Glycosylated Hemoglobin (Hb A1C)  -     Microalbumin / Creatinine Urine Ratio - Urine, Clean Catch; Future  -     Microalbumin / Creatinine Urine Ratio - Urine, Clean Catch    2. Benign hypertension  Assessment & Plan:  BP improved.  Continue current meds.  Monitor BP at " home.      3. Mixed hyperlipidemia  Assessment & Plan:  Continue statin and ezetimibe.  Recent lipids much improved.      4. CAD, multiple vessel  Assessment & Plan:  Continue eliquis, statin, ezetimibe and SGLT-2 inhibitor.      Other orders  -     glucose blood test strip; Use TID; ICD-10 E11.65, Z79.4  Dispense: 300 each; Refill: 3  -     glucose monitor monitoring kit; Use 1 each Daily.  Dispense: 1 each; Refill: 0  -     Lancets misc; Use 1 each 3 times a day.  Dispense: 300 each; Refill: 3      Current Outpatient Medications   Medication Instructions    acetaminophen (TYLENOL) 650 mg, Oral, Every 6 Hours PRN    albuterol sulfate  (90 Base) MCG/ACT inhaler As Needed    Blood Glucose Monitoring Suppl device Use to check blood sugars 3-4 times daily. E11.65    dapagliflozin Propanediol (FARXIGA) 10 mg, Oral, Daily    DULoxetine (CYMBALTA) 30 mg, Daily    Eliquis 5 mg, Oral, Every 12 Hours Scheduled    ezetimibe (ZETIA) 10 mg, Oral, Daily    glimepiride (AMARYL) 8 mg, Oral, Every Morning Before Breakfast    glucose blood test strip Use TID; ICD-10 E11.65, Z79.4    glucose monitor monitoring kit 1 each, Not Applicable, Daily    HYDROcodone-acetaminophen (NORCO) 5-325 MG per tablet 1 tablet, As Needed    insulin degludec (Tresiba FlexTouch) 100 UNIT/ML solution pen-injector injection Inject once daily; max daily dose 50u    Insulin Pen Needle (Pen Needles) 32G X 4 MM misc 1 each, Not Applicable, Daily    ipratropium-albuterol (DUO-NEB) 0.5-2.5 mg/3 ml nebulizer 3 mL, Nebulization, Every 6 Hours PRN    Krill Oil Ultra Strength 1500 MG capsule 1 capsule, Daily    Lancets misc 1 each, Not Applicable, 3 times daily    metoprolol succinate XL (TOPROL-XL) 50 mg, Daily    Misc Natural Products (Magic Mushroom Mix) capsule 1,000 mg, Daily    nitroglycerin (NITROSTAT) 0.4 MG SL tablet     pantoprazole (PROTONIX) 40 mg, Daily    rosuvastatin (CRESTOR) 40 mg, Oral, Daily    sodium chloride 0.65 % nasal spray 2 sprays,  Nasal, As Needed    valsartan-hydrochlorothiazide (Diovan HCT) 320-12.5 MG per tablet 1 tablet, Oral, Daily      Return in about 6 months (around 12/13/2025) for Recheck with A1c.    Electronically signed by: Steve Cohen MD  06/13/2025

## 2025-06-14 ENCOUNTER — RESULTS FOLLOW-UP (OUTPATIENT)
Dept: ENDOCRINOLOGY | Facility: CLINIC | Age: 75
End: 2025-06-14
Payer: MEDICARE

## 2025-06-14 LAB
ALBUMIN UR-MCNC: 49.6 MG/DL
CREAT UR-MCNC: 229.6 MG/DL
MICROALBUMIN/CREAT UR: 216 MG/G (ref 0–29)

## 2025-07-30 RX ORDER — DAPAGLIFLOZIN 10 MG/1
1 TABLET, FILM COATED ORAL DAILY
Qty: 90 TABLET | Refills: 1 | Status: SHIPPED | OUTPATIENT
Start: 2025-07-30 | End: 2025-08-01

## 2025-07-30 NOTE — TELEPHONE ENCOUNTER
Rx Refill Note  Requested Prescriptions      No prescriptions requested or ordered in this encounter          Last office visit with prescribing clinician: 6/13/2025     Next office visit with prescribing clinician: 2/6/2026         Nohelia Chang MA  07/30/25, 14:06 EDT

## 2025-08-01 RX ORDER — DAPAGLIFLOZIN 10 MG/1
1 TABLET, FILM COATED ORAL DAILY
Qty: 90 TABLET | Refills: 2 | Status: SHIPPED | OUTPATIENT
Start: 2025-08-01

## 2025-08-01 NOTE — TELEPHONE ENCOUNTER
Rx Refill Note  Requested Prescriptions     Pending Prescriptions Disp Refills    dapagliflozin Propanediol (Farxiga) 10 MG tablet 90 tablet 2     Sig: Take 10 mg by mouth Daily.      Last office visit with prescribing clinician: 6/13/2025   Last telemedicine visit with prescribing clinician: Visit date not found   Next office visit with prescribing clinician: 2/6/2026                         Would you like a call back once the refill request has been completed: [] Yes [] No    If the office needs to give you a call back, can they leave a voicemail: [] Yes [] No    Jeanine Arauz MA  08/01/25, 11:33 EDT

## 2025-08-08 ENCOUNTER — LAB (OUTPATIENT)
Dept: LAB | Facility: HOSPITAL | Age: 75
End: 2025-08-08
Payer: MEDICARE

## 2025-08-08 DIAGNOSIS — T69.1XXA CHILBLAINS, INITIAL ENCOUNTER: ICD-10-CM

## 2025-08-08 DIAGNOSIS — I10 BENIGN HYPERTENSION: ICD-10-CM

## 2025-08-08 DIAGNOSIS — E78.2 MIXED HYPERLIPIDEMIA: ICD-10-CM

## 2025-08-08 DIAGNOSIS — I48.0 PAROXYSMAL A-FIB: ICD-10-CM

## 2025-08-08 DIAGNOSIS — I25.10 CAD, MULTIPLE VESSEL: ICD-10-CM

## 2025-08-08 LAB
ANION GAP SERPL CALCULATED.3IONS-SCNC: 13 MMOL/L (ref 5–15)
BUN SERPL-MCNC: 14 MG/DL (ref 8–23)
BUN/CREAT SERPL: 12.7 (ref 7–25)
CALCIUM SPEC-SCNC: 9.6 MG/DL (ref 8.6–10.5)
CHLORIDE SERPL-SCNC: 100 MMOL/L (ref 98–107)
CHOLEST SERPL-MCNC: 160 MG/DL (ref 0–200)
CO2 SERPL-SCNC: 26 MMOL/L (ref 22–29)
CREAT SERPL-MCNC: 1.1 MG/DL (ref 0.76–1.27)
EGFRCR SERPLBLD CKD-EPI 2021: 71.3 ML/MIN/1.73
GLUCOSE SERPL-MCNC: 147 MG/DL (ref 65–99)
HDLC SERPL-MCNC: 28 MG/DL (ref 40–60)
LDLC SERPL CALC-MCNC: 86 MG/DL (ref 0–100)
LDLC/HDLC SERPL: 2.76 {RATIO}
POTASSIUM SERPL-SCNC: 4.5 MMOL/L (ref 3.5–5.2)
SODIUM SERPL-SCNC: 139 MMOL/L (ref 136–145)
TRIGL SERPL-MCNC: 273 MG/DL (ref 0–150)
VLDLC SERPL-MCNC: 46 MG/DL (ref 5–40)

## 2025-08-08 PROCEDURE — 80061 LIPID PANEL: CPT

## 2025-08-08 PROCEDURE — 80048 BASIC METABOLIC PNL TOTAL CA: CPT

## 2025-08-08 PROCEDURE — 36415 COLL VENOUS BLD VENIPUNCTURE: CPT

## 2025-08-08 PROCEDURE — 85025 COMPLETE CBC W/AUTO DIFF WBC: CPT

## 2025-08-08 PROCEDURE — 86038 ANTINUCLEAR ANTIBODIES: CPT

## 2025-08-08 PROCEDURE — 86225 DNA ANTIBODY NATIVE: CPT

## 2025-08-09 LAB
BASOPHILS # BLD AUTO: 0.07 10*3/MM3 (ref 0–0.2)
BASOPHILS NFR BLD AUTO: 0.8 % (ref 0–1.5)
DEPRECATED RDW RBC AUTO: 51.2 FL (ref 37–54)
EOSINOPHIL # BLD AUTO: 0.41 10*3/MM3 (ref 0–0.4)
EOSINOPHIL NFR BLD AUTO: 4.4 % (ref 0.3–6.2)
ERYTHROCYTE [DISTWIDTH] IN BLOOD BY AUTOMATED COUNT: 16.3 % (ref 12.3–15.4)
HCT VFR BLD AUTO: 46.1 % (ref 37.5–51)
HGB BLD-MCNC: 14.9 G/DL (ref 13–17.7)
IMM GRANULOCYTES # BLD AUTO: 0.05 10*3/MM3 (ref 0–0.05)
IMM GRANULOCYTES NFR BLD AUTO: 0.5 % (ref 0–0.5)
LYMPHOCYTES # BLD AUTO: 2.36 10*3/MM3 (ref 0.7–3.1)
LYMPHOCYTES NFR BLD AUTO: 25.3 % (ref 19.6–45.3)
MCH RBC QN AUTO: 28.2 PG (ref 26.6–33)
MCHC RBC AUTO-ENTMCNC: 32.3 G/DL (ref 31.5–35.7)
MCV RBC AUTO: 87.1 FL (ref 79–97)
MONOCYTES # BLD AUTO: 0.76 10*3/MM3 (ref 0.1–0.9)
MONOCYTES NFR BLD AUTO: 8.1 % (ref 5–12)
NEUTROPHILS NFR BLD AUTO: 5.68 10*3/MM3 (ref 1.7–7)
NEUTROPHILS NFR BLD AUTO: 60.9 % (ref 42.7–76)
NRBC BLD AUTO-RTO: 0 /100 WBC (ref 0–0.2)
PLATELET # BLD AUTO: 255 10*3/MM3 (ref 140–450)
PMV BLD AUTO: 9.8 FL (ref 6–12)
RBC # BLD AUTO: 5.29 10*6/MM3 (ref 4.14–5.8)
WBC NRBC COR # BLD AUTO: 9.33 10*3/MM3 (ref 3.4–10.8)

## 2025-08-11 ENCOUNTER — OFFICE VISIT (OUTPATIENT)
Age: 75
End: 2025-08-11
Payer: MEDICARE

## 2025-08-11 VITALS
BODY MASS INDEX: 32.28 KG/M2 | HEIGHT: 70 IN | HEART RATE: 105 BPM | WEIGHT: 225.5 LBS | DIASTOLIC BLOOD PRESSURE: 96 MMHG | SYSTOLIC BLOOD PRESSURE: 168 MMHG

## 2025-08-11 DIAGNOSIS — T69.1XXA CHILBLAINS, INITIAL ENCOUNTER: ICD-10-CM

## 2025-08-11 DIAGNOSIS — I48.0 PAROXYSMAL A-FIB: ICD-10-CM

## 2025-08-11 DIAGNOSIS — E78.2 MIXED HYPERLIPIDEMIA: ICD-10-CM

## 2025-08-11 DIAGNOSIS — I25.10 CAD, MULTIPLE VESSEL: Primary | ICD-10-CM

## 2025-08-11 DIAGNOSIS — I10 BENIGN HYPERTENSION: ICD-10-CM

## 2025-08-11 LAB
ANA SER QL: POSITIVE
DSDNA AB SER-ACNC: 5 IU/ML (ref 0–9)
Lab: NORMAL

## 2025-08-11 PROCEDURE — 99214 OFFICE O/P EST MOD 30 MIN: CPT | Performed by: INTERNAL MEDICINE

## 2025-08-11 PROCEDURE — 3080F DIAST BP >= 90 MM HG: CPT | Performed by: INTERNAL MEDICINE

## 2025-08-11 PROCEDURE — 1159F MED LIST DOCD IN RCRD: CPT | Performed by: INTERNAL MEDICINE

## 2025-08-11 PROCEDURE — 3077F SYST BP >= 140 MM HG: CPT | Performed by: INTERNAL MEDICINE

## 2025-08-11 PROCEDURE — 1160F RVW MEDS BY RX/DR IN RCRD: CPT | Performed by: INTERNAL MEDICINE

## 2025-08-11 RX ORDER — BLOOD-GLUCOSE METER
EACH MISCELLANEOUS
COMMUNITY
Start: 2025-06-13

## 2025-08-11 RX ORDER — ROSUVASTATIN CALCIUM 40 MG/1
40 TABLET, COATED ORAL DAILY
Qty: 90 TABLET | Refills: 1 | Status: SHIPPED | OUTPATIENT
Start: 2025-08-11

## 2025-08-14 RX ORDER — EZETIMIBE 10 MG/1
10 TABLET ORAL DAILY
Qty: 90 TABLET | Refills: 1 | Status: SHIPPED | OUTPATIENT
Start: 2025-08-14 | End: 2025-08-18 | Stop reason: SDUPTHER

## 2025-08-18 RX ORDER — EZETIMIBE 10 MG/1
10 TABLET ORAL DAILY
Qty: 90 TABLET | Refills: 1 | Status: SHIPPED | OUTPATIENT
Start: 2025-08-18

## 2025-08-20 ENCOUNTER — OUTSIDE FACILITY SERVICE (OUTPATIENT)
Dept: UROLOGY | Facility: CLINIC | Age: 75
End: 2025-08-20
Payer: MEDICARE

## 2025-08-20 ENCOUNTER — TELEPHONE (OUTPATIENT)
Age: 75
End: 2025-08-20
Payer: MEDICARE

## 2025-08-20 ENCOUNTER — DOCUMENTATION (OUTPATIENT)
Age: 75
End: 2025-08-20
Payer: MEDICARE

## 2025-08-20 DIAGNOSIS — N13.5 URETERAL STRICTURE, LEFT: Primary | ICD-10-CM

## 2025-08-20 PROCEDURE — 74420 UROGRAPHY RTRGR +-KUB: CPT | Performed by: STUDENT IN AN ORGANIZED HEALTH CARE EDUCATION/TRAINING PROGRAM

## 2025-08-20 PROCEDURE — 52332 CYSTOSCOPY AND TREATMENT: CPT | Performed by: STUDENT IN AN ORGANIZED HEALTH CARE EDUCATION/TRAINING PROGRAM

## 2025-08-20 PROCEDURE — 52351 CYSTOURETERO & OR PYELOSCOPE: CPT | Performed by: STUDENT IN AN ORGANIZED HEALTH CARE EDUCATION/TRAINING PROGRAM

## 2025-08-20 RX ORDER — CEFUROXIME AXETIL 500 MG/1
500 TABLET ORAL 2 TIMES DAILY
Qty: 10 TABLET | Refills: 0 | Status: SHIPPED | OUTPATIENT
Start: 2025-08-20

## 2025-08-20 RX ORDER — PHENAZOPYRIDINE HYDROCHLORIDE 200 MG/1
200 TABLET, FILM COATED ORAL 3 TIMES DAILY PRN
Qty: 20 TABLET | Refills: 0 | Status: SHIPPED | OUTPATIENT
Start: 2025-08-20

## 2025-08-27 RX ORDER — VALSARTAN AND HYDROCHLOROTHIAZIDE 320; 12.5 MG/1; MG/1
1 TABLET, FILM COATED ORAL DAILY
Qty: 90 TABLET | Refills: 1 | Status: SHIPPED | OUTPATIENT
Start: 2025-08-27

## (undated) DEVICE — BLANKT WARM UPPR/BDY ARM/OUT 57X196CM

## (undated) DEVICE — PK CYSTO-TUR BASIC 10

## (undated) DEVICE — NITINOL WIRE WITH HYDROPHILIC TIP: Brand: SENSOR

## (undated) DEVICE — GOWN,NON-REINFORCED,SIRUS,SET IN SLV,XXL: Brand: MEDLINE

## (undated) DEVICE — BALLOON DILATATION CATHETER KIT: Brand: UROMAX ULTRA KIT

## (undated) DEVICE — CATH URETRL FLXITP POLLACK STD 5F 70CM

## (undated) DEVICE — GLV SURG BIOGEL LTX PF 8

## (undated) DEVICE — SYR LUERLOK 30CC

## (undated) DEVICE — SYR LL TP 10ML STRL

## (undated) DEVICE — CVR FTSWITCH UNIV